# Patient Record
Sex: MALE | Race: WHITE | NOT HISPANIC OR LATINO | Employment: OTHER | URBAN - METROPOLITAN AREA
[De-identification: names, ages, dates, MRNs, and addresses within clinical notes are randomized per-mention and may not be internally consistent; named-entity substitution may affect disease eponyms.]

---

## 2017-07-11 ENCOUNTER — OFFICE VISIT (OUTPATIENT)
Dept: NEUROLOGY | Facility: CLINIC | Age: 37
End: 2017-07-11
Payer: MEDICARE

## 2017-07-11 VITALS
DIASTOLIC BLOOD PRESSURE: 68 MMHG | BODY MASS INDEX: 29.27 KG/M2 | HEART RATE: 84 BPM | WEIGHT: 228 LBS | OXYGEN SATURATION: 96 % | SYSTOLIC BLOOD PRESSURE: 105 MMHG

## 2017-07-11 DIAGNOSIS — G40.909 SEIZURE DISORDER (H): Primary | ICD-10-CM

## 2017-07-11 LAB
ALBUMIN SERPL-MCNC: 3.8 G/DL (ref 3.4–5)
ALP SERPL-CCNC: 38 U/L (ref 40–150)
ALT SERPL W P-5'-P-CCNC: 17 U/L (ref 0–70)
ANION GAP SERPL CALCULATED.3IONS-SCNC: 5 MMOL/L (ref 3–14)
AST SERPL W P-5'-P-CCNC: 11 U/L (ref 0–45)
BASOPHILS # BLD AUTO: 0 10E9/L (ref 0–0.2)
BASOPHILS NFR BLD AUTO: 0.2 %
BILIRUB SERPL-MCNC: 0.3 MG/DL (ref 0.2–1.3)
BUN SERPL-MCNC: 15 MG/DL (ref 7–30)
CALCIUM SERPL-MCNC: 9.5 MG/DL (ref 8.5–10.1)
CHLORIDE SERPL-SCNC: 107 MMOL/L (ref 94–109)
CO2 SERPL-SCNC: 26 MMOL/L (ref 20–32)
CREAT SERPL-MCNC: 0.9 MG/DL (ref 0.66–1.25)
DIFFERENTIAL METHOD BLD: ABNORMAL
EOSINOPHIL # BLD AUTO: 0.1 10E9/L (ref 0–0.7)
EOSINOPHIL NFR BLD AUTO: 2 %
ERYTHROCYTE [DISTWIDTH] IN BLOOD BY AUTOMATED COUNT: 12.7 % (ref 10–15)
GFR SERPL CREATININE-BSD FRML MDRD: ABNORMAL ML/MIN/1.7M2
GLUCOSE SERPL-MCNC: 90 MG/DL (ref 70–99)
HCT VFR BLD AUTO: 38.3 % (ref 40–53)
HGB BLD-MCNC: 13 G/DL (ref 13.3–17.7)
LYMPHOCYTES # BLD AUTO: 1.7 10E9/L (ref 0.8–5.3)
LYMPHOCYTES NFR BLD AUTO: 35.2 %
MCH RBC QN AUTO: 32.4 PG (ref 26.5–33)
MCHC RBC AUTO-ENTMCNC: 33.9 G/DL (ref 31.5–36.5)
MCV RBC AUTO: 96 FL (ref 78–100)
MONOCYTES # BLD AUTO: 0.7 10E9/L (ref 0–1.3)
MONOCYTES NFR BLD AUTO: 13.9 %
NEUTROPHILS # BLD AUTO: 2.4 10E9/L (ref 1.6–8.3)
NEUTROPHILS NFR BLD AUTO: 48.7 %
PLATELET # BLD AUTO: 149 10E9/L (ref 150–450)
POTASSIUM SERPL-SCNC: 3.9 MMOL/L (ref 3.4–5.3)
PROT SERPL-MCNC: 7.5 G/DL (ref 6.8–8.8)
RBC # BLD AUTO: 4.01 10E12/L (ref 4.4–5.9)
SODIUM SERPL-SCNC: 138 MMOL/L (ref 133–144)
VALPROATE SERPL-MCNC: 117 MG/L (ref 50–100)
WBC # BLD AUTO: 4.9 10E9/L (ref 4–11)

## 2017-07-11 PROCEDURE — 80053 COMPREHEN METABOLIC PANEL: CPT | Performed by: PSYCHIATRY & NEUROLOGY

## 2017-07-11 PROCEDURE — 99000 SPECIMEN HANDLING OFFICE-LAB: CPT | Performed by: PSYCHIATRY & NEUROLOGY

## 2017-07-11 PROCEDURE — 80201 ASSAY OF TOPIRAMATE: CPT | Mod: 90 | Performed by: PSYCHIATRY & NEUROLOGY

## 2017-07-11 PROCEDURE — 36415 COLL VENOUS BLD VENIPUNCTURE: CPT | Performed by: PSYCHIATRY & NEUROLOGY

## 2017-07-11 PROCEDURE — 99213 OFFICE O/P EST LOW 20 MIN: CPT | Performed by: PSYCHIATRY & NEUROLOGY

## 2017-07-11 PROCEDURE — 80164 ASSAY DIPROPYLACETIC ACD TOT: CPT | Performed by: PSYCHIATRY & NEUROLOGY

## 2017-07-11 PROCEDURE — 85025 COMPLETE CBC W/AUTO DIFF WBC: CPT | Performed by: PSYCHIATRY & NEUROLOGY

## 2017-07-11 NOTE — PROGRESS NOTES
This patient is seen in follow-up with the chronic encephalopathy and seizure disorder. I last saw him a year ago. He is here today with his  Noamy . By Naomy's report the patient has had no seizures in the last year. His medicines include topiramate 150 mg twice a day and Depakote 500 mg extended release twice a day. He also takes sertraline 150 mg a day, lithium 150 mg in the morning, and risperidone. He has moved to a new group home in [place].    On exam his blood pressures 105/68. The patient is cheerful and interactive to a limited degree. He seems mostly engaged in self stimulatory behavior. There has been no new lab work performed since last year as far as is known.    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 I am going to recheck labs including topiramate and valproate drug levels, CBC, and comprehensive metabolic panel. Follow-up will be in one year. I will communicate the results to his group home when available.      7/14 Addendum: reviewed labs with Naomy at group home.  Topiramate level 6, VPA level 117, Hgb slightly low at 13, and it has been low like that in the past.  plt count okay.  CMP normal.  No change in meds. F/u in one year.

## 2017-07-11 NOTE — NURSING NOTE
"Duane D Backes's goals for this visit include:   Chief Complaint   Patient presents with     RECHECK       He requests these members of his care team be copied on today's visit information: yes     PCP: Rajat Parkinson    Referring Provider:  No referring provider defined for this encounter.    Chief Complaint   Patient presents with     RECHECK       Initial /68  Pulse 84  Wt 103.4 kg (228 lb)  SpO2 96%  BMI 29.27 kg/m2 Estimated body mass index is 29.27 kg/(m^2) as calculated from the following:    Height as of 8/12/16: 1.88 m (6' 2\").    Weight as of this encounter: 103.4 kg (228 lb).  Medication Reconciliation: complete    Do you need any medication refills at today's visit?       "

## 2017-07-11 NOTE — MR AVS SNAPSHOT
After Visit Summary   7/11/2017    Duane D Backes    MRN: [id number]           Patient Information     Date Of Birth          1980        Visit Information        Provider Department      7/11/2017 3:00 PM Davidson Villar MD [facility]        Today's Diagnoses     Seizure disorder (H)    -  1       Follow-ups after your visit        Follow-up notes from your care team     Return in about 1 year (around 7/11/2018).      Your next 10 appointments already scheduled     Jul 10, 2018  3:30 PM CDT   Return Visit with Davidson Villar MD   [facility] ([facility])    [address] [place] [address] [phone]              Who to contact     If you have questions or need follow up information about today's clinic visit or your schedule please contact [facility] directly at [phone].  Normal or non-critical lab and imaging results will be communicated to you by MyChart, letter or phone within 4 business days after the clinic has received the results. If you do not hear from us within 7 days, please contact the clinic through [email or web address] Ã¨â€¹Â±Ã¨â€¦Â¾Ã§Â½â€˜hart or phone. If you have a critical or abnormal lab result, we will notify you by phone as soon as possible.  Submit refill requests through [employer] or call your pharmacy and they will forward the refill request to us. Please allow 3 business days for your refill to be completed.          Additional Information About Your Visit        MyChart Information     [employer] is an electronic gateway that provides easy, online access to your medical records. With [employer], you can request a clinic appointment, read your test results, renew a prescription or communicate with your care team.     To sign up for [employer] visit the website at [email or web address]   You will be asked to enter the access code listed below, as well as some personal information. Please follow the directions  to create your username and password.     Your access code is: [email or web address]  Expires: 10/9/2017  3:30 PM     Your access code will  in 90 days. If you need help or a new code, please contact your [facility] or call [phone] for assistance.        Care EveryWhere ID     This is your Care EveryWhere ID. This could be used by other organizations to access your [facility] medical records  [id number]        Your Vitals Were     Pulse Pulse Oximetry BMI (Body Mass Index)             84 96% 29.27 kg/m2          Blood Pressure from Last 3 Encounters:   17 105/68   16 117/72   16 114/71    Weight from Last 3 Encounters:   17 103.4 kg (228 lb)   16 102.1 kg (225 lb)   16 98.4 kg (217 lb)              We Performed the Following     CBC with platelets differential     Comprehensive metabolic panel     Topiramate Level     Valproic acid        Primary Care Provider Office Phone # Fax #    Rajat Parkinson [phone]       [facility] [address]  [facility] [address]        Equal Access to Services     MIKEY FROST : Hadii aad ku hadasho Soomaali, waaxda luqadaha, qaybta kaalmada adeegyada, waxay juan davidin hayfilomenan adán vicente. So [facility] [phone].    ATENCIÓN: Si habla español, tiene a mcnally disposición servicios gratuitos de asistencia lingüística. Llame al [phone].    We comply with applicable federal civil rights laws and [place] laws. We do not discriminate on the basis of race, color, national origin, age, disability sex, sexual orientation or gender identity.            Thank you!     Thank you for choosing [facility]  for your care. Our goal is always to provide you with excellent care. Hearing back from our patients is one way we can continue to improve our services. Please take a few minutes to complete the written survey that you may receive in the mail after your visit with us.  Thank you!             Your Updated Medication List - Protect others around you: Learn how to safely use, store and throw away your medicines at www.disposemymeds.org.          This list is accurate as of: 7/11/17  3:30 PM.  Always use your most recent med list.                   Brand Name Dispense Instructions for use Diagnosis    Briefs Large Misc     90    use prn during night for incontinence    Moderate intellectual disabilities, Autistic disorder, current or active state       diazepam 2 MG tablet    VALIUM    10 tablet    Take 1 tablet (2 mg) by mouth once as needed (60 minutes prior to medical or dental appointment)    Active autistic disorder, Moderate mental retardation, Obsessive-compulsive disorder       divalproex 500 MG 24 hr tablet    DEPAKOTE ER     Take 3 tablets by mouth 2 times daily.        docusate sodium 100 MG capsule    COLACE     Take 1 capsule by mouth 2 times daily.        EUCERIN Lotn      Externally apply topically daily        fish oil-omega-3 fatty acids 1000 MG capsule     60    1 PO BID    Autistic disorder, current or active state       lactulose 10 GM/15ML solution    CHRONULAC     Take 30 mLs by mouth daily. & additional 30mL 2 times per day as needed        levothyroxine 175 MCG tablet    SYNTHROID/LEVOTHROID    30 tablet    Take 1 tablet (175 mcg) by mouth daily for thyroid.    Hypothyroidism       lithium 150 MG capsule      150 mg 1 cap a.m., ., 3 caps bedtime        * MILK OF MAGNESIA PO      Take  by mouth. 30 cc every PM.        * MILK OF MAGNESIA 400 MG/5ML suspension   Generic drug:  magnesium hydroxide           polyethylene glycol powder    MIRALAX    510 g    Take 17 g by mouth daily.    Constipation       RISPERDAL 2 MG tablet   Generic drug:  risperiDONE      Take 2 mg by mouth 1 tab every morning & 1 tab every bedtime        sertraline 100 MG tablet    ZOLOFT     Take 1.5 tablets by mouth every morning.        * topiramate 100 MG tablet    TOPAMAX     Take 1 tablet  by mouth 2 times daily.        * topiramate 50 MG tablet    TOPAMAX    60 tablet    Take 1 tablet (50 mg) by mouth 2 times daily    Generalized convulsive epilepsy (H)       Vitamin D (Cholecalciferol) 1000 UNITS Caps     90 capsule    Take 3,000 Units by mouth daily    Vitamin D deficiency       * Notice:  This list has 4 medication(s) that are the same as other medications prescribed for you. Read the directions carefully, and ask your doctor or other care provider to review them with you.

## 2017-07-11 NOTE — LETTER
7/11/2017      RE: Duane D Backes  [address]  [facility] [address]       This patient is seen in follow-up with the chronic encephalopathy and seizure disorder. I last saw him a year ago. He is here today with his  Naomy . By Naomy's report the patient has had no seizures in the last year. His medicines include topiramate 150 mg twice a day and Depakote 500 mg extended release twice a day. He also takes sertraline 150 mg a day, lithium 150 mg in the morning, and risperidone. He has moved to a new group home in [place].    On exam his blood pressures 105/68. The patient is cheerful and interactive to a limited degree. He seems mostly engaged in self stimulatory behavior. There has been no new lab work performed since last year as far as is known.    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 I am going to recheck labs including topiramate and valproate drug levels, CBC, and comprehensive metabolic panel. Follow-up will be in one year. I will communicate the results to his group home when available.      7/14 Addendum: reviewed labs with Naomy at group home.  Topiramate level 6, VPA level 117, Hgb slightly low at 13, and it has been low like that in the past.  plt count okay.  CMP normal.  No change in meds. F/u in one year.    Davidson Villar MD

## 2017-07-13 LAB — TOPIRAMATE SERPL-MCNC: 6 UG/ML

## 2017-08-03 ENCOUNTER — MEDICAL CORRESPONDENCE (OUTPATIENT)
Dept: HEALTH INFORMATION MANAGEMENT | Facility: CLINIC | Age: 37
End: 2017-08-03

## 2017-08-15 ENCOUNTER — OFFICE VISIT (OUTPATIENT)
Dept: FAMILY MEDICINE | Facility: CLINIC | Age: 37
End: 2017-08-15
Payer: MEDICARE

## 2017-08-15 VITALS
DIASTOLIC BLOOD PRESSURE: 73 MMHG | HEART RATE: 75 BPM | HEIGHT: 74 IN | SYSTOLIC BLOOD PRESSURE: 120 MMHG | TEMPERATURE: 97.5 F | WEIGHT: 225.6 LBS | OXYGEN SATURATION: 98 % | BODY MASS INDEX: 28.95 KG/M2

## 2017-08-15 DIAGNOSIS — E03.4 HYPOTHYROIDISM DUE TO ACQUIRED ATROPHY OF THYROID: ICD-10-CM

## 2017-08-15 DIAGNOSIS — E66.3 OVERWEIGHT (BMI 25.0-29.9): ICD-10-CM

## 2017-08-15 DIAGNOSIS — Z00.00 ENCOUNTER FOR ROUTINE ADULT HEALTH EXAMINATION WITHOUT ABNORMAL FINDINGS: Primary | ICD-10-CM

## 2017-08-15 DIAGNOSIS — Z13.1 SCREENING FOR DIABETES MELLITUS: ICD-10-CM

## 2017-08-15 DIAGNOSIS — Z86.39 PERSONAL HISTORY OF OTHER ENDOCRINE, NUTRITIONAL AND METABOLIC DISEASE: ICD-10-CM

## 2017-08-15 DIAGNOSIS — Z13.6 CARDIOVASCULAR SCREENING; LDL GOAL LESS THAN 160: ICD-10-CM

## 2017-08-15 LAB
CHOLEST SERPL-MCNC: 152 MG/DL
GLUCOSE SERPL-MCNC: 91 MG/DL (ref 70–99)
HBA1C MFR BLD: 4.9 % (ref 4.3–6)
HDLC SERPL-MCNC: 60 MG/DL
LDLC SERPL CALC-MCNC: 73 MG/DL
NONHDLC SERPL-MCNC: 92 MG/DL
T4 FREE SERPL-MCNC: 0.85 NG/DL (ref 0.76–1.46)
TRIGL SERPL-MCNC: 94 MG/DL
TSH SERPL DL<=0.005 MIU/L-ACNC: 4.38 MU/L (ref 0.4–4)

## 2017-08-15 PROCEDURE — 80061 LIPID PANEL: CPT | Performed by: FAMILY MEDICINE

## 2017-08-15 PROCEDURE — 82947 ASSAY GLUCOSE BLOOD QUANT: CPT | Performed by: FAMILY MEDICINE

## 2017-08-15 PROCEDURE — 83036 HEMOGLOBIN GLYCOSYLATED A1C: CPT | Performed by: FAMILY MEDICINE

## 2017-08-15 PROCEDURE — 36415 COLL VENOUS BLD VENIPUNCTURE: CPT | Performed by: FAMILY MEDICINE

## 2017-08-15 PROCEDURE — 99395 PREV VISIT EST AGE 18-39: CPT | Performed by: FAMILY MEDICINE

## 2017-08-15 PROCEDURE — 84443 ASSAY THYROID STIM HORMONE: CPT | Performed by: FAMILY MEDICINE

## 2017-08-15 PROCEDURE — 84439 ASSAY OF FREE THYROXINE: CPT | Performed by: FAMILY MEDICINE

## 2017-08-15 RX ORDER — AMMONIUM LACTATE 12 G/100G
LOTION TOPICAL DAILY
COMMUNITY

## 2017-08-15 NOTE — LETTER
[address] [address]  Phone: [phone]       August 18, 2017      Duane D Backes  [address] [place] [id number]            Dear Duane D Backes    Your follow-up TSH is abnormal suggesting you are currently undertreated.  I suggest we change your levothyroxine dose to 200 micrograms/day and recheck your TSH in 2-3 months.   All of the rest of your test results were within the expected range for you.      Enclosed is a copy of the results.  It was a pleasure to see you at your last appointment.    If you have any questions or concerns, please call myself or my nurse at [phone].      Sincerely,      Rajat Parkinson MD/TANJA Lopez MA      Results for orders placed or performed in visit on 08/15/17   TSH with free T4 reflex   Result Value Ref Range    TSH 4.38 (H) 0.40 - 4.00 mU/L   Lipid panel reflex to direct LDL   Result Value Ref Range    Cholesterol 152 <200 mg/dL    Triglycerides 94 <150 mg/dL    HDL Cholesterol 60 >39 mg/dL    LDL Cholesterol Calculated 73 <100 mg/dL    Non HDL Cholesterol 92 <130 mg/dL   Glucose   Result Value Ref Range    Glucose 91 70 - 99 mg/dL   Hemoglobin A1c   Result Value Ref Range    Hemoglobin A1C 4.9 4.3 - 6.0 %   T4 free   Result Value Ref Range    T4 Free 0.85 0.76 - 1.46 ng/dL

## 2017-08-15 NOTE — MR AVS SNAPSHOT
After Visit Summary   8/15/2017    Duane D Backes    MRN: [id number]           Patient Information     Date Of Birth          1980        Visit Information        Provider Department      8/15/2017 3:40 PM Rajat Parkinson MD [facility]        Today's Diagnoses     Encounter for routine adult health examination without abnormal findings    -  1    Hypothyroidism due to acquired atrophy of thyroid        Overweight (BMI 25.0-29.9)        CARDIOVASCULAR SCREENING; LDL GOAL LESS THAN 160        Screening for diabetes mellitus        Personal history of other endocrine, nutritional and metabolic disease           Care Instructions      Preventive Health Recommendations  Male Ages 26 - 39    Yearly exam:             See your health care provider every year in order to  o   Review health changes.   o   Discuss preventive care.    o   Review your medicines if your doctor has prescribed any.    You should be tested each year for STDs (sexually transmitted diseases), if you re at risk.     After age 35, talk to your provider about cholesterol testing. If you are at risk for heart disease, have your cholesterol tested at least every 5 years.     If you are at risk for diabetes, you should have a diabetes test (fasting glucose).  Shots: Get a flu shot each year. Get a tetanus shot every 10 years.     Nutrition:    Eat at least 5 servings of fruits and vegetables daily.     Eat whole-grain bread, whole-wheat pasta and brown rice instead of white grains and rice.     Talk to your provider about Calcium and Vitamin D.     Lifestyle    Exercise for at least 150 minutes a week (30 minutes a day, 5 days a week). This will help you control your weight and prevent disease.     Limit alcohol to one drink per day.     No smoking.     Wear sunscreen to prevent skin cancer.     See your dentist every six months for an exam and cleaning.             Follow-ups after your visit        Follow-up notes  "from your care team     Return in about 1 year (around 8/15/2018) for full physical.      Your next 10 appointments already scheduled     Jul 10, 2018  3:30 PM CDT   Return Visit with Davidson Villar MD   [facility] ([facility]    [address] [place] [address] [phone]              Who to contact     If you have questions or need follow up information about today's clinic visit or your schedule please contact [facility] directly at [phone].  Normal or non-critical lab and imaging results will be communicated to you by [employer], letter or phone within 4 business days after the clinic has received the results. If you do not hear from us within 7 days, please contact the clinic through [employer] or phone. If you have a critical or abnormal lab result, we will notify you by phone as soon as possible.  Submit refill requests through [employer] or call your pharmacy and they will forward the refill request to us. Please allow 3 business days for your refill to be completed.          Additional Information About Your Visit        MyChart Information     [employer] lets you send messages to your doctor, view your test results, renew your prescriptions, schedule appointments and more. To sign up, go to [email or web address] . Click on \"Log in\" on the left side of the screen, which will take you to the Welcome page. Then click on \"Sign up Now\" on the right side of the page.     You will be asked to enter the access code listed below, as well as some personal information. Please follow the directions to create your username and password.     Your access code is: [email or web address]  Expires: 10/9/2017  3:30 PM     Your access code will  in 90 days. If you need help or a new code, please call your [facility] or [phone].        Care EveryWhere ID     This is your Care EveryWhere ID. This could be used by other organizations to access your " "[facility] medical records  [id number]        Your Vitals Were     Pulse Temperature Height Pulse Oximetry BMI (Body Mass Index)       75 97.5  F (36.4  C) (Oral) 6' 2.02\" (1.88 m) 98% 28.95 kg/m2        Blood Pressure from Last 3 Encounters:   08/15/17 120/73   07/11/17 105/68   11/08/16 117/72    Weight from Last 3 Encounters:   08/15/17 225 lb 9.6 oz (102.3 kg)   07/11/17 228 lb (103.4 kg)   11/08/16 225 lb (102.1 kg)              We Performed the Following     Glucose     Hemoglobin A1c     Lipid panel reflex to direct LDL     T4 free     TSH with free T4 reflex        Primary Care Provider Office Phone # Fax #    Rajat Parkinson [phone] [address] [place]  [facility] [address]        Equal Access to Services     [facility] Center[facility] : Hadii aad ku hadasho Soomaali, waaxda luqadaha, qaybta kaalmada adeegyada, waxay idiin hayfilomenan adán correa . So [facility] [phone].    ATENCIÓN: Si habla español, tiene a mcnally disposición servicios gratuitos de asistencia lingüística. Camila al [phone].    We comply with applicable federal civil rights laws and [place] laws. We do not discriminate on the basis of race, color, national origin, age, disability sex, sexual orientation or gender identity.            Thank you!     Thank you for choosing [facility]  for your care. Our goal is always to provide you with excellent care. Hearing back from our patients is one way we can continue to improve our services. Please take a few minutes to complete the written survey that you may receive in the mail after your visit with us. Thank you!             Your Updated Medication List - Protect others around you: Learn how to safely use, store and throw away your medicines at www.disposemymeds.org.          This list is accurate as of: 8/15/17 11:59 PM.  Always use your most recent med list.                   Brand Name Dispense Instructions for use Diagnosis    ammonium lactate 12 % " cream    AMLACTIN     Apply topically daily as needed for dry skin        Briefs Large Misc     90    use prn during night for incontinence    Moderate intellectual disabilities, Autistic disorder, current or active state       diazepam 2 MG tablet    VALIUM    10 tablet    Take 1 tablet (2 mg) by mouth once as needed (60 minutes prior to medical or dental appointment)    Active autistic disorder, Moderate mental retardation, Obsessive-compulsive disorder       divalproex 500 MG 24 hr tablet    DEPAKOTE ER     Take 3 tablets by mouth 2 times daily.        docusate sodium 100 MG capsule    COLACE     Take 1 capsule by mouth 2 times daily.        EUCERIN Lotn      Externally apply topically daily        fish oil-omega-3 fatty acids 1000 MG capsule     60    1 PO BID    Autistic disorder, current or active state       lactulose 10 GM/15ML solution    CHRONULAC     Take 30 mLs by mouth daily. & additional 30mL 2 times per day as needed        levothyroxine 175 MCG tablet    SYNTHROID/LEVOTHROID    30 tablet    Take 1 tablet (175 mcg) by mouth daily for thyroid.    Hypothyroidism       lithium 150 MG capsule      150 mg 1 cap a.m., ., 3 caps bedtime        * MILK OF MAGNESIA PO      Take  by mouth. 30 cc every PM.        * MILK OF MAGNESIA 400 MG/5ML suspension   Generic drug:  magnesium hydroxide           polyethylene glycol powder    MIRALAX    510 g    Take 17 g by mouth daily.    Constipation       RISPERDAL 2 MG tablet   Generic drug:  risperiDONE      Take 2 mg by mouth 1 tab every morning & 1 tab every bedtime        sertraline 100 MG tablet    ZOLOFT     Take 1.5 tablets by mouth every morning.        * topiramate 100 MG tablet    TOPAMAX     Take 1 tablet by mouth 2 times daily.        * topiramate 50 MG tablet    TOPAMAX    60 tablet    Take 1 tablet (50 mg) by mouth 2 times daily    Generalized convulsive epilepsy (H)       Vitamin D (Cholecalciferol) 1000 UNITS Caps     90 capsule    Take 3,000 Units by  mouth daily    Vitamin D deficiency       * Notice:  This list has 4 medication(s) that are the same as other medications prescribed for you. Read the directions carefully, and ask your doctor or other care provider to review them with you.

## 2017-08-15 NOTE — NURSING NOTE
"Chief Complaint   Patient presents with     Physical       Initial /73 (BP Location: Left arm, Patient Position: Chair, Cuff Size: Adult Regular)  Pulse 75  Temp 97.5  F (36.4  C) (Oral)  Ht 6' 2.02\" (1.88 m)  Wt 225 lb 9.6 oz (102.3 kg)  SpO2 98%  BMI 28.95 kg/m2 Estimated body mass index is 28.95 kg/(m^2) as calculated from the following:    Height as of this encounter: 6' 2.02\" (1.88 m).    Weight as of this encounter: 225 lb 9.6 oz (102.3 kg).  Medication Reconciliation: complete   Saige Fang CMA      "

## 2017-08-15 NOTE — PROGRESS NOTES
SUBJECTIVE:   CC: Duane D Backes is an 37 year old male who presents for preventative health visit.   He is accompanied by his group home attendant.     Healthy Habits:    Do you get at least three servings of calcium containing foods daily (dairy, green leafy vegetables, etc.)? yes    Amount of exercise or daily activities, outside of work: 1-2 day(s) per week    Problems taking medications regularly No    Medication side effects: No    Have you had an eye exam in the past two years? yes    Do you see a dentist twice per year? yes  Do you have sleep apnea, excessive snoring or daytime drowsiness?yes- possibly      Today's PHQ-2 Score:   PHQ-2 ( 1999 Pf[employer]) 8/6/2015 7/29/2014   Q1: Little interest or pleasure in doing things 0 0   Q2: Feeling down, depressed or hopeless 0 0   PHQ-2 Score 0 0     Unable to answer PHQ2  Abuse: Current or Past(Physical, Sexual or Emotional)- No  Do you feel safe in your environment - Yes    Social History   Substance Use Topics     Smoking status: Never Smoker     Smokeless tobacco: Never Used     Alcohol use No     Pt does not drink alcoholic drinks.    Pt has had incontinence problem for at least 6 months    ROS:  C: NEGATIVE for fever, chills, change in weight  I: NEGATIVE for worrisome rashes, moles or lesions  E: NEGATIVE for vision changes or irritation  ENT: NEGATIVE for ear, mouth and throat problems  R: NEGATIVE for significant cough or SOB  CV: NEGATIVE for chest pain, palpitations or peripheral edema  GI: NEGATIVE for nausea, abdominal pain, heartburn, or change in bowel habits   male: negative for dysuria, hematuria, decreased urinary stream, erectile dysfunction, urethral discharge  M: NEGATIVE for significant arthralgias or myalgia  N: NEGATIVE for weakness, dizziness or paresthesias  P: NEGATIVE for changes in mood or affect    Any history above obtained by the Medical Assistant was reviewed by Dr. Rajat Parkinson MD, and edited when necessary.    This document  "serves as a record of the services and decisions personally performed and made by Dr. Parkinson. It was created on his behalf by Amber Santana, a trained medical scribe. The creation of this document is based the provider's statements to the medical scribe.  Amber Santana August 15, 2017 4:25 PM      OBJECTIVE:   /73 (BP Location: Left arm, Patient Position: Chair, Cuff Size: Adult Regular)  Pulse 75  Temp 97.5  F (36.4  C) (Oral)  Ht 1.88 m (6' 2.02\")  Wt 102.3 kg (225 lb 9.6 oz)  SpO2 98%  BMI 28.95 kg/m2     EXAM:  GENERAL: healthy, alert and no distress  EYES: Eyes grossly normal to inspection, PERRL and conjunctivae and sclerae normal  HENT: ear canals and TM's normal, nose and mouth without ulcers or lesions  NECK: no adenopathy, no asymmetry, masses, or scars and thyroid normal to palpation  RESP: lungs clear to auscultation, no crackles or wheezes, no areas of dullness, no tachypnea   CV: regular rate and rhythm, normal S1 S2, no S3 or S4, no murmur, click or rub, no peripheral edema and peripheral pulses strong  ABDOMEN: soft, nontender, no hepatosplenomegaly, no masses and bowel sounds normal   (male): normal male genitalia without lesions or urethral discharge, no hernia  MS: no gross musculoskeletal defects noted, no edema  SKIN: no suspicious lesions or rashes  NEURO: Normal strength and tone, mentation intact and speech normal, DTRs symmetrical, cranial nerves 2-12 intact   PSYCH: autistic, occasional verbalization but no conversation, very cooperative with the exam, affect normal/bright     ASSESSMENT/PLAN:   (Z00.00) Encounter for routine adult health examination without abnormal findings  (primary encounter diagnosis)  Comment: Negative screening exam; up-to-date on preventive services.   Plan: Lipid panel reflex to direct LDL, Glucose,         Hemoglobin A1c        Follow up in 1 year.    (E03.4) Hypothyroidism due to acquired atrophy of thyroid  Comment: Last TSH was elevated but " "the free T4 was normal.  Plan: TSH with free T4 reflex        Adjust dose as needed.    (E66.3) Overweight (BMI 25.0-29.9)  Comment: weight increase over the last 3 years apparently due to ill-advised excess calories provided by family members during visits  Plan: Lipid panel reflex to direct LDL, Glucose,         Hemoglobin A1c        I told his attendant to review diet recommendations that were prescribed in 2014 and re-institute them. They can contact me for formal orders/signature if needed.    (Z13.6) CARDIOVASCULAR SCREENING; LDL GOAL LESS THAN 160  Comment: Non-fasting (last ate 3:00pm). Historically at goal.  Plan: Lipid panel reflex to direct LDL        Monitor periodically.    (Z13.1) Screening for diabetes mellitus  Comment:   Plan: Glucose, Hemoglobin A1c            COUNSELING:  Reviewed preventive health counseling, as reflected in patient instructions  Special attention given to:        Regular exercise       Healthy diet/nutrition       reports that he has never smoked. He has never used smokeless tobacco.    Estimated body mass index is 28.95 kg/(m^2) as calculated from the following:    Height as of this encounter: 1.88 m (6' 2.02\").    Weight as of this encounter: 102.3 kg (225 lb 9.6 oz).   Weight management plan: resume calorie restriction as described above    Counseling Resources:  ATP IV Guidelines  Pooled Cohorts Equation Calculator  FRAX Risk Assessment  ICSI Preventive Guidelines  Dietary Guidelines for Americans, 2010  USDA's MyPlate  ASA Prophylaxis  Lung CA Screening    The information in this document, created by the medical scribe for me, accurately reflects the services I personally performed and the decisions made by me. I have reviewed and approved this document for accuracy prior to leaving the patient care area. August 15, 2017 4:25 PM      Rajat Parkinson MD  "

## 2017-08-18 ENCOUNTER — TELEPHONE (OUTPATIENT)
Dept: FAMILY MEDICINE | Facility: CLINIC | Age: 37
End: 2017-08-18

## 2017-08-18 RX ORDER — LEVOTHYROXINE SODIUM 200 UG/1
200 TABLET ORAL DAILY
Qty: 30 TABLET | Refills: 11 | Status: SHIPPED | OUTPATIENT
Start: 2017-08-18 | End: 2019-08-28

## 2017-08-18 NOTE — TELEPHONE ENCOUNTER
Returned call from [facility] Spoke to Maurilio, advised of below results. Voiced understanding and requests copy of Dr. Parkinson' letter, labs and RX for synthroid.  TC- can you please fax to fax # [facility] [phone]  Ruth Marrero RN. '

## 2017-08-18 NOTE — TELEPHONE ENCOUNTER
Notes Recorded by Rajat Parkinson MD on 8/18/2017 at 7:23 AM  Mr. Green,    Your follow-up TSH is abnormal suggesting you are currently undertreated.  I suggest we change your levothyroxine dose to 200 micrograms/day and recheck your TSH in 2-3 months.  All of the rest of your test results were within the expected range for you.    Please contact the clinic if you have additional questions.  Thank you.    Sincerely,      Rajat Parkinson MD    This writer attempted to contact Duane on 08/18/17      Reason for call relay results and left message to return call with group home staff.      When patient calls back, please contact clinic RN team. If no one available, document that pt called and route to care team. routine priority.        Shelby Bowman RN

## 2017-08-18 NOTE — TELEPHONE ENCOUNTER
Printed letter with lab results and faxed to Maurilio at [facility], with a note that Levothyroxine was e-scribed to [facility],  [phone], right fax confirmed at 10:15 am today.  Linda Bhatt MA/  [facility]

## 2017-10-09 DIAGNOSIS — F84.0 ACTIVE AUTISTIC DISORDER: ICD-10-CM

## 2017-10-09 DIAGNOSIS — F71 MODERATE MENTAL RETARDATION: ICD-10-CM

## 2017-10-09 NOTE — TELEPHONE ENCOUNTER
diazepam (VALIUM) 2 MG tablet      Last Written Prescription Date:  08/11/16  Last Fill Quantity: 10,   # refills: 0  Last [place] [facility] [facility] or [facility] prescribing provider: 08/15/17  Future Office visit:       Routing refill request to provider for review/approval because:  Drug not on the [facility] or [facility] refill protocol or controlled substance      Chely Paul  [facility]

## 2017-10-10 RX ORDER — DIAZEPAM 2 MG
2 TABLET ORAL
Qty: 10 TABLET | Refills: 0 | Status: SHIPPED | OUTPATIENT
Start: 2017-10-10 | End: 2018-10-18

## 2017-10-10 NOTE — TELEPHONE ENCOUNTER
Faxed RX October 10, 2017 to fax number [phone]    Right Fax confirmed at 1:04 PM    LENA Stone MA

## 2017-11-07 ENCOUNTER — TELEPHONE (OUTPATIENT)
Dept: FAMILY MEDICINE | Facility: CLINIC | Age: 37
End: 2017-11-07

## 2017-11-07 NOTE — TELEPHONE ENCOUNTER
Reason for call:  Patient reporting a symptom    Symptom or request: Dry lips, face is turning red, not sure if spreading. Sometimes he will drink from his hand and rub it on his face. Not sure if possible it is spreading, not sure if it is dry skin or a rash and if patient needs to be seen?    Duration (how long have symptoms been present): first notices the evening of 10/5/2017    Have you been treated for this before? No    Additional comments:     Phone Number patient can be reached at:  Other phone number:    [facility] [phone]         Best Time:  any    Can we leave a detailed message on this number:  YES    Call taken on 11/7/2017 at 4:02 PM by Taya López

## 2017-11-08 NOTE — TELEPHONE ENCOUNTER
Maurilio was told to call if any changes  Patient rash has improved with lubriderm and vaseline    Patient will be keeping his appointment with you tomorrow

## 2017-11-08 NOTE — TELEPHONE ENCOUNTER
[place] and Maurilio states that the two days the patient is having red dry patches on face, it is spreading. Notes change in lithium dose. It has been decreased. Reviewed medication in [facility]. Medication does have dermatological adverse reactions. Advised for the patient to be seen. Scheduled for Thursday as Maurilio notes that is the soonest they are able to get the patient in.    Chanelle López RN, [facility] Triage

## 2017-11-09 ENCOUNTER — OFFICE VISIT (OUTPATIENT)
Dept: FAMILY MEDICINE | Facility: CLINIC | Age: 37
End: 2017-11-09
Payer: MEDICARE

## 2017-11-09 VITALS
SYSTOLIC BLOOD PRESSURE: 132 MMHG | DIASTOLIC BLOOD PRESSURE: 76 MMHG | OXYGEN SATURATION: 99 % | BODY MASS INDEX: 28.59 KG/M2 | WEIGHT: 222.8 LBS | HEIGHT: 74 IN | HEART RATE: 75 BPM

## 2017-11-09 DIAGNOSIS — L71.0 PERIORAL DERMATITIS: Primary | ICD-10-CM

## 2017-11-09 PROCEDURE — 99213 OFFICE O/P EST LOW 20 MIN: CPT | Performed by: FAMILY MEDICINE

## 2017-11-09 RX ORDER — TRIAMCINOLONE ACETONIDE 1 MG/G
OINTMENT TOPICAL
Qty: 30 G | Refills: 0 | Status: SHIPPED | OUTPATIENT
Start: 2017-11-09 | End: 2017-11-23

## 2017-11-09 RX ORDER — LANOLIN ALCOHOL/MO/W.PET/CERES
CREAM (GRAM) TOPICAL
Qty: 480 ML | Refills: 0 | Status: SHIPPED | OUTPATIENT
Start: 2017-11-09 | End: 2018-05-17

## 2017-11-09 NOTE — PROGRESS NOTES
"  SUBJECTIVE:   Duane D Backes is a 37 year old male who presents to clinic today for the following health issues:  He is accompanied by his group home attendant.     Concern - Derm  Onset: 11/03/17    Description: redness on the face    Intensity: moderate    Progression of Symptoms:  improving    Accompanying Signs & Symptoms:  Redness on the face    Previous history of similar problem:   none    Precipitating factors:   Worsened by: none    Alleviating factors:  Improved by: Lotion  (Lubriderm)    Therapies Tried and outcome: none    His group home attendant notes that the patient's parents informed the attendant that he has a habit of splashing water on his face and drinking from the faucet (which has recently increased due to his increased dose of lithium).     Past medical, family, and social histories, medications, and allergies are reviewed and updated in Epic.     ROS:  C: NEGATIVE for fever, chills, change in weight  E/M: NEGATIVE for ear, mouth and throat problems  R: NEGATIVE for significant cough or SOB  CV: NEGATIVE for chest pain, palpitations or peripheral edema  ROS otherwise negative    This document serves as a record of the services and decisions personally performed and made by Dr. Parkinson. It was created on his behalf by Amber Santana, a trained medical scribe. The creation of this document is based the provider's statements to the medical scribe.  Amber Santana November 9, 2017 4:09 PM     OBJECTIVE:                                                    /76 (BP Location: Left arm, Patient Position: Chair, Cuff Size: Adult Regular)  Pulse 75  Ht 1.88 m (6' 2.02\")  Wt 101.1 kg (222 lb 12.8 oz)  SpO2 99%  BMI 28.59 kg/m2   Body mass index is 28.59 kg/(m^2).     GENERAL: healthy, alert and no distress  EYES: Eyes grossly normal to inspection, PERRL, EOMI, sclerae white and conjunctivae normal  MS: no gross musculoskeletal defects noted, no edema  SKIN: Generalized erythema in the " greater perioral region including all of the chin and up past the nasolabial folds. There's a small patch on the bridge of the nose as well. Minimal if any scale present.  NEURO: Normal strength and tone, sensory exam grossly normal, and cranial nerves 2-12 intact  PSYCH: autistic, occasional verbalization but no conversation, affect normal/bright    Diagnostic Test Results:  none      ASSESSMENT/PLAN:                                                      (L71.0) Perioral dermatitis  (primary encounter diagnosis)  Comment: Secondary to water and air exposure (secondary to polydipsia from his lithium).  Plan: triamcinolone (KENALOG) 0.1 % ointment,         Emollient (EUCERIN) LOTN        I don't want a topical steroid used on this area for more than 2 weeks, so I have given specific instructions regarding that. Apply moisturizing cream/lotion (such as Eucerin) 4 times per day. Minimize water exposure to the face. When showering, use as cool of water as tolerated (tepid).     The information in this document, created by the medical scribe for me, accurately reflects the services I personally performed and the decisions made by me. I have reviewed and approved this document for accuracy prior to leaving the patient care area. November 9, 2017 4:09 PM   Rajat Parkinson MD

## 2017-11-09 NOTE — NURSING NOTE
"Chief Complaint   Patient presents with     Derm Problem     Pt's care giver stated that the redness on the face started on 11/03/17 evening. That the nurse stated that it might the Lithium medication.        Initial /76 (BP Location: Left arm, Patient Position: Chair, Cuff Size: Adult Regular)  Pulse 75  Ht 6' 2.02\" (1.88 m)  Wt 222 lb 12.8 oz (101.1 kg)  SpO2 99%  BMI 28.59 kg/m2 Estimated body mass index is 28.59 kg/(m^2) as calculated from the following:    Height as of this encounter: 6' 2.02\" (1.88 m).    Weight as of this encounter: 222 lb 12.8 oz (101.1 kg).  Medication Reconciliation: complete   Pablo Nieto MA        "

## 2018-01-08 ENCOUNTER — TELEPHONE (OUTPATIENT)
Dept: FAMILY MEDICINE | Facility: CLINIC | Age: 38
End: 2018-01-08

## 2018-01-08 NOTE — TELEPHONE ENCOUNTER
Reason for call:  Patient reporting a symptom    Symptom or request: Diarrhea     Duration (how long have symptoms been present): 4 instances today, other group home person was seen yesterday with intestional flu     Have you been treated for this before? No    Additional comments: [facility] wants to know should we need to bring him in please call us back.    Phone Number [facility] Staff can be reached at: [phone]    Best Time:  and    Can we leave a detailed message on this number:  YES    Call taken on 1/8/2018 at 9:42 AM by Anahi Smith

## 2018-01-08 NOTE — TELEPHONE ENCOUNTER
Duane D Backes is a 37 year old male who calls with diarrhea.    NURSING ASSESSMENT:  Description:  Diarrhea 4 times since 2 am  Onset/duration:  2:00 this morning  Precip. factors:  Another resident had a GI bug and patient had a lot more food than he typically does as he had visitors on Sunday.  Associated symptoms:  Denies fever, abdominal pain, trauma,weakness, lethargy, loss of appetite, trouble sleeping, new medications, vomiting, signs of dehydration, rapid or labored breathing, dizziness  Improves/worsens symptoms:  Patient is on BRAT diet now due to diarrhea-just started this morning at breakfast so unable to assess if this has helped at this time.  Pain scale (0-10)   0/10  LMP/preg/breast feeding:  N/A  Last exam/Treatment:  11/9/2017  Allergies:   Allergies   Allergen Reactions     Penicillin [Penicillins] Other (See Comments)     Prozac [Fluoxetine Hcl]      Reglan [Metoclopramide Hcl]      Ritalin [Methylphenidate Derivatives] Other (See Comments)     Ritalin     Trazodone        MEDICATIONS:   Taking medication(s) as prescribed? Yes  Taking over the counter medication(s?) No  Any medication side effects? No significant side effects    Any barriers to taking medication(s) as prescribed?  No  Medication(s) improving/managing symptoms?  N/A  Medication reconciliation completed: No      NURSING PLAN: Nursing advice to patient continue homecare measures and monitor.    RECOMMENDED DISPOSITION:  Home care advice - Continue BRAT diet, continue to monitor vital signs. Increase fluids. Progress to soft foods as tolerated. Be seem immediately if severe abdominal pain occurs, dizziness or weakness occurs, signs of dehydration occur or is diarrhea has blood in it. Be seen if diarrhea persists for more than 2 days with home care measures.  Will comply with recommendation: Yes  If further questions/concerns or if symptoms do not improve, worsen or new symptoms develop, call your PCP or [facility] as  soon as possible.      Guideline used:  Telephone Triage Protocols for Nurses, Fifth Edition, Daisha Barillas  Diarrhea, Adult    rOion Downs RN, BSN

## 2018-01-11 ENCOUNTER — MEDICAL CORRESPONDENCE (OUTPATIENT)
Dept: HEALTH INFORMATION MANAGEMENT | Facility: CLINIC | Age: 38
End: 2018-01-11

## 2018-02-05 ENCOUNTER — OFFICE VISIT (OUTPATIENT)
Dept: FAMILY MEDICINE | Facility: CLINIC | Age: 38
End: 2018-02-05
Payer: MEDICARE

## 2018-02-05 VITALS
HEART RATE: 90 BPM | OXYGEN SATURATION: 98 % | SYSTOLIC BLOOD PRESSURE: 114 MMHG | DIASTOLIC BLOOD PRESSURE: 73 MMHG | TEMPERATURE: 97.3 F

## 2018-02-05 DIAGNOSIS — L72.3 INFLAMED SEBACEOUS CYST: Primary | ICD-10-CM

## 2018-02-05 DIAGNOSIS — L02.02 FURUNCLE OF CHEEK: ICD-10-CM

## 2018-02-05 PROCEDURE — 99213 OFFICE O/P EST LOW 20 MIN: CPT | Performed by: FAMILY MEDICINE

## 2018-02-05 RX ORDER — SULFAMETHOXAZOLE/TRIMETHOPRIM 800-160 MG
1 TABLET ORAL 2 TIMES DAILY
Qty: 20 TABLET | Refills: 0 | Status: SHIPPED | OUTPATIENT
Start: 2018-02-05 | End: 2018-02-15

## 2018-02-05 ASSESSMENT — PAIN SCALES - GENERAL: PAINLEVEL: NO PAIN (0)

## 2018-02-05 NOTE — PROGRESS NOTES
SUBJECTIVE:   Duane D Backes is a 37 year old male who presents to clinic today for the following health issues:  He is accompanied by his group home attendant.       Concern - Lump/Bump     When did it start?: about 2 weeks    Any strain/injury, other illness, or event to trigger this problem?   YES- it was a pimple    Previous history of similar problem:   no      Location:           Left cheek    Describe it:   Bump, red a little pain   Patient itched/scratched at this area occasionally, per group home attendant    Severity: moderate      Progression of symptoms from onset until now:  same      Accompanying Signs & Symptoms:  Bump, red & a little pain    What have you noticed that tends to make this worse?     None      What have you noticed that tends to make this better?     None      What have you done (this time) to try to treat this problem yourself?     None      Did it help?     no           Past medical, family, and social histories, medications, and allergies are reviewed and updated in Epic.     ROS:  C: NEGATIVE for fever, chills, change in weight  E/M: NEGATIVE for ear, mouth and throat problems  R: NEGATIVE for significant cough or SOB  CV: NEGATIVE for chest pain, palpitations or peripheral edema  ROS otherwise negative    This document serves as a record of the services and decisions personally performed and made by Dr. Parkinson. It was created on his behalf by Amber Snatana, a trained medical scribe. The creation of this document is based the provider's statements to the medical scribe.  Amber Santana February 5, 2018 4:57 PM     OBJECTIVE:                                                    /73 (BP Location: Left arm, Patient Position: Chair, Cuff Size: Adult Large)  Pulse 90  Temp 97.3  F (36.3  C) (Oral)  SpO2 98%   There is no height or weight on file to calculate BMI.     GENERAL: healthy, alert and no distress  EYES: Eyes grossly normal to inspection, PERRL, EOMI, sclerae  white and conjunctivae normal  MS: no gross musculoskeletal defects noted, no edema  SKIN: Left cheek: Some nodularity to the skin and evidence of chronic inflammation and there's a central pore suggesting possible development of a sebaceous cyst. The area of erythema is about 14 x 9 mm which is also the area of induration (suggesting possible development of a dermatofibroma).  NEURO: Normal strength and tone, sensory exam grossly normal, and cranial nerves 2-12 intact  PSYCH: autistic, occasional verbalization but no conversation, affect normal/bright     Diagnostic Test Results:  none      ASSESSMENT/PLAN:                                                      (L72.3) Inflamed sebaceous cyst  (primary encounter diagnosis)  (L02.02) Furuncle of cheek  Comment: Possible dermatofibroma.   Plan: sulfamethoxazole-trimethoprim (BACTRIM         DS/SEPTRA DS) 800-160 MG per tablet        Return in about 10 days (around 2/15/2018) for recheck if symptoms of inflammation fail to resolve by then. His attendant is advised that he is likely to have thickened and discolored skin in that location for some time (since he likely has a sebaceous cyst and/or a dermatofibroma there).       The information in this document, created by the medical scribe for me, accurately reflects the services I personally performed and the decisions made by me. I have reviewed and approved this document for accuracy prior to leaving the patient care area. February 5, 2018 4:57 PM   Rajat Parkinson MD

## 2018-02-05 NOTE — PATIENT INSTRUCTIONS
At [facility], we strive to deliver an exceptional experience to you, every time we see you.  If you receive a survey in the mail, please send us back your thoughts. We really do value your feedback.    Based on your medical history, these are the current health maintenance/preventive care services that you are due for (some may have been done at this visit.)  Health Maintenance Due   Topic Date Due     MEDICARE ANNUAL WELLNESS VISIT  07/22/2000     EYE EXAM Q1 YEAR  08/13/2016     INFLUENZA VACCINE (SYSTEM ASSIGNED)  09/01/2017         Suggested websites for health information:  Www.[employer] : Up to date and easily searchable information on multiple topics.  Www.medlineplus.gov : medication info, interactive tutorials, watch real surgeries online  Www.familydoctor.org : good info from the Academy of Family Physicians  Www.cdc.gov : public health info, travel advisories, epidemics (H1N1)  Www.aap.org : children's health info, normal development, vaccinations  Www.health.[facility].[place].us : [place] dept of health, public health issues in [place], N1N1    Your care team:                            Family Medicine Internal Medicine   MD Scooter Gao MD Shantel Branch-Fleming, MD Katya Georgiev PA-C Megan Hill, APRN STEVEN Delgado MD Pediatrics   Jarrell Tinajero PABENOIT Joseph, STEVEN Barrientos APRN CNP   MD Estelle Marvin MD Deborah Mielke, MD Kim Thein, APRN [facility]      Clinic hours: Monday - Thursday 7 am-7 pm; Fridays 7 am-5 pm.   Urgent care: Monday - Friday 11 am-9 pm; Saturday and Sunday 9 am-5 pm.  Pharmacy : Monday -Thursday 8 am-8 pm; Friday 8 am-6 pm; Saturday and Sunday 9 am-5 pm.     Clinic: [phone]   Pharmacy: [phone]

## 2018-02-05 NOTE — MR AVS SNAPSHOT
After Visit Summary   2/5/2018    Duane D Backes    MRN: [id number]           Patient Information     Date Of Birth          1980        Visit Information        Provider Department      2/5/2018 4:40 PM Rajat Parkinson MD [facility]        Today's Diagnoses     Inflamed sebaceous cyst    -  1    Furuncle of cheek          Care Instructions    At [facility], we strive to deliver an exceptional experience to you, every time we see you.  If you receive a survey in the mail, please send us back your thoughts. We really do value your feedback.    Based on your medical history, these are the current health maintenance/preventive care services that you are due for (some may have been done at this visit.)  Health Maintenance Due   Topic Date Due     MEDICARE ANNUAL WELLNESS VISIT  07/22/2000     EYE EXAM Q1 YEAR  08/13/2016     INFLUENZA VACCINE (SYSTEM ASSIGNED)  09/01/2017         Suggested websites for health information:  Www.mymxlog : Up to date and easily searchable information on multiple topics.  Www.medlineplus.gov : medication info, interactive tutorials, watch real surgeries online  Www.familydoctor.org : good info from the Academy of Family Physicians  Www.cdc.gov : public health info, travel advisories, epidemics (H1N1)  Www.aap.org : children's health info, normal development, vaccinations  Www.health.state.[place].us : [place] dept of health, public health issues in [place], N1N1    Your care team:                            Family Medicine Internal Medicine   MD Scooter Gao MD Shantel Branch-Fleming, MD Katya Georgiev PA-C Megan Hill, APRN CNP Nam Ho, MD Pediatrics   RAMA Oliveira, STEVEN Barrientos APRN MD Estelle Goodman MD Deborah Mielke, MD Kim Thein, APRN [facility]      Clinic hours: Monday - Thursday 7 am-7 pm; Fridays 7 am-5 pm.   Urgent care: Monday - Friday 11 am-9 pm;  "Saturday and Sunday 9 am-5 pm.  Pharmacy : Monday -Thursday 8 am-8 pm; Friday 8 am-6 pm; Saturday and Sunday 9 am-5 pm.     Clinic: [phone]   Pharmacy: [phone]              Follow-ups after your visit        Follow-up notes from your care team     Return in about 10 days (around 2/15/2018) for recheck if symptoms fail to resolve by then.      Your next 10 appointments already scheduled     Jul 10, 2018  3:30 PM CDT   [facility] Davidson [facility] MD   [facility]    [address] [address] [place] [address] [phone]              Who to contact     If you have questions or need follow up information about today's clinic visit or your schedule please contact [facility] directly at [phone].  Normal or non-critical lab and imaging results will be communicated to you by MyChart, letter or phone within 4 business days after the clinic has received the results. If you do not hear from us within 7 days, please contact the clinic through [employer] or phone. If you have a critical or abnormal lab result, we will notify you by phone as soon as possible.  Submit refill requests through [employer] or call your pharmacy and they will forward the refill request to us. Please allow 3 business days for your refill to be completed.          Additional Information About Your Visit        [employer] Information     [employer]okia lets you send messages to your doctor, view your test results, renew your prescriptions, schedule appointments and more. To sign up, go to [email or web address] . Click on \"Log in\" on the left side of the screen, which will take you to the Welcome page. Then click on \"Sign up Now\" on the right side of the page.     You will be asked to enter the access code listed below, as well as some personal information. Please follow the directions to create your username and password.     Your access code is: " [email or web address]  Expires: 2018  4:16 PM     Your access code will  in 90 days. If you need help or a new code, please call your [place] clinic or [phone].        Care EveryWhere ID     This is your Care EveryWhere ID. This could be used by other organizations to access your [facility] medical records  [id number]        Your Vitals Were     Pulse Temperature Pulse Oximetry             90 97.3  F (36.3  C) (Oral) 98%          Blood Pressure from Last 3 Encounters:   18 114/73   17 132/76   08/15/17 120/73    Weight from Last 3 Encounters:   17 222 lb 12.8 oz (101.1 kg)   08/15/17 225 lb 9.6 oz (102.3 kg)   17 228 lb (103.4 kg)              Today, you had the following     No orders found for display         Today's Medication Changes          These changes are accurate as of 18  5:03 PM.  If you have any questions, ask your nurse or doctor.               Start taking these medicines.        Dose/Directions    sulfamethoxazole-trimethoprim 800-160 MG per tablet   Commonly known as:  BACTRIM DS/SEPTRA DS   Used for:  Inflamed sebaceous cyst, Furuncle of cheek   Started by:  Rajat Parkinson MD        Dose:  1 tablet   Take 1 tablet by mouth 2 times daily for 10 days for skin infection.   Quantity:  20 tablet   Refills:  0            Where to get your medicines      These medications were sent to [facility]. - [place], [place] - [address]  [address] [place] [address] [address] [address]     Phone:  [phone]     sulfamethoxazole-trimethoprim 800-160 MG per tablet                Primary Care Provider Office Phone # Fax #    Rajat Parkinson [phone] [address]  [place] [facility] [address]        Equal Access to Services     [facility] : Migdalia brisenoo Sovasu, waaxda luqadaha, qaybta kaalmada adeegyalance, concepcion correa . [facility] [phone].    ATENCIÓN: Si elodia díaz, jeanette a mcnally disposición  servicios gratuitos de asistencia lingüística. Camila kim [phone].    We comply with applicable federal civil rights laws and [place] laws. We do not discriminate on the basis of race, color, national origin, age, disability, sex, sexual orientation, or gender identity.            Thank you!     Thank you for choosing [facility]  for your care. Our goal is always to provide you with excellent care. Hearing back from our patients is one way we can continue to improve our services. Please take a few minutes to complete the written survey that you may receive in the mail after your visit with us. Thank you!             Your Updated Medication List - Protect others around you: Learn how to safely use, store and throw away your medicines at www.disposemymeds.org.          This list is accurate as of 2/5/18  5:03 PM.  Always use your most recent med list.                   Brand Name Dispense Instructions for use Diagnosis    ammonium lactate 12 % cream    AMLACTIN     Apply topically daily as needed for dry skin        Briefs Large Misc     90    use prn during night for incontinence    Moderate intellectual disabilities, Autistic disorder, current or active state       diazepam 2 MG tablet    VALIUM    10 tablet    Take 1 tablet (2 mg) by mouth once as needed (60 minutes prior to medical or dental appointment)    Active autistic disorder, Moderate mental retardation       divalproex sodium extended-release 500 MG 24 hr tablet    DEPAKOTE ER     Take 3 tablets by mouth 2 times daily.        docusate sodium 100 MG capsule    COLACE     Take 1 capsule by mouth 2 times daily.        EUCERIN Lotn     480 mL    Apply Eucerin cream to facial rash 4 times per day until cleared.    Perioral dermatitis       fish oil-omega-3 fatty acids 1000 MG capsule     60    1 PO BID    Autistic disorder, current or active state       lactulose 10 GM/15ML solution    CHRONULAC     Take 30 mLs by mouth daily. &  additional 30mL 2 times per day as needed        levothyroxine 200 MCG tablet    SYNTHROID/LEVOTHROID    30 tablet    Take 1 tablet (200 mcg) by mouth daily for thyroid.    Hypothyroidism due to acquired atrophy of thyroid       lithium 150 MG capsule      150 mg 1 cap a.m., ., 3 caps bedtime        * MILK OF MAGNESIA PO      Take  by mouth. 30 cc every PM.        * MILK OF MAGNESIA 400 MG/5ML suspension   Generic drug:  magnesium hydroxide           polyethylene glycol powder    MIRALAX    510 g    Take 17 g by mouth daily.    Constipation       RISPERDAL 2 MG tablet   Generic drug:  risperiDONE      Take 2 mg by mouth 1 tab every morning & 1 tab every bedtime        sertraline 100 MG tablet    ZOLOFT     Take 1.5 tablets by mouth every morning.        sulfamethoxazole-trimethoprim 800-160 MG per tablet    BACTRIM DS/SEPTRA DS    20 tablet    Take 1 tablet by mouth 2 times daily for 10 days for skin infection.    Inflamed sebaceous cyst, Furuncle of cheek       * topiramate 100 MG tablet    TOPAMAX     Take 1 tablet by mouth 2 times daily.        * topiramate 50 MG tablet    TOPAMAX    60 tablet    Take 1 tablet (50 mg) by mouth 2 times daily    Generalized convulsive epilepsy (H)       Vitamin D (Cholecalciferol) 1000 UNITS Caps     90 capsule    Take 3,000 Units by mouth daily    Vitamin D deficiency       * Notice:  This list has 4 medication(s) that are the same as other medications prescribed for you. Read the directions carefully, and ask your doctor or other care provider to review them with you.

## 2018-04-12 ENCOUNTER — TELEPHONE (OUTPATIENT)
Dept: FAMILY MEDICINE | Facility: CLINIC | Age: 38
End: 2018-04-12

## 2018-04-12 NOTE — TELEPHONE ENCOUNTER
Maurilio patient patient's group home contacted back and provided medication list prescribed by Dr Parkinson. Maurilio has other medications on patient's list but told him most likely prescribed by another provider such as specialist. Offered and scheduled an appointment due to pharmacy requesting refills.    John Almendarez CMA

## 2018-04-12 NOTE — TELEPHONE ENCOUNTER
Reason for Call:  Other call back    Detailed comments: Maurilio from [facility] needs to confirm which medication Dr. Parkinson prescribed for the patient. Maurilio is currently making a medication profile for the patient and need to put prescriber with medication. Feel free to fax to [phone]    Phone Number Maurilio can be reached at: [phone]    Best Time: anytime     Can we leave a detailed message on this number? YES    Call taken on 4/12/2018 at 10:18 AM by Marcy Basilio

## 2018-05-17 ENCOUNTER — OFFICE VISIT (OUTPATIENT)
Dept: FAMILY MEDICINE | Facility: CLINIC | Age: 38
End: 2018-05-17
Payer: MEDICARE

## 2018-05-17 VITALS
OXYGEN SATURATION: 95 % | BODY MASS INDEX: 28.11 KG/M2 | TEMPERATURE: 97.3 F | HEIGHT: 74 IN | DIASTOLIC BLOOD PRESSURE: 84 MMHG | HEART RATE: 74 BPM | WEIGHT: 219 LBS | SYSTOLIC BLOOD PRESSURE: 129 MMHG

## 2018-05-17 DIAGNOSIS — F84.0 ACTIVE AUTISTIC DISORDER: ICD-10-CM

## 2018-05-17 DIAGNOSIS — L84 CORN OR CALLUS: ICD-10-CM

## 2018-05-17 DIAGNOSIS — E03.4 HYPOTHYROIDISM DUE TO ACQUIRED ATROPHY OF THYROID: Primary | ICD-10-CM

## 2018-05-17 DIAGNOSIS — Z79.899 DRUG THERAPY: Primary | ICD-10-CM

## 2018-05-17 DIAGNOSIS — F71 MODERATE MENTAL RETARDATION: ICD-10-CM

## 2018-05-17 LAB
ANION GAP SERPL CALCULATED.3IONS-SCNC: 4 MMOL/L (ref 3–14)
BUN SERPL-MCNC: 9 MG/DL (ref 7–30)
CHLORIDE SERPL-SCNC: 105 MMOL/L (ref 94–109)
CHOLEST SERPL-MCNC: 174 MG/DL
CO2 SERPL-SCNC: 29 MMOL/L (ref 20–32)
CREAT SERPL-MCNC: 0.83 MG/DL (ref 0.66–1.25)
GFR SERPL CREATININE-BSD FRML MDRD: >90 ML/MIN/1.7M2
GLUCOSE SERPL-MCNC: 90 MG/DL (ref 70–99)
HDLC SERPL-MCNC: 58 MG/DL
LDLC SERPL CALC-MCNC: 101 MG/DL
LITHIUM SERPL-SCNC: 0.43 MMOL/L (ref 0.6–1.2)
NONHDLC SERPL-MCNC: 116 MG/DL
POTASSIUM SERPL-SCNC: 4.3 MMOL/L (ref 3.4–5.3)
SODIUM SERPL-SCNC: 138 MMOL/L (ref 133–144)
TRIGL SERPL-MCNC: 77 MG/DL
TSH SERPL DL<=0.005 MIU/L-ACNC: 1.38 MU/L (ref 0.4–4)

## 2018-05-17 PROCEDURE — 82565 ASSAY OF CREATININE: CPT

## 2018-05-17 PROCEDURE — 84443 ASSAY THYROID STIM HORMONE: CPT

## 2018-05-17 PROCEDURE — 80178 ASSAY OF LITHIUM: CPT

## 2018-05-17 PROCEDURE — 84520 ASSAY OF UREA NITROGEN: CPT

## 2018-05-17 PROCEDURE — 80061 LIPID PANEL: CPT

## 2018-05-17 PROCEDURE — 99214 OFFICE O/P EST MOD 30 MIN: CPT | Performed by: FAMILY MEDICINE

## 2018-05-17 PROCEDURE — 36415 COLL VENOUS BLD VENIPUNCTURE: CPT

## 2018-05-17 PROCEDURE — 82947 ASSAY GLUCOSE BLOOD QUANT: CPT

## 2018-05-17 PROCEDURE — 80051 ELECTROLYTE PANEL: CPT

## 2018-05-17 RX ORDER — CHOLECALCIFEROL (VITAMIN D3) 25 MCG
3000 TABLET ORAL DAILY
COMMUNITY
Start: 2017-07-11 | End: 2022-03-15

## 2018-05-17 ASSESSMENT — PAIN SCALES - GENERAL: PAINLEVEL: NO PAIN (0)

## 2018-05-17 NOTE — PROGRESS NOTES
"  SUBJECTIVE:   Duane D Backes is a 37 year old male who presents to clinic today for the following health issues:  He is accompanied by his group home attendant.     Medication Followup of ALL     Taking Medication as prescribed: yes    Side Effects:  None    Medication Helping Symptoms:  yes     Patient's group home attendant believes that the home's medication list is different than the clinic's medication list and he wants to make sure everything matches. Most things are the same, but some doses are different (lithium, for example).     Past medical, family, and social histories, medications, and allergies are reviewed and updated in [facility].     ROS:  CONSTITUTIONAL: NEGATIVE for fever, chills, change in weight  ENT/MOUTH: NEGATIVE for ear, mouth and throat problems  RESP: NEGATIVE for significant cough or SOB  CV: NEGATIVE for chest pain, palpitations or peripheral edema  ROS otherwise negative    This document serves as a record of the services and decisions personally performed and made by Dr. Parkinson. It was created on his behalf by Amber Santana, a trained medical scribe. The creation of this document is based the provider's statements to the medical scribe.  Amber Santana May 17, 2018 9:51 AM     OBJECTIVE:                                                    /84 (BP Location: Left arm, Patient Position: Chair, Cuff Size: Adult Large)  Pulse 74  Temp 97.3  F (36.3  C) (Oral)  Ht 1.88 m (6' 2.02\")  Wt 99.3 kg (219 lb)  SpO2 95%  BMI 28.11 kg/m2   Body mass index is 28.11 kg/(m^2).     GENERAL: healthy, alert and no distress  EYES: Eyes grossly normal to inspection, PERRL, EOMI, sclerae white and conjunctivae normal  MS: no gross musculoskeletal defects noted, no edema  SKIN: no suspicious lesions or rashes  NEURO: Normal strength and tone, sensory exam grossly normal, and cranial nerves 2-12 intact  PSYCH: autistic, occasional verbalization but no conversation, affect normal/bright  "     Diagnostic Test Results:  none      ASSESSMENT/PLAN:                                                      (E03.4) Hypothyroidism due to acquired atrophy of thyroid  (primary encounter diagnosis)  Comment: First recheck since increasing levothyroxine to 200 mcg daily.  Plan: Adjust levothyroxine as needed. Follow up in 6 months at [facility].    (F71) Moderate mental retardation  (F84.0) Active autistic disorder  Comment: Rubber gloves  Plan: order for DME (vinyl or nitrile exam gloves)            (L84) Corn or callus  Comment: His thickened callused feet do not appear to be causing much of a problem now.   Plan: The group home can change to using the ammonium lactate cream on a PRN basis, as we have on our medication list.    The information in this document, created by the medical scribe for me, accurately reflects the services I personally performed and the decisions made by me. I have reviewed and approved this document for accuracy prior to leaving the patient care area. May 17, 2018 9:51 AM     Rajat Parkinson MD

## 2018-05-17 NOTE — MR AVS SNAPSHOT
After Visit Summary   5/17/2018    Duane D Backes    MRN: [id number]           Patient Information     Date Of Birth          1980        Visit Information        Provider Department      5/17/2018 9:40 AM Rajat Parkinson MD [facility]        Today's Diagnoses     Hypothyroidism due to acquired atrophy of thyroid    -  1    Moderate mental retardation        Active autistic disorder          Care Instructions    At [facility], we strive to deliver an exceptional experience to you, every time we see you.  If you receive a survey in the mail, please send us back your thoughts. We really do value your feedback.    Based on your medical history, these are the current health maintenance/preventive care services that you are due for (some may have been done at this visit.)  Health Maintenance Due   Topic Date Due     MEDICARE ANNUAL WELLNESS VISIT  07/22/2000     HIV SCREEN (SYSTEM ASSIGNED)  07/22/2000     EYE EXAM Q1 YEAR  08/13/2016       Suggested websites for health information:  Www.[employer] : Up to date and easily searchable information on multiple topics.  Www.medlineplus.gov : medication info, interactive tutorials, watch real surgeries online  Www.familydoctor.org : good info from the Academy of Family Physicians  Www.cdc.gov : public health info, travel advisories, epidemics (H1N1)  Www.aap.org : children's health info, normal development, vaccinations  Www.health.state.[place].us : [place] dept of health, public health issues in [place], N1N1    Your care team:                            Family Medicine Internal Medicine   MD Scooter Gao MD Shantel Branch-Fleming, MD Katya Georgiev PA-C Megan Hill, APRN STEVEN Delgado MD Pediatrics   RAMA Oliveira, MD Shirin Dowell APRN CNP   MD Estelle Marvin MD Deborah Mielke, MD Kim Thein, APRN [facility]      Clinic hours: Monday - Thursday 7  am-7 pm; Fridays 7 am-5 pm.   Urgent care: Monday - Friday 11 am-9 pm; Saturday and Sunday 9 am-5 pm.  Pharmacy : Monday -Thursday 8 am-8 pm; Friday 8 am-6 pm; Saturday and Sunday 9 am-5 pm.     Clinic: [phone]   Pharmacy: [phone]              Follow-ups after your visit        Follow-up notes from your care team     Return in about 6 months (around 11/17/2018) for full physical, lab tests.      Your next 10 appointments already scheduled     Jul 10, 2018  3:30 PM CDT   Return Visit with Davidson Villar MD   [facility] ([facility])    [address] [place] [address] [phone]              Future tests that were ordered for you today     Open Standing Orders        Priority Remaining Interval Expires Ordered    Electrolyte panel (Na, K, Cl, CO2, Anion gap) Routine 1/2 EVERY 6 MONTHS 5/17/2019 5/17/2018    Urea nitrogen Routine 1/2 EVERY 6 MONTHS 5/17/2019 5/17/2018    Creatinine Routine 1/2 EVERY 6 MONTHS 5/17/2019 5/17/2018    Lithium level Routine 3/4  5/17/2019 5/17/2018            Who to contact     If you have questions or need follow up information about today's clinic visit or your schedule please contact [facility] directly at [phone].  Normal or non-critical lab and imaging results will be communicated to you by [employer], letter or phone within 4 business days after the clinic has received the results. If you do not hear from us within 7 days, please contact the clinic through [employer] or phone. If you have a critical or abnormal lab result, we will notify you by phone as soon as possible.  Submit refill requests through [employer] or call your pharmacy and they will forward the refill request to us. Please allow 3 business days for your refill to be completed.          Additional Information About Your Visit        [employer] Information     [employer] lets you send messages to your doctor, view your test results, renew  "your prescriptions, schedule appointments and more. To sign up, go to [email or web address] . Click on \"Log in\" on the left side of the screen, which will take you to the Welcome page. Then click on \"Sign up Now\" on the right side of the page.     You will be asked to enter the access code listed below, as well as some personal information. Please follow the directions to create your username and password.     Your access code is: [email or web address]  Expires: 8/15/2018 10:12 AM     Your access code will  in 90 days. If you need help or a new code, please call your [facility] clinic or [phone].        Care EveryWhere ID     This is your Care EveryWhere ID. This could be used by other organizations to access your [facility] medical records  [id number]        Your Vitals Were     Pulse Temperature Height Pulse Oximetry BMI (Body Mass Index)       74 97.3  F (36.3  C) (Oral) 6' 2.02\" (1.88 m) 95% 28.11 kg/m2        Blood Pressure from Last 3 Encounters:   18 129/84   18 114/73   17 132/76    Weight from Last 3 Encounters:   18 219 lb (99.3 kg)   17 222 lb 12.8 oz (101.1 kg)   08/15/17 225 lb 9.6 oz (102.3 kg)              Today, you had the following     No orders found for display         Today's Medication Changes          These changes are accurate as of 18 10:12 AM.  If you have any questions, ask your nurse or doctor.               Start taking these medicines.        Dose/Directions    order for DME   Used for:  Moderate mental retardation, Active autistic disorder   Started by:  Rajat Parkinson MD        Equipment being ordered: disposable briefs   Quantity:  100 each   Refills:  prn         These medicines have changed or have updated prescriptions.        Dose/Directions    fish oil-omega-3 fatty acids 1000 MG capsule   This may have changed:  See the new instructions.   Used for:  Autistic disorder, current or active state        1 PO BID   Quantity:  60   Refills:  " 11            Where to get your medicines      Some of these will need a paper prescription and others can be bought over the counter.  Ask your nurse if you have questions.     Bring a paper prescription for each of these medications     order for DME                Primary Care Provider Office Phone # Fax #    Rajat Parkinson [phone]       [address]  [facility] [address]        Equal Access to Services     TIFFANIE FROST : Hadii aad ku hadasho Soomaali, waaxda luqadaha, qaybta kaalmada adeegyada, waxay idiin hayaan adeeg kharash la'aan ah. So [facility] [phone].    ATENCIÓN: Si habrodrigo díaz, tiene a mcnally disposición servicios gratuitos de asistencia lingüística. Llame al [phone].    We comply with applicable federal civil rights laws and [place] laws. We do not discriminate on the basis of race, color, national origin, age, disability, sex, sexual orientation, or gender identity.            Thank you!     Thank you for choosing [facility]  for your care. Our goal is always to provide you with excellent care. Hearing back from our patients is one way we can continue to improve our services. Please take a few minutes to complete the written survey that you may receive in the mail after your visit with us. Thank you!             Your Updated Medication List - Protect others around you: Learn how to safely use, store and throw away your medicines at www.disposemymeds.org.          This list is accurate as of 5/17/18 10:12 AM.  Always use your most recent med list.                   Brand Name Dispense Instructions for use Diagnosis    ammonium lactate 12 % cream    AMLACTIN     Apply topically daily as needed for dry skin        cholecalciferol 1000 UNIT tablet    vitamin D3     3,000 Units daily        diazepam 2 MG tablet    VALIUM    10 tablet    Take 1 tablet (2 mg) by mouth once as needed (60 minutes prior to medical or dental appointment)    Active autistic  disorder, Moderate mental retardation       divalproex sodium extended-release 500 MG 24 hr tablet    DEPAKOTE ER     Take 3 tablets by mouth 2 times daily.        docusate sodium 100 MG capsule    COLACE     Take 1 capsule by mouth 2 times daily.        fish oil-omega-3 fatty acids 1000 MG capsule     60    1 PO BID    Autistic disorder, current or active state       lactulose 10 GM/15ML solution    CHRONULAC     Take 30 mLs by mouth every evening & additional 30mL 2 times per day as needed        levothyroxine 200 MCG tablet    SYNTHROID/LEVOTHROID    30 tablet    Take 1 tablet (200 mcg) by mouth daily for thyroid.    Hypothyroidism due to acquired atrophy of thyroid       lithium 150 MG capsule      450 mg At Bedtime        MILK OF MAGNESIA 400 MG/5ML suspension   Generic drug:  magnesium hydroxide      30 mLs every evening        order for DME     100 each    Equipment being ordered: disposable briefs    Moderate mental retardation, Active autistic disorder       polyethylene glycol powder    MIRALAX    510 g    Take 17 g by mouth daily.    Constipation       RISPERDAL 2 MG tablet   Generic drug:  risperiDONE      Take 2 mg by mouth 1 tab every morning & 1 tab every bedtime        sertraline 100 MG tablet    ZOLOFT     Take 1.5 tablets by mouth every morning.        * topiramate 100 MG tablet    TOPAMAX     Take 100 mg by mouth 2 times daily (150mg BID total)        * topiramate 50 MG tablet    TOPAMAX    60 tablet    Take 1 tablet (50 mg) by mouth 2 times daily    Generalized convulsive epilepsy (H)       * Notice:  This list has 2 medication(s) that are the same as other medications prescribed for you. Read the directions carefully, and ask your doctor or other care provider to review them with you.

## 2018-05-17 NOTE — PATIENT INSTRUCTIONS
At [facility], we strive to deliver an exceptional experience to you, every time we see you.  If you receive a survey in the mail, please send us back your thoughts. We really do value your feedback.    Based on your medical history, these are the current health maintenance/preventive care services that you are due for (some may have been done at this visit.)  Health Maintenance Due   Topic Date Due     MEDICARE ANNUAL WELLNESS VISIT  07/22/2000     HIV SCREEN (SYSTEM ASSIGNED)  07/22/2000     EYE EXAM Q1 YEAR  08/13/2016       Suggested websites for health information:  Www.Changelight.Sellvana : Up to date and easily searchable information on multiple topics.  Www.medlineplus.gov : medication info, interactive tutorials, watch real surgeries online  Www.familydoctor.org : good info from the Academy of Family Physicians  Www.cdc.gov : public health info, travel advisories, epidemics (H1N1)  Www.aap.org : children's health info, normal development, vaccinations  Www.health.[facility].[place].us : [place] dept of health, public health issues in [place], N1N1    Your care team:                            Family Medicine Internal Medicine   MD Scooter Gao MD Shantel Branch-Fleming, MD Katya Georgiev PA-C Megan Hill, APRN STEVEN Delgado MD Pediatrics   Jarrell Tinajero PABENOIT Joseph, MD Shirin Dowell APRN CNP   MD Estelle Marvin MD Deborah Mielke, MD Kim Thein, APRN [facility]      Clinic hours: Monday - Thursday 7 am-7 pm; Fridays 7 am-5 pm.   Urgent care: Monday - Friday 11 am-9 pm; Saturday and Sunday 9 am-5 pm.  Pharmacy : Monday -Thursday 8 am-8 pm; Friday 8 am-6 pm; Saturday and Sunday 9 am-5 pm.     Clinic: [phone]   Pharmacy: [phone]

## 2018-07-10 ENCOUNTER — OFFICE VISIT (OUTPATIENT)
Dept: NEUROLOGY | Facility: CLINIC | Age: 38
End: 2018-07-10
Payer: MEDICARE

## 2018-07-10 VITALS
OXYGEN SATURATION: 95 % | DIASTOLIC BLOOD PRESSURE: 71 MMHG | BODY MASS INDEX: 28.9 KG/M2 | SYSTOLIC BLOOD PRESSURE: 116 MMHG | WEIGHT: 225.2 LBS | HEART RATE: 83 BPM

## 2018-07-10 DIAGNOSIS — G40.909 SEIZURE DISORDER (H): Primary | ICD-10-CM

## 2018-07-10 DIAGNOSIS — E55.9 VITAMIN D DEFICIENCY: ICD-10-CM

## 2018-07-10 LAB
ALBUMIN SERPL-MCNC: 3.6 G/DL (ref 3.4–5)
ALP SERPL-CCNC: 40 U/L (ref 40–150)
ALT SERPL W P-5'-P-CCNC: 13 U/L (ref 0–70)
ANION GAP SERPL CALCULATED.3IONS-SCNC: 7 MMOL/L (ref 3–14)
AST SERPL W P-5'-P-CCNC: 13 U/L (ref 0–45)
BASOPHILS # BLD AUTO: 0 10E9/L (ref 0–0.2)
BASOPHILS NFR BLD AUTO: 0.2 %
BILIRUB SERPL-MCNC: 0.2 MG/DL (ref 0.2–1.3)
BUN SERPL-MCNC: 14 MG/DL (ref 7–30)
CALCIUM SERPL-MCNC: 9.1 MG/DL (ref 8.5–10.1)
CHLORIDE SERPL-SCNC: 109 MMOL/L (ref 94–109)
CO2 SERPL-SCNC: 26 MMOL/L (ref 20–32)
CREAT SERPL-MCNC: 0.78 MG/DL (ref 0.66–1.25)
DIFFERENTIAL METHOD BLD: ABNORMAL
EOSINOPHIL # BLD AUTO: 0.1 10E9/L (ref 0–0.7)
EOSINOPHIL NFR BLD AUTO: 2.3 %
ERYTHROCYTE [DISTWIDTH] IN BLOOD BY AUTOMATED COUNT: 12.8 % (ref 10–15)
GFR SERPL CREATININE-BSD FRML MDRD: >90 ML/MIN/1.7M2
GLUCOSE SERPL-MCNC: 85 MG/DL (ref 70–99)
HCT VFR BLD AUTO: 39.5 % (ref 40–53)
HGB BLD-MCNC: 12.9 G/DL (ref 13.3–17.7)
IMM GRANULOCYTES # BLD: 0 10E9/L (ref 0–0.4)
IMM GRANULOCYTES NFR BLD: 0.2 %
LYMPHOCYTES # BLD AUTO: 1.7 10E9/L (ref 0.8–5.3)
LYMPHOCYTES NFR BLD AUTO: 35.8 %
MCH RBC QN AUTO: 31.9 PG (ref 26.5–33)
MCHC RBC AUTO-ENTMCNC: 32.7 G/DL (ref 31.5–36.5)
MCV RBC AUTO: 98 FL (ref 78–100)
MONOCYTES # BLD AUTO: 0.6 10E9/L (ref 0–1.3)
MONOCYTES NFR BLD AUTO: 12.7 %
NEUTROPHILS # BLD AUTO: 2.3 10E9/L (ref 1.6–8.3)
NEUTROPHILS NFR BLD AUTO: 48.8 %
PLATELET # BLD AUTO: 174 10E9/L (ref 150–450)
POTASSIUM SERPL-SCNC: 4.4 MMOL/L (ref 3.4–5.3)
PROT SERPL-MCNC: 7 G/DL (ref 6.8–8.8)
RBC # BLD AUTO: 4.05 10E12/L (ref 4.4–5.9)
SODIUM SERPL-SCNC: 142 MMOL/L (ref 133–144)
WBC # BLD AUTO: 4.8 10E9/L (ref 4–11)

## 2018-07-10 PROCEDURE — 82306 VITAMIN D 25 HYDROXY: CPT | Performed by: PSYCHIATRY & NEUROLOGY

## 2018-07-10 PROCEDURE — 80053 COMPREHEN METABOLIC PANEL: CPT | Performed by: PSYCHIATRY & NEUROLOGY

## 2018-07-10 PROCEDURE — 99214 OFFICE O/P EST MOD 30 MIN: CPT | Performed by: PSYCHIATRY & NEUROLOGY

## 2018-07-10 PROCEDURE — 99000 SPECIMEN HANDLING OFFICE-LAB: CPT | Performed by: PSYCHIATRY & NEUROLOGY

## 2018-07-10 PROCEDURE — 36415 COLL VENOUS BLD VENIPUNCTURE: CPT | Performed by: PSYCHIATRY & NEUROLOGY

## 2018-07-10 PROCEDURE — 80201 ASSAY OF TOPIRAMATE: CPT | Mod: 90 | Performed by: PSYCHIATRY & NEUROLOGY

## 2018-07-10 PROCEDURE — 85025 COMPLETE CBC W/AUTO DIFF WBC: CPT | Performed by: PSYCHIATRY & NEUROLOGY

## 2018-07-10 PROCEDURE — 80164 ASSAY DIPROPYLACETIC ACD TOT: CPT | Performed by: PSYCHIATRY & NEUROLOGY

## 2018-07-10 ASSESSMENT — PAIN SCALES - GENERAL: PAINLEVEL: NO PAIN (0)

## 2018-07-10 NOTE — PROGRESS NOTES
Visit Date:   07/10/2018      HISTORY OF PRESENT ILLNESS:  This patient is seen in followup with a seizure disorder and chronic encephalopathy.  I last saw the patient a year ago.  He is here today with his , Raul, from the [facility].  There has been no major change in his neurologic status.  The patient has not had any seizures as far as is known.  The last seizure occurred in the first part of .        He is on topiramate 150 mg twice a day and Depakote 1500 mg twice a day.      NEUROLOGIC EXAMINATION:   On exam, the patient is somewhat cooperative.  He does occasionally shout out.  He has some self-stimulatory behavior.  His blood pressure is 116/71.  He kept getting up and going to the sink for a glass of water.      ASSESSMENT:   1.  Seizure disorder.   2.  Chronic encephalopathy with mental retardation and autism.   3.  History of epidermoid tumor.      DISCUSSION:  The patient is seen with the above problem list.  At this point, I am going to recheck labs including a topiramate level, Depakote level, CBC, metabolic panel, and vitamin D level.  His vitamin D level has been low in the past.  He is on D replacement now.  I will communicate the results of his lab work to his group home when available.  Followup will be in 1 year.         EMMY JARAMILLO MD             D: 07/10/2018   T: 07/10/2018   MT: ESTELLA      Name:     BACKES, DUANE   MRN:      [id number]        Account:      [id number]   :      1980           Visit Date:   07/10/2018      Document: [id number]         addendum: reviewed labs with Raul at [facility].  CBC, CMP, Vit D all normal.  Topiramate level 6, .  No change in meds.

## 2018-07-10 NOTE — LETTER
7/10/2018         RE: Duane D Backes  [address]  [facility] [address]        Dear Colleague,    Thank you for referring your patient, Duane D Backes, to the [facility]. Please see a copy of my visit note below.    Visit Date:   07/10/2018      HISTORY OF PRESENT ILLNESS:  This patient is seen in followup with a seizure disorder and chronic encephalopathy.  I last saw the patient a year ago.  He is here today with his , Raul, from the [facility].  There has been no major change in his neurologic status.  The patient has not had any seizures as far as is known.  The last seizure occurred in the first part of .        He is on topiramate 150 mg twice a day and Depakote 1500 mg twice a day.      NEUROLOGIC EXAMINATION:   On exam, the patient is somewhat cooperative.  He does occasionally shout out.  He has some self-stimulatory behavior.  His blood pressure is 116/71.  He kept getting up and going to the sink for a glass of water.      ASSESSMENT:   1.  Seizure disorder.   2.  Chronic encephalopathy with mental retardation and autism.   3.  History of epidermoid tumor.      DISCUSSION:  The patient is seen with the above problem list.  At this point, I am going to recheck labs including a topiramate level, Depakote level, CBC, metabolic panel, and vitamin D level.  His vitamin D level has been low in the past.  He is on D replacement now.  I will communicate the results of his lab work to his group home when available.  Followup will be in 1 year.         EMMY VILLAR MD             D: 07/10/2018   T: 07/10/2018   MT: NTS      Name:     BACKES, DUANE   MRN:      [id number]        Account:      [id number]   :      1980           Visit Date:   07/10/2018      Document: [id number]       Again, thank you for allowing me to participate in the care of your patient.        Sincerely,        Emmy Villar MD

## 2018-07-10 NOTE — NURSING NOTE
Duane D Backes's goals for this visit include: return  He requests these members of his care team be copied on today's visit information:     PCP: Rajat Parkinson    Referring Provider:  No referring provider defined for this encounter.    /71  Pulse 83  Wt 102.2 kg (225 lb 3.2 oz)  SpO2 95%  BMI 28.9 kg/m2    Do you need any medication refills at today's visit? n

## 2018-07-10 NOTE — MR AVS SNAPSHOT
After Visit Summary   7/10/2018    Duane D Backes    MRN: [id number]           Patient Information     Date Of Birth          1980        Visit Information        Provider Department      7/10/2018 3:30 PM Davidson Villar MD [facility]        Today's Diagnoses     Seizure disorder (H)    -  1    Vitamin D deficiency            Follow-ups after your visit        Follow-up notes from your care team     Return in about 1 year (around 7/10/2019).      Who to contact     If you have questions or need follow up information about today's clinic visit or your schedule please contact [facility] directly at [phone].  Normal or non-critical lab and imaging results will be communicated to you by MyChart, letter or phone within 4 business days after the clinic has received the results. If you do not hear from us within 7 days, please contact the clinic through MyChart or phone. If you have a critical or abnormal lab result, we will notify you by phone as soon as possible.  Submit refill requests through [employer] or call your pharmacy and they will forward the refill request to us. Please allow 3 business days for your refill to be completed.          Additional Information About Your Visit        MyChart Information     [employer] is an electronic gateway that provides easy, online access to your medical records. With [employer], you can request a clinic appointment, read your test results, renew a prescription or communicate with your care team.     To sign up for Hitsbookt visit the website at [email or web address]   You will be asked to enter the access code listed below, as well as some personal information. Please follow the directions to create your username and password.     Your access code is: [email or web address]  Expires: 8/15/2018 10:12 AM     Your access code will  in 90 days. If you need help or a new code, please contact your [facility] or call [phone] for assistance.        Care EveryWhere ID     This is your Care EveryWhere ID. This could be used by other organizations to access your [place] medical records  [id number]        Your Vitals Were     Pulse Pulse Oximetry BMI (Body Mass Index)             83 95% 28.9 kg/m2          Blood Pressure from Last 3 Encounters:   07/10/18 116/71   05/17/18 129/84   02/05/18 114/73    Weight from Last 3 Encounters:   07/10/18 102.2 kg (225 lb 3.2 oz)   05/17/18 99.3 kg (219 lb)   11/09/17 101.1 kg (222 lb 12.8 oz)              We Performed the Following     CBC with platelets and differential     Comprehensive metabolic panel     Topiramate Level     Valproic Acid level     Vitamin D Deficiency          Today's Medication Changes          These changes are accurate as of 7/10/18  3:49 PM.  If you have any questions, ask your nurse or doctor.               These medicines have changed or have updated prescriptions.        Dose/Directions    fish oil-omega-3 fatty acids 1000 MG capsule   This may have changed:  See the new instructions.   Used for:  Autistic disorder, current or active state        1 PO BID   Quantity:  60   Refills:  11                Primary Care Provider Office Phone # Fax #    Rajat Parkinson [phone]       [address]  [facility] [address]        Equal Access to Services     MIKEY FROST : Hadii roel ku hadasho Soomaali, waaxda luqadaha, qaybta kaalmada adefrancisco, concepcion vicente. So [facility] [phone].    ATENCIÓN: Si habla español, tiene a mcnally disposición servicios gratuitos de asistencia lingüística. Camila al [phone].    We comply with applicable federal civil rights laws and [place] laws. We do not discriminate on the basis of race, color, national origin, age, disability, sex, sexual orientation, or gender identity.            Thank you!     Thank you for choosing [facility]  for your  care. Our goal is always to provide you with excellent care. Hearing back from our patients is one way we can continue to improve our services. Please take a few minutes to complete the written survey that you may receive in the mail after your visit with us. Thank you!             Your Updated Medication List - Protect others around you: Learn how to safely use, store and throw away your medicines at www.disposemymeds.org.          This list is accurate as of 7/10/18  3:49 PM.  Always use your most recent med list.                   Brand Name Dispense Instructions for use Diagnosis    ammonium lactate 12 % cream    AMLACTIN     Apply topically daily as needed for dry skin        cholecalciferol 1000 UNIT tablet    vitamin D3     3,000 Units daily        diazepam 2 MG tablet    VALIUM    10 tablet    Take 1 tablet (2 mg) by mouth once as needed (60 minutes prior to medical or dental appointment)    Active autistic disorder, Moderate mental retardation       divalproex sodium extended-release 500 MG 24 hr tablet    DEPAKOTE ER     Take 3 tablets by mouth 2 times daily.        docusate sodium 100 MG capsule    COLACE     Take 1 capsule by mouth 2 times daily.        fish oil-omega-3 fatty acids 1000 MG capsule     60    1 PO BID    Autistic disorder, current or active state       lactulose 10 GM/15ML solution    CHRONULAC     Take 30 mLs by mouth every evening & additional 30mL 2 times per day as needed        levothyroxine 200 MCG tablet    SYNTHROID/LEVOTHROID    30 tablet    Take 1 tablet (200 mcg) by mouth daily for thyroid.    Hypothyroidism due to acquired atrophy of thyroid       lithium 150 MG capsule      450 mg At Bedtime        MILK OF MAGNESIA 400 MG/5ML suspension   Generic drug:  magnesium hydroxide      30 mLs every evening        polyethylene glycol powder    MIRALAX    510 g    Take 17 g by mouth daily.    Constipation       RISPERDAL 2 MG tablet   Generic drug:  risperiDONE      Take 2 mg by mouth 1  tab every morning & 1 tab every bedtime        sertraline 100 MG tablet    ZOLOFT     Take 1.5 tablets by mouth every morning.        * topiramate 100 MG tablet    TOPAMAX     Take 100 mg by mouth 2 times daily (150mg BID total)        * topiramate 50 MG tablet    TOPAMAX    60 tablet    Take 1 tablet (50 mg) by mouth 2 times daily    Generalized convulsive epilepsy (H)       * Notice:  This list has 2 medication(s) that are the same as other medications prescribed for you. Read the directions carefully, and ask your doctor or other care provider to review them with you.

## 2018-07-11 LAB
DEPRECATED CALCIDIOL+CALCIFEROL SERPL-MC: 39 UG/L (ref 20–75)
VALPROATE SERPL-MCNC: 129 MG/L (ref 50–100)

## 2018-07-12 LAB — TOPIRAMATE SERPL-MCNC: 6.1 UG/ML (ref 5–20)

## 2018-07-27 ENCOUNTER — TRANSFERRED RECORDS (OUTPATIENT)
Dept: HEALTH INFORMATION MANAGEMENT | Facility: CLINIC | Age: 38
End: 2018-07-27

## 2018-07-27 ENCOUNTER — OFFICE VISIT (OUTPATIENT)
Dept: URGENT CARE | Facility: URGENT CARE | Age: 38
End: 2018-07-27
Payer: MEDICARE

## 2018-07-27 VITALS
DIASTOLIC BLOOD PRESSURE: 62 MMHG | TEMPERATURE: 98 F | WEIGHT: 222 LBS | RESPIRATION RATE: 16 BRPM | OXYGEN SATURATION: 96 % | BODY MASS INDEX: 28.49 KG/M2 | HEART RATE: 76 BPM | SYSTOLIC BLOOD PRESSURE: 98 MMHG

## 2018-07-27 DIAGNOSIS — L73.9 HAIR FOLLICLE INFECTION: ICD-10-CM

## 2018-07-27 DIAGNOSIS — T14.8XXA BRUISE: Primary | ICD-10-CM

## 2018-07-27 PROCEDURE — 99213 OFFICE O/P EST LOW 20 MIN: CPT | Performed by: FAMILY MEDICINE

## 2018-07-27 NOTE — MR AVS SNAPSHOT
After Visit Summary   7/27/2018    Duane D Backes    MRN: [id number]           Patient Information     Date Of Birth          1980        Visit Information        Provider Department      7/27/2018 9:30 AM Mayuri Starkey MD [facility]        Today's Diagnoses     Bruise    -  1    Hair follicle infection           Follow-ups after your visit        Your next 10 appointments already scheduled     Aug 13, 2018  9:00 AM CDT   [facility]    [address] [facility] [address]   [phone]           Please do not eat 10-12 hours before your appointment if you are coming in fasting for labs on lipids, cholesterol, or glucose (sugar). This does not apply to pregnant women. Water, hot tea and black coffee (with nothing added) are okay. Do not drink other fluids, diet soda or chew gum.            Aug 16, 2018  4:20 PM CDT   PHYSICAL with Rajat Parkinson MD   [facility]    [address] [facility] [facility] [address]   [phone]              Who to contact     If you have questions or need follow up information about today's clinic visit or your schedule please contact [facility] directly at [phone].  Normal or non-critical lab and imaging results will be communicated to you by MyChart, letter or phone within 4 business days after the clinic has received the results. If you do not hear from us within 7 days, please contact the clinic through MyChart or phone. If you have a critical or abnormal lab result, we will notify you by phone as soon as possible.  Submit refill requests through [employer] or call your pharmacy and they will forward the refill request to us. Please allow 3 business days for your refill to be completed.          Additional Information About Your Visit        MyChart  "Information     [employer] lets you send messages to your doctor, view your test results, renew your prescriptions, schedule appointments and more. To sign up, go to [email or web address] . Click on \"Log in\" on the left side of the screen, which will take you to the Welcome page. Then click on \"Sign up Now\" on the right side of the page.     You will be asked to enter the access code listed below, as well as some personal information. Please follow the directions to create your username and password.     Your access code is: [email or web address]  Expires: 8/15/2018 10:12 AM     Your access code will  in 90 days. If you need help or a new code, please call your [place] clinic or [phone].        Care EveryWhere ID     This is your Care EveryWhere ID. This could be used by other organizations to access your [place] medical records  [id number]        Your Vitals Were     Pulse Temperature Respirations Pulse Oximetry BMI (Body Mass Index)       76 98  F (36.7  C) (Oral) 16 96% 28.49 kg/m2        Blood Pressure from Last 3 Encounters:   18 98/62   07/10/18 116/71   18 129/84    Weight from Last 3 Encounters:   18 222 lb (100.7 kg)   07/10/18 225 lb 3.2 oz (102.2 kg)   18 219 lb (99.3 kg)              Today, you had the following     No orders found for display         Today's Medication Changes          These changes are accurate as of 18 11:25 AM.  If you have any questions, ask your nurse or doctor.               These medicines have changed or have updated prescriptions.        Dose/Directions    fish oil-omega-3 fatty acids 1000 MG capsule   This may have changed:  See the new instructions.   Used for:  Autistic disorder, current or active state        1 PO BID   Quantity:  60   Refills:  11                Primary Care Provider Office Phone # Fax #    Rajat Parkinson [phone]       [address]  [facility] [address]        Equal Access to Services     " MIKEY [facility] Hospital[facility] : Hadii aad ku horacio Miller, waaxda luqadaha, qaybta kaalmada cassi, concepcion sahra haybetty tateuydinisha correa . So [facility] [phone].    ATENCIÓN: Si habla español, tiene a mcnally disposición servicios gratuitos de asistencia lingüística. Llame al [phone].    We comply with applicable federal civil rights laws and [place] laws. We do not discriminate on the basis of race, color, national origin, age, disability, sex, sexual orientation, or gender identity.            Thank you!     Thank you for choosing [facility]  for your care. Our goal is always to provide you with excellent care. Hearing back from our patients is one way we can continue to improve our services. Please take a few minutes to complete the written survey that you may receive in the mail after your visit with us. Thank you!             Your Updated Medication List - Protect others around you: Learn how to safely use, store and throw away your medicines at www.disposemymeds.org.          This list is accurate as of 7/27/18 11:25 AM.  Always use your most recent med list.                   Brand Name Dispense Instructions for use Diagnosis    ammonium lactate 12 % cream    AMLACTIN     Apply topically daily as needed for dry skin        cholecalciferol 1000 UNIT tablet    vitamin D3     3,000 Units daily        diazepam 2 MG tablet    VALIUM    10 tablet    Take 1 tablet (2 mg) by mouth once as needed (60 minutes prior to medical or dental appointment)    Active autistic disorder, Moderate mental retardation       divalproex sodium extended-release 500 MG 24 hr tablet    DEPAKOTE ER     Take 3 tablets by mouth 2 times daily.        docusate sodium 100 MG capsule    COLACE     Take 1 capsule by mouth 2 times daily.        fish oil-omega-3 fatty acids 1000 MG capsule     60    1 PO BID    Autistic disorder, current or active state       lactulose 10 GM/15ML solution    CHRONULAC     Take 30 mLs by  mouth every evening & additional 30mL 2 times per day as needed        levothyroxine 200 MCG tablet    SYNTHROID/LEVOTHROID    30 tablet    Take 1 tablet (200 mcg) by mouth daily for thyroid.    Hypothyroidism due to acquired atrophy of thyroid       lithium 150 MG capsule      450 mg At Bedtime        MILK OF MAGNESIA 400 MG/5ML suspension   Generic drug:  magnesium hydroxide      30 mLs every evening        polyethylene glycol powder    MIRALAX    510 g    Take 17 g by mouth daily.    Constipation       RISPERDAL 2 MG tablet   Generic drug:  risperiDONE      Take 2 mg by mouth 1 tab every morning & 1 tab every bedtime        sertraline 100 MG tablet    ZOLOFT     Take 1.5 tablets by mouth every morning.        * topiramate 100 MG tablet    TOPAMAX     Take 100 mg by mouth 2 times daily (150mg BID total)        * topiramate 50 MG tablet    TOPAMAX    60 tablet    Take 1 tablet (50 mg) by mouth 2 times daily    Generalized convulsive epilepsy (H)       * Notice:  This list has 2 medication(s) that are the same as other medications prescribed for you. Read the directions carefully, and ask your doctor or other care provider to review them with you.

## 2018-08-01 DIAGNOSIS — Z79.899 DRUG THERAPY: ICD-10-CM

## 2018-08-01 PROCEDURE — 80178 ASSAY OF LITHIUM: CPT

## 2018-08-01 PROCEDURE — 36415 COLL VENOUS BLD VENIPUNCTURE: CPT

## 2018-08-02 LAB — LITHIUM SERPL-SCNC: 0.28 MMOL/L (ref 0.6–1.2)

## 2018-08-03 ENCOUNTER — DOCUMENTATION ONLY (OUTPATIENT)
Dept: LAB | Facility: CLINIC | Age: 38
End: 2018-08-03

## 2018-08-03 DIAGNOSIS — E03.4 HYPOTHYROIDISM DUE TO ACQUIRED ATROPHY OF THYROID: Primary | ICD-10-CM

## 2018-08-03 DIAGNOSIS — Z11.4 SCREENING FOR HUMAN IMMUNODEFICIENCY VIRUS: ICD-10-CM

## 2018-08-03 NOTE — PROGRESS NOTES
Patient has a lab appointment on 08/13/2018. Per the lab schedule scrubbing protocol they are not due for anything. Please review chart and send orders or let patient know appointment is not needed.    Thank you,  Katey Ibarra

## 2018-08-09 DIAGNOSIS — Z11.4 SCREENING FOR HUMAN IMMUNODEFICIENCY VIRUS: ICD-10-CM

## 2018-08-09 DIAGNOSIS — E03.4 HYPOTHYROIDISM DUE TO ACQUIRED ATROPHY OF THYROID: ICD-10-CM

## 2018-08-09 LAB
HIV 1+2 AB+HIV1 P24 AG SERPL QL IA: NONREACTIVE
TSH SERPL DL<=0.005 MIU/L-ACNC: 0.83 MU/L (ref 0.4–4)

## 2018-08-09 PROCEDURE — 84443 ASSAY THYROID STIM HORMONE: CPT | Performed by: FAMILY MEDICINE

## 2018-08-09 PROCEDURE — 87389 HIV-1 AG W/HIV-1&-2 AB AG IA: CPT | Performed by: FAMILY MEDICINE

## 2018-08-09 PROCEDURE — 36415 COLL VENOUS BLD VENIPUNCTURE: CPT | Performed by: FAMILY MEDICINE

## 2018-08-09 NOTE — LETTER
August 10, 2018      Duanenancy Green  [address]  [facility] [address]        Mr. Green,     All of your labs were normal.     Please contact the clinic if you have additional questions.  Thank you.     Sincerely,       Rajat Parkinson MD     Resulted Orders   TSH with free T4 reflex   Result Value Ref Range    TSH 0.83 0.40 - 4.00 mU/L   HIV Antigen Antibody Combo   Result Value Ref Range    HIV Antigen Antibody Combo Nonreactive NR^Nonreactive          Comment:      HIV-1 p24 Ag & HIV-1/HIV-2 Ab Not Detected

## 2018-08-16 ENCOUNTER — OFFICE VISIT (OUTPATIENT)
Dept: FAMILY MEDICINE | Facility: CLINIC | Age: 38
End: 2018-08-16
Payer: MEDICARE

## 2018-08-16 VITALS
SYSTOLIC BLOOD PRESSURE: 110 MMHG | TEMPERATURE: 97.5 F | OXYGEN SATURATION: 98 % | DIASTOLIC BLOOD PRESSURE: 67 MMHG | HEIGHT: 74 IN | WEIGHT: 224 LBS | BODY MASS INDEX: 28.75 KG/M2 | HEART RATE: 84 BPM

## 2018-08-16 DIAGNOSIS — E03.4 HYPOTHYROIDISM DUE TO ACQUIRED ATROPHY OF THYROID: ICD-10-CM

## 2018-08-16 DIAGNOSIS — Z00.00 ENCOUNTER FOR ROUTINE ADULT HEALTH EXAMINATION WITHOUT ABNORMAL FINDINGS: Primary | ICD-10-CM

## 2018-08-16 PROCEDURE — 99395 PREV VISIT EST AGE 18-39: CPT | Performed by: FAMILY MEDICINE

## 2018-08-16 ASSESSMENT — PAIN SCALES - GENERAL: PAINLEVEL: NO PAIN (0)

## 2018-08-16 NOTE — PATIENT INSTRUCTIONS
At [facility], we strive to deliver an exceptional experience to you, every time we see you.  If you receive a survey in the mail, please send us back your thoughts. We really do value your feedback.    Based on your medical history, these are the current health maintenance/preventive care services that you are due for (some may have been done at this visit.)  Health Maintenance Due   Topic Date Due     PHQ-2 Q1 YR  07/22/1994     MEDICARE ANNUAL WELLNESS VISIT  07/22/2000     EYE EXAM Q1 YEAR  08/13/2016       Suggested websites for health information:  Www.[facility].Agendia : Up to date and easily searchable information on multiple topics.  Www.SDI.gov : medication info, interactive tutorials, watch real surgeries online  Www.familydoctor.org : good info from the Academy of Family Physicians  Www.cdc.gov : public health info, travel advisories, epidemics (H1N1)  Www.aap.org : children's health info, normal development, vaccinations  Www.health.[facility].[place].us : [place] dept of health, public health issues in [place], N1N1    Your care team:                            Family Medicine Internal Medicine   MD Scooter Gao MD Shantel Branch-Fleming, MD Katya Georgiev PA-C Megan Hill, APRN CNP    Lee Delgado MD Pediatrics   Jarrell Tinajero, PABENOIT Joseph, MD Shirin Dowell APRN CNP   MD Estelle Marvin MD Deborah Mielke, MD Kim Thein, APRN [facility]      Clinic hours: Monday - Thursday 7 am-7 pm; Fridays 7 am-5 pm.   Urgent care: Monday - Friday 11 am-9 pm; Saturday and Sunday 9 am-5 pm.  Pharmacy : Monday -Thursday 8 am-8 pm; Friday 8 am-6 pm; Saturday and Sunday 9 am-5 pm.     Clinic: [phone]   Pharmacy: [phone]        Preventive Health Recommendations  Male Ages 26 - 39    Yearly exam:             See your health care provider every year in order to  o   Review health changes.   o   Discuss preventive care.    o   Review your medicines  if your doctor has prescribed any.    You should be tested each year for STDs (sexually transmitted diseases), if you re at risk.     After age 35, talk to your provider about cholesterol testing. If you are at risk for heart disease, have your cholesterol tested at least every 5 years.     If you are at risk for diabetes, you should have a diabetes test (fasting glucose).  Shots: Get a flu shot each year. Get a tetanus shot every 10 years.     Nutrition:    Eat at least 5 servings of fruits and vegetables daily.     Eat whole-grain bread, whole-wheat pasta and brown rice instead of white grains and rice.     Get adequate Calcium and Vitamin D.     Lifestyle    Exercise for at least 150 minutes a week (30 minutes a day, 5 days a week). This will help you control your weight and prevent disease.     Limit alcohol to one drink per day.     No smoking.     Wear sunscreen to prevent skin cancer.     See your dentist every six months for an exam and cleaning.

## 2018-08-16 NOTE — PROGRESS NOTES
SUBJECTIVE:   CC: Duane D Backes is an 38 year old male who presents for preventative health visit.   He is accompanied by his group home attendant.     Healthy Habits:    Do you get at least three servings of calcium containing foods daily (dairy, green leafy vegetables, etc.)? yes    Amount of exercise or daily activities, outside of work: none    Problems taking medications regularly: no    Medication side effects: no    Have you had an eye exam in the past two years? yes    Do you see a dentist twice per year? yes    Do you have sleep apnea, excessive snoring or daytime drowsiness? no    Today's PHQ-2 Score:   PHQ-2 ( 1999 Pf[employer]) 8/6/2015 7/29/2014   Q1: Little interest or pleasure in doing things 0 0   Q2: Feeling down, depressed or hopeless 0 0   PHQ-2 Score 0 0       Abuse: Current or Past (Physical, Sexual or Emotional) - NO  Do you feel safe in your environment - YES    Social History   Substance Use Topics     Smoking status: Never Smoker     Smokeless tobacco: Never Used     Alcohol use No      If you drink alcohol do you typically have >3 drinks per day or >7 drinks per week? No                      Last PSA: No results found for: PSA    Reviewed orders with patient. Reviewed health maintenance and updated orders accordingly - Yes    ROS:  CONSTITUTIONAL: NEGATIVE for fever, chills, change in weight  INTEGUMENTARY/SKIN: NEGATIVE for worrisome rashes, moles or lesions  EYES: NEGATIVE for vision changes or irritation  ENT: NEGATIVE for ear, mouth and throat problems  RESP: NEGATIVE for significant cough or SOB  CV: NEGATIVE for chest pain, palpitations or peripheral edema  GI: NEGATIVE for nausea, abdominal pain, heartburn, or change in bowel habits   male: negative for dysuria, hematuria, decreased urinary stream, erectile dysfunction, urethral discharge  MUSCULOSKELETAL: NEGATIVE for significant arthralgias or myalgia  NEURO: NEGATIVE for weakness, dizziness or paresthesias  PSYCHIATRIC: NEGATIVE  "for changes in mood or affect    This document serves as a record of the services and decisions personally performed and made by Dr. Parkinson. It was created on his behalf by Jennifer Hardin, a trained medical scribe. The creation of this document is based the provider's statements to the medical scribe.  Jennifer Hardin August 16, 2018 4:39 PM     OBJECTIVE:   /67 (BP Location: Right arm, Patient Position: Chair, Cuff Size: Adult Large)  Pulse 84  Temp 97.5  F (36.4  C) (Oral)  Ht 6' 2.02\" (1.88 m)  Wt 224 lb (101.6 kg)  SpO2 98%  BMI 28.75 kg/m2     EXAM:  GENERAL: healthy, alert and no distress  EYES: Eyes grossly normal to inspection, PERRL and conjunctivae and sclerae normal  HENT: ear canals and TM's normal, nose and mouth without ulcers or lesions  NECK: no adenopathy, no asymmetry, masses, or scars and thyroid normal to palpation  RESP: lungs clear to auscultation - no rales, rhonchi or wheezes  CV: regular rate and rhythm, normal S1 S2, no S3 or S4, no murmur, click or rub, no peripheral edema and peripheral pulses strong  ABDOMEN: soft, nontender, no hepatosplenomegaly, no masses and bowel sounds normal   (male): normal male genitalia without lesions or urethral discharge, no hernia  MS: no gross musculoskeletal defects noted, no edema  SKIN: no suspicious lesions or rashes  NEURO: Normal strength and tone, sensory exam grossly normal, mentation intact, cranial nerves 2-12 intact and DTR's normal and symmetric   PSYCH: autistic, occasional verbalization but no conversation, very cooperative with the exam, affect normal/bright     Diagnostic Test Results:  Results for orders placed or performed in visit on 08/09/18   TSH with free T4 reflex   Result Value Ref Range    TSH 0.83 0.40 - 4.00 mU/L   HIV Antigen Antibody Combo   Result Value Ref Range    HIV Antigen Antibody Combo Nonreactive NR^Nonreactive          Ref. Range 7/10/2018 15:54   Sodium Latest Ref Range: 133 - 144 mmol/L 142   Potassium " "Latest Ref Range: 3.4 - 5.3 mmol/L 4.4   Chloride Latest Ref Range: 94 - 109 mmol/L 109   Carbon Dioxide Latest Ref Range: 20 - 32 mmol/L 26   Urea Nitrogen Latest Ref Range: 7 - 30 mg/dL 14   Creatinine Latest Ref Range: 0.66 - 1.25 mg/dL 0.78   GFR Estimate Latest Ref Range: >60 mL/min/1.7m2 >90   GFR Estimate If Black Latest Ref Range: >60 mL/min/1.7m2 >90   Calcium Latest Ref Range: 8.5 - 10.1 mg/dL 9.1   Anion Gap Latest Ref Range: 3 - 14 mmol/L 7   Albumin Latest Ref Range: 3.4 - 5.0 g/dL 3.6   Protein Total Latest Ref Range: 6.8 - 8.8 g/dL 7.0   Bilirubin Total Latest Ref Range: 0.2 - 1.3 mg/dL 0.2   Alkaline Phosphatase Latest Ref Range: 40 - 150 U/L 40   ALT Latest Ref Range: 0 - 70 U/L 13   AST Latest Ref Range: 0 - 45 U/L 13   Vitamin D Deficiency screening Latest Ref Range: 20 - 75 ug/L 39       ASSESSMENT/PLAN:   (Z00.00) Encounter for routine adult health examination without abnormal findings  (primary encounter diagnosis)  Comment: Negative screening exam; up-to-date on preventive services.   Plan: Return in about 1 year (around 8/16/2019) for full physical.     (E03.4) Hypothyroidism due to acquired atrophy of thyroid  Comment: historically euthyroid   Plan: TSH with free T4 reflex        Recheck TSH in 6 months and adjust levothyroxine dose as needed.    COUNSELING:  Reviewed preventive health counseling, as reflected in patient instructions    BP Readings from Last 1 Encounters:   08/16/18 110/67     Estimated body mass index is 28.75 kg/(m^2) as calculated from the following:    Height as of this encounter: 6' 2.02\" (1.88 m).    Weight as of this encounter: 224 lb (101.6 kg).  Weight management plan: see AVS for exercise recommendations. BMI chart provided.     reports that he has never smoked. He has never used smokeless tobacco.      Counseling Resources:  ATP IV Guidelines  Pooled Cohorts Equation Calculator  FRAX Risk Assessment  ICSI Preventive Guidelines  Dietary Guidelines for Americans, " 2010  USDA's MyPlate  ASA Prophylaxis  Lung CA Screening    The information in this document, created by the medical scribe for me, accurately reflects the services I personally performed and the decisions made by me. I have reviewed and approved this document for accuracy prior to leaving the patient care area. August 16, 2018 4:39 PM      Rajat Parkinson MD  [facility]

## 2018-08-16 NOTE — MR AVS SNAPSHOT
After Visit Summary   8/16/2018    Duane D Backes    MRN: [id number]           Patient Information     Date Of Birth          1980        Visit Information        Provider Department      8/16/2018 4:20 PM Rajat Parkinson MD [facility]        Today's Diagnoses     Encounter for routine adult health examination without abnormal findings    -  1    Hypothyroidism due to acquired atrophy of thyroid          Care Instructions    At [facility], we strive to deliver an exceptional experience to you, every time we see you.  If you receive a survey in the mail, please send us back your thoughts. We really do value your feedback.    Based on your medical history, these are the current health maintenance/preventive care services that you are due for (some may have been done at this visit.)  Health Maintenance Due   Topic Date Due     PHQ-2 Q1 YR  07/22/1994     MEDICARE ANNUAL WELLNESS VISIT  07/22/2000     EYE EXAM Q1 YEAR  08/13/2016       Suggested websites for health information:  Www.[employer] : Up to date and easily searchable information on multiple topics.  Www.medlineplus.gov : medication info, interactive tutorials, watch real surgeries online  Www.familydoctor.org : good info from the Academy of Family Physicians  Www.cdc.gov : public health info, travel advisories, epidemics (H1N1)  Www.aap.org : children's health info, normal development, vaccinations  Www.health.state.[place].us : [place] dept of health, public health issues in [place], N1N1    Your care team:                            Family Medicine Internal Medicine   MD Scooter Gao MD Shantel Branch-Fleming, MD Katya Georgiev PA-C Megan Hill, APRN STEVEN Delgado MD Pediatrics   RAMA Oliveira, MD Shirin Dowell APRN CNP   MD Estelle Marvin MD Deborah Mielke, MD Kim Thein, APRN CNP      Clinic hours: Monday - Thursday 7  am-7 pm; Fridays 7 am-5 pm.   Urgent care: Monday - Friday 11 am-9 pm; Saturday and Sunday 9 am-5 pm.  Pharmacy : Monday -Thursday 8 am-8 pm; Friday 8 am-6 pm; Saturday and Sunday 9 am-5 pm.     Clinic: [phone]   Pharmacy: [phone]        Preventive Health Recommendations  Male Ages 26 - 39    Yearly exam:             See your health care provider every year in order to  o   Review health changes.   o   Discuss preventive care.    o   Review your medicines if your doctor has prescribed any.    You should be tested each year for STDs (sexually transmitted diseases), if you re at risk.     After age 35, talk to your provider about cholesterol testing. If you are at risk for heart disease, have your cholesterol tested at least every 5 years.     If you are at risk for diabetes, you should have a diabetes test (fasting glucose).  Shots: Get a flu shot each year. Get a tetanus shot every 10 years.     Nutrition:    Eat at least 5 servings of fruits and vegetables daily.     Eat whole-grain bread, whole-wheat pasta and brown rice instead of white grains and rice.     Get adequate Calcium and Vitamin D.     Lifestyle    Exercise for at least 150 minutes a week (30 minutes a day, 5 days a week). This will help you control your weight and prevent disease.     Limit alcohol to one drink per day.     No smoking.     Wear sunscreen to prevent skin cancer.     See your dentist every six months for an exam and cleaning.             Follow-ups after your visit        Follow-up notes from your care team     Return in about 1 year (around 8/16/2019) for full physical.      Future tests that were ordered for you today     Open Future Orders        Priority Expected Expires Ordered    TSH with free T4 reflex Routine 2/6/2019 8/16/2019 8/16/2018            Who to contact     If you have questions or need follow up information about today's clinic visit or your schedule please contact [facility] directly  "at [phone].  Normal or non-critical lab and imaging results will be communicated to you by MyChart, letter or phone within 4 business days after the clinic has received the results. If you do not hear from us within 7 days, please contact the clinic through MyChart or phone. If you have a critical or abnormal lab result, we will notify you by phone as soon as possible.  Submit refill requests through [employer] or call your pharmacy and they will forward the refill request to us. Please allow 3 business days for your refill to be completed.          Additional Information About Your Visit        Care EveryWhere ID     This is your Care EveryWhere ID. This could be used by other organizations to access your [facility] medical records  [id number]        Your Vitals Were     Pulse Temperature Height Pulse Oximetry BMI (Body Mass Index)       84 97.5  F (36.4  C) (Oral) 1.88 m (6' 2.02\") 98% 28.75 kg/m2        Blood Pressure from Last 3 Encounters:   08/16/18 110/67   07/27/18 98/62   07/10/18 116/71    Weight from Last 3 Encounters:   08/16/18 101.6 kg (224 lb)   07/27/18 100.7 kg (222 lb)   07/10/18 102.2 kg (225 lb 3.2 oz)                 Today's Medication Changes          These changes are accurate as of 8/16/18  5:03 PM.  If you have any questions, ask your nurse or doctor.               These medicines have changed or have updated prescriptions.        Dose/Directions    fish oil-omega-3 fatty acids 1000 MG capsule   This may have changed:  See the new instructions.   Used for:  Autistic disorder, current or active state        1 PO BID   Quantity:  60   Refills:  11                Primary Care Provider Office Phone # Fax #    Rajat Parkinson [phone]       [address]  [place] [facility] [address]        Equal Access to Services     MIKEY FROST AH: Migdalia brisenoo Soomaali, waaxda luqadaha, qaybta kaalmada adeegyada, waxay sahra ivcente. So wa " [phone].    ATENCIÓN: Si elodia díaz, tiene a mcnally disposición servicios gratuitos de asistencia lingüística. Camila kim [phone].    We comply with applicable federal civil rights laws and [place] laws. We do not discriminate on the basis of race, color, national origin, age, disability, sex, sexual orientation, or gender identity.            Thank you!     Thank you for choosing [facility]  for your care. Our goal is always to provide you with excellent care. Hearing back from our patients is one way we can continue to improve our services. Please take a few minutes to complete the written survey that you may receive in the mail after your visit with us. Thank you!             Your Updated Medication List - Protect others around you: Learn how to safely use, store and throw away your medicines at www.disposemymeds.org.          This list is accurate as of 8/16/18  5:03 PM.  Always use your most recent med list.                   Brand Name Dispense Instructions for use Diagnosis    ammonium lactate 12 % cream    AMLACTIN     Apply topically daily as needed for dry skin        cholecalciferol 1000 UNIT tablet    vitamin D3     3,000 Units daily        diazepam 2 MG tablet    VALIUM    10 tablet    Take 1 tablet (2 mg) by mouth once as needed (60 minutes prior to medical or dental appointment)    Active autistic disorder, Moderate mental retardation       divalproex sodium extended-release 500 MG 24 hr tablet    DEPAKOTE ER     Take 3 tablets by mouth 2 times daily.        docusate sodium 100 MG capsule    COLACE     Take 1 capsule by mouth 2 times daily.        fish oil-omega-3 fatty acids 1000 MG capsule     60    1 PO BID    Autistic disorder, current or active state       lactulose 10 GM/15ML solution    CHRONULAC     Take 30 mLs by mouth every evening & additional 30mL 2 times per day as needed        levothyroxine 200 MCG tablet    SYNTHROID/LEVOTHROID    30 tablet    Take 1 tablet  (200 mcg) by mouth daily for thyroid.    Hypothyroidism due to acquired atrophy of thyroid       lithium 150 MG capsule      450 mg At Bedtime        MILK OF MAGNESIA 400 MG/5ML suspension   Generic drug:  magnesium hydroxide      30 mLs every evening        polyethylene glycol powder    MIRALAX    510 g    Take 17 g by mouth daily.    Constipation       RISPERDAL 2 MG tablet   Generic drug:  risperiDONE      Take 2 mg by mouth 1 tab every morning & 1 tab every bedtime        sertraline 100 MG tablet    ZOLOFT     Take 1.5 tablets by mouth every morning.        * topiramate 100 MG tablet    TOPAMAX     Take 100 mg by mouth 2 times daily (150mg BID total)        * topiramate 50 MG tablet    TOPAMAX    60 tablet    Take 1 tablet (50 mg) by mouth 2 times daily    Generalized convulsive epilepsy (H)       * Notice:  This list has 2 medication(s) that are the same as other medications prescribed for you. Read the directions carefully, and ask your doctor or other care provider to review them with you.

## 2018-09-27 ENCOUNTER — TELEPHONE (OUTPATIENT)
Dept: FAMILY MEDICINE | Facility: CLINIC | Age: 38
End: 2018-09-27

## 2018-09-27 NOTE — TELEPHONE ENCOUNTER
Spoke with Raul, group home staff, regarding concerns with patient's swallowing. This has been an ongoing issue. Patient has a habit of drinking liquids quickly and having some coughing due to it. They have been working with patient on trying to slow down and take his time during meal times.     No concerns for patient aspirating a large amount of liquid or solid. No difficulty breathing, shortness of breath, airway issues, chest pain, abdominal pain, confusion, or altered mental status.     Writer advised to have patient seen by PCP for evaluation of issue before a referral can be placed, Raul is agreeable. Patient is scheduled to see Dr. Parkinson tomorrow PM for OV. Advised Raul to call clinic back before then if any changes or concerns regarding patient, he states understanding.     Sia Oswald RN

## 2018-09-27 NOTE — TELEPHONE ENCOUNTER
Reason for Call:  Other     Detailed comments: patient has been coughing after drinking liquids and asking if patient can have a barium swallow test done?    Phone Number Patient can be reached at: Other phone number:    Raul ([facility]) [phone]         Best Time: any    Can we leave a detailed message on this number? YES    Call taken on 9/27/2018 at 2:19 PM by Taya López

## 2018-09-28 ENCOUNTER — OFFICE VISIT (OUTPATIENT)
Dept: FAMILY MEDICINE | Facility: CLINIC | Age: 38
End: 2018-09-28
Payer: MEDICARE

## 2018-09-28 VITALS
BODY MASS INDEX: 29.43 KG/M2 | DIASTOLIC BLOOD PRESSURE: 78 MMHG | TEMPERATURE: 97.5 F | HEIGHT: 74 IN | OXYGEN SATURATION: 97 % | SYSTOLIC BLOOD PRESSURE: 125 MMHG | HEART RATE: 84 BPM | RESPIRATION RATE: 18 BRPM | WEIGHT: 229.3 LBS

## 2018-09-28 DIAGNOSIS — Z23 NEED FOR PROPHYLACTIC VACCINATION AND INOCULATION AGAINST INFLUENZA: ICD-10-CM

## 2018-09-28 DIAGNOSIS — R13.13 PHARYNGEAL DYSPHAGIA: Primary | ICD-10-CM

## 2018-09-28 DIAGNOSIS — R13.10 DIFFICULTY SWALLOWING LIQUIDS: ICD-10-CM

## 2018-09-28 PROCEDURE — 90686 IIV4 VACC NO PRSV 0.5 ML IM: CPT | Performed by: FAMILY MEDICINE

## 2018-09-28 PROCEDURE — G0008 ADMIN INFLUENZA VIRUS VAC: HCPCS | Performed by: FAMILY MEDICINE

## 2018-09-28 PROCEDURE — 99213 OFFICE O/P EST LOW 20 MIN: CPT | Mod: 25 | Performed by: FAMILY MEDICINE

## 2018-09-28 ASSESSMENT — PAIN SCALES - GENERAL: PAINLEVEL: NO PAIN (0)

## 2018-09-28 NOTE — PROGRESS NOTES
"  SUBJECTIVE:   Duane D Backes is a 38 year old male who presents to clinic today for the following health issues:  He is accompanied by his group home attendant.    This has been an ongoing issue. Patient has a habit of drinking liquids quickly and having some coughing due to it. They have been working with patient on trying to slow down and take his time during meal times.       Past medical, family, and social histories, medications, and allergies are reviewed and updated in [facility].     ROS:  CONSTITUTIONAL: NEGATIVE for fever, chills, change in weight  ENT/MOUTH: NEGATIVE for ear, mouth and throat problems  RESP: NEGATIVE for significant cough or SOB  CV: NEGATIVE for chest pain, palpitations or peripheral edema  ROS otherwise negative    This document serves as a record of the services and decisions personally performed and made by Dr. Parkinson. It was created on his behalf by Jennifer Hardin, a trained medical scribe. The creation of this document is based the provider's statements to the medical scribe.  Jennifer Hardin September 28, 2018 4:41 PM     OBJECTIVE:                                                    /78 (BP Location: Right arm, Patient Position: Sitting, Cuff Size: Adult Large)  Pulse 84  Temp 97.5  F (36.4  C) (Oral)  Resp 18  Ht 1.88 m (6' 2.02\")  Wt 104 kg (229 lb 4.8 oz)  SpO2 97%  BMI 29.43 kg/m2   Body mass index is 29.43 kg/(m^2).     GENERAL: healthy, alert and no distress  EYES: Eyes grossly normal to inspection, PERRL, EOMI, sclerae white and conjunctivae normal  MS: no gross musculoskeletal defects noted, no edema  SKIN: no suspicious lesions or rashes  NEURO: Normal strength and tone, sensory exam grossly normal, mentation intact, oriented times 3 and cranial nerves 2-12 intact  PSYCH: mentation appears normal, affect normal/bright     Diagnostic Test Results:  none      ASSESSMENT/PLAN:                                                      (R13.13) Pharyngeal dysphagia  (primary " encounter diagnosis)  (R13.10) Difficulty swallowing liquids  Comment: coughing with drinking liquids but otherwise does not appear to have an eating problem, and he is definitely getting enough calories  Plan: SPEECH THERAPY REFERRAL, SPEECH THERAPY         REFERRAL, XR Video Swallow w Esophagram        I have ordered a clinical swallow study, and ST can do a video swallow study if necessary    (Z23) Need for prophylactic vaccination and inoculation against influenza  Comment: Influenza vaccine offered and accepted by patient's group home attendant. He has received it before without problems.   Plan: HC FLU VAC PRESRV FREE QUAD SPLIT VIR 3+YRS IM              The information in this document, created by the medical scribe for me, accurately reflects the services I personally performed and the decisions made by me. I have reviewed and approved this document for accuracy prior to leaving the patient care area. September 28, 2018 4:41 PM   Rajat Parkinson MD                             Injectable Influenza Immunization Documentation    1.  Is the person to be vaccinated sick today?   No    2. Does the person to be vaccinated have an allergy to a component   of the vaccine?   No  Egg Allergy Algorithm Link    3. Has the person to be vaccinated ever had a serious reaction   to influenza vaccine in the past?   No    4. Has the person to be vaccinated ever had Guillain-Barré syndrome?   No    Form completed by Patient staff

## 2018-09-28 NOTE — MR AVS SNAPSHOT
"              After Visit Summary   9/28/2018    Duane D Backes    MRN: [id number]           Patient Information     Date Of Birth          1980        Visit Information        Provider Department      9/28/2018 4:00 PM Rajat Parkinson MD [facility]        Today's Diagnoses     Pharyngeal dysphagia    -  1    Difficulty swallowing liquids        Need for prophylactic vaccination and inoculation against influenza           Follow-ups after your visit        Additional Services     SPEECH THERAPY REFERRAL       *This therapy referral will be filtered to a centralized scheduling office at [facility] and the patient will receive a call to schedule an appointment at a [place] location most convenient for them. *     [facility] provides Speech Therapy evaluation and treatment and many specialty services across the [place] system.  If requesting a specialty program, please choose from the list below.  If you have not heard from the scheduling office within 2 business days, please call [phone] for all locations, with the exception of [place], please call [phone].       Treatment: Evaluation & Treatment  Speech Treatment Diagnosis: Dysphagia  Special Instructions: also do video swallow study if indicated based on clinical swallow study  Special Programs: Clinical Swallow Study    Please be aware that coverage of these services is subject to the terms and limitations of your health insurance plan.  Call member services at your health plan with any benefit or coverage questions.      **Note to Provider:  If you are referring outside of [place] for the therapy appointment, please list the name of the location in the \"special instructions\" above, print the referral and give to the patient to schedule the appointment.            SPEECH THERAPY REFERRAL       *This therapy referral will be filtered to a centralized scheduling office at [place] [facility] and the patient will receive a call to schedule an appointment at a [place] location most convenient for them. *     [facility] provides Speech Therapy evaluation and treatment and many specialty services across the [place] system.  If requesting a specialty program, please choose from the list below.  If you have not heard from the scheduling office within 2 business days, please call [phone] for all locations, with the exception of [place], please call [phone].       Treatment: Evaluation & Treatment  Speech Treatment Diagnosis: Dysphagia  Special Instructions: clinical swallow study  Special Programs: also do Video Swallow Study if indicated based on clinical swallow study    Please be aware that coverage of these services is subject to the terms and limitations of your health insurance plan.  Call member services at your health plan with any benefit or coverage questions.      **Note to Provider:  If you are referring outside of [place] for the therapy appointment, please list the name of the location in the \"special instructions\" above, print the referral and give to the patient to schedule the appointment.                  Future tests that were ordered for you today     Open Future Orders        Priority Expected Expires Ordered    SPEECH THERAPY REFERRAL Routine  9/28/2019 9/28/2018    XR Video Swallow w Esophagram Routine 9/28/2018 9/28/2019 9/28/2018    SPEECH THERAPY REFERRAL Routine  9/28/2019 9/28/2018            Who to contact     If you have questions or need follow up information about today's clinic visit or your schedule please contact [facility] JOSUE ROYAL directly at [phone].  Normal or non-critical lab and imaging results will be communicated to you by MyChart, letter or phone within 4 business days after the clinic has received the results. If you do not hear from us within 7 days, please contact the clinic through [employer] " "or phone. If you have a critical or abnormal lab result, we will notify you by phone as soon as possible.  Submit refill requests through [employer] or call your pharmacy and they will forward the refill request to us. Please allow 3 business days for your refill to be completed.          Additional Information About Your Visit        Care EveryWhere ID     This is your Care EveryWhere ID. This could be used by other organizations to access your [place] medical records  [id number]        Your Vitals Were     Pulse Temperature Respirations Height Pulse Oximetry BMI (Body Mass Index)    84 97.5  F (36.4  C) (Oral) 18 6' 2.02\" (1.88 m) 97% 29.43 kg/m2       Blood Pressure from Last 3 Encounters:   09/28/18 125/78   08/16/18 110/67   07/27/18 98/62    Weight from Last 3 Encounters:   09/28/18 229 lb 4.8 oz (104 kg)   08/16/18 224 lb (101.6 kg)   07/27/18 222 lb (100.7 kg)              We Performed the Following     HC FLU VAC PRESRV FREE QUAD SPLIT VIR 3+YRS IM          Today's Medication Changes          These changes are accurate as of 9/28/18  4:49 PM.  If you have any questions, ask your nurse or doctor.               These medicines have changed or have updated prescriptions.        Dose/Directions    fish oil-omega-3 fatty acids 1000 MG capsule   This may have changed:  See the new instructions.   Used for:  Autistic disorder, current or active state        1 PO BID   Quantity:  60   Refills:  11                Primary Care Provider Office Phone # Fax #    Rajat Parkinson [phone]       [address]  [facility] [address]        Equal Access to Services     [facility] : Hadii roel Miller, waaxda bereket, qaybta troyalconcepcion jaimes. So [facility] [phone].    ATENCIÓN: Si habla español, tiene a mcnally disposición servicios gratuitos de asistencia lingüística. Llame al [phone].    We comply with applicable federal civil rights laws " and [place] laws. We do not discriminate on the basis of race, color, national origin, age, disability, sex, sexual orientation, or gender identity.            Thank you!     Thank you for choosing [facility]  for your care. Our goal is always to provide you with excellent care. Hearing back from our patients is one way we can continue to improve our services. Please take a few minutes to complete the written survey that you may receive in the mail after your visit with us. Thank you!             Your Updated Medication List - Protect others around you: Learn how to safely use, store and throw away your medicines at www.disposemymeds.org.          This list is accurate as of 9/28/18  4:49 PM.  Always use your most recent med list.                   Brand Name Dispense Instructions for use Diagnosis    ammonium lactate 12 % cream    AMLACTIN     Apply topically daily as needed for dry skin        cholecalciferol 1000 UNIT tablet    vitamin D3     3,000 Units daily        diazepam 2 MG tablet    VALIUM    10 tablet    Take 1 tablet (2 mg) by mouth once as needed (60 minutes prior to medical or dental appointment)    Active autistic disorder, Moderate mental retardation       divalproex sodium extended-release 500 MG 24 hr tablet    DEPAKOTE ER     Take 3 tablets by mouth 2 times daily.        docusate sodium 100 MG capsule    COLACE     Take 1 capsule by mouth 2 times daily.        fish oil-omega-3 fatty acids 1000 MG capsule     60    1 PO BID    Autistic disorder, current or active state       lactulose 10 GM/15ML solution    CHRONULAC     Take 30 mLs by mouth every evening & additional 30mL 2 times per day as needed        levothyroxine 200 MCG tablet    SYNTHROID/LEVOTHROID    30 tablet    Take 1 tablet (200 mcg) by mouth daily for thyroid.    Hypothyroidism due to acquired atrophy of thyroid       lithium 150 MG capsule      450 mg At Bedtime        MILK OF MAGNESIA 400 MG/5ML suspension    Generic drug:  magnesium hydroxide      30 mLs every evening        polyethylene glycol powder    MIRALAX    510 g    Take 17 g by mouth daily.    Constipation       RISPERDAL 2 MG tablet   Generic drug:  risperiDONE      Take 2 mg by mouth 1 tab every morning & 1 tab every bedtime        sertraline 100 MG tablet    ZOLOFT     Take 1.5 tablets by mouth every morning.        * topiramate 100 MG tablet    TOPAMAX     Take 100 mg by mouth 2 times daily (150mg BID total)        * topiramate 50 MG tablet    TOPAMAX    60 tablet    Take 1 tablet (50 mg) by mouth 2 times daily    Generalized convulsive epilepsy (H)       * Notice:  This list has 2 medication(s) that are the same as other medications prescribed for you. Read the directions carefully, and ask your doctor or other care provider to review them with you.

## 2018-10-16 ENCOUNTER — TELEPHONE (OUTPATIENT)
Dept: FAMILY MEDICINE | Facility: CLINIC | Age: 38
End: 2018-10-16

## 2018-10-16 ENCOUNTER — HOSPITAL ENCOUNTER (OUTPATIENT)
Dept: SPEECH THERAPY | Facility: CLINIC | Age: 38
Setting detail: THERAPIES SERIES
End: 2018-10-16
Attending: FAMILY MEDICINE
Payer: MEDICARE

## 2018-10-16 DIAGNOSIS — R13.10 DIFFICULTY SWALLOWING LIQUIDS: ICD-10-CM

## 2018-10-16 DIAGNOSIS — R13.13 PHARYNGEAL DYSPHAGIA: ICD-10-CM

## 2018-10-16 PROCEDURE — G8998 SWALLOW D/C STATUS: HCPCS | Mod: GN,CI | Performed by: SPEECH-LANGUAGE PATHOLOGIST

## 2018-10-16 PROCEDURE — 40000211 ZZHC STATISTIC SLP  DEPARTMENT VISIT: Performed by: SPEECH-LANGUAGE PATHOLOGIST

## 2018-10-16 PROCEDURE — G8996 SWALLOW CURRENT STATUS: HCPCS | Mod: GN,CI | Performed by: SPEECH-LANGUAGE PATHOLOGIST

## 2018-10-16 PROCEDURE — G8997 SWALLOW GOAL STATUS: HCPCS | Mod: GN,CI | Performed by: SPEECH-LANGUAGE PATHOLOGIST

## 2018-10-16 PROCEDURE — 92610 EVALUATE SWALLOWING FUNCTION: CPT | Mod: GN | Performed by: SPEECH-LANGUAGE PATHOLOGIST

## 2018-10-16 NOTE — TELEPHONE ENCOUNTER
Reason for Call:  Other Order    Detailed comments: Pt has a PRN for Diazepam and need a discontinue order for Pt no longer uses and would like the order to be faxed to fax # [phone]    Phone Number Patient can be reached at: Other phone number:  [phone]    Best Time: anytime    Can we leave a detailed message on this number? YES    Call taken on 10/16/2018 at 10:03 AM by Jose Ca

## 2018-10-16 NOTE — TELEPHONE ENCOUNTER
Living facility where patient resides is asking for discontinuation orders to remove Diazepam 2 mg tabs from current medication list. They report patient does not use this and would like discontinued. Need written physician order to do this. Please fax written order to [phone], emelia Carter at [facility].    Routing to provider to review and address.    Angelina Ac RN  [facility] Triage

## 2018-10-17 ENCOUNTER — DOCUMENTATION ONLY (OUTPATIENT)
Dept: OTHER | Facility: CLINIC | Age: 38
End: 2018-10-17

## 2018-10-17 NOTE — PROGRESS NOTES
[facility] OP SPEECH LANGUAGE PATHOLOGY CLINICAL SWALLOW EVALUATION   10/16/18 1600       Present No   General Information   Type Of Visit Initial   Start Of Care Date 10/16/18   Referring Physician Dr. CESAR Parkinson MD    Orders Evaluate And Treat   Medical Diagnosis Dysphagia    Onset Of Illness/injury Or Date Of Surgery 09/28/18  (Date of MD orders. Caregiver reports ~9 yrs of fast drinking)   Precautions/limitations No Known Precautions/limitations   Hearing WFL   Pertinent History of Current Problem/OT: Additional Occupational Profile Info Pt is a 38 y.o. male with a medical diagnosis of seizure disorder and chronic encephalopathy.  He lives in a group home. Pt presents today with his caregiver, Demetrius. Pt was referred for an OP SLP clinical swallow and video swallow evaluation of necessary d/t concerns regarding coughing and 1 severe episode of choking when drinking thin liquids. Demetrius reports he has been with Duane for the past 9 years, he has consistently been a very fast drinker, draining the full cup in quick successive gulps. Demetrius and other caregivers do provided verbal cues to reduce rate, though this is limited in its success.    Respiratory Status Room air   Prior Level Of Function Swallowing   Prior Level Of Function Comment Ongoing reports of coughing following quick successive swallows of all liquids for ~9 years.    Patient Role/employment History Employed   Living Environment Group Home   Patient/family Goals Recommendations for safe swallow, and adaptive drinking devices to reduce rate of intake.    Pain Assessment   Pain Reported No   Fall Risk Screen   Fall screen completed by SLP   Have you fallen 2 or more times in the past year? No   Have you fallen and had an injury in the past year? No   Is patient a fall risk? No   Clinical Swallow Evaluation   Oral Musculature unable to assess due to poor participation/comprehension   Dentition present and adequate   Mucosal Quality good   Oral  Labial Strength and Mobility WFL   Lingual Strength and Mobility WFL   Laryngeal Function Cough  (Strong following swallow)   Oral Musculature Comments Pt limited in his ability to follow directions and imitate SLP models. Based on limited OM examination, Pt does demonstrate intact oral musculature and anatomy as needed for swallow function.    Additional Documentation Yes   Additional evaluation(s) completed today Yes   Rationale for completing additional evaluation Based on Pt/caregiver report of symptoms, MD orders, and SLP clinical judgment.    Clinical Swallow Eval: Thin Liquid Texture Trial   Mode of Presentation, Thin Liquids cup;straw;self-fed  (Open cup, adaptive Bionix SafeStraw. )   Volume of Liquid or Food Presented 1 cup (successive), 6.2 ML-straw   Oral Phase of Swallow WFL   Pharyngeal Phase of Swallow intact;coughing/choking;impaired  (1 big cough following successive swallows from cup. )   Diagnostic Statement SLP: When given open cup Pt gulps full glass until empty and the produces big cough. Pt introduced to Bionix SafeFlow (blue) straw, Pt able to still suck up water even with the stopper d/t excellent buccal strength. Pt uncomprehending of swallow release straw concept to allow the weight to sink before gathering another sip-however, no overt s/sx of aspiration or penetration demonstrated using adaptive straw.    Swallow Compensations   Swallow Compensations (Adaptive drinking device to reduce rate/limit intake. )   Results (Overt s/sx of aspiration w/ open up. )   Educational Assessment   Barriers to Learning Cognitive   Preferred Learning Style Listening;Demonstration;Pictures/video   General Therapy Interventions   Intervention Comments No therapy recommended-SLP provided education and recommendations for adapive drinking materials.    Swallow Eval: Clinical Impressions   Skilled Criteria for Therapy Intervention No problems identified which require skilled intervention   Functional  Assessment Scale (FAS) 6   Dysphagia Outcome Severity Scale (JACQUELINE) Level 6 - JACQUELINE   Diet texture recommendations Regular diet;Thin liquids  (Recommend use of adaptive device for drinking. )   Recommended Feeding/Eating Techniques (Adaptive drinking device. )   Anticipated Discharge Disposition home w/ assist   Risks and Benefits of Treatment have been explained. Yes   Patient, family and/or staff in agreement with Plan of Care Yes   Clinical Impression Comments Pt demonstrated 1 instance of overt s/sx of aspiration-strong cough following successive swallows from open cup. Diet textures not tried during this evaluation, as no concerns were reported. Pt demonstrates impulsivity and excessive rate of intake with liquids, Pt is not receptive to verbal cues for reduced rate. When introduced to adaptive straw, Pt demonstrated limited cognitive capacity for sip and release to allow the weight to return to the bottom for reloading of liquid before taking another sip-this resulted in slow leakage around straw weight, with still limited oral intake. SLP presumes that with practice and cueing from caregivers Pt would be able to use straw appropriately. Unfortunately, a slow flow adaptive cup was not available for this evaluation, however, SLP provided verbal education to caregiver, and printed hand out on ordering forms for both the slow flow cup (recommended) and the SafeStraw. Based on the results of this assessment Pt is at risk for aspiration/penetration d/t rapid rate of liquid oral intake, SLP recommends Pt and caregivers obtain 1 of the 2 recommended adaptive drinking device to reduce rate and limit intake to single sips. Pt's caregiver, Demetrius received printed information, and verbalized understanding of all recommendations as well as education regarding overt s/sx and risks of aspiration.    Total Session Time   Total Session Time 25 minutes    Total Evaluation Time 25 minutes    Therapy Certification   Certification  date from 10/16/18   Certification date to 01/13/19   Medical Diagnosis Dysphagia, Seizure Disorder.    SLP Medicare Only G-code   G-code Swallowing   Swallowing   Swallowing:  Current Status , Goal , Discharge -Xsrl Only-Modifier the same for all G-codes CI: 1-19% impairment   Swallowing: Current  & Discharge Modifier Rationale-Eval Only Based on Pt performance during PO trials and SLP clinical judgment.      Thank you for referring Duane Backes to outpatient therapy at [facility] in [place].  Please call Nain Alcocer MA, SLP-CCC at [phone] or email [email or web address] with any questions or concerns.      Nani Alcocer M.A., SLP-CCC  Speech-Language Pathologist

## 2018-10-18 NOTE — TELEPHONE ENCOUNTER
Signed printed medication is faxed as requested below.  Andrea Alfonso,  [facility]

## 2018-11-20 ENCOUNTER — OFFICE VISIT (OUTPATIENT)
Dept: FAMILY MEDICINE | Facility: CLINIC | Age: 38
End: 2018-11-20
Payer: MEDICARE

## 2018-11-20 VITALS
OXYGEN SATURATION: 96 % | TEMPERATURE: 97.6 F | HEIGHT: 74 IN | WEIGHT: 225 LBS | HEART RATE: 78 BPM | BODY MASS INDEX: 28.88 KG/M2 | SYSTOLIC BLOOD PRESSURE: 123 MMHG | DIASTOLIC BLOOD PRESSURE: 72 MMHG

## 2018-11-20 DIAGNOSIS — E03.4 HYPOTHYROIDISM DUE TO ACQUIRED ATROPHY OF THYROID: ICD-10-CM

## 2018-11-20 DIAGNOSIS — Z01.818 PREOP GENERAL PHYSICAL EXAM: Primary | ICD-10-CM

## 2018-11-20 DIAGNOSIS — G40.309 GENERALIZED TONIC CLONIC EPILEPSY (H): ICD-10-CM

## 2018-11-20 DIAGNOSIS — F84.0 ACTIVE AUTISTIC DISORDER: ICD-10-CM

## 2018-11-20 DIAGNOSIS — H26.9 CATARACT OF LEFT EYE, UNSPECIFIED CATARACT TYPE: ICD-10-CM

## 2018-11-20 DIAGNOSIS — F71 MODERATE MENTAL RETARDATION: ICD-10-CM

## 2018-11-20 PROCEDURE — 99214 OFFICE O/P EST MOD 30 MIN: CPT | Performed by: FAMILY MEDICINE

## 2018-11-20 ASSESSMENT — PAIN SCALES - GENERAL: PAINLEVEL: NO PAIN (0)

## 2018-11-20 NOTE — PROGRESS NOTES
[address] [address]  [phone]  Dept: [phone]    PRE-OP EVALUATION:  Today's date: 2018    Duane D Backes (: 1980) presents for pre-operative evaluation assessment as requested by Dr. Abarca.  He requires evaluation and anesthesia risk assessment prior to undergoing surgery/procedure for treatment of left eye cataract .  He is accompanied by his group home attendant.     Proposed Surgery/ Procedure: left eye cataract removal and IOLI, right eye exam under anesthesia   Date of Surgery/ Procedure: 18  Time of Surgery/ Procedure: unsure  Hospital/Surgical Facility: [facility]  Fax number for surgical facility: [phone]  Primary Physician: Rajat Parkinson  Type of Anesthesia Anticipated: to be determined    Patient has a Health Care Directive or Living Will:  NO    1. NO - Do you have a history of heart attack, stroke, stent, bypass or surgery on an artery in the head, neck, heart or legs?  2. NO - Do you ever have any pain or discomfort in your chest?  3. NO - Do you have a history of  Heart Failure?  4. NO - Are you troubled by shortness of breath when: walking on the level, up a slight hill or at night?  5. YES - Do you currently have a cold, bronchitis or other respiratory infection?  6. NO - Do you have a cough, shortness of breath or wheezing?  7. NO - Do you sometimes get pains in the calves of your legs when you walk?  8. NO - Do you or anyone in your family have previous history of blood clots?  9. NO - Do you or does anyone in your family have a serious bleeding problem such as prolonged bleeding following surgeries or cuts?  10. NO - Have you ever had problems with anemia or been told to take iron pills?  11. NO - Have you had any abnormal blood loss such as black, tarry or bloody stools, or abnormal vaginal bleeding?  12. NO - Have you ever had a blood transfusion?  13. NO - Have you or any of  your relatives ever had problems with anesthesia?  14. NO - Do you have sleep apnea, excessive snoring or daytime drowsiness?  15. NO - Do you have any prosthetic heart valves?  16. NO - Do you have prosthetic joints?  17. NO - Is there any chance that you may be pregnant?      HPI:     HPI related to upcoming procedure: This 38 year old male complains of cataract that is apparently visually significant.       See problem list for active medical problems.  Problems all longstanding and stable, except as noted/documented.  See ROS for pertinent symptoms related to these conditions.                                                                                                                                                          .    MEDICAL HISTORY:     Patient Active Problem List    Diagnosis Date Noted     Overweight (BMI 25.0-29.9) 08/02/2013     Priority: Medium     Benign neoplasm of brain (H) 04/26/2013     Priority: Medium     Epidermoid brain tumor, Dr. Nilo Banuelos MD (neuro)       Hypothyroidism due to acquired atrophy of thyroid 03/31/2011     Priority: Medium     CARDIOVASCULAR SCREENING; LDL GOAL LESS THAN 160 10/31/2010     Priority: Medium     Generalized tonic clonic epilepsy (H) 08/27/2009     Priority: Medium     Dry skin 08/27/2009     Priority: Medium     Hearing loss 08/30/2005     Priority: Medium     Problem list name updated by automated process. Provider to review       Moderate mental retardation 04/13/2005     Priority: Medium     Active autistic disorder 04/13/2005     Priority: Medium     Problem list name updated by automated process. Provider to review       Obsessive-compulsive disorder 04/13/2005     Priority: Medium     Problem list name updated by automated process. Provider to review       Chronic constipation 04/13/2005     Priority: Medium      Past Medical History:   Diagnosis Date     Autistic disorder, current or active state      Dry skin      Moderate intellectual  disabilities      Obsessive-compulsive disorders      Overweight (BMI 25.0-29.9) 8/2/2013     Seizure disorder (H)     Generalized tonic clonic     Unspecified constipation      Past Surgical History:   Procedure Laterality Date     NO HISTORY OF SURGERY      Has had anesthesia with dental work.     Current Outpatient Prescriptions   Medication Sig Dispense Refill     ammonium lactate (AMLACTIN) 12 % cream Apply topically daily as needed for dry skin       divalproex (DEPAKOTE) 500 MG 24 hr tablet Take 3 tablets by mouth 2 times daily.       docusate sodium (COLACE) 100 MG capsule Take 1 capsule by mouth 2 times daily.       FISH OIL 1000 MG OR CAPS 1 PO BID (Patient taking differently: 1 capsule 2 times per day) 60 11     lactulose (CHRONULAC) 10 GM/15ML solution Take 30 mLs by mouth every evening & additional 30mL 2 times per day as needed       levothyroxine (SYNTHROID/LEVOTHROID) 200 MCG tablet Take 1 tablet (200 mcg) by mouth daily for thyroid. 30 tablet 11     lithium 150 MG capsule 450 mg At Bedtime        MILK OF MAGNESIA 400 MG/5ML suspension 30 mLs every evening        polyethylene glycol (MIRALAX) powder Take 17 g by mouth daily. 510 g 0     risperiDONE (RISPERDAL) 2 MG tablet Take 2 mg by mouth 1 tab every morning & 1 tab every bedtime       sertraline (ZOLOFT) 100 MG tablet Take 1.5 tablets by mouth every morning.       topiramate (TOPAMAX) 100 MG tablet Take 100 mg by mouth 2 times daily (150mg BID total)       topiramate (TOPAMAX) 50 MG tablet Take 1 tablet (50 mg) by mouth 2 times daily 60 tablet 6     VITAMIN D3 1000 units tablet 3,000 Units daily       OTC products: None, except as noted above    Allergies   Allergen Reactions     Penicillin [Penicillins] Other (See Comments)     Prozac [Fluoxetine Hcl]      Reglan [Metoclopramide Hcl]      Ritalin [Methylphenidate Derivatives] Other (See Comments)     Ritalin     Trazodone       Latex Allergy: NO    Social History   Substance Use Topics      "Smoking status: Never Smoker     Smokeless tobacco: Never Used     Alcohol use No     History   Drug Use No       REVIEW OF SYSTEMS:   Constitutional, neuro, ENT, endocrine, pulmonary, cardiac, gastrointestinal, genitourinary, musculoskeletal, integument and psychiatric systems are negative, except as otherwise noted.    This document serves as a record of the services and decisions personally performed and made by Dr. Parkinson. It was created on his behalf by Jennifer Hardin, a trained medical scribe. The creation of this document is based the provider's statements to the medical scribe.  Jennifer Hardin November 20, 2018 4:18 PM   EXAM:   /72 (BP Location: Left arm, Patient Position: Chair, Cuff Size: Adult Large)  Pulse 78  Temp 97.6  F (36.4  C) (Oral)  Ht 6' 2.02\" (1.88 m)  Wt 225 lb (102.1 kg)  SpO2 96%  BMI 28.88 kg/m2    GENERAL APPEARANCE: healthy, alert and no distress     EYES: EOMI,  PERRL, cataract left eye noted     HENT: ear canals and TM's normal and nose and mouth without ulcers or lesions     NECK: no adenopathy, no asymmetry, masses, or scars and thyroid normal to palpation     RESP: lungs clear to auscultation - no rales, rhonchi or wheezes     CV: regular rates and rhythm, normal S1 S2, no S3 or S4 and no murmur, click or rub     ABDOMEN:  soft, nontender, no HSM or masses and bowel sounds normal     MS: extremities normal- no gross deformities noted, no evidence of inflammation in joints, FROM in all extremities.     SKIN: mild seborrheic dermatitis and patches on the face     NEURO: Normal strength and tone, sensory exam grossly normal, mentation intact, speech normal, cranial nerves 2-12 intact and DTRs symmetrical      PSYCH: autistic, occasional verbalization but no conversation, very cooperative with the exam, affect normal/bright      LYMPHATICS: No cervical adenopathy    DIAGNOSTICS:   No labs or EKG required for low risk surgery (cataract, skin procedure, breast biopsy, " etc)    Recent Labs   Lab Test  07/10/18   1554  05/17/18   0936  08/15/17   1658  07/11/17   1533   HGB  12.9*   --    --   13.0*   PLT  174   --    --   149*   NA  142  138   --   138   POTASSIUM  4.4  4.3   --   3.9   CR  0.78  0.83   --   0.90   A1C   --    --   4.9   --    Results for BACKES, DUANE D (MRN [id number]) as of 11/20/2018 16:29   Ref. Range 8/9/2018 07:58   TSH Latest Ref Range: 0.40 - 4.00 mU/L 0.83     IMPRESSION:   Reason for surgery/procedure: cataract, autism requiring eye exam under anesthesia   Diagnosis/reason for consult: Pre-op evaluation of fitness for surgery & proposed anesthesia     The proposed surgical procedure is considered LOW risk.    REVISED CARDIAC RISK INDEX  The patient has the following serious cardiovascular risks for perioperative complications such as (MI, PE, VFib and 3  AV Block):  No serious cardiac risks  INTERPRETATION: 0 risks: Class I (very low risk - 0.4% complication rate)    The patient has the following additional risks for perioperative complications:  The diagnoses listed below:      ICD-10-CM    1. Preop general physical exam Z01.818    2. Cataract of left eye, unspecified cataract type H26.9    3. Active autistic disorder F84.0    4. Moderate mental retardation F71    5. Generalized tonic clonic epilepsy (H) G40.309    6. Hypothyroidism due to acquired atrophy of thyroid E03.4      RECOMMENDATIONS:     APPROVAL GIVEN to proceed with proposed procedure, without further diagnostic evaluation     The information in this document, created by the medical scribe for me, accurately reflects the services I personally performed and the decisions made by me. I have reviewed and approved this document for accuracy prior to leaving the patient care area. November 20, 2018 4:18 PM    Signed Electronically by: Rajat Parkinson MD    Copy of this evaluation report is provided to requesting physician.    [facility] Preop Guidelines    Revised Cardiac Risk Index

## 2018-11-20 NOTE — MR AVS SNAPSHOT
After Visit Summary   11/20/2018    Duane D Backes    MRN: [id number]           Patient Information     Date Of Birth          1980        Visit Information        Provider Department      11/20/2018 4:00 PM Rajat Parkinson MD [facility]        Today's Diagnoses     Preop general physical exam    -  1    Cataract of left eye, unspecified cataract type        Active autistic disorder        Moderate mental retardation        Generalized tonic clonic epilepsy (H)        Hypothyroidism due to acquired atrophy of thyroid          Care Instructions    At [facility], we strive to deliver an exceptional experience to you, every time we see you.  If you receive a survey in the mail, please send us back your thoughts. We really do value your feedback.    Your care team:                            Family Medicine Internal Medicine   MD Scooter Gao MD Shantel Branch-Fleming, MD Katya Georgiev PA-C Megan Hill, APRN [facility]    Lee Delgado MD Pediatrics   Jarrell Tinajero, PAEstrellaC  Rylie Joseph, CNP MD Shirin Fraser APRN CNP   MD Estelle Marvin MD Deborah Mielke, MD Kim Thein, APRN CNP      Clinic hours: Monday - Thursday 7 am-7 pm; Fridays 7 am-5 pm.   Urgent care: Monday - Friday 11 am-9 pm; Saturday and Sunday 9 am-5 pm.  Pharmacy : Monday -Thursday 8 am-8 pm; Friday 8 am-6 pm; Saturday and Sunday 9 am-5 pm.     Clinic: [phone]   Pharmacy: [phone]          Before Your Surgery      Call your surgeon if there is any change in your health. This includes signs of a cold or flu (such as a sore throat, runny nose, cough, rash or fever).    Do not smoke, drink alcohol or take over the counter medicine (unless your surgeon or primary care doctor tells you to) for the 24 hours before and after surgery.    If you take prescribed drugs: Follow your doctor s orders about which medicines to take and which to stop  "until after surgery.    Eating and drinking prior to surgery: follow the instructions from your surgeon    Take a shower or bath the night before surgery. Use the soap your surgeon gave you to gently clean your skin. If you do not have soap from your surgeon, use your regular soap. Do not shave or scrub the surgery site.  Wear clean pajamas and have clean sheets on your bed.           Follow-ups after your visit        Follow-up notes from your care team     Return in about 9 months (around 2019) for full physical.      Who to contact     If you have questions or need follow up information about today's clinic visit or your schedule please contact [facility] directly at [phone].  Normal or non-critical lab and imaging results will be communicated to you by [employer], letter or phone within 4 business days after the clinic has received the results. If you do not hear from us within 7 days, please contact the clinic through [employer] or phone. If you have a critical or abnormal lab result, we will notify you by phone as soon as possible.  Submit refill requests through Create or call your pharmacy and they will forward the refill request to us. Please allow 3 business days for your refill to be completed.          Additional Information About Your Visit        Create Information     Create lets you send messages to your doctor, view your test results, renew your prescriptions, schedule appointments and more. To sign up, go to [email or web address] . Click on \"Log in\" on the left side of the screen, which will take you to the Welcome page. Then click on \"Sign up Now\" on the right side of the page.     You will be asked to enter the access code listed below, as well as some personal information. Please follow the directions to create your username and password.     Your access code is: [email or web address]  Expires: 2019  4:30 PM     Your access code will  in 90 days. If you need help " "or a new code, please call your [facility] or [phone].        Care EveryWhere ID     This is your Care EveryWhere ID. This could be used by other organizations to access your [facility] medical records  [id number]        Your Vitals Were     Pulse Temperature Height Pulse Oximetry BMI (Body Mass Index)       78 97.6  F (36.4  C) (Oral) 1.88 m (6' 2.02\") 96% 28.88 kg/m2        Blood Pressure from Last 3 Encounters:   11/20/18 123/72   09/28/18 125/78   08/16/18 110/67    Weight from Last 3 Encounters:   11/20/18 102.1 kg (225 lb)   09/28/18 104 kg (229 lb 4.8 oz)   08/16/18 101.6 kg (224 lb)              Today, you had the following     No orders found for display         Today's Medication Changes          These changes are accurate as of 11/20/18  4:30 PM.  If you have any questions, ask your nurse or doctor.               These medicines have changed or have updated prescriptions.        Dose/Directions    fish oil-omega-3 fatty acids 1000 MG capsule   This may have changed:  See the new instructions.   Used for:  Autistic disorder, current or active state        1 PO BID   Quantity:  60   Refills:  11                Primary Care Provider Office Phone # Fax #    Rajat Parkinson [phone]       [address] [place]  [facility] [address]        Equal Access to Services     [facility] : Hadii roel ospina hadasho Sodarnellali, waaxda luqadaha, qaybta kaalmada adeabbiyada, concepcion correa . So [facility] [phone].    ATENCIÓN: Si habla español, tiene a mcnally disposición servicios gratuitos de asistencia lingüística. Camila al [phone].    We comply with applicable federal civil rights laws and [place] laws. We do not discriminate on the basis of race, color, national origin, age, disability, sex, sexual orientation, or gender identity.            Thank you!     Thank you for choosing [facility]  for your care. Our goal is always to provide you with " excellent care. Hearing back from our patients is one way we can continue to improve our services. Please take a few minutes to complete the written survey that you may receive in the mail after your visit with us. Thank you!             Your Updated Medication List - Protect others around you: Learn how to safely use, store and throw away your medicines at www.disposemymeds.org.          This list is accurate as of 11/20/18  4:30 PM.  Always use your most recent med list.                   Brand Name Dispense Instructions for use Diagnosis    ammonium lactate 12 % cream    AMLACTIN     Apply topically daily as needed for dry skin        cholecalciferol 1000 UNIT tablet    vitamin D3     3,000 Units daily        divalproex sodium extended-release 500 MG 24 hr tablet    DEPAKOTE ER     Take 3 tablets by mouth 2 times daily.        docusate sodium 100 MG capsule    COLACE     Take 1 capsule by mouth 2 times daily.        fish oil-omega-3 fatty acids 1000 MG capsule     60    1 PO BID    Autistic disorder, current or active state       lactulose 10 GM/15ML solution    CHRONULAC     Take 30 mLs by mouth every evening & additional 30mL 2 times per day as needed        levothyroxine 200 MCG tablet    SYNTHROID/LEVOTHROID    30 tablet    Take 1 tablet (200 mcg) by mouth daily for thyroid.    Hypothyroidism due to acquired atrophy of thyroid       lithium 150 MG capsule      450 mg At Bedtime        MILK OF MAGNESIA 400 MG/5ML suspension   Generic drug:  magnesium hydroxide      30 mLs every evening        polyethylene glycol powder    MIRALAX    510 g    Take 17 g by mouth daily.    Constipation       RISPERDAL 2 MG tablet   Generic drug:  risperiDONE      Take 2 mg by mouth 1 tab every morning & 1 tab every bedtime        sertraline 100 MG tablet    ZOLOFT     Take 1.5 tablets by mouth every morning.        * topiramate 100 MG tablet    TOPAMAX     Take 100 mg by mouth 2 times daily (150mg BID total)        * topiramate  50 MG tablet    TOPAMAX    60 tablet    Take 1 tablet (50 mg) by mouth 2 times daily    Generalized convulsive epilepsy (H)       * Notice:  This list has 2 medication(s) that are the same as other medications prescribed for you. Read the directions carefully, and ask your doctor or other care provider to review them with you.

## 2018-11-20 NOTE — PATIENT INSTRUCTIONS
At [facility], we strive to deliver an exceptional experience to you, every time we see you.  If you receive a survey in the mail, please send us back your thoughts. We really do value your feedback.    Your care team:                            Family Medicine Internal Medicine   MD Scooter Gao MD Shantel Branch-Fleming, MD Katya Georgiev PA-C Megan Hill, APRN CNP    Lee Delgado, MD Pediatrics   Jarrell Tinajero, RAMA Joseph, MD Shirin Dowell APRN CNP   MD Estelle Marvin MD Deborah Mielke, MD Alejandra Stephens, APRN [facility]      Clinic hours: Monday - Thursday 7 am-7 pm; Fridays 7 am-5 pm.   Urgent care: Monday - Friday 11 am-9 pm; Saturday and Sunday 9 am-5 pm.  Pharmacy : Monday -Thursday 8 am-8 pm; Friday 8 am-6 pm; Saturday and Sunday 9 am-5 pm.     Clinic: [phone]   Pharmacy: [phone]          Before Your Surgery      Call your surgeon if there is any change in your health. This includes signs of a cold or flu (such as a sore throat, runny nose, cough, rash or fever).    Do not smoke, drink alcohol or take over the counter medicine (unless your surgeon or primary care doctor tells you to) for the 24 hours before and after surgery.    If you take prescribed drugs: Follow your doctor s orders about which medicines to take and which to stop until after surgery.    Eating and drinking prior to surgery: follow the instructions from your surgeon    Take a shower or bath the night before surgery. Use the soap your surgeon gave you to gently clean your skin. If you do not have soap from your surgeon, use your regular soap. Do not shave or scrub the surgery site.  Wear clean pajamas and have clean sheets on your bed.

## 2018-11-26 ENCOUNTER — TELEPHONE (OUTPATIENT)
Dept: FAMILY MEDICINE | Facility: CLINIC | Age: 38
End: 2018-11-26

## 2018-11-26 NOTE — TELEPHONE ENCOUNTER
Reason for Call:  Other prescription    Detailed comments: Pt's Group Home calling to inform Dr. Parkinson that Pt's Psychiatrist  recently discontinued Pt's lithium carbonate for Pt is no longer using medication.    Phone Number [facility] [employer] can be reached at: Other phone number:  [phone]    Best Time: anytime    Can we leave a detailed message on this number? YES    Call taken on 11/26/2018 at 9:49 AM by Jose Ca

## 2018-12-12 DIAGNOSIS — G40.309 GENERALIZED CONVULSIVE EPILEPSY (H): ICD-10-CM

## 2018-12-12 NOTE — TELEPHONE ENCOUNTER
"Requested Prescriptions   Pending Prescriptions Disp Refills     topiramate (TOPAMAX) 100 MG tablet [Pharmacy Med Name: TOPIRAMATE 100MG TABS]  10          Last Written Prescription Date:  n/a  Last Fill Quantity: 0,  # refills: 0   Last Office Visit with [facility], [facility] or [facility] prescribing provider:  11/20/18   Future Office Visit:      Sig: TAKE 1 TABLET BY MOUTH TWICE DAILY (ALONG WITH 50MG TABLET FOR TOTAL = 150MG TWICE DAILY)    Anti-Seizure Meds Protocol  Failed - 12/12/2018  9:16 AM       Failed - Review Authorizing provider's last note.     Refer to last progress notes: confirm request is for original authorizing provider (cannot be through other providers).         Passed - Recent (12 mo) or future (30 days) visit within the authorizing provider's specialty    Patient had office visit in the last 12 months or has a visit in the next 30 days with authorizing provider or within the authorizing provider's specialty.  See \"Patient Info\" tab in inbasket, or \"Choose Columns\" in Meds & Orders section of the refill encounter.             Passed - Normal CBC on file in past 26 months    Recent Labs   Lab Test 07/10/18  1554   WBC 4.8   RBC 4.05*   HGB 12.9*   HCT 39.5*                   Passed - Normal ALT or AST on file in past 26 months    Recent Labs   Lab Test 07/10/18  1554   ALT 13     Recent Labs   Lab Test 07/10/18  1554   AST 13            Passed - Normal platelet count on file in past 26 months    Recent Labs   Lab Test 07/10/18  1554                  topiramate (TOPAMAX) 50 MG tablet [Pharmacy Med Name: TOPIRAMATE TAB 50MG]  10            Last Written Prescription Date:  5/10/16  Last Fill Quantity: 60,  # refills: 6   Last Office Visit with [facility], [facility] or [facility] prescribing provider:  11/20/18   Future Office Visit:      Sig: TAKE 1 TABLET BY MOUTH TWICE DAILY.    Anti-Seizure Meds Protocol  Failed - 12/12/2018  9:16 AM       Failed - Review Authorizing provider's last note.     Refer to last " "progress notes: confirm request is for original authorizing provider (cannot be through other providers).         Passed - Recent (12 mo) or future (30 days) visit within the authorizing provider's specialty    Patient had office visit in the last 12 months or has a visit in the next 30 days with authorizing provider or within the authorizing provider's specialty.  See \"Patient Info\" tab in inbasket, or \"Choose Columns\" in Meds & Orders section of the refill encounter.             Passed - Normal CBC on file in past 26 months    Recent Labs   Lab Test 07/10/18  1554   WBC 4.8   RBC 4.05*   HGB 12.9*   HCT 39.5*                   Passed - Normal ALT or AST on file in past 26 months    Recent Labs   Lab Test 07/10/18  1554   ALT 13     Recent Labs   Lab Test 07/10/18  1554   AST 13            Passed - Normal platelet count on file in past 26 months    Recent Labs   Lab Test 07/10/18  1554                        Marcin Faarax  [facility]  "

## 2018-12-14 NOTE — TELEPHONE ENCOUNTER
Routing refill request to provider for review/approval because:  A break in medication - last filled in 2016    Sia Oswald RN

## 2018-12-17 RX ORDER — TOPIRAMATE 100 MG/1
TABLET, FILM COATED ORAL
Refills: 10 | OUTPATIENT
Start: 2018-12-17

## 2018-12-17 RX ORDER — TOPIRAMATE 50 MG/1
TABLET, FILM COATED ORAL
Refills: 10 | OUTPATIENT
Start: 2018-12-17

## 2018-12-17 NOTE — TELEPHONE ENCOUNTER
Called group home and told family doctor wants med from psych-group home to call pharmacy and tell them psychiatrist provider name and have med refill request resent    Routing to Rn to cancel pended medication and close encounter  John Almendarez CMA

## 2018-12-24 DIAGNOSIS — G40.309 GENERALIZED CONVULSIVE EPILEPSY (H): ICD-10-CM

## 2018-12-25 NOTE — TELEPHONE ENCOUNTER
"Requested Prescriptions   Pending Prescriptions Disp Refills     topiramate (TOPAMAX) 100 MG tablet [Pharmacy Med Name: TOPIRAMATE 100MG TABS]  10     Sig: TAKE 1 TABLET BY MOUTH TWICE DAILY (ALONG WITH 50MG TABLET FOR TOTAL = 150MG TWICE DAILY)    Anti-Seizure Meds Protocol  Failed - 12/24/2018  1:40 PM       Failed - Review Authorizing provider's last note.     Refer to last progress notes: confirm request is for original authorizing provider (cannot be through other providers).         Passed - Recent (12 mo) or future (30 days) visit within the authorizing provider's specialty    Patient had office visit in the last 12 months or has a visit in the next 30 days with authorizing provider or within the authorizing provider's specialty.  See \"Patient Info\" tab in inbasket, or \"Choose Columns\" in Meds & Orders section of the refill encounter.             Passed - Normal CBC on file in past 26 months    Recent Labs   Lab Test 07/10/18  1554   WBC 4.8   RBC 4.05*   HGB 12.9*   HCT 39.5*                   Passed - Normal ALT or AST on file in past 26 months    Recent Labs   Lab Test 07/10/18  1554   ALT 13     Recent Labs   Lab Test 07/10/18  1554   AST 13            Passed - Normal platelet count on file in past 26 months    Recent Labs   Lab Test 07/10/18  1554                  topiramate (TOPAMAX) 50 MG tablet [Pharmacy Med Name: TOPIRAMATE TAB 50MG]  10     Sig: TAKE 1 TABLET BY MOUTH TWICE DAILY.    Anti-Seizure Meds Protocol  Failed - 12/24/2018  1:40 PM       Failed - Review Authorizing provider's last note.     Refer to last progress notes: confirm request is for original authorizing provider (cannot be through other providers).         Passed - Recent (12 mo) or future (30 days) visit within the authorizing provider's specialty    Patient had office visit in the last 12 months or has a visit in the next 30 days with authorizing provider or within the authorizing provider's specialty.  See \"Patient " "Info\" tab in inbasket, or \"Choose Columns\" in Meds & Orders section of the refill encounter.    Last Written Prescription Date:  5/10/16  Last Fill Quantity: 60,  # refills: 6   Last office visit: 11/20/2018 with prescribing provider:     Future Office Visit:                 Passed - Normal CBC on file in past 26 months    Recent Labs   Lab Test 07/10/18  1554   WBC 4.8   RBC 4.05*   HGB 12.9*   HCT 39.5*                   Passed - Normal ALT or AST on file in past 26 months    Recent Labs   Lab Test 07/10/18  1554   ALT 13     Recent Labs   Lab Test 07/10/18  1554   AST 13            Passed - Normal platelet count on file in past 26 months    Recent Labs   Lab Test 07/10/18  1554                    "

## 2018-12-27 RX ORDER — TOPIRAMATE 50 MG/1
TABLET, FILM COATED ORAL
Qty: 62 TABLET | Refills: 10 | Status: SHIPPED | OUTPATIENT
Start: 2018-12-27 | End: 2019-07-10

## 2018-12-27 RX ORDER — TOPIRAMATE 100 MG/1
TABLET, FILM COATED ORAL
Qty: 62 TABLET | Refills: 10 | Status: SHIPPED | OUTPATIENT
Start: 2018-12-27 | End: 2019-07-10

## 2019-01-22 ENCOUNTER — TELEPHONE (OUTPATIENT)
Dept: FAMILY MEDICINE | Facility: CLINIC | Age: 39
End: 2019-01-22

## 2019-01-22 DIAGNOSIS — F42.9 OBSESSIVE-COMPULSIVE DISORDER, UNSPECIFIED TYPE: ICD-10-CM

## 2019-01-22 DIAGNOSIS — F71 MODERATE MENTAL RETARDATION: ICD-10-CM

## 2019-01-22 DIAGNOSIS — F84.0 ACTIVE AUTISTIC DISORDER: Primary | ICD-10-CM

## 2019-01-23 NOTE — TELEPHONE ENCOUNTER
Received Referral request via fax for - OT/ SENSORY EVALUATION       Fax from : Fernando Estrada Designated Coordinator with [facility] [place] -    Provider they see: Dr. Parkinson   Phone number they can be reached at: Fernando can be reached at [phone]    Please fax [place]/ SENSORY EVALU[place]  [place] [facility] [phone] and a copy  to Fernando Estrada [phone]      REFERRAL REQUEST IN TEAM COMFORT BOX     Raimudno Croft, Patient Rep  [facility]

## 2019-01-24 NOTE — TELEPHONE ENCOUNTER
Referral and demographic face sheet is faxed to both gabby and Fernando.  Andrea Alfonso,  For [facility]

## 2019-02-22 ENCOUNTER — APPOINTMENT (OUTPATIENT)
Dept: CT IMAGING | Facility: CLINIC | Age: 39
End: 2019-02-22
Attending: EMERGENCY MEDICINE
Payer: MEDICARE

## 2019-02-22 ENCOUNTER — OFFICE VISIT (OUTPATIENT)
Dept: FAMILY MEDICINE | Facility: CLINIC | Age: 39
End: 2019-02-22
Payer: MEDICARE

## 2019-02-22 ENCOUNTER — HOSPITAL ENCOUNTER (EMERGENCY)
Facility: CLINIC | Age: 39
Discharge: HOME | End: 2019-02-22
Attending: EMERGENCY MEDICINE | Admitting: EMERGENCY MEDICINE
Payer: MEDICARE

## 2019-02-22 VITALS
DIASTOLIC BLOOD PRESSURE: 76 MMHG | OXYGEN SATURATION: 94 % | HEART RATE: 101 BPM | HEIGHT: 74 IN | TEMPERATURE: 97.9 F | SYSTOLIC BLOOD PRESSURE: 123 MMHG | WEIGHT: 215 LBS | BODY MASS INDEX: 27.59 KG/M2

## 2019-02-22 VITALS
BODY MASS INDEX: 30.06 KG/M2 | SYSTOLIC BLOOD PRESSURE: 128 MMHG | DIASTOLIC BLOOD PRESSURE: 83 MMHG | WEIGHT: 210 LBS | OXYGEN SATURATION: 98 % | TEMPERATURE: 97.3 F | RESPIRATION RATE: 16 BRPM | HEIGHT: 70 IN

## 2019-02-22 DIAGNOSIS — R09.81 NASAL CONGESTION: ICD-10-CM

## 2019-02-22 DIAGNOSIS — G47.10 HYPERSOMNOLENT: Primary | ICD-10-CM

## 2019-02-22 DIAGNOSIS — R41.82 ALTERED MENTAL STATUS, UNSPECIFIED ALTERED MENTAL STATUS TYPE: ICD-10-CM

## 2019-02-22 LAB
ALBUMIN SERPL-MCNC: 3.2 G/DL (ref 3.4–5)
ALBUMIN UR-MCNC: 10 MG/DL
ALP SERPL-CCNC: 43 U/L (ref 40–150)
ALT SERPL W P-5'-P-CCNC: 11 U/L (ref 0–70)
AMMONIA PLAS-SCNC: 30 UMOL/L (ref 10–50)
ANION GAP SERPL CALCULATED.3IONS-SCNC: 5 MMOL/L (ref 3–14)
APPEARANCE UR: CLEAR
AST SERPL W P-5'-P-CCNC: 9 U/L (ref 0–45)
BASOPHILS # BLD AUTO: 0 10E9/L (ref 0–0.2)
BASOPHILS NFR BLD AUTO: 0 %
BILIRUB SERPL-MCNC: 0.2 MG/DL (ref 0.2–1.3)
BILIRUB UR QL STRIP: NEGATIVE
BUN SERPL-MCNC: 14 MG/DL (ref 7–30)
CALCIUM SERPL-MCNC: 10.1 MG/DL (ref 8.5–10.1)
CHLORIDE SERPL-SCNC: 108 MMOL/L (ref 94–109)
CO2 SERPL-SCNC: 29 MMOL/L (ref 20–32)
COLOR UR AUTO: YELLOW
CREAT SERPL-MCNC: 0.84 MG/DL (ref 0.66–1.25)
DIFFERENTIAL METHOD BLD: ABNORMAL
DOHLE BOD BLD QL SMEAR: PRESENT
EOSINOPHIL # BLD AUTO: 0 10E9/L (ref 0–0.7)
EOSINOPHIL NFR BLD AUTO: 0 %
ERYTHROCYTE [DISTWIDTH] IN BLOOD BY AUTOMATED COUNT: 13.7 % (ref 10–15)
GFR SERPL CREATININE-BSD FRML MDRD: >90 ML/MIN/{1.73_M2}
GLUCOSE SERPL-MCNC: 99 MG/DL (ref 70–99)
GLUCOSE UR STRIP-MCNC: 70 MG/DL
HCT VFR BLD AUTO: 40 % (ref 40–53)
HETEROPH AB SER QL: NEGATIVE
HGB BLD-MCNC: 13.7 G/DL (ref 13.3–17.7)
HGB UR QL STRIP: ABNORMAL
KETONES UR STRIP-MCNC: NEGATIVE MG/DL
LEUKOCYTE ESTERASE UR QL STRIP: NEGATIVE
LITHIUM SERPL-SCNC: 0.21 MMOL/L (ref 0.6–1.2)
LYMPHOCYTES # BLD AUTO: 1.1 10E9/L (ref 0.8–5.3)
LYMPHOCYTES NFR BLD AUTO: 16 %
MCH RBC QN AUTO: 32.1 PG (ref 26.5–33)
MCHC RBC AUTO-ENTMCNC: 34.3 G/DL (ref 31.5–36.5)
MCV RBC AUTO: 94 FL (ref 78–100)
MONOCYTES # BLD AUTO: 1.1 10E9/L (ref 0–1.3)
MONOCYTES NFR BLD AUTO: 16 %
MUCOUS THREADS #/AREA URNS LPF: PRESENT /LPF
NEUTROPHILS # BLD AUTO: 4.5 10E9/L (ref 1.6–8.3)
NEUTROPHILS NFR BLD AUTO: 68 %
NITRATE UR QL: NEGATIVE
OVALOCYTES BLD QL SMEAR: ABNORMAL
PH UR STRIP: 6 PH (ref 5–7)
PLATELET # BLD AUTO: 110 10E9/L (ref 150–450)
PLATELET # BLD EST: ABNORMAL 10*3/UL
POTASSIUM SERPL-SCNC: 4 MMOL/L (ref 3.4–5.3)
PROT SERPL-MCNC: 7.8 G/DL (ref 6.8–8.8)
RBC # BLD AUTO: 4.27 10E12/L (ref 4.4–5.9)
RBC #/AREA URNS AUTO: 3 /HPF (ref 0–2)
SMUDGE CELLS BLD QL SMEAR: PRESENT
SODIUM SERPL-SCNC: 142 MMOL/L (ref 133–144)
SOURCE: ABNORMAL
SP GR UR STRIP: 1.02 (ref 1–1.03)
TOXIC GRANULES BLD QL SMEAR: PRESENT
UROBILINOGEN UR STRIP-MCNC: 2 MG/DL (ref 0–2)
WBC # BLD AUTO: 6.6 10E9/L (ref 4–11)
WBC #/AREA URNS AUTO: 1 /HPF (ref 0–5)

## 2019-02-22 PROCEDURE — 85025 COMPLETE CBC W/AUTO DIFF WBC: CPT | Performed by: EMERGENCY MEDICINE

## 2019-02-22 PROCEDURE — 70450 CT HEAD/BRAIN W/O DYE: CPT

## 2019-02-22 PROCEDURE — 99213 OFFICE O/P EST LOW 20 MIN: CPT | Performed by: NURSE PRACTITIONER

## 2019-02-22 PROCEDURE — 99284 EMERGENCY DEPT VISIT MOD MDM: CPT | Mod: 25

## 2019-02-22 PROCEDURE — 81001 URINALYSIS AUTO W/SCOPE: CPT | Performed by: EMERGENCY MEDICINE

## 2019-02-22 PROCEDURE — 82140 ASSAY OF AMMONIA: CPT | Performed by: EMERGENCY MEDICINE

## 2019-02-22 PROCEDURE — 80178 ASSAY OF LITHIUM: CPT | Performed by: EMERGENCY MEDICINE

## 2019-02-22 PROCEDURE — 86308 HETEROPHILE ANTIBODY SCREEN: CPT | Performed by: EMERGENCY MEDICINE

## 2019-02-22 PROCEDURE — 80053 COMPREHEN METABOLIC PANEL: CPT | Performed by: EMERGENCY MEDICINE

## 2019-02-22 RX ORDER — PSEUDOEPHEDRINE HCL 30 MG
30 TABLET ORAL
Qty: 15 TABLET | Refills: 1 | Status: SHIPPED | OUTPATIENT
Start: 2019-02-22 | End: 2019-03-04

## 2019-02-22 ASSESSMENT — MIFFLIN-ST. JEOR
SCORE: 1888.8
SCORE: 1975.3

## 2019-02-22 ASSESSMENT — ENCOUNTER SYMPTOMS
FATIGUE: 1
FEVER: 0
COUGH: 0
DIARRHEA: 0

## 2019-02-22 NOTE — PROGRESS NOTES
HPI      SUBJECTIVE:   Duane D Backes is a 38 year old male who presents to clinic today for the following health issues:      RESPIRATORY SYMPTOMS      Duration: 4 days    Description  nasal congestion, cough and fatigue/malaise    Severity: severe    Accompanying signs and symptoms: None    History (predisposing factors):  none    Precipitating or alleviating factors: None    Therapies tried and outcome:  none      Seen with caregiver   4 days of sleeping frequently,sinus and chest congestion, no fever. Infrequent cough. Appetite normal, no nausea, vomiting, diarrhea. No changes in medications. Normally active and communicating, abnormal to be sleeping at this time. Denies pain, behavior and interactions are normal. Goes to [employer] program for work and spent the whole day sleeping.     Past Medical History:   Diagnosis Date     Autistic disorder, current or active state      Dry skin      Moderate intellectual disabilities      Obsessive-compulsive disorders      Overweight (BMI 25.0-29.9) 8/2/2013     Seizure disorder (H)     Generalized tonic clonic     Unspecified constipation      Family History   Problem Relation Age of Onset     Unknown/Adopted No family hx of      Past Surgical History:   Procedure Laterality Date     NO HISTORY OF SURGERY      Has had anesthesia with dental work.     Social History     Tobacco Use     Smoking status: Never Smoker     Smokeless tobacco: Never Used   Substance Use Topics     Alcohol use: No     Current Outpatient Medications   Medication Sig Dispense Refill     ammonium lactate (AMLACTIN) 12 % cream Apply topically daily as needed for dry skin       divalproex (DEPAKOTE) 500 MG 24 hr tablet Take 3 tablets by mouth 2 times daily.       docusate sodium (COLACE) 100 MG capsule Take 1 capsule by mouth 2 times daily.       FISH OIL 1000 MG OR CAPS 1 PO BID (Patient taking differently: 1 capsule 2 times per day) 60 11     lactulose (CHRONULAC) 10 GM/15ML solution Take 30 mLs by mouth  "every evening & additional 30mL 2 times per day as needed       levothyroxine (SYNTHROID/LEVOTHROID) 200 MCG tablet Take 1 tablet (200 mcg) by mouth daily for thyroid. 30 tablet 11     MILK OF MAGNESIA 400 MG/5ML suspension 30 mLs every evening        polyethylene glycol (MIRALAX) powder Take 17 g by mouth daily. 510 g 0     risperiDONE (RISPERDAL) 2 MG tablet Take 2 mg by mouth 1 tab every morning & 1 tab every bedtime       sertraline (ZOLOFT) 100 MG tablet Take 1.5 tablets by mouth every morning.       topiramate (TOPAMAX) 100 MG tablet TAKE 1 TABLET BY MOUTH TWICE DAILY (ALONG WITH 50MG TABLET FOR TOTAL = 150MG TWICE DAILY) 62 tablet 10     topiramate (TOPAMAX) 50 MG tablet TAKE 1 TABLET BY MOUTH TWICE DAILY. 62 tablet 10     VITAMIN D3 1000 units tablet 3,000 Units daily       Allergies   Allergen Reactions     Penicillin [Penicillins] Other (See Comments)     Prozac [Fluoxetine Hcl]      Reglan [Metoclopramide Hcl]      Ritalin [Methylphenidate Derivatives] Other (See Comments)     Ritalin     Trazodone        Reviewed and updated as needed this visit by clinical staff and provider     ROS  Detailed as above     /76 (BP Location: Right arm, Patient Position: Sitting, Cuff Size: Adult Large)   Pulse 101   Temp 97.9  F (36.6  C) (Oral)   Ht 1.88 m (6' 2.02\")   Wt 97.5 kg (215 lb)   SpO2 94%   BMI 27.59 kg/m     Physical Exam   Constitutional: He appears well-developed and well-nourished.   Very difficult to arouse. Requires forceful shaking.   HENT:   Head: Normocephalic.   Right Ear: Tympanic membrane and ear canal normal.   Left Ear: Tympanic membrane and ear canal normal.   Nose: Rhinorrhea (purulent) present.   Mouth/Throat: No oropharyngeal exudate.   Eyes: Right eye exhibits discharge. Left eye exhibits discharge.   Cardiovascular: Regular rhythm.   Pulmonary/Chest: Effort normal and breath sounds normal.   Neurological:   Somnolent. Difficult to arouse   Skin: Skin is warm and dry. "   Psychiatric:   sleeping         Assessment and Plan:       ICD-10-CM    1. Hypersomnolent G47.10      Patient hypersomnolent during exam and difficult to arouse. Concern for possible medication toxicity versus infection. Spoke with caregiver about concern for progression of symptoms over past four days and would like patient to be evaluated in ER.       Peri Bernstein, APRN, CNP  [facility]

## 2019-02-22 NOTE — ED PROVIDER NOTES
History     Chief Complaint:  Altered mental status    History limited due to altered mental status. History provided by group home staff.    HPI Duane D Backes is a 38 year old male with a history of autistic disorder, mild MR, seizure, and obsessive compulsive disorder who presents to the emergency department today for evaluation of altered mental status. Group home staff states within this past week, the patient has been sleeping more. He endorses that the patient will go to work and then come home and sleep the rest of the day. The patient is usually an active person with staff when he gets home from work. Additionally, group home staff states that he has increased congestion and is noticeable while the patient is sleeping. One week ago the patient did have diarrhea, but has resolved. The patient typically drinks a lot of water throughout the day and has urinary frequency, however, that is not something new in the past week. Group home staff denies cough, diarrhea, fever, or history of asthma or urinary tract infections.    Allergies:  Penicillin   Prozac  Reglan  Ritalin  Trazodone     Medications:    Amlactin  Depakote  Colace   Levothyroxine   Risperdal  Zoloft  Topamax  Lactulose      Past Medical History:    Autistic disorder  Moderate intellectual disabilities  Obsessive compulsive disorders  Seizure disorder  Benign neoplasm of brain    Past Surgical History:    Surgical history reviewed. No pertinent surgical history.    Family History:    Family history reviewed. No pertinent family history.    Social History:  The patient was accompanied to the ED by group home staff.  Smoking Status: Never Smoker  Smokeless Tobacco: Never Used  Alcohol Use: Negative  Drug Use: Negative  Marital Status:  Single      Review of Systems   Constitutional: Positive for fatigue. Negative for fever.   HENT: Positive for congestion.    Respiratory: Negative for cough.    Gastrointestinal: Negative for diarrhea.   All other  "systems reviewed and are negative.    Physical Exam     Patient Vitals for the past 24 hrs:   BP Temp Heart Rate Resp SpO2 Height Weight   02/22/19 1559 128/83 97.3  F (36.3  C) 97 16 98 % 1.778 m (5' 10\") 95.3 kg (210 lb)       Physical Exam  General/Appearance: appears stated age, well-groomed, sleeping and appears sleepy when awoken but awoken easily, ambulates without difficulty  Eyes: EOMI, no scleral injection, no icterus  ENT: MMM  Neck: supple, nl ROM, no stiffness  Cardiovascular: RRR, nl S1S2, no m/r/g, 2+ pulses in all 4 extremities, cap refill <2sec  Respiratory: CTAB, good air movement throughout, no wheezes/rhonchi/rales, no increased WOB, no retractions  GI: abd soft, non-distended, nttp,  no HSM, no rebound, no guarding, nl BS  MSK: MITTAL, good tone, no bony abnormality  Skin: warm and well-perfused, no rash, no edema, no ecchymosis, nl turgor  Neuro: sleeping but easily awoken  Psych: deferred  Heme: no petechia, no purpura, no active bleeding    Emergency Department Course     Imaging:  Radiology findings were communicated with the patient who voiced understanding of the findings.    Head CT w/o contrast  1. No acute abnormality.  2. 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  Reading per radiology    Laboratory:  Laboratory findings were communicated with the patient and group home staff who voiced understanding of the findings.    Lithium level: 0.21(L)  Ammonia: 30  CBC: WBC 6.6, HGB 13.7, (L)  CMP: albumin 3.2(L) o/w WNL (Creatinine 0.84)  Mononucleosis: negative  UA with microscopic: glucose 70(A), blood trace(A), protein albumin 10(A), RBC 3(A), mucous present(A), o/w WNL    Emergency Department Course:    1557 Nursing notes and vitals reviewed.    1603 I performed an exam of the patient as documented " above.     1615 The patient provided a urine sample here in the emergency department. This was sent for laboratory testing, findings above.    1623 IV was inserted and blood was drawn for laboratory testing, results above.    1709 The patient was sent for a head CT while in the emergency department, results above.     1805 Patient rechecked and updated.     1805 I personally reviewed the lab and image results with the patient and group home staff and answered all related questions prior to discharge.    Impression & Plan      Medical Decision Making:  Duane D Backes is a 38 year old male with history of autism and mild MR, lives in a group home, who presents with increasing sleepiness over the past week.  Of note he has been able to go to work every day however come home and sleeps.  He is also noted to have nasal congestion during this time with group home staff thinks it is keeping him up at night.  He has not had any other focal complaints such as headache, cough, visible shortness of breath, diarrhea, vomiting.  There is been no noted falls.  Here workup is been relatively benign.  Head CT shows a chronic abnormality without change.  I doubt ACS, PE, other cardiopulmonary abnormality.  There is nothing to suggest infection such as nuchal rigidity, fever, elevated white count.  Although he is sleepy here he is very easily  awoken and is ambulated well.  I do not think LP is indicated.  I even went as far as a do a mono test as at some is not a torus the will cause sedation.  At this point in time I think it is possible that he is not sleeping well at night due to the nasal congestion, leading to excessive daytime sleepiness.  Especially as he still been able to go to work this week and function well I have low concern that were missing something acute and life-threatening.  We will try a nasal decongestant at night to see if this helps him sleep, thus leading to less than to sedation during the  day.    Diagnosis:    ICD-10-CM    1. Altered mental status, unspecified altered mental status type R41.82    2. Nasal congestion R09.81      Disposition:   The patient is discharged to home.    Discharge Medications:        START taking      Dose / Directions   pseudoePHEDrine 30 MG tablet  Commonly known as:  SUDAFED      Dose:  30 mg  Take 1 tablet (30 mg) by mouth nightly as needed for congestion  Quantity:  15 tablet  Refills:  1           Where to get your medicines      Some of these will need a paper prescription and others can be bought over the counter. Ask your nurse if you have questions.    Bring a paper prescription for each of these medications    pseudoePHEDrine 30 MG tablet         Scribe Disclosure:  I, Elicia Reeder, am serving as a scribe at 4:13 PM on 2/22/2019 to document services personally performed by Lety Bartlett MD based on my observations and the provider's statements to me.     EMERGENCY DEPARTMENT       Lety Bartlett MD  02/22/19 4330

## 2019-02-22 NOTE — ED AVS SNAPSHOT
Emergency Department  [address] [address]  Phone:  [phone]  Fax:  [phone]                                    Duane D Backes   MRN: [id number]    Department:   Emergency Department   Date of Visit:  2/22/2019           After Visit Summary Signature Page    I have received my discharge instructions, and my questions have been answered. I have discussed any challenges I see with this plan with the nurse or doctor.    ..........................................................................................................................................  Patient/Patient Representative Signature      ..........................................................................................................................................  Patient Representative Print Name and Relationship to Patient    ..................................................               ................................................  Date                                   Time    ..........................................................................................................................................  Reviewed by Signature/Title    ...................................................              ..............................................  Date                                               Time          22EPIC Rev 08/18

## 2019-03-19 ENCOUNTER — OFFICE VISIT (OUTPATIENT)
Dept: FAMILY MEDICINE | Facility: CLINIC | Age: 39
End: 2019-03-19
Payer: MEDICARE

## 2019-03-19 VITALS
DIASTOLIC BLOOD PRESSURE: 69 MMHG | OXYGEN SATURATION: 96 % | SYSTOLIC BLOOD PRESSURE: 103 MMHG | BODY MASS INDEX: 30.78 KG/M2 | HEART RATE: 88 BPM | WEIGHT: 215 LBS | TEMPERATURE: 98.2 F | HEIGHT: 70 IN

## 2019-03-19 DIAGNOSIS — K02.9 DENTAL CARIES: ICD-10-CM

## 2019-03-19 DIAGNOSIS — G40.309 GENERALIZED TONIC CLONIC EPILEPSY (H): ICD-10-CM

## 2019-03-19 DIAGNOSIS — F71 MODERATE MENTAL RETARDATION: ICD-10-CM

## 2019-03-19 DIAGNOSIS — Z01.818 PREOP GENERAL PHYSICAL EXAM: Primary | ICD-10-CM

## 2019-03-19 DIAGNOSIS — F84.0 ACTIVE AUTISTIC DISORDER: ICD-10-CM

## 2019-03-19 DIAGNOSIS — K05.6 PERIODONTAL DISEASE: ICD-10-CM

## 2019-03-19 DIAGNOSIS — E03.4 HYPOTHYROIDISM DUE TO ACQUIRED ATROPHY OF THYROID: ICD-10-CM

## 2019-03-19 LAB — TSH SERPL DL<=0.005 MIU/L-ACNC: 0.78 MU/L (ref 0.4–4)

## 2019-03-19 PROCEDURE — 36415 COLL VENOUS BLD VENIPUNCTURE: CPT | Performed by: FAMILY MEDICINE

## 2019-03-19 PROCEDURE — 84443 ASSAY THYROID STIM HORMONE: CPT | Performed by: FAMILY MEDICINE

## 2019-03-19 PROCEDURE — 99214 OFFICE O/P EST MOD 30 MIN: CPT | Performed by: FAMILY MEDICINE

## 2019-03-19 ASSESSMENT — MIFFLIN-ST. JEOR: SCORE: 1911.48

## 2019-03-19 ASSESSMENT — PAIN SCALES - GENERAL: PAINLEVEL: NO PAIN (0)

## 2019-03-19 NOTE — PATIENT INSTRUCTIONS
Before Your Surgery      Call your surgeon if there is any change in your health. This includes signs of a cold or flu (such as a sore throat, runny nose, cough, rash or fever).    Do not smoke, drink alcohol or take over the counter medicine (unless your surgeon or primary care doctor tells you to) for the 24 hours before and after surgery.    If you take prescribed drugs: Follow your doctor s orders about which medicines to take and which to stop until after surgery.    Eating and drinking prior to surgery: follow the instructions from your surgeon    Take a shower or bath the night before surgery. Use the soap your surgeon gave you to gently clean your skin. If you do not have soap from your surgeon, use your regular soap. Do not shave or scrub the surgery site.  Wear clean pajamas and have clean sheets on your bed.         ================================================================================  Normal Values   Blood pressure  <140/90 for most adults    <130/80 for some chronic diseases (ask your care team about yours)    BMI (body mass index)  18.5-25 kg/m2 (based on height and weight)     Thank you for visiting [facility]    Normal or non-critical lab and imaging results will be communicated to you by MyChart, letter or phone within 7 days.  If you do not hear from us within 10 days, please call the clinic. If you have a critical or abnormal lab result, we will notify you by phone as soon as possible.     If you have any questions regarding your visit please contact:     Team Comfort:   Clinic Hours Telephone Number   Dr. Rajat Hayden 7am-5pm  Monday - Friday [phone]  Isma Ryan RN  Ruth RN   Pharmacy 8:00am-8pm Monday-Friday    9am-5pm Saturday-Sunday [phone]   Urgent Care 11am-9pm Monday-Friday        9am-5pm Saturday-Sunday [phone]     After hours, weekend or if you need to make an appointment with your  primary provider please call [phone].   After Hours nurse advise: call [facility]: [phone]    Medication Refills:  Call your pharmacy and they will forward the refill to us. Please allow 3 business days for your refills to be completed.

## 2019-03-19 NOTE — LETTER
March 20, 2019        Duanenancy Green  [address] [place] [place] [address]        Mr. Green,    Your TSH indicates that your thyroid function is currently in balance.  No additional testing is necessary your next general physical unless you develop symptoms of over or underactive thyroid.    Please contact the clinic if you have additional questions.  Thank you.    Sincerely,      Raajt Parkinson MD/debby      Resulted Orders   TSH with free T4 reflex   Result Value Ref Range    TSH 0.78 0.40 - 4.00 mU/L

## 2019-03-19 NOTE — PROGRESS NOTES
[address] [address]  [phone]  Dept: [phone]    PRE-OP EVALUATION:  Today's date: 3/19/2019    Duane D Backes (: 1980) presents for pre-operative evaluation assessment as requested by Dr. Major. He is accompanied by his group home attendant.  He requires evaluation and anesthesia risk assessment prior to undergoing surgery/procedure for treatment of dental condition and/or for dental exam and cleaning.    Proposed Surgery/ Procedure: TBD  Date of Surgery/ Procedure: 19  Time of Surgery/ Procedure: 8:30am  Hospital/Surgical Facility: [facility]  Fax number for surgical facility: [phone]  Primary Physician: Rajat Parkinson MD  Type of Anesthesia Anticipated: to be determined    Patient has a Health Care Directive or Living Will:  NO    1. NO - Do you have a history of heart attack, stroke, stent, bypass or surgery on an artery in the head, neck, heart or legs?  2. NO - Do you ever have any pain or discomfort in your chest?  3. NO - Do you have a history of  Heart Failure?  4. NO - Are you troubled by shortness of breath when: walking on the level, up a slight hill or at night?  5. NO - Do you currently have a cold, bronchitis or other respiratory infection?  6. NO - Do you have a cough, shortness of breath or wheezing?  7. NO - Do you sometimes get pains in the calves of your legs when you walk?  8. NO - Do you or anyone in your family have previous history of blood clots?  9. NO - Do you or does anyone in your family have a serious bleeding problem such as prolonged bleeding following surgeries or cuts?  10. NO - Have you ever had problems with anemia or been told to take iron pills?  11. NO - Have you had any abnormal blood loss such as black, tarry or bloody stools, or abnormal vaginal bleeding?  12. NO - Have you ever had a blood transfusion?  13. NO - Have you or any of your relatives ever had problems  with anesthesia?  14. NO - Do you have sleep apnea, excessive snoring or daytime drowsiness?  15. NO - Do you have any prosthetic heart valves?  16. NO - Do you have prosthetic joints?  17. NO - Is there any chance that you may be pregnant?      HPI:     HPI related to upcoming procedure: This 38 year old male with mental retardation & autism complains of periodontal disease and dental caries requiring treatment under anesthesia.       See problem list for active medical problems.  Problems all longstanding and stable, except as noted/documented.  See ROS for pertinent symptoms related to these conditions.                                                                                                                                                          .    MEDICAL HISTORY:     Patient Active Problem List    Diagnosis Date Noted     Obesity, Class I, BMI 30-34.9 08/02/2013     Priority: Medium     Benign neoplasm of brain (H) 04/26/2013     Priority: Medium     Epidermoid brain tumor, DrUlises Banuelos MD (neuro)       Hypothyroidism due to acquired atrophy of thyroid 03/31/2011     Priority: Medium     CARDIOVASCULAR SCREENING; LDL GOAL LESS THAN 160 10/31/2010     Priority: Medium     Generalized tonic clonic epilepsy (H) 08/27/2009     Priority: Medium     Dry skin 08/27/2009     Priority: Medium     Hearing loss 08/30/2005     Priority: Medium     Problem list name updated by automated process. Provider to review       Moderate mental retardation 04/13/2005     Priority: Medium     Active autistic disorder 04/13/2005     Priority: Medium     Problem list name updated by automated process. Provider to review       Obsessive-compulsive disorder 04/13/2005     Priority: Medium     Problem list name updated by automated process. Provider to review       Chronic constipation 04/13/2005     Priority: Medium      Past Medical History:   Diagnosis Date     Autistic disorder, current or active state      Dry  skin      Moderate intellectual disabilities      Obsessive-compulsive disorders      Overweight (BMI 25.0-29.9) 8/2/2013     Seizure disorder (H)     Generalized tonic clonic     Unspecified constipation      Past Surgical History:   Procedure Laterality Date     CATARACT IOL, RT/LT Left 11/17/2018     Current Outpatient Medications   Medication Sig Dispense Refill     ammonium lactate (AMLACTIN) 12 % cream Apply topically daily as needed for dry skin       divalproex (DEPAKOTE) 500 MG 24 hr tablet Take 3 tablets by mouth 2 times daily.       docusate sodium (COLACE) 100 MG capsule Take 1 capsule by mouth 2 times daily.       FISH OIL 1000 MG OR CAPS 1 PO BID (Patient taking differently: 1 capsule 2 times per day) 60 11     lactulose (CHRONULAC) 10 GM/15ML solution Take 30 mLs by mouth every evening & additional 30mL 2 times per day as needed       levothyroxine (SYNTHROID/LEVOTHROID) 200 MCG tablet Take 1 tablet (200 mcg) by mouth daily for thyroid. 30 tablet 11     MILK OF MAGNESIA 400 MG/5ML suspension 30 mLs every evening        polyethylene glycol (MIRALAX) powder Take 17 g by mouth daily. 510 g 0     risperiDONE (RISPERDAL) 2 MG tablet Take 2 mg by mouth 1 tab every morning & 1 tab every bedtime       sertraline (ZOLOFT) 100 MG tablet Take 1.5 tablets by mouth every morning.       topiramate (TOPAMAX) 100 MG tablet TAKE 1 TABLET BY MOUTH TWICE DAILY (ALONG WITH 50MG TABLET FOR TOTAL = 150MG TWICE DAILY) 62 tablet 10     topiramate (TOPAMAX) 50 MG tablet TAKE 1 TABLET BY MOUTH TWICE DAILY. 62 tablet 10     VITAMIN D3 1000 units tablet 3,000 Units daily       OTC products: None, except as noted above    Allergies   Allergen Reactions     Penicillin [Penicillins] Other (See Comments)     Prozac [Fluoxetine Hcl]      Reglan [Metoclopramide Hcl]      Ritalin [Methylphenidate Derivatives] Other (See Comments)     Ritalin     Trazodone       Latex Allergy: NO    Social History     Tobacco Use     Smoking status:  "Never Smoker     Smokeless tobacco: Never Used   Substance Use Topics     Alcohol use: No     History   Drug Use No       REVIEW OF SYSTEMS:   Constitutional, neuro, ENT, endocrine, pulmonary, cardiac, gastrointestinal, genitourinary, musculoskeletal, integument and psychiatric systems are presumed negative, except as otherwise noted (pt unable to verbalize to do a ROS thoroughly)    EXAM:   /69 (BP Location: Left arm, Patient Position: Chair, Cuff Size: Adult Large)   Pulse 88   Temp 98.2  F (36.8  C) (Oral)   Ht 1.778 m (5' 10\")   Wt 97.5 kg (215 lb)   SpO2 96%   BMI 30.85 kg/m    GENERAL APPEARANCE: healthy, alert and no distress     EYES: EOMI,  PERRL, sclera white, no conjunctivitis     HENT: ear canals and TM's normal and nose and mouth without ulcers or lesions     NECK: no adenopathy, no asymmetry, masses, or scars and thyroid normal to palpation     RESP: lungs clear to auscultation - no rales, rhonchi or wheezes     CV: regular rates and rhythm, normal S1 S2, no S3 or S4 and no murmur, click or rub     ABDOMEN:  soft, nontender, no HSM or masses and bowel sounds normal     MS: extremities normal- no gross deformities noted, no evidence of inflammation in joints, FROM in all extremities.     SKIN: mild seborrheic dermatitis and patches on the face     NEURO: Normal strength and tone, sensory exam grossly normal, mentation intact, speech normal, cranial nerves 2-12 intact and DTRs symmetrical      PSYCH: autistic, occasional verbalization but no conversation, very cooperative with the exam, affect normal/bright      LYMPHATICS: No cervical adenopathy    DIAGNOSTICS:   No labs or EKG required for low risk surgery (cataract, skin procedure, breast biopsy, etc)    Recent Labs   Lab Test 02/22/19  1623 07/10/18  1554  08/15/17  1658   HGB 13.7 12.9*  --   --    * 174  --   --     142   < >  --    POTASSIUM 4.0 4.4   < >  --    CR 0.84 0.78   < >  --    A1C  --   --   --  4.9    < > = " values in this interval not displayed.     IMPRESSION:   Reason for surgery/procedure: periodontal disease  Diagnosis/reason for consult: Pre-procedure evaluation of fitness for procedure & proposed anesthesia     The proposed surgical procedure is considered LOW risk.    REVISED CARDIAC RISK INDEX  The patient has the following serious cardiovascular risks for perioperative complications such as (MI, PE, VFib and 3  AV Block):  No serious cardiac risks  INTERPRETATION: 0 risks: Class I (very low risk - 0.4% complication rate)    The patient has the following additional risks for perioperative complications:  The diagnoses listed below:       ICD-10-CM    1. Preop general physical exam Z01.818    2. Periodontal disease K05.6    3. History of dental caries K02.9    4. Active autistic disorder F84.0    5. Moderate mental retardation F71    6. Generalized tonic clonic epilepsy (H) G40.309    7. Hypothyroidism due to acquired atrophy of thyroid E03.4 TSH with free T4 reflex       RECOMMENDATIONS:     --Patient is to take all scheduled medications on the day of surgery EXCEPT for modifications listed below.  Fish oil capsules to be stopped starting 2 weeks before surgery    APPROVAL GIVEN to proceed with proposed procedure, without further diagnostic evaluation       Signed Electronically by: Rajat Parkinson MD    Copy of this evaluation report is provided to requesting physician.    [facility] Preop Guidelines    Revised Cardiac Risk Index

## 2019-05-02 ENCOUNTER — TRANSFERRED RECORDS (OUTPATIENT)
Dept: HEALTH INFORMATION MANAGEMENT | Facility: CLINIC | Age: 39
End: 2019-05-02

## 2019-07-10 ENCOUNTER — OFFICE VISIT (OUTPATIENT)
Dept: NEUROLOGY | Facility: CLINIC | Age: 39
End: 2019-07-10
Payer: MEDICARE

## 2019-07-10 VITALS
BODY MASS INDEX: 29.84 KG/M2 | OXYGEN SATURATION: 98 % | DIASTOLIC BLOOD PRESSURE: 77 MMHG | HEART RATE: 82 BPM | SYSTOLIC BLOOD PRESSURE: 113 MMHG | WEIGHT: 208 LBS

## 2019-07-10 DIAGNOSIS — E55.9 VITAMIN D DEFICIENCY: ICD-10-CM

## 2019-07-10 DIAGNOSIS — G40.909 SEIZURE DISORDER (H): Primary | ICD-10-CM

## 2019-07-10 DIAGNOSIS — G40.309 GENERALIZED CONVULSIVE EPILEPSY (H): ICD-10-CM

## 2019-07-10 LAB
ALBUMIN SERPL-MCNC: 3.8 G/DL (ref 3.4–5)
ALP SERPL-CCNC: 35 U/L (ref 40–150)
ALT SERPL W P-5'-P-CCNC: 14 U/L (ref 0–70)
ANION GAP SERPL CALCULATED.3IONS-SCNC: 3 MMOL/L (ref 3–14)
AST SERPL W P-5'-P-CCNC: 11 U/L (ref 0–45)
BASOPHILS # BLD AUTO: 0 10E9/L (ref 0–0.2)
BASOPHILS NFR BLD AUTO: 0.3 %
BILIRUB SERPL-MCNC: 0.3 MG/DL (ref 0.2–1.3)
BUN SERPL-MCNC: 16 MG/DL (ref 7–30)
CALCIUM SERPL-MCNC: 9.5 MG/DL (ref 8.5–10.1)
CHLORIDE SERPL-SCNC: 106 MMOL/L (ref 94–109)
CO2 SERPL-SCNC: 30 MMOL/L (ref 20–32)
CREAT SERPL-MCNC: 0.86 MG/DL (ref 0.66–1.25)
DIFFERENTIAL METHOD BLD: ABNORMAL
EOSINOPHIL # BLD AUTO: 0.1 10E9/L (ref 0–0.7)
EOSINOPHIL NFR BLD AUTO: 1.7 %
ERYTHROCYTE [DISTWIDTH] IN BLOOD BY AUTOMATED COUNT: 12.7 % (ref 10–15)
GFR SERPL CREATININE-BSD FRML MDRD: >90 ML/MIN/{1.73_M2}
GLUCOSE SERPL-MCNC: 78 MG/DL (ref 70–99)
HCT VFR BLD AUTO: 43.5 % (ref 40–53)
HGB BLD-MCNC: 14.3 G/DL (ref 13.3–17.7)
IMM GRANULOCYTES # BLD: 0 10E9/L (ref 0–0.4)
IMM GRANULOCYTES NFR BLD: 0.3 %
LYMPHOCYTES # BLD AUTO: 1.5 10E9/L (ref 0.8–5.3)
LYMPHOCYTES NFR BLD AUTO: 43.1 %
MCH RBC QN AUTO: 32.1 PG (ref 26.5–33)
MCHC RBC AUTO-ENTMCNC: 32.9 G/DL (ref 31.5–36.5)
MCV RBC AUTO: 98 FL (ref 78–100)
MONOCYTES # BLD AUTO: 0.4 10E9/L (ref 0–1.3)
MONOCYTES NFR BLD AUTO: 10.6 %
NEUTROPHILS # BLD AUTO: 1.5 10E9/L (ref 1.6–8.3)
NEUTROPHILS NFR BLD AUTO: 44 %
PLATELET # BLD AUTO: 132 10E9/L (ref 150–450)
POTASSIUM SERPL-SCNC: 4 MMOL/L (ref 3.4–5.3)
PROT SERPL-MCNC: 7.5 G/DL (ref 6.8–8.8)
RBC # BLD AUTO: 4.45 10E12/L (ref 4.4–5.9)
SODIUM SERPL-SCNC: 139 MMOL/L (ref 133–144)
VALPROATE SERPL-MCNC: 136 MG/L (ref 50–100)
WBC # BLD AUTO: 3.5 10E9/L (ref 4–11)

## 2019-07-10 PROCEDURE — 80053 COMPREHEN METABOLIC PANEL: CPT | Performed by: PSYCHIATRY & NEUROLOGY

## 2019-07-10 PROCEDURE — 80201 ASSAY OF TOPIRAMATE: CPT | Mod: 90 | Performed by: PSYCHIATRY & NEUROLOGY

## 2019-07-10 PROCEDURE — 85025 COMPLETE CBC W/AUTO DIFF WBC: CPT | Performed by: PSYCHIATRY & NEUROLOGY

## 2019-07-10 PROCEDURE — 99213 OFFICE O/P EST LOW 20 MIN: CPT | Performed by: PSYCHIATRY & NEUROLOGY

## 2019-07-10 PROCEDURE — 82306 VITAMIN D 25 HYDROXY: CPT | Performed by: PSYCHIATRY & NEUROLOGY

## 2019-07-10 PROCEDURE — 99000 SPECIMEN HANDLING OFFICE-LAB: CPT | Performed by: PSYCHIATRY & NEUROLOGY

## 2019-07-10 PROCEDURE — 80164 ASSAY DIPROPYLACETIC ACD TOT: CPT | Performed by: PSYCHIATRY & NEUROLOGY

## 2019-07-10 PROCEDURE — 36415 COLL VENOUS BLD VENIPUNCTURE: CPT | Performed by: PSYCHIATRY & NEUROLOGY

## 2019-07-10 RX ORDER — LITHIUM CARBONATE 300 MG/1
600 TABLET, FILM COATED, EXTENDED RELEASE ORAL AT BEDTIME
Status: ON HOLD | COMMUNITY
End: 2022-08-29

## 2019-07-10 RX ORDER — TOPIRAMATE 100 MG/1
TABLET, FILM COATED ORAL
Qty: 62 TABLET | Refills: 11 | Status: SHIPPED | OUTPATIENT
Start: 2019-07-10 | End: 2020-07-09

## 2019-07-10 RX ORDER — TOPIRAMATE 50 MG/1
50 TABLET, FILM COATED ORAL 2 TIMES DAILY
Qty: 62 TABLET | Refills: 11 | Status: SHIPPED | OUTPATIENT
Start: 2019-07-10 | End: 2020-07-09

## 2019-07-10 RX ORDER — DIVALPROEX SODIUM 500 MG/1
1500 TABLET, EXTENDED RELEASE ORAL 2 TIMES DAILY
Qty: 186 TABLET | Refills: 11 | Status: SHIPPED | OUTPATIENT
Start: 2019-07-10 | End: 2020-07-09

## 2019-07-10 ASSESSMENT — PAIN SCALES - GENERAL: PAINLEVEL: NO PAIN (0)

## 2019-07-10 NOTE — LETTER
7/10/2019         RE: Duane D Backes  [address]  [facility] [address]        Dear Colleague,    Thank you for referring your patient, Duane D Backes, to the [facility]. Please see a copy of my visit note below.    Visit Date:   07/10/2019      HISTORY OF PRESENT ILLNESS:  This patient is seen in followup with a seizure disorder and chronic encephalopathy.  I last saw the patient a year ago.  He is here with his , Demetrius, from the [facility].  Demetrius has known the patient for many years.  He does not think the patient has had a seizure, probably in 4 years or so.  The patient's diagnoses include chronic encephalopathy with mental retardation and autism, as well as history of epidermoid tumor and seizure disorder.  His medicines include topiramate 150 mg twice a day.  He does drink adequate amounts of water.  He is on Depakote 1500 mg twice a day.  He also takes vitamin D, sertraline, risperidone, and lithium.      On exam today, the patient is cooperative and in no distress.  His blood pressure is 113/77.  The patient is alert.  He makes occasional comments.  He is quite focused on going to [employer] for a  Diet Coke after this appointment.      ASSESSMENT:   1.  Seizure disorder.   2.  Chronic encephalopathy with mental retardation and autism.   3.  History of epidermoid tumor.      DISCUSSION:  The patient is seen with the above problem list.  Of note, he did have labs performed in February, including a platelet count of 110,000.  A comprehensive metabolic panel, at that time, was normal.  He had drug levels performed a year ago, and topiramate and Depakote levels were both therapeutic.  He had an ammonia level checked in February and that was normal, as well as a normal TSH performed in March.  I am going to recheck labs, including a CBC, metabolic panel and drug levels and a vitamin D level.  I will communicate those results to his group home.  I will see him in followup in 1  year.         EMMY VILLAR MD             D: 07/10/2019   T: 07/10/2019   MT: DARSHANA      Name:     BACKES, DUANE   MRN:      [id number]        Account:      [id number]   :      1980           Visit Date:   07/10/2019      Document: [id number]       Again, thank you for allowing me to participate in the care of your patient.        Sincerely,        Emmy Villar MD

## 2019-07-10 NOTE — PROGRESS NOTES
Visit Date:   07/10/2019      HISTORY OF PRESENT ILLNESS:  This patient is seen in followup with a seizure disorder and chronic encephalopathy.  I last saw the patient a year ago.  He is here with his , Demetrius, from the [facility].  Demetrius has known the patient for many years.  He does not think the patient has had a seizure, probably in 4 years or so.  The patient's diagnoses include chronic encephalopathy with mental retardation and autism, as well as history of epidermoid tumor and seizure disorder.  His medicines include topiramate 150 mg twice a day.  He does drink adequate amounts of water.  He is on Depakote 1500 mg twice a day.  He also takes vitamin D, sertraline, risperidone, and lithium.      On exam today, the patient is cooperative and in no distress.  His blood pressure is 113/77.  The patient is alert.  He makes occasional comments.  He is quite focused on going to [employer] for a  Diet Coke after this appointment.      ASSESSMENT:   1.  Seizure disorder.   2.  Chronic encephalopathy with mental retardation and autism.   3.  History of epidermoid tumor.      DISCUSSION:  The patient is seen with the above problem list.  Of note, he did have labs performed in February, including a platelet count of 110,000.  A comprehensive metabolic panel, at that time, was normal.  He had drug levels performed a year ago, and topiramate and Depakote levels were both therapeutic.  He had an ammonia level checked in February and that was normal, as well as a normal TSH performed in March.  I am going to recheck labs, including a CBC, metabolic panel and drug levels and a vitamin D level.  I will communicate those results to his group home.  I will see him in followup in 1 year.         EMMY JARAMILLO MD             D: 07/10/2019   T: 07/10/2019   MT: DARSHANA      Name:     BACKES, DUANE   MRN:      [id number]        Account:      [id number]   :      1980           Visit Date:   07/10/2019       Document: [id number]          7/15 addendum; reviewed lab work with group home.  VPA and topiramate levels therapeutic, CBC shows improved plt count.  CMP and Vit D normal. No change in Rx.

## 2019-07-11 LAB
DEPRECATED CALCIDIOL+CALCIFEROL SERPL-MC: 48 UG/L (ref 20–75)
TOPIRAMATE SERPL-MCNC: 7 UG/ML (ref 5–20)

## 2019-07-17 ENCOUNTER — TELEPHONE (OUTPATIENT)
Dept: NEUROLOGY | Facility: CLINIC | Age: 39
End: 2019-07-17

## 2019-07-17 NOTE — TELEPHONE ENCOUNTER
Lab results from Dr. Villar visit 7/10/19 faxed to Dr. Avalos at [facility] [phone].    Shannan Gordon LPN

## 2019-08-27 ENCOUNTER — OFFICE VISIT (OUTPATIENT)
Dept: FAMILY MEDICINE | Facility: CLINIC | Age: 39
End: 2019-08-27
Payer: MEDICARE

## 2019-08-27 VITALS
OXYGEN SATURATION: 95 % | TEMPERATURE: 97.5 F | HEART RATE: 77 BPM | WEIGHT: 202.4 LBS | SYSTOLIC BLOOD PRESSURE: 109 MMHG | BODY MASS INDEX: 28.98 KG/M2 | DIASTOLIC BLOOD PRESSURE: 75 MMHG | HEIGHT: 70 IN

## 2019-08-27 DIAGNOSIS — Z00.00 ROUTINE GENERAL MEDICAL EXAMINATION AT A HEALTH CARE FACILITY: Primary | ICD-10-CM

## 2019-08-27 DIAGNOSIS — E03.4 HYPOTHYROIDISM DUE TO ACQUIRED ATROPHY OF THYROID: ICD-10-CM

## 2019-08-27 DIAGNOSIS — E66.811 OBESITY, CLASS I, BMI 30-34.9: ICD-10-CM

## 2019-08-27 LAB
T4 FREE SERPL-MCNC: 1.19 NG/DL (ref 0.76–1.46)
TSH SERPL DL<=0.005 MIU/L-ACNC: 0.34 MU/L (ref 0.4–4)

## 2019-08-27 PROCEDURE — 99395 PREV VISIT EST AGE 18-39: CPT | Performed by: FAMILY MEDICINE

## 2019-08-27 PROCEDURE — 84443 ASSAY THYROID STIM HORMONE: CPT | Performed by: FAMILY MEDICINE

## 2019-08-27 PROCEDURE — 36415 COLL VENOUS BLD VENIPUNCTURE: CPT | Performed by: FAMILY MEDICINE

## 2019-08-27 PROCEDURE — 84439 ASSAY OF FREE THYROXINE: CPT | Performed by: FAMILY MEDICINE

## 2019-08-27 ASSESSMENT — PAIN SCALES - GENERAL: PAINLEVEL: NO PAIN (0)

## 2019-08-27 ASSESSMENT — MIFFLIN-ST. JEOR: SCORE: 1849.33

## 2019-08-27 NOTE — PROGRESS NOTES
SUBJECTIVE:   CC: Duane D Backes is an 39 year old male who presents for preventive health visit.   He is accompanied by his group home attendant.      Healthy Habits:    Do you get at least three servings of calcium containing foods daily (dairy, green leafy vegetables, etc.)? yes    Amount of exercise or daily activities, outside of work: 15 mins of physical activities    Problems taking medications regularly No    Medication side effects: No    Have you had an eye exam in the past two years? yes    Do you see a dentist twice per year? yes    Do you have sleep apnea, excessive snoring or daytime drowsiness?no          Today's PHQ-2 Score:   PHQ-2 ( 1999 [employer]) 2/22/2019 8/16/2018   Q1: Little interest or pleasure in doing things 2 0   Q2: Feeling down, depressed or hopeless 0 0   PHQ-2 Score 2 0       Abuse: Current or Past(Physical, Sexual or Emotional)- No  Do you feel safe in your environment? Yes    Social History     Tobacco Use     Smoking status: Never Smoker     Smokeless tobacco: Never Used   Substance Use Topics     Alcohol use: No     If you drink alcohol do you typically have >3 drinks per day or >7 drinks per week? No                            Past medical, family, and social histories, medications, and allergies are reviewed and updated in [facility].          ROS:  CONSTITUTIONAL: NEGATIVE for fever, chills, change in weight  INTEGUMENTARY/SKIN: NEGATIVE for worrisome rashes, moles or lesions  EYES: NEGATIVE for vision changes or irritation  ENT: NEGATIVE for ear, mouth and throat problems  RESP: NEGATIVE for significant cough or SOB  CV: NEGATIVE for chest pain, palpitations or peripheral edema  GI: NEGATIVE for nausea, abdominal pain, heartburn, or change in bowel habits   male: negative for dysuria, hematuria, decreased urinary stream, erectile dysfunction, urethral discharge  MUSCULOSKELETAL: NEGATIVE for significant arthralgias or myalgia  NEURO: NEGATIVE for weakness, dizziness or  "paresthesias  PSYCHIATRIC: NEGATIVE for changes in mood or affect  This document serves as a record of the services and decisions personally performed and made by DO KAPIL Ramos. It was created on his behalf by Marti Fuentes, a trained medical scribe. The creation of this document is based the provider's statements to the medical scribe.    Vanessa Fuentes 9:19 AM 8/27/2019      OBJECTIVE:   /75 (BP Location: Right arm, Patient Position: Chair, Cuff Size: Adult Regular)   Pulse 77   Temp 97.5  F (36.4  C) (Oral)   Ht 1.778 m (5' 10\")   Wt 91.8 kg (202 lb 6.4 oz)   SpO2 95%   BMI 29.04 kg/m    EXAM:  GENERAL: healthy, alert and no distress  EYES: Eyes grossly normal to inspection, PERRL and conjunctivae and sclerae normal  HENT: ear canals and TM's normal, nose and mouth without ulcers or lesions  NECK: no adenopathy, no asymmetry, masses, or scars and thyroid normal to palpation  RESP: lungs clear to auscultation - no rales, rhonchi or wheezes  CV: regular rate and rhythm, normal S1 S2, no S3 or S4, no murmur, click or rub, no peripheral edema and peripheral pulses strong  ABDOMEN: soft, nontender, no hepatosplenomegaly, no masses and bowel sounds normal   (male): normal male genitalia without lesions or urethral discharge, no hernia  MS: no gross musculoskeletal defects noted, no edema  SKIN: no suspicious lesions or rashes  NEURO: Normal strength and tone, mentation intact and speech normal  PSYCH: Autistic, well behaved. Appears at his baseline        ASSESSMENT/PLAN:   (Z00.00) Routine general medical examination at a health care facility  (primary encounter diagnosis)  Comment: Negative screening exam; up-to-date on preventive services.  Plan:        Follow up in 1 year    (E03.4) Hypothyroidism due to acquired atrophy of thyroid  Comment: Historically euthyroid  Plan: TSH with free T4 reflex        Return in about 6 months (around 2/27/2020) for recheck medications " "(thyroid).    (E66.9) Obesity, Class I, BMI 30-34.9  Comment: His weight has gradually decreased over past 1+ years  Plan: See below    COUNSELING:  Reviewed preventive health counseling, as reflected in patient instructions    Estimated body mass index is 29.04 kg/m  as calculated from the following:    Height as of this encounter: 1.778 m (5' 10\").    Weight as of this encounter: 91.8 kg (202 lb 6.4 oz).    Weight management plan: Discussed healthy diet and exercise guidelines , weight graph reviewed with his attendant      reports that he has never smoked. He has never used smokeless tobacco.      Counseling Resources:  ATP IV Guidelines  Pooled Cohorts Equation Calculator  FRAX Risk Assessment  ICSI Preventive Guidelines  Dietary Guidelines for Americans, 2010  USDA's MyPlate  ASA Prophylaxis  Lung CA Screening    Rajat Parkinson MD  [facility]    The information in this document, created by a scribe for me, accurately reflects the services I personally performed and the decisions made by me. I have reviewed and approved this document for accuracy.     "

## 2019-08-27 NOTE — LETTER
August 28, 2019      Duane D Backes  [address] [place] [address]        Dear Duane D Backes    Your follow-up TSH is abnormal suggesting you are currently over-treated.  I suggest we change your levothyroxine dose to 175 micrograms/day and recheck your TSH in 2-3 months.     Please contact the clinic if you have additional questions.  Thank you.      Enclosed is a copy of the results.  It was a pleasure to see you at your last appointment.      Sincerely,      Rajat Parkinson MD/EAN Thorpe MA      Results for orders placed or performed in visit on 08/27/19   TSH with free T4 reflex   Result Value Ref Range    TSH 0.34 (L) 0.40 - 4.00 mU/L   T4 free   Result Value Ref Range    T4 Free 1.19 0.76 - 1.46 ng/dL

## 2019-08-28 DIAGNOSIS — E03.4 HYPOTHYROIDISM DUE TO ACQUIRED ATROPHY OF THYROID: ICD-10-CM

## 2019-08-28 RX ORDER — LEVOTHYROXINE SODIUM 175 UG/1
175 TABLET ORAL DAILY
Qty: 30 TABLET | Refills: 11 | Status: SHIPPED | OUTPATIENT
Start: 2019-08-28 | End: 2020-03-05 | Stop reason: DRUGHIGH

## 2019-10-30 ENCOUNTER — TELEPHONE (OUTPATIENT)
Dept: FAMILY MEDICINE | Facility: CLINIC | Age: 39
End: 2019-10-30

## 2019-10-30 DIAGNOSIS — E03.4 HYPOTHYROIDISM DUE TO ACQUIRED ATROPHY OF THYROID: Primary | ICD-10-CM

## 2019-10-30 NOTE — TELEPHONE ENCOUNTER
Reason for Call: Request for an order or referral:    Order or referral being requested: Thryoid Labs    Date needed: as soon as possible    Has the patient been seen by the PCP for this problem? YES    Additional comments: Pt's group home rep called stating that they were told there would be a lab put in for a thyroid check at the pt's physical and they would like to go ahead a schedule it. He did request a phone call back for when the order was put in. Thank you.    Phone number Patient can be reached at:  Other phone number:  [phone]    Best Time:  Any    Can we leave a detailed message on this number?  YES    Call taken on 10/30/2019 at 4:16 PM by Yenny Pichardo

## 2019-11-07 DIAGNOSIS — F84.0 ACTIVE AUTISTIC DISORDER: Primary | ICD-10-CM

## 2019-11-07 DIAGNOSIS — F42.9 OBSESSIVE-COMPULSIVE DISORDER, UNSPECIFIED TYPE: ICD-10-CM

## 2019-11-07 DIAGNOSIS — F71 MODERATE INTELLECTUAL DISABILITY: ICD-10-CM

## 2019-11-08 NOTE — TELEPHONE ENCOUNTER
Called and spoke with Raul, he is notified lab orders are in.  Andrea Alfonso,  For [facility]st [facility] ([facility])

## 2019-11-29 ENCOUNTER — HOSPITAL ENCOUNTER (OUTPATIENT)
Dept: LAB | Facility: CLINIC | Age: 39
Discharge: HOME | End: 2019-11-29
Admitting: FAMILY MEDICINE
Payer: MEDICARE

## 2019-11-29 DIAGNOSIS — E03.4 HYPOTHYROIDISM DUE TO ACQUIRED ATROPHY OF THYROID: ICD-10-CM

## 2019-11-29 LAB — TSH SERPL DL<=0.005 MIU/L-ACNC: 0.85 MU/L (ref 0.4–4)

## 2019-11-29 PROCEDURE — 84443 ASSAY THYROID STIM HORMONE: CPT | Performed by: FAMILY MEDICINE

## 2019-11-29 PROCEDURE — 36415 COLL VENOUS BLD VENIPUNCTURE: CPT | Performed by: FAMILY MEDICINE

## 2019-11-29 NOTE — LETTER
November 29, 2019      Duane D Backes  [address] [place] [id number]          Dear Duane,     Your thyroid test was within normal range. Please continue with your current thyroid medication and dose. This should be checked yearly. Enclosed are the results.  Results for orders placed or performed during the hospital encounter of 11/29/19   TSH with free T4 reflex     Status: None   Result Value Ref Range    TSH 0.85 0.40 - 4.00 mU/L       Sincerely,   Lee Delgado MD

## 2019-12-16 ENCOUNTER — OFFICE VISIT (OUTPATIENT)
Dept: URGENT CARE | Facility: URGENT CARE | Age: 39
End: 2019-12-16
Payer: MEDICARE

## 2019-12-16 ENCOUNTER — ANCILLARY PROCEDURE (OUTPATIENT)
Dept: GENERAL RADIOLOGY | Facility: CLINIC | Age: 39
End: 2019-12-16
Attending: FAMILY MEDICINE
Payer: MEDICARE

## 2019-12-16 VITALS
SYSTOLIC BLOOD PRESSURE: 122 MMHG | HEART RATE: 87 BPM | DIASTOLIC BLOOD PRESSURE: 72 MMHG | TEMPERATURE: 98.8 F | BODY MASS INDEX: 28.47 KG/M2 | OXYGEN SATURATION: 99 % | WEIGHT: 198.4 LBS

## 2019-12-16 DIAGNOSIS — M79.671 RIGHT FOOT PAIN: Primary | ICD-10-CM

## 2019-12-16 PROCEDURE — 73630 X-RAY EXAM OF FOOT: CPT | Mod: RT

## 2019-12-16 PROCEDURE — 99214 OFFICE O/P EST MOD 30 MIN: CPT | Performed by: FAMILY MEDICINE

## 2019-12-16 NOTE — PROGRESS NOTES
SUBJECTIVE:  Chief Complaint   Patient presents with     Musculoskeletal Problem     right foot   .ryan who presents with a chief complaint of  right foot pain and swelling.  Symptoms began this am  ago , are moderate andstill present.  Context:Injury: possible   Pain exacerbated by weight-bearing   Relieved bynothing He treated it initially with no therapy.   This is the first time this type of injury has occurred to this patient.     Past Medical History:   Diagnosis Date     Autistic disorder, current or active state      Dry skin      Moderate intellectual disabilities      Obsessive-compulsive disorders      Overweight (BMI 25.0-29.9) 8/2/2013     Seizure disorder (H)     Generalized tonic clonic     Unspecified constipation        Past Surgical History:   Procedure Laterality Date     CATARACT IOL, RT/LT Left 11/17/2018       Family History   Problem Relation Age of Onset     Unknown/Adopted No family hx of        Social History     Tobacco Use     Smoking status: Never Smoker     Smokeless tobacco: Never Used   Substance Use Topics     Alcohol use: No        Allergies   Allergen Reactions     Penicillin [Penicillins] Other (See Comments)     Prozac [Fluoxetine Hcl]      Reglan [Metoclopramide Hcl]      Ritalin [Methylphenidate Derivatives] Other (See Comments)     Ritalin     Trazodone          ROS:Review of systems negative except as stated below    EXAM: /72   Pulse 87   Temp 98.8  F (37.1  C) (Oral)   Wt 90 kg (198 lb 6.4 oz)   SpO2 99%   BMI 28.47 kg/m     Exam:foot  tenderness to palpation and pain with rom  GENERAL APPEARANCE: healthy, alert and no distress  EXTREMITIES: peripheral pulses normal  SKIN: no suspicious lesions or rashes  NEURO: Normal strength and tone, sensory exam grossly normal, mentation intact and speech normal    Xray without acute findings, no fx read by Fernando Gonzales D.O.    ASSESSMENT:     ICD-10-CM    1. Right foot pain M79.671 XR Foot Right G/E 3 Views     order for  DME     PODIATRY/FOOT & ANKLE SURGERY REFERRAL     Ice  OTC tylenol  rest

## 2019-12-23 ENCOUNTER — OFFICE VISIT (OUTPATIENT)
Dept: PODIATRY | Facility: CLINIC | Age: 39
End: 2019-12-23
Payer: MEDICARE

## 2019-12-23 VITALS
BODY MASS INDEX: 28.35 KG/M2 | HEIGHT: 70 IN | WEIGHT: 198 LBS | SYSTOLIC BLOOD PRESSURE: 106 MMHG | DIASTOLIC BLOOD PRESSURE: 70 MMHG

## 2019-12-23 DIAGNOSIS — S99.921A INJURY OF RIGHT FOOT, INITIAL ENCOUNTER: Primary | ICD-10-CM

## 2019-12-23 DIAGNOSIS — S90.31XA CONTUSION OF RIGHT FOOT, INITIAL ENCOUNTER: ICD-10-CM

## 2019-12-23 PROCEDURE — 99203 OFFICE O/P NEW LOW 30 MIN: CPT | Performed by: PODIATRIST

## 2019-12-23 ASSESSMENT — MIFFLIN-ST. JEOR: SCORE: 1829.37

## 2019-12-23 NOTE — PATIENT INSTRUCTIONS
Thank you for choosing [facility] / [facility]le Surgery!    DR. HERZOG'S CLINIC LOCATIONS     MONDAY - [place] WEDNESDAY (AM ONLY) - [place]   [address] [place] [id number] [address] [address]   [phone] / [phone] / [phone]       THURSDAY - [place] SCHEDULE SURGERY: [phone]   [address] APPOINTMENTS: [phone]   [place] [address] BILLING QUESTIONS: [phone] / [phone]               FYI: BODY WEIGHT AND YOUR FEET  The following information is included in the after visit summary for all patients. Body weight can be a sensitive issue to discuss in clinic, but we think the following information is very important. Although we focus on the feet and ankles, we do support the overall health of our patients.     Many things can cause foot and ankle problems. Foot structure, activity level, foot mechanics and injuries are common causes of pain. One very important issue that often goes unmentioned, is body weight. Extra weight can cause increased stress on muscles, ligaments, bones and tendons. Sometimes just a few extra pounds is all it takes to put one over her/his threshold. Without reducing that stress, it can be difficult to alleviate pain. As Foot & Ankle specialists, our job is addressing the lower extremity problem and possible causes. Regarding extra body weight, we encourage patients to discuss diet and weight management plans with their primary care doctors. It is this team approach that gives you the best opportunity for pain relief and getting you back on your feet.      [place] has a Comprehensive Weight Management Program. This program includes counseling, education, non-surgical and surgical approaches to weight loss. If you are interested in learning more either talk to you primary care provider or call [phone].

## 2019-12-23 NOTE — LETTER
12/23/2019         RE: Duane D Backes  [address]  [facility] [address]        Dear Colleague,    Thank you for referring your patient, Duane D Backes, to the [facility]. Please see a copy of my visit note below.      ASSESSMENT/PLAN:    Encounter Diagnoses   Name Primary?     Injury of right foot, initial encounter Yes     Contusion of right foot, initial encounter      I personally reviewed the XR images.   No fractures or dislocations seen.     Given that he won't wear the CAM walker and has walked and run without difficulty, I am not concerned.     Activity as tolerated    Follow up in 1 month, if any noticed changes in gait, complaints or pain or signs of pain.    Body mass index is 28.41 kg/m .    Weight management plan: Patient was referred to their PCP to discuss a diet and exercise plan.      Nishant Mcdaniels DPM, FACFAS, MS    [facility] Department of Podiatry/Foot & Ankle Surgery      ____________________________________________________________________    HPI:         Chief Complaint: follow up from urgent care  He is accompanied by his care giver.  Onset of problem: 2 days  Duane was complaining of right foot pain and limping. Bruising was noted on his right foot.  Exact mechanism of injury unknown  12/16 urgent care. XR. CAM walker. He wore it for 1 day and then would throw it, rather than puti it on. He has walked and run without problems more recently   *  Patient Active Problem List   Diagnosis     Moderate mental retardation     Active autistic disorder     Obsessive-compulsive disorder     Chronic constipation     Hearing loss     Generalized tonic clonic epilepsy (H)     Dry skin     CARDIOVASCULAR SCREENING; LDL GOAL LESS THAN 160     Hypothyroidism due to acquired atrophy of thyroid     Benign neoplasm of brain (H)     Obesity, Class I, BMI 30-34.9   *  *  Past Surgical History:   Procedure Laterality Date     CATARACT IOL, RT/LT Left 11/17/2018   *  *  Current  Outpatient Medications   Medication Sig Dispense Refill     ammonium lactate (AMLACTIN) 12 % cream Apply topically daily as needed for dry skin       divalproex sodium extended-release (DEPAKOTE ER) 500 MG 24 hr tablet Take 3 tablets (1,500 mg) by mouth 2 times daily 186 tablet 11     docusate sodium (COLACE) 100 MG capsule Take 1 capsule by mouth 2 times daily.       FISH OIL 1000 MG OR CAPS 1 PO BID (Patient taking differently: 1 capsule 2 times per day) 60 11     lactulose (CHRONULAC) 10 GM/15ML solution Take 30 mLs by mouth every evening & additional 30mL 2 times per day as needed       levothyroxine (SYNTHROID/LEVOTHROID) 175 MCG tablet Take 1 tablet (175 mcg) by mouth daily for thyroid. 30 tablet 11     lithium (ESKALITH) 450 MG CR tablet Take 450 mg by mouth       MILK OF MAGNESIA 400 MG/5ML suspension 30 mLs every evening        polyethylene glycol (MIRALAX) powder Take 17 g by mouth daily. 510 g 0     risperiDONE (RISPERDAL) 2 MG tablet Take 2 mg by mouth 1 tab every morning & 1 tab every bedtime       sertraline (ZOLOFT) 100 MG tablet Take 1.5 tablets by mouth every morning.       topiramate (TOPAMAX) 100 MG tablet TAKE 1 TABLET BY MOUTH TWICE DAILY (ALONG WITH 50MG TABLET FOR TOTAL = 150MG TWICE DAILY) 62 tablet 11     topiramate (TOPAMAX) 50 MG tablet Take 1 tablet (50 mg) by mouth 2 times daily 62 tablet 11     VITAMIN D3 1000 units tablet 3,000 Units daily       order for DME Rt short cam walker (Patient not taking: Reported on 12/23/2019) 1 Device 0       Social History: Employment:  [employer];  Exercise/Physical activity:  daily;  Tobacco use:  no  Social History     Socioeconomic History     Marital status: Single     Spouse name: Not on file     Number of children: 0     Years of education: Not on file     Highest education level: Not on file   Occupational History     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      Employer: [employer]   Social Needs     Financial resource strain: Not on file  "    Food insecurity:     Worry: Not on file     Inability: Not on file     Transportation needs:     Medical: Not on file     Non-medical: Not on file   Tobacco Use     Smoking status: Never Smoker     Smokeless tobacco: Never Used   Substance and Sexual Activity     Alcohol use: No     Drug use: No     Sexual activity: Never   Lifestyle     Physical activity:     Days per week: Not on file     Minutes per session: Not on file     Stress: Not on file   Relationships     Social connections:     Talks on phone: Not on file     Gets together: Not on file     Attends Christian service: Not on file     Active member of club or organization: Not on file     Attends meetings of clubs or organizations: Not on file     Relationship status: Not on file     Intimate partner violence:     Fear of current or ex partner: Not on file     Emotionally abused: Not on file     Physically abused: Not on file     Forced sexual activity: Not on file   Other Topics Concern     Parent/sibling w/ CABG, MI or angioplasty before 65F 55M? Not Asked   Social History Narrative    Lives in group home.  Parents are legal guardians.       Family history:  Family History   Problem Relation Age of Onset     Unknown/Adopted No family hx of          EXAM:    Vitals: /70   Ht 1.778 m (5' 10\")   Wt 89.8 kg (198 lb)   BMI 28.41 kg/m     BMI: Body mass index is 28.41 kg/m .  Height: 5' 10\"    Constitutional/ general:  Pt is in no apparent distress, appears well-nourished.  Cooperative with history and physical exam.     Vascular:  Pedal pulses are palpable bilaterally for both the DP and PT arteries.  CFT < 3 sec.  No edema.  Pedal hair growth noted.     Neuro:  Alert and oriented x 3. Coordinated gait.  Light touch sensation is intact to the L4, L5, S1 distributions. No obvious deficits.  No evidence of neurological-based weakness, spasticity, or contracture in the lower extremities.     Derm: No open lesions. There is light ecchymosis over the " dorsal central metararsal heads.     Musculoskeletal:    Lower extremity muscle strength is normal.  Patient is ambulatory without an assistive device or brace .  No gross deformities.  Every where I palpated, he said it hurt. However, no problems standing or walking.      RIGHT FOOT THREE VIEWS  12/16/2019 10:35 AM     HISTORY:  Right foot pain.     COMPARISON:  None.     FINDINGS:  No fracture or osseous lesion is seen. The joint spaces are  well preserved. No soft tissue pathology is seen.                                                                        IMPRESSION:  Unremarkable examination.       CLARENCE IRVIN MD          Again, thank you for allowing me to participate in the care of your patient.        Sincerely,        Nishant Mcdaniels DPM

## 2019-12-27 ENCOUNTER — TRANSFERRED RECORDS (OUTPATIENT)
Dept: HEALTH INFORMATION MANAGEMENT | Facility: CLINIC | Age: 39
End: 2019-12-27

## 2019-12-27 ENCOUNTER — MEDICAL CORRESPONDENCE (OUTPATIENT)
Dept: HEALTH INFORMATION MANAGEMENT | Facility: CLINIC | Age: 39
End: 2019-12-27

## 2020-02-06 ENCOUNTER — TELEPHONE (OUTPATIENT)
Dept: FAMILY MEDICINE | Facility: CLINIC | Age: 40
End: 2020-02-06

## 2020-02-06 DIAGNOSIS — E03.4 HYPOTHYROIDISM DUE TO ACQUIRED ATROPHY OF THYROID: Primary | ICD-10-CM

## 2020-02-06 NOTE — TELEPHONE ENCOUNTER
.Reason for call:  Order   Order or referral being requested: Thyroid  Reason for request: gets them checked every 6 months  Date needed: as soon as possible  Has the patient been seen by the PCP for this problem? YES    Additional comments: Fax orders to: [phone]    Phone number to reach patient:  Home number on file [phone] (home)    Best Time:  Any    Can we leave a detailed message on this number?  YES

## 2020-02-14 NOTE — TELEPHONE ENCOUNTER
Lab order printed and faxed.  Andrea Alfonso,  For [facility]st [facility] ([facility])

## 2020-03-03 ENCOUNTER — TELEPHONE (OUTPATIENT)
Dept: FAMILY MEDICINE | Facility: CLINIC | Age: 40
End: 2020-03-03

## 2020-03-03 NOTE — TELEPHONE ENCOUNTER
Reason for Call:  Other prescription    Detailed comments: Patient has not been getting the right dose of lithium (ESKALITH) 450 MG CR tablet for over a period of time. Patient had been given 300 mg and was supposed to be taking 600 mg at night. She is assuring that patient is fine.    Phone Number Patient can be reached at: Other phone number:  [phone]    Best Time: any    Can we leave a detailed message on this number? YES    Call taken on 3/3/2020 at 11:32 AM by Taya López

## 2020-03-03 NOTE — TELEPHONE ENCOUNTER
Called for clarification.     01/16/2020 lithium dose was upped to 600mg. Dr. Grier is the prescribing provider.    Mary states that the patient was getting the wrong or improper dose of medication and it has now been corrected.     Routing to provider as an FYI    Kira Prakash RN  [facility]

## 2020-03-04 DIAGNOSIS — E03.4 HYPOTHYROIDISM DUE TO ACQUIRED ATROPHY OF THYROID: ICD-10-CM

## 2020-03-04 PROCEDURE — 36415 COLL VENOUS BLD VENIPUNCTURE: CPT | Performed by: FAMILY MEDICINE

## 2020-03-04 PROCEDURE — 84443 ASSAY THYROID STIM HORMONE: CPT | Performed by: FAMILY MEDICINE

## 2020-03-04 PROCEDURE — 84439 ASSAY OF FREE THYROXINE: CPT | Performed by: FAMILY MEDICINE

## 2020-03-05 ENCOUNTER — TELEPHONE (OUTPATIENT)
Dept: FAMILY MEDICINE | Facility: CLINIC | Age: 40
End: 2020-03-05

## 2020-03-05 DIAGNOSIS — E03.4 HYPOTHYROIDISM DUE TO ACQUIRED ATROPHY OF THYROID: Primary | ICD-10-CM

## 2020-03-05 LAB
T4 FREE SERPL-MCNC: 1 NG/DL (ref 0.76–1.46)
TSH SERPL DL<=0.005 MIU/L-ACNC: 0.03 MU/L (ref 0.4–4)

## 2020-03-05 RX ORDER — LEVOTHYROXINE SODIUM 150 UG/1
150 TABLET ORAL DAILY
Qty: 90 TABLET | Refills: 1 | Status: SHIPPED | OUTPATIENT
Start: 2020-03-05 | End: 2020-08-12

## 2020-03-05 NOTE — TELEPHONE ENCOUNTER
Please contact patient's caregiver- from recent labs looks like we should drop his thyroid meds back down to 150 mcg. I have sent this to [facility].  Jarrell Tinajero PA-C

## 2020-03-10 DIAGNOSIS — Z79.890 NEED FOR PROPHYLACTIC HORMONE REPLACEMENT THERAPY (POSTMENOPAUSAL): Primary | ICD-10-CM

## 2020-03-10 PROCEDURE — 36415 COLL VENOUS BLD VENIPUNCTURE: CPT

## 2020-03-10 PROCEDURE — 80178 ASSAY OF LITHIUM: CPT

## 2020-03-11 LAB — LITHIUM SERPL-SCNC: 0.62 MMOL/L (ref 0.6–1.2)

## 2020-06-02 ENCOUNTER — TRANSFERRED RECORDS (OUTPATIENT)
Dept: HEALTH INFORMATION MANAGEMENT | Facility: CLINIC | Age: 40
End: 2020-06-02

## 2020-06-15 ENCOUNTER — TELEPHONE (OUTPATIENT)
Dept: NEUROLOGY | Facility: CLINIC | Age: 40
End: 2020-06-15

## 2020-06-15 DIAGNOSIS — G40.909 SEIZURE DISORDER (H): Primary | ICD-10-CM

## 2020-06-15 NOTE — TELEPHONE ENCOUNTER
[facility] Call Center    Phone Message    May a detailed message be left on voicemail: yes     Reason for Call: Other: pt had 2 minor seisures recently, for the first time in 5 years, should pt be seen sooner than july 8th? or have any labs done, please call dino at pts group home at [phone] thank you     Action Taken: Message routed to:  [facility] neuro    Travel Screening: Not Applicable

## 2020-06-15 NOTE — TELEPHONE ENCOUNTER
Was able to speak with the house lead at the group home who reports Duane having 2 tonic clonic seizures yesterday which is the first time in 5 years. They were both fairly short (first shorter than the second one) with a post-ictal period of about a half hour in which he returned to baseline.   He has not missed any medication dosages. He has not been ill, had any infections or been under an increased amount of stress.   The pts virtual appt was moved up to 6/24 at 1PM. Will send message to the covering provider to see if he would like labs drawn in the meantime. He is currently taking Depakote and Topamax. Dosage and frequency verified.       Chanelle Ferguson, RNCC  Neurology

## 2020-06-15 NOTE — TELEPHONE ENCOUNTER
Tried reaching Raul at the group home but there was no answer so a message was left on his VM.  Chanelle Ferguson, RNCC  Neurology

## 2020-06-15 NOTE — TELEPHONE ENCOUNTER
Raul was notified of the lab orders and they will bring the pt to the  [facility] and complete the virtual visit with Dr Villar next week.  Chanelle Ferguson, RNCC  Neurology

## 2020-06-16 ENCOUNTER — NURSE TRIAGE (OUTPATIENT)
Dept: NURSING | Facility: CLINIC | Age: 40
End: 2020-06-16

## 2020-06-16 DIAGNOSIS — G40.909 SEIZURE DISORDER (H): ICD-10-CM

## 2020-06-16 LAB
ERYTHROCYTE [DISTWIDTH] IN BLOOD BY AUTOMATED COUNT: 14.4 % (ref 10–15)
HCT VFR BLD AUTO: 38.1 % (ref 40–53)
HGB BLD-MCNC: 12.7 G/DL (ref 13.3–17.7)
MCH RBC QN AUTO: 32.6 PG (ref 26.5–33)
MCHC RBC AUTO-ENTMCNC: 33.3 G/DL (ref 31.5–36.5)
MCV RBC AUTO: 98 FL (ref 78–100)
PLATELET # BLD AUTO: 125 10E9/L (ref 150–450)
RBC # BLD AUTO: 3.89 10E12/L (ref 4.4–5.9)
VALPROATE SERPL-MCNC: 172 MG/L (ref 50–100)
WBC # BLD AUTO: 4.9 10E9/L (ref 4–11)

## 2020-06-16 PROCEDURE — 80053 COMPREHEN METABOLIC PANEL: CPT | Performed by: PSYCHIATRY & NEUROLOGY

## 2020-06-16 PROCEDURE — 36415 COLL VENOUS BLD VENIPUNCTURE: CPT | Performed by: PSYCHIATRY & NEUROLOGY

## 2020-06-16 PROCEDURE — 99000 SPECIMEN HANDLING OFFICE-LAB: CPT | Performed by: PSYCHIATRY & NEUROLOGY

## 2020-06-16 PROCEDURE — 80164 ASSAY DIPROPYLACETIC ACD TOT: CPT | Performed by: PSYCHIATRY & NEUROLOGY

## 2020-06-16 PROCEDURE — 85027 COMPLETE CBC AUTOMATED: CPT | Performed by: PSYCHIATRY & NEUROLOGY

## 2020-06-16 PROCEDURE — 80201 ASSAY OF TOPIRAMATE: CPT | Mod: 90 | Performed by: PSYCHIATRY & NEUROLOGY

## 2020-06-17 ENCOUNTER — TELEPHONE (OUTPATIENT)
Dept: NEUROLOGY | Facility: CLINIC | Age: 40
End: 2020-06-17

## 2020-06-17 ENCOUNTER — TELEPHONE (OUTPATIENT)
Dept: FAMILY MEDICINE | Facility: CLINIC | Age: 40
End: 2020-06-17

## 2020-06-17 LAB
ALBUMIN SERPL-MCNC: 3.3 G/DL (ref 3.4–5)
ALP SERPL-CCNC: 53 U/L (ref 40–150)
ALT SERPL W P-5'-P-CCNC: 17 U/L (ref 0–70)
ANION GAP SERPL CALCULATED.3IONS-SCNC: 4 MMOL/L (ref 3–14)
AST SERPL W P-5'-P-CCNC: 34 U/L (ref 0–45)
BILIRUB SERPL-MCNC: 0.2 MG/DL (ref 0.2–1.3)
BUN SERPL-MCNC: 14 MG/DL (ref 7–30)
CALCIUM SERPL-MCNC: 8.9 MG/DL (ref 8.5–10.1)
CHLORIDE SERPL-SCNC: 102 MMOL/L (ref 94–109)
CO2 SERPL-SCNC: 28 MMOL/L (ref 20–32)
CREAT SERPL-MCNC: 0.79 MG/DL (ref 0.66–1.25)
GFR SERPL CREATININE-BSD FRML MDRD: >90 ML/MIN/{1.73_M2}
GLUCOSE SERPL-MCNC: 92 MG/DL (ref 70–99)
POTASSIUM SERPL-SCNC: 4.7 MMOL/L (ref 3.4–5.3)
PROT SERPL-MCNC: 7.4 G/DL (ref 6.8–8.8)
SODIUM SERPL-SCNC: 134 MMOL/L (ref 133–144)

## 2020-06-17 NOTE — TELEPHONE ENCOUNTER
Spoke to Samson at Group Home. Note valproic acid level 172 but no signs of toxicity such as imbalance, lethargy (he is hyper) N,V. No seizures since the 2 on 6/14. Not changing the Depakote dose at this time in view of 2 recent seizures. Topiramate level pending. He has follow up with Dr Villar next week.

## 2020-06-17 NOTE — TELEPHONE ENCOUNTER
I spoke to Nori just now about the critical VPA level of 172, obtained at 3.28 pm. Patient takes ER Depakote 1500 mg bid. Review of previous levels for Duane indicates that they have been often running in the 130s without him having any problems. He did have a cluster of seizures on 6/14, therefore it would be inappropriate to stop VPA now. He reportedly is doing well (no lethargy, ataxia, vomiting, etc). He has already taken the evening dose of VPA.  I recommended continuing the valproic acid ER dose without changes unless they observe one of the above clinical signs. I will notify Dr Villar in am in case he wants a different approach. GM

## 2020-06-17 NOTE — TELEPHONE ENCOUNTER
Rec'd call from lab w/ critical result: 6/16/20 Valproic Acid level 172.  Last level 7/10/19 was 136. EHR shows pt lives in group home. Group home yesterday reported pt had 2 seizures on 6/14 after 5 years seizure free. Taking Depakote ER (500mg) 3 tabs twice a day. Group home staff reports pt well today w/ no add'l seizures or other issues. Virtual Visit scheduled 6/24 w/ Dr Villar at [facility]. Paged on-call  Neuro resident @8:04 pm to call [facility]. 2nd page sent @8:28 pm. Spoke w/Dr Bolden @8:38pm: he advised if pt doing well w/ no add'l seizures, no vomiting, alert, acting like usual then continue current dose of Depakote and call primary neurologist tomorrow AM. Group home informed (Omar) of above and to call if any change in pt condition.

## 2020-06-17 NOTE — TELEPHONE ENCOUNTER
Reason for call:  Patient reporting a symptom    Symptom or request: Two seizures on 06/15/2020.    Have you been treated for this before? Yes    Additional comments: Spoke with neurologist and was told to follow up with primary about lab result to figure out a plan of action.    Phone Number patient can be reached at:  Other phone number:  [phone]    Best Time:  Anytime.    Can we leave a detailed message on this number:  YES    Call taken on 6/17/2020 at 9:07 AM by Ginger Miller

## 2020-06-17 NOTE — TELEPHONE ENCOUNTER
This writer attempted to contact Duane on 06/17/20      Reason for call triage and left message.      If patient calls back:   Registered Nurse called. Follow Triage Call workflow        Alina Ross RN     Left VM that if patient is symptomatic and/or has concerns please call back to ask to speak to triage nurse otherwise if just needing apt schedule apt with call center.

## 2020-06-17 NOTE — TELEPHONE ENCOUNTER
Raul from the patients group home is requesting to have lab orders placed for the client and he would also like a call back regarding prevoious lab results, please [phone] - ok to leave a message

## 2020-06-18 ENCOUNTER — VIRTUAL VISIT (OUTPATIENT)
Dept: FAMILY MEDICINE | Facility: CLINIC | Age: 40
End: 2020-06-18
Payer: MEDICARE

## 2020-06-18 DIAGNOSIS — G40.909 SEIZURE DISORDER (H): Primary | ICD-10-CM

## 2020-06-18 DIAGNOSIS — D69.59 THROMBOCYTOPENIA DUE TO DRUGS: ICD-10-CM

## 2020-06-18 DIAGNOSIS — T50.905A THROMBOCYTOPENIA DUE TO DRUGS: ICD-10-CM

## 2020-06-18 DIAGNOSIS — D63.8 ANEMIA IN OTHER CHRONIC DISEASES CLASSIFIED ELSEWHERE: ICD-10-CM

## 2020-06-18 DIAGNOSIS — E03.4 HYPOTHYROIDISM DUE TO ACQUIRED ATROPHY OF THYROID: ICD-10-CM

## 2020-06-18 LAB — TOPIRAMATE SERPL-MCNC: 5.3 UG/ML (ref 5–20)

## 2020-06-18 PROCEDURE — 99214 OFFICE O/P EST MOD 30 MIN: CPT | Mod: 95 | Performed by: FAMILY MEDICINE

## 2020-06-18 NOTE — PATIENT INSTRUCTIONS
At [facility], we strive to deliver an exceptional experience to you, every time we see you.  If you receive a survey in the mail, please send us back your thoughts. We really do value your feedback.    Based on your medical history, these are the current health maintenance/preventive care services that you are due for (some may have been done at this visit.)  Health Maintenance Due   Topic Date Due     EYE EXAM  08/13/2016     PHQ-2  01/01/2020         Suggested websites for health information:  Www.SelStor.org : Up to date and easily searchable information on multiple topics.  Www.[employer].gov : medication info, interactive tutorials, watch real surgeries online  Www.familydoctor.org : good info from the Academy of Family Physicians  Www.cdc.gov : public health info, travel advisories, epidemics (H1N1)  Www.aap.org : children's health info, normal development, vaccinations  Www.health.[facility].[place].us : [place] dept of health, public health issues in [place], N1N1    Your care team:                            Family Medicine Internal Medicine   MD Scooter Gao MD Shantel Branch-Fleming, MD Katya Georgiev PA-C Nam Ho, MD Pediatrics   Jarrell Tinajero, PABENOIT Joseph, CNP MD Estelle Nicole CNP, MD Deborah Mielke, MD Kim Thein, APRN CNP      Clinic hours: Monday - Thursday 7 am-7 pm; Fridays 7 am-5 pm.   Urgent care: Monday - Friday 11 am-9 pm; Saturday and Sunday 9 am-5 pm.  Pharmacy : Monday -Thursday 8 am-8 pm; Friday 8 am-6 pm; Saturday and Sunday 9 am-5 pm.     Clinic: [phone]   Pharmacy: [phone]

## 2020-06-18 NOTE — PROGRESS NOTES
"Duane D Backes is a 39 year old male who is being evaluated via a billable telephone visit.      The patient has been notified of following:     \"This telephone visit will be conducted via a call between you and your physician/provider. We have found that certain health care needs can be provided without the need for a physical exam.  This service lets us provide the care you need with a short phone conversation.  If a prescription is necessary we can send it directly to your pharmacy.  If lab work is needed we can place an order for that and you can then stop by our lab to have the test done at a later time.    Telephone visits are billed at different rates depending on your insurance coverage. During this emergency period, for some insurers they may be billed the same as an in-person visit.  Please reach out to your insurance provider with any questions.    If during the course of the call the physician/provider feels a telephone visit is not appropriate, you will not be charged for this service.\"    Patient has given verbal consent for Telephone visit?  Yes    What phone number would you like to be contacted at? [phone]    How would you like to obtain your AVS? Mail a copy    Subjective     Duane D Backes is a 39 year old male who presents via phone visit today for the following health issues. He is accompanied by his group home attendant (Raul) who provides the history for the patient. :    HPI  Concern - Seizures  Onset: 6/14/2020    Description:   Experienced 2 seizures on Sunday 6/14/2020    Intensity: mild    Progression of Symptoms:  none    Accompanying Signs & Symptoms:  none    Previous history of similar problem:   yes    Precipitating factors:   Worsened by: none    Alleviating factors:  Improved by: none    Therapies Tried and outcome:  Divalproex and topiramate       Past medical, family, and social histories, medications, and allergies are reviewed and updated in Epic.  Allergies: Penicillin " [penicillins]; Prozac [fluoxetine hcl]; Reglan [metoclopramide hcl]; Ritalin [methylphenidate derivatives]; and Trazodone    Review Of Systems:   Constitutional, HEENT, cardiovascular, pulmonary, gi and gu systems are negative, except as otherwise noted.    Objective:     Reported vitals:There were no vitals taken for this visit.   RESP: No cough, no audible wheezing, able to talk in full sentences.  PSYCH: The remainder of the exam cannot be completed due to this being a telephone visit.  The patient himself is nonverbal, and all history and discussion is done through his attendant.      Diagnostic Test Results:     Ref. Range 6/16/2020 15:27 6/16/2020 15:28   Sodium Latest Ref Range: 133 - 144 mmol/L 134    Potassium Latest Ref Range: 3.4 - 5.3 mmol/L 4.7    Chloride Latest Ref Range: 94 - 109 mmol/L 102    Carbon Dioxide Latest Ref Range: 20 - 32 mmol/L 28    Urea Nitrogen Latest Ref Range: 7 - 30 mg/dL 14    Creatinine Latest Ref Range: 0.66 - 1.25 mg/dL 0.79    GFR Estimate Latest Ref Range: >60 mL/min/1.73_m2 >90    Calcium Latest Ref Range: 8.5 - 10.1 mg/dL 8.9    Anion Gap Latest Ref Range: 3 - 14 mmol/L 4    Albumin Latest Ref Range: 3.4 - 5.0 g/dL 3.3 (L)    Protein Total Latest Ref Range: 6.8 - 8.8 g/dL 7.4    Bilirubin Total Latest Ref Range: 0.2 - 1.3 mg/dL 0.2    Alkaline Phosphatase Latest Ref Range: 40 - 150 U/L 53    ALT Latest Ref Range: 0 - 70 U/L 17    AST Latest Ref Range: 0 - 45 U/L 34    Glucose Latest Ref Range: 70 - 99 mg/dL 92    WBC Latest Ref Range: 4.0 - 11.0 10e9/L  4.9   Hemoglobin Latest Ref Range: 13.3 - 17.7 g/dL  12.7 (L)   Hematocrit Latest Ref Range: 40.0 - 53.0 %  38.1 (L)   Platelet Count Latest Ref Range: 150 - 450 10e9/L  125 (L)   RBC Count Latest Ref Range: 4.4 - 5.9 10e12/L  3.89 (L)   MCV Latest Ref Range: 78 - 100 fl  98   MCH Latest Ref Range: 26.5 - 33.0 pg  32.6   MCHC Latest Ref Range: 31.5 - 36.5 g/dL  33.3   RDW Latest Ref Range: 10.0 - 15.0 %  14.4   Valproic Acid  Level Latest Ref Range: 50 - 100 mg/L  172 (HH)       =====    ASSESSMENT/PLAN:  (G40.909) Seizure disorder (H)  (primary encounter diagnosis)  Comment: No more seizures since the original to earlier this week  Plan: I reviewed the lab results available at this time and Dr. Reagan's note with Raul.  The topiramate level is still not done yet.  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  Plan: Raul is made aware.  He can ask the other prescribing physicians to comment on them.      Phone call duration:  15 minutes    Rajat Parkinson MD

## 2020-06-18 NOTE — TELEPHONE ENCOUNTER
S-(situation): spoke with Raul at group home.     B-(background): patient had 2 minor seizures this week.     A-(assessment): the seizures were minor. One of them was one min long  and the other one was  two min long. Up until now his seizures have been  managed well. He had labs drawn with neurology on  Tuesday. They said  Depakote level  was a little high and he should get in touch with his PCP to see if medication should be adjusted.       R-(recommendations): phone apt today. Raul was agreeable. Writer assisted him in scheduling.     Alina Ross RN

## 2020-06-22 NOTE — TELEPHONE ENCOUNTER
I talked with Minerva at group home.  Pt had two seizures last week on the same day.  Back to baseline after 45 minutes.  Had eye-blinkin, incontinence with second event.  Slumped over.  Letharhic.  Last seizure 6 years ago.  Will discuss at f/u appt Wednesday.

## 2020-06-24 ENCOUNTER — VIRTUAL VISIT (OUTPATIENT)
Dept: NEUROLOGY | Facility: CLINIC | Age: 40
End: 2020-06-24
Payer: MEDICARE

## 2020-06-24 ENCOUNTER — TRANSFERRED RECORDS (OUTPATIENT)
Dept: HEALTH INFORMATION MANAGEMENT | Facility: CLINIC | Age: 40
End: 2020-06-24

## 2020-06-24 DIAGNOSIS — G40.309 GENERALIZED TONIC CLONIC EPILEPSY (H): Primary | ICD-10-CM

## 2020-06-24 PROCEDURE — 99213 OFFICE O/P EST LOW 20 MIN: CPT | Mod: 95 | Performed by: PSYCHIATRY & NEUROLOGY

## 2020-06-24 NOTE — LETTER
"    6/24/2020         RE: Duane D Backes  [address] [facility] [address]        Dear Colleague,    Thank you for referring your patient, Duane D Backes, to the [facility]. Please see a copy of my visit note below.    Duane D Backes is a 39 year old male who is being evaluated via a billable video visit.      The patient has been notified of following:     \"This video visit will be conducted via a call between you and your physician/provider. We have found that certain health care needs can be provided without the need for an in-person physical exam.  This service lets us provide the care you need with a video conversation.  If a prescription is necessary we can send it directly to your pharmacy.  If lab work is needed we can place an order for that and you can then stop by our lab to have the test done at a later time.    Video visits are billed at different rates depending on your insurance coverage.  Please reach out to your insurance provider with any questions.    If during the course of the call the physician/provider feels a video visit is not appropriate, you will not be charged for this service.\"    Patient has given verbal consent for Video visit? Yes      Will anyone else be joining your video visit? No        Video-Visit Details    Type of service:  Video Visit    Video Start Time:   Video End Time:     Originating Location (pt. Location):     Distant Location (provider location):  [facility]     Platform used for Video Visit:     Davidson Villar MD        Visit Date:   06/24/2020      NEUROLOGY CONSULTATION       This was a telephone followup visit on the patient.  His , Raul, handled the call.  The patient himself was not involved because of his chronic encephalopathy.        INTERVAL HISTORY:  The patient had 2 seizures last week.  These are the first seizures he has had probably in 4 years.  He has been in his current residence 3 years and has " never had a seizure there.  He had 2 seizures that occurred close to each other.  He had some jerking and convulsions for a minute or more.  He went over on his side.  He did not injure himself.  He was dazed and confused for up to 25 minutes afterwards and then he slept for a period of time.  Within 35 minutes or so he was resuming his normal activities and was back to himself.  Some labs were checked.  A Depakote level was found to be elevated at 172.  In the past, he has had Depakote levels ranging from 117 up to 136.  He also had a topiramate level performed and it was 5.3, which is in the range of his previous topiramate levels.  His medicines are topiramate 150 mg twice a day and Depakote 1500 mg twice a day.  Other labs included a CBC.  His platelet count was 125.  Looking at platelet counts over the last 4 years, they have ranged from 136 up to 174.  His platelet count last year was 132.  Blood count was otherwise significant for a hemoglobin of 12.7.  His white count was normal.  His T4 was normal and his TSH was slightly low.      There is no exam as this is a telephone followup visit.      ASSESSMENT:   1.  Recent breakthrough seizures.   2.  Longstanding history of epilepsy and chronic encephalopathy.   3.  History of epidermoid tumor.      DISCUSSION:  The patient is seen in followup with the above problem list.  He did have 2 breakthrough seizures that sounded like they were complex partial with a motor component.  They resolved within a minute or 2 and within 30 minutes, he was back to his baseline.  The episodes occurred on the same day.  This is quite unusual.  However, now he is back to baseline and has been since shortly after the events.  Options would be to make no change in the medicine, increase the topiramate, or refer to Epilepsy for another opinion.  After going through the pros and cons with Raul, the decision was made to recheck his Depakote level and CBC in a month.  If he has another  seizure, then referral will be put in for epilepsy consultation.  Otherwise, no change will be made in his medicines at this time.      This was a telephone visit.  It was initiated at time 1:05 and terminated at time 1:25.  The call was conducted with the patient's , Raul, at the patient's residence.  I did fill out a new seizure protocol and also ordered sheet for labs in 1 month.  The patient will follow-up in a year.         EMMY VILLAR MD             D: 2020   T: 2020   MT:       Name:     BACKES, DUANE   MRN:      [id number]        Account:      [id number]   :      1980           Visit Date:   2020      Document: [id number]       cc: Copy for Provider        [facility]         Again, thank you for allowing me to participate in the care of your patient.        Sincerely,        Emmy Villar MD

## 2020-06-24 NOTE — PROGRESS NOTES
"Duane D Backes is a 39 year old male who is being evaluated via a billable video visit.      The patient has been notified of following:     \"This video visit will be conducted via a call between you and your physician/provider. We have found that certain health care needs can be provided without the need for an in-person physical exam.  This service lets us provide the care you need with a video conversation.  If a prescription is necessary we can send it directly to your pharmacy.  If lab work is needed we can place an order for that and you can then stop by our lab to have the test done at a later time.    Video visits are billed at different rates depending on your insurance coverage.  Please reach out to your insurance provider with any questions.    If during the course of the call the physician/provider feels a video visit is not appropriate, you will not be charged for this service.\"    Patient has given verbal consent for Video visit? Yes      Will anyone else be joining your video visit? No        Video-Visit Details    Type of service:  Video Visit    Video Start Time:   Video End Time:     Originating Location (pt. Location):     Distant Location (provider location):  [facility]     Platform used for Video Visit:     Davidson Villar MD      "

## 2020-06-25 NOTE — PROGRESS NOTES
Visit Date:   06/24/2020      NEUROLOGY CONSULTATION       This was a telephone followup visit on the patient.  His , Raul, handled the call.  The patient himself was not involved because of his chronic encephalopathy.        INTERVAL HISTORY:  The patient had 2 seizures last week.  These are the first seizures he has had probably in 4 years.  He has been in his current residence 3 years and has never had a seizure there.  He had 2 seizures that occurred close to each other.  He had some jerking and convulsions for a minute or more.  He went over on his side.  He did not injure himself.  He was dazed and confused for up to 25 minutes afterwards and then he slept for a period of time.  Within 35 minutes or so he was resuming his normal activities and was back to himself.  Some labs were checked.  A Depakote level was found to be elevated at 172.  In the past, he has had Depakote levels ranging from 117 up to 136.  He also had a topiramate level performed and it was 5.3, which is in the range of his previous topiramate levels.  His medicines are topiramate 150 mg twice a day and Depakote 1500 mg twice a day.  Other labs included a CBC.  His platelet count was 125.  Looking at platelet counts over the last 4 years, they have ranged from 136 up to 174.  His platelet count last year was 132.  Blood count was otherwise significant for a hemoglobin of 12.7.  His white count was normal.  His T4 was normal and his TSH was slightly low.      There is no exam as this is a telephone followup visit.      ASSESSMENT:   1.  Recent breakthrough seizures.   2.  Longstanding history of epilepsy and chronic encephalopathy.   3.  History of epidermoid tumor.      DISCUSSION:  The patient is seen in followup with the above problem list.  He did have 2 breakthrough seizures that sounded like they were complex partial with a motor component.  They resolved within a minute or 2 and within 30 minutes, he was back to his baseline.   The episodes occurred on the same day.  This is quite unusual.  However, now he is back to baseline and has been since shortly after the events.  Options would be to make no change in the medicine, increase the topiramate, or refer to Epilepsy for another opinion.  After going through the pros and cons with Raul, the decision was made to recheck his Depakote level and CBC in a month.  If he has another seizure, then referral will be put in for epilepsy consultation.  Otherwise, no change will be made in his medicines at this time.      This was a telephone visit.  It was initiated at time 1:05 and terminated at time 1:25.  The call was conducted with the patient's , Raul, at the patient's residence.  I did fill out a new seizure protocol and also ordered sheet for labs in 1 month.  The patient will follow-up in a year.         EMMY JARAMILLO MD             D: 2020   T: 2020   MT:       Name:     BACKES, DUANE   MRN:      [id number]        Account:      [id number]   :      1980           Visit Date:   2020      Document: [id number]       cc: Copy for Provider        [facility]

## 2020-07-09 DIAGNOSIS — G40.909 SEIZURE DISORDER (H): ICD-10-CM

## 2020-07-09 DIAGNOSIS — G40.309 GENERALIZED CONVULSIVE EPILEPSY (H): ICD-10-CM

## 2020-07-09 RX ORDER — TOPIRAMATE 100 MG/1
TABLET, FILM COATED ORAL
Qty: 60 TABLET | Refills: 11 | Status: SHIPPED | OUTPATIENT
Start: 2020-07-09 | End: 2020-10-30

## 2020-07-09 RX ORDER — DIVALPROEX SODIUM 500 MG/1
TABLET, EXTENDED RELEASE ORAL
Qty: 180 TABLET | Refills: 11 | Status: SHIPPED | OUTPATIENT
Start: 2020-07-09 | End: 2020-10-30

## 2020-07-09 RX ORDER — TOPIRAMATE 50 MG/1
TABLET, FILM COATED ORAL
Qty: 60 TABLET | Refills: 11 | Status: ON HOLD | OUTPATIENT
Start: 2020-07-09 | End: 2021-02-25

## 2020-07-09 NOTE — TELEPHONE ENCOUNTER
Rx Authorization:    Requested Medication/ Dose: Topirmate 100MG Tabs    Date last refill ordered: 7/10/19    Quantity ordered: 62 tabs    # refills: 10    Date of last clinic visit with ordering provider: 6/24/20    Date of next clinic visit with ordering provider: f/u 1 year    All pertinent protocol data (lab date/result):     Include pertinent information from patients message:       Rx Authorization:    Requested Medication/ Dose: Dicalproex Sodium ER 500MG Tabs    Date last refill ordered: 7/10/19    Quantity ordered: 186 tabs    # refills: 11    Date of last clinic visit with ordering provider: 6/24/20    Date of next clinic visit with ordering provider: f/u 1 year    All pertinent protocol data (lab date/result):     Include pertinent information from patients message:

## 2020-07-09 NOTE — TELEPHONE ENCOUNTER
Rx Authorization:    Requested Medication/ Dose: Topiramate 50MG Tabs    Date last refill ordered: 7/10/19    Quantity ordered: 62 tabs    # refills:     Date of last clinic visit with ordering provider: 6/24/20    Date of next clinic visit with ordering provider: f/u 1 year    All pertinent protocol data (lab date/result):     Include pertinent information from patients message:

## 2020-08-10 DIAGNOSIS — E03.4 HYPOTHYROIDISM DUE TO ACQUIRED ATROPHY OF THYROID: ICD-10-CM

## 2020-08-12 NOTE — TELEPHONE ENCOUNTER
Routing refill request to provider for review/approval because:  Labs not current:  TSH    Chanelle López RN, [facility] Triage

## 2020-08-14 RX ORDER — LEVOTHYROXINE SODIUM 150 UG/1
150 TABLET ORAL DAILY
Qty: 31 TABLET | Refills: 0 | Status: SHIPPED | OUTPATIENT
Start: 2020-08-14 | End: 2020-09-03

## 2020-08-15 ENCOUNTER — ANCILLARY PROCEDURE (OUTPATIENT)
Dept: GENERAL RADIOLOGY | Facility: CLINIC | Age: 40
End: 2020-08-15
Attending: PHYSICIAN ASSISTANT
Payer: MEDICARE

## 2020-08-15 ENCOUNTER — OFFICE VISIT (OUTPATIENT)
Dept: URGENT CARE | Facility: URGENT CARE | Age: 40
End: 2020-08-15
Payer: MEDICARE

## 2020-08-15 VITALS
SYSTOLIC BLOOD PRESSURE: 118 MMHG | RESPIRATION RATE: 18 BRPM | OXYGEN SATURATION: 98 % | DIASTOLIC BLOOD PRESSURE: 77 MMHG | HEART RATE: 79 BPM

## 2020-08-15 DIAGNOSIS — M79.674 PAIN OF TOE OF RIGHT FOOT: ICD-10-CM

## 2020-08-15 DIAGNOSIS — M79.674 PAIN OF TOE OF RIGHT FOOT: Primary | ICD-10-CM

## 2020-08-15 DIAGNOSIS — S92.919A CLOSED NONDISPLACED FRACTURE OF PHALANX OF TOE, UNSPECIFIED LATERALITY, UNSPECIFIED TOE, INITIAL ENCOUNTER: ICD-10-CM

## 2020-08-15 PROCEDURE — 99214 OFFICE O/P EST MOD 30 MIN: CPT | Performed by: PHYSICIAN ASSISTANT

## 2020-08-15 PROCEDURE — 73660 X-RAY EXAM OF TOE(S): CPT | Mod: RT

## 2020-08-15 NOTE — PROGRESS NOTES
SUBJECTIVE:  Chief Complaint   Patient presents with     Toe Injury     Pt stubbed R 4th digit toe 2 days ago and now it is swollen and bruised     Duane D Backes is a 40 year old male presents with a chief complaint of right toe(s) great and fourth swelling and bruising.  The injury occurred 2 days   ago.   The injury happened while at home. How: stubbed.  The patient complained of mild pain  and has not had decreased ROM.      Past Medical History:   Diagnosis Date     Autistic disorder, current or active state      Dry skin      Moderate intellectual disabilities      Obsessive-compulsive disorders      Overweight (BMI 25.0-29.9) 8/2/2013     Seizure disorder (H)     Generalized tonic clonic     Unspecified constipation      Allergies   Allergen Reactions     Penicillin [Penicillins] Other (See Comments)     Prozac [Fluoxetine Hcl]      Reglan [Metoclopramide Hcl]      Ritalin [Methylphenidate Derivatives] Other (See Comments)     Ritalin     Trazodone      Social History     Tobacco Use     Smoking status: Never Smoker     Smokeless tobacco: Never Used   Substance Use Topics     Alcohol use: No     Family History   Problem Relation Age of Onset     Unknown/Adopted No family hx of        ROS:  CONSTITUTIONAL:NEGATIVE for fever, chills, change in weight  INTEGUMENTARY/SKIN: POSITIVE for right toe bruising  RESP:NEGATIVE for significant cough or SOB  CV: NEGATIVE for chest pain, palpitations or peripheral edema  GI: NEGATIVE for nausea, abdominal pain, heartburn, or change in bowel habits  MUSCULOSKELETAL: POSITIVE for right toe injury  NEURO: NEGATIVE for weakness, dizziness or paresthesias    EXAM:   /77   Pulse 79   Resp 18   SpO2 98%   Gen: healthy,alert,no distress  Extremity: toe(s) great and fourth has swelling and point tenderness .   There is not compromise to the distal circulation.  Pulses are +2 and CRT is brisk  GENERAL APPEARANCE: healthy, alert and no distress  EXTREMITIES: peripheral pulses  normal  MS:  tender to palpation   SKIN: positive for mild bruising  NEURO: Normal strength and tone, sensory exam grossly normal, mentation intact and speech normal    X-RAY Positive for toe injury with fracture on xray Xray read by Real Pak PA-C at time of visit    ASSESSMENT/PLAN    ICD-10-CM    1. Pain of toe of right foot  M79.674 XR Toe Right G/E 2 Views     order for DME   2. Closed nondisplaced fracture of phalanx of toe, unspecified laterality, unspecified toe, initial encounter  S92.919A order for DME       Flat sole shoe  Ice  Tylenol  Activities as tolerated

## 2020-08-19 ENCOUNTER — TELEPHONE (OUTPATIENT)
Dept: FAMILY MEDICINE | Facility: CLINIC | Age: 40
End: 2020-08-19

## 2020-08-19 DIAGNOSIS — F84.0 ACTIVE AUTISTIC DISORDER: ICD-10-CM

## 2020-08-19 DIAGNOSIS — F71 MODERATE INTELLECTUAL DISABILITY: ICD-10-CM

## 2020-08-19 DIAGNOSIS — R13.13 PHARYNGEAL DYSPHAGIA: Primary | ICD-10-CM

## 2020-08-19 DIAGNOSIS — R13.10 DIFFICULTY SWALLOWING LIQUIDS: ICD-10-CM

## 2020-08-19 NOTE — TELEPHONE ENCOUNTER
Response to fax (which will arrive shortly):    1. Swallow eval ordered.    2. I see he has Miralax, lactulose, and milk of magnesia on his med list. I would neeed to know how much of these he is taking per week before I recommend any other med changes.

## 2020-08-20 ENCOUNTER — TELEPHONE (OUTPATIENT)
Dept: NEUROLOGY | Facility: CLINIC | Age: 40
End: 2020-08-20

## 2020-08-20 ENCOUNTER — TELEPHONE (OUTPATIENT)
Dept: NEUROLOGY | Facility: CLINIC | Age: 40
End: 2020-08-20
Payer: MEDICARE

## 2020-08-20 DIAGNOSIS — G40.909 SEIZURE DISORDER (H): Primary | ICD-10-CM

## 2020-08-20 NOTE — TELEPHONE ENCOUNTER
Health Call Center    Phone Message    May a detailed message be left on voicemail: yes     Reason for Call: Other: Raul calling to request orders for labs for Duane. Please call him back at your earliest convenience.   He is requesting this call back today.    Action Taken: Message routed to:  [facility] ([facility]):  NEUROLOGY    Travel Screening: Not Applicable

## 2020-08-21 DIAGNOSIS — E03.4 HYPOTHYROIDISM DUE TO ACQUIRED ATROPHY OF THYROID: ICD-10-CM

## 2020-08-21 PROCEDURE — 84443 ASSAY THYROID STIM HORMONE: CPT | Performed by: FAMILY MEDICINE

## 2020-08-21 PROCEDURE — 36415 COLL VENOUS BLD VENIPUNCTURE: CPT | Performed by: FAMILY MEDICINE

## 2020-08-22 LAB — TSH SERPL DL<=0.005 MIU/L-ACNC: 0.42 MU/L (ref 0.4–4)

## 2020-08-26 ENCOUNTER — TELEPHONE (OUTPATIENT)
Dept: NEUROLOGY | Facility: CLINIC | Age: 40
End: 2020-08-26

## 2020-08-26 NOTE — TELEPHONE ENCOUNTER
This writer attempted to contact Duane on 08/26/20      Reason for call medication questions and left message.      If patient calls back:   1st floor [facility] Care Team (MA/TC) called. Inform patient that someone from the team will contact them, document that pt called and route to care team.     -patient has speech therapy scheduled for 08/27/2020    Eunice Thacker MA

## 2020-08-26 NOTE — TELEPHONE ENCOUNTER
I called patient home number and  Left a message to call back and reschedule hie appt with Dr. Carter 9/3/2020 to either a video appt on 9/3 at the previous scheduled time or they can reschedule to a Tuesday or Wednesday in clinic. Dr. Carter does not do telephone NP appts.    Shannan Gordon LPN

## 2020-08-27 ENCOUNTER — HOSPITAL ENCOUNTER (OUTPATIENT)
Dept: SPEECH THERAPY | Facility: CLINIC | Age: 40
Setting detail: THERAPIES SERIES
End: 2020-08-27
Attending: FAMILY MEDICINE
Payer: MEDICARE

## 2020-08-27 DIAGNOSIS — R13.10 DIFFICULTY SWALLOWING LIQUIDS: ICD-10-CM

## 2020-08-27 DIAGNOSIS — F71 MODERATE INTELLECTUAL DISABILITY: ICD-10-CM

## 2020-08-27 DIAGNOSIS — R13.13 PHARYNGEAL DYSPHAGIA: ICD-10-CM

## 2020-08-27 DIAGNOSIS — F84.0 ACTIVE AUTISTIC DISORDER: ICD-10-CM

## 2020-08-27 PROCEDURE — 92610 EVALUATE SWALLOWING FUNCTION: CPT | Mod: GN | Performed by: SPEECH-LANGUAGE PATHOLOGIST

## 2020-08-27 NOTE — PROGRESS NOTES
Speech-Language Pathology Department   [facility]  [facility] Swallow Evaluation   08/27/20 1100   General Information   Type Of Visit Initial   Start Of Care Date 08/27/20   Referring Physician Dr. CESAR Parkinson MD    Orders Evaluate And Treat   Medical Diagnosis Dysphagia    Onset Of Illness/injury Or Date Of Surgery 08/19/20  (Date of MD orders. )   Precautions/limitations No Known Precautions/limitations   Hearing WFL   Pertinent History of Current Problem/OT: Additional Occupational Profile Info Pt is a 40 y.o. male known to this evaluating SLP from a previous clinical swallow evaluation completed 10/2018. Pt with a PMH significant for seizure dx and ASD. Pt was referred for a clinical swallow evaluation d/t concerns with coughing on liquids, reported to occur several times per week. Pt present today with his caregiver, Raul, who reports using a cup and straw since last evaluation and provision of verbal cues for Pt to reduce rate of intake. Pt will grab open faced cups and quickly drink all contents if left unattended in the group home environment with profuse coughing following. No reported concerns with eating and textures or consistencies.    Respiratory Status Room air   Prior Level Of Function Swallowing   Prior Level Of Function Comment Ongoing dysphagia, previous clinical swallow evaluation completed.    Patient Role/employment History Disabled   Living Environment Group Home   General Observations Pt alert and engage, some verbal abilites.    Patient/family Goals Assess swallow function and safety.    Clinical Swallow Evaluation   Oral Musculature unable to assess due to poor participation/comprehension   Dentition present and adequate   Laryngeal Function Cough   Oral Musculature Comments Pt limited in his ability to follow directions and imitate SLP models. Based on limited OM examination, Pt does demonstrate intact oral  musculature and anatomy as needed for swallow function.    Additional Documentation Yes   Additional evaluation(s) completed today Yes   Rationale for completing additional evaluation Based on Pt/caregiver report of symptoms, MD orders, and SLP clinical judgment.    Clinical Swallow Eval: Thin Liquid Texture Trial   Mode of Presentation, Thin Liquids straw;self-fed   Volume of Liquid or Food Presented 2-3 tbs   (Pt taking successive sips. )   Oral Phase of Swallow WFL   Pharyngeal Phase of Swallow intact   Diagnostic Statement No overt s/sx of aspiration nor penetration observed.    Clinical Swallow Eval: Nectar Thick Liquid Texture Trial   Mode of Presentation, Nectar straw;self-fed   Volume of Nectar Presented 1-3 tbs  (Successive )   Oral Phase, Nectar WFL   Pharyngeal Phase, Nectar impaired;coughing/choking   Diagnostic Statement Delayed cough, possibly d/t successive rapid swallows.    Clinical Swallow Eval: Honey Thick Liquid Texture Trial   Mode of Presentation, Honey straw;self-fed   Volume of Honey Presented 1-3 tbs   Oral Phase, Honey WFL   Pharyngeal Phase, Honey intact   Diagnostic Statement No overt s/sx of aspiration nor penetration.    Swallow Compensations   Swallow Compensations   (Bionic straw-adaptive device. Limited success. )   Results Suspect aspiration   Educational Assessment   Barriers to Learning Cognitive   Preferred Learning Style Listening;Demonstration;Pictures/video   General Therapy Interventions   Intervention Comments No therapy recommended at this time. Will request MD orders for OP VFSS to rule in/out aspiration. Trial of Bionic adaptive straw for reduced amt of intake unsuccessful even with cueing for Pt to release straw with each sip to allow block weight to return to the bottom of the straw for re-fill.    Swallow Eval: Clinical Impressions   Skilled Criteria for Therapy Intervention Current level of function same as previous level of function   Functional Assessment Scale  (FAS) 5   Dysphagia Outcome Severity Scale (JACQUELINE) Level 5 - JACQUELINE   Treatment Diagnosis Mild pharyngal dysphagia    Diet texture recommendations Regular diet;Thin liquids  (Using straw and closed cup for reduced rate. )   Recommended Feeding/Eating Techniques alternate between small bites and sips of food/liquid;small sips/bites   Rehab Potential fair, will monitor progress closely   Demonstrates Need for Referral to Another Service   (OP VFSS)   Anticipated Discharge Disposition home w/ assist   Risks and Benefits of Treatment have been explained. Yes   Patient, family and/or staff in agreement with Plan of Care Yes   Clinical Impression Comments Pt demonstrated overt s/sx of aspiration or penetration on 1 trial during this evaluation. SLP suspects this is d/t rapid rate of intake. At this time it is recommended Pt complete and OP VFSS to establish a baseline for his current swallow function and rule in/out aspiration or penetration. Recommend Pt continue to use closed cup with straw at home to reduce rate of intake for safer swallow function. Pt is at increased risk for aspiration/penetration d/t his rapid rate of intake even with the straw to reduce amts. Education provided this session to caregiver regarding overt s/sx of aspiration/penetration and associated risks as well as recommended strategies: straw, cueing for reduced rate, smaller amts in the cup at a time, and chin tuck. Caregiver verbalized understanding of all results and recommendations.    Total Session Time   Total Evaluation Time 20     Thank you for referring [facility]  Please call Nani Alcocer MA, SLP-CCC at [phone] or email [email or web address] with any questions or concerns.      Nani Alcocer M.A., SLP-CCC  Speech-Language Pathologist

## 2020-08-28 NOTE — TELEPHONE ENCOUNTER
I was able to contact [facility] at [phone] and reschedule to video appt.  They are able to have video appts using laptop.  They will call back with email to send link to    Shannan Gordon LPN

## 2020-09-01 NOTE — TELEPHONE ENCOUNTER
Rual called again to see if they can get lab orders to be completed before 9/16/2020 appt with Dr. Carter.  Dr. Ingram had mentioned at pt last appt 6/24/2020 that pt was to get Depakote ane CBC drawn in 1 month (aprox 7/24/2020) but this was not completed.    I advise Raul that I will check with Dr. Carter and get back to him.    Pt uses [facility] trina Gordon LPN

## 2020-09-01 NOTE — TELEPHONE ENCOUNTER
Dr. Carter placed orders for CBC and Depakote level. Brain informed and will pt in to have these done before appt.    Shannan Gordon LPN

## 2020-09-02 DIAGNOSIS — E03.4 HYPOTHYROIDISM DUE TO ACQUIRED ATROPHY OF THYROID: ICD-10-CM

## 2020-09-03 RX ORDER — LEVOTHYROXINE SODIUM 150 UG/1
TABLET ORAL
Qty: 31 TABLET | Refills: 9 | Status: SHIPPED | OUTPATIENT
Start: 2020-09-03 | End: 2022-01-28

## 2020-09-03 NOTE — TELEPHONE ENCOUNTER
Prescription approved per [facility] Refill Protocol.    Kimmie Harmon RN

## 2020-09-09 NOTE — TELEPHONE ENCOUNTER
Spoke with group home and pt is taking:    Miralax 17 grams daily in am  Milk of mag 30 ml daily at 5 pm  lactulose 30 ml daily at 5 pm       Fax number# [phone]    Coty Giraldo MA

## 2020-09-14 DIAGNOSIS — G40.909 SEIZURE DISORDER (H): ICD-10-CM

## 2020-09-14 LAB
ERYTHROCYTE [DISTWIDTH] IN BLOOD BY AUTOMATED COUNT: 13.9 % (ref 10–15)
HCT VFR BLD AUTO: 37.9 % (ref 40–53)
HGB BLD-MCNC: 12.6 G/DL (ref 13.3–17.7)
MCH RBC QN AUTO: 33.3 PG (ref 26.5–33)
MCHC RBC AUTO-ENTMCNC: 33.2 G/DL (ref 31.5–36.5)
MCV RBC AUTO: 100 FL (ref 78–100)
PLATELET # BLD AUTO: 148 10E9/L (ref 150–450)
RBC # BLD AUTO: 3.78 10E12/L (ref 4.4–5.9)
VALPROATE SERPL-MCNC: 144 MG/L (ref 50–100)
WBC # BLD AUTO: 4.7 10E9/L (ref 4–11)

## 2020-09-14 PROCEDURE — 80164 ASSAY DIPROPYLACETIC ACD TOT: CPT | Performed by: INTERNAL MEDICINE

## 2020-09-14 PROCEDURE — 36415 COLL VENOUS BLD VENIPUNCTURE: CPT | Performed by: INTERNAL MEDICINE

## 2020-09-14 PROCEDURE — 85027 COMPLETE CBC AUTOMATED: CPT | Performed by: INTERNAL MEDICINE

## 2020-09-15 NOTE — PROGRESS NOTES
[facility] Neurology Consultation    Duane D Backes MRN# [id number]   Age: 40 year old YOB: 1980     Requesting physician: No ref. provider found  Rajat Parkinson     Reason for Consultation: seizures      History of Presenting Symptoms:   Duane D Backes is a 40 year old male who presents today for evaluation of seizures. Patient previously followed with Dr Villar and was last seen 6/2020.     Prior to visit in June patient had 2 seizures on one day. These were the first seizures in approximately 4 years. They were described as some jerking and convulsions for about a minute or so. He was dazed and confused for about about 25 minutes afterwards. About 10 minutes later he was resuming his normal activities. Depakote level was checked and elevated to 174. Depakote level kept at same dosing given seizures and lack of symptoms that could be attributed to toxicity. CBC showed low but stable platelets and hemoglobin.    Patient returns for follow-up today. He has had no seizures since last visit in June. His behavior has become more aggressive over the last few months. Labs were checked prior to visit. Depakote level was now 144. Patient has always been high energy level and this has not changes. There are no changes in balance and no tremors seen.     Patient has followed with Dr Villar since 2013. He has history of mental retardation and chronic encephalopathy. When he first saw Dr Villar in 2013 he was on Topamax 100 mg BID and Depakote 1500 mg BID. Caretaker who is with patient today is not aware of patient's seizure history. Patient lives in a group home. 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 He has seen neurosurgery in the past. Past EEGs have showed left temporal sharp waves.     No other active health concerns from caretaker today.        Past Medical History:     Patient Active Problem List   Diagnosis     Moderate mental retardation     Active autistic  disorder     Obsessive-compulsive disorder     Chronic constipation     Hearing loss     Generalized tonic clonic epilepsy (H)     Dry skin     CARDIOVASCULAR SCREENING; LDL GOAL LESS THAN 160     Hypothyroidism due to acquired atrophy of thyroid     Benign neoplasm of brain (H)     Obesity, Class I, BMI 30-34.9     Seizure disorder (H)     Past Medical History:   Diagnosis Date     Autistic disorder, current or active state      Dry skin      Moderate intellectual disabilities      Obsessive-compulsive disorders      Overweight (BMI 25.0-29.9) 8/2/2013     Seizure disorder (H)     Generalized tonic clonic     Unspecified constipation         Past Surgical History:     Past Surgical History:   Procedure Laterality Date     CATARACT IOL, RT/LT Left 11/17/2018        Social History:     Social History     Tobacco Use     Smoking status: Never Smoker     Smokeless tobacco: Never Used   Substance Use Topics     Alcohol use: No     Drug use: No        Family History:     Family History   Problem Relation Age of Onset     Unknown/Adopted No family hx of         Medications:     Current Outpatient Medications   Medication Sig     ammonium lactate (AMLACTIN) 12 % cream Apply topically daily as needed for dry skin     divalproex sodium extended-release (DEPAKOTE ER) 500 MG 24 hr tablet TAKE 3 TABLETS (1500MG) BY MOUTH TWICE DAILY.     docusate sodium (COLACE) 100 MG capsule Take 1 capsule by mouth 2 times daily.     FISH OIL 1000 MG OR CAPS 1 PO BID (Patient taking differently: 1 capsule 2 times per day)     lactulose (CHRONULAC) 10 GM/15ML solution Take 30 mLs by mouth every evening & additional 30mL 2 times per day as needed     levothyroxine (SYNTHROID/LEVOTHROID) 150 MCG tablet TAKE 1 TABLET BY MOUTH ONCE DAILY  FOR THYROID   *1 TOTAL FILL*     LITHIUM CARBONATE ER PO Take 600 mg by mouth At Bedtime      MILK OF MAGNESIA 400 MG/5ML suspension 30 mLs every evening      polyethylene glycol (MIRALAX) powder Take 17 g by  "mouth daily.     risperiDONE (RISPERDAL) 2 MG tablet Take 2 mg by mouth 1 tab every morning & 1 tab every bedtime     sertraline (ZOLOFT) 100 MG tablet Take 1.5 tablets by mouth every morning.     topiramate (TOPAMAX) 100 MG tablet TAKE 1 TABLET BY MOUTH TWICE DAILY ALONG WITH 50MG TABLET TO = 150MG TOTAL.     topiramate (TOPAMAX) 50 MG tablet TAKE 1 TABLET BY MOUTH TWICE DAILY.     VITAMIN D3 1000 units tablet 3,000 Units daily     order for [facility] [place] sole shoe     No current facility-administered medications for this visit.         Allergies:     Allergies   Allergen Reactions     Penicillin [Penicillins] Other (See Comments)     Prozac [Fluoxetine Hcl]      Reglan [Metoclopramide Hcl]      Ritalin [Methylphenidate Derivatives] Other (See Comments)     Ritalin     Trazodone         Review of Systems:   As documented above     Physical Exam:   Vitals: BP 97/59   Pulse 67   SpO2 98%    Exam limited due to mental status.  General: Mildly agitated  HEENT: Neck supple with normal range of motion. No paracervical muscle tenderness or tightness.  Patient not cooperative for fundoscopic exam  Heart: Regular rate  Lungs: breathing comfortably  Extremities: no edema  Skin: No rashes  Neurologic:     Mental Status: Fully alert. Language is limited. Does not answer provider's questions. Follows instructions from caretaker to spell name. Frequently says \"big cup\". Very inattentive. Unable to assess orientation, memory, fund of knowledge.      Cranial Nerves: Visual fields intact to confrontation. EOMI grossly intact. Facial movements symmetric. Hearing not formally tested but intact to conversation. No dysarthria.      Motor: Normal tone, no tremors. Unable to formally test strength, but at antigravity with resistance in all extremities.     Deep Tendon Reflexes: 2+/symmetric throughout upper and lower extremities.      Sensory: Responds to light touch stimulation in all extremities.     Coordination: Does not follow " directions for testing     Gait: Normal, steady casual gait. Walks briefly on toes and heels as instructed.          Data: Pertinent prior to visit   Imaging:  CT brain 2/22/2019    IMPRESSION:  1. No acute abnormality.  2. 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  Personally reviewed and agree with above impression    Procedures:  Routine EEG 12/6/2011  CONCLUSION:  Abnormal electroencephalogram due to isolated spike  and slow wave seen once over a T3 electrode.  Previous EEG also reportedly showed left temporal spikes    Laboratory:  , Hgb 12.6 (stable),  (stable), WBC 4.7 (9/4/2020)  Topirimate 5.3 (6/16/2020)         Assessment and Plan:   Assessment:  Duane D Backes is a 40 year old male who presents today for evaluation of seizures. Patient previously followed with Dr Villar and was last seen 6/2020. Patient has had no seizures since last visit with Dr Villar. Examination showing chronic cognitive deficits as documented above. Depakote level again elevated, but no evidence of toxicity such as ataxia, tremors, depressed mental status.  Seizures appear to be well controlled. Since 2013 Depakote levels have ranged from 117 to 172 and one reading of 338. Patient does not appear to be bothered symptomatically by high levels. There is mild but stable decrease in hemoglobin and platelets. I am going to refer to epilepsy clinic with question of whether it is appropriate to continue Depakote level at current dosing given high levels. I am concerned that decreasing dose may exacerbate seizures or result in worsening of behavioral issues. Patient is on risperidone as well which is prescribed by primary care doctor.      Plan:  - Continue current anti-seizure drugs for now; Depakote 1500 mg BID and Topamax 150 mg  BID  - Refer to epilepsy clinic for question of Depakote dosing with chronically high levels  - Patient can return to general neurology clinic after referral or continue to follow in epilepsy clinic as desired    Follow up in Neurology clinic as needed should new concerns arise.    Wilner Carter MD   of Neurology  [facility]

## 2020-09-16 ENCOUNTER — TELEPHONE (OUTPATIENT)
Dept: FAMILY MEDICINE | Facility: CLINIC | Age: 40
End: 2020-09-16

## 2020-09-16 ENCOUNTER — MEDICAL CORRESPONDENCE (OUTPATIENT)
Dept: HEALTH INFORMATION MANAGEMENT | Facility: CLINIC | Age: 40
End: 2020-09-16

## 2020-09-16 ENCOUNTER — OFFICE VISIT (OUTPATIENT)
Dept: NEUROLOGY | Facility: CLINIC | Age: 40
End: 2020-09-16
Payer: MEDICARE

## 2020-09-16 VITALS — OXYGEN SATURATION: 98 % | HEART RATE: 67 BPM | DIASTOLIC BLOOD PRESSURE: 59 MMHG | SYSTOLIC BLOOD PRESSURE: 97 MMHG

## 2020-09-16 DIAGNOSIS — N39.44 NOCTURNAL ENURESIS: Primary | ICD-10-CM

## 2020-09-16 DIAGNOSIS — Z13.1 SCREENING FOR DIABETES MELLITUS: ICD-10-CM

## 2020-09-16 DIAGNOSIS — Z86.39 PERSONAL HISTORY OF OTHER ENDOCRINE, NUTRITIONAL AND METABOLIC DISEASE: ICD-10-CM

## 2020-09-16 DIAGNOSIS — G40.909 SEIZURE DISORDER (H): Primary | ICD-10-CM

## 2020-09-16 PROCEDURE — 99213 OFFICE O/P EST LOW 20 MIN: CPT | Performed by: INTERNAL MEDICINE

## 2020-09-16 ASSESSMENT — PAIN SCALES - GENERAL: PAINLEVEL: NO PAIN (0)

## 2020-09-16 NOTE — LETTER
9/16/2020         RE: Duane D Backes  [address] [facility] [address]        Dear Colleague,    Thank you for referring your patient, Duane D Backes, to the [facility]. Please see a copy of my visit note below.    [facility] Neurology Consultation    Duane D Backes MRN# [id number]   Age: 40 year old YOB: 1980     Requesting physician: No ref. provider found  Rajat Parkinson     Reason for Consultation: seizures      History of Presenting Symptoms:   Duane D Backes is a 40 year old male who presents today for evaluation of seizures. Patient previously followed with Dr Villar and was last seen 6/2020.     Prior to visit in June patient had 2 seizures on one day. These were the first seizures in approximately 4 years. They were described as some jerking and convulsions for about a minute or so. He was dazed and confused for about about 25 minutes afterwards. About 10 minutes later he was resuming his normal activities. Depakote level was checked and elevated to 174. Depakote level kept at same dosing given seizures and lack of symptoms that could be attributed to toxicity. CBC showed low but stable platelets and hemoglobin.    Patient returns for follow-up today. He has had no seizures since last visit in June. His behavior has become more aggressive over the last few months. Labs were checked prior to visit. Depakote level was now 144. Patient has always been high energy level and this has not changes. There are no changes in balance and no tremors seen.     Patient has followed with Dr Villar since 2013. He has history of mental retardation and chronic encephalopathy. When he first saw Dr Villar in 2013 he was on Topamax 100 mg BID and Depakote 1500 mg BID. Caretaker who is with patient today is not aware of patient's seizure history. Patient lives in a group home. 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 He has seen neurosurgery in  the past. Past EEGs have showed left temporal sharp waves.     No other active health concerns from caretaker today.        Past Medical History:     Patient Active Problem List   Diagnosis     Moderate mental retardation     Active autistic disorder     Obsessive-compulsive disorder     Chronic constipation     Hearing loss     Generalized tonic clonic epilepsy (H)     Dry skin     CARDIOVASCULAR SCREENING; LDL GOAL LESS THAN 160     Hypothyroidism due to acquired atrophy of thyroid     Benign neoplasm of brain (H)     Obesity, Class I, BMI 30-34.9     Seizure disorder (H)     Past Medical History:   Diagnosis Date     Autistic disorder, current or active state      Dry skin      Moderate intellectual disabilities      Obsessive-compulsive disorders      Overweight (BMI 25.0-29.9) 8/2/2013     Seizure disorder (H)     Generalized tonic clonic     Unspecified constipation         Past Surgical History:     Past Surgical History:   Procedure Laterality Date     CATARACT IOL, RT/LT Left 11/17/2018        Social History:     Social History     Tobacco Use     Smoking status: Never Smoker     Smokeless tobacco: Never Used   Substance Use Topics     Alcohol use: No     Drug use: No        Family History:     Family History   Problem Relation Age of Onset     Unknown/Adopted No family hx of         Medications:     Current Outpatient Medications   Medication Sig     ammonium lactate (AMLACTIN) 12 % cream Apply topically daily as needed for dry skin     divalproex sodium extended-release (DEPAKOTE ER) 500 MG 24 hr tablet TAKE 3 TABLETS (1500MG) BY MOUTH TWICE DAILY.     docusate sodium (COLACE) 100 MG capsule Take 1 capsule by mouth 2 times daily.     FISH OIL 1000 MG OR CAPS 1 PO BID (Patient taking differently: 1 capsule 2 times per day)     lactulose (CHRONULAC) 10 GM/15ML solution Take 30 mLs by mouth every evening & additional 30mL 2 times per day as needed     levothyroxine (SYNTHROID/LEVOTHROID) 150 MCG tablet TAKE  "1 TABLET BY MOUTH ONCE DAILY  FOR THYROID   *1 TOTAL FILL*     LITHIUM CARBONATE ER PO Take 600 mg by mouth At Bedtime      MILK OF MAGNESIA 400 MG/5ML suspension 30 mLs every evening      polyethylene glycol (MIRALAX) powder Take 17 g by mouth daily.     risperiDONE (RISPERDAL) 2 MG tablet Take 2 mg by mouth 1 tab every morning & 1 tab every bedtime     sertraline (ZOLOFT) 100 MG tablet Take 1.5 tablets by mouth every morning.     topiramate (TOPAMAX) 100 MG tablet TAKE 1 TABLET BY MOUTH TWICE DAILY ALONG WITH 50MG TABLET TO = 150MG TOTAL.     topiramate (TOPAMAX) 50 MG tablet TAKE 1 TABLET BY MOUTH TWICE DAILY.     VITAMIN D3 1000 units tablet 3,000 Units daily     order for [facility] [place] sole shoe     No current facility-administered medications for this visit.         Allergies:     Allergies   Allergen Reactions     Penicillin [Penicillins] Other (See Comments)     Prozac [Fluoxetine Hcl]      Reglan [Metoclopramide Hcl]      Ritalin [Methylphenidate Derivatives] Other (See Comments)     Ritalin     Trazodone         Review of Systems:   As documented above     Physical Exam:   Vitals: BP 97/59   Pulse 67   SpO2 98%    Exam limited due to mental status.  General: Mildly agitated  HEENT: Neck supple with normal range of motion. No paracervical muscle tenderness or tightness.  Patient not cooperative for fundoscopic exam  Heart: Regular rate  Lungs: breathing comfortably  Extremities: no edema  Skin: No rashes  Neurologic:     Mental Status: Fully alert. Language is limited. Does not answer provider's questions. Follows instructions from caretaker to spell name. Frequently says \"big cup\". Very inattentive. Unable to assess orientation, memory, fund of knowledge.      Cranial Nerves: Visual fields intact to confrontation. EOMI grossly intact. Facial movements symmetric. Hearing not formally tested but intact to conversation. No dysarthria.      Motor: Normal tone, no tremors. Unable to formally test strength, but at " antigravity with resistance in all extremities.     Deep Tendon Reflexes: 2+/symmetric throughout upper and lower extremities.      Sensory: Responds to light touch stimulation in all extremities.     Coordination: Does not follow directions for testing     Gait: Normal, steady casual gait. Walks briefly on toes and heels as instructed.          Data: Pertinent prior to visit   Imaging:  CT brain 2/22/2019    IMPRESSION:  1. No acute abnormality.  2. 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  Personally reviewed and agree with above impression    Procedures:  Routine EEG 12/6/2011  CONCLUSION:  Abnormal electroencephalogram due to isolated spike  and slow wave seen once over a T3 electrode.  Previous EEG also reportedly showed left temporal spikes    Laboratory:  , Hgb 12.6 (stable),  (stable), WBC 4.7 (9/4/2020)  Topirimate 5.3 (6/16/2020)         Assessment and Plan:   Assessment:  Duane D Backes is a 40 year old male who presents today for evaluation of seizures. Patient previously followed with Dr Villar and was last seen 6/2020. Patient has had no seizures since last visit with Dr Villar. Examination showing chronic cognitive deficits as documented above. Depakote level again elevated, but no evidence of toxicity such as ataxia, tremors, depressed mental status.  Seizures appear to be well controlled. Since 2013 Depakote levels have ranged from 117 to 172 and one reading of 338. Patient does not appear to be bothered symptomatically by high levels. There is mild but stable decrease in hemoglobin and platelets. I am going to refer to epilepsy clinic with question of whether it is appropriate to continue Depakote level at current dosing given high levels. I am concerned that decreasing dose may exacerbate  seizures or result in worsening of behavioral issues. Patient is on risperidone as well which is prescribed by primary care doctor.      Plan:  - Continue current anti-seizure drugs for now; Depakote 1500 mg BID and Topamax 150 mg BID  - Refer to epilepsy clinic for question of Depakote dosing with chronically high levels  - Patient can return to general neurology clinic after referral or continue to follow in epilepsy clinic as desired    Follow up in Neurology clinic as needed should new concerns arise.    Wilner Carter MD   of Neurology  [facility]        Again, thank you for allowing me to participate in the care of your patient.        Sincerely,        Wilner Carter MD

## 2020-09-16 NOTE — TELEPHONE ENCOUNTER
.Reason for Call: Request for an order or referral:    Order or referral being requested: Lab orders / appointment request  received via fax     Date needed: as soon as possible    Has the patient been seen by the PCP for this problem? Not Applicable    Additional comments: Incoming Fax request was  placed on  [facility]     Phone number  can be reached at:  Fernando Ashton Designated Coordinator - Can be reached at [phone]    Best Time:      Can we leave a detailed message on this number?  YES    Fax received  on 9/16/2020 at 2:20 PM by Raimundo Croft

## 2020-09-16 NOTE — TELEPHONE ENCOUNTER
Regarding the patient's increase in nocturnal enuresis: I have placed orders for blood and urine tests to screen for diabetes.  They should keep in mind I think this is a low yield test, because he had a normal glucose 3 months ago.  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  They should bring this up to his psychiatrist.      In general, since he is under age 40, has had normal exams in the past, I recommend against him coming in for a screening exam if he is not mask compliant.  I will review this request for a variance in our mask policy with my supervisors.

## 2020-09-16 NOTE — TELEPHONE ENCOUNTER
This is a Letter to Dr Parkinson from Fernando Estrada. It was placed in Dr Parkinson' bin for review.      Aidee RODRIGUEZ)(DEVONTE)

## 2020-09-16 NOTE — NURSING NOTE
Duane D Backes's goals for this visit include: consult  He requests these members of his care team be copied on today's visit information:     PCP: Rajat Parkinson    Referring Provider:  No referring provider defined for this encounter.    BP 97/59   Pulse 67   SpO2 98%     Do you need any medication refills at today's visit? n

## 2020-09-18 NOTE — TELEPHONE ENCOUNTER
This writer attempted to contact Fernando on 09/18/20      Reason for call provider message and left message.      If patient calls back:   Registered Nurse called. Follow Triage Call workflow        Alina Ross RN

## 2020-09-21 NOTE — TELEPHONE ENCOUNTER
System direction is that we do not refuse care to patients who cannot mask but rather immediately room them and conduct the visit with full PPE; please help pt schedule on ancillary schedule with clear expectations that he will be put in a room as soon as he arrives and EKG will need to be done in full PPE. He will need to exit the building immediately after EKG is done. Will update team if further directions are received between now and when pt is scheduled.     Evie Ortiz, Patient Care Supervisor

## 2020-09-21 NOTE — TELEPHONE ENCOUNTER
[facility] staff  scheduled EKG on 10/29/20. Writer put in notes regarding immediate rooming and full PPE usage.  Updated staff with information as well since pt not mask compliant.      JATINDER Mcknight.

## 2020-09-21 NOTE — TELEPHONE ENCOUNTER
Shelby Bowman, RN  [facility] 14 minutes ago (11:32 AM)       I have asked Evie to document in chart if ok for patient to have ancillary visit or not if patient is not going to be wearing a mask. Once we have direction.      Waiting to hear if pt can be put on ancillary schedule for EKG.

## 2020-09-21 NOTE — TELEPHONE ENCOUNTER
"I believe he can schedule EKG on ancillary schedule if they are willing to accept him without a mask.   (I am surprised this is called \"annual\" EKG, as he never had an EKG here before)    I still recommend against the physical.  "

## 2020-09-21 NOTE — TELEPHONE ENCOUNTER
Raul is requesting options for the patient to have his exam as well as an annual EKG per psychiatrist  Request.     Patient is having an increase in urination , agitation and aggression. Getting up and throwing objects wanting to get up and go on van rides, not waiting for staff, he rushes out the door.     Patient is home more, not going to work and is around people in the home more than normal so is believed to be part of the heightened behavior.     Brain is aware that lab orders have been placed and will call to schedule this apt.     Routing to provider to please advise on physical and EKG.     Kira Prakash RN  [facility]

## 2020-09-21 NOTE — TELEPHONE ENCOUNTER
Message sent to ancillary staff supervisor. Awaiting for direction regarding: ancillary visit without mask. Staff will need to be updated with Dr. Parkinson' message below and assist with scheduling per supervisor response.    Shelby Bowman RN  [facility]

## 2020-09-23 DIAGNOSIS — Z13.1 SCREENING FOR DIABETES MELLITUS: ICD-10-CM

## 2020-09-23 DIAGNOSIS — Z86.39 PERSONAL HISTORY OF OTHER ENDOCRINE, NUTRITIONAL AND METABOLIC DISEASE: ICD-10-CM

## 2020-09-23 DIAGNOSIS — N39.44 NOCTURNAL ENURESIS: ICD-10-CM

## 2020-09-23 LAB
ALBUMIN UR-MCNC: NEGATIVE MG/DL
APPEARANCE UR: CLEAR
BILIRUB UR QL STRIP: NEGATIVE
COLOR UR AUTO: YELLOW
GLUCOSE SERPL-MCNC: 81 MG/DL (ref 70–99)
GLUCOSE UR STRIP-MCNC: NEGATIVE MG/DL
HBA1C MFR BLD: 4.7 % (ref 0–5.6)
HGB UR QL STRIP: NEGATIVE
KETONES UR STRIP-MCNC: ABNORMAL MG/DL
LEUKOCYTE ESTERASE UR QL STRIP: NEGATIVE
NITRATE UR QL: NEGATIVE
PH UR STRIP: 7.5 PH (ref 5–7)
SOURCE: ABNORMAL
SP GR UR STRIP: 1.02 (ref 1–1.03)
UROBILINOGEN UR STRIP-ACNC: 0.2 EU/DL (ref 0.2–1)

## 2020-09-23 PROCEDURE — 36415 COLL VENOUS BLD VENIPUNCTURE: CPT | Performed by: INTERNAL MEDICINE

## 2020-09-23 PROCEDURE — 81003 URINALYSIS AUTO W/O SCOPE: CPT | Performed by: INTERNAL MEDICINE

## 2020-09-23 PROCEDURE — 82947 ASSAY GLUCOSE BLOOD QUANT: CPT | Performed by: INTERNAL MEDICINE

## 2020-09-23 PROCEDURE — 83036 HEMOGLOBIN GLYCOSYLATED A1C: CPT | Performed by: INTERNAL MEDICINE

## 2020-09-28 ENCOUNTER — HOSPITAL ENCOUNTER (OUTPATIENT)
Dept: GENERAL RADIOLOGY | Facility: CLINIC | Age: 40
Discharge: HOME | End: 2020-09-28
Attending: FAMILY MEDICINE | Admitting: FAMILY MEDICINE
Payer: MEDICARE

## 2020-09-28 DIAGNOSIS — R13.13 DYSPHAGIA, PHARYNGEAL: ICD-10-CM

## 2020-09-28 DIAGNOSIS — R13.10 DIFFICULTY IN SWALLOWING: ICD-10-CM

## 2020-09-28 PROCEDURE — 74220 X-RAY XM ESOPHAGUS 1CNTRST: CPT

## 2020-09-28 PROCEDURE — 25500045 ZZH RX 255: Performed by: FAMILY MEDICINE

## 2020-09-28 RX ADMIN — ANTACID/ANTIFLATULENT 4 G: 380; 550; 10; 10 GRANULE, EFFERVESCENT ORAL at 09:04

## 2020-10-27 ENCOUNTER — TELEPHONE (OUTPATIENT)
Dept: FAMILY MEDICINE | Facility: CLINIC | Age: 40
End: 2020-10-27

## 2020-10-27 NOTE — TELEPHONE ENCOUNTER
Reason for Call:  Request for results:    Name of test or procedure: UA reflex to Microscopic, Glucose, Hemoglobin A1c    Date of test of procedure: 09/23/20    Location of the test or procedure: [facility]    OK to leave the result message on voice mail or with a family member? YES    Phone number Group Home  can be reached at:  Other phone number:  [phone]    Additional comments: Pt's Group Home calling for Pt had a lab apt on 09/23/20 and would like a call back to discuss results.    Call taken on 10/27/2020 at 1:09 PM by Jose Ca

## 2020-10-27 NOTE — TELEPHONE ENCOUNTER
Routing to provider to advise; it appears that patient's labs need to be reviewed.     Orion Downs RN, BSN, PHN

## 2020-10-29 ENCOUNTER — ALLIED HEALTH/NURSE VISIT (OUTPATIENT)
Dept: NURSING | Facility: CLINIC | Age: 40
End: 2020-10-29
Payer: MEDICARE

## 2020-10-29 DIAGNOSIS — Z23 NEED FOR INFLUENZA VACCINATION: ICD-10-CM

## 2020-10-29 DIAGNOSIS — Z79.899 MEDICATION MANAGEMENT: Primary | ICD-10-CM

## 2020-10-29 PROCEDURE — G0008 ADMIN INFLUENZA VIRUS VAC: HCPCS

## 2020-10-29 PROCEDURE — 99207 PR NO CHARGE NURSE ONLY: CPT

## 2020-10-29 PROCEDURE — 93000 ELECTROCARDIOGRAM COMPLETE: CPT

## 2020-10-29 PROCEDURE — 90686 IIV4 VACC NO PRSV 0.5 ML IM: CPT

## 2020-10-29 NOTE — TELEPHONE ENCOUNTER
Patient was here today for EKG and flu shot, and asked about results. Informed Staff with patient that provider has not had chance to review message and result yet. Asking for print out of labs. Lab result printed and gave to staff with patient.     Sheila Jurado MA

## 2020-10-30 ENCOUNTER — VIRTUAL VISIT (OUTPATIENT)
Dept: NEUROLOGY | Facility: CLINIC | Age: 40
End: 2020-10-30
Payer: MEDICARE

## 2020-10-30 ENCOUNTER — TELEPHONE (OUTPATIENT)
Dept: NEUROLOGY | Facility: CLINIC | Age: 40
End: 2020-10-30

## 2020-10-30 DIAGNOSIS — G40.909 SEIZURE DISORDER (H): ICD-10-CM

## 2020-10-30 DIAGNOSIS — G40.309 GENERALIZED CONVULSIVE EPILEPSY (H): ICD-10-CM

## 2020-10-30 RX ORDER — DIVALPROEX SODIUM 500 MG/1
TABLET, EXTENDED RELEASE ORAL
Qty: 150 TABLET | Refills: 11 | Status: SHIPPED | OUTPATIENT
Start: 2020-10-30 | End: 2021-03-09

## 2020-10-30 RX ORDER — TOPIRAMATE 100 MG/1
TABLET, FILM COATED ORAL
Qty: 120 TABLET | Refills: 11 | Status: ON HOLD | OUTPATIENT
Start: 2020-10-30 | End: 2021-02-25

## 2020-10-30 NOTE — TELEPHONE ENCOUNTER
Increasing dose instructions are in the patient instruction section of the visit from this morning.    Will route via Right Fax to the pharmacy.    [facility] [place], [place] - [address]  Phone:  [phone]   Fax:  [phone]

## 2020-10-30 NOTE — TELEPHONE ENCOUNTER
What is the concern that needs to be addressed by a nurse? Pharmacy [facility] called. They received the RX's of  Depakote and Topamax but had not received the med schedule for them. They request those be faxed out    May a detailed message be left on voicemail    Date of last office visit:     Message routed to: Hyun yu

## 2020-10-30 NOTE — LETTER
"10/30/2020       RE: Duane D Backes  : 1980   MRN: [id number]      Dear Colleague,    Thank you for referring your patient, Duane D Backes, to the [facility] at [facility]. Please see a copy of my visit note below.    Duane D Backes is a 40 year old male who is being evaluated via a billable video visit.      The patient has been notified of following:     \"This video visit will be conducted via a call between you and your physician/provider. We have found that certain health care needs can be provided without the need for an in-person physical exam.  This service lets us provide the care you need with a video conversation.  If a prescription is necessary we can send it directly to your pharmacy.  If lab work is needed we can place an order for that and you can then stop by our lab to have the test done at a later time.    Video visits are billed at different rates depending on your insurance coverage.  Please reach out to your insurance provider with any questions.    If during the course of the call the physician/provider feels a video visit is not appropriate, you will not be charged for this service.\"    Patient has given verbal consent for Video visit? Yes  How would you like to obtain your AVS? Mail a copy  If you are dropped from the video visit, the video invite should be resent to: Send to e-mail at: [email or web address].. [phone]    Will anyone else be joining your video visit? Yes: Raul. How would they like to receive their invitation? Send to e-mail at:email listed above            Video-Visit Details    Type of service:  Video Visit    Video Start Time: 9:04 AM  Video End Time: 10:08 AM    Originating Location (pt. Location): Home    Distant Location (provider location):  [facility]     Platform used for Video Visit: Lakisha Washington MD    [facility] History and Physical       Patient:  Duane D " "Peter  :  1980   Age:  40 year old   Today's Office Visit:  10/30/2020    Referring Provider:    Wilner Carter MD  [address] [place] [id number]    History of Present Illness:  Duane D Backes is 40 year old right handed with history of cognitive disability and epilepsy referred to us by Dr. Carter for depakote recommendation. Visit is completed by Brain (lead staff person).Past seizure history if not well known to Brain. Per medical records from  he had a generalized tonic-clonic convulsion at [place], EEG showed rare left temporal epileptiform discharges. Per medical records \"While on 5D at [facility], [place], he did have 2 seizures.  One on the evening of 2006, 1 in the morning of 2006.  Each of them were described as lasting about 2 minutes with a decrease in oxygen saturations, elevated blood pressure, elevated heart rate.  Described that his head deviates to the left, his eyes roll back, and all 4 extremities shake in a rhythmic motion.  He tends to be postictal and lethargic for a few minutes afterwards.  He is also noted to grind his teeth and become diaphoretic during this time.  Due to these 2 seizures, it was decided that we would orally load him in divided doses with Dilantin.  He had previously been on Depakote for a mood stabilizer, however, he was on a dose that could be used for seizure control.  It was thought that Dilantin was a better drug for focal seizure control.  He was then started on a maintenance dose of 300 mg by mouth in the morning.  The loading dose was 300 mg by mouth every 4 hours x4 doses.  First maintenance dose given 2006 in the morning.  His goal Dilantin level is at the low end of the therapeutic range, so if the total therapeutic range is 10-20, we would like him closer to 10 if that is adequately controlling his seizures without causing significant side effects.  His last seizures was the morning of " "09/05/2006, anticipated discharge is 09/07/2006.\"    He came to current group home in 2017.  He is has been treated for epilepsy with topiramate (low levels) and depakote with high concentration for many years. His most recent depakote 9/14/2020 at 144 and  6/16/2020 at 172.  1st depakote level in 5/19/2015 was 136. He has always had persistently high depakote. In  June 2020 he had two seizure in one day. Per Brain he was sitting on couch, whole body was rigidity with eye lid twitching for 1 minute. There was no obvious shaking.  At home caregiver have not seen staring spells, no convulsions at night, no stiffening spells. He has autism, he perseverates on food, he has increased behavioral outburst with COVID restrictions. He started lithium this year 1/2020 and it has been helpful.He has lost weight in the last few years and has restricted diet. In the 3 years he has lost 40 pounds.    Seizure type 1: Generalized tonic-clonic convulsion. Last one 6/2020. Prior to this 2006.   Epilepsy Risk Factors:   Prior history of encephalitis, meningitis, traumatic brain injury, febrile seizures in childhood and birth and delivery are unknown to Raul.  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       Current Outpatient Medications   Medication Sig Dispense Refill     ammonium lactate (AMLACTIN) 12 % cream Apply topically daily as needed for dry skin       divalproex sodium extended-release (DEPAKOTE ER) 500 MG 24 hr tablet TAKE 3 TABLETS (1500MG) BY MOUTH TWICE DAILY. 180 tablet 11     docusate sodium (COLACE) 100 MG capsule Take 1 capsule by mouth 2 times daily.       FISH OIL 1000 MG OR CAPS 1 PO BID (Patient taking differently: 1 capsule 2 times per day) 60 11     lactulose (CHRONULAC) 10 GM/15ML solution Take 30 mLs by mouth every evening & additional 30mL 2 times per day as needed       levothyroxine (SYNTHROID/LEVOTHROID) 150 MCG tablet TAKE 1 TABLET BY MOUTH ONCE DAILY  FOR THYROID   *1 TOTAL FILL* " 31 tablet 9     LITHIUM CARBONATE ER PO Take 600 mg by mouth At Bedtime        MILK OF MAGNESIA 400 MG/5ML suspension 30 mLs every evening        polyethylene glycol (MIRALAX) powder Take 17 g by mouth daily. 510 g 0     risperiDONE (RISPERDAL) 2 MG tablet Take 2 mg by mouth 1 tab every morning & 1 tab every bedtime       sertraline (ZOLOFT) 100 MG tablet Take 1.5 tablets by mouth every morning.       topiramate (TOPAMAX) 100 MG tablet TAKE 1 TABLET BY MOUTH TWICE DAILY ALONG WITH 50MG TABLET TO = 150MG TOTAL. 60 tablet 11     topiramate (TOPAMAX) 50 MG tablet TAKE 1 TABLET BY MOUTH TWICE DAILY. 60 tablet 11     VITAMIN D3 1000 units tablet 3,000 Units daily       order for DME Flat sole shoe (Patient not taking: Reported on 10/30/2020) 1 Device 0     Perceived AED Side Effects: No  Medication Notes:   AED Medication Compliance:  compliant most of the time  Using a pill box:  Yes  Past AEDs:   Topiramate 150 mg bid   Depakote 1500 mg twice a day   Past medical history:   Autism, cognitive delay (he is verbal, but, limited), no other past medical history noted by Brain    Past Medical History:   Diagnosis Date     Autistic disorder, current or active state      Dry skin      Moderate intellectual disabilities      Obsessive-compulsive disorders      Overweight (BMI 25.0-29.9) 2013     Seizure disorder (H)     Generalized tonic clonic     Unspecified constipation       Past Surgical History:   Procedure Laterality Date     CATARACT IOL, RT/LT Left 2018     Family History   Problem Relation Age of Onset     Unknown/Adopted No family hx of       Psycho-Social History: he moved to current group home . He grew up with parents, after high school he was institutionalized (may be due to behavioral) and then transferred to group home . No drinking, no drugs, and no smoking.     Previous Evaluations for Epilepsy:   EE: Left temporal epileptiform discharges   MRI brain 2009: Large multilobulated mass  lesion centered in the right   cerebellopontine angle cistern with associated mass-effect, most   compatible with epidermoid tumor, not significantly changed in size   from the previous study.     Review of Systems:  Lethargy / Tiredness:  No  Nausea / Vomiting:  No  Double Vision:  No  Sleepiness:  No  Depression:  No  Slowed Cognitive Function:  yes  Memory Problems:  No  Poor Balance:  No  Dizziness:  No  Appetite Changes:  Increased   Blurred Vision:  No  Sleep Changes:  No  Behavioral Changes:  No  Skin: negative  Respiratory: No shortness of breath, dyspnea on exertion, cough, or hemoptysis  Cardiovascular: negative  Have you experienced a traumatic fall related to your events: No  Are these falls related to your seizures: No      Exam:    There were no vitals taken for this visit.     Wt Readings from Last 5 Encounters:   12/23/19 198 lb (89.8 kg)   12/16/19 198 lb 6.4 oz (90 kg)   08/27/19 202 lb 6.4 oz (91.8 kg)   07/10/19 208 lb (94.3 kg)   03/19/19 215 lb (97.5 kg)       Limited exam completed over video. Alert, able to follow simple commands, speaks in one-word sentences, extraocular movements are intact, face appears symmetric, he is able to lift upper extremities overhead.  He is able to walk around.      Impression:    Epilepsy (possible focal epilepsy)  History of Autism   History of right temporal epidermoid tumor     Discussion:  40-year-old male with a history of autism, epilepsy, and right temporal lobe epilepsy epidermoid tumor.  Prior EEGs have shown rare left temporal sharp waves (opposite side of tumor).  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  Patient has been managed on Depakote and topiramate for several years with no seizures.  In June 2020 he had 2 possible generalized tonic-clonic seizures in the setting of supratherapeutic divalproex levels.  His most recent divalproex level in September  "2020 was 144 and prior to that 172.  In the last 3 years he has lost nearly 40 pounds and there has been a gradual increase in his divalproex level.  At this time I recommend we increase topiramate as his last level was only 5.3 (6/16/2020) and then decrease divalproex as noted below.  Certainly if he has seizure deterioration we may introduce a sodium channel blocker, in 2006 Dilantin was very efficacious for him.  If a sodium channel blockers considered I recommend we use oxcarbazepine or Vimpat.  Phenytoin will have interactions with divalproex.  Certainly when we make medication changes he is at risk of breakthrough seizures, behavioral changes, and appetite changes.  Additionally side effects of kidney stones, appetite changes, and mood changes with increasing Topamax have been reviewed.  I reviewed this with Raul and he is agreeable to making these changes.  We will follow up with them in 4 weeks.    Plan :   We will increased topiramate and reduce depakote.   Week 1: topiramate 150 mg am and 200 mg pm and continue depakote 1500 mg twice a day   Week 2: topiramate 200 mg am and 200 mg pm and continue depakote 1500 mg twice a day   Week 3: topiramate 200 mg am and 200 mg pm and decrease depakote 1000 mg am and 1500 mg Pm   Week 4-onward:  topiramate 200 mg am and 200 mg pm and depakote 1000 mg twice a day   Week 5: check seizure medications labs   Week 8: follow up  With Felix     If he has breakthrough seizure we can consider a Na+ blocking agent (phenytoin  Worked for him in the past). We may consider oxcarbazepine or vimpat.      During this time medical history data collection, counseling, and coordination of care exceeded 50% of the visit time. I addressed all questions the patient/caregiver raised in regards to the patient's medical care. This note was created with voice recognition software. Inadvertent grammatical errors, typographical errors, and \"sound a like\" substitutions may occur due to " limitations of the software.  Read the note carefully and apply context when erroneous substitutions have occurred. Thank you.     Lauren Washington MD   Epilepsy Staff

## 2020-10-30 NOTE — PATIENT INSTRUCTIONS
We will increased topiramate and reduce depakote.   Week 1: topiramate 150 mg am and 200 mg pm and continue depakote 1500 mg twice a day   Week 2: topiramate 200 mg am and 200 mg pm and continue depakote 1500 mg twice a day   Week 3: topiramate 200 mg am and 200 mg pm and decrease depakote 1000 mg am and 1500 mg Pm   Week 4-onward:  topiramate 200 mg am and 200 mg pm and depakote 1000 mg twice a day   Week 5: check seizure medications labs   Week 8: follow up  With Felix TURCIOS if he has a seizure at [phone]    Lauren Washington MD

## 2020-10-30 NOTE — PROGRESS NOTES
"Duane D Backes is a 40 year old male who is being evaluated via a billable video visit.      The patient has been notified of following:     \"This video visit will be conducted via a call between you and your physician/provider. We have found that certain health care needs can be provided without the need for an in-person physical exam.  This service lets us provide the care you need with a video conversation.  If a prescription is necessary we can send it directly to your pharmacy.  If lab work is needed we can place an order for that and you can then stop by our lab to have the test done at a later time.    Video visits are billed at different rates depending on your insurance coverage.  Please reach out to your insurance provider with any questions.    If during the course of the call the physician/provider feels a video visit is not appropriate, you will not be charged for this service.\"    Patient has given verbal consent for Video visit? Yes  How would you like to obtain your AVS? Mail a copy  If you are dropped from the video visit, the video invite should be resent to: Send to e-mail at: [email or web address]. [phone]    Will anyone else be joining your video visit? Yes: Raul. How would they like to receive their invitation? Send to e-mail at:email listed above            Video-Visit Details    Type of service:  Video Visit    Video Start Time: 9:04 AM  Video End Time: 10:08 AM    Originating Location (pt. Location): Home    Distant Location (provider location):  [facility] EPILEPSY CARE     Platform used for Video Visit: Lakisha Washington MD    [facility] Epilepsy [facility] History and Physical       Patient:  Duane D Backes  :  1980   Age:  40 year old   Today's Office Visit:  10/30/2020    Referring Provider:    Wilner Carter MD  [address] [place] [id number]    History of Present Illness:  Duane D Backes is 40 year old right handed with history of cognitive disability and " "epilepsy referred to us by Dr. Carter for depakote recommendation. Visit is completed by Brain (lead staff person).Past seizure history if not well known to Brain. Per medical records from 2006 he had a generalized tonic-clonic convulsion at [place], EEG showed rare left temporal epileptiform discharges. Per medical records \"While on 5D at [facility], [place], he did have 2 seizures.  One on the evening of 09/04/2006, 1 in the morning of 09/05/2006.  Each of them were described as lasting about 2 minutes with a decrease in oxygen saturations, elevated blood pressure, elevated heart rate.  Described that his head deviates to the left, his eyes roll back, and all 4 extremities shake in a rhythmic motion.  He tends to be postictal and lethargic for a few minutes afterwards.  He is also noted to grind his teeth and become diaphoretic during this time.  Due to these 2 seizures, it was decided that we would orally load him in divided doses with Dilantin.  He had previously been on Depakote for a mood stabilizer, however, he was on a dose that could be used for seizure control.  It was thought that Dilantin was a better drug for focal seizure control.  He was then started on a maintenance dose of 300 mg by mouth in the morning.  The loading dose was 300 mg by mouth every 4 hours x4 doses.  First maintenance dose given 09/06/2006 in the morning.  His goal Dilantin level is at the low end of the therapeutic range, so if the total therapeutic range is 10-20, we would like him closer to 10 if that is adequately controlling his seizures without causing significant side effects.  His last seizures was the morning of 09/05/2006, anticipated discharge is 09/07/2006.\"    He came to current group home in 2017.  He is has been treated for epilepsy with topiramate (low levels) and depakote with high concentration for many years. His most recent depakote 9/14/2020 at 144 and  6/16/2020 at 172.  1st " depakote level in 5/19/2015 was 136. He has always had persistently high depakote. In  June 2020 he had two seizure in one day. Per Brain he was sitting on couch, whole body was rigidity with eye lid twitching for 1 minute. There was no obvious shaking.  At home caregiver have not seen staring spells, no convulsions at night, no stiffening spells. He has autism, he perseverates on food, he has increased behavioral outburst with COVID restrictions. He started lithium this year 1/2020 and it has been helpful.He has lost weight in the last few years and has restricted diet. In the 3 years he has lost 40 pounds.    Seizure type 1: Generalized tonic-clonic convulsion. Last one 6/2020. Prior to this 2006.   Epilepsy Risk Factors:   Prior history of encephalitis, meningitis, traumatic brain injury, febrile seizures in childhood and birth and delivery are unknown to Raul.  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       Current Outpatient Medications   Medication Sig Dispense Refill     ammonium lactate (AMLACTIN) 12 % cream Apply topically daily as needed for dry skin       divalproex sodium extended-release (DEPAKOTE ER) 500 MG 24 hr tablet TAKE 3 TABLETS (1500MG) BY MOUTH TWICE DAILY. 180 tablet 11     docusate sodium (COLACE) 100 MG capsule Take 1 capsule by mouth 2 times daily.       FISH OIL 1000 MG OR CAPS 1 PO BID (Patient taking differently: 1 capsule 2 times per day) 60 11     lactulose (CHRONULAC) 10 GM/15ML solution Take 30 mLs by mouth every evening & additional 30mL 2 times per day as needed       levothyroxine (SYNTHROID/LEVOTHROID) 150 MCG tablet TAKE 1 TABLET BY MOUTH ONCE DAILY  FOR THYROID   *1 TOTAL FILL* 31 tablet 9     LITHIUM CARBONATE ER PO Take 600 mg by mouth At Bedtime        MILK OF MAGNESIA 400 MG/5ML suspension 30 mLs every evening        polyethylene glycol (MIRALAX) powder Take 17 g by mouth daily. 510 g 0     risperiDONE (RISPERDAL) 2 MG tablet Take 2 mg by mouth 1  tab every morning & 1 tab every bedtime       sertraline (ZOLOFT) 100 MG tablet Take 1.5 tablets by mouth every morning.       topiramate (TOPAMAX) 100 MG tablet TAKE 1 TABLET BY MOUTH TWICE DAILY ALONG WITH 50MG TABLET TO = 150MG TOTAL. 60 tablet 11     topiramate (TOPAMAX) 50 MG tablet TAKE 1 TABLET BY MOUTH TWICE DAILY. 60 tablet 11     VITAMIN D3 1000 units tablet 3,000 Units daily       order for DME Flat sole shoe (Patient not taking: Reported on 10/30/2020) 1 Device 0     Perceived AED Side Effects: No  Medication Notes:   AED Medication Compliance:  compliant most of the time  Using a pill box:  Yes  Past AEDs:   Topiramate 150 mg bid   Depakote 1500 mg twice a day   Past medical history:   Autism, cognitive delay (he is verbal, but, limited), no other past medical history noted by Brain    Past Medical History:   Diagnosis Date     Autistic disorder, current or active state      Dry skin      Moderate intellectual disabilities      Obsessive-compulsive disorders      Overweight (BMI 25.0-29.9) 2013     Seizure disorder (H)     Generalized tonic clonic     Unspecified constipation       Past Surgical History:   Procedure Laterality Date     CATARACT IOL, RT/LT Left 2018     Family History   Problem Relation Age of Onset     Unknown/Adopted No family hx of       Psycho-Social History: he moved to current group home . He grew up with parents, after high school he was institutionalized (may be due to behavioral) and then transferred to group home . No drinking, no drugs, and no smoking.     Previous Evaluations for Epilepsy:   EE: Left temporal epileptiform discharges   MRI brain 2009: Large multilobulated mass lesion centered in the right   cerebellopontine angle cistern with associated mass-effect, most   compatible with epidermoid tumor, not significantly changed in size   from the previous study.     Review of Systems:  Lethargy / Tiredness:  No  Nausea / Vomiting:  No  Double  Vision:  No  Sleepiness:  No  Depression:  No  Slowed Cognitive Function:  yes  Memory Problems:  No  Poor Balance:  No  Dizziness:  No  Appetite Changes:  Increased   Blurred Vision:  No  Sleep Changes:  No  Behavioral Changes:  No  Skin: negative  Respiratory: No shortness of breath, dyspnea on exertion, cough, or hemoptysis  Cardiovascular: negative  Have you experienced a traumatic fall related to your events: No  Are these falls related to your seizures: No      Exam:    There were no vitals taken for this visit.     Wt Readings from Last 5 Encounters:   12/23/19 198 lb (89.8 kg)   12/16/19 198 lb 6.4 oz (90 kg)   08/27/19 202 lb 6.4 oz (91.8 kg)   07/10/19 208 lb (94.3 kg)   03/19/19 215 lb (97.5 kg)       Limited exam completed over video. Alert, able to follow simple commands, speaks in one-word sentences, extraocular movements are intact, face appears symmetric, he is able to lift upper extremities overhead.  He is able to walk around.      Impression:    Epilepsy (possible focal epilepsy)  History of Autism   History of right temporal epidermoid tumor     Discussion:  40-year-old male with a history of autism, epilepsy, and right temporal lobe epilepsy epidermoid tumor.  Prior EEGs have shown rare left temporal sharp waves (opposite side of tumor).  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  Patient has been managed on Depakote and topiramate for several years with no seizures.  In June 2020 he had 2 possible generalized tonic-clonic seizures in the setting of supratherapeutic divalproex levels.  His most recent divalproex level in September 2020 was 144 and prior to that 172.  In the last 3 years he has lost nearly 40 pounds and there has been a gradual increase in his divalproex level.  At this time I recommend we increase topiramate as his last level was only 5.3 (6/16/2020) and then decrease divalproex as  "noted below.  Certainly if he has seizure deterioration we may introduce a sodium channel blocker, in 2006 Dilantin was very efficacious for him.  If a sodium channel blockers considered I recommend we use oxcarbazepine or Vimpat.  Phenytoin will have interactions with divalproex.  Certainly when we make medication changes he is at risk of breakthrough seizures, behavioral changes, and appetite changes.  Additionally side effects of kidney stones, appetite changes, and mood changes with increasing Topamax have been reviewed.  I reviewed this with Raul and he is agreeable to making these changes.  We will follow up with them in 4 weeks.    Plan :   We will increased topiramate and reduce depakote.   Week 1: topiramate 150 mg am and 200 mg pm and continue depakote 1500 mg twice a day   Week 2: topiramate 200 mg am and 200 mg pm and continue depakote 1500 mg twice a day   Week 3: topiramate 200 mg am and 200 mg pm and decrease depakote 1000 mg am and 1500 mg Pm   Week 4-onward:  topiramate 200 mg am and 200 mg pm and depakote 1000 mg twice a day   Week 5: check seizure medications labs   Week 8: follow up  With Felix     If he has breakthrough seizure we can consider a Na+ blocking agent (phenytoin  Worked for him in the past). We may consider oxcarbazepine or vimpat.      During this time medical history data collection, counseling, and coordination of care exceeded 50% of the visit time. I addressed all questions the patient/caregiver raised in regards to the patient's medical care. This note was created with voice recognition software. Inadvertent grammatical errors, typographical errors, and \"sound a like\" substitutions may occur due to limitations of the software.  Read the note carefully and apply context when erroneous substitutions have occurred. Thank you.     Lauren Washington MD   Epilepsy Staff           "

## 2020-11-02 NOTE — TELEPHONE ENCOUNTER
Neurology visit referral form prepared in alignment with the note by Dr. Washington on 10/30/2020.

## 2020-11-03 ENCOUNTER — TRANSFERRED RECORDS (OUTPATIENT)
Dept: HEALTH INFORMATION MANAGEMENT | Facility: CLINIC | Age: 40
End: 2020-11-03

## 2020-11-11 ENCOUNTER — HOSPITAL ENCOUNTER (OUTPATIENT)
Dept: SPEECH THERAPY | Facility: CLINIC | Age: 40
Setting detail: THERAPIES SERIES
End: 2020-11-11
Attending: FAMILY MEDICINE
Payer: MEDICARE

## 2020-11-11 ENCOUNTER — HOSPITAL ENCOUNTER (OUTPATIENT)
Dept: GENERAL RADIOLOGY | Facility: CLINIC | Age: 40
Discharge: HOME | End: 2020-11-11
Attending: FAMILY MEDICINE | Admitting: FAMILY MEDICINE
Payer: MEDICARE

## 2020-11-11 DIAGNOSIS — R13.10 DIFFICULTY IN SWALLOWING: ICD-10-CM

## 2020-11-11 DIAGNOSIS — R13.13 DYSPHAGIA, PHARYNGEAL: ICD-10-CM

## 2020-11-11 PROCEDURE — 92526 ORAL FUNCTION THERAPY: CPT | Mod: GN | Performed by: SPEECH-LANGUAGE PATHOLOGIST

## 2020-11-11 PROCEDURE — 74230 X-RAY XM SWLNG FUNCJ C+: CPT

## 2020-11-11 PROCEDURE — 92611 MOTION FLUOROSCOPY/SWALLOW: CPT | Mod: GN | Performed by: SPEECH-LANGUAGE PATHOLOGIST

## 2020-11-11 RX ORDER — BARIUM SULFATE 400 MG/ML
5 SUSPENSION ORAL ONCE
Status: COMPLETED | OUTPATIENT
Start: 2020-11-11 | End: 2020-11-11

## 2020-11-11 RX ADMIN — BARIUM SULFATE 30 ML: 400 SUSPENSION ORAL at 11:45

## 2020-11-11 RX ADMIN — BARIUM SULFATE 20 ML: 400 SUSPENSION ORAL at 11:34

## 2020-11-11 NOTE — PROGRESS NOTES
[facility]          OUTPATIENT SWALLOW  EVALUATION  PLAN OF TREATMENT FOR OUTPATIENT REHABILITATION  (COMPLETE FOR INITIAL CLAIMS ONLY)  Patient's Last Name, First Name, M.I.  YOB: 1980  Backes,Duane D     Provider's Name   [facility]   Medical Record No.  [id number]     Start of Care Date:  11/11/20   Onset Date:  08/19/20   Type:     ___PT   ____OT  ___X_SLP Medical Diagnosis:  Dysphagia     Treatment Diagnosis:  Moderately-severe oropharyngeal dysphagia, cervical esophageal stasis Visits from SOC:  1     _________________________________________________________________________________  Plan of Treatment/Functional Goals:  Planned Therapy Interventions: Dysphagia Treatment                        Goals   1. Goal Identifier: Strategies       Goal Description: 1. Patient will improve safety of oral intake by having caregivers verbalize comprehension of small sips,  thickening liquids at home/diet modifications, and maintaining upright positioning 1 hour after intake.        Target Date: 02/09/21           2. Goal Identifier: Exercises       Goal Description: 2. Patient will improve timing and strength of swallow with trial of exercise program with effortful swallow exercise training to patient and caregiver 10 reps, 3x/daily.        Target Date: 02/09/21           3.                             4.                             5.                            6.                              7.                              8.                                 Predicted Duration of Therapy Intervention (days/wks): 2x/mon x 3 mos    WILMER Meade       I CERTIFY THE NEED FOR THESE SERVICES FURNISHED UNDER        THIS PLAN OF TREATMENT AND WHILE UNDER MY CARE     (Physician co-signature of this document indicates review and certification of the therapy plan).                  Certification date  from: 11/11/20 Certification date to: 02/09/21          Referring Physician: Dr. Rajat Parkinson    Initial Assessment        See Epic Evaluation Start Of Care Date: 11/11/20

## 2020-11-11 NOTE — PROGRESS NOTES
VIDEO SWALLOW STUDY       11/11/20 1200   General Information   Type Of Visit Initial   Start Of Care Date 11/11/20   Referring Physician Dr. Rajat Parkinson   Orders Evaluate And Treat   Medical Diagnosis Dysphagia   Onset Of Illness/injury Or Date Of Surgery 08/19/20   Precautions/limitations No Known Precautions/limitations   Hearing WFL   Pertinent History of Current Problem/OT: Additional Occupational Profile Info Pt is a 40 y.o. with PMH significant for seizure dx and ASD. Pt was referred for a clinical swallow evaluation d/t concerns with coughing on liquids, reported to occur several times per week. Pt present today with his caregiver, Raul, who reports using a cup and straw since last evaluation and provision of verbal cues for Pt to reduce rate of intake. Pt will grab open faced cups and quickly drink all contents if left unattended in the group home environment with profuse coughing following. No reported concerns with eating and textures or consistencies.  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    Respiratory Status Room air   Prior Level Of Function Swallowing   Prior Level Of Function Comment Eating a soft solid diet with thin liquids.    Patient Role/employment History Disabled   Living Environment Group Home   General Observations Patient pleasant, cooperative with verbal redirection.    Patient/family Goals Patient is interested in eating.    Fall Risk Screen   Have you fallen 2 or more times in the past year? No   Have you fallen and had an injury in the past year? No   Is patient a fall risk? No   Clinical Swallow Evaluation   Oral Musculature unable to assess due to poor participation/comprehension   Additional evaluation(s) completed today Yes   Rationale for completing additional evaluation Aspiration visualized during esophagram, overt Sx of aspiration noted during clinical swallow evaluation and need for further assessment.    VFSS  Evaluation   VFSS Additional Documentation Yes   VFSS Eval: Thin Liquid Texture Trial   Mode of Presentation, Thin Liquid cup;self-fed   Preparatory Phase Poor bolus control   Oral Phase, Thin Liquid Premature pharyngeal entry   Pharyngeal Phase, Thin Liquid Delayed swallow reflex;Residue in valleculae   Rosenbek's Penetration Aspiration Scale: Thin Liquid Trial Results 8 - contrast passes glottis, visible subglottic residue remains, absent patient response (aspiration)   Response to Aspiration unproductive reflexive involuntary cough/throat clear   Diagnostic Statement Delayed unproductive response to aspiration   VFSS Eval: Nectar Thick Liquid Texture Trial   Mode of Presentation, Nectar cup;straw;self-fed   Preparatory Phase WFL   Oral Phase, Nectar Premature pharyngeal entry   Pharyngeal Phase, Nectar Delayed swallow reflex;Residue in valleculae   Rosenbek's Penetration Aspiration Scale: Nectar-Thick Liquid Trial Results 7 - contrast passes glottis, visible subglottic residue remains despite patient's response (aspiration)   Response to Aspiration, Nectar absent response, silent aspiration   Diagnostic Statement Residue remaining in trachea without cough response   VFSS Eval: Honey Thick Liquid Texture Trial   Mode of Presentation, Honey self-fed;cup   Oral Phase, Honey Poor AP movement   Pharyngeal Phase, Honey Delayed swallow reflex;Residue in valleculae   Rosenbek's Penetration Aspiration Scale: Honey Trial Results 6 - contrast passes glottis, no subglottic residue remains (aspiration)   Response to Aspiration unproductive reflexive involuntary cough/throat clear   Diagnostic Statement Trace aspiration   VFSS Eval: Puree Solid Texture Trial   Mode of Presentation, Puree spoon;fed by clinician   Oral Phase, Puree Premature pharyngeal entry   Pharyngeal Phase, Puree Residue in valleculae;Delayed swallow reflex   Rosenbek's Penetration Aspiration Scale: Puree Food Trial Results 1 - no aspiration, contrast does  not enter airway   Diagnostic Statement Significant cervical esophageal stasis   VFSS Eval: Semisolid Texture Trial   Mode of Presentation, Semisolid self-fed   Preparatory Phase WFL   Oral Phase, Semisolid Premature pharyngeal entry   Pharyngeal Phase, Semisolid Residue in valleculae;UES dysfunction   Diagnostic Statement Significant cervical esophageal stasis   Esophageal Phase of Swallow   Patient reports or presents with symptoms of esophageal dysphagia Yes   Esophageal comments Esophagram was limited to liquid swallow on 9/28/20 due to johnnie aspiration during study   General Therapy Interventions   Planned Therapy Interventions Dysphagia Treatment   Swallow Eval: Clinical Impressions   Skilled Criteria for Therapy Intervention Skilled criteria met.  Treatment indicated.   Functional Assessment Scale (FAS) 2   Dysphagia Outcome Severity Scale (JACQUELINE) Level 2 - JACQUELINE   Treatment Diagnosis Moderately-severe oropharyngeal dysphagia, cervical esophageal stasis   Diet texture recommendations Dysphagia diet level 2;Honey thick liquids   Recommended Feeding/Eating Techniques maintain upright posture during/after eating for 30 mins;small sips/bites  (Single sips in cup vs. controlled amount via straw)   Rehab Potential fair, will monitor progress closely   Demonstrates Need for Referral to Another Service other (see comments)  (Neurologist)   Predicted Duration of Therapy Intervention (days/wks) 2x/mon x 3 mos   Anticipated Discharge Disposition home w/ outpatient services   Risks and Benefits of Treatment have been explained. Yes   Patient, family and/or staff in agreement with Plan of Care Yes   Clinical Impression Comments Patient presents with moderately-severe oropharyngeal dysphagia with aspiration observed across thin, nectar, and honey thick liquid consistencies.  His swallow response is delayed with premature pharyngeal entry of liquids.  There was a delayed cough response following some aspiration events, but  patient unable to complete a volitional cough or throat clear to expel aspirated material.  He cleared puree and semi-solid consistencies through the pharynx, but with moderately-severe esophageal stasis noted throughout study, suggestive of esophageal dysmotility.  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  Recommend consideration of alternative means of nutrition and hydration to replace oral intake of liquids, as patient is at very high aspiration pneumonia risk.     Swallow Goals   SLP Swallow Goals 1;2   Swallow Goal 1   Goal Identifier Strategies   Goal Description 1. Patient will improve safety of oral intake by having caregivers verbalize comprehension of small sips,  thickening liquids at home/diet modifications, and maintaining upright positioning 1 hour after intake.    Target Date 02/09/21   Swallow Goal 2   Goal Identifier Exercises   Goal Description 2. Patient will improve timing and strength of swallow with trial of exercise program with effortful swallow exercise training to patient and caregiver 10 reps, 3x/daily.    Target Date 02/09/21   Total Session Time   OT Video/Fluoro Swallow Eval Minutes (52946) 45   Total Evaluation Time 45   Therapy Certification   Certification date from 11/11/20   Certification date to 02/09/21   Medical Diagnosis Dysphagia

## 2020-11-25 ENCOUNTER — VIRTUAL VISIT (OUTPATIENT)
Dept: FAMILY MEDICINE | Facility: CLINIC | Age: 40
End: 2020-11-25
Payer: MEDICARE

## 2020-11-25 DIAGNOSIS — R53.83 FATIGUE, UNSPECIFIED TYPE: Primary | ICD-10-CM

## 2020-11-25 PROCEDURE — 99441 PR PHYSICIAN TELEPHONE EVALUATION 5-10 MIN: CPT | Mod: 95 | Performed by: FAMILY MEDICINE

## 2020-11-25 NOTE — PATIENT INSTRUCTIONS
At [facility], we strive to deliver an exceptional experience to you, every time we see you. If you receive a survey, please complete it as we do value your feedback.  If you have MyChart, you can expect to receive results automatically within 24 hours of their completion.  Your provider will send a note interpreting your results as well.   If you do not have MyChart, you should receive your results in about a week by mail.    Your care team:                            Family Medicine Internal Medicine   MD Scooter Gao MD Shantel Branch-Fleming, MD Srinivasa Vaka, MD Katya Georgiev PA-C Megan Hill, APRN CNP    Lee Delgado, MD Pediatrics   Jarrell Tinajero, PAEstrellaC  Rylie Joseph, CNP MD Shirin Fraser APRN CNP   MD Estelle Marvin MD Deborah Mielke, MD Alejandra Stephens, APRN CNP  Berna Summers, PAEstrellaC  Nori Greer, CNP  MD Liane Kim MD Angela Wermerskirchen, MD      Clinic hours: Monday - Thursday 7 am-7 pm; Fridays 7 am-5 pm.   Urgent care: Monday - Friday 11 am-9 pm; Saturday and Sunday 9 am-5 pm.    Clinic: [phone]       [facility]: Monday - Thursday 8 am - 7 pm; Friday 8 am - 6 pm  [facility]: [phone]     Use [email or web address] for 24/7 diagnosis and treatment of dozens of conditions.

## 2020-11-25 NOTE — PROGRESS NOTES
"Duane D Backes is a 40 year old male who is being evaluated via a billable telephone visit.      The patient has been notified of following:     \"This telephone visit will be conducted via a call between you and your physician/provider. We have found that certain health care needs can be provided without the need for a physical exam.  This service lets us provide the care you need with a short phone conversation.  If a prescription is necessary we can send it directly to your pharmacy.  If lab work is needed we can place an order for that and you can then stop by our lab to have the test done at a later time.    Telephone visits are billed at different rates depending on your insurance coverage. During this emergency period, for some insurers they may be billed the same as an in-person visit.  Please reach out to your insurance provider with any questions.    If during the course of the call the physician/provider feels a telephone visit is not appropriate, you will not be charged for this service.\"    Patient has given verbal consent for Telephone visit?  Yes    What phone number would you like to be contacted at? See appt notes    How would you like to obtain your AVS? Mail a copy    Subjective     Duane D Backes is a 40 year old male who presents via phone visit today for the following health issues:    HPI     History per Maria G the nurse at the group home.     Fatigue over the last day or so.  Lives in a group home and no specific exposure but concern and would like to be tested. Guardian agrees. Denies fever, cough or other symptoms but patient is nonverbal. Patient some times acts more fatigued when ill.        Review of Systems   Constitutional, HEENT, cardiovascular, pulmonary, gi and gu systems are negative, except as otherwise noted.       Objective          Vitals:  No vitals were obtained today due to virtual visit.    Exam no available for this nonverbal patient.        " "    Assessment/Plan:    Assessment & Plan     Fatigue, unspecified type  COVID testing since in group home and having ill symptoms.  - Symptomatic COVID-19 Virus (Coronavirus) by PCR; Future     BMI:   Estimated body mass index is 28.41 kg/m  as calculated from the following:    Height as of 12/23/19: 1.778 m (5' 10\").    Weight as of 12/23/19: 89.8 kg (198 lb).            Return in about 3 days (around 11/28/2020), or if symptoms worsen or fail to improve.    Denise Matias MD  [facility]    Phone call duration:  6 minutes                "

## 2020-12-11 ENCOUNTER — TELEPHONE (OUTPATIENT)
Dept: NEUROLOGY | Facility: CLINIC | Age: 40
End: 2020-12-11

## 2020-12-11 ENCOUNTER — VIRTUAL VISIT (OUTPATIENT)
Dept: NEUROLOGY | Facility: CLINIC | Age: 40
End: 2020-12-11
Payer: MEDICARE

## 2020-12-11 DIAGNOSIS — G40.909 SEIZURE DISORDER (H): Primary | ICD-10-CM

## 2020-12-11 NOTE — PATIENT INSTRUCTIONS
Continue  topiramate 200 mg am and 200 mg pm and depakote 1000 mg twice a day   Check seizure medications labs when able (he is COVID positive 11/27/2020)  Follow up  With Felix 2 months     Lauren Washington MD

## 2020-12-11 NOTE — LETTER
"2020       RE: Duane D Backes  : 1980   MRN: [id number]      Dear Colleague,    Thank you for referring your patient, Duane D Backes, to the [facility] EPILEPSY [facility] at [facility]. Please see a copy of my visit note below.    Duane D Backes is a 40 year old male who is being evaluated via a billable video visit.      The patient has been notified of following:     \"This video visit will be conducted via a call between you and your physician/provider. We have found that certain health care needs can be provided without the need for an in-person physical exam.  This service lets us provide the care you need with a video conversation.  If a prescription is necessary we can send it directly to your pharmacy.  If lab work is needed we can place an order for that and you can then stop by our lab to have the test done at a later time.    Video visits are billed at different rates depending on your insurance coverage.  Please reach out to your insurance provider with any questions.    If during the course of the call the physician/provider feels a video visit is not appropriate, you will not be charged for this service.\"        Patient has given verbal consent for Video visit? Yes  How would you like to obtain your AVS? Mail a copy  If you are dropped from the video visit, the video invite should be resent to: Send to e-mail at: [email or web address] [phone]    Will anyone else be joining your video visit? Yes: Brain. How would they like to receive their invitation? Text to cell phone: cell        Video-Visit Details    Type of service:  Video Visit    Video Start Time: 2:20 PM  Video End Time: 2:32 PM    Originating Location (pt. Location): Home    Distant Location (provider location):  [facility] EPILEPSY CARE     Platform used for Video Visit: Lexus Washington MD      [facility] Epilepsy Care History and Physical       Patient:  Duane D Backes  :  " 1980   Age:  40 year old   Today's Office Visit:  12/11/2020    Referring Provider:    Wilner Carter MD  [address] [place] [address]    History of Present Illness:  Duane D Backes is 40 year old right handed with history of cognitive disability and epilepsy referred to us by Dr. Carter for high depakote levels. Per medical records from 2006 he had a generalized tonic-clonic convulsion at [place], EEG showed rare left temporal epileptiform discharges. Per medical records he moved to current [facility] in 2017.  He is has been treated for epilepsy with topiramate (low levels) and depakote with high concentration for many years. His most recent depakote 9/14/2020 at 144 and  6/16/2020 at 172.  1st depakote level in 5/19/2015 was 136. He has always had persistently high depakote. In  June 2020 he had two seizure in one day. Per Brain he was sitting on couch, whole body was rigidity with eye lid twitching for 1 minute. There was no obvious shaking.  In the last 3 years he has lost 40 pounds.    Seizure type 1: Generalized tonic-clonic convulsion. Last one 6/2020. Prior to this 2006.     Interval History: Last seizure 6/2020.  On her last visit we reduced Depakote and increase topiramate.  Unfortunately he had Covid in the end of November 2020 and was not able to complete lab testing.  He has not had any breakthrough seizures.  There have been no significant side effects to these medication changes including rash, mood changes, behavioral outburst.  I encouraged Raul to obtain his labs when it is safe  Current antiepileptic drug:   Depakote 1000 mg twice a day  Topiramate  200 mg twice a day  Medication Notes:   AED Medication Compliance:  compliant most of the time  Using a pill box:  Yes  Past AEDs notes:   Topiramate: We increase topiramate to 200 mg twice a day in winter 2020.    Depakote was decreased from 1500 mg twice a day to 1000 mg twice a day and 2020 due to blood concentrations near  170.  Psycho-Social History: he moved to current group home . He grew up with parents, after high school he was institutionalized (may be due to behavioral) and then transferred to group home . No drinking, no drugs, and no smoking.     Previous Evaluations for Epilepsy:   EE: Left temporal epileptiform discharges   MRI brain 2009: Large multilobulated mass lesion centered in the right   cerebellopontine angle cistern with associated mass-effect, most   compatible with epidermoid tumor, not significantly changed in size   from the previous study.     Review of Systems:  Lethargy / Tiredness:  No  Nausea / Vomiting:  No  Double Vision:  No  Sleepiness:  No  Depression:  No  Slowed Cognitive Function:  yes  Memory Problems:  No  Poor Balance:  No  Dizziness:  No  Appetite Changes:  Increased   Blurred Vision:  No  Sleep Changes:  No  Behavioral Changes:  No  Skin: negative  Respiratory: No shortness of breath, dyspnea on exertion, cough, or hemoptysis  Cardiovascular: negative  Have you experienced a traumatic fall related to your events: No  Are these falls related to your seizures: No      Exam:    There were no vitals taken for this visit.     Wt Readings from Last 5 Encounters:   19 198 lb (89.8 kg)   19 198 lb 6.4 oz (90 kg)   19 202 lb 6.4 oz (91.8 kg)   07/10/19 208 lb (94.3 kg)   19 215 lb (97.5 kg)     Examination was not completed     Impression:    Epilepsy (possible focal epilepsy)  History of Autism   History of right temporal epidermoid tumor     Discussion:  40-year-old male with a history of autism, epilepsy, and right temporal lobe epilepsy epidermoid tumor.  Prior EEGs have shown rare left temporal sharp waves (opposite side of tumor).  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  Patient has been managed on Depakote and topiramate for several years with no seizures.  In  June 2020 he had 2 possible generalized tonic-clonic seizures in the setting of supratherapeutic divalproex levels.  His most recent divalproex level in September 2020 was 144 and prior to that 172.  On her last visit we lowered divalproex from 3000 mg/day to 2000 mg/day.  Increase topiramate from 300 mg/day to 400 mg/day.  He tolerated this change with no significant seizure deterioration or side effects.  He currently has Covid and was not able to complete laboratory testing.  I encouraged Raul to obtain labs when it is safe to do so.  I will follow-up with him in 2 months.    If he has seizure deterioration we may introduce a sodium channel blocker, in 2006 Dilantin was very efficacious for him.  If a sodium channel blockers considered I recommend we use oxcarbazepine or Vimpat.  Phenytoin will have interactions with divalproex.  Certainly when we make medication changes he is at risk of breakthrough seizures, behavioral changes, and appetite changes.  Additionally side effects of kidney stones, appetite changes, and mood changes with increasing Topamax have been reviewed.     Plan :   Continue  topiramate 200 mg am and 200 mg pm and depakote 1000 mg twice a day   Check seizure medications labs when able (he is COVID positive 11/27/2020)  Follow up  With Felix 2 months     If he has breakthrough seizure we can consider a Na+ blocking agent (phenytoin  Worked for him in the past). We may consider oxcarbazepine or vimpat.      Lauren Washington MD   Epilepsy Staff

## 2020-12-11 NOTE — PROGRESS NOTES
"Duane D Backes is a 40 year old male who is being evaluated via a billable video visit.      The patient has been notified of following:     \"This video visit will be conducted via a call between you and your physician/provider. We have found that certain health care needs can be provided without the need for an in-person physical exam.  This service lets us provide the care you need with a video conversation.  If a prescription is necessary we can send it directly to your pharmacy.  If lab work is needed we can place an order for that and you can then stop by our lab to have the test done at a later time.    Video visits are billed at different rates depending on your insurance coverage.  Please reach out to your insurance provider with any questions.    If during the course of the call the physician/provider feels a video visit is not appropriate, you will not be charged for this service.\"        Patient has given verbal consent for Video visit? Yes  How would you like to obtain your AVS? Mail a copy  If you are dropped from the video visit, the video invite should be resent to: Send to e-mail at: [email or web address]. [phone]    Will anyone else be joining your video visit? Yes: Brain. How would they like to receive their invitation? Text to cell phone: cell        Video-Visit Details    Type of service:  Video Visit    Video Start Time: 2:20 PM  Video End Time: 2:32 PM    Originating Location (pt. Location): Home    Distant Location (provider location):  [facility] EPILEPSY CARE     Platform used for Video Visit: Lexus Washington MD      [facility] Epilepsy Care History and Physical       Patient:  Duane D Backes  :  1980   Age:  40 year old   Today's Office Visit:  2020    Referring Provider:    Wilner Carter MD  [address] [place] [id number]    History of Present Illness:  Duane D Backes is 40 year old right handed with history of cognitive disability and epilepsy " referred to us by Dr. Carter for high depakote levels. Per medical records from 2006 he had a generalized tonic-clonic convulsion at [place], EEG showed rare left temporal epileptiform discharges. Per medical records he moved to current shelter in 2017.  He is has been treated for epilepsy with topiramate (low levels) and depakote with high concentration for many years. His most recent depakote 9/14/2020 at 144 and  6/16/2020 at 172.  1st depakote level in 5/19/2015 was 136. He has always had persistently high depakote. In  June 2020 he had two seizure in one day. Per Brain he was sitting on couch, whole body was rigidity with eye lid twitching for 1 minute. There was no obvious shaking.  In the last 3 years he has lost 40 pounds.    Seizure type 1: Generalized tonic-clonic convulsion. Last one 6/2020. Prior to this 2006.     Interval History: Last seizure 6/2020.  On her last visit we reduced Depakote and increase topiramate.  Unfortunately he had Covid in the end of November 2020 and was not able to complete lab testing.  He has not had any breakthrough seizures.  There have been no significant side effects to these medication changes including rash, mood changes, behavioral outburst.  I encouraged Raul to obtain his labs when it is safe  Current antiepileptic drug:   Depakote 1000 mg twice a day  Topiramate  200 mg twice a day  Medication Notes:   AED Medication Compliance:  compliant most of the time  Using a pill box:  Yes  Past AEDs notes:   Topiramate: We increase topiramate to 200 mg twice a day in winter 2020.    Depakote was decreased from 1500 mg twice a day to 1000 mg twice a day and 2020 due to blood concentrations near 170.  Psycho-Social History: he moved to current group home 2017. He grew up with parents, after high school he was institutionalized (may be due to behavioral) and then transferred to group home 2013. No drinking, no drugs, and no smoking.     Previous Evaluations for Epilepsy:    EE: Left temporal epileptiform discharges   MRI brain 2009: Large multilobulated mass lesion centered in the right   cerebellopontine angle cistern with associated mass-effect, most   compatible with epidermoid tumor, not significantly changed in size   from the previous study.     Review of Systems:  Lethargy / Tiredness:  No  Nausea / Vomiting:  No  Double Vision:  No  Sleepiness:  No  Depression:  No  Slowed Cognitive Function:  yes  Memory Problems:  No  Poor Balance:  No  Dizziness:  No  Appetite Changes:  Increased   Blurred Vision:  No  Sleep Changes:  No  Behavioral Changes:  No  Skin: negative  Respiratory: No shortness of breath, dyspnea on exertion, cough, or hemoptysis  Cardiovascular: negative  Have you experienced a traumatic fall related to your events: No  Are these falls related to your seizures: No      Exam:    There were no vitals taken for this visit.     Wt Readings from Last 5 Encounters:   19 198 lb (89.8 kg)   19 198 lb 6.4 oz (90 kg)   19 202 lb 6.4 oz (91.8 kg)   07/10/19 208 lb (94.3 kg)   19 215 lb (97.5 kg)     Examination was not completed     Impression:    Epilepsy (possible focal epilepsy)  History of Autism   History of right temporal epidermoid tumor     Discussion:  40-year-old male with a history of autism, epilepsy, and right temporal lobe epilepsy epidermoid tumor.  Prior EEGs have shown rare left temporal sharp waves (opposite side of tumor).  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  Patient has been managed on Depakote and topiramate for several years with no seizures.  In 2020 he had 2 possible generalized tonic-clonic seizures in the setting of supratherapeutic divalproex levels.  His most recent divalproex level in 2020 was 144 and prior to that 172.  On her last visit we lowered divalproex from 3000 mg/day to 2000 mg/day.   Increase topiramate from 300 mg/day to 400 mg/day.  He tolerated this change with no significant seizure deterioration or side effects.  He currently has Covid and was not able to complete laboratory testing.  I encouraged Raul to obtain labs when it is safe to do so.  I will follow-up with him in 2 months.    If he has seizure deterioration we may introduce a sodium channel blocker, in 2006 Dilantin was very efficacious for him.  If a sodium channel blockers considered I recommend we use oxcarbazepine or Vimpat.  Phenytoin will have interactions with divalproex.  Certainly when we make medication changes he is at risk of breakthrough seizures, behavioral changes, and appetite changes.  Additionally side effects of kidney stones, appetite changes, and mood changes with increasing Topamax have been reviewed.     Plan :   Continue  topiramate 200 mg am and 200 mg pm and depakote 1000 mg twice a day   Check seizure medications labs when able (he is COVID positive 11/27/2020)  Follow up  With Felix 2 months     If he has breakthrough seizure we can consider a Na+ blocking agent (phenytoin  Worked for him in the past). We may consider oxcarbazepine or vimpat.      Lauren Washington MD   Epilepsy Staff

## 2020-12-17 ENCOUNTER — MEDICAL CORRESPONDENCE (OUTPATIENT)
Dept: HEALTH INFORMATION MANAGEMENT | Facility: CLINIC | Age: 40
End: 2020-12-17

## 2020-12-17 NOTE — TELEPHONE ENCOUNTER
Healthcare visit referral form prepared in alignment with office note from 12/11/2020. Ready for MD review and signature.

## 2020-12-21 NOTE — TELEPHONE ENCOUNTER
Healthcare visit form completed and fax back to [phone], fax to [employer] and placed in folder.

## 2020-12-22 ENCOUNTER — MEDICAL CORRESPONDENCE (OUTPATIENT)
Dept: HEALTH INFORMATION MANAGEMENT | Facility: CLINIC | Age: 40
End: 2020-12-22

## 2020-12-22 ENCOUNTER — HOSPITAL ENCOUNTER (OUTPATIENT)
Dept: SPEECH THERAPY | Facility: CLINIC | Age: 40
Setting detail: THERAPIES SERIES
End: 2020-12-22
Attending: FAMILY MEDICINE
Payer: MEDICARE

## 2020-12-22 ENCOUNTER — TELEPHONE (OUTPATIENT)
Dept: FAMILY MEDICINE | Facility: CLINIC | Age: 40
End: 2020-12-22

## 2020-12-22 DIAGNOSIS — G40.309 GENERALIZED CONVULSIVE EPILEPSY (H): ICD-10-CM

## 2020-12-22 LAB — VALPROATE SERPL-MCNC: 130 MG/L (ref 50–100)

## 2020-12-22 PROCEDURE — 92526 ORAL FUNCTION THERAPY: CPT | Mod: GN | Performed by: SPEECH-LANGUAGE PATHOLOGIST

## 2020-12-22 PROCEDURE — 80201 ASSAY OF TOPIRAMATE: CPT | Mod: 90 | Performed by: PSYCHIATRY & NEUROLOGY

## 2020-12-22 PROCEDURE — 80164 ASSAY DIPROPYLACETIC ACD TOT: CPT | Performed by: PSYCHIATRY & NEUROLOGY

## 2020-12-22 PROCEDURE — 36415 COLL VENOUS BLD VENIPUNCTURE: CPT | Performed by: PSYCHIATRY & NEUROLOGY

## 2020-12-22 PROCEDURE — 99000 SPECIMEN HANDLING OFFICE-LAB: CPT | Performed by: PSYCHIATRY & NEUROLOGY

## 2020-12-22 NOTE — TELEPHONE ENCOUNTER
.Reason for Call:  Form, our goal is to have forms completed with 72 hours, however, some forms may require a visit or additional information.    Type of letter, form or note:  Adult Standing Medication and Program Orders Form    Who is the form from?: [facility]     Where did the form come from: form was faxed in    What clinic location was the form placed at?: [place]    [facility]    What number is listed as a contact on the form?: [phone]       Additional comments: Please fax completed form to [phone]    Form received via Fax  on 12/22/2020 at 1:27 PM by Raimundo Croft

## 2020-12-22 NOTE — TELEPHONE ENCOUNTER
Form was not on [facility] nor in  bin. So writer saw last person to access chart was Lina Gilbert.  I huddled with DAVE Gilbert and she stated she had put the form in Dr. Parkinson red folder.  It is not there.  So please advise if provider has form for completion.      JATINDER Mcknight.

## 2020-12-23 LAB — TOPIRAMATE SERPL-MCNC: 8.1 UG/ML (ref 5–20)

## 2020-12-24 NOTE — TELEPHONE ENCOUNTER
Form faxed and put in abstraction basket to be scanned in.  Andrea Alfonso,  For 1st Floor Primary Care

## 2021-01-05 ENCOUNTER — TELEPHONE (OUTPATIENT)
Dept: FAMILY MEDICINE | Facility: CLINIC | Age: 41
End: 2021-01-05

## 2021-01-05 ENCOUNTER — HOSPITAL ENCOUNTER (OUTPATIENT)
Dept: SPEECH THERAPY | Facility: CLINIC | Age: 41
Setting detail: THERAPIES SERIES
End: 2021-01-05
Attending: FAMILY MEDICINE
Payer: MEDICARE

## 2021-01-05 ENCOUNTER — TELEPHONE (OUTPATIENT)
Dept: NEUROLOGY | Facility: CLINIC | Age: 41
End: 2021-01-05

## 2021-01-05 PROCEDURE — 92526 ORAL FUNCTION THERAPY: CPT | Mod: GN | Performed by: SPEECH-LANGUAGE PATHOLOGIST

## 2021-01-05 NOTE — TELEPHONE ENCOUNTER
Routing refill request to provider for review/approval because:  Drug not on the [facility] refill protocol   Doreen Miranda RN

## 2021-01-05 NOTE — TELEPHONE ENCOUNTER
Reason for call: Raul from Patient's group home is requesting a Rx for a Thickening Agent to be sent to [facility]    Can we leave a detailed message: yes     Raul  can be reached at: [phone]    Call taken at 12:23 pm on 1/5/2021    Linda Bhatt MA  [facility]  2nd Floor  Primary Care

## 2021-01-05 NOTE — TELEPHONE ENCOUNTER
Dr. Washington provided to this nurse a fax from the patient's Designated Coordinator. MD would like AED levels drawn and a follow up visit in 2-4 weeks. Advises for patient to seek advice from PCP about COVID vaccine.     Labs have been drawn. Appointment was scheduled for 1/29 to follow up.

## 2021-01-14 ENCOUNTER — TELEPHONE (OUTPATIENT)
Dept: FAMILY MEDICINE | Facility: CLINIC | Age: 41
End: 2021-01-14

## 2021-01-14 DIAGNOSIS — R13.13 PHARYNGEAL DYSPHAGIA: Primary | ICD-10-CM

## 2021-01-14 RX ORDER — NIACINAMIDE, ADENOSINE 1; .02 G/50ML; G/50ML
CREAM TOPICAL
Status: CANCELLED | OUTPATIENT
Start: 2021-01-14

## 2021-01-14 NOTE — TELEPHONE ENCOUNTER
Reason for Call: Request for an order or referral:    Order or referral being requested: for liquid thickening per his swallow study    Date needed: as soon as possible    Has the patient been seen by the PCP for this problem? YES    Additional comments: n/a     Phone number Patient can be reached at:  Other phone number:  [phone]    Best Time:  anytime    Can we leave a detailed message on this number?  YES    Call taken on 1/14/2021 at 10:55 AM by Char Roth

## 2021-01-14 NOTE — TELEPHONE ENCOUNTER
Raul contacted and informed Rx sent to [facility]. He would also like Rx copy faxed to him for file [phone]. Copy faxed.    John Almendarez CMA

## 2021-01-14 NOTE — TELEPHONE ENCOUNTER
Sarah Funes speech pathologist ([phone]). She is calling to get an Rx for thicket sent over to the pharmacy as the patient is at high risk for aspiration.    Chanelle López RN, [facility] Triage

## 2021-01-19 ENCOUNTER — HOSPITAL ENCOUNTER (OUTPATIENT)
Dept: SPEECH THERAPY | Facility: CLINIC | Age: 41
Setting detail: THERAPIES SERIES
End: 2021-01-19
Attending: FAMILY MEDICINE
Payer: MEDICARE

## 2021-01-19 ENCOUNTER — VIRTUAL VISIT (OUTPATIENT)
Dept: NEUROLOGY | Facility: CLINIC | Age: 41
End: 2021-01-19
Payer: MEDICARE

## 2021-01-19 DIAGNOSIS — G40.909 SEIZURE DISORDER (H): Primary | ICD-10-CM

## 2021-01-19 PROCEDURE — 92526 ORAL FUNCTION THERAPY: CPT | Mod: GN | Performed by: SPEECH-LANGUAGE PATHOLOGIST

## 2021-01-19 NOTE — PROGRESS NOTES
Duane is a 40 year old who is being evaluated via a billable video visit.      How would you like to obtain your AVS? Fax [phone]  If the video visit is dropped, the invitation should be resent by: Text to cell phone: [phone] (same)  Will anyone else be joining your video visit? Yes: Raul. How would they like to receive their invitation? Other e-mail: no      Video Start Time: 11:04 AM  Video-Visit Details    Type of service:  Video Visit    Video End Time:11:22 AM    Originating Location (pt. Location): Home    Distant Location (provider location):  [facility]     Platform used for Video Visit: Appia         [facility] and Physical       Patient:  Duane D Backes  :  1980   Age:  40 year old   Today's Office Visit:  2021    Referring Provider:    Wilner Carter MD  [address] [place] [id number]    Epilepsy history copied forward:  Duane D Backes is 40 year old right handed with history of cognitive disability and epilepsy referred to us by Dr. Carter for high depakote levels. Per medical records from  he had a generalized tonic-clonic convulsion at [facility], EEG showed rare left temporal epileptiform discharges. Per medical records he moved to current [facility] in 2017.  He is has been treated for epilepsy with topiramate (low levels) and depakote with high concentration for many years. His most recent depakote 2020 at 144 and  2020 at 172.  1st depakote level in 2015 was 136. He has always had persistently high depakote. In  2020 he had two seizure in one day. Per Brain he was sitting on couch, whole body was rigidity with eye lid twitching for 1 minute. There was no obvious shaking.  In the last 3 years he has lost 40 pounds.    Seizure type 1: Generalized tonic-clonic convulsion. Last one 2020. Prior to this .     Interval History: spoke to brain on phone. Duane's last seizure 2020.  On her last visit we  reduced Depakote and increase topiramate.  Unfortunately he had Covid in the end of 2020 and was not able to complete lab testing.  He has not had any breakthrough seizures.  There have been no significant side effects to these medication changes including rash, mood changes, behavioral outburst (hits, throws, and may get mad). He gets 14 (8 oz) cups per day.   Current antiepileptic drug:   Depakote 1000 mg twice a day  Topiramate  200 mg twice a day  Medication Notes:   AED Medication Compliance:  compliant most of the time  Using a pill box:  Yes  Past AEDs notes:   Topiramate: We increase topiramate to 200 mg twice a day in winter 2020.    Depakote was decreased from 1500 mg twice a day to 1000 mg twice a day and  due to blood concentrations near 170.  Psycho-Social History: he moved to current group home . He grew up with parents, after high school he was institutionalized (may be due to behavioral) and then transferred to group home . No drinking, no drugs, and no smoking.     Previous Evaluations for Epilepsy:   EE: Left temporal epileptiform discharges   MRI brain 2009: Large multilobulated mass lesion centered in the right   cerebellopontine angle cistern with associated mass-effect, most   compatible with epidermoid tumor, not significantly changed in size   from the previous study.     Review of Systems:  Lethargy / Tiredness:  No  Nausea / Vomiting:  No  Double Vision:  No  Sleepiness:  No  Depression:  No  Slowed Cognitive Function:  yes  Memory Problems:  No  Poor Balance:  No  Dizziness:  No  Appetite Changes:  Increased   Blurred Vision:  No  Sleep Changes:  No  Behavioral Changes:  No  Skin: negative  Respiratory: No shortness of breath, dyspnea on exertion, cough, or hemoptysis  Cardiovascular: negative  Have you experienced a traumatic fall related to your events: No  Are these falls related to your seizures: No      Exam:    There were no vitals taken for this visit.      Wt Readings from Last 5 Encounters:   12/23/19 198 lb (89.8 kg)   12/16/19 198 lb 6.4 oz (90 kg)   08/27/19 202 lb 6.4 oz (91.8 kg)   07/10/19 208 lb (94.3 kg)   03/19/19 215 lb (97.5 kg)     Cognitive delay, 1-2 words, face symmetric, extra ocular movements in tact, hard to have him follow task and assess upper extremities strength.      Impression:    Epilepsy (possible focal epilepsy)  History of Autism   History of right temporal epidermoid tumor   Behavioral outburst       Discussion:  40-year-old male with a history of autism, epilepsy, and right temporal lobe epilepsy epidermoid tumor.  Prior EEGs have shown rare left temporal sharp waves (opposite side of tumor).  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  Patient has been managed on Depakote and topiramate for several years with no seizures.  In June 2020 he had 2 possible generalized tonic-clonic seizures in the setting of supratherapeutic divalproex levels.  His most recent divalproex level in September 2020 was 130, prior to that 144 and 172.  He tolerated this change with no significant seizure deterioration or side effects.  I encouraged Raul to obtain labs when it is safe to do so.  I will follow-up with him in 2 months.    If he has seizure deterioration we may introduce a sodium channel blocker, in 2006 Dilantin was very efficacious for him.  If a sodium channel blockers considered I recommend we use oxcarbazepine or Vimpat.  Phenytoin will have interactions with divalproex.  Certainly when we make medication changes he is at risk of breakthrough seizures, behavioral changes, and appetite changes.  Additionally side effects of kidney stones, appetite changes, and mood changes with increasing Topamax have been reviewed.     Plan :   Continue topiramate 200 mg am and 200 mg pm and depakote 1000 mg twice a day   We may consider lowering depakote 500 mg  am and 1000 mg pm since his levels are still high at 130, certainly he can have more mood issues and seizures.   Follow up  With Felix 4-6 months   Encouraged hydration     If he has breakthrough seizure we can consider a Na+ blocking agent (phenytoin  Worked for him in the past). We may consider oxcarbazepine or vimpat.      Lauren Washington MD   Epilepsy Staff

## 2021-01-19 NOTE — PATIENT INSTRUCTIONS
Continue topiramate 200 mg am and 200 mg pm and depakote 1000 mg twice a day   We may consider lowering depakote 500 mg am and 1000 mg pm since his levels are still high at 130, certainly he can have more mood issues and seizures.   Follow up  With Felix 4-6 months   Encouraged hydration       Lauren Washington MD

## 2021-01-26 ENCOUNTER — RECORDS - HEALTHEAST (OUTPATIENT)
Dept: SCHEDULING | Facility: CLINIC | Age: 41
End: 2021-01-26

## 2021-01-26 ENCOUNTER — NURSE TRIAGE (OUTPATIENT)
Dept: NURSING | Facility: CLINIC | Age: 41
End: 2021-01-26

## 2021-01-26 NOTE — TELEPHONE ENCOUNTER
Please call Raul (Lead Care Provider) at .    Raul is calling requesting to know if patient should adjust any daily regimen for constipation?     Patient is taking Colace, MOM, Miralax, lactulose.  Topomax was increased to 200 mg BID.  Depakote 500mg BID.      Reporting under standing orders patient starts prune juice and day Ducolox on day 3 if no results.   Reporting patient has received a suppository x 3 in past month on 14th, 18th, 25th. Denies current symptoms.    Lina Guzman RN  [facility]      Additional Information    Negative: Drug overdose and triager unable to answer question    Negative: Caller requesting information unrelated to medicine    Negative: Caller requesting a prescription for Strep throat and has a positive culture result    Negative: Rash while taking a medication or within 3 days of stopping it    Negative: Immunization reaction suspected    Negative: Asthma and having symptoms of asthma (cough, wheezing, etc.)    Negative: Breastfeeding questions about mother's medicines and diet    Negative: MORE THAN A DOUBLE DOSE of a prescription or over-the-counter (OTC) drug    Negative: DOUBLE DOSE (an extra dose or lesser amount) of over-the-counter (OTC) drug and any symptoms (e.g., dizziness, nausea, pain, sleepiness)    Negative: DOUBLE DOSE (an extra dose or lesser amount) of prescription drug and any symptoms (e.g., dizziness, nausea, pain, sleepiness)    Negative: Took another person's prescription drug    Negative: DOUBLE DOSE (an extra dose or lesser amount) of prescription drug and NO symptoms (Exception: a double dose of antibiotics)    Negative: Diabetes drug error or overdose (e.g., took wrong type of insulin or took extra dose)    Negative: Caller has medication question about med not prescribed by PCP and triager unable to answer question (e.g., compatibility with other med, storage)    Negative: Request for URGENT new prescription or refill of 'essential'  medication (i.e., likelihood of harm to patient if not taken) and triager unable to fill per department policy    Negative: Prescription not at pharmacy and was prescribed today by PCP    Negative: Pharmacy calling with prescription questions and triager unable to answer question    Negative: Caller has urgent medication question about med that PCP prescribed and triager unable to answer question    Caller has NON-URGENT medication question about med that PCP prescribed and triager unable to answer question    Protocols used: MEDICATION QUESTION CALL-A-[place]

## 2021-02-02 ENCOUNTER — HOSPITAL ENCOUNTER (OUTPATIENT)
Dept: SPEECH THERAPY | Facility: CLINIC | Age: 41
Setting detail: THERAPIES SERIES
End: 2021-02-02
Attending: FAMILY MEDICINE
Payer: MEDICARE

## 2021-02-02 PROCEDURE — 92526 ORAL FUNCTION THERAPY: CPT | Mod: GN | Performed by: SPEECH-LANGUAGE PATHOLOGIST

## 2021-02-02 NOTE — PROGRESS NOTES
Outpatient Speech Language Pathology Discharge Note     Patient: Duane D Backes  : 1980    Beginning/End Dates of Reporting Period:  20 to 2021    Referring Provider: Dr. BAYLEE Parkinson MD     Therapy Diagnosis: Moderately-severe oropharyngeal dysphagia, cervical esophageal stasis    Client Self Report: Pt is a 40 y.o. male who completed an OP Clinical Swallow Evaluation in 2020, and an OP VFSS on 20. Pt recommended NDD2 with honey thick liquids as well as training of swallow strategies and exercises. Pt has been limited in compliance with exercises even given support and daily models/cues from group home staff. Staff reports 1:1 supervision for meals, they have not yet obtained thickened liquids d/t insurance difficulties-SLP has contacted MD and prescription has been provided. Pt noted to continually be severely impulsive during all PO intake and benefits from offering of limited amounts (2 plate method) for all intake.     Objective Measurements: See below:    Data from last tx. Session completed 2021:   HEP-Caregiver reports limited Pt particpation with recommended exercises at home but daily attempts continue to be made and cues provided. Concern Pt continues to be highly impulsive during all PO intake drinking and eating/over stuffing his mouth. Pt noted to be getting adequate nutrition and hydration per caregiver reports. Pt tolerating thickened liquids and 2 plate method for meals with direct cues for reduced impulsivity and slow rate of intake. Concern that when Pt is offered smaller amts of liquid in his cup he is tilting his head back far in order to get the liquid-recommend cueing for imrpoved head position rather than increasing amt of liquid avaliable to Pt or offering straws. Caregiver verbalized understanding. SLP-Fctnxzspso-qrivpk of exercises verbally, caregiver notes Pt working with staff at home with limited accuracy. BRX-Iukkaybmfk-opgszvrd reviewed and provided written  education to staff on recommendations for 1:1 supervision, NDD2 with honey thick for all PO, direct cueing for single sips/single bites to reduce aspiration risk. SLP provided education on overt s/sx of aspiration/penetration as well as associated risks-caregiver verbalized understanding with 100%. Theraputic PO trials-single sips-limited amt in cup provided of honey thick liquid=3/3 w/o overt s/sx of aspiration/penetration-Pt demonstrated 1 instance of reaching/grabbing cup, SLP and staff cued Pt to stop and wait. Pt still has not gotten thickened liquid for at home d/t insurance difficulties even with MD perscription-staff continuing to work on addressing this.        Goals:  Goal Identifier Strategies   Goal Description 1. Patient will improve safety of oral intake by having caregivers verbalize comprehension of small sips,  thickening liquids at home/diet modifications, and maintaining upright positioning 1 hour after intake.    Target Date 02/09/21   Date Met   2/2/2021   Progress:Group home staff/caregivers verbalized understanding of all recommendations and will continue to cue Pt for compliance.      Goal Identifier Exercises   Goal Description 2. Patient will improve timing and strength of swallow with trial of exercise program with effortful swallow exercise training to patient and caregiver 10 reps, 3x/daily.    Target Date 02/09/21   Date Met      Progress:Limited, as Pt mostly non-compliant d/t limited cognitive abilities.      Progress Toward Goals:   Progress limited due to Pt impaired cognition and limited participation even given significant support from group home staff/caregivers.        Plan:  Discharge from therapy.    Discharge: Yes     Reason for Discharge: No further expectation of progress.    Discharge Plan: Patient to continue home program. RECOMMEND: Pt TO COMPLETE REPEAT OP VFSS IF REGRESSION OCCURS FOR IN 6-12 MONTHS TO MONITOR SWALLOW FUNCTION.     Thank you for referring Duane Backes  to outpatient [facility].  Please call Nani Alcocer MA, SLP-CCC at [phone] or email [email or web address] with any questions or concerns.      Nani Alcocer M.A., SLP-CCC  Speech-Language Pathologist

## 2021-02-05 NOTE — TELEPHONE ENCOUNTER
Raul returned call. Informed of the provider documentation below. Raul will pass along provider's response to the LPN on site. No further action needed at this time, per Raul.     Shelby Bowman RN  [facility]

## 2021-02-05 NOTE — TELEPHONE ENCOUNTER
This writer attempted to contact Raul from  on 02/05/21      Reason for call provider message and left message.      If patient calls back:   Relay message from provider below      Sarah Walker RN, BSN, CMSRN  [facility]

## 2021-02-05 NOTE — TELEPHONE ENCOUNTER
He has chronic constipation, and he is basically given every medication and treatment we have available.  He will have any additional recommendations than what is listed below.

## 2021-02-24 ENCOUNTER — HOSPITAL ENCOUNTER (INPATIENT)
Facility: CLINIC | Age: 41
LOS: 12 days | Discharge: NURSING, REHAB OR LONG-TERM CARE | DRG: 100 | End: 2021-03-09
Attending: EMERGENCY MEDICINE | Admitting: INTERNAL MEDICINE
Payer: MEDICARE

## 2021-02-24 ENCOUNTER — APPOINTMENT (OUTPATIENT)
Dept: CT IMAGING | Facility: CLINIC | Age: 41
DRG: 100 | End: 2021-02-24
Attending: EMERGENCY MEDICINE
Payer: MEDICARE

## 2021-02-24 DIAGNOSIS — G40.909 SEIZURE DISORDER (H): ICD-10-CM

## 2021-02-24 DIAGNOSIS — R41.82 ALTERED MENTAL STATUS, UNSPECIFIED ALTERED MENTAL STATUS TYPE: ICD-10-CM

## 2021-02-24 LAB
ANION GAP SERPL CALCULATED.3IONS-SCNC: <1 MMOL/L (ref 3–14)
BASOPHILS # BLD AUTO: 0 10E9/L (ref 0–0.2)
BASOPHILS NFR BLD AUTO: 0 %
BUN SERPL-MCNC: 13 MG/DL (ref 7–30)
CALCIUM SERPL-MCNC: 9.7 MG/DL (ref 8.5–10.1)
CHLORIDE SERPL-SCNC: 105 MMOL/L (ref 94–109)
CO2 SERPL-SCNC: 35 MMOL/L (ref 20–32)
CREAT SERPL-MCNC: 0.76 MG/DL (ref 0.66–1.25)
DIFFERENTIAL METHOD BLD: ABNORMAL
EOSINOPHIL # BLD AUTO: 0.1 10E9/L (ref 0–0.7)
EOSINOPHIL NFR BLD AUTO: 3.4 %
ERYTHROCYTE [DISTWIDTH] IN BLOOD BY AUTOMATED COUNT: 14.7 % (ref 10–15)
GFR SERPL CREATININE-BSD FRML MDRD: >90 ML/MIN/{1.73_M2}
GLUCOSE SERPL-MCNC: 87 MG/DL (ref 70–99)
HCT VFR BLD AUTO: 41.9 % (ref 40–53)
HGB BLD-MCNC: 13.1 G/DL (ref 13.3–17.7)
IMM GRANULOCYTES # BLD: 0 10E9/L (ref 0–0.4)
IMM GRANULOCYTES NFR BLD: 0.5 %
LACTATE BLD-SCNC: 0.7 MMOL/L (ref 0.7–2)
LITHIUM SERPL-SCNC: 1.26 MMOL/L (ref 0.6–1.2)
LYMPHOCYTES # BLD AUTO: 1 10E9/L (ref 0.8–5.3)
LYMPHOCYTES NFR BLD AUTO: 25.6 %
MCH RBC QN AUTO: 30.3 PG (ref 26.5–33)
MCHC RBC AUTO-ENTMCNC: 31.3 G/DL (ref 31.5–36.5)
MCV RBC AUTO: 97 FL (ref 78–100)
MONOCYTES # BLD AUTO: 0.2 10E9/L (ref 0–1.3)
MONOCYTES NFR BLD AUTO: 5.7 %
NEUTROPHILS # BLD AUTO: 2.5 10E9/L (ref 1.6–8.3)
NEUTROPHILS NFR BLD AUTO: 64.8 %
NRBC # BLD AUTO: 0 10*3/UL
NRBC BLD AUTO-RTO: 0 /100
PLATELET # BLD AUTO: 155 10E9/L (ref 150–450)
POTASSIUM SERPL-SCNC: 5.2 MMOL/L (ref 3.4–5.3)
RBC # BLD AUTO: 4.32 10E12/L (ref 4.4–5.9)
SODIUM SERPL-SCNC: 138 MMOL/L (ref 133–144)
VALPROATE SERPL-MCNC: 102 MG/L (ref 50–100)
WBC # BLD AUTO: 3.9 10E9/L (ref 4–11)

## 2021-02-24 PROCEDURE — 99291 CRITICAL CARE FIRST HOUR: CPT | Mod: 25

## 2021-02-24 PROCEDURE — 96374 THER/PROPH/DIAG INJ IV PUSH: CPT

## 2021-02-24 PROCEDURE — 96375 TX/PRO/DX INJ NEW DRUG ADDON: CPT

## 2021-02-24 PROCEDURE — 83605 ASSAY OF LACTIC ACID: CPT | Performed by: EMERGENCY MEDICINE

## 2021-02-24 PROCEDURE — 250N000011 HC RX IP 250 OP 636: Performed by: EMERGENCY MEDICINE

## 2021-02-24 PROCEDURE — 31500 INSERT EMERGENCY AIRWAY: CPT

## 2021-02-24 PROCEDURE — 70450 CT HEAD/BRAIN W/O DYE: CPT | Mod: ME

## 2021-02-24 PROCEDURE — 99292 CRITICAL CARE ADDL 30 MIN: CPT

## 2021-02-24 PROCEDURE — 80201 ASSAY OF TOPIRAMATE: CPT | Performed by: EMERGENCY MEDICINE

## 2021-02-24 PROCEDURE — C9803 HOPD COVID-19 SPEC COLLECT: HCPCS

## 2021-02-24 PROCEDURE — 80048 BASIC METABOLIC PNL TOTAL CA: CPT | Performed by: EMERGENCY MEDICINE

## 2021-02-24 PROCEDURE — 96361 HYDRATE IV INFUSION ADD-ON: CPT

## 2021-02-24 PROCEDURE — 85025 COMPLETE CBC W/AUTO DIFF WBC: CPT | Performed by: EMERGENCY MEDICINE

## 2021-02-24 PROCEDURE — 80178 ASSAY OF LITHIUM: CPT | Performed by: EMERGENCY MEDICINE

## 2021-02-24 PROCEDURE — 36415 COLL VENOUS BLD VENIPUNCTURE: CPT

## 2021-02-24 PROCEDURE — 96376 TX/PRO/DX INJ SAME DRUG ADON: CPT

## 2021-02-24 PROCEDURE — 80164 ASSAY DIPROPYLACETIC ACD TOT: CPT | Performed by: EMERGENCY MEDICINE

## 2021-02-24 RX ORDER — LORAZEPAM 2 MG/ML
INJECTION INTRAMUSCULAR
Status: DISCONTINUED
Start: 2021-02-24 | End: 2021-02-25 | Stop reason: HOSPADM

## 2021-02-24 RX ADMIN — LORAZEPAM 2 MG: 2 INJECTION INTRAMUSCULAR; INTRAVENOUS at 23:53

## 2021-02-24 RX ADMIN — LORAZEPAM 2 MG: 2 INJECTION INTRAMUSCULAR; INTRAVENOUS at 23:56

## 2021-02-25 ENCOUNTER — APPOINTMENT (OUTPATIENT)
Dept: MRI IMAGING | Facility: CLINIC | Age: 41
DRG: 100 | End: 2021-02-25
Attending: EMERGENCY MEDICINE
Payer: MEDICARE

## 2021-02-25 ENCOUNTER — TELEPHONE (OUTPATIENT)
Dept: NEUROLOGY | Facility: CLINIC | Age: 41
End: 2021-02-25

## 2021-02-25 ENCOUNTER — APPOINTMENT (OUTPATIENT)
Dept: GENERAL RADIOLOGY | Facility: CLINIC | Age: 41
DRG: 100 | End: 2021-02-25
Attending: EMERGENCY MEDICINE
Payer: MEDICARE

## 2021-02-25 ENCOUNTER — HOSPITAL ENCOUNTER (OUTPATIENT)
Dept: NEUROLOGY | Facility: CLINIC | Age: 41
DRG: 100 | End: 2021-02-25
Attending: PSYCHIATRY & NEUROLOGY
Payer: MEDICARE

## 2021-02-25 PROBLEM — R41.82 ALTERED MENTAL STATUS, UNSPECIFIED ALTERED MENTAL STATUS TYPE: Status: ACTIVE | Noted: 2021-02-25

## 2021-02-25 LAB
ALBUMIN SERPL-MCNC: 3 G/DL (ref 3.4–5)
ALBUMIN UR-MCNC: NEGATIVE MG/DL
APPEARANCE UR: CLEAR
BASE EXCESS BLDA CALC-SCNC: 4.4 MMOL/L
BASE EXCESS BLDV CALC-SCNC: 3 MMOL/L
BILIRUB UR QL STRIP: NEGATIVE
COLOR UR AUTO: NORMAL
CREAT SERPL-MCNC: 0.7 MG/DL (ref 0.66–1.25)
ERYTHROCYTE [DISTWIDTH] IN BLOOD BY AUTOMATED COUNT: 14.7 % (ref 10–15)
FLUAV+FLUBV AG SPEC QL: NEGATIVE
FLUAV+FLUBV AG SPEC QL: NEGATIVE
GASTROCULT GAST QL: POSITIVE
GFR SERPL CREATININE-BSD FRML MDRD: >90 ML/MIN/{1.73_M2}
GLUCOSE BLDC GLUCOMTR-MCNC: 72 MG/DL (ref 70–99)
GLUCOSE BLDC GLUCOMTR-MCNC: 73 MG/DL (ref 70–99)
GLUCOSE BLDC GLUCOMTR-MCNC: 78 MG/DL (ref 70–99)
GLUCOSE BLDC GLUCOMTR-MCNC: 86 MG/DL (ref 70–99)
GLUCOSE BLDC GLUCOMTR-MCNC: 87 MG/DL (ref 70–99)
GLUCOSE BLDC GLUCOMTR-MCNC: 91 MG/DL (ref 70–99)
GLUCOSE UR STRIP-MCNC: NEGATIVE MG/DL
GRAM STN SPEC: NORMAL
GRAM STN SPEC: NORMAL
HCO3 BLD-SCNC: 27 MMOL/L (ref 21–28)
HCO3 BLDV-SCNC: 27 MMOL/L (ref 21–28)
HCT VFR BLD AUTO: 35.7 % (ref 40–53)
HGB BLD-MCNC: 11.7 G/DL (ref 13.3–17.7)
HGB UR QL STRIP: NEGATIVE
KETONES UR STRIP-MCNC: NEGATIVE MG/DL
LABORATORY COMMENT REPORT: NORMAL
LEUKOCYTE ESTERASE UR QL STRIP: NEGATIVE
Lab: NORMAL
MCH RBC QN AUTO: 30.8 PG (ref 26.5–33)
MCHC RBC AUTO-ENTMCNC: 32.8 G/DL (ref 31.5–36.5)
MCV RBC AUTO: 94 FL (ref 78–100)
NITRATE UR QL: NEGATIVE
O2/TOTAL GAS SETTING VFR VENT: 40 %
OXYHGB MFR BLD: 99 % (ref 92–100)
OXYHGB MFR BLDV: 97 %
PCO2 BLD: 33 MM HG (ref 35–45)
PCO2 BLDV: 40 MM HG (ref 40–50)
PH BLD: 7.52 PH (ref 7.35–7.45)
PH BLDV: 7.44 PH (ref 7.32–7.43)
PH UR STRIP: 7 PH (ref 5–7)
PLATELET # BLD AUTO: 150 10E9/L (ref 150–450)
PO2 BLD: 174 MM HG (ref 80–105)
PO2 BLDV: 100 MM HG (ref 25–47)
RBC # BLD AUTO: 3.8 10E12/L (ref 4.4–5.9)
SARS-COV-2 RNA RESP QL NAA+PROBE: NEGATIVE
SOURCE: NORMAL
SP GR UR STRIP: 1 (ref 1–1.03)
SPECIMEN SOURCE: NORMAL
T4 FREE SERPL-MCNC: 1.05 NG/DL (ref 0.76–1.46)
TSH SERPL DL<=0.005 MIU/L-ACNC: 0.27 MU/L (ref 0.4–4)
UROBILINOGEN UR STRIP-MCNC: 0 MG/DL (ref 0–2)
WBC # BLD AUTO: 4.6 10E9/L (ref 4–11)

## 2021-02-25 PROCEDURE — C9113 INJ PANTOPRAZOLE SODIUM, VIA: HCPCS | Performed by: ANESTHESIOLOGY

## 2021-02-25 PROCEDURE — 258N000003 HC RX IP 258 OP 636: Performed by: STUDENT IN AN ORGANIZED HEALTH CARE EDUCATION/TRAINING PROGRAM

## 2021-02-25 PROCEDURE — 85027 COMPLETE CBC AUTOMATED: CPT | Performed by: INTERNAL MEDICINE

## 2021-02-25 PROCEDURE — 87205 SMEAR GRAM STAIN: CPT | Performed by: INTERNAL MEDICINE

## 2021-02-25 PROCEDURE — 87070 CULTURE OTHR SPECIMN AEROBIC: CPT | Performed by: INTERNAL MEDICINE

## 2021-02-25 PROCEDURE — 36600 WITHDRAWAL OF ARTERIAL BLOOD: CPT

## 2021-02-25 PROCEDURE — 82271 OCCULT BLOOD OTHER SOURCES: CPT | Performed by: INTERNAL MEDICINE

## 2021-02-25 PROCEDURE — 250N000013 HC RX MED GY IP 250 OP 250 PS 637: Performed by: PSYCHIATRY & NEUROLOGY

## 2021-02-25 PROCEDURE — 87077 CULTURE AEROBIC IDENTIFY: CPT | Performed by: INTERNAL MEDICINE

## 2021-02-25 PROCEDURE — 87186 SC STD MICRODIL/AGAR DIL: CPT | Performed by: INTERNAL MEDICINE

## 2021-02-25 PROCEDURE — 87040 BLOOD CULTURE FOR BACTERIA: CPT | Performed by: EMERGENCY MEDICINE

## 2021-02-25 PROCEDURE — 250N000009 HC RX 250: Performed by: INTERNAL MEDICINE

## 2021-02-25 PROCEDURE — 36415 COLL VENOUS BLD VENIPUNCTURE: CPT | Performed by: ANESTHESIOLOGY

## 2021-02-25 PROCEDURE — 250N000013 HC RX MED GY IP 250 OP 250 PS 637: Performed by: INTERNAL MEDICINE

## 2021-02-25 PROCEDURE — 82805 BLOOD GASES W/O2 SATURATION: CPT | Performed by: ANESTHESIOLOGY

## 2021-02-25 PROCEDURE — 70553 MRI BRAIN STEM W/O & W/DYE: CPT | Mod: ME

## 2021-02-25 PROCEDURE — C9254 INJECTION, LACOSAMIDE: HCPCS | Performed by: STUDENT IN AN ORGANIZED HEALTH CARE EDUCATION/TRAINING PROGRAM

## 2021-02-25 PROCEDURE — 200N000001 HC R&B ICU

## 2021-02-25 PROCEDURE — A9585 GADOBUTROL INJECTION: HCPCS | Performed by: EMERGENCY MEDICINE

## 2021-02-25 PROCEDURE — 999N000052 EEG VIDEO 12-26 HR UNMONITORED

## 2021-02-25 PROCEDURE — 999N000065 XR CHEST PORT 1 VW

## 2021-02-25 PROCEDURE — 87804 INFLUENZA ASSAY W/OPTIC: CPT | Performed by: INTERNAL MEDICINE

## 2021-02-25 PROCEDURE — 87635 SARS-COV-2 COVID-19 AMP PRB: CPT | Performed by: EMERGENCY MEDICINE

## 2021-02-25 PROCEDURE — 258N000003 HC RX IP 258 OP 636: Performed by: EMERGENCY MEDICINE

## 2021-02-25 PROCEDURE — 999N001017 HC STATISTIC GLUCOSE BY METER IP

## 2021-02-25 PROCEDURE — 999N000157 HC STATISTIC RCP TIME EA 10 MIN

## 2021-02-25 PROCEDURE — 250N000011 HC RX IP 250 OP 636: Performed by: STUDENT IN AN ORGANIZED HEALTH CARE EDUCATION/TRAINING PROGRAM

## 2021-02-25 PROCEDURE — 258N000003 HC RX IP 258 OP 636: Performed by: PSYCHIATRY & NEUROLOGY

## 2021-02-25 PROCEDURE — 250N000011 HC RX IP 250 OP 636: Performed by: EMERGENCY MEDICINE

## 2021-02-25 PROCEDURE — 84439 ASSAY OF FREE THYROXINE: CPT | Performed by: INTERNAL MEDICINE

## 2021-02-25 PROCEDURE — 255N000002 HC RX 255 OP 636: Performed by: EMERGENCY MEDICINE

## 2021-02-25 PROCEDURE — 94003 VENT MGMT INPAT SUBQ DAY: CPT

## 2021-02-25 PROCEDURE — 250N000011 HC RX IP 250 OP 636: Performed by: PSYCHIATRY & NEUROLOGY

## 2021-02-25 PROCEDURE — 82565 ASSAY OF CREATININE: CPT | Performed by: INTERNAL MEDICINE

## 2021-02-25 PROCEDURE — 82040 ASSAY OF SERUM ALBUMIN: CPT | Performed by: INTERNAL MEDICINE

## 2021-02-25 PROCEDURE — 250N000011 HC RX IP 250 OP 636

## 2021-02-25 PROCEDURE — 5A1955Z RESPIRATORY VENTILATION, GREATER THAN 96 CONSECUTIVE HOURS: ICD-10-PCS | Performed by: EMERGENCY MEDICINE

## 2021-02-25 PROCEDURE — 95720 EEG PHY/QHP EA INCR W/VEEG: CPT | Performed by: PSYCHIATRY & NEUROLOGY

## 2021-02-25 PROCEDURE — 250N000011 HC RX IP 250 OP 636: Performed by: ANESTHESIOLOGY

## 2021-02-25 PROCEDURE — 99291 CRITICAL CARE FIRST HOUR: CPT | Mod: GC | Performed by: PSYCHIATRY & NEUROLOGY

## 2021-02-25 PROCEDURE — 36415 COLL VENOUS BLD VENIPUNCTURE: CPT | Performed by: INTERNAL MEDICINE

## 2021-02-25 PROCEDURE — 82805 BLOOD GASES W/O2 SATURATION: CPT | Performed by: INTERNAL MEDICINE

## 2021-02-25 PROCEDURE — 999N000009 HC STATISTIC AIRWAY CARE

## 2021-02-25 PROCEDURE — 250N000009 HC RX 250: Performed by: PSYCHIATRY & NEUROLOGY

## 2021-02-25 PROCEDURE — 99291 CRITICAL CARE FIRST HOUR: CPT | Performed by: INTERNAL MEDICINE

## 2021-02-25 PROCEDURE — 84443 ASSAY THYROID STIM HORMONE: CPT | Performed by: INTERNAL MEDICINE

## 2021-02-25 PROCEDURE — 81003 URINALYSIS AUTO W/O SCOPE: CPT | Performed by: EMERGENCY MEDICINE

## 2021-02-25 PROCEDURE — 250N000011 HC RX IP 250 OP 636: Performed by: INTERNAL MEDICINE

## 2021-02-25 PROCEDURE — 94002 VENT MGMT INPAT INIT DAY: CPT

## 2021-02-25 PROCEDURE — 250N000009 HC RX 250: Performed by: EMERGENCY MEDICINE

## 2021-02-25 RX ORDER — NICOTINE POLACRILEX 4 MG
15-30 LOZENGE BUCCAL
Status: DISCONTINUED | OUTPATIENT
Start: 2021-02-25 | End: 2021-03-09 | Stop reason: HOSPADM

## 2021-02-25 RX ORDER — LORAZEPAM 2 MG/ML
2 INJECTION INTRAMUSCULAR ONCE
Status: DISCONTINUED | OUTPATIENT
Start: 2021-02-25 | End: 2021-02-28 | Stop reason: CLARIF

## 2021-02-25 RX ORDER — DEXTROSE MONOHYDRATE 25 G/50ML
25-50 INJECTION, SOLUTION INTRAVENOUS
Status: DISCONTINUED | OUTPATIENT
Start: 2021-02-25 | End: 2021-03-09 | Stop reason: HOSPADM

## 2021-02-25 RX ORDER — LORAZEPAM 2 MG/ML
INJECTION INTRAMUSCULAR
Status: COMPLETED
Start: 2021-02-25 | End: 2021-02-25

## 2021-02-25 RX ORDER — MIDAZOLAM (PF) 1 MG/ML IN 0.9 % SODIUM CHLORIDE INTRAVENOUS SOLUTION
1-8 CONTINUOUS
Status: DISCONTINUED | OUTPATIENT
Start: 2021-02-25 | End: 2021-02-25

## 2021-02-25 RX ORDER — PROPOFOL 10 MG/ML
INJECTION, EMULSION INTRAVENOUS
Status: DISCONTINUED
Start: 2021-02-25 | End: 2021-02-25 | Stop reason: WASHOUT

## 2021-02-25 RX ORDER — TOPIRAMATE 200 MG/1
200 TABLET, FILM COATED ORAL 2 TIMES DAILY
Status: DISCONTINUED | OUTPATIENT
Start: 2021-02-25 | End: 2021-03-09 | Stop reason: HOSPADM

## 2021-02-25 RX ORDER — LORAZEPAM 2 MG/ML
2 INJECTION INTRAMUSCULAR ONCE
Status: COMPLETED | OUTPATIENT
Start: 2021-02-25 | End: 2021-02-25

## 2021-02-25 RX ORDER — PROPOFOL 10 MG/ML
5-75 INJECTION, EMULSION INTRAVENOUS CONTINUOUS
Status: DISCONTINUED | OUTPATIENT
Start: 2021-02-25 | End: 2021-02-26

## 2021-02-25 RX ORDER — GADOBUTROL 604.72 MG/ML
9 INJECTION INTRAVENOUS ONCE
Status: COMPLETED | OUTPATIENT
Start: 2021-02-25 | End: 2021-02-25

## 2021-02-25 RX ORDER — PSEUDOEPHEDRINE HCL 30 MG
30 TABLET ORAL
COMMUNITY
End: 2021-03-23

## 2021-02-25 RX ORDER — ETOMIDATE 2 MG/ML
20 INJECTION INTRAVENOUS ONCE
Status: COMPLETED | OUTPATIENT
Start: 2021-02-25 | End: 2021-02-25

## 2021-02-25 RX ORDER — TOPIRAMATE 200 MG/1
200 TABLET, FILM COATED ORAL 2 TIMES DAILY
COMMUNITY
End: 2021-03-30

## 2021-02-25 RX ORDER — LORAZEPAM 2 MG/ML
INJECTION INTRAMUSCULAR
Status: DISCONTINUED
Start: 2021-02-25 | End: 2021-02-25 | Stop reason: HOSPADM

## 2021-02-25 RX ORDER — LORAZEPAM 2 MG/ML
2 INJECTION INTRAMUSCULAR ONCE
Status: COMPLETED | OUTPATIENT
Start: 2021-02-25 | End: 2021-02-24

## 2021-02-25 RX ORDER — LACTULOSE 10 G/15ML
30 SOLUTION ORAL 2 TIMES DAILY PRN
COMMUNITY
End: 2021-03-23

## 2021-02-25 RX ORDER — LEVETIRACETAM 10 MG/ML
1000 INJECTION INTRAVASCULAR ONCE
Status: COMPLETED | OUTPATIENT
Start: 2021-02-25 | End: 2021-02-25

## 2021-02-25 RX ORDER — DEXTROSE, SODIUM CHLORIDE, SODIUM LACTATE, POTASSIUM CHLORIDE, AND CALCIUM CHLORIDE 5; .6; .31; .03; .02 G/100ML; G/100ML; G/100ML; G/100ML; G/100ML
INJECTION, SOLUTION INTRAVENOUS CONTINUOUS
Status: DISCONTINUED | OUTPATIENT
Start: 2021-02-25 | End: 2021-03-02

## 2021-02-25 RX ORDER — CHLORHEXIDINE GLUCONATE ORAL RINSE 1.2 MG/ML
15 SOLUTION DENTAL EVERY 12 HOURS
Status: DISCONTINUED | OUTPATIENT
Start: 2021-02-25 | End: 2021-03-02

## 2021-02-25 RX ADMIN — VALPROATE SODIUM 750 MG: 100 INJECTION, SOLUTION INTRAVENOUS at 18:40

## 2021-02-25 RX ADMIN — SODIUM CHLORIDE 100 MG: 9 INJECTION, SOLUTION INTRAVENOUS at 20:47

## 2021-02-25 RX ADMIN — GADOBUTROL 9 ML: 604.72 INJECTION INTRAVENOUS at 05:25

## 2021-02-25 RX ADMIN — ETOMIDATE INJECTION 20 MG: 2 SOLUTION INTRAVENOUS at 00:10

## 2021-02-25 RX ADMIN — PANTOPRAZOLE SODIUM 40 MG: 40 INJECTION, POWDER, FOR SOLUTION INTRAVENOUS at 18:27

## 2021-02-25 RX ADMIN — TOPIRAMATE 200 MG: 200 TABLET ORAL at 12:53

## 2021-02-25 RX ADMIN — CHLORHEXIDINE GLUCONATE 0.12% ORAL RINSE 15 ML: 1.2 LIQUID ORAL at 20:09

## 2021-02-25 RX ADMIN — TOPIRAMATE 200 MG: 200 TABLET ORAL at 20:09

## 2021-02-25 RX ADMIN — LEVETIRACETAM 1000 MG: 10 INJECTION INTRAVENOUS at 00:00

## 2021-02-25 RX ADMIN — LORAZEPAM 2 MG: 2 INJECTION INTRAMUSCULAR; INTRAVENOUS at 03:44

## 2021-02-25 RX ADMIN — LEVETIRACETAM 750 MG: 100 INJECTION, SOLUTION INTRAVENOUS at 12:35

## 2021-02-25 RX ADMIN — LORAZEPAM 2 MG: 2 INJECTION INTRAMUSCULAR; INTRAVENOUS at 04:38

## 2021-02-25 RX ADMIN — CHLORHEXIDINE GLUCONATE 0.12% ORAL RINSE 15 ML: 1.2 LIQUID ORAL at 09:29

## 2021-02-25 RX ADMIN — ENOXAPARIN SODIUM 40 MG: 40 INJECTION SUBCUTANEOUS at 18:27

## 2021-02-25 RX ADMIN — VALPROATE SODIUM 750 MG: 100 INJECTION, SOLUTION INTRAVENOUS at 12:37

## 2021-02-25 RX ADMIN — FAMOTIDINE 20 MG: 10 INJECTION, SOLUTION INTRAVENOUS at 09:29

## 2021-02-25 RX ADMIN — SODIUM CHLORIDE, SODIUM LACTATE, POTASSIUM CHLORIDE, CALCIUM CHLORIDE AND DEXTROSE MONOHYDRATE: 5; 600; 310; 30; 20 INJECTION, SOLUTION INTRAVENOUS at 06:56

## 2021-02-25 RX ADMIN — MIDAZOLAM (PF) 1 MG/ML IN 0.9 % SODIUM CHLORIDE INTRAVENOUS SOLUTION 4 MG/HR: at 04:05

## 2021-02-25 RX ADMIN — SODIUM CHLORIDE 200 MG: 9 INJECTION, SOLUTION INTRAVENOUS at 09:47

## 2021-02-25 RX ADMIN — ROCURONIUM BROMIDE 100 MG: 10 INJECTION, SOLUTION INTRAVENOUS at 00:10

## 2021-02-25 RX ADMIN — SODIUM CHLORIDE 1000 ML: 9 INJECTION, SOLUTION INTRAVENOUS at 00:20

## 2021-02-25 RX ADMIN — PROPOFOL 10 MCG/KG/MIN: 10 INJECTION, EMULSION INTRAVENOUS at 00:15

## 2021-02-25 RX ADMIN — SODIUM CHLORIDE 100 MG: 9 INJECTION, SOLUTION INTRAVENOUS at 13:18

## 2021-02-25 RX ADMIN — LEVETIRACETAM 1000 MG: 10 INJECTION INTRAVENOUS at 00:15

## 2021-02-25 ASSESSMENT — ACTIVITIES OF DAILY LIVING (ADL)
ADLS_ACUITY_SCORE: 16
ADLS_ACUITY_SCORE: 16
ADLS_ACUITY_SCORE: 14
ADLS_ACUITY_SCORE: 16

## 2021-02-25 NOTE — H&P
[facility] Intensive Care Unit  History and Physical  February 25, 2021  Duane D Backes MRN# [id number]   Age: 40 year old YOB: 1980     Date of Admission: 2/24/2021    Primary care provider: Rajat Parkinson          Assessment and plan :   Duane D Backes is a 40 year old male with history of autism, tonic-clonic seizures, benign epidermoid tumor, and hypothyroidism who presented to the ED 2/24/2021 for a 3 minute witnessed seizure. He had another seizure when he arrived in the ED and was given a total of 4 mg ativan. He became somnolent and had increased secretions so he was intubated in the ED and transferred to the ICU. My assessment and plan for this patient is as follows:    Neurology:   Seizures  Patient has seizure disorder that has reportedly been controlled with depakote 1000 mg BID and topiramate 200 mg BID. He had one witnessed 3-4 minute seizure at his group home on 2/24. The seizure broke by the time he arrived to the ED. CT 2/24 showing no acute changes. He had another 3-4 minute seizure in the ED and was given a total of 4 mg of ativan. He was intubated for airway protection given somnolence and increased secretions. Neuro crit was consulted and MRI and EEG was ordered. He was also given a keppra load. Patient had another seizure when he was in MRI 2/25. Ativan given and versed drip was started. Patient has remained hemodynamically stable.  -Neuro consulted - appreciate recs  -Keppra load  -EEG in morning  -Versed gtt    Cardiovascular:   Patient has had softer pressures on propofol. Will stop propofol drip as patient is on versed. Will continue to monitor.  -Discontinue propofol    Pulmonary/Ventilator Management:   Mechanical ventilation  He was intubated in the ED for airway protection. On VC/AC with RR 16, Vte 550, PEEP 5, FiO2 40. Has not had a blood gas since being intubated.  -VBG     GI and Nutrition : Acute abdominal issue present  No. Active GI bleeding present: No.  Liver function abnormality present No. Significant constipation issue: No. Significant diarrhea No.  Other GI problem: No.   Reasons to postpone or  Contraindication to enteral nutrition No   -Will keep NPO for now given he may be able to be extubated within 24 hours    Renal/Fluids/Electrolytes: .Assessment: JESSICA  present (rapid recent change in kidney function: rise in serum creatinine r?0.3 mg/dl or ? 50% or urine output <0.5 ml/kg/hr for more than 6 hours)?: NO. Is most recent renal function and/or urine output trend improving: not changed. Rare Major electrolytes or A/B abnormalities present needing immediate intervention No: Is U/A abnormal: No. Plan: monitor renal function, I/O, electrolytes, A/B status and treat/replace  electrolytes as needed.   -D5 LR at 50 ml/hr for lower gluocse    Infectious Disease:   No concerns at this time. Patient had COVID-19 in November and is now negative.     Endocrine:   Hypothyroidism  Patient is on levothyroxine 150 mcg PTA. Can hold this for now.    Hematology/Oncology:   No concerns at this time.    IV/Access:   2 Peripherals    Prophylaxis:   Famotidine  DVT: enoxaparin    Billing: total time spend providing critical care was 30 min         Chief Complaint/ Reason for ICU Admission and HPI     Admitted for : Seizure   History obtained from ED signout  History of Present Illness:   Duane Backes is a 40 year old M with history of epidermoid tumor and previously well controlled seizure disorder on depakote and topiramate who had a 3-4 minute witnessed seizure at his group home. He was brought to the ED by EMS and by the time he arrived, his seizure broke. He had another seizure in the ED and was given 2 of ativan and then 2 more. He was likely post-ictal and overly sedated and started to become somnolent and had frothy secretions. He was intubated in the ED for airway protection and transferred to the ICU.    Patient is currently sedated and intubated.         Past Medical  History:   I have reviewed this patient's medical history.         Past Surgical History:   I have reviewed this patient's surgical history.         Social History:   I have reviewed this patient's social history.  Smoking: No.   History   Smoking Status     Never Smoker   Smokeless Tobacco     Never Used     Alcohol: No  Social History    Substance and Sexual Activity      Alcohol use: No           Family History:     Family History   Problem Relation Age of Onset     Unknown/Adopted No family hx of             Allergies:   Please see allergy list which was reviewed this admission.  All allergies reviewed and addressed         Medications:     Medications Prior to Admission   Medication Sig Dispense Refill Last Dose     ammonium lactate (AMLACTIN) 12 % cream Apply topically daily as needed for dry skin        divalproex sodium extended-release (DEPAKOTE ER) 500 MG 24 hr tablet Decreased depakote as advised (will fax med schedule to [facility]) to 1000 mg twice a day 150 tablet 11      docusate sodium (COLACE) 100 MG capsule Take 1 capsule by mouth 2 times daily.        FISH OIL 1000 MG OR CAPS 1 PO BID (Patient taking differently: 1 capsule 2 times per day) 60 11      lactulose (CHRONULAC) 10 GM/15ML solution Take 30 mLs by mouth every evening & additional 30mL 2 times per day as needed        levothyroxine (SYNTHROID/LEVOTHROID) 150 MCG tablet TAKE 1 TABLET BY MOUTH ONCE DAILY  FOR THYROID   *1 TOTAL FILL* 31 tablet 9      LITHIUM CARBONATE ER PO Take 600 mg by mouth At Bedtime         MILK OF MAGNESIA 400 MG/5ML suspension 30 mLs every evening         order for DME Flat sole shoe (Patient not taking: Reported on 10/30/2020) 1 Device 0      polyethylene glycol (MIRALAX) powder Take 17 g by mouth daily. 510 g 0      risperiDONE (RISPERDAL) 2 MG tablet Take 2 mg by mouth 1 tab every morning & 1 tab every bedtime        sertraline (ZOLOFT) 100 MG tablet Take 1.5 tablets by mouth every morning.        Starch, Thickening,  POWD Combined with beverages, per instructions from Speech Therapy. 850 g 99      topiramate (TOPAMAX) 100 MG tablet Increase as advised to 200 mg twice a day over 2 weeks 120 tablet 11      topiramate (TOPAMAX) 50 MG tablet TAKE 1 TABLET BY MOUTH TWICE DAILY. (Patient not taking: Reported on 1/19/2021) 60 tablet 11      VITAMIN D3 1000 units tablet 3,000 Units daily               Review of Systems:   Unable to complete ROS because patient is sedated and intubated.       Physical Exam:   Vitals were reviewed  Temp:  [97.8  F (36.6  C)] 97.8  F (36.6  C)  Pulse:  [] 64  Resp:  [0-185] 9  BP: ()/(49-87) 113/83  SpO2:  [97 %-100 %] 99 %    Intake/Output Summary (Last 24 hours) at 2/25/2021 0427  Last data filed at 2/25/2021 0115  Gross per 24 hour   Intake 1200 ml   Output --   Net 1200 ml       General:   Comfortable, no pain or respiratory distress   Neurologic:   Sedation: heavily sedated and unawakable   HEENT:   Head is atraumatic  Trachea is midline  Endotrachael tube is present      Lungs:   Symmetrical chest shape and movements with each tidal breath  Symmetrical and normal breath sounds, no wheeze, ronchi, crackles, rub or bronchial breath sounds   Cardiovascular:   Normal and symmetrical radial, femoral and carotid pulses  Regular rate and rhythm  Normal S1,S2, and no gallop, rub or murmur   Abdomen:   Distended:  No. Bowel sound present and normal: Yes . Soft: Yes . Tender: No. Rebound:tendeness or guarding present No  Hernia present: No   Extremities:   Clubbing present: No, Edema present: No, Acrocyanosis present: No, Mottling present: No, Normal capillary refill: Yes    Skin:   Warm, dry.  No rash on limited exam.    Lines/Drain:    Central line present: No  Arterial line present: No  External ventricular Drain present: No  Chest tube present: No  LATHA present: No  PEG present: No           Ancillary Data:   All laboratory and imaging data reviewed.     ABG/vent data:   Ventilation Mode: CMV/AC   (Continuous Mandatory Ventilation/ Assist Control)  Rate Set (breaths/minute): 16 breaths/min  Tidal Volume Set (mL): 550 mL  PEEP (cm H2O): 5 cmH2O  Oxygen Concentration (%): 60 %  Peak Inspiratory Pressure (cm H2O) (Drager Mackenzie): 22  Resp: 9    No lab results found in last 7 days.     ROUTINE IP LABS (Last four results)  BMP  Recent Labs   Lab 02/24/21  2322      POTASSIUM 5.2   CHLORIDE 105   JESSICA 9.7   CO2 35*   BUN 13   CR 0.76   GLC 87     CBC  Recent Labs   Lab 02/24/21  2322   WBC 3.9*   RBC 4.32*   HGB 13.1*   HCT 41.9   MCV 97   MCH 30.3   MCHC 31.3*   RDW 14.7        INRNo lab results found in last 7 days.          All imaging studies reviewed by me.    Kristin Huddleston, MS4 February 25, 2021 served as a scribe for this note for Dr. Prasanna Shine MD    CC time:  30 min  Prasanna Shine MD  [facility] Intensivist Service

## 2021-02-25 NOTE — CONSULTS
"  [facility]    Neuro Critical Care Consult Note    Reason for Consult:  Seizure     Chief Complaint: No chief complaint on file.       HPI obtain from chart review as patient is intubated and sedated   Duane D Backes is a 40 year old male \"...........with history of autism, tonic-clonic seizures, benign epidermoid tumor, and hypothyroidism who presented to the ED 2/24/2021 for a 3 minute witnessed seizure. He had another seizure when he arrived in the ED and was given a total of 4 mg ativan. He became somnolent and had increased secretions so he was intubated in the ED and transferred to the ICU......\"    Patient takes VPA 1000 mg BID and Topamax 200 mg BID and follows at the  with Dr. Washington as his primary epileptologist. Per his last note on 1/19/2021    \"........ Prior EEGs have shown rare left temporal sharp waves (opposite side of tumor).  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  Patient has been managed on Depakote and topiramate for several years with no seizures.  In June 2020 he had 2 possible generalized tonic-clonic seizures in the setting of supratherapeutic divalproex levels.  His most recent divalproex level in September 2020 was 130, prior to that 144 and 172.  He tolerated this change with no significant seizure deterioration or side effects.  I encouraged Raul to obtain labs when it is safe to do so.  I will follow-up with him in 2 months.     If he has seizure deterioration we may introduce a sodium channel blocker, in 2006 Dilantin was very efficacious for him.  If a sodium channel blockers considered I recommend we use oxcarbazepine or Vimpat.  Phenytoin will have interactions with divalproex.  Certainly when we make medication changes he is at risk of breakthrough seizures, behavioral changes, and appetite changes.  Additionally side effects of kidney stones, appetite " "changes, and mood changes with increasing Topamax have been reviewed.      Plan :   Continue topiramate 200 mg am and 200 mg pm and depakote 1000 mg twice a day   We may consider lowering depakote 500 mg am and 1000 mg pm since his levels are still high at 130, certainly he can have more mood issues and seizures.\"    Impression  Epilepsy  Concern for status   VPA level 102    Recommendations  Will hook up to EEG  Will stop Keppra as it may exacerbate mood problems   Will load with Vimpat 200 now and start 100 mg BID   Will start  mg every 8 hours   Will resume home  mg BID   Limited sedating medication as able, preferably use precedex for sedation for accurate neuro examination  Hold all lithium and other antipsychotic medication as it may lower seizure threshold    Patient Follow-up    - final recommendation pending work-up    Thank you for this consult. We will continue to follow.     The NeuroCritical and Stroke Staff is Kathy Cardona MD.    Joshua Merchant MD  Vascular Neurology Fellow  To page me or covering stroke neurology team member, click here: [facility]   Choose \"On Call\" tab at top, then search dropdown box for \"Neurology Adult\", select location, press Enter, then look for stroke/neuro ICU/telestroke.    _____________________________________________________    Past Medical History   Past Medical History:   Diagnosis Date     Autistic disorder, current or active state      Dry skin      Moderate intellectual disabilities      Obsessive-compulsive disorders      Overweight (BMI 25.0-29.9) 8/2/2013     Seizure disorder (H)     Generalized tonic clonic     Unspecified constipation      Past Surgical History   Past Surgical History:   Procedure Laterality Date     CATARACT IOL, RT/LT Left 11/17/2018     Medications   Home Meds  Prior to Admission medications    Medication Sig Start Date End Date Taking? Authorizing Provider   ammonium lactate (LAC-HYDRIN) 12 % external lotion Apply topically " daily To feet and heels   Yes Reported, Patient   divalproex sodium extended-release (DEPAKOTE ER) 500 MG 24 hr tablet Decreased depakote as advised (will fax med schedule to [facility]) to 1000 mg twice a day 10/30/20  Yes Lauren Washington MD   docusate sodium (COLACE) 100 MG capsule Take 1 capsule by mouth 2 times daily.   Yes Reported, Patient   FISH OIL 1000 MG OR CAPS 1 PO BID  Patient taking differently: 1 capsule 2 times per day 8/27/09  Yes Pato Morales MD   lactulose (CHRONULAC) 10 GM/15ML solution Take 30 mLs by mouth 2 times daily as needed   Yes Unknown, Entered By History   lactulose (CHRONULAC) 10 GM/15ML solution Take 30 mLs by mouth every evening & additional 30mL 2 times per day as needed   Yes Reported, Patient   levothyroxine (SYNTHROID/LEVOTHROID) 150 MCG tablet TAKE 1 TABLET BY MOUTH ONCE DAILY  FOR THYROID   *1 TOTAL FILL* 9/3/20  Yes Rajat Parkinson MD   lithium ER (LITHOBID) 300 MG CR tablet Take 600 mg by mouth At Bedtime    Yes Reported, Patient   MILK OF MAGNESIA 400 MG/5ML suspension 30 mLs every evening  3/29/16  Yes Reported, Patient   polyethylene glycol (MIRALAX) powder Take 17 g by mouth daily. 4/27/12  Yes Pato Morales MD   pseudoePHEDrine (SUDAFED) 30 MG tablet Take 30 mg by mouth every evening as needed for congestion   Yes Unknown, Entered By History   risperiDONE (RISPERDAL) 2 MG tablet Take 2 mg by mouth 2 times daily 1 tab every morning & 1 tab every bedtime   Yes Reported, Patient   sertraline (ZOLOFT) 100 MG tablet Take 1.5 tablets by mouth every morning.   Yes Ronal Tavera MD   topiramate (TOPAMAX) 200 MG tablet Take 200 mg by mouth 2 times daily   Yes Unknown, Entered By History   VITAMIN D3 1000 units tablet 3,000 Units daily 7/11/17  Yes Reported, Patient   order for DME Flat sole shoe  Patient not taking: Reported on 10/30/2020 8/15/20   Real Pak, PAEstrellaC   Starch, Thickening, POWD Combined with beverages, per instructions from Speech Therapy.  1/14/21   Rajat Parkinson MD   topiramate (TOPAMAX) 50 MG tablet TAKE 1 TABLET BY MOUTH TWICE DAILY.  Patient not taking: Reported on 1/19/2021 7/9/20   Davidson Villar MD       Scheduled Meds    chlorhexidine  15 mL Mouth/Throat Q12H     enoxaparin ANTICOAGULANT  40 mg Subcutaneous Q24H     famotidine  20 mg Intravenous Q12H     LORazepam  2 mg Intravenous Once       Infusion Meds    dextrose 5% lactated ringers 50 mL/hr at 02/25/21 0733     midazolam 4 mg/hr (02/25/21 0732)     midazolam 4 mg/hr (02/25/21 0656)     propofol (DIPRIVAN) infusion Stopped (02/25/21 0637)       PRN Meds  glucose **OR** dextrose **OR** glucagon, midazolam    Allergies   Allergies   Allergen Reactions     Penicillin [Penicillins] Other (See Comments)     Prozac [Fluoxetine Hcl]      Reglan [Metoclopramide Hcl]      Ritalin [Methylphenidate Derivatives] Other (See Comments)     Ritalin     Trazodone      Family History   Family History   Problem Relation Age of Onset     Unknown/Adopted No family hx of      Social History   Social History     Tobacco Use     Smoking status: Never Smoker     Smokeless tobacco: Never Used   Substance Use Topics     Alcohol use: No     Drug use: No       Review of Systems   Review of systems not obtained due to patient factors - intubation and sedation       PHYSICAL EXAMINATION   Temp:  [97.8  F (36.6  C)] 97.8  F (36.6  C)  Pulse:  [] 55  Resp:  [0-185] 16  BP: ()/(48-87) 88/59  FiO2 (%):  [40 %] 40 %  SpO2:  [97 %-100 %] 100 %    Neurologic  Mental Status:  intubated and sedated, does no follow  Cranial Nerves:  Pupils 3-4 mm and reactive, face appears symmetric, cough and gag intact. Dolls eyes present   Motor:  Normal tone, withdraws on noxious  Reflexes:  clonus in RLE  Sensory:  intact to noxious  Coordination:  no resting tremor  Station/Gait:  deferred    Dysphagia Screen  Per Nursing      Imaging  I personally reviewed all imaging; relevant findings per HPI.    Labs Data    CBC  Recent Labs   Lab 02/24/21  2322   WBC 3.9*   RBC 4.32*   HGB 13.1*   HCT 41.9        Basic Metabolic Panel   Recent Labs   Lab 02/25/21  0749 02/24/21  2322   NA  --  138   POTASSIUM  --  5.2   CHLORIDE  --  105   CO2  --  35*   BUN  --  13   CR 0.70 0.76   GLC  --  87   JESSICA  --  9.7     Liver Panel  No results for input(s): PROTTOTAL, ALBUMIN, BILITOTAL, ALKPHOS, AST, ALT, BILIDIRECT in the last 168 hours.  INR  No lab results found.   Lipid Profile    Recent Labs   Lab Test 05/17/18  0936 08/15/17  1658 08/06/15  0946 07/31/14 08/02/13  1532   CHOL 174 152 138 137 138   HDL 58 60 59 47 53   * 73 65 75 70   TRIG 77 94 70 76 80   CHOLHDLRATIO  --   --  2.3 3 2.6     A1C    Recent Labs   Lab Test 09/23/20  0858 08/15/17  1658   A1C 4.7 4.9     Troponin I  No results for input(s): TROPI in the last 168 hours.

## 2021-02-25 NOTE — TELEPHONE ENCOUNTER
Per chart review, patient was intubated for respiratory compromise secondary to treatment for repetitive GTC. Forwarding to provider as notification.  ----------ADDENDUM 3/1/2021-------------  [facility] was notified by telephone to call our office once Duane is discharged to schedule a follow up appointment with Dr. Washington.

## 2021-02-25 NOTE — PROGRESS NOTES
Care Coordination:    Writer was notified that patient might have a guardian. Patient called family at [phone]. Awaiting call back. Writer also received multiple calls from [facility], they would like an update after rounds.Will verify ok to talk to GH from parents/guardian.    Bette Giraldo RN  BSN, Care Coordinator    [employer]/ Care Transitions          [email or web address]                Phone [phone]

## 2021-02-25 NOTE — ED NOTES
Approx 3.5 minute seizure in room 24. RNs at bedside; Ativan given, MD called to room. Patient moved to Stab 3. Charge RN aware.

## 2021-02-25 NOTE — PROGRESS NOTES
Critical Care Progress Note      02/25/2021    Name: Duane D Backes MRN#: [id number]   Age: 40 year old YOB: 1980     Hsptl Day# 1  ICU DAY # 1    MV DAY #             Problem List:   Active Problems:    Seizure disorder (H)    Altered mental status, unspecified altered mental status type           Summary/Hospital Course:   Duane D Backes is a 40 year old male with PMH of autism, epilepsy, tonic-clonic seizures, autism, and an epidermoid tumor who presented on 2/24 to the ED for a 3 minute witnessed seizure. He had another seizure in the ED and was given a total of 4 mg of ativan. He became somnolent and had increased secretions prompting intubation and mechanical ventilation and admission to the ICU.         Assessment and plan :     Duane D Backes is a 40 year old male with PMH of autism, epilepsy, tonic-clonic seizures, and an epidermoid tumor admitted on 2/24/2021 for treatment of seizure that led to intubation and mechanical ventilation. Patient has been transitioned off of sedation as of this morning and is intubated and mechanically ventilated.    I have personally reviewed the daily labs, imaging studies, cultures and discussed the case with referring physician and consulting physicians.     My assessment and plan by system for this patient is as follows:    Neurology/Psychiatry:   1. Seizure disorder with new seizure prompting admission. Patient has history of seizures, and specifically epilepsy, tonic-clonic seizures, and an enlarging epidermoid cyst. Differential for seizure leading to this admission includes increasing size of epidermoid cyst, medications lowering the seizure threshold (lithium, Risperdal, Zoloft, and Sudafed), and less likely infection and sepsis which would lower the seizure threshold. Patient reportedly taking 1000 mg BID Depakote and 200 mg BID topiramate PTA. Lithium level 1.26 and valproic acid level 102 on admission.   - Transitioned off of sedation this  AM    Plan  - Neurology consulted and following, appreciate recs:   - Discontinue the midazolam, start lacosamide  - EEG this AM  - Repeat CBC assessing for infection as possible cause of seizure    Cardiovascular:   1. Stable hemodynamics. Was on propofol this AM for sedation, has been stopped due to hypotension.   2. Sinus Rhythm on monitor with no signs of ischemia   Plan   - Monitor hemodynamic status.     Pulmonary/Ventilator Management:   1. Currently intubated and mechanically ventilated. Vent settings: RR 12, Vte 550, PEEP 5, FiO2 30.     Plan  - Recheck VBG to assess acid-base status and respiratory rate  - Trial SBT today, if patient awake and alert enough.     GI and Nutrition :   1. Brown-red sediment/liquid output through OG tube. Less likely blood in the tube at this point related to a GI bleed because patient is hemodynamically stable.   2. NPO while intubated  3. Recently started on DD2 diet with honey thickened liquids per group home  Plan  - Follow Hgb closely  - Monitor output of OG tube closely for clotting    Renal/Fluids/Electrolytes:   1. Respiratory alkalosis. Patient blowing off too much CO2 while on vent. Vent settings changed to decrease respiratory rate from 16 to 12 bpm.     Plan  - Repeat VBG to assess acid-base status  - monitor function and electrolytes as needed with replacement per ICU protocols.  - generally avoid nephrotoxic agents such as NSAID, IV contrast unless specifically required  - adjust medications as needed for renal clearance  - follow I/O's as appropriate.    Infectious Disease:   1.  WBC 3.9 on admission->4.6 on recheck 2/25 AM. UA on admission negative. Blood cultures pending, NGT.       Plan  - Repeat CBC, follow-up on blood culture   - Sputum culture  - Flu swab  - Check in with group home to see if flu has been at group home  - WBCs normalizing, and temperature coming up. Patient does not have clear source of infection. Additionally there is no organ dysfunction  present. Therefore, do not need to start antibiotics at this point     Endocrine:   1. Hypothermia. Patient initially had temp of 91F and has slowly warmed up to normal temp while on a Eric hugger. SIRS or Sepsis could lead to hypothermia.  2. History of hypothyroidism: We will check TSH and reflex T4    Plan  - ICU insulin protocol, goal sugar <180  - Continue Eric hugger for external warming of patient  - Continue to monitor temperature, investigate for source of infection  - PTA levothyroxine 150 mcg, hold for now    Hematology/Oncology:   1. Anemia, Hg 13.1->11.7  - Monitor Hg levels on recheck      MSKL/Rheum:  1. No current concerns    IV/Access:   1. Venous access - PIV right upper forearm, PIV left forearm,   2. Colin catheter    ICU Prophylaxis:   1. DVT: Hep Subq  2. VAP: HOB 30 degrees, chlorhexidine rinse  3. Stress Ulcer: PPI  4. Restraints: Nonviolent soft two point restraints required and necessary for patient safety and continued cares and good effect as patient continues to pull at necessary lines, tubes despite education and distraction. Will readdress daily.   5. Wound care - per unit routine   6. Feeding - NPO while intubated and mechanically sedated  7. Family Update: TBD  8. Disposition - ICU, TBD        Key goals for next 24 hours:   1. Monitor patient's neurologic status to assess whether he is alert enough to extubate  2. Prevent further seizures  3. Check thyroid function           Interim History:   Patient off of sedation, intubated and mechanically ventilated.            Key Medications:       chlorhexidine  15 mL Mouth/Throat Q12H     enoxaparin ANTICOAGULANT  40 mg Subcutaneous Q24H     famotidine  20 mg Intravenous Q12H     lacosamide (VIMPAT) intermittent infusion  100 mg Intravenous BID     levETIRAcetam  750 mg Intravenous Q12H     LORazepam  2 mg Intravenous Once     topiramate  200 mg Oral BID     valproate (DEPACON) intermittent infusion  750 mg Intravenous Q8H       dextrose 5%  lactated ringers 50 mL/hr at 02/25/21 0733     midazolam Stopped (02/25/21 0910)     midazolam Stopped (02/25/21 0927)     propofol (DIPRIVAN) infusion Stopped (02/25/21 0637)               Physical Examination:   Temp:  [92.5  F (33.6  C)-97.8  F (36.6  C)] 95.2  F (35.1  C)  Pulse:  [] 84  Resp:  [0-185] 16  BP: ()/(48-87) 107/73  FiO2 (%):  [30 %-40 %] 30 %  SpO2:  [97 %-100 %] 98 %    Intake/Output Summary (Last 24 hours) at 2/25/2021 0943  Last data filed at 2/25/2021 0637  Gross per 24 hour   Intake 1250.15 ml   Output 170 ml   Net 1080.15 ml     Wt Readings from Last 4 Encounters:   02/25/21 85.7 kg (188 lb 15 oz)   12/23/19 89.8 kg (198 lb)   12/16/19 90 kg (198 lb 6.4 oz)   08/27/19 91.8 kg (202 lb 6.4 oz)     BP - Mean:  [] 82  Ventilation Mode: CMV/AC  (Continuous Mandatory Ventilation/ Assist Control)  FiO2 (%): 30 %  Rate Set (breaths/minute): 12 breaths/min  Tidal Volume Set (mL): 550 mL  PEEP (cm H2O): 5 cmH2O  Oxygen Concentration (%): 30 %  Peak Inspiratory Pressure (cm H2O) (Drager Mackenzie): 22  Resp: 16    Recent Labs   Lab 02/25/21  0755   PH 7.52*   PCO2 33*   PO2 174*   HCO3 27   O2PER 40       GEN: no acute distress   HEENT: head ncat, sclera anicteric, OP patent, trachea midline   PULM: unlabored synchronous with vent, clear anteriorly    CV/COR: RRR S1S2 no gallop,  No rub, no murmur, 2+ peripheral pulses  ABD: soft nontender, hypoactive bowel sounds, no mass  EXT: No edema,   warm  NEURO: pupils constrict to light, patient does not react to pinches to extremities  SKIN: no obvious rash  LINES: clean, dry intact         Data:   All data and imaging reviewed     ROUTINE ICU LABS (Last four results)  CMP  Recent Labs   Lab 02/25/21  0749 02/24/21  2322   NA  --  138   POTASSIUM  --  5.2   CHLORIDE  --  105   CO2  --  35*   ANIONGAP  --  <1*   GLC  --  87   BUN  --  13   CR 0.70 0.76   GFRESTIMATED >90 >90   GFRESTBLACK >90 >90   JESSICA  --  9.7   ALBUMIN 3.0*  --      CBC  Recent  Labs   Lab 02/25/21  0749 02/24/21  2322   WBC 4.6 3.9*   RBC 3.80* 4.32*   HGB 11.7* 13.1*   HCT 35.7* 41.9   MCV 94 97   MCH 30.8 30.3   MCHC 32.8 31.3*   RDW 14.7 14.7    155     INRNo lab results found in last 7 days.  Arterial Blood Gas  Recent Labs   Lab 02/25/21  0755   PH 7.52*   PCO2 33*   PO2 174*   HCO3 27   O2PER 40       All cultures:  Recent Labs   Lab 02/25/21  0028 02/25/21  0023   CULT No growth after 4 hours No growth after 4 hours     Recent Results (from the past 24 hour(s))   CT Head w/o Contrast    Narrative    EXAM: CT HEAD W/O CONTRAST  LOCATION: [facility]  DATE/TIME: 2/24/2021 11:42 PM    INDICATION: Seizure, nontraumatic (Age 18-40y)  COMPARISON: 02/22/2019  TECHNIQUE: Routine CT Head without IV contrast. Multiplanar reformats. Dose reduction techniques were used.    FINDINGS:  INTRACRANIAL CONTENTS: No intracranial hemorrhage. Again noted is a large low-attenuation lesion involving the right middle cranial fossa, right ambient cistern, sella and suprasellar region and bilateral CP angles extending along the right aspect of the   cervical medullary junction. This compresses the right cerebral peduncle, right lilli and medulla and the craniocervical junction is deviated to the left. No CT evidence of acute infarct. Normal parenchymal attenuation. Normal ventricles and sulci.     VISUALIZED ORBITS/SINUSES/MASTOIDS: No intraorbital abnormality. No paranasal sinus mucosal disease. No middle ear or mastoid effusion.    BONES/SOFT TISSUES: No acute abnormality.      Impression    IMPRESSION:  1.  No acute intracranial process.  2.  No change in the large epidermoid tumor involving the right middle cranial fossa, right ambient cistern, suprasellar region, with extension along the right aspect of the midbrain and lilli and into the cervical medullary junction region with   associated mass effect as described.   XR Chest Port 1 View    Narrative    EXAM: XR CHEST PORT 1  VW  LOCATION: [facility]  DATE/TIME: 2/25/2021 12:14 AM    INDICATION: Intubated.  COMPARISON: None.      Impression    IMPRESSION: Endotracheal tube has been placed which terminates about 4 cm above the ruma. An enteric tube terminates over the left upper quadrant. Heart size is normal. Streaky bibasilar atelectasis or infiltrate. The upper lung zones are clear. No   visible pneumothorax or pleural effusion on this supine image.   MR Brain w/o & w Contrast    Narrative    EXAM: MR BRAIN W/O and W CONTRAST  LOCATION: [facility]  DATE/TIME: 2/25/2021 4:36 AM    INDICATION: Seizure, nontraumatic (Age 18-40y).  COMPARISON: 03/27/2014, 02/24/2021.  CONTRAST: 9 mL Gadavist.  TECHNIQUE: 1.5 multiplanar multisequence head MRI without and with intravenous contrast according to the seizure protocol.    FINDINGS:  INTRACRANIAL CONTENTS: The large epidermoid cyst has increased in size from 2014, with a larger component along the right lateral aspect of the medulla (series 8 image 6). There is increased mass effect on the brainstem with leftward displacement. This   area previously measured 2.2 x 2.2 cm, but now measures 3.6 x 2.9 cm in oblique AP x oblique transverse dimension. In addition, extension along the left medulla has increased from 2014 (series 5.2 image 17), measuring 2.5 x 1.8 cm, previously 1.4 x 1.4   cm. The remainder of the epidermoid cyst is grossly stable in size and extent, extending from the foramen magnum superiorly to encase the medulla and basilar artery, extending superiorly along the ventral lilli and midbrain into the suprasellar cistern   and along the undersurface of the right temporal lobe.     There is nodular enhancement and septation along the ventral and right lateral margin of the epidermoid cyst adjacent to the medulla (series 16 images 6 through 9). While some of the nodular enhancement could reflect choroid plexus traversing through the   epidermoid cyst, the  thicker septations along the ventral medulla are somewhat suspicious for a solid component, measuring approximately 0.7 x 1.4 x 0.8 cm (series 16 and series 8 image 6 and series 18 image 12). In addition, there is associated   susceptibility with the areas of nodular enhancement that appear calcified on the comparison head CT.    No acute or subacute infarct. No acute intracranial hemorrhage. There is no midline shift or uncal herniation. The cerebellar tonsils are superiorly displaced without herniation. The brain parenchyma is grossly normal in signal. The ventricles are stable   in configuration and nondilated. Normal position of the cerebellar tonsils.    SELLA: The pituitary gland is grossly unremarkable. The epidermoid cyst extends superiorly to encase the pituitary infundibulum and superiorly displaces the optic chiasm.    OSSEOUS STRUCTURES/SOFT TISSUES: Normal marrow signal. The major intracranial vascular flow voids are maintained.     ORBITS: No abnormality accounting for technique.     SINUSES/MASTOIDS: Moderate mucosal thickening scattered about the paranasal sinuses. Scattered fluid/membrane thickening in the mastoid air cells bilaterally.       Impression    IMPRESSION:  1.  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  2.  In addition, there is a new enhancing, partially calcified nodular component along the anterior and right lateral margins of the medulla. Given that epidermoid cysts can have malignant degeneration, short interval contrast-enhanced MRI follow-up is   recommended in 4-6 weeks.  3.  No acute hemorrhage or infarct.    Findings were discussed with Dr. Xavier at 6:41 AM on 02/25/2021.             Samson Romero on 2/25/2021 at 1:05 PM    Billing: This patient is critically ill: Yes. Total critical care time today 35 min.    Physician Attestation   I, Wale Woodward, was present with  the medical/REBEKA student who participated in the service and in the documentation of the note.  I have verified the history and personally performed the physical exam and medical decision making.  I agree with the assessment and plan of care as documented in the note.      I personally reviewed vital signs, medications, labs and imaging.  40 year old male with epidermoid tumor with PMH of autism, epilepsy, tonic-clonic seizures, presented with seizures that required intubation for hypoxemic respiratory failure and lack of airway protection. He also is on thyroxine and was found to be hypothermic. We will assess his thyroid function as to the cause of his hypothermia. It is unlikely septic or infection related.  He remains intubated    Wale Woodward MD  Date of Service (when I saw the patient): 02/25/21    Critical Care Time excluding procedures: 37 Mins

## 2021-02-25 NOTE — ED TRIAGE NOTES
2320 staff reported seizure type activity at 2320, pt has Hx of seizure 1 year ago but has hx of brain tumor. En-route ems reports  Pt intermittently opens eyes and looks around. Slight tremor noted upon arrival. Ems reports pt incontinent

## 2021-02-25 NOTE — PROGRESS NOTES
ABG ordered by MD. Joesph's test performed on right radial artery, showing collateral circulation. ABG performed successfully. RT will continue to follow.

## 2021-02-25 NOTE — PHARMACY-ADMISSION MEDICATION HISTORY
Pharmacy Medication History  Admission medication history interview status for the 2/24/2021  admission is complete. See EPIC admission navigator for prior to admission medications     Location of Interview: NA  Medication history sources: MAR (DION Ontiveros)    In the past week, patient estimated taking medication this percent of the time: greater than 90%    Medication reconciliation completed by provider prior to medication history? No    Time spent in this activity: 20 minutes    Prior to Admission medications    Medication Sig Last Dose Taking? Auth Provider   ammonium lactate (LAC-HYDRIN) 12 % external lotion Apply topically daily To feet and heels 2/24/2021 at Unknown time Yes Reported, Patient   divalproex sodium extended-release (DEPAKOTE ER) 500 MG 24 hr tablet Decreased depakote as advised (will fax med schedule to [facility]) to 1000 mg twice a day 2/24/2021 at Unknown time Yes Lauren Washington MD   docusate sodium (COLACE) 100 MG capsule Take 1 capsule by mouth 2 times daily. 2/24/2021 at Unknown time Yes Reported, Patient   FISH OIL 1000 MG OR CAPS 1 PO BID  Patient taking differently: 1 capsule 2 times per day 2/24/2021 at Unknown time Yes Pato Morales MD   lactulose (CHRONULAC) 10 GM/15ML solution Take 30 mLs by mouth 2 times daily as needed prn Yes Unknown, Entered By History   lactulose (CHRONULAC) 10 GM/15ML solution Take 30 mLs by mouth every evening & additional 30mL 2 times per day as needed 2/24/2021 at Unknown time Yes Reported, Patient   levothyroxine (SYNTHROID/LEVOTHROID) 150 MCG tablet TAKE 1 TABLET BY MOUTH ONCE DAILY  FOR THYROID   *1 TOTAL FILL* 2/24/2021 at Unknown time Yes Rajat Parkinson MD   lithium ER (LITHOBID) 300 MG CR tablet Take 600 mg by mouth At Bedtime  2/24/2021 at Unknown time Yes Reported, Patient   MILK OF MAGNESIA 400 MG/5ML suspension 30 mLs every evening  2/24/2021 at Unknown time Yes Reported, Patient   polyethylene glycol (MIRALAX) powder Take 17 g by mouth  daily. 2/24/2021 at Unknown time Yes Pato Morales MD   pseudoePHEDrine (SUDAFED) 30 MG tablet Take 30 mg by mouth every evening as needed for congestion prn Yes Unknown, Entered By History   risperiDONE (RISPERDAL) 2 MG tablet Take 2 mg by mouth 2 times daily 1 tab every morning & 1 tab every bedtime 2/24/2021 at Unknown time Yes Reported, Patient   sertraline (ZOLOFT) 100 MG tablet Take 1.5 tablets by mouth every morning. 2/24/2021 at Unknown time Yes Ronal Tavera MD   topiramate (TOPAMAX) 200 MG tablet Take 200 mg by mouth 2 times daily 2/24/2021 at Unknown time Yes Unknown, Entered By History   VITAMIN D3 1000 units tablet 3,000 Units daily 2/24/2021 at Unknown time Yes Reported, Patient   order for DME Flat sole shoe  Patient not taking: Reported on 10/30/2020   Real Pak, PA-C   Starch, Thickening, POWD Combined with beverages, per instructions from Speech Therapy.   Rajat Parkinson MD       The information provided in this note is only as accurate as the sources available at the time of update(s)

## 2021-02-25 NOTE — TELEPHONE ENCOUNTER
Patient's Group Home called to inform Dr. Washington that patient has been admitted to [facility] after multiple seizures yesterday.

## 2021-02-25 NOTE — ED NOTES
Currently in MRI. Patient had another seizure, Ativan given and MD notified. Per MD, Versed drip started. VS stable at this time.

## 2021-02-25 NOTE — ED NOTES
Successful intubation at this time. Size 8 ET tube secured at 27 cm at the teeth. See intubation record.

## 2021-02-25 NOTE — ED NOTES
"Spoke to Quentin at [facility] who stated that parents are guardians and \"they are elderly so they may not know what you're talking about\". Parents can be reached at [phone]. Quentin would like to be updated when pt is moved to intensive care - can be reached at [phone]  "

## 2021-02-25 NOTE — PROGRESS NOTES
[facility] ICU RESPIRATORY NOTE        Date of Admission: 2/24/2021    Date of Intubation (most recent): 2/25/21    Reason for Mechanical Ventilation: Airway Protection    Number of Days on Mechanical Ventilation: 1    Met Criteria for Spontaneous Breathing Trial: No    Reason for No Spontaneous Breathing Trial: AMS    Significant Events Today: None    ABG Results:   Recent Labs   Lab 02/25/21  0755   PH 7.52*   PCO2 33*   PO2 174*   HCO3 27   O2PER 40       Current Vent Settings: Ventilation Mode: CMV/AC  (Continuous Mandatory Ventilation/ Assist Control)  FiO2 (%): 30 %  Rate Set (breaths/minute): 12 breaths/min  Tidal Volume Set (mL): 550 mL  PEEP (cm H2O): 5 cmH2O  Oxygen Concentration (%): 30 %  Peak Inspiratory Pressure (cm H2O) (Drager Mackenzie): 22  Resp: 16      Skin Assessment: Intact    Plan: RT will continue to follow, and wean as able.    Queenie Lea, RT

## 2021-02-25 NOTE — PROGRESS NOTES
Pt intubated in the ED with 8.0 ETT secured 27 at the teeth confirmed with ETCOS, BBS and CXR. Pt placed on transport vent with settings Rt 16, Vt 550, PEEP of 5 and 60% FIO2. RT will continue to follow.

## 2021-02-25 NOTE — CONSULTS
Care Management Initial Consult    General Information  Assessment completed with: Care Team MemberMaria G  Type of CM/SW Visit: Initial Assessment    Primary Care Provider verified and updated as needed: Yes   Readmission within the last 30 days: no previous admission in last 30 days         Advance Care Planning: Advance Care Planning Reviewed: other (comment)(guardianship paper work pending)          Communication Assessment  Patient's communication style: spoken language (English or Bilingual)    Hearing Difficulty or Deaf: no   Wear Glasses or Blind: no    Cognitive  Cognitive/Neuro/Behavioral: .WDL except  Level of Consciousness: sedated  Arousal Level: unresponsive  Orientation: other (see comments)(ramón)        Speech: RAMÓN (unable to assess)    Living Environment:   People in home: other (see comments)     Current living Arrangements: group home(Estates)      Able to return to prior arrangements: yes       Family/Social Support:  Care provided by: other (see comments)  Provides care for: no one, unable/limited ability to care for self  Marital Status: Single  Parent(s), Guardian, Facility resident(s)/Staff          Description of Support System: Supportive         Current Resources:   Patient receiving home care services: No     Community Resources: County Worker, Group Home  Equipment currently used at home: none  Supplies currently used at home: Gloves, Wipes, Other(special cups for apiration risk)    Employment/Financial:  Employment Status: disabled        Financial Concerns: No concerns identified      Finance Comments: ( waiting on funding for thickend liquids.)    Lifestyle & Psychosocial Needs:        Socioeconomic History     Marital status: Single     Spouse name: Not on file     Number of children: 0     Years of education: Not on file     Highest education level: Not on file   Occupational History     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      Employer: [employer]     Tobacco Use      Smoking status: Never Smoker     Smokeless tobacco: Never Used   Substance and Sexual Activity     Alcohol use: No     Drug use: No     Sexual activity: Never       Functional Status:  Prior to admission patient needed assistance:   Dependent ADLs:: Grooming(needed help wiping self after a BM)          Mental Health Status:  Mental Health Status: No Current Concerns       Chemical Dependency Status:                Values/Beliefs:  Spiritual, Cultural Beliefs, Mormon Practices, Values that affect care: no               Additional Information:  Awaiting guardianship paperwork and then will send to [facility] choices.    Bette Giraldo RN

## 2021-02-25 NOTE — ED PROVIDER NOTES
History     Chief Complaint:  Seizures        The history is provided by the EMS personnel and a caregiver. History limited by: Altered Mental Status.     Duane D Backes is a 40 year old male with a history of autism, tonic clonic seizures, hypothyroidism, benign neoplasm of brain, and intellectual disability who presents for evaluation after a seizure. EMS reports that the patient was laying on the couch at his group home this evening when he began to have a seizure with shaking movements. Staff quickly noticed this and reported that this seizure with tremulous movements that lasted for approximately 3-4 minutes. EMS noted him to be incontinent and the patient did not have any trauma or injuries from the seizure. En route, his blood pressure was 120/85 and his blood glucose was 96. He lifted his head up, licked his lips,and occasionally opened his eyes for EMS though was generally unresponsive.      Of note, staff at his group home noted him to seem more off-balance today than usual. He took all of his normal medications today.     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     Review of Systems   Unable to perform ROS: Mental status change   All other systems reviewed and are negative.    Allergies:  Penicillin  Fluoxetine  Reglan  Ritalin  trazodone     Medications:   Depakote  Colace  Lactulose  Levothyroxine   Risperidone  Miralax  Lithium carbonate  Sertraline  Milk of magnesia  Topiramate     Medical History:   Autistic disorder  Intellectual disabilities  OCD  Seizure disorder - generalized tonic clonic   Constipation    Benign neoplasm of brain  Hearing loss  Hypothyroidism     Surgical History:  Cataract IOL - left    Social History:  The patient was accompanied to  the ED by EMS.  The patient currently lives in a group home.   PCP: Rajat Parkinson      Physical Exam     Patient Vitals for the past 24 hrs:   BP Pulse Resp SpO2 Weight   02/25/21 0050 101/70 72 16 99 % --   02/25/21 0040 92/58 75 (!) 0 99 % --   02/25/21 0030 (!) 89/53 84 16 99 % --   02/25/21 0020 (!) 74/49 93 15 100 % --   02/24/21 2350 -- 85 13 100 % 90.7 kg (200 lb)   02/24/21 2320 109/73 120 (!) 6 99 % --   02/24/21 2317 -- 129 (!) 185 97 % --      Physical Exam  General: somnolent, will not wake to voice but will occasionally open eyes and look around the room with noxious stimuli   Head: The scalp, face, and head appear normal. No signs of trauma   Eyes: The pupils are equal, round, and reactive to light. Conjunctivae and sclerae are normal. No fixed or deviated gaze   Neck: Normal range of motion. No anterior cervical lymphadenopathy noted  CV: tachycardiac but regular   Resp: Lungs are clear without wheezes or rales. No respiratory distress.   GI: Abdomen is soft, no rigidity, guarding, or rebound. No distension. No tenderness to palpation in any quadrant.     MS:Joints grossly normal without effusions. No asymmetric leg swelling, calf or thigh tenderness.    Skin: No rash or lesions noted. Normal capillary refill noted  Neuro: somnolent, likely post ictal, moving all extremities. Occasionally grabbing at IV. Will open eyes and look around the room. Will not answer questions or follow commands. Clonus present bilaterally     Emergency Department Course     Imaging:  CT Head w/o Contrast:  IMPRESSION:   1.  No acute intracranial process.   2.  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  Reading per radiology.     XR Chest Port 1 View:  IMPRESSION: Endotracheal tube has  been placed which terminates about 4 cm above the ruma. An enteric tube terminates over the left upper quadrant. Heart size is normal. Streaky bibasilar atelectasis or infiltrate. The upper lung zones are clear. No visible pneumothorax or pleural effusion on this supine image.  Reading per radiology.     MR Brain w/o & w contrast:  Pending     Laboratory:    CBC: WBC 3.9 (L), HGB 13.1 (L),   BMP: Carbon Dioxide 35 (H), Anion Gap <1 (L), (Creatinine 0.76) o/w WNL   Lactic Acid (Resulted 2336): 0.7  Lithium Level: 1.26 (H)   Valproic Acid Level: 102 (H)   Topiramate Level: Pending   Blood Culture x2: Pending     UA with Microscopic: Negative     [facility]    -Intubation    Date/Time: 2/25/2021 12:17 AM  Performed by: Samson Navarrete MD  Authorized by: Samson Navarrete MD     ED EVALUATION:      Assessment Time: 2/24/2021 11:59 PM      I have performed an Emergency Department Evaluation including taking a history and physical examination, this evaluation will be documented in the electronic medical record for this ED encounter.      ASA Class: Class 2- mild systemic disease, no acute problems, no functional limitations    Mallampati: Grade 1- soft palate, uvula, tonsillar pillars, and posterior pharyngeal wall visible    NPO Status: not NPO, emergent situation  UNIVERSAL PROTOCOL   Site Marked: Yes  Prior Images Obtained and Reviewed:  Yes  Required items: Required blood products, implants, devices and special equipment available    Patient identity confirmed:  Hospital-assigned identification number and arm band  Patient was reevaluated immediately before administering moderate or deep sedation or anesthesia  Confirmation Checklist:  Patient's identity using two indicators  Universal Protocol: the Joint Commission Universal Protocol was followed          PRE-PROCEDURE DETAILS     Patient status:  Altered mental status    Paralytics:   Rocuronium        SEDATION    Patient Sedated: Yes    Sedation:  Etomidate  Vital signs: Vital signs monitored during sedation    PROCEDURE DETAILS     Preoxygenation:  Nasal cannula    CPR in progress: no      Intubation method:  Oral    Oral intubation technique:  Video-assisted    Laryngoscope blade:  Mac 4    Tube size (mm):  8.0    Tube type:  Cuffed    Number of attempts:  1    Tube visualized through cords: yes      PLACEMENT ASSESSMENT     ETT to teeth:  27    Tube secured with:  Adhesive tape and ETT parks    Breath sounds:  Equal    Placement verification: chest rise, condensation, CXR verification, direct visualization, equal breath sounds and ETCO2 detector      CXR findings:  ETT in proper place  PROCEDURE   Length of time physician/provider present for 1:1 monitoring during sedation: 20       Emergency Department Course:    Reviewed:  I reviewed the patient's nursing notes, vitals, past medical records, Care Everywhere.     Assessments:  2314 EMS arrival. History obtained from EMS.  2316 I performed an exam of the patient, as documented above.   2359 I walked by the room and the patient was seizing. He was moved to the stabilization room and intubated per above procedure note.   0021 I looked at the patient's chest xray after it was taken to see ETT placement.   0045 Patient rechecked. He is doing well.   0341 Patient is seizing again. I rechecked the patient. Given Ativan and will start on Versed drip     Consults:   0029 I spoke with Dr. Puga of the Neurology service regarding patient's presentation, findings, and plan of care.   0114 I spoke with Dr. Xavier of the intensivist service regarding patient's presentation, findings, and plan of care. They are in agreement to accept the patient for admission to a bed for further monitoring, care, and treatment.      Interventions:  2353 Ativan 2 mg IV  2356 Ativan 2 mg IV  0000 Keppra 1000 mg IV  0010 Etomidate 20 mg IV  0010 Rocuronium 100 mg  IV  0015 Propofol infusion 10 mcg/kg/min IV  0015 Keppra 1000 mg IV  0020 Normal Saline 1000 mL IV   0344 Ativan 2 mg IV  Versed Drip    Disposition:  The patient was admitted to the ICU under the care of Dr. Xavier.     Impression & Plan     Medical Decision Making:  Duane D Backes is a 40 year old male with a history of autism, tonic clonic seizures, hypothyroidism, epidermoid tumor, and intellectual disability who presents for evaluation after a seizure.  Patient comes in from group home after witnessed seizure activity.  Patient has known history of epilepsy and is normally well controlled on topiramate and Depakote.  He recently had his Depakote decreased from 1500 mg a day to 1000 mg twice a day in January.  Upon initial evaluation in the emergency department he appears to be postictal and quite somnolent.  He will react to noxious stimuli but will not follow commands or give any pertinent history.  In reviewing his notes this does not appear to be consistent with his baseline.  Shortly after initially evaluating the patient and returning from CT, patient was witnessed by nursing staff to have tonic-clonic movements.  I was brought to the room and witnessed him having a tonic-clonic seizure.  2 mg of Ativan were administered however after 3 minutes he continued to have seizure-like activity therefore 2 more milligrams of Ativan were administered with ceasing of seizure activity.  Review of the CT of his head does not show any significant changes in his epidermoid tumor.  There is no evidence of intracranial hemorrhage or new mass.  Patient was loaded with 2 g of Keppra.  Patient was observed closely for period of time, but did not recover to his mental baseline.  He was still quite somnolent and had gurgling respirations.  I was concerned about his ability to maintain his airway and protect his airway in case of an episode of emesis.  Therefore I proceeded with intubation.  Details can be viewed as above.   Patient was started on propofol for sedation and further treatment of seizure-like activity.  I discussed the case with neuro critical care who recommended obtaining an MRI and admitting the patient to the neuro ICU for further evaluation.  Shortly before going to MRI patient was witnessed having further seizure-like activity.  This was abated once again with 2 mg of Ativan.  We increased his propofol drip and started an additional Versed drip.  I again reviewed the patient's blood work and history.  He does not have any significant electrolyte abnormalities that would contribute to his seizing.  He does have a mildly elevated lithium level compared to his baseline which may be contributing to lowering his seizure threshold.  However I do not believe that he requires dialysis at this time as I do not believe that this is overt lithium toxicity.  There is no clear infectious source on my work-up here today.  Given that he has not had any fevers or history of headaches, I will hold off on performing LP especially in light of a known brain mass.  At this point I do not have a clear cause for the patient's increased seizure activity that was previously well controlled.  Will be admitted to our neuro ICU for further evaluation and treatment.    Critical Care Time for this patient is 45 minutes, exclusive of procedures.    Covid-19  Duane D Backes was evaluated during a global COVID-19 pandemic, which necessitated consideration that the patient might be at risk for infection with the SARS-CoV-2 virus that causes COVID-19.   Applicable protocols for evaluation were followed during the patient's care.   COVID-19 was considered as part of the patient's evaluation. The plan for testing is:  a test was obtained during this visit.     Diagnosis:     ICD-10-CM    1. Seizure disorder (H)  G40.909 Blood culture     Blood culture     Asymptomatic SARS-CoV-2 COVID-19 Virus (Coronavirus) by PCR   2. Altered mental status, unspecified  altered mental status type  R41.82        Scribe Disclosure:  I, Rylie Jean, am serving as a scribe at 11:14 PM on 2/24/2021 to document services personally performed by Samson Navarrete MD based on my observations and the provider's statements to me.      Samson Navarrete MD  02/25/21 0451

## 2021-02-26 ENCOUNTER — HOSPITAL ENCOUNTER (OUTPATIENT)
Dept: NEUROLOGY | Facility: CLINIC | Age: 41
DRG: 100 | End: 2021-02-26
Attending: PSYCHIATRY & NEUROLOGY
Payer: MEDICARE

## 2021-02-26 LAB
ALBUMIN SERPL-MCNC: 2.9 G/DL (ref 3.4–5)
ALP SERPL-CCNC: 56 U/L (ref 40–150)
ALT SERPL W P-5'-P-CCNC: 19 U/L (ref 0–70)
AMMONIA PLAS-SCNC: 29 UMOL/L (ref 10–50)
ANION GAP SERPL CALCULATED.3IONS-SCNC: 4 MMOL/L (ref 3–14)
AST SERPL W P-5'-P-CCNC: 18 U/L (ref 0–45)
BASE EXCESS BLDV CALC-SCNC: 1.5 MMOL/L
BASOPHILS # BLD AUTO: 0 10E9/L (ref 0–0.2)
BASOPHILS NFR BLD AUTO: 0.1 %
BILIRUB SERPL-MCNC: 0.3 MG/DL (ref 0.2–1.3)
BUN SERPL-MCNC: 16 MG/DL (ref 7–30)
CALCIUM SERPL-MCNC: 9.5 MG/DL (ref 8.5–10.1)
CHLORIDE SERPL-SCNC: 110 MMOL/L (ref 94–109)
CO2 SERPL-SCNC: 27 MMOL/L (ref 20–32)
CREAT SERPL-MCNC: 0.87 MG/DL (ref 0.66–1.25)
CRP SERPL-MCNC: 127 MG/L (ref 0–8)
DIFFERENTIAL METHOD BLD: ABNORMAL
EOSINOPHIL # BLD AUTO: 0 10E9/L (ref 0–0.7)
EOSINOPHIL NFR BLD AUTO: 0.3 %
ERYTHROCYTE [DISTWIDTH] IN BLOOD BY AUTOMATED COUNT: 15.4 % (ref 10–15)
GFR SERPL CREATININE-BSD FRML MDRD: >90 ML/MIN/{1.73_M2}
GLUCOSE BLDC GLUCOMTR-MCNC: 82 MG/DL (ref 70–99)
GLUCOSE BLDC GLUCOMTR-MCNC: 98 MG/DL (ref 70–99)
GLUCOSE SERPL-MCNC: 104 MG/DL (ref 70–99)
HCO3 BLDV-SCNC: 26 MMOL/L (ref 21–28)
HCT VFR BLD AUTO: 39.1 % (ref 40–53)
HGB BLD-MCNC: 12.6 G/DL (ref 13.3–17.7)
IMM GRANULOCYTES # BLD: 0.1 10E9/L (ref 0–0.4)
IMM GRANULOCYTES NFR BLD: 0.5 %
LYMPHOCYTES # BLD AUTO: 0.7 10E9/L (ref 0.8–5.3)
LYMPHOCYTES NFR BLD AUTO: 6.4 %
MCH RBC QN AUTO: 30.6 PG (ref 26.5–33)
MCHC RBC AUTO-ENTMCNC: 32.2 G/DL (ref 31.5–36.5)
MCV RBC AUTO: 95 FL (ref 78–100)
MONOCYTES # BLD AUTO: 1.4 10E9/L (ref 0–1.3)
MONOCYTES NFR BLD AUTO: 13 %
NEUTROPHILS # BLD AUTO: 8.6 10E9/L (ref 1.6–8.3)
NEUTROPHILS NFR BLD AUTO: 79.7 %
NRBC # BLD AUTO: 0 10*3/UL
NRBC BLD AUTO-RTO: 0 /100
O2/TOTAL GAS SETTING VFR VENT: 30 %
OXYHGB MFR BLDV: 92 %
PCO2 BLDV: 40 MM HG (ref 40–50)
PH BLDV: 7.43 PH (ref 7.32–7.43)
PLATELET # BLD AUTO: 148 10E9/L (ref 150–450)
PO2 BLDV: 63 MM HG (ref 25–47)
POTASSIUM SERPL-SCNC: 4.3 MMOL/L (ref 3.4–5.3)
PROCALCITONIN SERPL-MCNC: 0.09 NG/ML
PROT SERPL-MCNC: 6.5 G/DL (ref 6.8–8.8)
RBC # BLD AUTO: 4.12 10E12/L (ref 4.4–5.9)
SODIUM SERPL-SCNC: 141 MMOL/L (ref 133–144)
TOPIRAMATE SERPL-MCNC: 8.8 UG/ML (ref 5–20)
WBC # BLD AUTO: 10.8 10E9/L (ref 4–11)

## 2021-02-26 PROCEDURE — 999N000052 EEG VIDEO 12-26 HR UNMONITORED

## 2021-02-26 PROCEDURE — 200N000001 HC R&B ICU

## 2021-02-26 PROCEDURE — 94003 VENT MGMT INPAT SUBQ DAY: CPT

## 2021-02-26 PROCEDURE — 258N000003 HC RX IP 258 OP 636: Performed by: PSYCHIATRY & NEUROLOGY

## 2021-02-26 PROCEDURE — 999N000157 HC STATISTIC RCP TIME EA 10 MIN

## 2021-02-26 PROCEDURE — 250N000013 HC RX MED GY IP 250 OP 250 PS 637: Performed by: PSYCHIATRY & NEUROLOGY

## 2021-02-26 PROCEDURE — 250N000011 HC RX IP 250 OP 636: Performed by: STUDENT IN AN ORGANIZED HEALTH CARE EDUCATION/TRAINING PROGRAM

## 2021-02-26 PROCEDURE — 999N001017 HC STATISTIC GLUCOSE BY METER IP

## 2021-02-26 PROCEDURE — 250N000011 HC RX IP 250 OP 636: Performed by: INTERNAL MEDICINE

## 2021-02-26 PROCEDURE — 258N000003 HC RX IP 258 OP 636: Performed by: STUDENT IN AN ORGANIZED HEALTH CARE EDUCATION/TRAINING PROGRAM

## 2021-02-26 PROCEDURE — 250N000009 HC RX 250: Performed by: PSYCHIATRY & NEUROLOGY

## 2021-02-26 PROCEDURE — C9254 INJECTION, LACOSAMIDE: HCPCS | Performed by: STUDENT IN AN ORGANIZED HEALTH CARE EDUCATION/TRAINING PROGRAM

## 2021-02-26 PROCEDURE — 80053 COMPREHEN METABOLIC PANEL: CPT | Performed by: STUDENT IN AN ORGANIZED HEALTH CARE EDUCATION/TRAINING PROGRAM

## 2021-02-26 PROCEDURE — 82805 BLOOD GASES W/O2 SATURATION: CPT | Performed by: INTERNAL MEDICINE

## 2021-02-26 PROCEDURE — 272N000078 HC NUTRITION PRODUCT INTERMEDIATE LITER

## 2021-02-26 PROCEDURE — 84145 PROCALCITONIN (PCT): CPT | Performed by: STUDENT IN AN ORGANIZED HEALTH CARE EDUCATION/TRAINING PROGRAM

## 2021-02-26 PROCEDURE — 250N000009 HC RX 250: Performed by: ANESTHESIOLOGY

## 2021-02-26 PROCEDURE — 250N000013 HC RX MED GY IP 250 OP 250 PS 637: Performed by: INTERNAL MEDICINE

## 2021-02-26 PROCEDURE — C9113 INJ PANTOPRAZOLE SODIUM, VIA: HCPCS | Performed by: ANESTHESIOLOGY

## 2021-02-26 PROCEDURE — 94002 VENT MGMT INPAT INIT DAY: CPT

## 2021-02-26 PROCEDURE — 82140 ASSAY OF AMMONIA: CPT | Performed by: STUDENT IN AN ORGANIZED HEALTH CARE EDUCATION/TRAINING PROGRAM

## 2021-02-26 PROCEDURE — 999N000009 HC STATISTIC AIRWAY CARE

## 2021-02-26 PROCEDURE — 95720 EEG PHY/QHP EA INCR W/VEEG: CPT | Performed by: PSYCHIATRY & NEUROLOGY

## 2021-02-26 PROCEDURE — 85025 COMPLETE CBC W/AUTO DIFF WBC: CPT | Performed by: STUDENT IN AN ORGANIZED HEALTH CARE EDUCATION/TRAINING PROGRAM

## 2021-02-26 PROCEDURE — 86140 C-REACTIVE PROTEIN: CPT | Performed by: STUDENT IN AN ORGANIZED HEALTH CARE EDUCATION/TRAINING PROGRAM

## 2021-02-26 PROCEDURE — 36415 COLL VENOUS BLD VENIPUNCTURE: CPT | Performed by: STUDENT IN AN ORGANIZED HEALTH CARE EDUCATION/TRAINING PROGRAM

## 2021-02-26 PROCEDURE — 250N000011 HC RX IP 250 OP 636: Performed by: ANESTHESIOLOGY

## 2021-02-26 PROCEDURE — 99291 CRITICAL CARE FIRST HOUR: CPT | Performed by: ANESTHESIOLOGY

## 2021-02-26 PROCEDURE — 99291 CRITICAL CARE FIRST HOUR: CPT | Mod: 25 | Performed by: PSYCHIATRY & NEUROLOGY

## 2021-02-26 RX ORDER — DEXMEDETOMIDINE HYDROCHLORIDE 4 UG/ML
0.2-0.7 INJECTION, SOLUTION INTRAVENOUS CONTINUOUS
Status: DISCONTINUED | OUTPATIENT
Start: 2021-02-26 | End: 2021-03-01

## 2021-02-26 RX ADMIN — TOPIRAMATE 200 MG: 200 TABLET ORAL at 20:21

## 2021-02-26 RX ADMIN — VALPROATE SODIUM 750 MG: 100 INJECTION, SOLUTION INTRAVENOUS at 18:20

## 2021-02-26 RX ADMIN — CHLORHEXIDINE GLUCONATE 0.12% ORAL RINSE 15 ML: 1.2 LIQUID ORAL at 08:11

## 2021-02-26 RX ADMIN — VALPROATE SODIUM 750 MG: 100 INJECTION, SOLUTION INTRAVENOUS at 02:09

## 2021-02-26 RX ADMIN — PANTOPRAZOLE SODIUM 40 MG: 40 INJECTION, POWDER, FOR SOLUTION INTRAVENOUS at 02:09

## 2021-02-26 RX ADMIN — FAMOTIDINE 20 MG: 10 INJECTION, SOLUTION INTRAVENOUS at 11:39

## 2021-02-26 RX ADMIN — SODIUM CHLORIDE, SODIUM LACTATE, POTASSIUM CHLORIDE, CALCIUM CHLORIDE AND DEXTROSE MONOHYDRATE: 5; 600; 310; 30; 20 INJECTION, SOLUTION INTRAVENOUS at 05:11

## 2021-02-26 RX ADMIN — ENOXAPARIN SODIUM 40 MG: 40 INJECTION SUBCUTANEOUS at 18:20

## 2021-02-26 RX ADMIN — TOPIRAMATE 200 MG: 200 TABLET ORAL at 08:22

## 2021-02-26 RX ADMIN — CHLORHEXIDINE GLUCONATE 0.12% ORAL RINSE 15 ML: 1.2 LIQUID ORAL at 20:24

## 2021-02-26 RX ADMIN — SODIUM CHLORIDE, SODIUM LACTATE, POTASSIUM CHLORIDE, CALCIUM CHLORIDE AND DEXTROSE MONOHYDRATE: 5; 600; 310; 30; 20 INJECTION, SOLUTION INTRAVENOUS at 23:04

## 2021-02-26 RX ADMIN — SODIUM CHLORIDE 100 MG: 9 INJECTION, SOLUTION INTRAVENOUS at 21:28

## 2021-02-26 RX ADMIN — VALPROATE SODIUM 750 MG: 100 INJECTION, SOLUTION INTRAVENOUS at 09:37

## 2021-02-26 RX ADMIN — SODIUM CHLORIDE 100 MG: 9 INJECTION, SOLUTION INTRAVENOUS at 08:29

## 2021-02-26 RX ADMIN — FAMOTIDINE 20 MG: 10 INJECTION, SOLUTION INTRAVENOUS at 23:07

## 2021-02-26 ASSESSMENT — ACTIVITIES OF DAILY LIVING (ADL)
ADLS_ACUITY_SCORE: 16
ADLS_ACUITY_SCORE: 16
ADLS_ACUITY_SCORE: 22
TOILETING_ISSUES: NO
DOING_ERRANDS_INDEPENDENTLY_DIFFICULTY: YES
ADLS_ACUITY_SCORE: 24
ADLS_ACUITY_SCORE: 22
WEAR_GLASSES_OR_BLIND: NO
WALKING_OR_CLIMBING_STAIRS_DIFFICULTY: NO
ADLS_ACUITY_SCORE: 20
DIFFICULTY_COMMUNICATING: YES
DRESSING/BATHING_DIFFICULTY: NO
COMMUNICATION: OTHER (SEE COMMENTS)
CONCENTRATING,_REMEMBERING_OR_MAKING_DECISIONS_DIFFICULTY: YES
DIFFICULTY_EATING/SWALLOWING: NO
FALL_HISTORY_WITHIN_LAST_SIX_MONTHS: NO

## 2021-02-26 ASSESSMENT — MIFFLIN-ST. JEOR: SCORE: 1866.75

## 2021-02-26 NOTE — PROVIDER NOTIFICATION
Dr. Woodward notified of positive occult blood in sample send from gastric tube. No change in plan of care, ok to give lovenox.

## 2021-02-26 NOTE — PROGRESS NOTES
End of Shift Nursing Summary  02/25/21 0700 to 1930   Neuro:  PERRL. Withdraws bilateral upper extremities, nothing from lowers. Spontaneous movement of right arm x1.   Pain: CPOT 0  CV:  SR. MAP > 65.  Pulm:  Lungs clear. Copious clear oral secretions. Small volume white secretions through ET suction. + cough.  GI/:  Urine output adequate. Colin removed-condom catheter applied. No BM. NPO  Endocrine: BG WDL.  Skin: Abrasion on right ankle and bruised right pinkie toe.  Fever: Hypothermic this a.m. Normalized now.   Lines/drips:  2 PIV.   Additional:  Spoke with group home staff. Patient likes [employer], [employer], fries, soda and ice cream. Called patient Dad but no answer and no callback.      *See EPIC flowsheet for full nursing assessment findings    Olya Patino RN

## 2021-02-26 NOTE — PROGRESS NOTES
Care Management Follow Up    Length of Stay (days): 1    Expected Discharge Date:  TBD     Concerns to be Addressed:       Patient plan of care discussed at interdisciplinary rounds: will be discussing at rounds.    Referrals Placed by CM/SW:    Private pay costs discussed: Not applicable    Additional Information:  Writer has received multiple calls from patient's mother as she is unable to get through to ICU. Writer gave update going by notes but Mother and Guardian Seema would like an update soon.      Bette Giraldo RN

## 2021-02-26 NOTE — PROGRESS NOTES
Critical Care Progress Note      02/26/2021    Name: Duane D Backes MRN#: [id number]   Age: 40 year old YOB: 1980     Hsptl Day# 2  ICU DAY # 2    MV DAY #             Problem List:   Active Problems:    Seizure disorder (H)    Altered mental status, unspecified altered mental status type           Summary/Hospital Course:   Duane D Backes is a 40 year old male with PMH of autism, epilepsy, tonic-clonic seizures, autism, and an epidermoid tumor who presented on 2/24 to the ED for a 3 minute witnessed seizure. He had another seizure in the ED, became somnolent,  and was given a total of 4 mg of ativan. He became somnolent and had increased secretions prompting intubation and mechanical ventilation and admission to the ICU. He has been off of sedation for 24 hours and has been slow to wake up since then. EEG has not shown any epileptic or seizure activity so far.         Assessment and plan :     Duane D Backes is a 40 year old male with PMH of autism, epilepsy, tonic-clonic seizures, and an epidermoid tumor admitted on 2/24/2021 for treatment of seizure that led to intubation and mechanical ventilation. Patient has been off of sedation as of yesterday morning and is intubated and mechanically ventilated.  I have personally reviewed the daily labs, imaging studies, cultures and discussed the case with referring physician and consulting physicians.     My assessment and plan by system for this patient is as follows:    Neurology/Psychiatry:   1. Seizure disorder with new seizure prompting admission. Patient has history of seizures, and specifically epilepsy, tonic-clonic seizures, and an enlarging epidermoid cyst. Differential for seizure leading to this admission includes increasing size of epidermoid cyst, medications lowering the seizure threshold (lithium, Risperdal, Zoloft, and Sudafed), and less likely infection and sepsis which would lower the seizure threshold. Less likely infection or sepsis, as  WBC has normalized, influenza A and B are negative, there is no growth to date on the sputum or blood cultures. Less likely related to medications because according to the group home, patient's medications have not been changed recently.   - Transitioned off of sedation as of yesterday (2/25) AM, patient has been slow to arouse from sedation since then.   2. History of autism--Baseline mental status could be clouding the patient's neurological status and the process of transitioning off of sedation.   3. History of epilepsy  4. History of tonic-clonic seizures  5. History of enlarging epidermoid cyst       Plan  - Neurology consulted and following, appreciate recs:              - Continue valproate 750 mg Q8H    - Continue PTA topiramate 200 mg BID   - Hold lithium and other antipsychotic meds as they may lower the seizure threshold   - Limit sedating medication as able, preferable use precedex for sedation for accurate neuro  examination--holding off on precedex for now because patient has been slow to wake up from sedation.   - Continue EEG, await final results. Preliminary EEG shows continuous polymorphic moderate amplitude delta. Study consistent with moderate to severe diffuse encephalopathy. No indication of seizures or seizure tendency.    - Repeat CBC, assessing for infection as possible cause of seizure  - Neurosurgery consult based on neurology input     Cardiovascular:   1. Stable hemodynamics   2. Sinus Rhythm on monitor with no signs of ischemia   Plan   - Monitor hemodynamic status closely      Pulmonary/Ventilator Management:   1. Currently intubated and mechanically ventilated. Vent settings: RR 12, Vte 550, PEEP 5, FiO2 30.      Plan  - Recheck ABG to assess acid-base status and respiratory rate  - Continue to monitor neurologic status to assess readiness for weaning ventilation per protocol    GI and Nutrition :   1. Occult blood from OG tube found yesterday, today no more blood or clotting in OG  tube.  Discontinued protonix  2. Recently started on DD2 diet with honey thickened liquids per group home    Plan  - Nutrition consult today   - Start TF today   - Change pantoprazole 40 mg Q12H to famotidine 20 mg Q12H, as there is no more sign of bleeding from the stomach or in the OG tube.     Renal/Fluids/Electrolytes:   1. Previous respiratory alkalosis, resolved.      Plan  - Repeat VBG to assess acid-base status  - monitor function and electrolytes as needed with replacement per ICU protocols.  - generally avoid nephrotoxic agents such as NSAID, IV contrast unless specifically required  - adjust medications as needed for renal clearance  - follow I/O's as appropriate.    Infectious Disease:   1. - WBCs normalizing, and temperature now in normal range. Patient does not have clear source of infection. Additionally there is no organ dysfunction present. Therefore, do not need to start antibiotics at this point.  WBC 3.9 on admission->4.6 on recheck 2/25 AM. UA on admission negative. Blood cultures pending, NGT. Sputum cultures with >25 PMNs/low power field. Influenza A and B negative. Per patient's mother, patient received flu shot a few weeks ago. He did not have any adverse reactions at that time.        Plan  - Repeat CBC, follow-up on blood and sputum cultures       Endocrine:   1. Hypothermia, resolved. Patient initially had temp of 91F and has slowly warmed up to normal temp while on a Eric hugger. SIRS or Sepsis could lead to hypothermia, but less likely at this point because WBC are normalizing .  2. History of hypothyroidism: TSH low at 0.27 and free T4 normal at 1.05. Patient's most recent TSH levels were 0.42 on 8/21/20 and 0.03 on 3/4/20. Hypothyroidism is less likely to be cause of hypothermia since free T4 was in the normal range and TSH is low. Patient is currently euthermic     Plan  - ICU insulin protocol, goal sugar <180  - Continue Eric hugger for external warming of patient  - Continue to  monitor temperature, investigate for source of infection  - PTA levothyroxine 150 mcg, holding for now     Hematology/Oncology:   1. Anemia, Hg 13.1->11.7 yesterday (2/25)    Plan  - Monitor Hg levels on recheck      MSKL/Rheum:  1. No current concerns      IV/Access:   1. Venous access - PIV right upper forearm, PIV left forearm   2. Colin catheter      ICU Prophylaxis:   1. DVT: Hep Subq/ LMWH/mechanical  2. VAP: HOB 30 degrees, chlorhexidine rinse  3. Stress Ulcer: PPI ppx  4. Restraints: Nonviolent soft two point restraints required and necessary for patient safety and continued cares and good effect as patient continues to pull at necessary lines, tubes despite education and distraction. Will readdress daily.   5. Wound care - per unit routine   6. Feeding - NPO while intubated and mechanically ventilated  7. Family Update: Family updated yesterday and per group home, they keep family updated  8. Disposition - ICU        Key goals for next 24 hours:   1. Monitor patient's neurologic status to assess whether he is alert enough to extubate  2. Prevent further seizures  3. Wean ventilator as tolerated               Interim History:   Patient has been off of sedation for 24 hours at this point. Patient has been slow to arouse. Of note, contacted patient's group home and mother yesterday to obtain a baseline functioning status. He is verbal at baseline and responds when asked questions, though does not hold conversations. Ambulates independently at baseline. His mother reports that he may not understand how to respond when asked to open his eyes or move his arms.            Key Medications:       chlorhexidine  15 mL Mouth/Throat Q12H     enoxaparin ANTICOAGULANT  40 mg Subcutaneous Q24H     lacosamide (VIMPAT) intermittent infusion  100 mg Intravenous BID     LORazepam  2 mg Intravenous Once     pantoprazole  40 mg Intravenous Q12H     topiramate  200 mg Oral BID     valproate (DEPACON) intermittent infusion  750  mg Intravenous Q8H       dexmedetomidine       dextrose 5% lactated ringers 50 mL/hr at 02/26/21 0828               Physical Examination:   Temp:  [94.1  F (34.5  C)-98.8  F (37.1  C)] 97.8  F (36.6  C)  Pulse:  [72-93] 74  Resp:  [11-12] 12  BP: ()/(47-94) 118/70  FiO2 (%):  [30 %] 30 %  SpO2:  [97 %-99 %] 99 %    Intake/Output Summary (Last 24 hours) at 2/26/2021 0909  Last data filed at 2/26/2021 0600  Gross per 24 hour   Intake 1756.33 ml   Output 1500 ml   Net 256.33 ml     Wt Readings from Last 4 Encounters:   02/26/21 87.7 kg (193 lb 5.5 oz)   12/23/19 89.8 kg (198 lb)   12/16/19 90 kg (198 lb 6.4 oz)   08/27/19 91.8 kg (202 lb 6.4 oz)     BP - Mean:  [] 81  Ventilation Mode: CMV/AC  (Continuous Mandatory Ventilation/ Assist Control)  FiO2 (%): 30 %  Rate Set (breaths/minute): 12 breaths/min  Tidal Volume Set (mL): 550 mL  PEEP (cm H2O): 5 cmH2O  Oxygen Concentration (%): 30 %  Peak Inspiratory Pressure (cm H2O) (Drager Mackenzie): 22  Resp: 12    Recent Labs   Lab 02/25/21  0755   PH 7.52*   PCO2 33*   PO2 174*   HCO3 27   O2PER 40       GEN: no acute distress   HEENT: head ncat, sclera anicteric, OP patent, trachea midline   PULM: unlabored synchronous with vent, clear anteriorly    CV/COR: RRR S1S2 no gallop,  No rub, no murmur, pulses 2+ BUE and BLE  ABD: soft nontender, hypoactive bowel sounds, no mass  EXT:  1+ BLE edema, extremities warm and well perfused  NEURO: pupils constrict to light, patient moving some and withdrawing during oral cares, does not withdraw to painful stimulus on BUE or BLE, does not respond to verbal commands  SKIN: no obvious rash  LINES: clean, dry intact         Data:   All data and imaging reviewed     ROUTINE ICU LABS (Last four results)  CMP  Recent Labs   Lab 02/25/21  0749 02/24/21  2161   NA  --  138   POTASSIUM  --  5.2   CHLORIDE  --  105   CO2  --  35*   ANIONGAP  --  <1*   GLC  --  87   BUN  --  13   CR 0.70 0.76   GFRESTIMATED >90 >90   GFRESTBLACK >90 >90    JESSICA  --  9.7   ALBUMIN 3.0*  --      CBC  Recent Labs   Lab 02/25/21  0749 02/24/21  2322   WBC 4.6 3.9*   RBC 3.80* 4.32*   HGB 11.7* 13.1*   HCT 35.7* 41.9   MCV 94 97   MCH 30.8 30.3   MCHC 32.8 31.3*   RDW 14.7 14.7    155     INRNo lab results found in last 7 days.  Arterial Blood Gas  Recent Labs   Lab 02/25/21  0755   PH 7.52*   PCO2 33*   PO2 174*   HCO3 27   O2PER 40       All cultures:  Recent Labs   Lab 02/25/21  1435 02/25/21  0028 02/25/21  0023   CULT PENDING No growth after 1 day No growth after 1 day     No results found for this or any previous visit (from the past 24 hour(s)).    Samson Romero on 2/26/2021 at 11:45 AM    Physician Attestation   I, Wale Woodward, was present with the medical/REBEKA student who participated in the service and in the documentation of the note.  I have verified the history and personally performed the physical exam and medical decision making.  I agree with the assessment and plan of care as documented in the note.      I personally reviewed vital signs, medications, labs and imaging.    40 year old developmentally autistic presented with seizures that required intubation and control of seizures with anti epileptic medications including valproate and topiramate.   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

## 2021-02-26 NOTE — PROGRESS NOTES
[facility] ICU RESPIRATORY NOTE           Date of Admission: 2/24/2021     Date of Intubation (most recent): 2/25/21     Reason for Mechanical Ventilation: Airway Protection     Number of Days on Mechanical Ventilation: 2     Met Criteria for Spontaneous Breathing Trial: No     Reason for No Spontaneous Breathing Trial: AMS     Significant Events Today: None     ABG Results:   Recent Labs   Lab 02/25/21  0755   PH 7.52*   PCO2 33*   PO2 174*   HCO3 27   O2PER 40     Ventilation Mode: CMV/AC  (Continuous Mandatory Ventilation/ Assist Control)  FiO2 (%): 30 %  Rate Set (breaths/minute): 12 breaths/min  Tidal Volume Set (mL): 550 mL  PEEP (cm H2O): 5 cmH2O  Oxygen Concentration (%): 30 %  Peak Inspiratory Pressure (cm H2O) (Drager Mackenzie): 22  Resp: 11    TAVO Pierre, RRT

## 2021-02-26 NOTE — PROGRESS NOTES
EEG CLINICAL NEUROPHYSIOLOGY PRELIMINARY REPORT    First 2 hours of video EEG reviewed.  As best as can be determined from electronic medical record, low-dose of sedative drips had been discontinued 1 to 2 hours prior to the study.  Moderate amplitude delta and a diffuse theta is noted.  There are brief periods of attenuation lasting 2 to 3 seconds.  These are not particularly persistent.  EEG does show some reactivity.  With spontaneous arousal, higher amplitude and somewhat more rhythmic delta is seen.  Epileptiform discharges or seizures not seen.    Abnormal.  Background activity consistent with moderate to severe diffuse encephalopathy.  Recent discontinuation of low-dose sedative drips would not be expected to result in these findings.  Thus far, no indication of seizures or seizure tendency.Will continue video-EEG monitoring. Full report to follow.    Ricardo Quiroga MD  Contact information for physicians covering Epilepsy and EEG can be found on [facility] under Neurology Adult/[facility]/Staff Epilepsy and EEG.

## 2021-02-26 NOTE — PROGRESS NOTES
"  [facility]    Stroke Progress Note    Interval Events  No acute neurological event overnight     Impression  Encephalopathy  Hx of Seizure   ?Prolong post ictus state vs ?CNS infection, however thought to be likely given objective finding for infection.   TPM and VPA level both WNL   VEEG has been showing moderate to severe non specific encephalopathy.     Plan  Will continue vEEG due to poor exam   Hold all sedative to allow it to clear system  If patient does not continue to slowly improve, will need LP   Continue AED at current doses   Will order ammonia, ESR, CRP for additional toxic metabolic infectious work up.     Sputum growing Klebsiella PNA, concern for ?aspiration pna, treatment per MICU. Low threshold to treat.       The Stroke Staff is STEVENSON Marie.    Joshua Merchant MD  Vascular Neurology Fellow  To page me or covering stroke neurology team member, click here: [facility]COM   Choose \"On Call\" tab at top, then search dropdown box for \"Neurology Adult\", select location, press Enter, then look for stroke/neuro ICU/telestroke.    ______________________________________________________    Medications   Home Meds  Prior to Admission medications    Medication Sig Start Date End Date Taking? Authorizing Provider   ammonium lactate (LAC-HYDRIN) 12 % external lotion Apply topically daily To feet and heels   Yes Reported, Patient   divalproex sodium extended-release (DEPAKOTE ER) 500 MG 24 hr tablet Decreased depakote as advised (will fax med schedule to [facility]) to 1000 mg twice a day 10/30/20  Yes Lauren Washington MD   docusate sodium (COLACE) 100 MG capsule Take 1 capsule by mouth 2 times daily.   Yes Reported, Patient   FISH OIL 1000 MG OR CAPS 1 PO BID  Patient taking differently: 1 capsule 2 times per day 8/27/09  Yes Pato Morales MD   lactulose (CHRONULAC) 10 GM/15ML solution Take 30 mLs by mouth 2 times daily as needed   Yes Unknown, Entered By History   lactulose (CHRONULAC) 10 " GM/15ML solution Take 30 mLs by mouth every evening & additional 30mL 2 times per day as needed   Yes Reported, Patient   levothyroxine (SYNTHROID/LEVOTHROID) 150 MCG tablet TAKE 1 TABLET BY MOUTH ONCE DAILY  FOR THYROID   *1 TOTAL FILL* 9/3/20  Yes Rajat Parkinson MD   lithium ER (LITHOBID) 300 MG CR tablet Take 600 mg by mouth At Bedtime    Yes Reported, Patient   MILK OF MAGNESIA 400 MG/5ML suspension 30 mLs every evening  3/29/16  Yes Reported, Patient   polyethylene glycol (MIRALAX) powder Take 17 g by mouth daily. 4/27/12  Yes Pato Morales MD   pseudoePHEDrine (SUDAFED) 30 MG tablet Take 30 mg by mouth every evening as needed for congestion   Yes Unknown, Entered By History   risperiDONE (RISPERDAL) 2 MG tablet Take 2 mg by mouth 2 times daily 1 tab every morning & 1 tab every bedtime   Yes Reported, Patient   sertraline (ZOLOFT) 100 MG tablet Take 1.5 tablets by mouth every morning.   Yes Ronal Tavera MD   topiramate (TOPAMAX) 200 MG tablet Take 200 mg by mouth 2 times daily   Yes Unknown, Entered By History   VITAMIN D3 1000 units tablet 3,000 Units daily 7/11/17  Yes Reported, Patient   order for DME Flat sole shoe  Patient not taking: Reported on 10/30/2020 8/15/20   Real Pak, PAEstrellaC   Starch, Thickening, POWD Combined with beverages, per instructions from Speech Therapy. 1/14/21   Rajat Parkinson MD       Scheduled Meds    chlorhexidine  15 mL Mouth/Throat Q12H     enoxaparin ANTICOAGULANT  40 mg Subcutaneous Q24H     famotidine  20 mg Intravenous Q12H     lacosamide (VIMPAT) intermittent infusion  100 mg Intravenous BID     LORazepam  2 mg Intravenous Once     topiramate  200 mg Oral BID     valproate (DEPACON) intermittent infusion  750 mg Intravenous Q8H       Infusion Meds    dexmedetomidine       dextrose 5% lactated ringers 50 mL/hr at 02/26/21 0828       PRN Meds  glucose **OR** dextrose **OR** glucagon, midazolam       PHYSICAL EXAMINATION  Temp:  [97.5  F (36.4   C)-98.7  F (37.1  C)] 98.7  F (37.1  C)  Pulse:  [72-93] 79  Resp:  [11-16] 13  BP: ()/(58-94) 114/69  FiO2 (%):  [30 %] 30 %  SpO2:  [96 %-99 %] 96 %     Neurologic  Mental Status:  Intubated and examined off propofol x 3 hours, does not follow.  Cranial Nerves:  Pupil 5 mm BL and reactive, facial sensation intact to noxious, face appear symmetric, corneal intact, cough gag intact  Motor:  mild withdraw to noxious, shuffles bilateral lower extremity with noxious.   Reflexes:  toes down-going  Sensory:  intact to noxious  Coordination:  intermittent shivering of body *these are not seizure*  Station/Gait:  deferred         Imaging  I personally reviewed all imaging; relevant findings per HPI.     Lab Results Data   CBC  Recent Labs   Lab 02/25/21  0749 02/24/21  2322   WBC 4.6 3.9*   RBC 3.80* 4.32*   HGB 11.7* 13.1*   HCT 35.7* 41.9    155     Basic Metabolic Panel    Recent Labs   Lab 02/25/21  0749 02/24/21  2322   NA  --  138   POTASSIUM  --  5.2   CHLORIDE  --  105   CO2  --  35*   BUN  --  13   CR 0.70 0.76   GLC  --  87   JESSICA  --  9.7     Liver Panel  Recent Labs   Lab 02/25/21  0749   ALBUMIN 3.0*     INR  No lab results found.   Lipid Profile    Recent Labs   Lab Test 05/17/18  0936 08/15/17  1658 08/06/15  0946 07/31/14 08/02/13  1532   CHOL 174 152 138 137 138   HDL 58 60 59 47 53   * 73 65 75 70   TRIG 77 94 70 76 80   CHOLHDLRATIO  --   --  2.3 3 2.6     A1C    Recent Labs   Lab Test 09/23/20  0858 08/15/17  1658   A1C 4.7 4.9     Troponin I  No results for input(s): TROPI in the last 168 hours.

## 2021-02-26 NOTE — PROGRESS NOTES
EEG CLINICAL NEUROPHYSIOLOGY PRELIMINARY REPORT    Video-EEG through 4 AM today reviewed. As best as can be determined from electronic medical record, patient not currently being treated with sedative drips. Reasonably continuous polymorphic moderate amplitude delta. No normal rhythms. No clear reactivity. No epileptiform discharges or seizures.    Study continues consistent with moderate to severe diffuse encephalopathy. No indication of seizures or seizure tendency. Full report to follow.    Ricardo Quiroga MD  Contact information for physicians covering Epilepsy and EEG can be found on [facility] under Neurology Adult/[facility]/Staff Epilepsy and EEG.

## 2021-02-26 NOTE — CONSULTS
"CLINICAL NUTRITION SERVICES  -  ASSESSMENT NOTE      Recommendations Ordered by Registered Dietitian (RD):   - Change to non-dextrose IVF with TF start  - Begin TF Isosource 1.5 via OG at 10 mL/hr. After 12 hrs, increase to 25 mL/hr and continue to advance by 20 mL every 8 hrs to goal rate of 65 mL/hr  - Goal: Isosource 1.5 at 65 mL/hr = 2340 kcal (27 kcal/kg), 106 gm protein (1.2 mg/kg), 23 gm fiber, 275 gm CHO, 1185 mL free water  - Free water flushes: 60 mL q 4 hrs for patency     - Electrolyte replacements with TF advancements as needed   - Consider bowel regimen with TF start     Malnutrition:  % Weight Loss:  Weight loss does not meet criteria for malnutrition   % Intake:  Decreased intake does not meet criteria for malnutrition (at least day 3 NPO)  Subcutaneous Fat Loss:  None observed  Muscle Loss:  None observed  Fluid Retention:  None noted    Malnutrition Diagnosis: Patient does not meet two of the above criteria necessary for diagnosing malnutrition at this time but is at risk pending further decline         REASON FOR ASSESSMENT  Duane D Backes is a 40 year old male seen by Registered Dietitian for Provider Order - Registered Dietitian to Assess and Order TF per Medical Nutrition Therapy Protocol      NUTRITION HISTORY  - Information obtained from chart review  - PMH includes: autism, tonic-clonic seizures, benign epidermoid tumor, and hypothyroidism  - Presented to the ED for a 3 minute witnessed seizure   - Resides in a group home and apparently recently started on a DD2 diet and HTL  - Per RN note on admission: \"Spoke with group home staff. Patient likes [employer], [employer], fries, soda and ice cream.\"    - Unable to obtain diet hx from patient d/t current condition     CURRENT NUTRITION ORDERS  Diet Order:     NPO, vented     Current Intake/Tolerance:  Pt is NPO day 4  OGT present, plan for TF 2/26 during rounds       NUTRITION FOCUSED PHYSICAL ASSESSMENT FOR DIAGNOSING MALNUTRITION)  Yes - upper " "body only             Observed:    No nutrition-related physical findings observed    Obtained from Chart/Interdisciplinary Team:  No BM recorded  No edema recorded     ANTHROPOMETRICS  Height: 6' 2\"  Weight:  85.7 kg (188 lb 15 oz) -- 2/25   Body mass index is 24.24 kg/m .  Weight Status:  Normal BMI  IBW: 86.3 kg   % IBW: 99%  Weight History: Lack of recent weights available to evaluate. He appears down ~8 pounds in the past 14 months (4% body wt), and down ~39 pounds over the past 29 months (17% body wt). This is not significant enough to meet criteria for malnutrition, and without diet history we unable to rule out if this was intentional wt loss  Wt Readings from Last 10 Encounters:   02/27/21 86.3 kg (190 lb 4.1 oz)   12/23/19 89.8 kg (198 lb)   12/16/19 90 kg (198 lb 6.4 oz)   08/27/19 91.8 kg (202 lb 6.4 oz)   07/10/19 94.3 kg (208 lb)   03/19/19 97.5 kg (215 lb)   02/22/19 95.3 kg (210 lb)   02/22/19 97.5 kg (215 lb)   11/20/18 102.1 kg (225 lb)   09/28/18 104 kg (229 lb 4.8 oz)     Vitals:    02/24/21 2350 02/25/21 0600 02/26/21 0400 02/27/21 0000   Weight: 90.7 kg (200 lb) 85.7 kg (188 lb 15 oz) 87.7 kg (193 lb 5.5 oz) 86.3 kg (190 lb 4.1 oz)       LABS  Labs reviewed  K 3.9  No Mg/Phos   Recent Labs   Lab 02/27/21  0515 02/27/21  0356 02/26/21  2055 02/26/21  1511 02/26/21  0410 02/25/21  2346 02/25/21 2029 02/25/21  1654 02/24/21 2322 02/24/21 2322   *  --   --  104*  --   --   --   --   --  87   BGM  --  102* 98  --  82 87 91 86   < >  --     < > = values in this interval not displayed.     Lab Results   Component Value Date    URINEKETONE Negative 02/25/2021       MEDICATIONS  Medications reviewed  D5 IVF in LR  At 50 mL/hr = 204 kcal, 60 gm CHO  Propofol stopped 2/25    ASSESSED NUTRITION NEEDS PER APPROVED PRACTICE GUIDELINES:    Dosing Weight 85.7 kg   Estimated Energy Needs: 2753-6991 kcals (25-30 Kcal/Kg)  Justification: maintenance and vented  Estimated Protein Needs: 102-128 grams " protein (1.2-1.5 g pro/Kg)  Justification: preservation of lean body mass  Estimated Fluid Needs: 1 mL/kcal  Justification: maintenance or per provider pending fluid status    MALNUTRITION:  % Weight Loss:  Weight loss does not meet criteria for malnutrition   % Intake:  Decreased intake does not meet criteria for malnutrition (at least day 4 NPO)  Subcutaneous Fat Loss:  None observed  Muscle Loss:  None observed  Fluid Retention:  None noted    Malnutrition Diagnosis: Patient does not meet two of the above criteria necessary for diagnosing malnutrition at this time but is at risk pending further decline     NUTRITION DIAGNOSIS:  Inadequate protein-energy intake related to NPO and vented as evidenced by receiving 0% estimated needs       NUTRITION INTERVENTIONS  Recommendations / Nutrition Prescription  - Change to non-dextrose IVF with TF start  - Begin TF Isosource 1.5 via OG at 10 mL/hr. After 12 hrs, increase to 25 mL/hr and continue to advance by 20 mL every 8 hrs to goal rate of 65 mL/hr  - Goal: Isosource 1.5 at 65 mL/hr = 2340 kcal (27 kcal/kg), 106 gm protein (1.2 mg/kg), 23 gm fiber, 275 gm CHO, 1185 mL free water  - Free water flushes: 60 mL q 4 hrs for patency     - Electrolyte replacements with TF advancements as needed   - Consider bowel regimen with TF start      Implementation  Nutrition education: Not appropriate at this time due to patient condition  EN Composition, EN Schedule, Feeding Tube Flush: as above   Collaboration and Referral of Nutrition care: patient was discussed during ICU rounds 2/26      Nutrition Goals  TF Isosource 1.5 at 65 mL/hr will meet % estimated needs within 48 hrs       MONITORING AND EVALUATION:  Progress towards goals will be monitored and evaluated per protocol and Practice Guidelines      Lexi Mason RD, LD  Clinical Dietitian

## 2021-02-26 NOTE — PROGRESS NOTES
[facility] ICU RESPIRATORY NOTE        Date of Admission: 2/24/2021    Date of Intubation (most recent):  2/25/21    Reason for Mechanical Ventilation:  AW protection    Number of Days on Mechanical Ventilation:  2    Met Criteria for Spontaneous Breathing Trial: No    Reason for No Spontaneous Breathing Trial:  Per MD    Significant Events Today: None  ABG Results:   Recent Labs   Lab 02/26/21  1505 02/25/21  0755   PH  --  7.52*   PCO2  --  33*   PO2  --  174*   HCO3  --  27   O2PER 30 40       Current Vent Settings: Ventilation Mode: CMV/AC  (Continuous Mandatory Ventilation/ Assist Control)  FiO2 (%): 30 %  Rate Set (breaths/minute): 12 breaths/min  Tidal Volume Set (mL): 550 mL  PEEP (cm H2O): 5 cmH2O  Oxygen Concentration (%): 30 %  Peak Inspiratory Pressure (cm H2O) (Drager Mackenzie): 22  Resp: 14      Pan:  Pt to remain on full vent support overnight    Mark Anthony Elaine, RT

## 2021-02-26 NOTE — PLAN OF CARE
Neuro:  PERRL,  Waxing neuro status.  Intermittently withdraws to pain in bilateral upper extremities.  Some spontaneous head movements noted.  Cough intact.   Heart rate increases with stimulation.    Does not follow commands.  Continuous EEG.  See MRI report regarding increased size of epidermoid tumor.       Pulm:  LS CTA, scant white/clear secretions.  Copious oral secretions noted.      Cardiac: Normal Sinus Rhythm, BP WDL     GI:  NG to Low Intermittent suction, aden colored output noted.     :  Colin removed by previous RN.   External catheter in place, patient due to void. Bladder palpated,  non-distended.       Skin:  Coccyx intact with sacral mepilex dressing in place.   R 5th toe bruised/scabbed toenail.  R ankle approximately 2cm x 2cm abrasion/reddened  area noted.          For full assessment see Epic flow sheets.

## 2021-02-27 ENCOUNTER — HOSPITAL ENCOUNTER (OUTPATIENT)
Dept: NEUROLOGY | Facility: CLINIC | Age: 41
DRG: 100 | End: 2021-02-27
Attending: PSYCHIATRY & NEUROLOGY
Payer: MEDICARE

## 2021-02-27 LAB
ANION GAP SERPL CALCULATED.3IONS-SCNC: 3 MMOL/L (ref 3–14)
BACTERIA SPEC CULT: ABNORMAL
BUN SERPL-MCNC: 15 MG/DL (ref 7–30)
CALCIUM SERPL-MCNC: 9.8 MG/DL (ref 8.5–10.1)
CHLORIDE SERPL-SCNC: 109 MMOL/L (ref 94–109)
CO2 SERPL-SCNC: 26 MMOL/L (ref 20–32)
CREAT SERPL-MCNC: 0.87 MG/DL (ref 0.66–1.25)
CRP SERPL-MCNC: 148 MG/L (ref 0–8)
ERYTHROCYTE [DISTWIDTH] IN BLOOD BY AUTOMATED COUNT: 15.1 % (ref 10–15)
ERYTHROCYTE [SEDIMENTATION RATE] IN BLOOD BY WESTERGREN METHOD: 29 MM/H (ref 0–15)
GFR SERPL CREATININE-BSD FRML MDRD: >90 ML/MIN/{1.73_M2}
GLUCOSE BLDC GLUCOMTR-MCNC: 102 MG/DL (ref 70–99)
GLUCOSE SERPL-MCNC: 102 MG/DL (ref 70–99)
HCT VFR BLD AUTO: 36.9 % (ref 40–53)
HGB BLD-MCNC: 12 G/DL (ref 13.3–17.7)
Lab: ABNORMAL
MAGNESIUM SERPL-MCNC: 2 MG/DL (ref 1.6–2.3)
MCH RBC QN AUTO: 30.7 PG (ref 26.5–33)
MCHC RBC AUTO-ENTMCNC: 32.5 G/DL (ref 31.5–36.5)
MCV RBC AUTO: 94 FL (ref 78–100)
PHOSPHATE SERPL-MCNC: 3.7 MG/DL (ref 2.5–4.5)
PLATELET # BLD AUTO: 114 10E9/L (ref 150–450)
POTASSIUM SERPL-SCNC: 3.9 MMOL/L (ref 3.4–5.3)
PROCALCITONIN SERPL-MCNC: 0.12 NG/ML
RBC # BLD AUTO: 3.91 10E12/L (ref 4.4–5.9)
SODIUM SERPL-SCNC: 138 MMOL/L (ref 133–144)
SPECIMEN SOURCE: ABNORMAL
WBC # BLD AUTO: 10.2 10E9/L (ref 4–11)

## 2021-02-27 PROCEDURE — C9254 INJECTION, LACOSAMIDE: HCPCS | Performed by: STUDENT IN AN ORGANIZED HEALTH CARE EDUCATION/TRAINING PROGRAM

## 2021-02-27 PROCEDURE — 36415 COLL VENOUS BLD VENIPUNCTURE: CPT | Performed by: ANESTHESIOLOGY

## 2021-02-27 PROCEDURE — 999N001017 HC STATISTIC GLUCOSE BY METER IP

## 2021-02-27 PROCEDURE — 84100 ASSAY OF PHOSPHORUS: CPT | Performed by: ANESTHESIOLOGY

## 2021-02-27 PROCEDURE — 258N000003 HC RX IP 258 OP 636: Performed by: STUDENT IN AN ORGANIZED HEALTH CARE EDUCATION/TRAINING PROGRAM

## 2021-02-27 PROCEDURE — 258N000003 HC RX IP 258 OP 636: Performed by: PSYCHIATRY & NEUROLOGY

## 2021-02-27 PROCEDURE — 250N000009 HC RX 250: Performed by: PSYCHIATRY & NEUROLOGY

## 2021-02-27 PROCEDURE — 85652 RBC SED RATE AUTOMATED: CPT | Performed by: ANESTHESIOLOGY

## 2021-02-27 PROCEDURE — 200N000001 HC R&B ICU

## 2021-02-27 PROCEDURE — 99291 CRITICAL CARE FIRST HOUR: CPT | Performed by: ANESTHESIOLOGY

## 2021-02-27 PROCEDURE — 85027 COMPLETE CBC AUTOMATED: CPT | Performed by: ANESTHESIOLOGY

## 2021-02-27 PROCEDURE — 250N000013 HC RX MED GY IP 250 OP 250 PS 637: Performed by: PSYCHIATRY & NEUROLOGY

## 2021-02-27 PROCEDURE — 250N000011 HC RX IP 250 OP 636: Performed by: INTERNAL MEDICINE

## 2021-02-27 PROCEDURE — 86140 C-REACTIVE PROTEIN: CPT | Performed by: ANESTHESIOLOGY

## 2021-02-27 PROCEDURE — 999N000052 EEG VIDEO 12-26 HR UNMONITORED

## 2021-02-27 PROCEDURE — 99291 CRITICAL CARE FIRST HOUR: CPT | Mod: 25 | Performed by: PSYCHIATRY & NEUROLOGY

## 2021-02-27 PROCEDURE — 272N000078 HC NUTRITION PRODUCT INTERMEDIATE LITER

## 2021-02-27 PROCEDURE — 250N000009 HC RX 250: Performed by: ANESTHESIOLOGY

## 2021-02-27 PROCEDURE — 999N000157 HC STATISTIC RCP TIME EA 10 MIN

## 2021-02-27 PROCEDURE — 250N000011 HC RX IP 250 OP 636: Performed by: STUDENT IN AN ORGANIZED HEALTH CARE EDUCATION/TRAINING PROGRAM

## 2021-02-27 PROCEDURE — 83735 ASSAY OF MAGNESIUM: CPT | Performed by: ANESTHESIOLOGY

## 2021-02-27 PROCEDURE — 94003 VENT MGMT INPAT SUBQ DAY: CPT

## 2021-02-27 PROCEDURE — 84145 PROCALCITONIN (PCT): CPT | Performed by: ANESTHESIOLOGY

## 2021-02-27 PROCEDURE — 95720 EEG PHY/QHP EA INCR W/VEEG: CPT | Performed by: PSYCHIATRY & NEUROLOGY

## 2021-02-27 PROCEDURE — 80048 BASIC METABOLIC PNL TOTAL CA: CPT | Performed by: ANESTHESIOLOGY

## 2021-02-27 PROCEDURE — 250N000013 HC RX MED GY IP 250 OP 250 PS 637: Performed by: INTERNAL MEDICINE

## 2021-02-27 RX ORDER — DEXTROSE MONOHYDRATE 100 MG/ML
INJECTION, SOLUTION INTRAVENOUS CONTINUOUS PRN
Status: DISCONTINUED | OUTPATIENT
Start: 2021-02-27 | End: 2021-03-03

## 2021-02-27 RX ORDER — LEVOFLOXACIN 5 MG/ML
750 INJECTION, SOLUTION INTRAVENOUS EVERY 24 HOURS
Status: DISCONTINUED | OUTPATIENT
Start: 2021-02-27 | End: 2021-03-04

## 2021-02-27 RX ADMIN — SODIUM CHLORIDE 100 MG: 9 INJECTION, SOLUTION INTRAVENOUS at 21:00

## 2021-02-27 RX ADMIN — SODIUM CHLORIDE, SODIUM LACTATE, POTASSIUM CHLORIDE, CALCIUM CHLORIDE AND DEXTROSE MONOHYDRATE: 5; 600; 310; 30; 20 INJECTION, SOLUTION INTRAVENOUS at 20:11

## 2021-02-27 RX ADMIN — CHLORHEXIDINE GLUCONATE 0.12% ORAL RINSE 15 ML: 1.2 LIQUID ORAL at 07:40

## 2021-02-27 RX ADMIN — TOPIRAMATE 200 MG: 200 TABLET ORAL at 20:07

## 2021-02-27 RX ADMIN — LEVOFLOXACIN 750 MG: 5 INJECTION, SOLUTION INTRAVENOUS at 09:31

## 2021-02-27 RX ADMIN — VALPROATE SODIUM 750 MG: 100 INJECTION, SOLUTION INTRAVENOUS at 20:12

## 2021-02-27 RX ADMIN — CHLORHEXIDINE GLUCONATE 0.12% ORAL RINSE 15 ML: 1.2 LIQUID ORAL at 20:08

## 2021-02-27 RX ADMIN — VALPROATE SODIUM 750 MG: 100 INJECTION, SOLUTION INTRAVENOUS at 01:50

## 2021-02-27 RX ADMIN — VALPROATE SODIUM 750 MG: 100 INJECTION, SOLUTION INTRAVENOUS at 11:49

## 2021-02-27 RX ADMIN — FAMOTIDINE 20 MG: 10 INJECTION, SOLUTION INTRAVENOUS at 11:52

## 2021-02-27 RX ADMIN — TOPIRAMATE 200 MG: 200 TABLET ORAL at 09:20

## 2021-02-27 RX ADMIN — ENOXAPARIN SODIUM 40 MG: 40 INJECTION SUBCUTANEOUS at 20:07

## 2021-02-27 RX ADMIN — SODIUM CHLORIDE 100 MG: 9 INJECTION, SOLUTION INTRAVENOUS at 09:59

## 2021-02-27 ASSESSMENT — ACTIVITIES OF DAILY LIVING (ADL)
ADLS_ACUITY_SCORE: 24

## 2021-02-27 ASSESSMENT — MIFFLIN-ST. JEOR: SCORE: 1852.75

## 2021-02-27 NOTE — PLAN OF CARE
Neuro: this AM pt moves x 3 extremities spontaneously but does not withdraw LUE. Does not open eyes, pupils now equal and brisk. Spontaneous coughing on vent. Brief period of upper body tremors (approx 10 seconds) x 1. Remains on EEG and off sedation  CV: SR without ectopy, BP stable, mild BLE edema  Pulm: At times breathing over vent. Lungs clear. Small to moderate secretions.  GI/: good UOP through condom cath. + flatus. OG to LIS  Skin: repo side to side, blanchable redness to gluteus  Lines: D5LR at 50 mL/hr  Other: Patient's father updated last night over phone regarding patient's condition.

## 2021-02-27 NOTE — PLAN OF CARE
"Pt remains vented, without sedation for more than 36 hours.  Neuro exam performed every two hours, note slight increase in awareness when performing mouth care, turning head to side to avoid suctioning. Spontaneous cough with turns.  Occasionally, pt withdraws bilat upper extremities to pain response, left lower extremity withdrew x1.  Restraints removed as pt not reaching for ET tube.  Pressure support done with neuro at bedside, pt did not elicit his own breath and trial stopped immediately.   Updated mother/guardian via phone.  Discussed pt's functional status in group home, learned that he may have difficulty following simple commands like, \"squeeze my hand, show a thumb\".  May need creative ways to assess alertness.  Mother reports that pt is visually astute, following directions when shown, but not necessarily cued with hearing.  EEG remains on, note increased activity with cares/turns and then return to slowed waveform.    "

## 2021-02-27 NOTE — PROGRESS NOTES
ICU Update    Crp and procal slightly up D/w NCC - will start abx, levaquin - bugs S to both per antibiogram.   NCC want avoid meropenem and cefepime as potential provoke worsening seizures    Tony Briggs MD

## 2021-02-27 NOTE — PROGRESS NOTES
[facility] ICU RESPIRATORY NOTE        Date of Admission: 2/24/2021    Date of Intubation (most recent): 2/25/2021    Reason for Mechanical Ventilation: AW protection    Number of Days on Mechanical Ventilation: 3    Met Criteria for Spontaneous Breathing Trial:No    Reason for No Spontaneous Breathing Trial: Per MD    Significant Events Today: None    ABG Results:   Recent Labs   Lab 02/26/21  1505 02/25/21  0755   PH  --  7.52*   PCO2  --  33*   PO2  --  174*   HCO3  --  27   O2PER 30 40       Current Vent Settings: Ventilation Mode: CMV/AC  (Continuous Mandatory Ventilation/ Assist Control)  FiO2 (%): 30 %  Rate Set (breaths/minute): 14 breaths/min  Tidal Volume Set (mL): 550 mL  PEEP (cm H2O): 5 cmH2O  Oxygen Concentration (%): 30 %  Resp: 15      Plan: Continue on full vent support.      Hakeem Lopez, RT

## 2021-02-27 NOTE — PROGRESS NOTES
EEG CLINICAL NEUROPHYSIOLOGY PRELIMINARY REPORT    EEG through 9 AM today reviewed. As best as can be determined from electronic medical record, patient not currently being treated with sedative drips. Moderate irregular diffuse delta persists. No normal posterior dominant rhythm. No seizures.    Continues with evidence of moderate to severe diffuse encephalopathy. No indications of seizures or seizure tendency. Full report to follow.    Ricardo Quiroga MD  Contact information for physicians covering Epilepsy and EEG can be found on [facility] under Neurology Adult/MC/Staff Epilepsy and EEG.

## 2021-02-27 NOTE — PROGRESS NOTES
Critical Care Progress Note      02/27/2021    Name: Duane D Backes MRN#: [id number]   Age: 40 year old YOB: 1980     Hsptl Day# 2  ICU DAY # 2    MV DAY #             Problem List:   Active Problems:    Seizure disorder (H)    Altered mental status, unspecified altered mental status type           Summary/Hospital Course:   Duane D Backes is a 40 year old male with PMH of autism, epilepsy, tonic-clonic seizures, & an epidermoid tumor who presented on 2/24 to the ED for a 3 minute witnessed seizure. He had another seizure in the ED, became somnolent,  and was given a total of 4 mg of ativan. He had increased secretions prompting intubation and mechanical ventilation and admission to the ICU. He has been off of sedation for 48h and has been slow to wake up since then. EEG has not shown any epileptic or seizure activity.    Sputum growing Klebsiella PNA, concern for pneumonia started on levaquin.      Assessment and plan :     Duane D Backes IS a 40 year old male w/ PMH of autism, epilepsy, tonic-clonic seizures, and an epidermoid tumor, admitted on 2/24/2021 for treatment of seizure that led to intubation and mechanical ventilation. Patient has been off of sedation as of 2/25 AM & remains intubated and mechanically ventilated.  I have personally reviewed the daily labs, imaging studies, cultures and discussed the case with referring physician and consulting physicians.     My assessment and plan by system for this patient is as follows:    Neurology/Psychiatry:   1. Seizure disorder with new seizure  PMH of seizures, and specifically epilepsy, tonic-clonic seizures, and an enlarging epidermoid cyst. Differential for seizure leading to this admission includes increasing size of epidermoid cyst, medications lowering the seizure threshold (Lithium, Risperdal, Zoloft, and Sudafed), and infection and sepsis. Infection is possible as elevated ESR/CRP & sputum Cx returned positive for Enterobacter cloacae &  Klebsiella pneumonia, the patient was started on levaquin per antibiogram.  - Less likely related to medications as these have not been changed recently. TPM & VPA level WNL.  - Less likely to be related to epidermoid cyst as this is likely slow growing & nothing on MRI to suggest edema &/or compression per neurology  - Preliminary EEG shows continuous polymorphic moderate amplitude delta. Study consistent with moderate to severe diffuse encephalopathy. No indication of seizures or seizure tendency.  2. Transitioned off of sedation as of two days ago (2/25) AM, patient has been slow to arouse from sedation since then.   3. History of autism--Baseline mental status could be clouding the patient's neurological status and the process of transitioning off of sedation. Moving all extremities but does not follow commands     Plan  - Neurology consulted and following, appreciate recs:              - Continue valproate 750 mg Q8H               - Continue PTA topiramate 200 mg BID              - Hold lithium and other antipsychotic meds as they may lower the seizure threshold              - Limit sedating medication as able, off sedation for 48 hours  - Continue EEG, await final results.   -  Repeat CBC, assessing for infection as possible cause of seizure  - Neurosurgery consult based on neurology input    Cardiovascular:   1. Stable hemodynamics   2. Sinus Rhythm on monitor with no signs of ischemia   Plan   - Monitor hemodynamic status closely    Pulmonary/Ventilator Management:   1. Currently intubated and mechanically ventilated. CMV RR14, Vte 550, PEEP 5, FiO2 30.   2. Sputum Cx returned positive for Enterobacter cloacae & Klebsiella pneumonia, concern for aspiration pneumonia     Plan  - Start Levaquin given sensitivities per antibiogram, avoiding meropenem & cefepime as potential to provoke seizures  - CXR  Recheck ABG to assess acid-base status and respiratory rate  - Continue to monitor neurologic status to assess  readiness for weaning ventilation per protocol  -    GI and Nutrition :   1. Occult blood from OG tube found 2/25, today no more blood or clotting in OG tube.   2. Nutrition consulted, plan for TF Isosource 1.5 at 65 mL/hr      Plan  - Start TF today   - Continue famotidine 20 mg Q12H, as there is no more sign of bleeding from the stomach or in the OG tube.     Renal/Fluids/Electrolytes:   1. Previous respiratory alkalosis, resolved.   2. Normal kidney function w/ Cr 0.87, BUN 15     Plan  - Repeat VBG to assess acid-base status  - monitor function and electrolytes as needed with replacement per ICU protocols.  - generally avoid nephrotoxic agents such as NSAID, IV contrast unless specifically required  - adjust medications as needed for renal clearance  - follow I/O's as appropriate.    Infectious Disease:   1. Sputum Cx growth of Enterobacter cloacae & Klebsiella pneumonia  2. CRP & ESR elevated   3. Influenza A/B negative  Plan  - Start Levaquin given sensitivities per antibiogram, avoiding meropenem & cefepime as potential to provoke seizures    Endocrine:   1. Hypothermia, resolved. Patient initially had temp of 91F and has slowly warmed up to normal temp while on a Eric hugger. SIRS or Sepsis could lead to hypothermia.   2. History of hypothyroidism: TSH low at 0.27 and free T4 normal at 1.05. Patient's most recent TSH levels were 0.42 on 8/21/20 and 0.03 on 3/4/20. Hypothyroidism is less likely to be cause of hypothermia since free T4 was in the normal range and TSH is low. Patient is currently euthermic.     Plan  - ICU insulin protocol, goal sugar <180   - Continue to monitor temperature, investigate for source of infection  - PTA levothyroxine 150 mcg, holding for now     Hematology/Oncology:   1. Anemia. Hb now 12, down from 13.1 on 2/24  Plan  - Continue to monitor    MSKL/Rheum:  1. No active issues    IV/Access:   1. Venous access - PIVx2  Plan  - central access required and necessary      ICU  Prophylaxis:   1. DVT: Hep Subq/ LMWH/mechanical  2. VAP: HOB 30 degrees, chlorhexidine rinse  3. Stress Ulcer: PPI/H2 blocker  4. Restraints: Nonviolent soft two point restraints required and necessary for patient safety and continued cares and good effect as patient continues to pull at necessary lines, tubes despite education and distraction. Will readdress daily.   5. Wound care - per unit routine   6. Feeding - TF Isosource 1.5 at 65 mL/hr   7. Family Update:   8. Disposition - ICU    PLAN:  1. Started levoquin  2 ABG and chest Xray  3. Neurology following          Interim History:     No acute overnight events.          Key Medications:       chlorhexidine  15 mL Mouth/Throat Q12H     enoxaparin ANTICOAGULANT  40 mg Subcutaneous Q24H     famotidine  20 mg Intravenous Q12H     lacosamide (VIMPAT) intermittent infusion  100 mg Intravenous BID     levofloxacin  750 mg Intravenous Q24H     LORazepam  2 mg Intravenous Once     topiramate  200 mg Oral BID     valproate (DEPACON) intermittent infusion  750 mg Intravenous Q8H       dexmedetomidine       dextrose       dextrose 5% lactated ringers 50 mL/hr at 02/27/21 0726               Physical Examination:   Temp:  [97.5  F (36.4  C)-99.6  F (37.6  C)] 99.6  F (37.6  C)  Pulse:  [] 102  Resp:  [9-24] 24  BP: (103-155)/(64-91) 155/90  FiO2 (%):  [30 %] 30 %  SpO2:  [90 %-98 %] 94 %    Intake/Output Summary (Last 24 hours) at 2/27/2021 1308  Last data filed at 2/27/2021 1300  Gross per 24 hour   Intake 1570 ml   Output 1920 ml   Net -350 ml     Wt Readings from Last 4 Encounters:   02/27/21 86.3 kg (190 lb 4.1 oz)   12/23/19 89.8 kg (198 lb)   12/16/19 90 kg (198 lb 6.4 oz)   08/27/19 91.8 kg (202 lb 6.4 oz)     BP - Mean:  [] 113  Ventilation Mode: CMV/AC  (Continuous Mandatory Ventilation/ Assist Control)  FiO2 (%): 30 %  Rate Set (breaths/minute): 14 breaths/min  Tidal Volume Set (mL): 550 mL  PEEP (cm H2O): 5 cmH2O  Oxygen Concentration (%): 30 %  Resp:  24    Recent Labs   Lab 02/26/21  1505 02/25/21  0755   PH  --  7.52*   PCO2  --  33*   PO2  --  174*   HCO3  --  27   O2PER 30 40       GEN: no acute distress   HEENT: head ncat, sclera anicteric, OP patent, trachea midline. NG in place  PULM: unlabored synchronous with vent, clear anteriorly    CV/COR: RRR S1S2 no gallop,  No rub, no murmur  ABD: soft nontender, hypoactive bowel sounds, no mass. OG in place  EXT:  Edema   warm  NEURO: moves all extremities spontaneously. Intact to noxious. Does not follow commands.  : roman draining clear urine  SKIN: no obvious rash  LINES: clean, dry intact         Data:   All data and imaging reviewed     ROUTINE ICU LABS (Last four results)  CMP  Recent Labs   Lab 02/27/21 0515 02/26/21  1511 02/25/21  0749 02/24/21  2322    141  --  138   POTASSIUM 3.9 4.3  --  5.2   CHLORIDE 109 110*  --  105   CO2 26 27  --  35*   ANIONGAP 3 4  --  <1*   * 104*  --  87   BUN 15 16  --  13   CR 0.87 0.87 0.70 0.76   GFRESTIMATED >90 >90 >90 >90   GFRESTBLACK >90 >90 >90 >90   JESSICA 9.8 9.5  --  9.7   MAG 2.0  --   --   --    PHOS 3.7  --   --   --    PROTTOTAL  --  6.5*  --   --    ALBUMIN  --  2.9* 3.0*  --    BILITOTAL  --  0.3  --   --    ALKPHOS  --  56  --   --    AST  --  18  --   --    ALT  --  19  --   --      CBC  Recent Labs   Lab 02/27/21  0515 02/26/21  1511 02/25/21  0749 02/24/21  2322   WBC 10.2 10.8 4.6 3.9*   RBC 3.91* 4.12* 3.80* 4.32*   HGB 12.0* 12.6* 11.7* 13.1*   HCT 36.9* 39.1* 35.7* 41.9   MCV 94 95 94 97   MCH 30.7 30.6 30.8 30.3   MCHC 32.5 32.2 32.8 31.3*   RDW 15.1* 15.4* 14.7 14.7   * 148* 150 155     INRNo lab results found in last 7 days.  Arterial Blood Gas  Recent Labs   Lab 02/26/21  1505 02/25/21  0755   PH  --  7.52*   PCO2  --  33*   PO2  --  174*   HCO3  --  27   O2PER 30 40       All cultures:  Recent Labs   Lab 02/25/21  1435 02/25/21  0028 02/25/21  0023   CULT Heavy growth  Normal stacey    Light growth  Enterobacter cloacae  complex  *  Light growth  Klebsiella pneumoniae  *  Susceptibility testing in progress No growth after 1 day No growth after 1 day     No results found for this or any previous visit (from the past 24 hour(s)).      Loretta Gonzalez, MS4    Physician Attestation   I, Wale Woodward, was present with the medical/REBEKA student who participated in the service and in the documentation of the note.  I have verified the history and personally performed the physical exam and medical decision making.  I agree with the assessment and plan of care as documented in the note.      I personally reviewed vital signs, medications, labs and imaging.    40 year old male with a past medical history significant for autism, epilepsy, tonic-clonic seizures, and an epidermoid tumor, admitted on 2/24/2021 for treatment of seizure that led to intubation and mechanical ventilation.   He has acute respiratory failure secondary to aspiration pneumonia and is currently on antibiotics  Patient still not following commands or at baseline but moves all 4 extremities. EEG monitoring continues and encephalopathy is being investigated    Wale Woodward MD  Date of Service (when I saw the patient): 02/27/21    Critical care time excluding procedures 35 Mins

## 2021-02-27 NOTE — PROGRESS NOTES
Care Management Follow Up    Length of Stay (days): 2    Expected Discharge Date: TBD     Concerns to be Addressed:       Patient plan of care discussed at interdisciplinary rounds: Yes    Anticipated Discharge Disposition:  Back to group home?     Additional Information:  Writer received call from Maria G WILDER and RN on duty. Writer gave update and asked again for guardianship paperwork. Writer did received guardianship paperwork and forwarded it to [employer] to be verified.      Bette Giraldo RN

## 2021-02-27 NOTE — PROGRESS NOTES
"  [facility]    Stroke Progress Note    Interval Events  No acute neurological event overnight    CRP rising   EEG negative for seizure     Impression  Encephalopathy  Hx of Seizure  Klebsiella PNA   Prolong post ictus state plus superimpose infection from PNA      Plan  Will continue vEEG due to poor exam   Continue to hold all sedative to allow it to clear system  Defer LP for now given slow improvement and source found for infection.  Continue AED at current doses   Antibiotic per MICU     Wean from vent as tolerated.    The Stroke Staff is STEVENSON Marie.    Joshua Merchant MD  Vascular Neurology Fellow  To page me or covering stroke neurology team member, click here: AMCOM   Choose \"On Call\" tab at top, then search dropdown box for \"Neurology Adult\", select location, press Enter, then look for stroke/neuro ICU/telestroke.    ______________________________________________________    Medications   Home Meds  Prior to Admission medications    Medication Sig Start Date End Date Taking? Authorizing Provider   ammonium lactate (LAC-HYDRIN) 12 % external lotion Apply topically daily To feet and heels   Yes Reported, Patient   divalproex sodium extended-release (DEPAKOTE ER) 500 MG 24 hr tablet Decreased depakote as advised (will fax med schedule to [facility]) to 1000 mg twice a day 10/30/20  Yes Lauren Washington MD   docusate sodium (COLACE) 100 MG capsule Take 1 capsule by mouth 2 times daily.   Yes Reported, Patient   FISH OIL 1000 MG OR CAPS 1 PO BID  Patient taking differently: 1 capsule 2 times per day 8/27/09  Yes Pato Morales MD   lactulose (CHRONULAC) 10 GM/15ML solution Take 30 mLs by mouth 2 times daily as needed   Yes Unknown, Entered By History   lactulose (CHRONULAC) 10 GM/15ML solution Take 30 mLs by mouth every evening & additional 30mL 2 times per day as needed   Yes Reported, Patient   levothyroxine (SYNTHROID/LEVOTHROID) 150 MCG tablet TAKE 1 TABLET BY MOUTH ONCE DAILY  " FOR THYROID   *1 TOTAL FILL* 9/3/20  Yes Rajat Parkinson MD   lithium ER (LITHOBID) 300 MG CR tablet Take 600 mg by mouth At Bedtime    Yes Reported, Patient   MILK OF MAGNESIA 400 MG/5ML suspension 30 mLs every evening  3/29/16  Yes Reported, Patient   polyethylene glycol (MIRALAX) powder Take 17 g by mouth daily. 4/27/12  Yes Pato Morales MD   pseudoePHEDrine (SUDAFED) 30 MG tablet Take 30 mg by mouth every evening as needed for congestion   Yes Unknown, Entered By History   risperiDONE (RISPERDAL) 2 MG tablet Take 2 mg by mouth 2 times daily 1 tab every morning & 1 tab every bedtime   Yes Reported, Patient   sertraline (ZOLOFT) 100 MG tablet Take 1.5 tablets by mouth every morning.   Yes Ronal Tavera MD   topiramate (TOPAMAX) 200 MG tablet Take 200 mg by mouth 2 times daily   Yes Unknown, Entered By History   VITAMIN D3 1000 units tablet 3,000 Units daily 7/11/17  Yes Reported, Patient   order for DME Flat sole shoe  Patient not taking: Reported on 10/30/2020 8/15/20   Real Pak, RAMA   Starch, Thickening, POWD Combined with beverages, per instructions from Speech Therapy. 1/14/21   Rajat Parkinson MD       Scheduled Meds    chlorhexidine  15 mL Mouth/Throat Q12H     enoxaparin ANTICOAGULANT  40 mg Subcutaneous Q24H     famotidine  20 mg Intravenous Q12H     lacosamide (VIMPAT) intermittent infusion  100 mg Intravenous BID     levofloxacin  750 mg Intravenous Q24H     LORazepam  2 mg Intravenous Once     topiramate  200 mg Oral BID     valproate (DEPACON) intermittent infusion  750 mg Intravenous Q8H       Infusion Meds    dexmedetomidine       dextrose       dextrose 5% lactated ringers 50 mL/hr at 02/27/21 0726       PRN Meds  dextrose, glucose **OR** dextrose **OR** glucagon, midazolam       PHYSICAL EXAMINATION  Temp:  [97.5  F (36.4  C)-99.6  F (37.6  C)] 99.6  F (37.6  C)  Pulse:  [] 102  Resp:  [9-24] 24  BP: (103-155)/(64-91) 155/90  FiO2 (%):  [30 %] 30 %  SpO2:  [90  %-98 %] 94 %     Neurologic  Mental Status:  Intubated and examined off sedation, does not follow   Cranial Nerves:  Pupil 5 mm BL and reactive, facial sensation intact to noxious, face appear symmetric, corneal intact, cough gag intact, open eyes and grimaces face to noxious   Motor:  Moves all extremity to noxious stimulation, LUE extends with noxious.   Reflexes:  toes down-going  Sensory:  intact to noxious  Coordination:  No resting tremor   Station/Gait:  deferred         Imaging  I personally reviewed all imaging; relevant findings per HPI.     Lab Results Data   CBC  Recent Labs   Lab 02/27/21  0515 02/26/21  1511 02/25/21  0749   WBC 10.2 10.8 4.6   RBC 3.91* 4.12* 3.80*   HGB 12.0* 12.6* 11.7*   HCT 36.9* 39.1* 35.7*   * 148* 150     Basic Metabolic Panel    Recent Labs   Lab 02/27/21  0515 02/26/21  1511 02/25/21  0749 02/24/21  2322    141  --  138   POTASSIUM 3.9 4.3  --  5.2   CHLORIDE 109 110*  --  105   CO2 26 27  --  35*   BUN 15 16  --  13   CR 0.87 0.87 0.70 0.76   * 104*  --  87   JESSICA 9.8 9.5  --  9.7     Liver Panel  Recent Labs   Lab 02/26/21  1511 02/25/21  0749   PROTTOTAL 6.5*  --    ALBUMIN 2.9* 3.0*   BILITOTAL 0.3  --    ALKPHOS 56  --    AST 18  --    ALT 19  --      INR  No lab results found.   Lipid Profile    Recent Labs   Lab Test 05/17/18  0936 08/15/17  1658 08/06/15  0946 07/31/14 08/02/13  1532   CHOL 174 152 138 137 138   HDL 58 60 59 47 53   * 73 65 75 70   TRIG 77 94 70 76 80   CHOLHDLRATIO  --   --  2.3 3 2.6     A1C    Recent Labs   Lab Test 09/23/20  0858 08/15/17  1658   A1C 4.7 4.9     Troponin I  No results for input(s): TROPI in the last 168 hours.

## 2021-02-28 ENCOUNTER — APPOINTMENT (OUTPATIENT)
Dept: GENERAL RADIOLOGY | Facility: CLINIC | Age: 41
DRG: 100 | End: 2021-02-28
Attending: ANESTHESIOLOGY
Payer: MEDICARE

## 2021-02-28 ENCOUNTER — HOSPITAL ENCOUNTER (OUTPATIENT)
Dept: NEUROLOGY | Facility: CLINIC | Age: 41
DRG: 100 | End: 2021-02-28
Attending: PSYCHIATRY & NEUROLOGY
Payer: MEDICARE

## 2021-02-28 LAB
ANION GAP SERPL CALCULATED.3IONS-SCNC: 7 MMOL/L (ref 3–14)
BASE EXCESS BLDA CALC-SCNC: 3.2 MMOL/L
BUN SERPL-MCNC: 22 MG/DL (ref 7–30)
CALCIUM SERPL-MCNC: 9.9 MG/DL (ref 8.5–10.1)
CHLORIDE SERPL-SCNC: 110 MMOL/L (ref 94–109)
CO2 SERPL-SCNC: 27 MMOL/L (ref 20–32)
CREAT SERPL-MCNC: 0.91 MG/DL (ref 0.66–1.25)
CREAT SERPL-MCNC: 0.94 MG/DL (ref 0.66–1.25)
CRP SERPL-MCNC: 126 MG/L (ref 0–8)
ERYTHROCYTE [DISTWIDTH] IN BLOOD BY AUTOMATED COUNT: 14.6 % (ref 10–15)
GFR SERPL CREATININE-BSD FRML MDRD: >90 ML/MIN/{1.73_M2}
GFR SERPL CREATININE-BSD FRML MDRD: >90 ML/MIN/{1.73_M2}
GLUCOSE SERPL-MCNC: 117 MG/DL (ref 70–99)
HCO3 BLD-SCNC: 25 MMOL/L (ref 21–28)
HCT VFR BLD AUTO: 36.9 % (ref 40–53)
HGB BLD-MCNC: 11.9 G/DL (ref 13.3–17.7)
MCH RBC QN AUTO: 30.6 PG (ref 26.5–33)
MCHC RBC AUTO-ENTMCNC: 32.2 G/DL (ref 31.5–36.5)
MCV RBC AUTO: 95 FL (ref 78–100)
O2/TOTAL GAS SETTING VFR VENT: ABNORMAL %
OXYHGB MFR BLD: 99 % (ref 92–100)
PCO2 BLD: 28 MM HG (ref 35–45)
PH BLD: 7.56 PH (ref 7.35–7.45)
PLATELET # BLD AUTO: 104 10E9/L (ref 150–450)
PO2 BLD: 166 MM HG (ref 80–105)
POTASSIUM SERPL-SCNC: 3.5 MMOL/L (ref 3.4–5.3)
PROCALCITONIN SERPL-MCNC: 0.12 NG/ML
RBC # BLD AUTO: 3.89 10E12/L (ref 4.4–5.9)
SODIUM SERPL-SCNC: 144 MMOL/L (ref 133–144)
WBC # BLD AUTO: 9.8 10E9/L (ref 4–11)

## 2021-02-28 PROCEDURE — 258N000003 HC RX IP 258 OP 636: Performed by: STUDENT IN AN ORGANIZED HEALTH CARE EDUCATION/TRAINING PROGRAM

## 2021-02-28 PROCEDURE — 250N000013 HC RX MED GY IP 250 OP 250 PS 637: Performed by: ANESTHESIOLOGY

## 2021-02-28 PROCEDURE — 36415 COLL VENOUS BLD VENIPUNCTURE: CPT | Performed by: INTERNAL MEDICINE

## 2021-02-28 PROCEDURE — 999N000157 HC STATISTIC RCP TIME EA 10 MIN

## 2021-02-28 PROCEDURE — 250N000011 HC RX IP 250 OP 636: Performed by: INTERNAL MEDICINE

## 2021-02-28 PROCEDURE — 999N000052 EEG 61-119 MINUTES

## 2021-02-28 PROCEDURE — 82805 BLOOD GASES W/O2 SATURATION: CPT | Performed by: ANESTHESIOLOGY

## 2021-02-28 PROCEDURE — 200N000001 HC R&B ICU

## 2021-02-28 PROCEDURE — 250N000009 HC RX 250: Performed by: PSYCHIATRY & NEUROLOGY

## 2021-02-28 PROCEDURE — 99291 CRITICAL CARE FIRST HOUR: CPT | Performed by: ANESTHESIOLOGY

## 2021-02-28 PROCEDURE — 84145 PROCALCITONIN (PCT): CPT | Performed by: ANESTHESIOLOGY

## 2021-02-28 PROCEDURE — 36600 WITHDRAWAL OF ARTERIAL BLOOD: CPT

## 2021-02-28 PROCEDURE — 250N000013 HC RX MED GY IP 250 OP 250 PS 637: Performed by: PSYCHIATRY & NEUROLOGY

## 2021-02-28 PROCEDURE — 36415 COLL VENOUS BLD VENIPUNCTURE: CPT | Performed by: ANESTHESIOLOGY

## 2021-02-28 PROCEDURE — 258N000003 HC RX IP 258 OP 636: Performed by: PSYCHIATRY & NEUROLOGY

## 2021-02-28 PROCEDURE — 99291 CRITICAL CARE FIRST HOUR: CPT | Mod: 25 | Performed by: PSYCHIATRY & NEUROLOGY

## 2021-02-28 PROCEDURE — 80048 BASIC METABOLIC PNL TOTAL CA: CPT | Performed by: ANESTHESIOLOGY

## 2021-02-28 PROCEDURE — 250N000009 HC RX 250: Performed by: STUDENT IN AN ORGANIZED HEALTH CARE EDUCATION/TRAINING PROGRAM

## 2021-02-28 PROCEDURE — 82565 ASSAY OF CREATININE: CPT | Performed by: INTERNAL MEDICINE

## 2021-02-28 PROCEDURE — 71045 X-RAY EXAM CHEST 1 VIEW: CPT

## 2021-02-28 PROCEDURE — 250N000013 HC RX MED GY IP 250 OP 250 PS 637: Performed by: INTERNAL MEDICINE

## 2021-02-28 PROCEDURE — 95813 EEG EXTND MNTR 61-119 MIN: CPT | Mod: 26 | Performed by: PSYCHIATRY & NEUROLOGY

## 2021-02-28 PROCEDURE — 94003 VENT MGMT INPAT SUBQ DAY: CPT

## 2021-02-28 PROCEDURE — 250N000011 HC RX IP 250 OP 636: Performed by: STUDENT IN AN ORGANIZED HEALTH CARE EDUCATION/TRAINING PROGRAM

## 2021-02-28 PROCEDURE — 250N000009 HC RX 250: Performed by: ANESTHESIOLOGY

## 2021-02-28 PROCEDURE — 86140 C-REACTIVE PROTEIN: CPT | Performed by: ANESTHESIOLOGY

## 2021-02-28 PROCEDURE — C9254 INJECTION, LACOSAMIDE: HCPCS | Performed by: STUDENT IN AN ORGANIZED HEALTH CARE EDUCATION/TRAINING PROGRAM

## 2021-02-28 PROCEDURE — 85027 COMPLETE CBC AUTOMATED: CPT | Performed by: ANESTHESIOLOGY

## 2021-02-28 PROCEDURE — 272N000078 HC NUTRITION PRODUCT INTERMEDIATE LITER

## 2021-02-28 RX ORDER — PROPOFOL 10 MG/ML
2.5-75 INJECTION, EMULSION INTRAVENOUS CONTINUOUS
Status: DISCONTINUED | OUTPATIENT
Start: 2021-02-28 | End: 2021-03-02

## 2021-02-28 RX ORDER — ACETAZOLAMIDE 250 MG/1
250 TABLET ORAL
Status: DISCONTINUED | OUTPATIENT
Start: 2021-02-28 | End: 2021-02-28

## 2021-02-28 RX ORDER — ACETAZOLAMIDE 250 MG/1
250 TABLET ORAL ONCE
Status: COMPLETED | OUTPATIENT
Start: 2021-02-28 | End: 2021-02-28

## 2021-02-28 RX ADMIN — SODIUM CHLORIDE 100 MG: 9 INJECTION, SOLUTION INTRAVENOUS at 21:02

## 2021-02-28 RX ADMIN — ACETAZOLAMIDE 250 MG: 250 TABLET ORAL at 14:14

## 2021-02-28 RX ADMIN — FAMOTIDINE 20 MG: 10 INJECTION, SOLUTION INTRAVENOUS at 23:53

## 2021-02-28 RX ADMIN — FAMOTIDINE 20 MG: 10 INJECTION, SOLUTION INTRAVENOUS at 00:01

## 2021-02-28 RX ADMIN — VALPROATE SODIUM 750 MG: 100 INJECTION, SOLUTION INTRAVENOUS at 01:57

## 2021-02-28 RX ADMIN — ENOXAPARIN SODIUM 40 MG: 40 INJECTION SUBCUTANEOUS at 17:19

## 2021-02-28 RX ADMIN — TOPIRAMATE 200 MG: 200 TABLET ORAL at 08:20

## 2021-02-28 RX ADMIN — CHLORHEXIDINE GLUCONATE 0.12% ORAL RINSE 15 ML: 1.2 LIQUID ORAL at 08:20

## 2021-02-28 RX ADMIN — DEXMEDETOMIDINE HYDROCHLORIDE 0.2 MCG/KG/HR: 4 INJECTION, SOLUTION INTRAVENOUS at 15:32

## 2021-02-28 RX ADMIN — TOPIRAMATE 200 MG: 200 TABLET ORAL at 20:31

## 2021-02-28 RX ADMIN — CHLORHEXIDINE GLUCONATE 0.12% ORAL RINSE 15 ML: 1.2 LIQUID ORAL at 19:57

## 2021-02-28 RX ADMIN — SODIUM CHLORIDE, SODIUM LACTATE, POTASSIUM CHLORIDE, CALCIUM CHLORIDE AND DEXTROSE MONOHYDRATE: 5; 600; 310; 30; 20 INJECTION, SOLUTION INTRAVENOUS at 18:11

## 2021-02-28 RX ADMIN — VALPROATE SODIUM 750 MG: 100 INJECTION, SOLUTION INTRAVENOUS at 17:19

## 2021-02-28 RX ADMIN — VALPROATE SODIUM 750 MG: 100 INJECTION, SOLUTION INTRAVENOUS at 11:38

## 2021-02-28 RX ADMIN — LEVOFLOXACIN 750 MG: 5 INJECTION, SOLUTION INTRAVENOUS at 07:58

## 2021-02-28 RX ADMIN — MICONAZOLE NITRATE: 20 POWDER TOPICAL at 17:23

## 2021-02-28 RX ADMIN — SODIUM CHLORIDE 100 MG: 9 INJECTION, SOLUTION INTRAVENOUS at 09:17

## 2021-02-28 RX ADMIN — FAMOTIDINE 20 MG: 10 INJECTION, SOLUTION INTRAVENOUS at 14:14

## 2021-02-28 ASSESSMENT — ACTIVITIES OF DAILY LIVING (ADL)
ADLS_ACUITY_SCORE: 24

## 2021-02-28 ASSESSMENT — MIFFLIN-ST. JEOR: SCORE: 1855.75

## 2021-02-28 NOTE — PROGRESS NOTES
ICU Update    Pt not tolerating precedex for sedation 2/2 bradycardia (0.2 HR 45) and agitation on vent.  Will order propofol if unable to titrate precedex    Tony Briggs MD

## 2021-02-28 NOTE — PROGRESS NOTES
[facility] ICU RESPIRATORY NOTE        Date of Admission: 2/24/2021    Date of Intubation (most recent): 2/25/21    Reason for Mechanical Ventilation: Airway Protection    Number of Days on Mechanical Ventilation: 4    Met Criteria for Spontaneous Breathing Trial: Yes - 5/5 30%      Significant Events Today: Patient tolerated CPAP trial today for 5 hours. Decision not to extubate per MD for secretion management.     ABG Results:   Recent Labs   Lab 02/28/21  0635 02/26/21  1505 02/25/21  0755   PH 7.56*  --  7.52*   PCO2 28*  --  33*   PO2 166*  --  174*   HCO3 25  --  27   O2PER 30% 30 40       Current Vent Settings: Ventilation Mode: CMV/AC  (Continuous Mandatory Ventilation/ Assist Control)  FiO2 (%): 30 %  Rate Set (breaths/minute): 14 breaths/min  Tidal Volume Set (mL): 550 mL  PEEP (cm H2O): 5 cmH2O  Pressure Support (cm H2O): 5 cmH2O  Oxygen Concentration (%): 30 %  Resp: 10      Skin Assessment: No skin issues    Plan: Continue vent support and daily SBT     Amber Davis, RT

## 2021-02-28 NOTE — PROGRESS NOTES
Critical Care Progress Note      02/28/2021    Name: Duane D Backes MRN#: [id number]   Age: 40 year old YOB: 1980     Hsptl Day# 3  ICU DAY # 3    MV DAY #             Problem List:   Active Problems:    Seizure disorder (H)    Altered mental status, unspecified altered mental status type           Summary/Hospital Course:   Duane D Backes is a 40 year old male with PMH of autism, epilepsy, tonic-clonic seizures, & an epidermoid tumor who presented on 2/24 to the ED for a 3 minute witnessed seizure. He had another seizure in the ED, became somnolent and had increased secretions, prompting intubation and mechanical ventilation and admission to the ICU. He has been off of sedation for 72h and has been slow to wake up since then. EEG has not shown any epileptic or seizure activity. Sputum growing Klebsiella PNA and Enterobacter cloacae, now on day 2 of levaquin for pneumonia.         Assessment and plan :   Duane D Backes is a 40 year old male w/ PMH of autism, epilepsy, tonic-clonic seizures, and an epidermoid tumor, admitted on 2/24/2021 for treatment of seizure that led to intubation and mechanical ventilation. Patient has been off of sedation as of 2/25 AM & remains intubated and mechanically ventilated. Patient has been slow to wake up, but as of today 2/28, is demonstrating more neurologic activity.   I have personally reviewed the daily labs, imaging studies, cultures and discussed the case with referring physician and consulting physicians.     My assessment and plan by system for this patient is as follows:    Neurology/Psychiatry:   1. Seizure disorder with new seizure  PMH of seizures, and specifically epilepsy, tonic-clonic seizures, and an enlarging epidermoid cyst. Differential for seizure leading to this admission includes increasing size of epidermoid cyst, medications lowering the seizure threshold (Lithium, Risperdal, Zoloft, and Sudafed), and infection and sepsis. Infection is possible  cause of seizure, as elevated ESR/CRP & sputum culture returned positive for Enterobacter cloacae & Klebsiella pneumonia, the patient was started on levaquin per antibiogram.  - Less likely related to medications as these have not been changed recently. TPM & VPA level WNL.  - Less likely to be related to epidermoid cyst as this is likely slow growing & nothing on MRI to suggest edema &/or compression per neurology  - Preliminary EEG shows continuous polymorphic moderate amplitude delta. Study consistent with moderate to severe diffuse encephalopathy. EEG reactivity seen at one point. No indication of seizures or seizure tendency.  2. Transitioned off of sedation as of three days ago (2/25) AM, patient has been slow to arouse from sedation since then.   3. History of autism--Baseline mental status could be clouding the patient's neurological status and the process of transitioning off of sedation. Moving all extremities, follows commands to open eyes, but does not follow commands regarding extremities.      Plan  - Neurology consulted and following, appreciate recs:              - Continue valproate 750 mg Q8H               - Continue PTA topiramate 200 mg BID              - Hold lithium and other antipsychotic meds as they may lower the seizure threshold              - Limit sedating medication as able, off sedation for 72 hours   - Defer LP for now given slow improvement and source found for infection precipitating seizures  - Continue EEG, await final results.   -  Repeat CBC monitor WBC count      Cardiovascular:   1. Stable hemodynamics   2. Sinus Rhythm on monitor with no signs of ischemia   Plan   - Monitor hemodynamic status closely    Pulmonary/Ventilator Management:   1. Currently intubated and mechanically ventilated. O2 sats have been 96-98% with vent settings: CMV RR14, Vte 550, PEEP 5, FiO2 30%  2. Sputum Cx returned positive for Enterobacter cloacae & Klebsiella pneumonia, concern for aspiration  pneumonia. Large amount of secretions suctioned from mouth per nursing. CXR today 2/28 shows new airspace opacity in mid left lung with increasing opacity in retrocardiac region as well, possibly reflecting lower lobe pneumonia.      Plan  - Continue Levaquin given sensitivities per antibiogram, avoiding meropenem & cefepime as potential to provoke seizures  - Repeat CXR tomorrow to evaluate progression of pneumonia   -  Continue to monitor neurologic status and assess readiness for weaning ventilation per protocol  -SBTrial for potential extubation    GI and Nutrition :   1. Occult blood from OG tube found 2/25, no more blood or clotting in OG tube.   2.  TF Isosource 1.5 with goal at 65 mL/hr      Plan  - Continue TF   - Continue famotidine 20 mg Q12H, as there is no more sign of bleeding from the stomach or in the OG tube.     Renal/Fluids/Electrolytes:   1. Respiratory alkalosis on current vent settings. Decrease RR from 14 to 12 increase CO2 towards normal.   2. Normal kidney function w/ Cr 0.87->0.94, BUN 15     Plan  - Repeat ABG to assess acid-base status  - monitor function and electrolytes as needed with replacement per ICU protocols.  - generally avoid nephrotoxic agents such as NSAID, IV contrast unless specifically required  - adjust medications as needed for renal clearance  - follow I/O's as appropriate.    Infectious Disease:   1. Sputum Cx growth of Enterobacter cloacae & Klebsiella pneumonia  2. CRP elevated (127->148)  3. Influenza A/B negative  4. CXR today 2/28 compared with CXR 2/25 shows new airspace opacity in mid left lung with increasing opacity in retrocardiac region, possibly reflecting lower lobe pneumonia.   Plan  - Continue Levaquin given sensitivities per antibiogram, avoiding meropenem & cefepime as potential to provoke seizures       Endocrine:   1. Hypothermia, resolved. Patient initially had temp of 91F and has slowly warmed up to normal temp while on a Eric hugger. SIRS or Sepsis  could lead to hypothermia.   2. History of hypothyroidism: TSH low at 0.27 and free T4 normal at 1.05. Patient's most recent TSH levels were 0.42 on 8/21/20 and 0.03 on 3/4/20. Hypothyroidism is less likely to be cause of hypothermia since free T4 was in the normal range and TSH is low. Patient is currently euthermic.     Plan  - ICU insulin protocol, goal sugar <180   - Continue to monitor temperature as we treat infection  - PTA levothyroxine 150 mcg, holding for now     Hematology/Oncology:   1. Anemia. Hb 12 on 2/27, down from 13.1 on 2/24  Plan  - Continue to monitor Hb      MSKL/Rheum:  1. No active issues      IV/Access:   1. Venous access - PIVx2      ICU Prophylaxis:   1. DVT: Hep Subq/ LMWH/mechanical  2. VAP: HOB 30 degrees, chlorhexidine rinse  3. Stress Ulcer: PPI/H2 blocker  4. Restraints: Nonviolent soft two point restraints required and necessary for patient safety and continued cares and good effect as patient continues to pull at necessary lines, tubes despite education and distraction. Will readdress daily.   5. Wound care - per unit routine   6. Feeding - TF Isosource at 65 mL/hr  7. Family Update:TBD  8. Disposition - ICU        Key goals for next 24 hours:   1. Continue to monitor neurologic status to assess whether ready to wean ventilation  2. Continue to treat pneumonia with Levaquin for 7 days and re-assess status of pneumonia  3. Assess for potential extubation           Interim History:   Pt remains off sedation, still mechanically ventilated. Moves right arm spontaneously as well as BLE. Tube feeds started, tolerating well. Condom cath in place with good urine output.            Key Medications:       chlorhexidine  15 mL Mouth/Throat Q12H     enoxaparin ANTICOAGULANT  40 mg Subcutaneous Q24H     famotidine  20 mg Intravenous Q12H     lacosamide (VIMPAT) intermittent infusion  100 mg Intravenous BID     levofloxacin  750 mg Intravenous Q24H     LORazepam  2 mg Intravenous Once      topiramate  200 mg Oral BID     valproate (DEPACON) intermittent infusion  750 mg Intravenous Q8H       dexmedetomidine       dextrose       dextrose 5% lactated ringers 50 mL/hr at 02/27/21 2011               Physical Examination:   Temp:  [98.1  F (36.7  C)-99.1  F (37.3  C)] 98.1  F (36.7  C)  Pulse:  [] 66  Resp:  [13-24] 14  BP: (101-160)/(59-98) 121/81  FiO2 (%):  [30 %] 30 %  SpO2:  [92 %-100 %] 96 %    Intake/Output Summary (Last 24 hours) at 2/28/2021 0847  Last data filed at 2/28/2021 0800  Gross per 24 hour   Intake 4100 ml   Output 1430 ml   Net 2670 ml     Wt Readings from Last 4 Encounters:   02/28/21 86.6 kg (190 lb 14.7 oz)   12/23/19 89.8 kg (198 lb)   12/16/19 90 kg (198 lb 6.4 oz)   08/27/19 91.8 kg (202 lb 6.4 oz)     BP - Mean:  [] 90  Ventilation Mode: CMV/AC  (Continuous Mandatory Ventilation/ Assist Control)  FiO2 (%): 30 %  Rate Set (breaths/minute): 14 breaths/min  Tidal Volume Set (mL): 550 mL  PEEP (cm H2O): 5 cmH2O  Oxygen Concentration (%): 30 %  Resp: 14    Recent Labs   Lab 02/28/21  0635 02/26/21  1505 02/25/21  0755   PH 7.56*  --  7.52*   PCO2 28*  --  33*   PO2 166*  --  174*   HCO3 25  --  27   O2PER 30% 30 40       GEN: no acute distress   HEENT: head ncat, sclera anicteric, OP patent, trachea midline   PULM: unlabored synchronous with vent, course lung sounds over all lung sounds anteriorly bilaterally   CV/COR: RRR S1S2 no gallop,  No rub, no murmur  ABD: soft nontender, hypoactive bowel sounds, no mass  EXT:  No edema, warm  NEURO: opens eyes to commands. Does not follow commands with extremities. Moves all extremities spontaneously, intact to painful stimuli, withdraws to oral cares.   SKIN: no obvious rash  LINES: clean, dry intact         Data:   All data and imaging reviewed     ROUTINE ICU LABS (Last four results)  CMP  Recent Labs   Lab 02/28/21  0611 02/27/21  0515 02/26/21  1511 02/25/21  0749 02/24/21  2322   NA  --  138 141  --  138   POTASSIUM  --  3.9  4.3  --  5.2   CHLORIDE  --  109 110*  --  105   CO2  --  26 27  --  35*   ANIONGAP  --  3 4  --  <1*   GLC  --  102* 104*  --  87   BUN  --  15 16  --  13   CR 0.94 0.87 0.87 0.70 0.76   GFRESTIMATED >90 >90 >90 >90 >90   GFRESTBLACK >90 >90 >90 >90 >90   JESSICA  --  9.8 9.5  --  9.7   MAG  --  2.0  --   --   --    PHOS  --  3.7  --   --   --    PROTTOTAL  --   --  6.5*  --   --    ALBUMIN  --   --  2.9* 3.0*  --    BILITOTAL  --   --  0.3  --   --    ALKPHOS  --   --  56  --   --    AST  --   --  18  --   --    ALT  --   --  19  --   --      CBC  Recent Labs   Lab 02/27/21  0515 02/26/21  1511 02/25/21  0749 02/24/21  2322   WBC 10.2 10.8 4.6 3.9*   RBC 3.91* 4.12* 3.80* 4.32*   HGB 12.0* 12.6* 11.7* 13.1*   HCT 36.9* 39.1* 35.7* 41.9   MCV 94 95 94 97   MCH 30.7 30.6 30.8 30.3   MCHC 32.5 32.2 32.8 31.3*   RDW 15.1* 15.4* 14.7 14.7   * 148* 150 155     INRNo lab results found in last 7 days.  Arterial Blood Gas  Recent Labs   Lab 02/28/21  0635 02/26/21  1505 02/25/21  0755   PH 7.56*  --  7.52*   PCO2 28*  --  33*   PO2 166*  --  174*   HCO3 25  --  27   O2PER 30% 30 40       All cultures:  Recent Labs   Lab 02/25/21  1435 02/25/21  0028 02/25/21  0023   CULT Heavy growth  Normal stacey    Light growth  Enterobacter cloacae complex  *  Light growth  Klebsiella pneumoniae  * No growth after 3 days No growth after 3 days     No results found for this or any previous visit (from the past 24 hour(s)).    Physician Attestation   I, Wale Woodward, was present with the medical/REBEKA student who participated in the service and in the documentation of the note.  I have verified the history and personally performed the physical exam and medical decision making.  I agree with the assessment and plan of care as documented in the note.      I personally reviewed vital signs, medications, labs and imaging.    40 year old male with PMH of autism, epilepsy, tonic-clonic seizures, & an epidermoid tumor who presented on  2/24 to the ED for a 3 minute witnessed seizure. He had another seizure in the ED,prompting intubation and mechanical ventilation and admission to the ICU. He has been off of sedation for 72h and has been slow to wake up since then. EEG has not shown any epileptic or seizure activity. Sputum growing Klebsiella PNA and Enterobacter cloacae, now on day 2 of levaquin for pneumonia. He has acute hypoxic respiratory failure and is weaning slowly. Secretions may preclude extubation today.         Wale Woodward MD  Date of Service (when I saw the patient): 02/28/21  Billing: This patient is critically ill: Yes. Total critical care time today 38 min.

## 2021-02-28 NOTE — PROGRESS NOTES
"  [facility]    Stroke Progress Note    Interval Events  Patient more reactive today and moving more. Opening eyes spontaneously   VEEG negative for seizure     Impression  Encephalopathy  Hx of Seizure  Klebsiella PNA      Plan  Will discontinue vEEG   Continue to hold all sedative to allow it to clear system.  Continue AED at current doses   Antibiotic per MICU     Wean from vent as tolerated.    The Stroke Staff is STEVENSON Marie.    Joshua Merchant MD  Vascular Neurology Fellow  To page me or covering stroke neurology team member, click here: AMCOM   Choose \"On Call\" tab at top, then search dropdown box for \"Neurology Adult\", select location, press Enter, then look for stroke/neuro ICU/telestroke.    ______________________________________________________    Medications   Home Meds  Prior to Admission medications    Medication Sig Start Date End Date Taking? Authorizing Provider   ammonium lactate (LAC-HYDRIN) 12 % external lotion Apply topically daily To feet and heels   Yes Reported, Patient   divalproex sodium extended-release (DEPAKOTE ER) 500 MG 24 hr tablet Decreased depakote as advised (will fax med schedule to [facility]) to 1000 mg twice a day 10/30/20  Yes Lauren Washington MD   docusate sodium (COLACE) 100 MG capsule Take 1 capsule by mouth 2 times daily.   Yes Reported, Patient   FISH OIL 1000 MG OR CAPS 1 PO BID  Patient taking differently: 1 capsule 2 times per day 8/27/09  Yes Pato Morales MD   lactulose (CHRONULAC) 10 GM/15ML solution Take 30 mLs by mouth 2 times daily as needed   Yes Unknown, Entered By History   lactulose (CHRONULAC) 10 GM/15ML solution Take 30 mLs by mouth every evening & additional 30mL 2 times per day as needed   Yes Reported, Patient   levothyroxine (SYNTHROID/LEVOTHROID) 150 MCG tablet TAKE 1 TABLET BY MOUTH ONCE DAILY  FOR THYROID   *1 TOTAL FILL* 9/3/20  Yes Rajat Parkinson MD   lithium ER (LITHOBID) 300 MG CR tablet Take 600 mg by mouth At " Bedtime    Yes Reported, Patient   MILK OF MAGNESIA 400 MG/5ML suspension 30 mLs every evening  3/29/16  Yes Reported, Patient   polyethylene glycol (MIRALAX) powder Take 17 g by mouth daily. 4/27/12  Yes Pato Morales MD   pseudoePHEDrine (SUDAFED) 30 MG tablet Take 30 mg by mouth every evening as needed for congestion   Yes Unknown, Entered By History   risperiDONE (RISPERDAL) 2 MG tablet Take 2 mg by mouth 2 times daily 1 tab every morning & 1 tab every bedtime   Yes Reported, Patient   sertraline (ZOLOFT) 100 MG tablet Take 1.5 tablets by mouth every morning.   Yes Ronal Tavera MD   topiramate (TOPAMAX) 200 MG tablet Take 200 mg by mouth 2 times daily   Yes Unknown, Entered By History   VITAMIN D3 1000 units tablet 3,000 Units daily 7/11/17  Yes Reported, Patient   order for DME Flat sole shoe  Patient not taking: Reported on 10/30/2020 8/15/20   Real Pak, PAEstrellaC   Starch, Thickening, POWD Combined with beverages, per instructions from Speech Therapy. 1/14/21   Rajat Parkinson MD       Scheduled Meds    chlorhexidine  15 mL Mouth/Throat Q12H     enoxaparin ANTICOAGULANT  40 mg Subcutaneous Q24H     famotidine  20 mg Intravenous Q12H     lacosamide (VIMPAT) intermittent infusion  100 mg Intravenous BID     levofloxacin  750 mg Intravenous Q24H     topiramate  200 mg Oral BID     valproate (DEPACON) intermittent infusion  750 mg Intravenous Q8H       Infusion Meds    dexmedetomidine 0.2 mcg/kg/hr (02/28/21 1532)     dextrose       dextrose 5% lactated ringers 50 mL/hr at 02/27/21 2011       PRN Meds  dextrose, glucose **OR** dextrose **OR** glucagon, miconazole, midazolam       PHYSICAL EXAMINATION  Temp:  [98.1  F (36.7  C)-99.1  F (37.3  C)] 98.1  F (36.7  C)  Pulse:  [] 56  Resp:  [10-18] 14  BP: (101-160)/(59-98) 113/69  FiO2 (%):  [30 %] 30 %  SpO2:  [95 %-100 %] 96 %     Neurologic  Mental Status:  Intubated and examined off sedation, does not follow, open eyes spontaneously    Cranial Nerves:  Pupil 5 mm BL and reactive, facial sensation intact to noxious, face appear symmetric, corneal intact, cough gag intact, open eyes and grimaces face to noxious   Motor:  Moves all extremity to noxious stimulation and spontanously, LUE extends with noxious.   Reflexes:  toes down-going  Sensory:  intact to noxious  Coordination:  No resting tremor   Station/Gait:  deferred         Imaging  I personally reviewed all imaging; relevant findings per HPI.     Lab Results Data   CBC  Recent Labs   Lab 02/28/21  1419 02/27/21  0515 02/26/21  1511   WBC 9.8 10.2 10.8   RBC 3.89* 3.91* 4.12*   HGB 11.9* 12.0* 12.6*   HCT 36.9* 36.9* 39.1*   * 114* 148*     Basic Metabolic Panel    Recent Labs   Lab 02/28/21  1419 02/28/21  0611 02/27/21  0515 02/26/21  1511     --  138 141   POTASSIUM 3.5  --  3.9 4.3   CHLORIDE 110*  --  109 110*   CO2 27  --  26 27   BUN 22  --  15 16   CR 0.91 0.94 0.87 0.87   *  --  102* 104*   JESSICA 9.9  --  9.8 9.5     Liver Panel  Recent Labs   Lab 02/26/21  1511 02/25/21  0749   PROTTOTAL 6.5*  --    ALBUMIN 2.9* 3.0*   BILITOTAL 0.3  --    ALKPHOS 56  --    AST 18  --    ALT 19  --      INR  No lab results found.   Lipid Profile    Recent Labs   Lab Test 05/17/18  0936 08/15/17  1658 08/06/15  0946 07/31/14 08/02/13  1532   CHOL 174 152 138 137 138   HDL 58 60 59 47 53   * 73 65 75 70   TRIG 77 94 70 76 80   CHOLHDLRATIO  --   --  2.3 3 2.6     A1C    Recent Labs   Lab Test 09/23/20  0858 08/15/17  1658   A1C 4.7 4.9     Troponin I  No results for input(s): TROPI in the last 168 hours.

## 2021-02-28 NOTE — PROGRESS NOTES
EEG CLINICAL NEUROPHYSIOLOGY PRELIMINARY REPORT    EEG through 9 AM February 25 reviewed.  Irregular diffuse delta predominates.  No normal rhythms.  There is indication of reactivity at 1 point with a brief run of irregular alpha with spontaneous arousal.  At times the delta becomes somewhat periodic.  Infrequent brief runs of a poorly formed generalized periodic discharges at 1 Hz or less.  These appear to be part of patient's generalized encephalopathy.  No electrographic seizures.    Study continues consistent with moderate to severe diffuse encephalopathy.  EEG reactivity is seen at one point.  No epileptiform activity or electrographic seizures.    Full note to follow.    Ricardo Quiroga MD  Contact information for physicians covering Epilepsy and EEG is available on [facility] under Neurology Adult/MC/Staff Epilepsy and EEG

## 2021-02-28 NOTE — PLAN OF CARE
Pt remains off all of sedation medications, neuro exam with slow improvement noted.  With profound painful stimuli applied by MD, noted eye flutter to open, withdraw to pain.  No following of commands.  Independent movement of right upper extremity observed several times throughout shift, bilat lower extremity movement noted at times.  Left upper extremity appears stiff, flexes to deep pain.EEG continues, note increased activity after turns/neuro exam.    Tube feeding started, will increase to goal by end of next day, appears to be tolerating.  Applied new external catheter for urine collection, urine output adequate, incontinent x 1 with failed condom cath for large amount of urine.    Note blanchable redness bilat buttocks and coccyx.  Turned pt q2 hours, using tap system and pillows, applied new foam dressing to coccyx.    Updated both parents via phone x 2 this shift.

## 2021-02-28 NOTE — PLAN OF CARE
Pt remains off sedation and on vent support. Pt does not follow commands. He does move right arm spontaneously as well as both lower extremities. Left arm noted to move spontaneously x1. Tube feeds advanced, tolerated well. No BM. Condom cath in place for good output. Labs pending.

## 2021-03-01 LAB
ANION GAP SERPL CALCULATED.3IONS-SCNC: 5 MMOL/L (ref 3–14)
BASE EXCESS BLDV CALC-SCNC: 2.5 MMOL/L
BUN SERPL-MCNC: 25 MG/DL (ref 7–30)
CALCIUM SERPL-MCNC: 9.8 MG/DL (ref 8.5–10.1)
CHLORIDE SERPL-SCNC: 114 MMOL/L (ref 94–109)
CO2 SERPL-SCNC: 27 MMOL/L (ref 20–32)
CREAT SERPL-MCNC: 0.82 MG/DL (ref 0.66–1.25)
ERYTHROCYTE [DISTWIDTH] IN BLOOD BY AUTOMATED COUNT: 14.7 % (ref 10–15)
GFR SERPL CREATININE-BSD FRML MDRD: >90 ML/MIN/{1.73_M2}
GLUCOSE BLDC GLUCOMTR-MCNC: 82 MG/DL (ref 70–99)
GLUCOSE SERPL-MCNC: 80 MG/DL (ref 70–99)
HCO3 BLDV-SCNC: 27 MMOL/L (ref 21–28)
HCT VFR BLD AUTO: 35.9 % (ref 40–53)
HGB BLD-MCNC: 11.3 G/DL (ref 13.3–17.7)
MAGNESIUM SERPL-MCNC: 2.3 MG/DL (ref 1.6–2.3)
MCH RBC QN AUTO: 30.2 PG (ref 26.5–33)
MCHC RBC AUTO-ENTMCNC: 31.5 G/DL (ref 31.5–36.5)
MCV RBC AUTO: 96 FL (ref 78–100)
O2/TOTAL GAS SETTING VFR VENT: ABNORMAL %
OXYHGB MFR BLDV: 97 %
PCO2 BLDV: 40 MM HG (ref 40–50)
PH BLDV: 7.43 PH (ref 7.32–7.43)
PHOSPHATE SERPL-MCNC: 4 MG/DL (ref 2.5–4.5)
PLATELET # BLD AUTO: 95 10E9/L (ref 150–450)
PO2 BLDV: 93 MM HG (ref 25–47)
POTASSIUM SERPL-SCNC: 3.6 MMOL/L (ref 3.4–5.3)
RBC # BLD AUTO: 3.74 10E12/L (ref 4.4–5.9)
SODIUM SERPL-SCNC: 146 MMOL/L (ref 133–144)
WBC # BLD AUTO: 7.3 10E9/L (ref 4–11)

## 2021-03-01 PROCEDURE — 83735 ASSAY OF MAGNESIUM: CPT | Performed by: ANESTHESIOLOGY

## 2021-03-01 PROCEDURE — 36415 COLL VENOUS BLD VENIPUNCTURE: CPT | Performed by: ANESTHESIOLOGY

## 2021-03-01 PROCEDURE — 250N000009 HC RX 250: Performed by: PSYCHIATRY & NEUROLOGY

## 2021-03-01 PROCEDURE — 250N000011 HC RX IP 250 OP 636: Performed by: STUDENT IN AN ORGANIZED HEALTH CARE EDUCATION/TRAINING PROGRAM

## 2021-03-01 PROCEDURE — 258N000003 HC RX IP 258 OP 636: Performed by: STUDENT IN AN ORGANIZED HEALTH CARE EDUCATION/TRAINING PROGRAM

## 2021-03-01 PROCEDURE — 200N000001 HC R&B ICU

## 2021-03-01 PROCEDURE — 250N000011 HC RX IP 250 OP 636: Performed by: INTERNAL MEDICINE

## 2021-03-01 PROCEDURE — 250N000009 HC RX 250: Performed by: ANESTHESIOLOGY

## 2021-03-01 PROCEDURE — 94660 CPAP INITIATION&MGMT: CPT

## 2021-03-01 PROCEDURE — 999N000157 HC STATISTIC RCP TIME EA 10 MIN

## 2021-03-01 PROCEDURE — 36415 COLL VENOUS BLD VENIPUNCTURE: CPT | Performed by: INTERNAL MEDICINE

## 2021-03-01 PROCEDURE — 84100 ASSAY OF PHOSPHORUS: CPT | Performed by: ANESTHESIOLOGY

## 2021-03-01 PROCEDURE — 80048 BASIC METABOLIC PNL TOTAL CA: CPT | Performed by: ANESTHESIOLOGY

## 2021-03-01 PROCEDURE — 999N001017 HC STATISTIC GLUCOSE BY METER IP

## 2021-03-01 PROCEDURE — 250N000013 HC RX MED GY IP 250 OP 250 PS 637: Performed by: INTERNAL MEDICINE

## 2021-03-01 PROCEDURE — 94003 VENT MGMT INPAT SUBQ DAY: CPT

## 2021-03-01 PROCEDURE — 82805 BLOOD GASES W/O2 SATURATION: CPT | Performed by: INTERNAL MEDICINE

## 2021-03-01 PROCEDURE — 99291 CRITICAL CARE FIRST HOUR: CPT | Performed by: INTERNAL MEDICINE

## 2021-03-01 PROCEDURE — 99232 SBSQ HOSP IP/OBS MODERATE 35: CPT | Performed by: PSYCHIATRY & NEUROLOGY

## 2021-03-01 PROCEDURE — C9254 INJECTION, LACOSAMIDE: HCPCS | Performed by: STUDENT IN AN ORGANIZED HEALTH CARE EDUCATION/TRAINING PROGRAM

## 2021-03-01 PROCEDURE — 258N000003 HC RX IP 258 OP 636: Performed by: PSYCHIATRY & NEUROLOGY

## 2021-03-01 PROCEDURE — 85027 COMPLETE CBC AUTOMATED: CPT | Performed by: ANESTHESIOLOGY

## 2021-03-01 PROCEDURE — 250N000013 HC RX MED GY IP 250 OP 250 PS 637: Performed by: PSYCHIATRY & NEUROLOGY

## 2021-03-01 RX ORDER — ALBUTEROL SULFATE 0.83 MG/ML
2.5 SOLUTION RESPIRATORY (INHALATION) EVERY 6 HOURS PRN
Status: DISCONTINUED | OUTPATIENT
Start: 2021-03-01 | End: 2021-03-09 | Stop reason: HOSPADM

## 2021-03-01 RX ORDER — DEXAMETHASONE SODIUM PHOSPHATE 4 MG/ML
4 INJECTION, SOLUTION INTRA-ARTICULAR; INTRALESIONAL; INTRAMUSCULAR; INTRAVENOUS; SOFT TISSUE EVERY 12 HOURS
Status: COMPLETED | OUTPATIENT
Start: 2021-03-01 | End: 2021-03-02

## 2021-03-01 RX ORDER — SODIUM CHLORIDE FOR INHALATION 3 %
3 VIAL, NEBULIZER (ML) INHALATION
Status: DISCONTINUED | OUTPATIENT
Start: 2021-03-01 | End: 2021-03-02

## 2021-03-01 RX ADMIN — VALPROATE SODIUM 750 MG: 100 INJECTION, SOLUTION INTRAVENOUS at 18:06

## 2021-03-01 RX ADMIN — PROPOFOL 2.5 MCG/KG/MIN: 10 INJECTION, EMULSION INTRAVENOUS at 00:06

## 2021-03-01 RX ADMIN — DEXAMETHASONE SODIUM PHOSPHATE 4 MG: 4 INJECTION, SOLUTION INTRAMUSCULAR; INTRAVENOUS at 19:56

## 2021-03-01 RX ADMIN — SODIUM CHLORIDE 100 MG: 9 INJECTION, SOLUTION INTRAVENOUS at 21:16

## 2021-03-01 RX ADMIN — SODIUM CHLORIDE, SODIUM LACTATE, POTASSIUM CHLORIDE, CALCIUM CHLORIDE AND DEXTROSE MONOHYDRATE: 5; 600; 310; 30; 20 INJECTION, SOLUTION INTRAVENOUS at 15:14

## 2021-03-01 RX ADMIN — CHLORHEXIDINE GLUCONATE 0.12% ORAL RINSE 15 ML: 1.2 LIQUID ORAL at 08:04

## 2021-03-01 RX ADMIN — TOPIRAMATE 200 MG: 200 TABLET ORAL at 08:04

## 2021-03-01 RX ADMIN — PROPOFOL 15 MCG/KG/MIN: 10 INJECTION, EMULSION INTRAVENOUS at 11:16

## 2021-03-01 RX ADMIN — LEVOFLOXACIN 750 MG: 5 INJECTION, SOLUTION INTRAVENOUS at 08:04

## 2021-03-01 RX ADMIN — VALPROATE SODIUM 750 MG: 100 INJECTION, SOLUTION INTRAVENOUS at 01:54

## 2021-03-01 RX ADMIN — SODIUM CHLORIDE 100 MG: 9 INJECTION, SOLUTION INTRAVENOUS at 08:35

## 2021-03-01 RX ADMIN — VALPROATE SODIUM 750 MG: 100 INJECTION, SOLUTION INTRAVENOUS at 09:30

## 2021-03-01 RX ADMIN — FAMOTIDINE 20 MG: 10 INJECTION, SOLUTION INTRAVENOUS at 11:04

## 2021-03-01 RX ADMIN — ENOXAPARIN SODIUM 40 MG: 40 INJECTION SUBCUTANEOUS at 18:06

## 2021-03-01 ASSESSMENT — ACTIVITIES OF DAILY LIVING (ADL)
ADLS_ACUITY_SCORE: 24

## 2021-03-01 NOTE — PROGRESS NOTES
Patient remains vented, on 5/5 vent wean, tolerating well. Propofol weaned to off. Vital signs stable, patient does not follow commands. Patient parents called with update.

## 2021-03-01 NOTE — PROGRESS NOTES
"  [facility]    Stroke Progress Note    Interval Events  No acute neurological event overnight, patient moving more today while off sedation    Impression  Encephalopathy  Hx of Seizure  Klebsiella PNA      Plan  Expect patient's encephalopathy to continue to improve once he PNA is being treated   His EEG was negative for seizure over 3 days of recordning  Thank you for the consult   We will sign off at this time   Please page with any additional question.         The Stroke Staff is BRAYAN Griffiths MD  Vascular Neurology Fellow  To page me or covering stroke neurology team member, click here: AMCOM   Choose \"On Call\" tab at top, then search dropdown box for \"Neurology Adult\", select location, press Enter, then look for stroke/neuro ICU/telestroke.    ______________________________________________________    Medications   Home Meds  Prior to Admission medications    Medication Sig Start Date End Date Taking? Authorizing Provider   ammonium lactate (LAC-HYDRIN) 12 % external lotion Apply topically daily To feet and heels   Yes Reported, Patient   divalproex sodium extended-release (DEPAKOTE ER) 500 MG 24 hr tablet Decreased depakote as advised (will fax med schedule to [facility]) to 1000 mg twice a day 10/30/20  Yes Lauren Washington MD   docusate sodium (COLACE) 100 MG capsule Take 1 capsule by mouth 2 times daily.   Yes Reported, Patient   FISH OIL 1000 MG OR CAPS 1 PO BID  Patient taking differently: 1 capsule 2 times per day 8/27/09  Yes Pato Morales MD   lactulose (CHRONULAC) 10 GM/15ML solution Take 30 mLs by mouth 2 times daily as needed   Yes Unknown, Entered By History   lactulose (CHRONULAC) 10 GM/15ML solution Take 30 mLs by mouth every evening & additional 30mL 2 times per day as needed   Yes Reported, Patient   levothyroxine (SYNTHROID/LEVOTHROID) 150 MCG tablet TAKE 1 TABLET BY MOUTH ONCE DAILY  FOR THYROID   *1 TOTAL FILL* 9/3/20  Yes Rajat Parkinson, " MD   lithium ER (LITHOBID) 300 MG CR tablet Take 600 mg by mouth At Bedtime    Yes Reported, Patient   MILK OF MAGNESIA 400 MG/5ML suspension 30 mLs every evening  3/29/16  Yes Reported, Patient   polyethylene glycol (MIRALAX) powder Take 17 g by mouth daily. 4/27/12  Yes Pato Morales MD   pseudoePHEDrine (SUDAFED) 30 MG tablet Take 30 mg by mouth every evening as needed for congestion   Yes Unknown, Entered By History   risperiDONE (RISPERDAL) 2 MG tablet Take 2 mg by mouth 2 times daily 1 tab every morning & 1 tab every bedtime   Yes Reported, Patient   sertraline (ZOLOFT) 100 MG tablet Take 1.5 tablets by mouth every morning.   Yes Ronal Tavera MD   topiramate (TOPAMAX) 200 MG tablet Take 200 mg by mouth 2 times daily   Yes Unknown, Entered By History   VITAMIN D3 1000 units tablet 3,000 Units daily 7/11/17  Yes Reported, Patient   order for DME Flat sole shoe  Patient not taking: Reported on 10/30/2020 8/15/20   Real Pak, PA-C   Starch, Thickening, POWD Combined with beverages, per instructions from Speech Therapy. 1/14/21   Rajat Parkinson MD       Scheduled Meds    chlorhexidine  15 mL Mouth/Throat Q12H     enoxaparin ANTICOAGULANT  40 mg Subcutaneous Q24H     famotidine  20 mg Intravenous Q12H     lacosamide (VIMPAT) intermittent infusion  100 mg Intravenous BID     levofloxacin  750 mg Intravenous Q24H     topiramate  200 mg Oral BID     valproate (DEPACON) intermittent infusion  750 mg Intravenous Q8H       Infusion Meds    dexmedetomidine Stopped (03/01/21 0000)     dextrose       dextrose 5% lactated ringers 50 mL/hr at 03/01/21 0742     propofol (DIPRIVAN) infusion 10 mcg/kg/min (03/01/21 1120)       PRN Meds  dextrose, glucose **OR** dextrose **OR** glucagon, miconazole, midazolam       PHYSICAL EXAMINATION  Temp:  [97.9  F (36.6  C)-98.8  F (37.1  C)] 97.9  F (36.6  C)  Pulse:  [47-94] 81  Resp:  [10-17] 13  BP: (104-149)/(56-96) 109/61  FiO2 (%):  [30 %] 30 %  SpO2:  [96  %-99 %] 98 %     Neurologic  Mental Status:  Intubated and examined off sedation, does not follow, open eyes spontaneously   Cranial Nerves:  Pupil 5 mm BL and reactive, facial sensation intact to noxious, face appear symmetric, corneal intact, cough gag intact, open eyes and grimaces face to noxious   Motor:  Moves all extremity to noxious stimulation and spontanously, LUE extends with noxious.   Reflexes:  toes down-going  Sensory:  intact to noxious  Coordination:  No resting tremor   Station/Gait:  deferred         Imaging  I personally reviewed all imaging; relevant findings per HPI.     Lab Results Data   CBC  Recent Labs   Lab 03/01/21  0534 02/28/21  1419 02/27/21  0515   WBC 7.3 9.8 10.2   RBC 3.74* 3.89* 3.91*   HGB 11.3* 11.9* 12.0*   HCT 35.9* 36.9* 36.9*   PLT 95* 104* 114*     Basic Metabolic Panel    Recent Labs   Lab 03/01/21  0534 02/28/21  1419 02/28/21  0611 02/27/21  0515   * 144  --  138   POTASSIUM 3.6 3.5  --  3.9   CHLORIDE 114* 110*  --  109   CO2 27 27  --  26   BUN 25 22  --  15   CR 0.82 0.91 0.94 0.87   GLC 80 117*  --  102*   JESSICA 9.8 9.9  --  9.8     Liver Panel  Recent Labs   Lab 02/26/21  1511 02/25/21  0749   PROTTOTAL 6.5*  --    ALBUMIN 2.9* 3.0*   BILITOTAL 0.3  --    ALKPHOS 56  --    AST 18  --    ALT 19  --      INR  No lab results found.   Lipid Profile    Recent Labs   Lab Test 05/17/18  0936 08/15/17  1658 08/06/15  0946 07/31/14 08/02/13  1532   CHOL 174 152 138 137 138   HDL 58 60 59 47 53   * 73 65 75 70   TRIG 77 94 70 76 80   CHOLHDLRATIO  --   --  2.3 3 2.6     A1C    Recent Labs   Lab Test 09/23/20  0858 08/15/17  1658   A1C 4.7 4.9     Troponin I  No results for input(s): TROPI in the last 168 hours.

## 2021-03-01 NOTE — PROGRESS NOTES
Patient on oxymask, then to nasal canula 5 LPM. No signs of shortness of breath or labored breathing. Vital signs stable. Patient restless and anxious at times. Sitter at bedside. Worsening agitation, upper extremity soft restraints applied per Dr Shine order. Patient much calmer after started. Patient father at bedside for visit and update.

## 2021-03-01 NOTE — PROGRESS NOTES
Medical Student Critical Care Progress Note      03/01/2021    Name: Duane D Backes MRN#: [id number]   Age: 40 year old YOB: 1980     [facility] Day# 4  ICU DAY # 4    MV DAY #             Problem List:   Active Problems:    Seizure disorder (H)    Altered mental status, unspecified altered mental status type           Summary/Hospital Course:   Duane D Backes is a 40 year old male with PMH of autism, epilepsy, tonic-clonic seizures, & an epidermoid tumor who presented on 2/24 to the ED for a 3 minute witnessed seizure. He had another seizure in the ED, became somnolent and had increased secretions, prompting intubation and mechanical ventilation and admission to the ICU. Patient was off of sedation from 2/25 to 2/28, and he was slow to wake during that period, likely due to his baseline mental status. EEG did not show any epileptic or seizure activity, EEG has been discontinued. Sputum growing Klebsiella PNA and Enterobacter cloacae, now on day 3 of levaquin for pneumonia.         Assessment and plan :     Duane D Backes is a 40 year old male w/ PMH of autism, epilepsy, tonic-clonic seizures, and an epidermoid tumor, admitted on 2/24/2021 for treatment of seizure that led to intubation and mechanical ventilation. Patient is sedated, intubated, and mechanically ventilated. Patient's sputum grew Klebsiella pneumonia and Enterobacter cloacae, and today is on day 3 of 7 of levaquin for pneumonia treatement  I have personally reviewed the daily labs, imaging studies, cultures and discussed the case with referring physician and consulting physicians.     My assessment and plan by system for this patient is as follows:    Neurology/Psychiatry:   1. Seizure disorder with new seizure  PMH of seizures, and specifically epilepsy, tonic-clonic seizures, and an enlarging epidermoid cyst. Differential for seizure leading to this admission includes increasing size of epidermoid cyst, medications lowering the seizure  threshold (Lithium, Risperdal, Zoloft, and Sudafed), and infection and sepsis. Infection is possible cause of seizure, as elevated ESR/CRP & sputum culture returned positive for Enterobacter cloacae & Klebsiella pneumonia, the patient was started on levaquin per antibiogram. Patient currently on sedation with propofol.   - Less likely related to medications as these have not been changed recently. TPM & VPA level WNL.  - Less likely to be related to epidermoid cyst as this is likely slow growing & nothing on MRI to suggest edema &/or compression per neurology  - 3 day Video EEG showed irregular diffuse delta of low to moderate amplitude. Rarely there were some signs of reactivity (irregular alpha). No epileptiform discharges or seizures. Overall study consistent with severe diffuse encephalopathy. Seizures not seen at any point.   2. History of autism--Baseline mental status could be clouding the patient's neurological status and the process of transitioning off of sedation from 2/25 to 2/28, and might make transitioning off sedation on 3/1 slow. Moving right upper extremity and bilateral lower extremities.      Plan  - Neurology consulted and signing off today, appreciate recs:              - Expect patient's encephalopathy to continue to improve with treatment of PNA   - Continue valproate 750 mg Q8H               - Continue PTA topiramate 200 mg BID   - Lacosamine 100 mg BID              - Hold lithium and other antipsychotic meds as they may lower the seizure threshold              - Defer LP for now given slow improvement and source found for infection precipitating  seizures  - EEG discontinuted  - Repeat CBC, monitor WBC count  - Wean down propofol sedation as able for extubation    Cardiovascular:   1. Stable hemodynamics   2. Sinus Rhythm on monitor with no signs of ischemia   Plan   - Monitor hemodynamic status closely       Pulmonary/Ventilator Management:   1. Currently intubated and mechanically  ventilated. O2 sats have been 96-98% with vent settings: CMV RR14, Vte 550, PEEP 5, FiO2 30%. SBTrial done yesterday 2/28, and per RT note, pt tolerated CPAP trial for 5 hours, but because of large amount of secretions, was not extubated at that time.   2. Sputum Cx returned positive for Enterobacter cloacae & Klebsiella pneumonia, concern for aspiration pneumonia. Large amount of secretions suctioned from mouth per nursing. CXR on 2/28 shows new airspace opacity in mid left lung with increasing opacity in retrocardiac region as well, possibly reflecting lower lobe pneumonia.   Plan  - Continue levaquin for pneumonia  - Continue to monitor neurologic status and assess readiness for weaning ventilation per protocol  - SBTrial again today   - Continue to suction secretions, more manageable today    GI and Nutrition :   1. Occult blood from OG tube found 2/25, no more blood or clotting in OG tube.   2. TF Isosource 1.5 with goal at 65 mL/hr      Plan  - Continue TF   - Continue famotidine 20 mg Q12H, as there is no more sign of bleeding from the stomach or in the OG tube.   - assess swallow after extubation for ability to take pills     Renal/Fluids/Electrolytes:   1. Respiratory alkalosis on current vent settings as of ABG yesterday (2/28).   2. Mild hypernatremia, 146 today 3/1, up from 144 yesterday 2/28   3. Mild hyperchloremia, 114 today 3/1, up from 110 yesterday 2/28   4. Normal kidney function w/ Cr 0.87->0.94, BUN 15     Plan  - Continue to monitor sodium  - D5W drip at 50 mL/hr  - monitor function and electrolytes as needed with replacement per ICU protocols.  - generally avoid nephrotoxic agents such as NSAID, IV contrast unless specifically required  - adjust medications as needed for renal clearance  - follow I/O's as appropriate.    Infectious Disease:   1. Sputum Cx growth of Enterobacter cloacae & Klebsiella pneumonia  2. Influenza A/B negative  3. CXR yesterday 2/28 compared with CXR 2/25 shows new  airspace opacity in mid left lung with increasing opacity in retrocardiac region, possibly reflecting lower lobe pneumonia.   Plan  - Continue Levaquin (on day 3 of 7) given sensitivities per antibiogram, avoiding meropenem & cefepime as potential to provoke seizures    Endocrine:   1. Hypothermia, resolved. Patient initially had temp of 91F and has slowly warmed up to normal temp while on a Eric hugger. SIRS or Sepsis could have led to hypothermia.   2. History of hypothyroidism: TSH low at 0.27 and free T4 normal at 1.05. Patient's most recent TSH levels were 0.42 on 8/21/20 and 0.03 on 3/4/20. Hypothyroidism is less likely to be cause of hypothermia since free T4 was in the normal range and TSH is low. Patient is currently euthermic.     Plan  - ICU insulin protocol, goal sugar <180   - Continue to monitor temperature as we treat infection  - PTA levothyroxine 150 mcg, holding for now--resume if patient able to take PO    Hematology/Oncology:   1. Anemia, normocytic. Current Hb 11.3 today 3/1. (13.1->12->11.3). No evidence of acute blood loss.   2. Thrombocytopenia, chronic, stable  Plan  - Continue to monitor Hb and plt      MSKL/Rheum:  1. No issues  Plan  - Possibly PT/OT      IV/Access:   1. Venous access - PIV x 2      ICU Prophylaxis:   1. DVT: Hep Subq/ LMWH/mechanical  2. VAP: HOB 30 degrees, chlorhexidine rinse  3. Stress Ulcer: PPI/H2 blocker  4. Restraints: Nonviolent soft two point restraints required and necessary for patient safety and continued cares and good effect as patient continues to pull at necessary lines, tubes despite education and distraction. Will readdress daily.   5. Wound care - per unit routine   6. Feeding - Continue TF Isosource at 65 mL/hr  7. Family Update: TBD  8. Disposition - ICU        Key goals for next 24 hours:   1. Continue to monitor neurologic status and secretions to assess whether ready to wean ventilation  2. Assess for potential extubation   3. Continue to treat  pneumonia with Levaquin for 7 days (currently day 3/7) and re-assess status of pneumonia               Interim History:   Pt placed on sedation, initially with Precedex, switched to propofol due to HR in low 40's. Remains ventilated; patient over breaths vent.            Key Medications:       chlorhexidine  15 mL Mouth/Throat Q12H     enoxaparin ANTICOAGULANT  40 mg Subcutaneous Q24H     famotidine  20 mg Intravenous Q12H     lacosamide (VIMPAT) intermittent infusion  100 mg Intravenous BID     levofloxacin  750 mg Intravenous Q24H     topiramate  200 mg Oral BID     valproate (DEPACON) intermittent infusion  750 mg Intravenous Q8H       dexmedetomidine Stopped (03/01/21 0000)     dextrose       dextrose 5% lactated ringers 50 mL/hr at 03/01/21 0742     propofol (DIPRIVAN) infusion 10 mcg/kg/min (03/01/21 1120)               Physical Examination:   Temp:  [97.9  F (36.6  C)-98.8  F (37.1  C)] 97.9  F (36.6  C)  Pulse:  [47-87] 81  Resp:  [10-17] 13  BP: (104-149)/(56-96) 109/61  FiO2 (%):  [30 %] 30 %  SpO2:  [96 %-99 %] 98 %    Intake/Output Summary (Last 24 hours) at 3/1/2021 0908  Last data filed at 3/1/2021 0900  Gross per 24 hour   Intake 3555.86 ml   Output 1100 ml   Net 2455.86 ml     Wt Readings from Last 4 Encounters:   02/28/21 86.6 kg (190 lb 14.7 oz)   12/23/19 89.8 kg (198 lb)   12/16/19 90 kg (198 lb 6.4 oz)   08/27/19 91.8 kg (202 lb 6.4 oz)     BP - Mean:  [] 78  Ventilation Mode: CPAP/PS  (Continuous positive airway pressure with Pressure Support)  FiO2 (%): 30 %  Rate Set (breaths/minute): 14 breaths/min  Tidal Volume Set (mL): 550 mL  PEEP (cm H2O): 5 cmH2O  Pressure Support (cm H2O): 5 cmH2O  Oxygen Concentration (%): 30 %  Resp: 13    Recent Labs   Lab 02/28/21  0635 02/26/21  1505 02/25/21  0755   PH 7.56*  --  7.52*   PCO2 28*  --  33*   PO2 166*  --  174*   HCO3 25  --  27   O2PER 30% 30 40       GEN: no acute distress   HEENT: head ncat, sclera anicteric, OP patent, trachea midline    PULM: unlabored synchronous with vent, mild rhonchi bilaterally anteriorly, though decreased from exam yesterday   CV/COR: RRR S1S2 no gallop,  No rub, no murmur  ABD: soft nontender, hypoactive bowel sounds, no mass  EXT:  Edema   warm  NEURO: sedated with propofol, moves RUE and BLE spontaneously, withdraws to pain on RUE and BLE   SKIN: no obvious rash  LINES: clean, dry intact         Data:   All data and imaging reviewed     ROUTINE ICU LABS (Last four results)  CMP  Recent Labs   Lab 03/01/21  0534 02/28/21  1419 02/28/21  0611 02/27/21  0515 02/26/21  1511 02/25/21  0749   * 144  --  138 141  --    POTASSIUM 3.6 3.5  --  3.9 4.3  --    CHLORIDE 114* 110*  --  109 110*  --    CO2 27 27  --  26 27  --    ANIONGAP 5 7  --  3 4  --    GLC 80 117*  --  102* 104*  --    BUN 25 22  --  15 16  --    CR 0.82 0.91 0.94 0.87 0.87 0.70   GFRESTIMATED >90 >90 >90 >90 >90 >90   GFRESTBLACK >90 >90 >90 >90 >90 >90   JESSICA 9.8 9.9  --  9.8 9.5  --    MAG 2.3  --   --  2.0  --   --    PHOS 4.0  --   --  3.7  --   --    PROTTOTAL  --   --   --   --  6.5*  --    ALBUMIN  --   --   --   --  2.9* 3.0*   BILITOTAL  --   --   --   --  0.3  --    ALKPHOS  --   --   --   --  56  --    AST  --   --   --   --  18  --    ALT  --   --   --   --  19  --      CBC  Recent Labs   Lab 03/01/21  0534 02/28/21  1419 02/27/21  0515 02/26/21  1511   WBC 7.3 9.8 10.2 10.8   RBC 3.74* 3.89* 3.91* 4.12*   HGB 11.3* 11.9* 12.0* 12.6*   HCT 35.9* 36.9* 36.9* 39.1*   MCV 96 95 94 95   MCH 30.2 30.6 30.7 30.6   MCHC 31.5 32.2 32.5 32.2   RDW 14.7 14.6 15.1* 15.4*   PLT 95* 104* 114* 148*     INRNo lab results found in last 7 days.  Arterial Blood Gas  Recent Labs   Lab 02/28/21  0635 02/26/21  1505 02/25/21  0755   PH 7.56*  --  7.52*   PCO2 28*  --  33*   PO2 166*  --  174*   HCO3 25  --  27   O2PER 30% 30 40       All cultures:  Recent Labs   Lab 02/25/21  1435 02/25/21  0028 02/25/21  0023   CULT Heavy growth  Normal stacey    Light  growth  Enterobacter cloacae complex  *  Light growth  Klebsiella pneumoniae  * No growth after 4 days No growth after 4 days     No results found for this or any previous visit (from the past 24 hour(s)).  Samson Romero, MS4 on 3/1/2021 at 2:44 PM    Physician Attestation   I, Prasanna Rodriguez, was present with the medical/REBEKA student who participated in the service and in the documentation of the note.  I have verified the history and personally performed the physical exam and medical decision making.  I agree with the assessment and plan of care as documented in the note.      I personally reviewed vital signs, medications, labs and imaging.    Please see my separate note for plan of care.     Prasanna Rodriguez MD  Date of Service (when I saw the patient): 03/01/21

## 2021-03-01 NOTE — PROGRESS NOTES
Critical Care Progress Note  03/01/2021    Name: Duane D Backes MRN#: [id number]   Age: 40 year old YOB: 1980     [facility] Day# 4           Problem List:   Active Problems:    Seizure disorder (H)    Altered mental status, unspecified altered mental status type         Summary/Hospital Course:   Mr. Green is a 40 year old man with a history of autism, epilepsy, tonic-clonic seizures, and an epidermoid tumor who initially presented on 2/24 with a 3 minute seizure. At presentation in ED, had a recurrent seizure, and required intubation for airway protection and admission to ICU. He has been slow to wake from sedation. His course has been complicated by sputum growing Klebsiella and enterobacter, now on levaquin.     Assessment and plan :     Duane D Backes is a 40 year old male admitted on 2/24/2021 for seizures, with need for intubation for airway protection, in the setting of a history of autism, epilepsy, tonic-clonic seizures, and an epidermoid tumor, with course complicated by probable aspiration pneumonia.   I have personally reviewed the daily labs, imaging studies, cultures and discussed the case with referring physician and consulting physicians.   My assessment and plan by system for this patient is as follows:    Neurology/Psychiatry:   1. ICU sedation  2. Seizure disorder  3. Autism   Plan  -neurology was consulted, feel he is close to baseline at this point  -continue valproate 750 gm q8h  -lacosamine 100 mg BID  -topiramate 200 mg BID  -holding lithium and other antipsychotic meds that could lower seizure threshold  -propofol for sedation, wean to off as able (did not tolerate precedex due to bradycardia)    Cardiovascular:   1. No acute issues  Plan  - monitor hemodynamics     Pulmonary/Ventilator Management:   1. Intubation for airway protection   2. Probable aspiration pneumonia  3. Respiratory alkalosis, resolved  Plan  - improving oxygenation, tolerating PST well today  - check VBG, if  stable, will trial extubation today, despite not following commands (as his mental status is likely close to baseline- he is opening eyes to voice).   - secretions more manageable today   - treatment of pneumonia as below     GI/Nutrition :   1. Enteral feedings  Plan  -Continue tube feeds  -famotidine 20 mg q12h for GI ppx  -will need to assess swallow after extubation for ability to take pills, otherwise will need NG placement.     Renal/Fluids/Electrolytes:   1. Respiratory alkalosis, resolved   2. Hypernatremia: mild   3. Hyperchloremia   Plan  - D5W drip at 50 mL/hr  - follow sodium   - monitor function and electrolytes as needed with replacement per ICU protocols.  - generally avoid nephrotoxic agents such as NSAID, IV contrast unless specifically required  - adjust medications as needed for renal clearance  - follow I/O's as appropriate.    Infectious Disease:   1. Aspiration pneumonia, with Enterobacter and Klebsiella on culture  Plan  - Continue Levoquin day 3 of 7    Endocrine:   1. Hypothyroid  Plan  - levothyroxine 150 mcg daily, resume if able to take PO.     Hematology/Oncology:   1. Normocytic anemia, no signs, symptoms of active blood loss  2. Thrombocytopenia, chronic, relatively stable.   Plan  - monitor CBC    MSK/Rheum:  1. No acute issues. Baseline is able to use all extremities.   Plan  - monitor, may need PT/OT.     IV/Access:   1. Venous access - PIV  Plan  - continue current access     ICU Prophylaxis:   1. DVT: LMWH  2. VAP: HOB 30 degrees, chlorhexidine rinse  3. Stress Ulcer: H2 blocker (stop when extubated)   4. Restraints: Nonviolent soft two point restraints required and necessary for patient safety and continued cares and good effect as patient continues to pull at necessary lines, tubes despite education and distraction. Will readdress daily.   5. Wound care - per unit routine   6. Feeding - tube feeds  7. Family Update: Will be updated by team.  8. Disposition - ICU    Key goals for  next 24 hours:   1. PST, possible extubation   2. Wean sedation to off, if able  3. Monitor secretions  4. Continue antibiotics.       Interim History/Overnight Events:   Improving secretions this AM. Off propofol, and starting to open eyes to voice, not following commands.        Key Medications:       chlorhexidine  15 mL Mouth/Throat Q12H     enoxaparin ANTICOAGULANT  40 mg Subcutaneous Q24H     famotidine  20 mg Intravenous Q12H     lacosamide (VIMPAT) intermittent infusion  100 mg Intravenous BID     levofloxacin  750 mg Intravenous Q24H     topiramate  200 mg Oral BID     valproate (DEPACON) intermittent infusion  750 mg Intravenous Q8H       dexmedetomidine Stopped (03/01/21 0000)     dextrose       dextrose 5% lactated ringers 50 mL/hr at 03/01/21 0742     propofol (DIPRIVAN) infusion 15 mcg/kg/min (03/01/21 0815)               Physical Examination:   Temp:  [97.9  F (36.6  C)-98.8  F (37.1  C)] 97.9  F (36.6  C)  Pulse:  [47-94] 87  Resp:  [10-17] 14  BP: (112-149)/(63-96) 140/85  FiO2 (%):  [30 %] 30 %  SpO2:  [96 %-99 %] 98 %    Intake/Output Summary (Last 24 hours) at 3/1/2021 1016  Last data filed at 3/1/2021 1000  Gross per 24 hour   Intake 3675.86 ml   Output 1100 ml   Net 2575.86 ml     Wt Readings from Last 4 Encounters:   02/28/21 86.6 kg (190 lb 14.7 oz)   12/23/19 89.8 kg (198 lb)   12/16/19 90 kg (198 lb 6.4 oz)   08/27/19 91.8 kg (202 lb 6.4 oz)     BP - Mean:  [] 106  Ventilation Mode: CMV/AC  (Continuous Mandatory Ventilation/ Assist Control)  FiO2 (%): 30 %  Rate Set (breaths/minute): 14 breaths/min  Tidal Volume Set (mL): 550 mL  PEEP (cm H2O): 5 cmH2O  Pressure Support (cm H2O): 5 cmH2O  Oxygen Concentration (%): 30 %  Resp: 14    Recent Labs   Lab 02/28/21  0635 02/26/21  1505 02/25/21  0755   PH 7.56*  --  7.52*   PCO2 28*  --  33*   PO2 166*  --  174*   HCO3 25  --  27   O2PER 30% 30 40         GEN: no acute distress, on vent.   HEENT: head ncat, sclera anicteric, trachea midline    Lymph: no cervical or supraclavicular LAD  PULM: unlabored synchronous with vent, clear anteriorly. Breathing over vent.   CV/COR: RRR, normal S1 and S2. No gallop, rub, nor murmur  ABD: soft, non-tender, non-distended. Hypoactive bowel sounds, no mass  MSK/EXT:  No LE edema. WWP.  NEURO: opening eyes to voice, otherwise not following commands. Moves RUE and BLE spontaneously.   SKIN: no obvious rash  LINES: clean, dry intact         Data:   All data and imaging reviewed.     ROUTINE ICU LABS (Last four results)  CMP  Recent Labs   Lab 03/01/21  0534 02/28/21  1419 02/28/21  0611 02/27/21  0515 02/26/21  1511 02/25/21  0749   * 144  --  138 141  --    POTASSIUM 3.6 3.5  --  3.9 4.3  --    CHLORIDE 114* 110*  --  109 110*  --    CO2 27 27  --  26 27  --    ANIONGAP 5 7  --  3 4  --    GLC 80 117*  --  102* 104*  --    BUN 25 22  --  15 16  --    CR 0.82 0.91 0.94 0.87 0.87 0.70   GFRESTIMATED >90 >90 >90 >90 >90 >90   GFRESTBLACK >90 >90 >90 >90 >90 >90   JESSICA 9.8 9.9  --  9.8 9.5  --    MAG 2.3  --   --  2.0  --   --    PHOS 4.0  --   --  3.7  --   --    PROTTOTAL  --   --   --   --  6.5*  --    ALBUMIN  --   --   --   --  2.9* 3.0*   BILITOTAL  --   --   --   --  0.3  --    ALKPHOS  --   --   --   --  56  --    AST  --   --   --   --  18  --    ALT  --   --   --   --  19  --      CBC  Recent Labs   Lab 03/01/21  0534 02/28/21  1419 02/27/21  0515 02/26/21  1511   WBC 7.3 9.8 10.2 10.8   RBC 3.74* 3.89* 3.91* 4.12*   HGB 11.3* 11.9* 12.0* 12.6*   HCT 35.9* 36.9* 36.9* 39.1*   MCV 96 95 94 95   MCH 30.2 30.6 30.7 30.6   MCHC 31.5 32.2 32.5 32.2   RDW 14.7 14.6 15.1* 15.4*   PLT 95* 104* 114* 148*     INRNo lab results found in last 7 days.  Arterial Blood Gas  Recent Labs   Lab 02/28/21  0635 02/26/21  1505 02/25/21  0755   PH 7.56*  --  7.52*   PCO2 28*  --  33*   PO2 166*  --  174*   HCO3 25  --  27   O2PER 30% 30 40       All cultures:  Recent Labs   Lab 02/25/21  1435 02/25/21  0028 02/25/21  0023   CULT  Heavy growth  Normal stacey    Light growth  Enterobacter cloacae complex  *  Light growth  Klebsiella pneumoniae  * No growth after 4 days No growth after 4 days     Recent Results (from the past 24 hour(s))   XR Chest Port 1 View    Narrative    CHEST ONE VIEW PORTABLE   2/28/2021 10:38 AM     HISTORY: Sputum sample shows Klebsiella. Evaluate for pneumonia.    COMPARISON: Chest x-ray 2/25/2021.      Impression    IMPRESSION: Portable chest. There is a new airspace opacity in the mid  left lung with increasing opacity in the retrocardiac region as well,  possibly reflecting left lower lobe pneumonia. Right lung is clear. No  pneumothorax or pleural fluid. Heart is normal in size. Nasogastric  tube and endotracheal tube remain in position.    DOUGLAS GONZALEZ MD                 Billing: This patient is critically ill: Yes. Total critical care time today 40 min, excluding procedures.     Prasanna Rodriguez MD    Department of Medicine, Division of Pulmonary, Allergy, Critical Care, and Sleep Medicine

## 2021-03-01 NOTE — PLAN OF CARE
Pt with improved neuro exam, opens eyes to rigorous stimulation, moves all extremities strongly, reaching for ET tube, requiring restrains of bilateral upper extremities.  Pressure supported x 4 hours 5/5, returned to cmv due to oral secretions and likely inability to manage them if extubated.  Will reassess in AM.  Tube feeds to goal, no residuals noted, + flatus, no BM. Frequent turns, position changes (to chair) completed, note blanchable buttocks/coccyx, covered with foam dressing.  Pt using legs to kick off condom cath x 2, applied brief for incontinence as both male external catheters appear to fail.  Next shift made aware of concerns.

## 2021-03-01 NOTE — PROGRESS NOTES
[facility] ICU RESPIRATORY NOTE        Date of Admission: 2/24/2021    Date of Intubation (most recent): 2/25/21    Reason for Mechanical Ventilation: airway protection    Number of Days on Mechanical Ventilation: 5    Met Criteria for Spontaneous Breathing Trial: No    Reason for No Spontaneous Breathing Trial: Per MD    Significant Events Today: None    ABG Results:   Recent Labs   Lab 02/28/21  0635 02/26/21  1505 02/25/21  0755   PH 7.56*  --  7.52*   PCO2 28*  --  33*   PO2 166*  --  174*   HCO3 25  --  27   O2PER 30% 30 40       Current Vent Settings: Ventilation Mode: CMV/AC  (Continuous Mandatory Ventilation/ Assist Control)  FiO2 (%): 30 %  Rate Set (breaths/minute): 14 breaths/min  Tidal Volume Set (mL): 550 mL  PEEP (cm H2O): 5 cmH2O  Pressure Support (cm H2O): 5 cmH2O  Oxygen Concentration (%): 30 %  Resp: 14          Plan: will keep the patient on full ventilatory support  Ans wean as tolerated    Lydia Crawford, RT

## 2021-03-01 NOTE — PROGRESS NOTES
Extubation Note    Successful completion of SBT (Yes or No): Yes  Extubation time: 1545    Patient assessment:  Lung sounds: Slightly Coarse  Stridor Present (Yes or No):  No  Patient tolerance:  Well    Oxygen device: Nasal Cannula  Liter flow: 4lpm  SpO2: 97%    Plan: Continue to monitor    Amber Davis, RT

## 2021-03-01 NOTE — PLAN OF CARE
Pt has been restless at times. Precedex switched to propofol due to HR in the low 40's. Will wean as able. Moves all 4 extremities. Does not follow commands. Remains on vent. Pt over breaths vent rate. Tube feeds at goal, tolerating well. Pt bladder scanned after 7 hours without voiding. Straight cathed for 550ml urine. Blanchable redness noted to coccyx and sacrum. Barrier applied. Pt turned side to side q2. Will continue to monitor closely.

## 2021-03-02 ENCOUNTER — APPOINTMENT (OUTPATIENT)
Dept: SPEECH THERAPY | Facility: CLINIC | Age: 41
DRG: 100 | End: 2021-03-02
Attending: INTERNAL MEDICINE
Payer: MEDICARE

## 2021-03-02 LAB
ANION GAP SERPL CALCULATED.3IONS-SCNC: 6 MMOL/L (ref 3–14)
BUN SERPL-MCNC: 28 MG/DL (ref 7–30)
CALCIUM SERPL-MCNC: 9.6 MG/DL (ref 8.5–10.1)
CHLORIDE SERPL-SCNC: 115 MMOL/L (ref 94–109)
CO2 SERPL-SCNC: 28 MMOL/L (ref 20–32)
CREAT SERPL-MCNC: 0.93 MG/DL (ref 0.66–1.25)
ERYTHROCYTE [DISTWIDTH] IN BLOOD BY AUTOMATED COUNT: 14.6 % (ref 10–15)
GFR SERPL CREATININE-BSD FRML MDRD: >90 ML/MIN/{1.73_M2}
GLUCOSE BLDC GLUCOMTR-MCNC: 68 MG/DL (ref 70–99)
GLUCOSE BLDC GLUCOMTR-MCNC: 72 MG/DL (ref 70–99)
GLUCOSE BLDC GLUCOMTR-MCNC: 78 MG/DL (ref 70–99)
GLUCOSE BLDC GLUCOMTR-MCNC: 94 MG/DL (ref 70–99)
GLUCOSE BLDC GLUCOMTR-MCNC: 98 MG/DL (ref 70–99)
GLUCOSE SERPL-MCNC: 80 MG/DL (ref 70–99)
HCT VFR BLD AUTO: 34 % (ref 40–53)
HGB BLD-MCNC: 10.8 G/DL (ref 13.3–17.7)
MCH RBC QN AUTO: 30.3 PG (ref 26.5–33)
MCHC RBC AUTO-ENTMCNC: 31.8 G/DL (ref 31.5–36.5)
MCV RBC AUTO: 96 FL (ref 78–100)
PLATELET # BLD AUTO: 104 10E9/L (ref 150–450)
POTASSIUM SERPL-SCNC: 3.2 MMOL/L (ref 3.4–5.3)
POTASSIUM SERPL-SCNC: 3.3 MMOL/L (ref 3.4–5.3)
POTASSIUM SERPL-SCNC: 3.8 MMOL/L (ref 3.4–5.3)
RBC # BLD AUTO: 3.56 10E12/L (ref 4.4–5.9)
SODIUM SERPL-SCNC: 145 MMOL/L (ref 133–144)
SODIUM SERPL-SCNC: 149 MMOL/L (ref 133–144)
WBC # BLD AUTO: 7.1 10E9/L (ref 4–11)

## 2021-03-02 PROCEDURE — 200N000001 HC R&B ICU

## 2021-03-02 PROCEDURE — 84295 ASSAY OF SERUM SODIUM: CPT | Performed by: INTERNAL MEDICINE

## 2021-03-02 PROCEDURE — 258N000003 HC RX IP 258 OP 636: Performed by: INTERNAL MEDICINE

## 2021-03-02 PROCEDURE — 36415 COLL VENOUS BLD VENIPUNCTURE: CPT | Performed by: ANESTHESIOLOGY

## 2021-03-02 PROCEDURE — 250N000011 HC RX IP 250 OP 636: Performed by: STUDENT IN AN ORGANIZED HEALTH CARE EDUCATION/TRAINING PROGRAM

## 2021-03-02 PROCEDURE — 250N000011 HC RX IP 250 OP 636: Performed by: INTERNAL MEDICINE

## 2021-03-02 PROCEDURE — 258N000003 HC RX IP 258 OP 636: Performed by: PSYCHIATRY & NEUROLOGY

## 2021-03-02 PROCEDURE — C9254 INJECTION, LACOSAMIDE: HCPCS | Performed by: STUDENT IN AN ORGANIZED HEALTH CARE EDUCATION/TRAINING PROGRAM

## 2021-03-02 PROCEDURE — 250N000009 HC RX 250: Performed by: ANESTHESIOLOGY

## 2021-03-02 PROCEDURE — 85027 COMPLETE CBC AUTOMATED: CPT | Performed by: ANESTHESIOLOGY

## 2021-03-02 PROCEDURE — 258N000003 HC RX IP 258 OP 636: Performed by: STUDENT IN AN ORGANIZED HEALTH CARE EDUCATION/TRAINING PROGRAM

## 2021-03-02 PROCEDURE — 80048 BASIC METABOLIC PNL TOTAL CA: CPT | Performed by: ANESTHESIOLOGY

## 2021-03-02 PROCEDURE — 999N001017 HC STATISTIC GLUCOSE BY METER IP

## 2021-03-02 PROCEDURE — 99233 SBSQ HOSP IP/OBS HIGH 50: CPT | Performed by: INTERNAL MEDICINE

## 2021-03-02 PROCEDURE — 250N000009 HC RX 250: Performed by: PSYCHIATRY & NEUROLOGY

## 2021-03-02 PROCEDURE — 36415 COLL VENOUS BLD VENIPUNCTURE: CPT | Performed by: INTERNAL MEDICINE

## 2021-03-02 PROCEDURE — 92526 ORAL FUNCTION THERAPY: CPT | Mod: GN | Performed by: SPEECH-LANGUAGE PATHOLOGIST

## 2021-03-02 PROCEDURE — 250N000013 HC RX MED GY IP 250 OP 250 PS 637: Performed by: PSYCHIATRY & NEUROLOGY

## 2021-03-02 PROCEDURE — 92610 EVALUATE SWALLOWING FUNCTION: CPT | Mod: GN | Performed by: SPEECH-LANGUAGE PATHOLOGIST

## 2021-03-02 PROCEDURE — 258N000001 HC RX 258: Performed by: INTERNAL MEDICINE

## 2021-03-02 PROCEDURE — 84132 ASSAY OF SERUM POTASSIUM: CPT | Performed by: INTERNAL MEDICINE

## 2021-03-02 RX ORDER — HALOPERIDOL 5 MG/ML
1-2 INJECTION INTRAMUSCULAR EVERY 6 HOURS PRN
Status: DISCONTINUED | OUTPATIENT
Start: 2021-03-02 | End: 2021-03-09 | Stop reason: HOSPADM

## 2021-03-02 RX ORDER — DEXTROSE MONOHYDRATE 50 MG/ML
INJECTION, SOLUTION INTRAVENOUS CONTINUOUS
Status: DISCONTINUED | OUTPATIENT
Start: 2021-03-02 | End: 2021-03-04

## 2021-03-02 RX ORDER — BISACODYL 10 MG
10 SUPPOSITORY, RECTAL RECTAL DAILY PRN
Status: DISCONTINUED | OUTPATIENT
Start: 2021-03-02 | End: 2021-03-09 | Stop reason: HOSPADM

## 2021-03-02 RX ORDER — POTASSIUM CHLORIDE 7.45 MG/ML
10 INJECTION INTRAVENOUS
Status: COMPLETED | OUTPATIENT
Start: 2021-03-02 | End: 2021-03-02

## 2021-03-02 RX ADMIN — DEXTROSE MONOHYDRATE: 50 INJECTION, SOLUTION INTRAVENOUS at 08:38

## 2021-03-02 RX ADMIN — TOPIRAMATE 200 MG: 200 TABLET ORAL at 21:38

## 2021-03-02 RX ADMIN — MIDAZOLAM HYDROCHLORIDE 1 MG: 1 INJECTION, SOLUTION INTRAMUSCULAR; INTRAVENOUS at 01:19

## 2021-03-02 RX ADMIN — POTASSIUM CHLORIDE 10 MEQ: 7.46 INJECTION, SOLUTION INTRAVENOUS at 13:14

## 2021-03-02 RX ADMIN — SODIUM CHLORIDE 100 MG: 9 INJECTION, SOLUTION INTRAVENOUS at 10:09

## 2021-03-02 RX ADMIN — POTASSIUM CHLORIDE 10 MEQ: 7.46 INJECTION, SOLUTION INTRAVENOUS at 11:01

## 2021-03-02 RX ADMIN — ENOXAPARIN SODIUM 40 MG: 40 INJECTION SUBCUTANEOUS at 18:06

## 2021-03-02 RX ADMIN — MIDAZOLAM HYDROCHLORIDE 2 MG: 1 INJECTION, SOLUTION INTRAMUSCULAR; INTRAVENOUS at 02:30

## 2021-03-02 RX ADMIN — SODIUM CHLORIDE 100 MG: 9 INJECTION, SOLUTION INTRAVENOUS at 20:55

## 2021-03-02 RX ADMIN — LEVOFLOXACIN 750 MG: 5 INJECTION, SOLUTION INTRAVENOUS at 08:26

## 2021-03-02 RX ADMIN — VALPROATE SODIUM 750 MG: 100 INJECTION, SOLUTION INTRAVENOUS at 11:03

## 2021-03-02 RX ADMIN — POTASSIUM CHLORIDE 10 MEQ: 7.46 INJECTION, SOLUTION INTRAVENOUS at 11:55

## 2021-03-02 RX ADMIN — DEXAMETHASONE SODIUM PHOSPHATE 4 MG: 4 INJECTION, SOLUTION INTRAMUSCULAR; INTRAVENOUS at 08:24

## 2021-03-02 RX ADMIN — VALPROATE SODIUM 750 MG: 100 INJECTION, SOLUTION INTRAVENOUS at 02:40

## 2021-03-02 RX ADMIN — TOPIRAMATE 200 MG: 200 TABLET ORAL at 11:56

## 2021-03-02 RX ADMIN — POTASSIUM CHLORIDE 10 MEQ: 7.46 INJECTION, SOLUTION INTRAVENOUS at 09:57

## 2021-03-02 RX ADMIN — FAMOTIDINE 20 MG: 10 INJECTION, SOLUTION INTRAVENOUS at 00:47

## 2021-03-02 RX ADMIN — VALPROATE SODIUM 750 MG: 100 INJECTION, SOLUTION INTRAVENOUS at 18:06

## 2021-03-02 RX ADMIN — DEXTROSE MONOHYDRATE 25 ML: 25 INJECTION, SOLUTION INTRAVENOUS at 23:53

## 2021-03-02 ASSESSMENT — ACTIVITIES OF DAILY LIVING (ADL)
ADLS_ACUITY_SCORE: 24
ADLS_ACUITY_SCORE: 23
ADLS_ACUITY_SCORE: 23
ADLS_ACUITY_SCORE: 24
ADLS_ACUITY_SCORE: 24
ADLS_ACUITY_SCORE: 23

## 2021-03-02 ASSESSMENT — MIFFLIN-ST. JEOR: SCORE: 1844.75

## 2021-03-02 NOTE — PROGRESS NOTES
03/02/21 1200   General Information   Onset of Illness/Injury or Date of Surgery 02/25/21   Referring Physician Dr. Rodriguez   Patient/Family Therapy Goal Statement (SLP) Pt unable to state at time of eval.   Pertinent History of Current Problem Per notes: Peter is a 40 year old man with a history of autism, epilepsy, tonic-clonic seizures, and an epidermoid tumor who initially presented on 2/24 with a 3 minute seizure. At presentation in ED, had a recurrent seizure, and required intubation for airway protection and admission to ICU. He has been slow to wake from sedation. His course has been complicated by sputum growing Klebsiella and enterobacter, now on levaquin. He was extubated on 3/1.      Hx of dysphagia per previous SLP swallow clinical evals and VFSS completed 11/11/20.  Aspiration reported with thin, nectar, and honey thick liquids, mild pharyngeal residue present.  High aspiration risk/silent aspiration risk reported; DDL2 with honey thick and strict use of safe swallow strategies were recommended along with consideration of alternative nutrition/hydration.  Pt has been on thin liquids at group home per report.  Hx includes epidermoid tumor displacing the brainstem.   General Observations Group home staff contacted today and reported that at baseline, pt eats fast, no thickener used due to insurance issues per record review.  Staff provides 1:1 assist/cues w/ a sippy cup or by straw with thin liquids and pt eats soft- regular solids with staff providing 1-2 bites at a time, occasional cough reported, pt also completes swallow exercises with staff at the group home.  Pt's father was present at the end of today's eval and reports pt is usually more verbal with repetitive speech and looks fatigued this am compared to his baseline.    Pt was restless and sitter was present to hold pt's hands to prevent him from taking off the O2 mask during the eval.   Oral Motor   Oral Musculature unable to assess due  to poor participation/comprehension   Dentition (Oral Motor)   Dentition (Oral Motor) adequate dentition   Vocal Quality/Secretion Management (Oral Motor)   Comment, Vocal Quality/Secretion Management (Oral Motor) Hoarse voice   General Swallowing Observations   Respiratory Support (General Swallowing Observations) oxygen mask  (6L - pt attempting to take off repeatedly)   Clinical Swallow Evaluation: Nectar-Thick Liquid Texture Trial   Mode of Presentation, Nectar cup;spoon   Volume of Nectar Presented large impulsive consecutive sip by cup, tsps of nectar w/SLP x 3   Oral Phase, Nectar Premature pharyngeal entry   Pharyngeal Phase, Nectar reduction in laryngeal movement;repeated swallows  (delay)   Diagnostic Statement overt extensive cough after trial by cup, cough after 3rd trial by spoon   Clinical Swallow Evaluation: Honey-Thick Liquid Texture Trial   Mode of Presentation, Honey spoon   Volume of Honey Presented tsps x 6   Oral Phase, Honey Premature pharyngeal entry   Pharyngeal Phase, Honey reduction in laryngeal movement;repeated swallows  (delay)   Diagnostic Statement no immediate cough after trials with mod-max cues to swallow big, some improved laryngeal elevation   Clinical Swallow Evaluation: Puree Solid Texture Trial   Mode of Presentation, Puree spoon   Volume of Puree Presented tsp x 1   Oral Phase, Puree Premature pharyngeal entry   Pharyngeal Phase, Puree repeated swallows;reduction in laryngeal movement  (delay)   Diagnostic Statement poor elevation on swallow initiation, decreased swallow coordination, overt cough when attempting mulitple swallows   Esophageal Phase of Swallow   Patient reports or presents with symptoms of esophageal dysphagia Yes   Esophageal comments Hx of esophageal dysmotility per report; some burping noted   Swallowing Recommendations   Diet Consistency Recommendations NPO   SLP Therapy Assessment/Plan   Criteria for Skilled Therapeutic Interventions Met (SLP Eval) yes  "  SLP Diagnosis Moderate-severe oral-pharyngeal dysphagia   Therapy Frequency (SLP Eval) daily   Predicted Duration of Therapy Intervention (SLP Eval) 1 week   Comment, Therapy Assessment/Plan (SLP) Moderate-severe oral-pharyngeal dysphagia was observed at bedside.  Pt has mod-severe dysphagia at baseline per last VFSS completed as an OP 11/11/20.  Precautions with a DDL2 and honey thick liquids were recommended due to high aspiration risk.  Alternative nutrition/hydration consideration was also recommended at that time.  Pt has been on soft food and thin liquids at his group home with staff assist, occasional cough reported.  Deficits/risk factors today include decreased attention and awareness, inability to consistently follow commands, recent intubation, hoarse voice, decreased laryngeal elevation, need for multiple swallows, and overt coughing after nectar by tsp, nectar by cup, and tsp of pudding.  Pt is at high risk for aspiration/silent aspiration.  Recommend NPO status except for crushed essential meds with 1-3 tsps of honey thick by spoon with cues to \"swallow big\".  Hold all po if aspiration signs noted.  Plan to continue Tx to assess pt's safety to initiate a modified diet and train caregiver strategies.     Therapy Plan Review/Discharge Plan (SLP)   Therapy Plan Review (SLP) evaluation/treatment results reviewed;care plan/treatment goals reviewed;risks/benefits reviewed;current/potential barriers reviewed;participants voiced agreement with care plan;participants included;father   SLP Discharge Planning    SLP Discharge Recommendation (DC Rec) home with home care speech therapy    Total Evaluation Time   Total Evaluation Time (Minutes) 20     "

## 2021-03-02 NOTE — PLAN OF CARE
CNS: Pt restless, pulling at oxymask and lines, and attempting to get out of bed. Pt alert to self, garbled incomprehensible speech, unable to follow commands, moves all extremities with purpose. Pt received versed x2.     CVS: SB/SR, pt dipped into the 30's, BP stable-MD aware    Resp: 6L oxymask, desats slowly without mask. LS clear, moderate cough, pt swallows secretions.     GI: NPO, pt may need tube placement if unable to swallow today.     : Lg inc overnight, BS for 400, straight cathed for 500mL @ 0615.     Skin: pale, ecchymotic on sternum    Access: PIVx3

## 2021-03-02 NOTE — PROGRESS NOTES
Brief Intensivist Note  He had only mild wheezing and minimal stridor on exam, and will treat with 2 doses of IV Decadron (one now and one in am). He appears very comfortable on exam at this time.     renetta alfaro  March 1, 2021

## 2021-03-02 NOTE — PROVIDER NOTIFICATION
Upper airway wheezing.  Spo2 98% on humidified aerosol mask    Jennifer ABRAMS  at bedside     See orders nebs

## 2021-03-02 NOTE — PROGRESS NOTES
Medical Student Critical Care Progress Note      03/02/2021    Name: Duane D Backes MRN#: [id number]   Age: 40 year old YOB: 1980     [facility] Day# 5  ICU DAY # 5                 Problem List:   Active Problems:    Seizure disorder (H)    Altered mental status, unspecified altered mental status type           Summary/Hospital Course:   Duane D Backes is a 40 year old male with PMH of autism, epilepsy, tonic-clonic seizures, & an epidermoid tumor who presented on 2/24 to the ED for a 3 minute witnessed seizure. He had another seizure in the ED, became somnolent and had increased secretions, prompting intubation and mechanical ventilation and admission to the ICU. Patient was off of sedation from 2/25 to 2/28, and he was slow to wake during that period, likely due to his baseline mental status. EEG did not show any epileptic or seizure activity, EEG has been discontinued. Sputum growing Klebsiella PNA and Enterobacter cloacae, now on day 4 of levaquin for pneumonia. Extubated yesterday on 3/1.         Assessment and plan :   Duane D Backes is a 40 year old male w/ PMH of autism, epilepsy, tonic-clonic seizures, and an epidermoid tumor, admitted on 2/24/2021 for treatment of seizure that led to intubation and mechanical ventilation. Patient is sedated, intubated, and mechanically ventilated. Patient's sputum grew Klebsiella pneumonia and Enterobacter cloacae, and today is on day 4 of 7 of levaquin for pneumonia treatment. Extubated yesterday 3/1. Has been on oxymask since extubation.    I have personally reviewed the daily labs, imaging studies, cultures and discussed the case with referring physician and consulting physicians.     My assessment and plan by system for this patient is as follows:    Neurology/Psychiatry:   1. Seizure disorder with new seizure  PMH of seizures, and specifically epilepsy, tonic-clonic seizures, and an enlarging epidermoid cyst. Differential for seizure leading to this  admission includes increasing size of epidermoid cyst, medications lowering the seizure threshold, and infection and sepsis. Infection is likely cause of seizure, as elevated ESR/CRP & sputum culture returned positive for Enterobacter cloacae & Klebsiella pneumonia.   - Less likely related to medications as these have not been changed recently. TPM & VPA level WNL.  - Less likely to be related to epidermoid cyst as this is likely slow growing & nothing on MRI to suggest edema &/or compression per neurology  - 3 day Video EEG showed severe diffuse encephalopathy and no seizures not seen at any point.   2. Delirium/ agitation  3. History of autism--Baseline mental status could be clouding the patient's neurological status and the process of transitioning off of sedation. Moving bilateral upper and lower extremities. Patient agitated and moving around in bed.      Plan  - Neurology consulted and signed, appreciate recs:              - Expect patient's encephalopathy to continue to improve with treatment of PNA              - Continue valproate 750 mg Q8H               - Continue PTA topiramate 200 mg BID- although patient has not been getting this due to no enteral access    currently              - Continue Lacosamine 100 mg BID              - Continue to hold lithium and other antipsychotic meds as they may lower the seizure threshold  - Limit sedating meds unless necessary for patient's safety   - Continue with sitter supervision  - Pt's father (Soren Green) to visit patient today. Hopefully this will be helpful to patient to interact with familiar face, as well as to get more information of baseline status of the patient from his father.        Cardiovascular:   1. Bradycardia--overnight was given Versed and HR dropped to 30s-40s  Plan  - Avoid Versed and Precedex for agitation  - Monitor hemodynamic status     Pulmonary/Ventilator Management:   1. Extubated yesterday 3/1, has been on 6 L O2 with oxymask;  satting 95-98%  2. Aspiration pneumonia, growing Klebsiella pna and Enterobacter cloacae  Plan  - Wean oxygen as tolerated, from facemask to NC  - Continue Levaquin for pneumonia treatment (currently day 4 of 7)  - Continue to manage secretions with suction and encourage coughing  - Continue albuterol PRN  - One more dose of Decadron for airway swelling  - Get up in chair to help respiratory status     GI and Nutrition :   1. Nutrition  2. Currently NPO   Plan  - SLP consult--to assess swallow ability to take PO pills. If not able to take PO, consider NG tube placement.    - Discontinue famotidine since pt not intubated   - Place feeding tube today if doesn't pass swallow    Renal/Fluids/Electrolytes:   1. Hypernatremia. 149 today 3/2, up from yesterday-146 3/1. Free water deficit is 3.3 L  2. Hyperchloremia. 115 today 3/2, up from yesterday-114 3/1   3. Previous respiratory alkalosis, resolved.   Plan  - Increase D5W @ 50 mL/hr to D5W @ 75 mL/hr to replace water. Stop LR  - Follow sodium and electrolytes closely   - monitor function and electrolytes as needed with replacement per ICU protocols.  - generally avoid nephrotoxic agents such as NSAID, IV contrast unless specifically required  - adjust medications as needed for renal clearance  - follow I/O's as appropriate.    Infectious Disease:   1. Aspiration pna, growing Klebsiella pna and Enterobacter cloacae  Plan  - Continue Levaquin, now on day 4 of 7    Endocrine:   1. Hx of hypothyroidism  Plan  - Restart PTA levothyroxine 150 mcg/day if able to take PO    Hematology/Oncology:   1. Normocytic anemia, Hb decreasing; today 10.8 (11.9->11.3->10.8). No signs of symptoms of active blood loss  2. Thrombocytopenia, chronic  Plan  - Continue to follow Hb and platelets closely       MSKL/Rheum:  1. No issues   Plan  -PT/OT      IV/Access:   1. Venous access - PIV  Plan  - continue current access      ICU Prophylaxis:   1. DVT: LMWH  2. VAP: Not indicated  3. Stress  Ulcer: not indicated, extubated  4. Restraints: wearing hand mitt on left hand  5. Wound care - per unit routine   6. Feeding - SLP consult to eval swallow today. If not able to swallow, will place feeding tube  7. Family Update:Updated by team this AM, father (Soren) to visit pt today   8. Disposition - ICU        Key goals for next 24 hours:   1. Continue antibiotics  2. Monitor activity status  3. Wean down O2 support as tolerated          Interim History:   Patient extubated yesterday. Received 2 doses IV decadron for mild wheezing and stridor. Had bradycardia to the 30-40s after receiving Versed. Has been restless and moving around in hospital bed.             Key Medications:       enoxaparin ANTICOAGULANT  40 mg Subcutaneous Q24H     lacosamide (VIMPAT) intermittent infusion  100 mg Intravenous BID     levofloxacin  750 mg Intravenous Q24H     potassium chloride  10 mEq Intravenous Q1H     topiramate  200 mg Oral BID     valproate (DEPACON) in NS intermittent infusion 50 mL  750 mg Intravenous Q8H       dextrose       D5W 75 mL/hr at 03/02/21 0838               Physical Examination:   Temp:  [97.2  F (36.2  C)-97.8  F (36.6  C)] 97.8  F (36.6  C)  Pulse:  [37-96] 77  Resp:  [9-28] 16  BP: ()/() 127/85  FiO2 (%):  [30 %] 30 %  SpO2:  [88 %-100 %] 92 %    Intake/Output Summary (Last 24 hours) at 3/2/2021 0838  Last data filed at 3/2/2021 0615  Gross per 24 hour   Intake 1995.6 ml   Output 1522 ml   Net 473.6 ml     Wt Readings from Last 4 Encounters:   03/02/21 85.5 kg (188 lb 7.9 oz)   12/23/19 89.8 kg (198 lb)   12/16/19 90 kg (198 lb 6.4 oz)   08/27/19 91.8 kg (202 lb 6.4 oz)     BP - Mean:  [] 105  Ventilation Mode: CPAP/PS  (Continuous positive airway pressure with Pressure Support)  FiO2 (%): 30 %  Rate Set (breaths/minute): 14 breaths/min  Tidal Volume Set (mL): 550 mL  PEEP (cm H2O): 5 cmH2O  Pressure Support (cm H2O): 5 cmH2O  Oxygen Concentration (%): 30 %  Resp: 16    Recent Labs    Lab 03/01/21  1404 02/28/21  0635 02/26/21  1505 02/25/21  0755   PH  --  7.56*  --  7.52*   PCO2  --  28*  --  33*   PO2  --  166*  --  174*   HCO3  --  25  --  27   O2PER At  30% 30 40       GEN: restless, moving around in hospital bed  HEENT: head ncat, sclera anicteric, OP patent, trachea midline   PULM: clear to auscultation bilaterally and anteriorly    CV/COR: RRR S1S2 no gallop,  No rub, no murmur  ABD: soft nontender, hypoactive bowel sounds, no mass  EXT: No edema,   warm  NEURO: Follows some commands--opens eyes, squeezes hand and moves legs on command. Does not respond verbally to questions  SKIN: no obvious rash  LINES: clean, dry intact         Data:   All data and imaging reviewed     ROUTINE ICU LABS (Last four results)  CMP  Recent Labs   Lab 03/02/21  0737 03/02/21  0604 03/01/21  0534 02/28/21  1419 02/28/21  0611 02/27/21  0515 02/26/21  1511 02/25/21  0749   NA  --  149* 146* 144  --  138 141  --    POTASSIUM 3.2* 3.3* 3.6 3.5  --  3.9 4.3  --    CHLORIDE  --  115* 114* 110*  --  109 110*  --    CO2  --  28 27 27  --  26 27  --    ANIONGAP  --  6 5 7  --  3 4  --    GLC  --  80 80 117*  --  102* 104*  --    BUN  --  28 25 22  --  15 16  --    CR  --  0.93 0.82 0.91 0.94 0.87 0.87 0.70   GFRESTIMATED  --  >90 >90 >90 >90 >90 >90 >90   GFRESTBLACK  --  >90 >90 >90 >90 >90 >90 >90   JESSICA  --  9.6 9.8 9.9  --  9.8 9.5  --    MAG  --   --  2.3  --   --  2.0  --   --    PHOS  --   --  4.0  --   --  3.7  --   --    PROTTOTAL  --   --   --   --   --   --  6.5*  --    ALBUMIN  --   --   --   --   --   --  2.9* 3.0*   BILITOTAL  --   --   --   --   --   --  0.3  --    ALKPHOS  --   --   --   --   --   --  56  --    AST  --   --   --   --   --   --  18  --    ALT  --   --   --   --   --   --  19  --      CBC  Recent Labs   Lab 03/02/21  0604 03/01/21  0534 02/28/21  1419 02/27/21  0515   WBC 7.1 7.3 9.8 10.2   RBC 3.56* 3.74* 3.89* 3.91*   HGB 10.8* 11.3* 11.9* 12.0*   HCT 34.0* 35.9* 36.9* 36.9*   MCV  96 96 95 94   MCH 30.3 30.2 30.6 30.7   MCHC 31.8 31.5 32.2 32.5   RDW 14.6 14.7 14.6 15.1*   * 95* 104* 114*     INRNo lab results found in last 7 days.  Arterial Blood Gas  Recent Labs   Lab 03/01/21  1404 02/28/21  0635 02/26/21  1505 02/25/21  0755   PH  --  7.56*  --  7.52*   PCO2  --  28*  --  33*   PO2  --  166*  --  174*   HCO3  --  25  --  27   O2PER Ta  30% 30 40       All cultures:  Recent Labs   Lab 02/25/21  1435 02/25/21  0028 02/25/21  0023   CULT Heavy growth  Normal stacey    Light growth  Enterobacter cloacae complex  *  Light growth  Klebsiella pneumoniae  * No growth after 5 days No growth after 5 days     No results found for this or any previous visit (from the past 24 hour(s)).    Samson Romero, MS4 on 3/2/2021 at 11:59 AM    Physician Attestation   I, Prasanna Rodriguez, was present with the medical/REBEKA student who participated in the service and in the documentation of the note.  I have verified the history and personally performed the physical exam and medical decision making.  I agree with the assessment and plan of care as documented in the note.      I personally reviewed vital signs, medications, labs and imaging.    See my separate note for full plan of care.     Prasanna Rodriguez MD  Date of Service (when I saw the patient): 03/02/21

## 2021-03-02 NOTE — PROGRESS NOTES
Critical Care Progress Note  03/02/2021    Name: Duane D Backes MRN#: [id number]   Age: 40 year old YOB: 1980     [facility] Day# 5           Problem List:   Active Problems:    Seizure disorder (H)    Altered mental status, unspecified altered mental status type         Summary/Hospital Course:   Mr. Green is a 40 year old man with a history of autism, epilepsy, tonic-clonic seizures, and an epidermoid tumor who initially presented on 2/24 with a 3 minute seizure. At presentation in ED, had a recurrent seizure, and required intubation for airway protection and admission to ICU. He has been slow to wake from sedation. His course has been complicated by sputum growing Klebsiella and enterobacter, now on levaquin. He was extubated on 3/1.     Assessment and plan :     Duane D Backes is a 40 year old male admitted on 2/24/2021 for seizures, with need for intubation for airway protection, in the setting of a history of autism, epilepsy, tonic-clonic seizures, and an epidermoid tumor, with course complicated by probable aspiration pneumonia.   I have personally reviewed the daily labs, imaging studies, cultures and discussed the case with referring physician and consulting physicians.   My assessment and plan by system for this patient is as follows:    Neurology/Psychiatry:   1. Seizure disorder  2. Delirium / agitation   3. Autism   Plan  - neurology was consulted, feel he is close to baseline at this point  - continue valproate 750 gm q8h  - lacosamine 100 mg BID  - topiramate 200 mg BID (not currently getting, no enteral access at this point)  - holding lithium and other antipsychotic meds that could lower seizure threshold  - would limit sedating meds in general unless needed for patient safety  - has sitter for agitation     Cardiovascular:   1. Mild bradycardia, associated with versed administration   Plan  - monitor hemodynamics   - avoid precedex and versed     Pulmonary/Ventilator Management:   1.  Probable aspiration pneumonia  2. Acute hypoxic respiratory failure: extubated on 3/1  3. Concern for upper airway wheeze/stridor vs secretions: no obvious cause of stridor, more likely related to secretions. Resolved this AM  Plan  - treatment of pneumonia as below   - wean O2 as able to nasal cannula if possible.   - up to chair today  - decadron x1 more dose for possible upper airway swelling  - did not get any racemic epi, will discharge  - continue albuterol PRN    GI/Nutrition :   1. Nutrition  Plan  -tube feeds on hold, no access  -assess for swallow via SLP this afternoon   -if unable to take PO, would try to place NG or NJ today at bedside   -stop famotidine as no longer intubated     Renal/Fluids/Electrolytes:   1. Hypernatremia: worse today. Water deficit is 3.3 L  2. Hyperchloremia   Plan  - D5W drip, increase to 75 mL/hr, stop LR for now  - follow sodium this afternoon.   - monitor function and electrolytes as needed with replacement per ICU protocols.  - generally avoid nephrotoxic agents such as NSAID, IV contrast unless specifically required  - adjust medications as needed for renal clearance  - follow I/O's as appropriate.    Infectious Disease:   1. Aspiration pneumonia, with Enterobacter and Klebsiella on culture  Plan  - Continue Levoquin day 4 of 7    Endocrine:   1. Hypothyroid  Plan  - levothyroxine 150 mcg daily, resume if able to take PO.     Hematology/Oncology:   1. Normocytic anemia, no signs, symptoms of active blood loss  2. Thrombocytopenia, chronic, relatively stable.   Plan  - monitor CBC    MSK/Rheum:  1. No acute issues. Baseline is able to use all extremities.   Plan  - monitor, may need PT/OT.     IV/Access:   1. Venous access - PIV  Plan  - continue current access     ICU Prophylaxis:   1. DVT: LMWH  2. VAP: not indicated   3. Stress Ulcer: not indicated    4. Restraints: hand mitts ordered.   5. Wound care - per unit routine   6. Feeding - tube feeds on hold due to NPO, will  try to place feeding tube today and assess for swallow.   7. Family Update: team has updated father this AM.   8. Disposition - ICU     Key goals for next 24 hours:   1. Assess for ability to swallow vs place feeding tube  2. Correct hypernatremia   3. Consider transfer to floor if remains stable.       Interim History/Overnight Events:   Extubated yesterday afternoon. Possible wheeze vs stridor in evening, given epi nebs, as well as albuterol/saline nebs for secretion clearance. Overnight, given decadron. Given versed overnight. Not following commands this morning, but moving around more.        Key Medications:       chlorhexidine  15 mL Mouth/Throat Q12H     dexamethasone  4 mg Intravenous Q12H     enoxaparin ANTICOAGULANT  40 mg Subcutaneous Q24H     famotidine  20 mg Intravenous Q12H     lacosamide (VIMPAT) intermittent infusion  100 mg Intravenous BID     levofloxacin  750 mg Intravenous Q24H     topiramate  200 mg Oral BID     valproate (DEPACON) in NS intermittent infusion 50 mL  750 mg Intravenous Q8H       dextrose       dextrose 5% lactated ringers 50 mL/hr at 03/01/21 1957     propofol (DIPRIVAN) infusion Stopped (03/01/21 1230)               Physical Examination:   Temp:  [97.2  F (36.2  C)-97.9  F (36.6  C)] 97.7  F (36.5  C)  Pulse:  [37-96] 57  Resp:  [9-28] 18  BP: ()/() 145/82  FiO2 (%):  [30 %] 30 %  SpO2:  [88 %-100 %] 97 %    Intake/Output Summary (Last 24 hours) at 3/1/2021 1016  Last data filed at 3/1/2021 1000  Gross per 24 hour   Intake 3675.86 ml   Output 1100 ml   Net 2575.86 ml     Wt Readings from Last 4 Encounters:   03/02/21 85.5 kg (188 lb 7.9 oz)   12/23/19 89.8 kg (198 lb)   12/16/19 90 kg (198 lb 6.4 oz)   08/27/19 91.8 kg (202 lb 6.4 oz)     BP - Mean:  [] 97  Ventilation Mode: CPAP/PS  (Continuous positive airway pressure with Pressure Support)  FiO2 (%): 30 %  Rate Set (breaths/minute): 14 breaths/min  Tidal Volume Set (mL): 550 mL  PEEP (cm H2O): 5  cmH2O  Pressure Support (cm H2O): 5 cmH2O  Oxygen Concentration (%): 30 %  Resp: 18    GEN: no acute distress, lying in bed.   HEENT: head ncat, sclera anicteric, trachea midline   PULM: unlabored breathing, on oxymask. No wheeze or stridor this AM. No cough noted. CTAB  CV/COR: RRR, normal S1 and S2. No gallop, rub, nor murmur  ABD: soft, non-tender, non-distended. Hypoactive bowel sounds, no mass  MSK/EXT:  No LE edema. WWP.  NEURO: opening eyes to voice, otherwise not following commands for me this morning. Moves all extremities spontaneously   SKIN: no obvious rash  LINES: clean, dry intact         Data:   All data and imaging reviewed.     ROUTINE ICU LABS (Last four results)  CMP  Recent Labs   Lab 03/02/21  0604 03/01/21  0534 02/28/21  1419 02/28/21  0611 02/27/21  0515 02/26/21  1511 02/25/21  0749   * 146* 144  --  138 141  --    POTASSIUM 3.3* 3.6 3.5  --  3.9 4.3  --    CHLORIDE 115* 114* 110*  --  109 110*  --    CO2 28 27 27  --  26 27  --    ANIONGAP 6 5 7  --  3 4  --    GLC 80 80 117*  --  102* 104*  --    BUN 28 25 22  --  15 16  --    CR 0.93 0.82 0.91 0.94 0.87 0.87 0.70   GFRESTIMATED >90 >90 >90 >90 >90 >90 >90   GFRESTBLACK >90 >90 >90 >90 >90 >90 >90   JESSICA 9.6 9.8 9.9  --  9.8 9.5  --    MAG  --  2.3  --   --  2.0  --   --    PHOS  --  4.0  --   --  3.7  --   --    PROTTOTAL  --   --   --   --   --  6.5*  --    ALBUMIN  --   --   --   --   --  2.9* 3.0*   BILITOTAL  --   --   --   --   --  0.3  --    ALKPHOS  --   --   --   --   --  56  --    AST  --   --   --   --   --  18  --    ALT  --   --   --   --   --  19  --      CBC  Recent Labs   Lab 03/02/21  0604 03/01/21  0534 02/28/21  1419 02/27/21  0515   WBC 7.1 7.3 9.8 10.2   RBC 3.56* 3.74* 3.89* 3.91*   HGB 10.8* 11.3* 11.9* 12.0*   HCT 34.0* 35.9* 36.9* 36.9*   MCV 96 96 95 94   MCH 30.3 30.2 30.6 30.7   MCHC 31.8 31.5 32.2 32.5   RDW 14.6 14.7 14.6 15.1*   * 95* 104* 114*     INRNo lab results found in last 7 days.  Arterial  Blood Gas  Recent Labs   Lab 03/01/21  1404 02/28/21  0635 02/26/21  1505 02/25/21  0755   PH  --  7.56*  --  7.52*   PCO2  --  28*  --  33*   PO2  --  166*  --  174*   HCO3  --  25  --  27   O2PER Ta  30% 30 40       All cultures:  Recent Labs   Lab 02/25/21  1435 02/25/21  0028 02/25/21  0023   CULT Heavy growth  Normal stacey    Light growth  Enterobacter cloacae complex  *  Light growth  Klebsiella pneumoniae  * No growth after 5 days No growth after 5 days     No results found for this or any previous visit (from the past 24 hour(s)).              Billing: This patient is critically ill: No, however given risk of decompensation in respiratory status, will keep in ICU today. Total care time today 40 min, excluding procedures, with >50% in counseling/coordination of care.     Prasanna Rodriguez MD    Department of Medicine, Division of Pulmonary, Allergy, Critical Care, and Sleep Medicine

## 2021-03-02 NOTE — PROGRESS NOTES
CLINICAL NUTRITION SERVICES - REASSESSMENT NOTE      RECOMMENDATIONS FOR MD/PROVIDER TO ORDER:   - If patient unable to tolerate diet advancement >FL, consider replacing TF to resume nutrition support   - Start bowel program    Recommendations Ordered by Registered Dietitian (RD):   - Will leave TF orders in EMR incase nasoenteric tube replaced    Malnutrition:   % Weight Loss:  Weight loss does not meet criteria for malnutrition   % Intake:  Decreased intake does not meet criteria for malnutrition   Subcutaneous Fat Loss:  None observed  Muscle Loss:  None observed  Fluid Retention:  None noted     Malnutrition Diagnosis: Patient does not meet two of the above criteria necessary for diagnosing malnutrition at this time but is at risk pending further decline        EVALUATION OF PROGRESS TOWARD GOALS   Diet:  NPO    Nutrition Support:  TF initiated 2/27 and goal rate achieved on 2/28. TF stopped with extubation (OGT) on 3/1 and no longer running at this time     Intake/Tolerance:    Day 1 NPO w/o nutrition support   Pt w/o a BM x5 days this admission --> recommend bowel program  Labs reviewed: Na 149 (H), K 3.2 (L)  Recent Labs   Lab 03/02/21  0757 03/02/21  0604 03/02/21  0351 03/01/21  2359 03/01/21  1959 03/01/21  0534 02/28/21  1419 02/27/21  0515 02/27/21  0356 02/26/21  2055 02/26/21  1511 02/24/21  2322 02/24/21  2322   GLC  --  80  --   --   --  80 117* 102*  --   --  104*  --  87   BGM 72  --  78 94 82  --   --   --  102* 98  --    < >  --     < > = values in this interval not displayed.     Medications reviewed: Propofol restarted yesterday and stopped same day, D5 IVF increased to 75 mL/hr today (90 gm CHO and 306 kcal/day)     ASSESSED NUTRITION NEEDS:  Dosing Weight 85.7 kg   Estimated Energy Needs: 1590-1092 kcals (25-30 Kcal/Kg)  Justification: maintenance and vented  Estimated Protein Needs: 102-128 grams protein (1.2-1.5 g pro/Kg)  Justification: preservation of lean body mass  Estimated Fluid  Needs: 1 mL/kcal  Justification: maintenance or per provider pending fluid status       NEW FINDINGS:   Awaiting SLP eval for diet tolerance (recently on DD2, HTL at group home)     Vitals:    02/25/21 0600 02/26/21 0400 02/27/21 0000 02/28/21 0400   Weight: 85.7 kg (188 lb 15 oz) 87.7 kg (193 lb 5.5 oz) 86.3 kg (190 lb 4.1 oz) 86.6 kg (190 lb 14.7 oz)    03/02/21 0500   Weight: 85.5 kg (188 lb 7.9 oz)       Previous Goals:   TF Isosource 1.5 at 65 mL/hr will meet % estimated needs within 48 hrs   Evaluation: Met within time frame; now no longer applicable     Previous Nutrition Diagnosis:   Inadequate protein-energy intake related to NPO and vented as evidenced by receiving 0% estimated needs   Evaluation: No change      CURRENT NUTRITION DIAGNOSIS  Inadequate oral intake related to recently extubated, awaiting SLP as evidenced by receiving 0% estimated needs with TF off    INTERVENTIONS  Recommendations / Nutrition Prescription  ADAT per SLP    Implementation  Collaboration and Referral of Nutrition care: patient discussed during ICU rounds     Goals  Diet tolerance >FL vs nutrition support within 48 hrs       MONITORING AND EVALUATION:  Progress towards goals will be monitored and evaluated per protocol and Practice Guidelines      Lexi Mason RD, LD  Clinical Dietitian

## 2021-03-03 ENCOUNTER — DOCUMENTATION ONLY (OUTPATIENT)
Dept: OTHER | Facility: CLINIC | Age: 41
End: 2021-03-03

## 2021-03-03 ENCOUNTER — APPOINTMENT (OUTPATIENT)
Dept: SPEECH THERAPY | Facility: CLINIC | Age: 41
DRG: 100 | End: 2021-03-03
Attending: INTERNAL MEDICINE
Payer: MEDICARE

## 2021-03-03 LAB
ANION GAP SERPL CALCULATED.3IONS-SCNC: 5 MMOL/L (ref 3–14)
BACTERIA SPEC CULT: NO GROWTH
BACTERIA SPEC CULT: NO GROWTH
BUN SERPL-MCNC: 26 MG/DL (ref 7–30)
CALCIUM SERPL-MCNC: 9.3 MG/DL (ref 8.5–10.1)
CHLORIDE SERPL-SCNC: 114 MMOL/L (ref 94–109)
CO2 SERPL-SCNC: 28 MMOL/L (ref 20–32)
CREAT SERPL-MCNC: 0.91 MG/DL (ref 0.66–1.25)
ERYTHROCYTE [DISTWIDTH] IN BLOOD BY AUTOMATED COUNT: 14.1 % (ref 10–15)
GFR SERPL CREATININE-BSD FRML MDRD: >90 ML/MIN/{1.73_M2}
GLUCOSE BLDC GLUCOMTR-MCNC: 136 MG/DL (ref 70–99)
GLUCOSE BLDC GLUCOMTR-MCNC: 68 MG/DL (ref 70–99)
GLUCOSE BLDC GLUCOMTR-MCNC: 71 MG/DL (ref 70–99)
GLUCOSE BLDC GLUCOMTR-MCNC: 77 MG/DL (ref 70–99)
GLUCOSE BLDC GLUCOMTR-MCNC: 83 MG/DL (ref 70–99)
GLUCOSE BLDC GLUCOMTR-MCNC: 94 MG/DL (ref 70–99)
GLUCOSE SERPL-MCNC: 65 MG/DL (ref 70–99)
HCT VFR BLD AUTO: 33.7 % (ref 40–53)
HGB BLD-MCNC: 11.2 G/DL (ref 13.3–17.7)
MCH RBC QN AUTO: 31.3 PG (ref 26.5–33)
MCHC RBC AUTO-ENTMCNC: 33.2 G/DL (ref 31.5–36.5)
MCV RBC AUTO: 94 FL (ref 78–100)
PLATELET # BLD AUTO: 138 10E9/L (ref 150–450)
POTASSIUM SERPL-SCNC: 3.2 MMOL/L (ref 3.4–5.3)
POTASSIUM SERPL-SCNC: 3.2 MMOL/L (ref 3.4–5.3)
RBC # BLD AUTO: 3.58 10E12/L (ref 4.4–5.9)
SODIUM SERPL-SCNC: 147 MMOL/L (ref 133–144)
SPECIMEN SOURCE: NORMAL
SPECIMEN SOURCE: NORMAL
WBC # BLD AUTO: 6.6 10E9/L (ref 4–11)

## 2021-03-03 PROCEDURE — 258N000001 HC RX 258: Performed by: INTERNAL MEDICINE

## 2021-03-03 PROCEDURE — 36415 COLL VENOUS BLD VENIPUNCTURE: CPT | Performed by: ANESTHESIOLOGY

## 2021-03-03 PROCEDURE — 92526 ORAL FUNCTION THERAPY: CPT | Mod: GN | Performed by: SPEECH-LANGUAGE PATHOLOGIST

## 2021-03-03 PROCEDURE — 99233 SBSQ HOSP IP/OBS HIGH 50: CPT | Performed by: INTERNAL MEDICINE

## 2021-03-03 PROCEDURE — 80048 BASIC METABOLIC PNL TOTAL CA: CPT | Performed by: ANESTHESIOLOGY

## 2021-03-03 PROCEDURE — 250N000009 HC RX 250: Performed by: PSYCHIATRY & NEUROLOGY

## 2021-03-03 PROCEDURE — 250N000011 HC RX IP 250 OP 636: Performed by: PHYSICAL THERAPIST

## 2021-03-03 PROCEDURE — 250N000011 HC RX IP 250 OP 636: Performed by: INTERNAL MEDICINE

## 2021-03-03 PROCEDURE — 258N000003 HC RX IP 258 OP 636: Performed by: INTERNAL MEDICINE

## 2021-03-03 PROCEDURE — 99207 PR CDG-MDM COMPONENT: MEETS MODERATE - UP CODED: CPT | Performed by: PSYCHIATRY & NEUROLOGY

## 2021-03-03 PROCEDURE — 99221 1ST HOSP IP/OBS SF/LOW 40: CPT | Performed by: NURSE PRACTITIONER

## 2021-03-03 PROCEDURE — 258N000003 HC RX IP 258 OP 636: Performed by: STUDENT IN AN ORGANIZED HEALTH CARE EDUCATION/TRAINING PROGRAM

## 2021-03-03 PROCEDURE — 84132 ASSAY OF SERUM POTASSIUM: CPT

## 2021-03-03 PROCEDURE — 250N000013 HC RX MED GY IP 250 OP 250 PS 637: Performed by: INTERNAL MEDICINE

## 2021-03-03 PROCEDURE — 250N000011 HC RX IP 250 OP 636: Performed by: STUDENT IN AN ORGANIZED HEALTH CARE EDUCATION/TRAINING PROGRAM

## 2021-03-03 PROCEDURE — 250N000013 HC RX MED GY IP 250 OP 250 PS 637: Performed by: PHYSICIAN ASSISTANT

## 2021-03-03 PROCEDURE — 258N000003 HC RX IP 258 OP 636: Performed by: PSYCHIATRY & NEUROLOGY

## 2021-03-03 PROCEDURE — 36415 COLL VENOUS BLD VENIPUNCTURE: CPT

## 2021-03-03 PROCEDURE — 250N000013 HC RX MED GY IP 250 OP 250 PS 637: Performed by: PSYCHIATRY & NEUROLOGY

## 2021-03-03 PROCEDURE — 999N001017 HC STATISTIC GLUCOSE BY METER IP

## 2021-03-03 PROCEDURE — 85027 COMPLETE CBC AUTOMATED: CPT | Performed by: ANESTHESIOLOGY

## 2021-03-03 PROCEDURE — C9254 INJECTION, LACOSAMIDE: HCPCS | Performed by: STUDENT IN AN ORGANIZED HEALTH CARE EDUCATION/TRAINING PROGRAM

## 2021-03-03 PROCEDURE — 120N000001 HC R&B MED SURG/OB

## 2021-03-03 RX ORDER — POTASSIUM CHLORIDE 7.45 MG/ML
10 INJECTION INTRAVENOUS
Status: DISPENSED | OUTPATIENT
Start: 2021-03-03 | End: 2021-03-03

## 2021-03-03 RX ORDER — POTASSIUM CHLORIDE 7.45 MG/ML
10 INJECTION INTRAVENOUS
Status: COMPLETED | OUTPATIENT
Start: 2021-03-03 | End: 2021-03-03

## 2021-03-03 RX ORDER — DIVALPROEX SODIUM 125 MG/1
125 CAPSULE, COATED PELLETS ORAL EVERY 8 HOURS SCHEDULED
Status: DISCONTINUED | OUTPATIENT
Start: 2021-03-03 | End: 2021-03-05

## 2021-03-03 RX ORDER — LEVOTHYROXINE SODIUM 75 UG/1
150 TABLET ORAL
Status: DISCONTINUED | OUTPATIENT
Start: 2021-03-04 | End: 2021-03-09 | Stop reason: HOSPADM

## 2021-03-03 RX ORDER — OLANZAPINE 5 MG/1
5 TABLET, ORALLY DISINTEGRATING ORAL 3 TIMES DAILY PRN
Status: DISCONTINUED | OUTPATIENT
Start: 2021-03-03 | End: 2021-03-09 | Stop reason: HOSPADM

## 2021-03-03 RX ORDER — OLANZAPINE 10 MG/2ML
5 INJECTION, POWDER, FOR SOLUTION INTRAMUSCULAR EVERY 6 HOURS PRN
Status: DISCONTINUED | OUTPATIENT
Start: 2021-03-03 | End: 2021-03-09 | Stop reason: HOSPADM

## 2021-03-03 RX ORDER — LACOSAMIDE 50 MG/1
100 TABLET ORAL 2 TIMES DAILY
Status: DISCONTINUED | OUTPATIENT
Start: 2021-03-03 | End: 2021-03-09 | Stop reason: HOSPADM

## 2021-03-03 RX ADMIN — SODIUM CHLORIDE 100 MG: 9 INJECTION, SOLUTION INTRAVENOUS at 09:13

## 2021-03-03 RX ADMIN — TOPIRAMATE 200 MG: 200 TABLET ORAL at 20:50

## 2021-03-03 RX ADMIN — POTASSIUM CHLORIDE 10 MEQ: 7.46 INJECTION, SOLUTION INTRAVENOUS at 08:47

## 2021-03-03 RX ADMIN — POTASSIUM CHLORIDE 10 MEQ: 7.46 INJECTION, SOLUTION INTRAVENOUS at 15:59

## 2021-03-03 RX ADMIN — VALPROATE SODIUM 750 MG: 100 INJECTION, SOLUTION INTRAVENOUS at 09:49

## 2021-03-03 RX ADMIN — POTASSIUM CHLORIDE 10 MEQ: 7.46 INJECTION, SOLUTION INTRAVENOUS at 09:49

## 2021-03-03 RX ADMIN — DEXTROSE MONOHYDRATE 25 ML: 25 INJECTION, SOLUTION INTRAVENOUS at 04:39

## 2021-03-03 RX ADMIN — LACOSAMIDE 100 MG: 50 TABLET, FILM COATED ORAL at 20:50

## 2021-03-03 RX ADMIN — LEVOFLOXACIN 750 MG: 5 INJECTION, SOLUTION INTRAVENOUS at 07:31

## 2021-03-03 RX ADMIN — DIVALPROEX SODIUM 125 MG: 125 CAPSULE, COATED PELLETS ORAL at 21:50

## 2021-03-03 RX ADMIN — POTASSIUM CHLORIDE 10 MEQ: 7.46 INJECTION, SOLUTION INTRAVENOUS at 07:30

## 2021-03-03 RX ADMIN — OLANZAPINE 5 MG: 5 TABLET, ORALLY DISINTEGRATING ORAL at 18:47

## 2021-03-03 RX ADMIN — ENOXAPARIN SODIUM 40 MG: 40 INJECTION SUBCUTANEOUS at 18:24

## 2021-03-03 RX ADMIN — VALPROATE SODIUM 750 MG: 100 INJECTION, SOLUTION INTRAVENOUS at 02:49

## 2021-03-03 RX ADMIN — POTASSIUM CHLORIDE 10 MEQ: 7.46 INJECTION, SOLUTION INTRAVENOUS at 14:42

## 2021-03-03 RX ADMIN — DEXTROSE MONOHYDRATE: 50 INJECTION, SOLUTION INTRAVENOUS at 04:39

## 2021-03-03 RX ADMIN — TOPIRAMATE 200 MG: 200 TABLET ORAL at 08:05

## 2021-03-03 RX ADMIN — POTASSIUM CHLORIDE 10 MEQ: 7.46 INJECTION, SOLUTION INTRAVENOUS at 17:41

## 2021-03-03 RX ADMIN — POTASSIUM CHLORIDE 10 MEQ: 7.46 INJECTION, SOLUTION INTRAVENOUS at 06:25

## 2021-03-03 ASSESSMENT — ACTIVITIES OF DAILY LIVING (ADL)
ADLS_ACUITY_SCORE: 23

## 2021-03-03 ASSESSMENT — MIFFLIN-ST. JEOR: SCORE: 1787.75

## 2021-03-03 NOTE — PROGRESS NOTES
SPIRITUAL HEALTH SERVICES Progress Note  [facility] ICU    Referral Source: Length of Stay    Attempted visit in room with PT. PT was up in a chair and alert but unable to sustain meaningful conversation.     will attempt to contact family.      Drake Thorpe  Chaplain Resident

## 2021-03-03 NOTE — PROGRESS NOTES
"[facility]    Medicine Progress Note - Hospitalist Service       Date of Admission:  2/24/2021  Assessment & Plan       Duane D Backes is a 40 year old male with history of autism, tonic-clonic seizures, moderate MR, benign epidermoid tumor, and hypothyroidism who presented to the ED 2/24/2021 for a 3 minute witnessed seizure. He had another seizure when he arrived in the ED and was given a total of 4 mg ativan. He became somnolent and had increased secretions so he was intubated in the ED and transferred to the ICU.   - PTA on Depakote ER 1000mg BID and Topamax 200 mg BID; he follows with Dr Washington from [facility]  - He was managed by ICU and NCC; he was successfully extubated on 03/01 and Hospitalist service was called regarding transfer of care.    1. Breakthrough seizures  2. Status epilepticus  - as above; h/o tonic-clonic seizures, follows with Dr Washington from [facility]  - PTA on Depakote ER 1000mg BID and Topamax 200 mg BID;  - had another seizure in ER and was intubated for airway protection  - started on propofol infusion and versed drip and admitted to ICU  - CT head 02/25- No acute intracranial process; No change in the large epidermoid tumor involving the right middle cranial fossa, right ambient cistern, suprasellar region, with extension along the right aspect of the midbrain and lilli and into the cervical medullary junction region with  associated mass effect   - MRI brain- 02/25-  Increased size of the epidermoid cyst along the medulla, with increased inferior extension and mass effect on the medulla, as detailed above. In addition, there is a new enhancing, partially calcified nodular component along the anterior and right lateral margins of the medulla. Given that epidermoid cysts can have malignant degeneration, short interval contrast-enhanced MRI follow-up is   recommended in 4-6 weeks  - as per NCC- \"doubt that this is the cause of his seizure or his prolonged encephalopathy. This " "cyst has probably been slowly growing over the years not suddenly\"  - PTA medications lowering the seizure threshold (lithium, Risperdal, Zoloft, and Sudafed)- were held on admission   - initially started on Keppra, then started on Vimpat  - VEEG- moderate to severe diffuse encephalopathy.  Epileptiform discharges or seizures were not seen  - he was successfully extubated on 03/01  - doing fine, seizure-free, mentation seems at baseline  - currently on Vimpat 100mg iv BID, Topamax 200 mg BID and Depacon 750 mg iv q8h; will switch iv meds to po route as ratio is 1:1  - Neurology signed off  - follow up with primary Neurologist    3. Acute hypoxic respiratory failure- resolved  - as above- intubated in ER for airways protection  - extubated on 03/01  - doing well now, sat well on RA    4. Aspiration PNA  - sputum culture- 02/25- positive for Klebsiella and Enterobacter clocae complex  - started on Levaquin on 2/27; day 5/7  - afebrile    5. Acute encephalopathy-  improved  6. Autism  7. Moderate MR  - as above- holding lithium and other antipsychotic meds that could lower seizure threshold  - would limit sedating meds in general unless needed for patient safety  - will place a Psych consult regarding if any of his PTA meds should be resumed  - sitter at bedside  - SW consult    8. Epidermoid tumor  - MRI brain shows some increase in size and some mass effect- as above  - NS consult    9. Hypothyroidism  - TSH 0.27, fT4 1.05  - continue PTA Synthroid    10. Hypernatremia  - Na 149--145--147  - continue D5W at 75cc/h  - BMP on am    11. Dysphagia  - has a known hx of silent aspiration with all liquids; however, pt was on soft food and thin liquids at the group home   - SLP consulted- recommended- NPO except for honey thick liquids by tsp with 1:1 assist/feeding by spoon only with precautions  - MD/family discussion on overall POC wishes to initiate a po diet/modified diet (DDL1 and honey thick by spoon) on 3/4 with 1:1 " "assist despite aspiration risk     Diet: NPO for Medical/Clinical Reasons Except for: Meds, NPO but receiving Tube Feeding, Other; Specify: Honey thick by tsp provided at bedside, 1:1 assist, slow rate, by spoon only, cue/verify swallows, cue \"big swallow\", crush meds and give with honey...    DVT Prophylaxis: Enoxaparin (Lovenox) SQ  Colin Catheter: not present  Code Status: Full Code           Disposition Plan   Expected discharge: couple of days, recommended to prior living arrangement once Psych arpit, ELLE consult.  Entered: Renetta Grossman MD 03/03/2021, 12:33 PM       The patient's care was discussed with the Bedside Nurse and ICU Team.    Renetta Grossman MD  Hospitalist Service  [facility]  Contact information available via [facility] Paging/Directory    ______________________________________________________________________    Interval History   Doing fine, awake, alert, NAD; a little restless;   - sitter at bedside    Data reviewed today: I reviewed all medications, new labs and imaging results over the last 24 hours. I personally reviewed the all imaging since admission- image(s) showing as above.    Physical Exam   Vital Signs: Temp: 97.2  F (36.2  C) Temp src: Axillary BP: (!) 143/92 Pulse: 71   Resp: 27 SpO2: 92 % O2 Device: None (Room air) Oxygen Delivery: 2 LPM  Weight: 175 lbs 14.83 oz     GEN: no acute distress, sitting up in bed.   HEENT: head ncat, sclera anicteric, trachea midline   PULM: unlabored breathing, CTAB, on room air.  CV/COR: RRR, normal S1 and S2. No gallop, rub, nor murmur  ABD: soft, non-tender, non-distended; BS present  MSK/EXT:  No LE edema. WWP.  NEURO: Alert, no FNDs, mildly restless, follows some commands; ? Appears at baseline.       Data   Recent Labs   Lab 03/03/21  1314 03/03/21  0435 03/02/21  1424 03/02/21  0604 03/02/21  0604 03/01/21  0534 02/26/21  1511 02/26/21  1511 02/25/21  0749   WBC  --  6.6  --   --  7.1 7.3   < > 10.8 4.6   HGB  --  " 11.2*  --   --  10.8* 11.3*   < > 12.6* 11.7*   MCV  --  94  --   --  96 96   < > 95 94   PLT  --  138*  --   --  104* 95*   < > 148* 150   NA  --  147* 145*  --  149* 146*   < > 141  --    POTASSIUM 3.2* 3.2* 3.8   < > 3.3* 3.6   < > 4.3  --    CHLORIDE  --  114*  --   --  115* 114*   < > 110*  --    CO2  --  28  --   --  28 27   < > 27  --    BUN  --  26  --   --  28 25   < > 16  --    CR  --  0.91  --   --  0.93 0.82   < > 0.87 0.70   ANIONGAP  --  5  --   --  6 5   < > 4  --    JESSICA  --  9.3  --   --  9.6 9.8   < > 9.5  --    GLC  --  65*  --   --  80 80   < > 104*  --    ALBUMIN  --   --   --   --   --   --   --  2.9* 3.0*   PROTTOTAL  --   --   --   --   --   --   --  6.5*  --    BILITOTAL  --   --   --   --   --   --   --  0.3  --    ALKPHOS  --   --   --   --   --   --   --  56  --    ALT  --   --   --   --   --   --   --  19  --    AST  --   --   --   --   --   --   --  18  --     < > = values in this interval not displayed.     No results found for this or any previous visit (from the past 24 hour(s)).  Medications     dextrose       D5W 75 mL/hr at 03/03/21 0439       enoxaparin ANTICOAGULANT  40 mg Subcutaneous Q24H     lacosamide (VIMPAT) intermittent infusion  100 mg Intravenous BID     levofloxacin  750 mg Intravenous Q24H     [START ON 3/4/2021] levothyroxine  150 mcg Oral QAM AC     potassium chloride  10 mEq Intravenous Q1H     topiramate  200 mg Oral BID     valproate (DEPACON) in NS intermittent infusion 50 mL  750 mg Intravenous Q8H

## 2021-03-03 NOTE — PROGRESS NOTES
Medical Student Critical Care Progress Note      03/03/2021    Name: Duane D Backes MRN#: [id number]   Age: 40 year old YOB: 1980     [facility]  [facility]    [facility] DAY #             Problem List:   Active Problems:    Seizure disorder (H)    Altered mental status, unspecified altered mental status type           Summary/Hospital Course:   Duane D Backes is a 40 year old male with PMH of autism, epilepsy, tonic-clonic seizures, & an epidermoid tumor who presented on 2/24 to the ED for a 3 minute witnessed seizure. He had another seizure in the ED, became somnolent and had increased secretions, prompting intubation and mechanical ventilation and admission to the ICU. Patient was off of sedation from 2/25 to 2/28, and he was slow to wake during that period, likely due to his baseline mental status. EEG did not show any epileptic or seizure activity, EEG has been discontinued. Sputum growing Klebsiella PNA and Enterobacter cloacae, now on day 5 of Levaquin for pneumonia. Extubated 2 days ago on 3/1. Has transitioned to breathing comfortably on room air currently.         Assessment and plan :   Duane D Backes is a 40 year old male w/ PMH of autism, epilepsy, tonic-clonic seizures, and an epidermoid tumor, admitted on 2/24/2021 for treatment of seizure that led to intubation and mechanical ventilation. Patient is sedated, intubated, and mechanically ventilated. Patient's sputum grew Klebsiella pneumonia and Enterobacter cloacae, and today is on day 5 of 7 of levaquin for pneumonia treatment. Extubated two days ago 3/1. Has weaned off of oxygen since extubation.   I have personally reviewed the daily labs, imaging studies, cultures and discussed the case with referring physician and consulting physicians.     My assessment and plan by system for this patient is as follows:    Neurology/Psychiatry:   1. Seizure disorder with new seizure  PMH of seizures, and specifically epilepsy, tonic-clonic seizures,  and an enlarging epidermoid cyst. Differential for seizure leading to this admission includes increasing size of epidermoid cyst, medications lowering the seizure threshold, and infection and sepsis. Infection is likely cause of seizure, as elevated ESR/CRP & sputum culture returned positive for Enterobacter cloacae & Klebsiella pneumonia.   - Less likely related to medications as these have not been changed recently. TPM & VPA level WNL.  - Less likely to be related to epidermoid cyst as this is likely slow growing & nothing on MRI to suggest edema &/or compression per neurology  - 3 day Video EEG showed severe diffuse encephalopathy and no seizures not seen at any point.   2. Delirium/ agitation  3. History of autism- Moving bilateral upper and lower extremities. Patient agitated and moving around in bed, verbal and responds to questions. Patient is likely at or close to his baseline.      Plan  - Neurology consulted and signed, appreciate recs:              - Continue valproate 750 mg Q8H               - Continue PTA topiramate 200 mg BID              - Continue Lacosamide 100 mg BID              - Continue to hold lithium and other antipsychotic meds as they may lower the seizure threshold  - Limit sedating meds unless necessary for patient's safety   - Continue with sitter supervision      Cardiovascular:   1. No acute issues  Plan  - Monitor hemodynamic status    Pulmonary/Ventilator Management:   1. Extubated 2 days ago, 3/1, now breathing on room air  2. Aspiration pneumonia, growing Klebsiella pna and Enterobacter cloacae  Plan  - Continue Levaquin for pneumonia treatment (currently day 5 of 7)  - Continue albuterol PRN  - Get up in chair to help respiratory status     GI and Nutrition :   1. Nutrition  2. Moderate-severe oral -pharyngeal dysphagia.    3. Currently NPO except for essential meds  Plan  - SLP recommends NPO status except for crushed essential meds with 1-3 tsps of honey thick by spoon with  "cues to \"swallow big.\" Hold PO if aspiration signs noted.   - Per SLP, patient qualifies for dysphagia diet level 1 with staff assist.   - Plan to touch base with family regarding diet  - Nutrition consulted, recommend if patient unable to tolerate diet advancement, consider replacing TF for nutrition. However, patient would likely pull at feeding tube if placed.       Renal/Fluids/Electrolytes:   1. Hypernatremia, improving. 147 today 3/3 (149->145->147). Free water deficit is 2.4 L.  2. Hyperchloremia. 114 today 3/3 (114->115->114)  3. Previous respiratory alkalosis, resolved.   Plan  - Continue D5W @ 75 mL/hr to replace water  - Follow sodium and electrolytes closely   - monitor function and electrolytes as needed with replacement per ICU protocols.  - generally avoid nephrotoxic agents such as NSAID, IV contrast unless specifically required  - adjust medications as needed for renal clearance  - follow I/O's as appropriate.    Infectious Disease:   1. Aspiration pna, growing Klebsiella pna and Enterobacter cloacae  Plan  - Continue Levaquin, now on day 5 of 7    Endocrine:   1. Hx of hypothyroidism  2. Hypoglycemia.   Plan  - PTA levothyroxine 150 mcg/day since able to take PO  - Glucose replacement protocol for hypoglycemia    Hematology/Oncology:   1. Normocytic anemia, Hb 11.2 today 3/3 (11.9->11.3->10.8->11.2). No signs of symptoms of active blood loss  2. Thrombocytopenia, chronic  Plan  - Continue to follow Hb and platelets closely     MSKL/Rheum:  1. No issues   Plan  - Consider PT/OT    IV/Access:   1. Venous access - PIV   Plan  - continue current access      ICU Prophylaxis:   1. DVT: not indicated  2. VAP: not indicated  3. Stress Ulcer: not indicated  4. Restraints: hand mitts  5. Wound care - per unit routine   6. Feeding - Talk with family regarding diet  7. Family Update:Family to be updated today  8. Disposition - Stable to transfer to floor.         Key goals for next 24 hours:   1. Transfer to " floor  2. Talk to family regarding diet         Interim History:   Pt restless overnight and this morning, pulling at oxymask and lines, and attempting to get out of bed. Speaks some words. Has been able to take crushed pills PO with honey thickened liquids. Patient BG was low overnight to 68, needed dextrose 25 mLx2 overnight.            Key Medications:       enoxaparin ANTICOAGULANT  40 mg Subcutaneous Q24H     lacosamide (VIMPAT) intermittent infusion  100 mg Intravenous BID     levofloxacin  750 mg Intravenous Q24H     potassium chloride  10 mEq Intravenous Q1H     topiramate  200 mg Oral BID     valproate (DEPACON) in NS intermittent infusion 50 mL  750 mg Intravenous Q8H       dextrose       D5W 75 mL/hr at 03/03/21 0439               Physical Examination:   Temp:  [97.6  F (36.4  C)-97.8  F (36.6  C)] 97.8  F (36.6  C)  Pulse:  [56-77] 57  Resp:  [10-34] 15  BP: (126-157)/() 150/86  SpO2:  [92 %-97 %] 97 %    Intake/Output Summary (Last 24 hours) at 3/3/2021 0711  Last data filed at 3/3/2021 0600  Gross per 24 hour   Intake 1800 ml   Output 2349 ml   Net -549 ml     Wt Readings from Last 4 Encounters:   03/03/21 79.8 kg (175 lb 14.8 oz)   12/23/19 89.8 kg (198 lb)   12/16/19 90 kg (198 lb 6.4 oz)   08/27/19 91.8 kg (202 lb 6.4 oz)     BP - Mean:  [] 113  Resp: 15    Recent Labs   Lab 03/01/21  1404 02/28/21  0635 02/26/21  1505 02/25/21  0755   PH  --  7.56*  --  7.52*   PCO2  --  28*  --  33*   PO2  --  166*  --  174*   HCO3  --  25  --  27   O2PER Ta  30% 30 40       GEN: no acute distress, moving around in bed  HEENT: head ncat, sclera anicteric, OP patent, trachea midline   PULM: unlabored, clear anteriorly/bilaterally to auscultation    CV/COR: RRR S1S2 no gallop,  No rub, no murmur  ABD: soft nontender, hypoactive bowel sounds, no mass  EXT:  No edema, warm  NEURO: able to follow simple commands, responds verbally to questions, more alert than yesterday, moving all extremities  purposefully  SKIN: no obvious rash  LINES: clean, dry intact         Data:   All data and imaging reviewed     ROUTINE ICU LABS (Last four results)  CMP  Recent Labs   Lab 03/03/21  0435 03/02/21  1424 03/02/21  0737 03/02/21  0604 03/01/21  0534 02/28/21  1419 02/27/21  0515 02/27/21  0515 02/26/21  1511 02/25/21  0749   * 145*  --  149* 146* 144  --  138 141  --    POTASSIUM 3.2* 3.8 3.2* 3.3* 3.6 3.5  --  3.9 4.3  --    CHLORIDE 114*  --   --  115* 114* 110*  --  109 110*  --    CO2 28  --   --  28 27 27  --  26 27  --    ANIONGAP 5  --   --  6 5 7  --  3 4  --    GLC 65*  --   --  80 80 117*  --  102* 104*  --    BUN 26  --   --  28 25 22  --  15 16  --    CR 0.91  --   --  0.93 0.82 0.91   < > 0.87 0.87 0.70   GFRESTIMATED >90  --   --  >90 >90 >90   < > >90 >90 >90   GFRESTBLACK >90  --   --  >90 >90 >90   < > >90 >90 >90   JESSICA 9.3  --   --  9.6 9.8 9.9  --  9.8 9.5  --    MAG  --   --   --   --  2.3  --   --  2.0  --   --    PHOS  --   --   --   --  4.0  --   --  3.7  --   --    PROTTOTAL  --   --   --   --   --   --   --   --  6.5*  --    ALBUMIN  --   --   --   --   --   --   --   --  2.9* 3.0*   BILITOTAL  --   --   --   --   --   --   --   --  0.3  --    ALKPHOS  --   --   --   --   --   --   --   --  56  --    AST  --   --   --   --   --   --   --   --  18  --    ALT  --   --   --   --   --   --   --   --  19  --     < > = values in this interval not displayed.     CBC  Recent Labs   Lab 03/03/21  0435 03/02/21  0604 03/01/21  0534 02/28/21  1419   WBC 6.6 7.1 7.3 9.8   RBC 3.58* 3.56* 3.74* 3.89*   HGB 11.2* 10.8* 11.3* 11.9*   HCT 33.7* 34.0* 35.9* 36.9*   MCV 94 96 96 95   MCH 31.3 30.3 30.2 30.6   MCHC 33.2 31.8 31.5 32.2   RDW 14.1 14.6 14.7 14.6   * 104* 95* 104*     INRNo lab results found in last 7 days.  Arterial Blood Gas  Recent Labs   Lab 03/01/21  1404 02/28/21  0635 02/26/21  1505 02/25/21  0755   PH  --  7.56*  --  7.52*   PCO2  --  28*  --  33*   PO2  --  166*  --  174*    HCO3  --  25  --  27   O2PER Ta  30% 30 40       All cultures:  Recent Labs   Lab 02/25/21  1435 02/25/21  0028 02/25/21  0023   CULT Heavy growth  Normal stacey    Light growth  Enterobacter cloacae complex  *  Light growth  Klebsiella pneumoniae  * No growth No growth     No results found for this or any previous visit (from the past 24 hour(s)).    Samson Romero, MS4 on 3/3/2021 at 12:22 PM      Physician Attestation   I, Prasanna Rodriguez, was present with the medical/REBEKA student who participated in the service and in the documentation of the note.  I have verified the history and personally performed the physical exam and medical decision making.  I agree with the assessment and plan of care as documented in the note.      I personally reviewed vital signs, medications, labs and imaging.    Please see my separate note for full plan of care    Prasanna Rodriguez MD  Date of Service (when I saw the patient): 03/03/21

## 2021-03-03 NOTE — PLAN OF CARE
SLP - Swallow Update:  Pt was awake and more interactive today.  Some improvement in swallow timing and ROM observed.  Pt has a known hx of silent aspiration with all liquids; however, pt was on soft food and thin liquids at the group home with staff assist.  Some coughing reported, but no respiratory difficulty.  Pt will have ongoing baseline silent aspiration risk given hx.    Recommend 1)NPO except for honey thick liquids by tsp with 1:1 assist/feeding by spoon only with precautions (see NPO order).    2)MD/family discussion on overall POC wishes to initiate a po diet/modified diet (DDL1 and honey thick by spoon) on 3/4 with 1:1 assist despite aspiration risk.  Discussed status with RN who states given pt's behavior he has a high likelihood of pulling any (NG or PEG) alternate nutrtion/hydration placement.    SLP to follow.

## 2021-03-03 NOTE — PROGRESS NOTES
Critical Care Progress Note  03/03/2021    Name: Duane D Backes MRN#: [id number]   Age: 40 year old YOB: 1980     [facility] Day# 6           Problem List:   Active Problems:    Seizure disorder (H)    Altered mental status, unspecified altered mental status type         Summary/Hospital Course:   Mr. Green is a 40 year old man with a history of autism, epilepsy, tonic-clonic seizures, and an epidermoid tumor who initially presented on 2/24 with a 3 minute seizure. At presentation in ED, had a recurrent seizure, and required intubation for airway protection and admission to ICU. He has been slow to wake from sedation. His course has been complicated by sputum growing Klebsiella and enterobacter, now on levaquin. He was extubated on 3/1.     Assessment and plan :     Duane D Backes is a 40 year old male admitted on 2/24/2021 for seizures, with need for intubation for airway protection, in the setting of a history of autism, epilepsy, tonic-clonic seizures, and an epidermoid tumor, with course complicated by probable aspiration pneumonia.   I have personally reviewed the daily labs, imaging studies, cultures and discussed the case with referring physician and consulting physicians.   My assessment and plan by system for this patient is as follows:    Neurology/Psychiatry:   1. Seizure disorder  2. Delirium / agitation: much more alert today, likely at or very close to baseline.   3. Autism   Plan  - valproate 750 gm q8h  - lacosamine 100 mg BID  - topiramate 200 mg BID  - holding lithium and other antipsychotic meds that could lower seizure threshold  - would limit sedating meds in general unless needed for patient safety  - has sitter for agitation     Cardiovascular:   1. Mild bradycardia, associated with versed administration   Plan  - monitor hemodynamics   - avoid precedex and versed     Pulmonary/Ventilator Management:   1. Probable aspiration pneumonia  2. Resolved acute hypoxic respiratory failure:  extubated on 3/1. Now on room air.   Plan  - treatment of pneumonia as below   - up to chair as much as able.   - albuterol PRN    GI/Nutrition :   1. Nutrition  Plan  - known silent aspiration. Need to discuss with family, but likely should allow him to eat, as he is likely close to his baseline.   -if unable to take PO, could consider feeding tube, but would be diffiult to keep in as he will pull at it.     Renal/Fluids/Electrolytes:   1. Hypernatremia: improved today.   2. Hyperchloremia   Plan  - D5W drip at 75 mL/hr  - monitor function and electrolytes as needed with replacement per ICU protocols.  - generally avoid nephrotoxic agents such as NSAID, IV contrast unless specifically required  - adjust medications as needed for renal clearance  - follow I/O's as appropriate.    Infectious Disease:   1. Aspiration pneumonia, with Enterobacter and Klebsiella on culture  Plan  - Continue Levoquin day 5 of 7    Endocrine:   1. Hypothyroid  Plan  - levothyroxine 150 mcg daily, resume     Hematology/Oncology:   1. Normocytic anemia, no signs, symptoms of active blood loss  2. Thrombocytopenia, chronic, relatively stable.   Plan  - monitor CBC    MSK/Rheum:  1. No acute issues. Baseline is able to use all extremities.   Plan  - monitor, may need PT/OT.     IV/Access:   1. Venous access - PIV  Plan  - continue current access     ICU Prophylaxis:   1. DVT: LMWH  2. VAP: not indicated   3. Stress Ulcer: not indicated    4. Restraints: hand mitts ordered.   5. Wound care - per unit routine   6. Feeding - need to discuss re-starting PTA diet with family given known aspiration risk.   7. Family Update: not yet done   8. Disposition - stable to transfer to floor.     Key goals for next 24 hours:   1. Stable to transfer to floor  2. Discuss restarting diet with family       Interim History/Overnight Events:   Doing well. Weaned to room air. More alert and responsive today. Denies pain. Answering questions, but not always making  sense (I think close to his baseline).         Key Medications:       enoxaparin ANTICOAGULANT  40 mg Subcutaneous Q24H     lacosamide (VIMPAT) intermittent infusion  100 mg Intravenous BID     levofloxacin  750 mg Intravenous Q24H     potassium chloride  10 mEq Intravenous Q1H     topiramate  200 mg Oral BID     valproate (DEPACON) in NS intermittent infusion 50 mL  750 mg Intravenous Q8H       dextrose       D5W 75 mL/hr at 03/03/21 0439               Physical Examination:   Temp:  [97.2  F (36.2  C)-98.1  F (36.7  C)] 97.2  F (36.2  C)  Pulse:  [50-80] 68  Resp:  [11-34] 14  BP: (118-157)/() 146/94  SpO2:  [92 %-99 %] 95 %    Intake/Output Summary (Last 24 hours) at 3/1/2021 1016  Last data filed at 3/1/2021 1000  Gross per 24 hour   Intake 3675.86 ml   Output 1100 ml   Net 2575.86 ml     Wt Readings from Last 4 Encounters:   03/03/21 79.8 kg (175 lb 14.8 oz)   12/23/19 89.8 kg (198 lb)   12/16/19 90 kg (198 lb 6.4 oz)   08/27/19 91.8 kg (202 lb 6.4 oz)     BP - Mean:  [] 115  Resp: 14    GEN: no acute distress, sitting up in bed.   HEENT: head ncat, sclera anicteric, trachea midline   PULM: unlabored breathing, CTAB, on room air.  CV/COR: RRR, normal S1 and S2. No gallop, rub, nor murmur  ABD: soft, non-tender, non-distended. Hypoactive bowel sounds, no mass  MSK/EXT:  No LE edema. WWP.  NEURO: Alert, answering questions. Appears at baseline.   SKIN: no obvious rash  LINES: clean, dry intact         Data:   All data and imaging reviewed.     ROUTINE ICU LABS (Last four results)  CMP  Recent Labs   Lab 03/03/21  0435 03/02/21  1424 03/02/21  0737 03/02/21  0604 03/01/21  0534 02/28/21  1419 02/27/21  0515 02/27/21  0515 02/26/21  1511 02/25/21  0749   * 145*  --  149* 146* 144  --  138 141  --    POTASSIUM 3.2* 3.8 3.2* 3.3* 3.6 3.5  --  3.9 4.3  --    CHLORIDE 114*  --   --  115* 114* 110*  --  109 110*  --    CO2 28  --   --  28 27 27  --  26 27  --    ANIONGAP 5  --   --  6 5 7  --  3 4  --     GLC 65*  --   --  80 80 117*  --  102* 104*  --    BUN 26  --   --  28 25 22  --  15 16  --    CR 0.91  --   --  0.93 0.82 0.91   < > 0.87 0.87 0.70   GFRESTIMATED >90  --   --  >90 >90 >90   < > >90 >90 >90   GFRESTBLACK >90  --   --  >90 >90 >90   < > >90 >90 >90   JESSICA 9.3  --   --  9.6 9.8 9.9  --  9.8 9.5  --    MAG  --   --   --   --  2.3  --   --  2.0  --   --    PHOS  --   --   --   --  4.0  --   --  3.7  --   --    PROTTOTAL  --   --   --   --   --   --   --   --  6.5*  --    ALBUMIN  --   --   --   --   --   --   --   --  2.9* 3.0*   BILITOTAL  --   --   --   --   --   --   --   --  0.3  --    ALKPHOS  --   --   --   --   --   --   --   --  56  --    AST  --   --   --   --   --   --   --   --  18  --    ALT  --   --   --   --   --   --   --   --  19  --     < > = values in this interval not displayed.     CBC  Recent Labs   Lab 03/03/21  0435 03/02/21  0604 03/01/21  0534 02/28/21  1419   WBC 6.6 7.1 7.3 9.8   RBC 3.58* 3.56* 3.74* 3.89*   HGB 11.2* 10.8* 11.3* 11.9*   HCT 33.7* 34.0* 35.9* 36.9*   MCV 94 96 96 95   MCH 31.3 30.3 30.2 30.6   MCHC 33.2 31.8 31.5 32.2   RDW 14.1 14.6 14.7 14.6   * 104* 95* 104*     INRNo lab results found in last 7 days.  Arterial Blood Gas  Recent Labs   Lab 03/01/21  1404 02/28/21  0635 02/26/21  1505 02/25/21  0755   PH  --  7.56*  --  7.52*   PCO2  --  28*  --  33*   PO2  --  166*  --  174*   HCO3  --  25  --  27   O2PER Ta  30% 30 40       All cultures:  Recent Labs   Lab 02/25/21  1435 02/25/21  0028 02/25/21  0023   CULT Heavy growth  Normal stacey    Light growth  Enterobacter cloacae complex  *  Light growth  Klebsiella pneumoniae  * No growth No growth     No results found for this or any previous visit (from the past 24 hour(s)).              Billing: This patient is critically ill: No. Spent 35 min on direct patient care today, with >50% in counseling/coordination of care.     Prasanna Rodriguez MD    Department of Medicine,  Division of Pulmonary, Allergy, Critical Care, and Sleep Medicine

## 2021-03-03 NOTE — PROGRESS NOTES
Patient vital signs stable, blood glucose stable this shift. Patient follows commands, alert, answers easy questions with one word, returning to baseline. Patient more calm this shift, but remains restless. sitter at bedside, restraints discontinued. Patient has red, blanchable rash on right shoulder - back, bilateral lower legs. Elbows reddened from patient  abasing mattress. Dr Valencia and hospialist aware and seen at bedside. Patient transferred to station 55, report called to RN. All belongings sent with patient. Patient parents called and updated per telephone.

## 2021-03-03 NOTE — PROGRESS NOTES
SPIRITUAL HEALTH SERVICES Progress Note  [facility] ICU    Attempted to contact family via telephone who did not answer. Left message indicating the availability of  services.    Attempt to follow-up with  family in 1 - 2 days.      Drake Thorpe  Chaplain Resident

## 2021-03-03 NOTE — CONSULTS
"[facility]    Neurosurgery Consultation     Date of Admission:  2/24/2021  Date of Consult (When I saw the patient): 03/03/21    Assessment & Plan   Duane D Backes is a 40 year old male who was admitted on 2/24/2021. I was asked to see the patient for \"epidermoid cyst- increase in size and some mass effect on medulla\".     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     Plan:   - Outpatient follow up with Dr. Lui in 4 weeks with new MRI brain with and without contrast  - Appreciate assistance from other services       I have discussed the following assessment and plan with Dr. Lui who is in agreement with initial plan and will follow up with further consultation recommendations.    Tiffanie Frazier, STEVEN  [facility] Neurosurgery  [address] [address]  Tel [phone]  Pager [phone]    Code Status    Full Code    Reason for Consult   Reason for consult: epidermoid cyst- increase in size and some mass effect on medulla    Primary Care Physician   Rajat Parkinson    History is obtained from the electronic health record    History of Present Illness   Duane D Backes is a 40 year old male with history of moderate MR, autism, seizures, and epidermoid cyst. Presented to ED on 2/24 for a witnessed 3 minute seizure, required intubation for airway protection and was admitted to ICU. MRI brain from 2/25 shows increased size of the epidermoid cyst along the medulla, with increased inferior extension and mass effect on the medulla, comparison from 2014. He is also being treated for aspiration pneumonia, currently on Levaquin. SLP following for dysphagia, patient typically on soft food and thin " liquids at group home. Per Neurology note on 3/1, EEG was negative for seizure activity over 3 days of recording. Patient is currently on Depakote, Vimpat, and Topamax. Patient was extubated on 3/1. On exam, patient is alert, appears comfortable, able to state his name. He follows simple commands and moves all extremities well. Per nursing/family, patient is close to his baseline now.     MR BRAIN W/O AND W CONTRAST 2021 4:36 AM  IMPRESSION:  1.  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  2.  In addition, there is a new enhancing, partially calcified nodular component along the anterior and right lateral margins of the medulla. Given that epidermoid cysts can have malignant degeneration, short interval contrast-enhanced MRI follow-up is recommended in 4-6 weeks.  3.  No acute hemorrhage or infarct.    Past Medical History   I have reviewed this patient's medical history and updated it with pertinent information if needed.   Past Medical History:   Diagnosis Date     Autistic disorder, current or active state      Dry skin      Moderate intellectual disabilities      Obsessive-compulsive disorders      Overweight (BMI 25.0-29.9) 2013     Seizure disorder (H)     Generalized tonic clonic     Unspecified constipation        Past Surgical History   I have reviewed this patient's surgical history and updated it with pertinent information if needed.  Past Surgical History:   Procedure Laterality Date     CATARACT IOL, RT/LT Left 2018       Prior to Admission Medications   Prior to Admission Medications   Prescriptions Last Dose Informant Patient Reported? Taking?   FISH OIL 1000 MG OR CAPS 2021 at Unknown time  No Yes   Si PO BID   Patient taking differently: 1 capsule 2 times per day   MILK OF MAGNESIA 400 MG/5ML suspension 2021 at Unknown time  Yes Yes   Si mLs every evening    Starch,  Thickening, POWD   No No   Sig: Combined with beverages, per instructions from Speech Therapy.   VITAMIN D3 1000 units tablet 2/24/2021 at Unknown time  Yes Yes   Sig: 3,000 Units daily   ammonium lactate (LAC-HYDRIN) 12 % external lotion 2/24/2021 at Unknown time  Yes Yes   Sig: Apply topically daily To feet and heels   divalproex sodium extended-release (DEPAKOTE ER) 500 MG 24 hr tablet 2/24/2021 at Unknown time  No Yes   Sig: Decreased depakote as advised (will fax med schedule to [facility]) to 1000 mg twice a day   docusate sodium (COLACE) 100 MG capsule 2/24/2021 at Unknown time  Yes Yes   Sig: Take 1 capsule by mouth 2 times daily.   lactulose (CHRONULAC) 10 GM/15ML solution 2/24/2021 at Unknown time  Yes Yes   Sig: Take 30 mLs by mouth every evening & additional 30mL 2 times per day as needed   lactulose (CHRONULAC) 10 GM/15ML solution prn  Yes Yes   Sig: Take 30 mLs by mouth 2 times daily as needed   levothyroxine (SYNTHROID/LEVOTHROID) 150 MCG tablet 2/24/2021 at Unknown time  No Yes   Sig: TAKE 1 TABLET BY MOUTH ONCE DAILY  FOR THYROID   *1 TOTAL FILL*   lithium ER (LITHOBID) 300 MG CR tablet 2/24/2021 at Unknown time  Yes Yes   Sig: Take 600 mg by mouth At Bedtime    order for DME   No No   Sig: Flat sole shoe   Patient not taking: Reported on 10/30/2020   polyethylene glycol (MIRALAX) powder 2/24/2021 at Unknown time  No Yes   Sig: Take 17 g by mouth daily.   pseudoePHEDrine (SUDAFED) 30 MG tablet prn  Yes Yes   Sig: Take 30 mg by mouth every evening as needed for congestion   risperiDONE (RISPERDAL) 2 MG tablet 2/24/2021 at Unknown time  Yes Yes   Sig: Take 2 mg by mouth 2 times daily 1 tab every morning & 1 tab every bedtime   sertraline (ZOLOFT) 100 MG tablet 2/24/2021 at Unknown time  Yes Yes   Sig: Take 1.5 tablets by mouth every morning.   topiramate (TOPAMAX) 200 MG tablet 2/24/2021 at Unknown time  Yes Yes   Sig: Take 200 mg by mouth 2 times daily      Facility-Administered Medications: None  "    Allergies   Allergies   Allergen Reactions     Penicillin [Penicillins] Other (See Comments)     Prozac [Fluoxetine Hcl]      Reglan [Metoclopramide Hcl]      Ritalin [Methylphenidate Derivatives] Other (See Comments)     Ritalin     Trazodone        Social History   I have reviewed this patient's social history and updated it with pertinent information if needed. Duane D Backes  reports that he has never smoked. He has never used smokeless tobacco. He reports that he does not drink alcohol or use drugs.    Family History   I have reviewed this patient's family history and updated it with pertinent information if needed.   Family History   Problem Relation Age of Onset     Unknown/Adopted No family hx of        Review of Systems   10 point ROS negative other than noted in HPI    Physical Exam   Temp: 97.1  F (36.2  C) Temp src: Axillary BP: 127/69 Pulse: 69   Resp: 27 SpO2: 96 % O2 Device: None (Room air) Oxygen Delivery: 2 LPM  Vital Signs with Ranges  Temp:  [97.1  F (36.2  C)-98.1  F (36.7  C)] 97.1  F (36.2  C)  Pulse:  [50-89] 69  Resp:  [11-29] 27  BP: (110-157)/() 127/69  SpO2:  [92 %-99 %] 96 %  175 lbs 14.83 oz     , Blood pressure 127/69, pulse 69, temperature 97.1  F (36.2  C), temperature source Axillary, resp. rate 27, height 6' 2\" (1.88 m), weight 175 lb 14.8 oz (79.8 kg), SpO2 96 %.  175 lbs 14.83 oz  HEENT:  Normocephalic, atraumatic.  PERRLA.   Heart:  No peripheral edema  Lungs:  No SOB  Abdomen:  Soft, non-tender, non-distended.  Normal bowel sounds.  Skin:  Warm and dry, good capillary refill.  Extremities:  Good radial and dorsalis pedis pulses bilaterally, no edema, cyanosis or clubbing.    NEUROLOGICAL EXAMINATION:   Alert, able to state name, appears comfortable in bed  PERRLA   Able to follow some simple commands  Moves all extremities well     Data     CBC RESULTS:   Recent Labs   Lab Test 03/03/21  0435   WBC 6.6   RBC 3.58*   HGB 11.2*   HCT 33.7*   MCV 94   MCH 31.3   MCHC 33.2 "   RDW 14.1   *     Basic Metabolic Panel:  Lab Results   Component Value Date     03/03/2021      Lab Results   Component Value Date    POTASSIUM 3.2 03/03/2021     Lab Results   Component Value Date    CHLORIDE 114 03/03/2021     Lab Results   Component Value Date    JESSICA 9.3 03/03/2021     Lab Results   Component Value Date    CO2 28 03/03/2021     Lab Results   Component Value Date    BUN 26 03/03/2021     Lab Results   Component Value Date    CR 0.91 03/03/2021     Lab Results   Component Value Date    GLC 65 03/03/2021     INR:  No results found for: INR

## 2021-03-03 NOTE — PLAN OF CARE
CNS: Pt restless, pulling at oxymask and lines, and attempting to get out of bed. Pt alert to self, garbled speech, whispers, follows commands, moves all extremities with purpose.      CVS: SB/SR     Resp: 5-6L NC,  LS clear, moderate cough, pt swallows secretions.      GI: NPO, pt may need tube placement if fails repeat swallow study, pt coughed when taking pills with honey thickened liquids. Pt's BG was 68 x2 needing 50% dextrose 25mL x2 with adequate increase in BG.      : Lg inc overnight, weighing briefs     Skin: pale, ecchymotic on sternum     Access: PIVx2    K+ 3.2, replacement initiated

## 2021-03-04 ENCOUNTER — TELEPHONE (OUTPATIENT)
Dept: NEUROSURGERY | Facility: CLINIC | Age: 41
End: 2021-03-04

## 2021-03-04 ENCOUNTER — APPOINTMENT (OUTPATIENT)
Dept: SPEECH THERAPY | Facility: CLINIC | Age: 41
DRG: 100 | End: 2021-03-04
Attending: INTERNAL MEDICINE
Payer: MEDICARE

## 2021-03-04 DIAGNOSIS — G40.909 SEIZURE DISORDER (H): Primary | ICD-10-CM

## 2021-03-04 LAB
ANION GAP SERPL CALCULATED.3IONS-SCNC: 7 MMOL/L (ref 3–14)
BUN SERPL-MCNC: 22 MG/DL (ref 7–30)
CALCIUM SERPL-MCNC: 9.6 MG/DL (ref 8.5–10.1)
CHLORIDE SERPL-SCNC: 109 MMOL/L (ref 94–109)
CO2 SERPL-SCNC: 26 MMOL/L (ref 20–32)
CREAT SERPL-MCNC: 0.95 MG/DL (ref 0.66–1.25)
GFR SERPL CREATININE-BSD FRML MDRD: >90 ML/MIN/{1.73_M2}
GLUCOSE SERPL-MCNC: 79 MG/DL (ref 70–99)
POTASSIUM SERPL-SCNC: 3 MMOL/L (ref 3.4–5.3)
POTASSIUM SERPL-SCNC: 3.4 MMOL/L (ref 3.4–5.3)
POTASSIUM SERPL-SCNC: 3.8 MMOL/L (ref 3.4–5.3)
PROCALCITONIN SERPL-MCNC: 0.14 NG/ML
SODIUM SERPL-SCNC: 142 MMOL/L (ref 133–144)

## 2021-03-04 PROCEDURE — 84132 ASSAY OF SERUM POTASSIUM: CPT | Performed by: INTERNAL MEDICINE

## 2021-03-04 PROCEDURE — 84145 PROCALCITONIN (PCT): CPT | Performed by: INTERNAL MEDICINE

## 2021-03-04 PROCEDURE — 80048 BASIC METABOLIC PNL TOTAL CA: CPT | Performed by: INTERNAL MEDICINE

## 2021-03-04 PROCEDURE — 36415 COLL VENOUS BLD VENIPUNCTURE: CPT | Performed by: INTERNAL MEDICINE

## 2021-03-04 PROCEDURE — 250N000013 HC RX MED GY IP 250 OP 250 PS 637: Performed by: PHYSICIAN ASSISTANT

## 2021-03-04 PROCEDURE — 250N000013 HC RX MED GY IP 250 OP 250 PS 637: Performed by: INTERNAL MEDICINE

## 2021-03-04 PROCEDURE — 99232 SBSQ HOSP IP/OBS MODERATE 35: CPT | Performed by: INTERNAL MEDICINE

## 2021-03-04 PROCEDURE — 258N000003 HC RX IP 258 OP 636: Performed by: INTERNAL MEDICINE

## 2021-03-04 PROCEDURE — 92526 ORAL FUNCTION THERAPY: CPT | Mod: GN | Performed by: SPEECH-LANGUAGE PATHOLOGIST

## 2021-03-04 PROCEDURE — 250N000013 HC RX MED GY IP 250 OP 250 PS 637: Performed by: PSYCHIATRY & NEUROLOGY

## 2021-03-04 PROCEDURE — 99223 1ST HOSP IP/OBS HIGH 75: CPT | Performed by: PSYCHIATRY & NEUROLOGY

## 2021-03-04 PROCEDURE — 250N000011 HC RX IP 250 OP 636: Performed by: INTERNAL MEDICINE

## 2021-03-04 PROCEDURE — 120N000001 HC R&B MED SURG/OB

## 2021-03-04 PROCEDURE — 99207 PR CONSULT E&M CHANGED TO INITIAL LEVEL: CPT | Performed by: PSYCHIATRY & NEUROLOGY

## 2021-03-04 RX ORDER — POTASSIUM CHLORIDE 1.5 G/1.58G
20 POWDER, FOR SOLUTION ORAL ONCE
Status: COMPLETED | OUTPATIENT
Start: 2021-03-04 | End: 2021-03-04

## 2021-03-04 RX ORDER — POTASSIUM CHLORIDE 1500 MG/1
20 TABLET, EXTENDED RELEASE ORAL ONCE
Status: DISCONTINUED | OUTPATIENT
Start: 2021-03-04 | End: 2021-03-04 | Stop reason: CLARIF

## 2021-03-04 RX ORDER — LITHIUM CARBONATE 300 MG/1
600 TABLET, FILM COATED, EXTENDED RELEASE ORAL AT BEDTIME
Status: DISCONTINUED | OUTPATIENT
Start: 2021-03-04 | End: 2021-03-09 | Stop reason: HOSPADM

## 2021-03-04 RX ORDER — RISPERIDONE 2 MG/1
2 TABLET ORAL 2 TIMES DAILY
Status: DISCONTINUED | OUTPATIENT
Start: 2021-03-04 | End: 2021-03-09 | Stop reason: HOSPADM

## 2021-03-04 RX ORDER — LEVOFLOXACIN 750 MG/1
750 TABLET, FILM COATED ORAL DAILY
Status: DISCONTINUED | OUTPATIENT
Start: 2021-03-05 | End: 2021-03-05

## 2021-03-04 RX ORDER — POTASSIUM CHLORIDE 1.5 G/1.58G
40 POWDER, FOR SOLUTION ORAL ONCE
Status: COMPLETED | OUTPATIENT
Start: 2021-03-04 | End: 2021-03-04

## 2021-03-04 RX ORDER — POTASSIUM CHLORIDE 1500 MG/1
40 TABLET, EXTENDED RELEASE ORAL ONCE
Status: DISCONTINUED | OUTPATIENT
Start: 2021-03-04 | End: 2021-03-04 | Stop reason: CLARIF

## 2021-03-04 RX ADMIN — RISPERIDONE 2 MG: 2 TABLET ORAL at 20:02

## 2021-03-04 RX ADMIN — LACOSAMIDE 100 MG: 50 TABLET, FILM COATED ORAL at 08:27

## 2021-03-04 RX ADMIN — OLANZAPINE 5 MG: 5 TABLET, ORALLY DISINTEGRATING ORAL at 16:32

## 2021-03-04 RX ADMIN — POTASSIUM CHLORIDE 20 MEQ: 1.5 POWDER, FOR SOLUTION ORAL at 11:51

## 2021-03-04 RX ADMIN — DEXTROSE MONOHYDRATE: 50 INJECTION, SOLUTION INTRAVENOUS at 02:24

## 2021-03-04 RX ADMIN — LEVOTHYROXINE SODIUM 150 MCG: 75 TABLET ORAL at 07:02

## 2021-03-04 RX ADMIN — TOPIRAMATE 200 MG: 200 TABLET ORAL at 20:03

## 2021-03-04 RX ADMIN — DIVALPROEX SODIUM 125 MG: 125 CAPSULE, COATED PELLETS ORAL at 22:00

## 2021-03-04 RX ADMIN — DIVALPROEX SODIUM 125 MG: 125 CAPSULE, COATED PELLETS ORAL at 05:40

## 2021-03-04 RX ADMIN — OLANZAPINE 5 MG: 5 TABLET, ORALLY DISINTEGRATING ORAL at 08:28

## 2021-03-04 RX ADMIN — ENOXAPARIN SODIUM 40 MG: 40 INJECTION SUBCUTANEOUS at 18:17

## 2021-03-04 RX ADMIN — LEVOFLOXACIN 750 MG: 5 INJECTION, SOLUTION INTRAVENOUS at 11:52

## 2021-03-04 RX ADMIN — OLANZAPINE 5 MG: 5 TABLET, ORALLY DISINTEGRATING ORAL at 22:00

## 2021-03-04 RX ADMIN — LITHIUM CARBONATE 600 MG: 300 TABLET, EXTENDED RELEASE ORAL at 22:00

## 2021-03-04 RX ADMIN — POTASSIUM CHLORIDE 40 MEQ: 1.5 POWDER, FOR SOLUTION ORAL at 09:24

## 2021-03-04 RX ADMIN — LACOSAMIDE 100 MG: 50 TABLET, FILM COATED ORAL at 20:03

## 2021-03-04 RX ADMIN — DIVALPROEX SODIUM 125 MG: 125 CAPSULE, COATED PELLETS ORAL at 15:11

## 2021-03-04 RX ADMIN — TOPIRAMATE 200 MG: 200 TABLET ORAL at 08:27

## 2021-03-04 ASSESSMENT — ACTIVITIES OF DAILY LIVING (ADL)
ADLS_ACUITY_SCORE: 22
ADLS_ACUITY_SCORE: 21
ADLS_ACUITY_SCORE: 21
ADLS_ACUITY_SCORE: 23
ADLS_ACUITY_SCORE: 23
ADLS_ACUITY_SCORE: 21

## 2021-03-04 NOTE — PROGRESS NOTES
"[facility]    Medicine Progress Note - Hospitalist Service       Date of Admission:  2/24/2021  Assessment & Plan       Duane D Backes is a 40 year old male with history of autism, tonic-clonic seizures, moderate MR, benign epidermoid tumor, and hypothyroidism who presented to the ED 2/24/2021 for a 3 minute witnessed seizure. He had another seizure when he arrived in the ED and was given a total of 4 mg ativan. He became somnolent and had increased secretions so he was intubated in the ED and transferred to the ICU.   - PTA on Depakote ER 1000mg BID and Topamax 200 mg BID; he follows with Dr Washington from [facility]  - He was managed by ICU and NCC; he was successfully extubated on 03/01 and Hospitalist service was called regarding transfer of care.    1. Breakthrough seizures  2. Status epilepticus  - h/o tonic-clonic seizures, follows with Dr Washington from [facility]  - PTA on Depakote ER 1000mg BID and Topamax 200 mg BID;  - had another seizure in ER and was intubated for airway protection  - started on propofol infusion and versed drip and admitted to ICU  - CT head 02/25- No acute intracranial process; No change in the large epidermoid tumor involving the right middle cranial fossa, right ambient cistern, suprasellar region, with extension along the right aspect of the midbrain and lilli and into the cervical medullary junction region with  associated mass effect   - MRI brain- 02/25-  Increased size of the epidermoid cyst along the medulla, with increased inferior extension and mass effect on the medulla, as detailed above. In addition, there is a new enhancing, partially calcified nodular component along the anterior and right lateral margins of the medulla. Given that epidermoid cysts can have malignant degeneration, short interval contrast-enhanced MRI follow-up is   recommended in 4-6 weeks  - as per NCC- \"doubt that this is the cause of his seizure or his prolonged encephalopathy. This cyst has " "probably been slowly growing over the years not suddenly\"  - PTA medications lowering the seizure threshold (lithium, Risperdal, Zoloft, and Sudafed)- were held on admission   - initially started on Keppra, then started on Vimpat  - VEEG- moderate to severe diffuse encephalopathy.  Epileptiform discharges or seizures were not seen  - he was successfully extubated on 03/01  - doing fine, seizure-free, mentation seems at baseline  - currently on Vimpat 100mg iv BID, Topamax 200 mg BID and Depacon 750 mg iv q8h;  switched iv meds to po route as ratio is 1:1  - Neurology signed off  - follow up with primary Neurologist  If remain stable and no more seizure able to discharge in a day or so.     3. Acute hypoxic respiratory failure- resolved  - as above- intubated in ER for airways protection  - extubated on 03/01  - doing well now, sat well on RA    4. Aspiration PNA  - sputum culture- 02/25- positive for Klebsiella and Enterobacter clocae complex  - started on Levaquin on 2/27; day 6/7  - afebrile, switch Levaquin to oral as well, last dose tomorrow.     5. Acute encephalopathy-  improved  6. Autism  7. Moderate MR  - as above- holding lithium and other antipsychotic meds that could lower seizure threshold  - would limit sedating meds in general unless needed for patient safety  - will place a Psych consult regarding if any of his PTA meds should be resumed  - sitter at bedside  - SW consult    8. Epidermoid tumor  - MRI brain shows some increase in size and some mass effect- as above  - NS consult    9. Hypothyroidism  - TSH 0.27, fT4 1.05  - continue PTA Synthroid    10. Hypernatremia  - Na 149--145--147  - continue D5W at 75cc/h  - BMP on am    11. Dysphagia  - has a known hx of silent aspiration with all liquids; however, pt was on soft food and thin liquids at the group home   - SLP consulted- recommended- NPO except for honey thick liquids by tsp with 1:1 assist/feeding by spoon only with precautions  Speech to " follow and advance diet as tolerated.      Diet: Full Liquid Diet Honey Thickened Liquids (pre-thickened or use instant food thickener) (By spoon)    DVT Prophylaxis: Enoxaparin (Lovenox) SQ  Colin Catheter: not present  Code Status: Full Code           Disposition Plan   Expected discharge: Tomorrow, recommended to prior living arrangement once Psych ELLE munson consult.  Entered: Rich Richards MD 03/04/2021, 1:12 PM       The patient's care was discussed with the Bedside Nurse and ICU Team.    Rich Richards MD  Hospitalist Service  [facility]  Contact information available via [facility] Paging/Directory    ______________________________________________________________________    Interval History   Patient at bedside, awake and alert, rip off his IV line this morning. Offer no complaints, looks comfortable. No more seizure.    Data reviewed today: I reviewed all medications, new labs and imaging results over the last 24 hours. I personally reviewed the all imaging since admission- image(s) showing as above.    Physical Exam   Vital Signs: Temp: 98  F (36.7  C) Temp src: Oral BP: 125/81 Pulse: 64   Resp: 18 SpO2: 100 % O2 Device: None (Room air)    Weight: 175 lbs 14.83 oz     GEN: no acute distress, sitting up in bed.   HEENT: head ncat, sclera anicteric, trachea midline   PULM: unlabored breathing, CTAB, on room air.  CV/COR: RRR, normal S1 and S2. No gallop, rub, nor murmur  ABD: soft, non-tender, non-distended; BS present  MSK/EXT:  No LE edema. WWP.  NEURO: Alert,  follows some commands;  Appears at baseline.       Data   Recent Labs   Lab 03/04/21  0659 03/04/21  0058 03/03/21  1314 03/03/21  0435 03/02/21  1424 03/02/21  0604 03/02/21  0604 03/01/21  0534 02/26/21  1511 02/26/21  1511   WBC  --   --   --  6.6  --   --  7.1 7.3   < > 10.8   HGB  --   --   --  11.2*  --   --  10.8* 11.3*   < > 12.6*   MCV  --   --   --  94  --   --  96 96   < > 95   PLT  --   --   --  138*  --   --  104* 95*    < > 148*     --   --  147* 145*  --  149* 146*   < > 141   POTASSIUM 3.0* 3.4 3.2* 3.2* 3.8   < > 3.3* 3.6   < > 4.3   CHLORIDE 109  --   --  114*  --   --  115* 114*   < > 110*   CO2 26  --   --  28  --   --  28 27   < > 27   BUN 22  --   --  26  --   --  28 25   < > 16   CR 0.95  --   --  0.91  --   --  0.93 0.82   < > 0.87   ANIONGAP 7  --   --  5  --   --  6 5   < > 4   JESSICA 9.6  --   --  9.3  --   --  9.6 9.8   < > 9.5   GLC 79  --   --  65*  --   --  80 80   < > 104*   ALBUMIN  --   --   --   --   --   --   --   --   --  2.9*   PROTTOTAL  --   --   --   --   --   --   --   --   --  6.5*   BILITOTAL  --   --   --   --   --   --   --   --   --  0.3   ALKPHOS  --   --   --   --   --   --   --   --   --  56   ALT  --   --   --   --   --   --   --   --   --  19   AST  --   --   --   --   --   --   --   --   --  18    < > = values in this interval not displayed.     No results found for this or any previous visit (from the past 24 hour(s)).  Medications       divalproex sodium delayed-release  125 mg Oral Q8H LUIS MIGUEL     enoxaparin ANTICOAGULANT  40 mg Subcutaneous Q24H     lacosamide  100 mg Oral BID     levofloxacin  750 mg Intravenous Q24H     levothyroxine  150 mcg Oral QAM AC     topiramate  200 mg Oral BID

## 2021-03-04 NOTE — TELEPHONE ENCOUNTER
LMTC need to schedule a 3-4 week follow up from the 2-25 - MRI prior to visit as well.  Schedule with Dr. Lui.

## 2021-03-04 NOTE — PLAN OF CARE
SLP: Patient seen for swallow treatment with nurse present for short period and sitter. Patient was given trials of honey thick liquids via the spoon and was able to tolerate without vocal changes or coughing over 4 oz. He consumed 4 oz of pudding with decreased laryngeal elevation and tolerate most before his vocal quality was slightly wet. Suspect pharyngeal residue and silent aspiration can not be ruled out. He did not tolerate trials of nectar thick. He continues to be a high risk for aspiration given history of silent aspiration with liquids and poor esophageal clearing. Now post intubation safer option for today would be to start on a full liquids honey thick and then advance to DDL 1 if tolerating.     MD/family discussion on overall POC wishes to initiate a po diet/modified diet (DDL1 and honey thick by spoon) on 3/4 with 1:1 assist despite aspiration risk.  Discussed status with RN who states given pt's behavior he has a high likelihood of pulling any (NG or PEG) alternate nutrtion/hydration placement.   SLP to follow.

## 2021-03-04 NOTE — PLAN OF CARE
PT: Attempted to see for PT evaluation. Pt with sitter present with bilateral mitts on. Unable to monica shoes or socks as pt swatting them away forcefully when attempted a few times. Unable to see pt at this time as pt was getting more agitated when prepping for safe mobility for assessment.  Discussed with RN.

## 2021-03-04 NOTE — PLAN OF CARE
Pt is alert to self only. Restless and tries to exit the bed. NPO except honey thick liquids. IV IF. Incontinent of bladder. Sitter at bedside. Will continue to monitor.

## 2021-03-04 NOTE — PROGRESS NOTES
MD Notification    Notified Person: MD    Notified Person Name: JEVONJERIKAREN MD    Notification Date/Time:3/3/21, 10 pm    Notification Interaction: [employer] Smart Web    Purpose of Notification: Need for seizure precautions    Orders Received:    Comments:

## 2021-03-04 NOTE — PLAN OF CARE
ICU tx, arrived to floor at 1630. Alert to self only, few words spoken. Agitated and restless. Pt pulled out IV, working on a new one. NPO except honey thick liquids. No prn medications, paged MD for IM or oral solution prn. Incontinent X1. Sitter at bedside. Continue to monitor.    Addendum: New IV placed. Oral zyprexa given.

## 2021-03-04 NOTE — CONSULTS
Psychiatry consult:  The patient was seen; full note to follow.    Recommendations:  May resume Lithium, Risperdal, Zoloft. These are not contraindicated in seizure disorders and will typically lower the seizure threshold if a medication overdose has occurred. Dosing is appropriate and safe to resume.     Psychiatry will sign off.   Please re consult as needed.   Guillermo Moran MD   Text Page

## 2021-03-05 ENCOUNTER — APPOINTMENT (OUTPATIENT)
Dept: SPEECH THERAPY | Facility: CLINIC | Age: 41
DRG: 100 | End: 2021-03-05
Attending: INTERNAL MEDICINE
Payer: MEDICARE

## 2021-03-05 ENCOUNTER — APPOINTMENT (OUTPATIENT)
Dept: PHYSICAL THERAPY | Facility: CLINIC | Age: 41
DRG: 100 | End: 2021-03-05
Attending: INTERNAL MEDICINE
Payer: MEDICARE

## 2021-03-05 LAB
ALBUMIN SERPL-MCNC: 2.8 G/DL (ref 3.4–5)
ANION GAP SERPL CALCULATED.3IONS-SCNC: 6 MMOL/L (ref 3–14)
BASOPHILS # BLD AUTO: 0 10E9/L (ref 0–0.2)
BASOPHILS NFR BLD AUTO: 0.2 %
BUN SERPL-MCNC: 17 MG/DL (ref 7–30)
CALCIUM SERPL-MCNC: 9.6 MG/DL (ref 8.5–10.1)
CHLORIDE SERPL-SCNC: 111 MMOL/L (ref 94–109)
CO2 SERPL-SCNC: 28 MMOL/L (ref 20–32)
CREAT SERPL-MCNC: 0.92 MG/DL (ref 0.66–1.25)
DIFFERENTIAL METHOD BLD: ABNORMAL
EOSINOPHIL # BLD AUTO: 0.2 10E9/L (ref 0–0.7)
EOSINOPHIL NFR BLD AUTO: 3 %
ERYTHROCYTE [DISTWIDTH] IN BLOOD BY AUTOMATED COUNT: 14.2 % (ref 10–15)
GFR SERPL CREATININE-BSD FRML MDRD: >90 ML/MIN/{1.73_M2}
GLUCOSE SERPL-MCNC: 100 MG/DL (ref 70–99)
HCT VFR BLD AUTO: 39.6 % (ref 40–53)
HGB BLD-MCNC: 12.8 G/DL (ref 13.3–17.7)
IMM GRANULOCYTES # BLD: 0.1 10E9/L (ref 0–0.4)
IMM GRANULOCYTES NFR BLD: 1.2 %
LABORATORY COMMENT REPORT: NORMAL
LYMPHOCYTES # BLD AUTO: 1.7 10E9/L (ref 0.8–5.3)
LYMPHOCYTES NFR BLD AUTO: 28.2 %
MCH RBC QN AUTO: 30.8 PG (ref 26.5–33)
MCHC RBC AUTO-ENTMCNC: 32.3 G/DL (ref 31.5–36.5)
MCV RBC AUTO: 95 FL (ref 78–100)
MONOCYTES # BLD AUTO: 0.7 10E9/L (ref 0–1.3)
MONOCYTES NFR BLD AUTO: 12.3 %
NEUTROPHILS # BLD AUTO: 3.3 10E9/L (ref 1.6–8.3)
NEUTROPHILS NFR BLD AUTO: 55.1 %
NRBC # BLD AUTO: 0 10*3/UL
NRBC BLD AUTO-RTO: 0 /100
PHOSPHATE SERPL-MCNC: 4.5 MG/DL (ref 2.5–4.5)
PLATELET # BLD AUTO: 185 10E9/L (ref 150–450)
POTASSIUM SERPL-SCNC: 3.1 MMOL/L (ref 3.4–5.3)
POTASSIUM SERPL-SCNC: 3.8 MMOL/L (ref 3.4–5.3)
RBC # BLD AUTO: 4.15 10E12/L (ref 4.4–5.9)
SARS-COV-2 RNA RESP QL NAA+PROBE: NEGATIVE
SODIUM SERPL-SCNC: 145 MMOL/L (ref 133–144)
SODIUM SERPL-SCNC: 146 MMOL/L (ref 133–144)
SPECIMEN SOURCE: NORMAL
WBC # BLD AUTO: 6 10E9/L (ref 4–11)

## 2021-03-05 PROCEDURE — 97161 PT EVAL LOW COMPLEX 20 MIN: CPT | Mod: GP | Performed by: PHYSICAL THERAPIST

## 2021-03-05 PROCEDURE — 87635 SARS-COV-2 COVID-19 AMP PRB: CPT | Performed by: INTERNAL MEDICINE

## 2021-03-05 PROCEDURE — 97530 THERAPEUTIC ACTIVITIES: CPT | Mod: GP | Performed by: PHYSICAL THERAPIST

## 2021-03-05 PROCEDURE — 250N000013 HC RX MED GY IP 250 OP 250 PS 637: Performed by: INTERNAL MEDICINE

## 2021-03-05 PROCEDURE — 250N000011 HC RX IP 250 OP 636: Performed by: INTERNAL MEDICINE

## 2021-03-05 PROCEDURE — 250N000013 HC RX MED GY IP 250 OP 250 PS 637: Performed by: PSYCHIATRY & NEUROLOGY

## 2021-03-05 PROCEDURE — 92526 ORAL FUNCTION THERAPY: CPT | Mod: GN | Performed by: SPEECH-LANGUAGE PATHOLOGIST

## 2021-03-05 PROCEDURE — 85025 COMPLETE CBC W/AUTO DIFF WBC: CPT | Performed by: INTERNAL MEDICINE

## 2021-03-05 PROCEDURE — 84132 ASSAY OF SERUM POTASSIUM: CPT | Performed by: INTERNAL MEDICINE

## 2021-03-05 PROCEDURE — 84295 ASSAY OF SERUM SODIUM: CPT | Performed by: INTERNAL MEDICINE

## 2021-03-05 PROCEDURE — 36415 COLL VENOUS BLD VENIPUNCTURE: CPT | Performed by: INTERNAL MEDICINE

## 2021-03-05 PROCEDURE — 99233 SBSQ HOSP IP/OBS HIGH 50: CPT | Performed by: INTERNAL MEDICINE

## 2021-03-05 PROCEDURE — 120N000001 HC R&B MED SURG/OB

## 2021-03-05 PROCEDURE — 80069 RENAL FUNCTION PANEL: CPT | Performed by: INTERNAL MEDICINE

## 2021-03-05 RX ORDER — POTASSIUM CHLORIDE 1.5 G/1.58G
40 POWDER, FOR SOLUTION ORAL ONCE
Status: COMPLETED | OUTPATIENT
Start: 2021-03-05 | End: 2021-03-05

## 2021-03-05 RX ORDER — DIVALPROEX SODIUM 125 MG/1
500 CAPSULE, COATED PELLETS ORAL EVERY 8 HOURS SCHEDULED
Status: DISCONTINUED | OUTPATIENT
Start: 2021-03-05 | End: 2021-03-09 | Stop reason: HOSPADM

## 2021-03-05 RX ADMIN — LACOSAMIDE 100 MG: 50 TABLET, FILM COATED ORAL at 08:49

## 2021-03-05 RX ADMIN — LITHIUM CARBONATE 600 MG: 300 TABLET, EXTENDED RELEASE ORAL at 21:16

## 2021-03-05 RX ADMIN — DIVALPROEX SODIUM 500 MG: 125 CAPSULE, COATED PELLETS ORAL at 21:16

## 2021-03-05 RX ADMIN — DIVALPROEX SODIUM 125 MG: 125 CAPSULE, COATED PELLETS ORAL at 14:40

## 2021-03-05 RX ADMIN — LEVOFLOXACIN 750 MG: 750 TABLET, FILM COATED ORAL at 08:49

## 2021-03-05 RX ADMIN — TOPIRAMATE 200 MG: 200 TABLET ORAL at 08:49

## 2021-03-05 RX ADMIN — TOPIRAMATE 200 MG: 200 TABLET ORAL at 20:47

## 2021-03-05 RX ADMIN — RISPERIDONE 2 MG: 2 TABLET ORAL at 08:49

## 2021-03-05 RX ADMIN — ENOXAPARIN SODIUM 40 MG: 40 INJECTION SUBCUTANEOUS at 18:03

## 2021-03-05 RX ADMIN — LACOSAMIDE 100 MG: 50 TABLET, FILM COATED ORAL at 20:47

## 2021-03-05 RX ADMIN — LEVOTHYROXINE SODIUM 150 MCG: 75 TABLET ORAL at 06:43

## 2021-03-05 RX ADMIN — RISPERIDONE 2 MG: 2 TABLET ORAL at 20:48

## 2021-03-05 RX ADMIN — SERTRALINE HYDROCHLORIDE 150 MG: 100 TABLET ORAL at 08:49

## 2021-03-05 RX ADMIN — POTASSIUM CHLORIDE 40 MEQ: 1.5 POWDER, FOR SOLUTION ORAL at 10:03

## 2021-03-05 RX ADMIN — DIVALPROEX SODIUM 125 MG: 125 CAPSULE, COATED PELLETS ORAL at 06:43

## 2021-03-05 ASSESSMENT — ACTIVITIES OF DAILY LIVING (ADL)
ADLS_ACUITY_SCORE: 22

## 2021-03-05 NOTE — PROGRESS NOTES
CLINICAL NUTRITION SERVICES - REASSESSMENT NOTE    Recommendations Ordered by Registered Dietitian (RD):   OK for magic cup, high protein jello (gelatein) w/ meals for calorie and protein density.    Malnutrition: (3/2)   % Weight Loss:  Weight loss does not meet criteria for malnutrition   % Intake:  Decreased intake does not meet criteria for malnutrition   Subcutaneous Fat Loss:  None observed  Muscle Loss:  None observed  Fluid Retention:  None noted     Malnutrition Diagnosis: Patient does not meet two of the above criteria necessary for diagnosing malnutrition at this time but is at risk pending further decline      EVALUATION OF PROGRESS TOWARD GOALS   Diet: Full Liquid Diet - Honey Thickened Liquids (3/4)     Nutrition Support: Started 2/26, goal reached 2/28, off 3/1 with extubation.     Intake/Tolerance:   - Diet just advanced yesterday morning per SLP. Per documentation: high aspiration risk, discussion w/ MD and family indicate wishes for diet advancement. Likely to pull at any type of enteral feeding equipment (NG, GT).   - This morning sitter had just ordered 4 puddings, some coke, cream of wheat and magic cup for breakfast. Will trial high protein geletain as well.   - Received 1986 kcal, 49 g pro in 2 meals yesterday with 100% intakes recorded.     - BM x3 yesterday.   - Wt is 79.8 kg today. This is down about 5.9 kg since admission. No evidence of fat or muscle wasting on exam today.     ASSESSED NUTRITION NEEDS:  Dosing Weight 85.7 kg   Estimated Energy Needs: 3292-7026 kcals (25-30 Kcal/Kg)  Justification: maintenance  Estimated Protein Needs:  grams protein (1-1.2 g pro/Kg)  Justification: preservation of lean body mass  Estimated Fluid Needs: 1 mL/kcal  Justification: maintenance or per provider pending fluid status    NEW FINDINGS:   - Discharge as soon as today    Previous Goals:   Diet tolerance >FL vs nutrition support within 48 hrs   Evaluation: Met on 3/4    Previous Nutrition  Diagnosis:   Inadequate oral intake related to recently extubated, awaiting SLP as evidenced by receiving 0% estimated needs with TF off  Evaluation: Improving with diet advancement     CURRENT NUTRITION DIAGNOSIS  Inadequate oral intake related to difficulty swallowing and slow diet adv post extubatoin as evidenced by pt is consuming 100% of meals on a FLD, HTL diet restriction x 1day.     INTERVENTIONS  Recommendations / Nutrition Prescription  Diet per SLP  OK for magic cup, high protein jello (gelatein) w/ meals for calorie and protein density.     Implementation  Collaboration and Referral of Nutrition care: discussed the above with sitter at bedside.     Goals  Intake of >75% nutritionally adequate meals TID.       MONITORING AND EVALUATION:  Progress towards goals will be monitored and evaluated per protocol and Practice Guidelines    Rosmery Barragan RD,   [facility] [place], [address], 57, [place]   Pager: [phone]  Weekend Pager: [phone]

## 2021-03-05 NOTE — PROGRESS NOTES
03/05/21 0815   Quick Adds   Type of Visit Initial PT Evaluation   Living Environment   People in home facility resident   Current Living Arrangements group home   Living Environment Comments Pt unable to provide subjective history.    Self-Care   Usual Activity Tolerance good   Current Activity Tolerance fair   Equipment Currently Used at Home none   Disability/Function   Fall history within last six months no  (Per chart, pt unable to report)   General Information   Onset of Illness/Injury or Date of Surgery 02/24/21   Referring Physician Prasanna Shine MD   Patient/Family Therapy Goals Statement (PT) None stated.    Pertinent History of Current Problem (include personal factors and/or comorbidities that impact the POC) 41 y/o male admitted after a seizure and AMS. PMH including autism, tonic clonic seizures, hypothyroidism.    Existing Precautions/Restrictions fall;seizures   General Observations Pt in supine upon arrival of therapist, CNA present bedside. B mitts donned.    Cognition   Orientation Status (Cognition) person   Affect/Mental Status (Cognition) confused   Follows Commands (Cognition) 25-49% accuracy   Cognitive Status Comments Noted impaired safety awareness.   Pain Assessment   Patient Currently in Pain No   Posture    Posture Comments Noted forward head and shoulder posture upon sitting EOB and standing.    Range of Motion (ROM)   ROM Comment B LEs WFL.    Strength   Strength Comments Not formally assessed, pt demonstrates at least 3/5 grossly in B LEs with mobility.    Bed Mobility   Comment (Bed Mobility) Supine-sit with Maggie for safety.   Transfers   Transfer Safety Comments Sit <> stand with FWW and Maggie of 2.    Gait/Stairs (Locomotion)   Comment (Gait/Stairs) Pt amb a couple steps forward with FWW and min-modA, noted L lateral trunk lean and unsteadiness.   Balance   Balance Comments Noted fair sitting balance and poor standing/dynamic balance.    Sensory Examination   Sensory  Perception Comments NT.    Clinical Impression   Criteria for Skilled Therapeutic Intervention yes, treatment indicated   PT Diagnosis (PT) Difficulty with functional mobility   Influenced by the following impairments Generalized weakness, Decreased activity tolerance, Impaired balance   Functional limitations due to impairments Limited functional mobility requiring AD and assist.    Clinical Presentation Stable/Uncomplicated   Clinical Presentation Rationale Based on PMH, current presentation, and social support.    Clinical Decision Making (Complexity) low complexity   Therapy Frequency (PT) 5x/week   Predicted Duration of Therapy Intervention (days/wks) 4 days   Planned Therapy Interventions (PT) bed mobility training;gait training;strengthening;transfer training   Anticipated Equipment Needs at Discharge (PT) walker, rolling   PT Discharge Planning    PT Discharge Recommendation (DC Rec) Transitional Care Facility   PT Rationale for DC Rec Pt is below baseline currently requiring assist of 2 due to impaired balance and confusion. Pt will benefit from continued skilled PT intervention at TCU to progress independence and safety with functional mobility.    Total Evaluation Time   Total Evaluation Time (Minutes) 5

## 2021-03-05 NOTE — PROVIDER NOTIFICATION
Group home called to update MD that pt.is due for 2nd covid vaccine. Updated Dr. Richards with above.

## 2021-03-05 NOTE — CONSULTS
Please see CM-RN consult filed 2/25/2021; CM-RN completing duplicate consults as CM-SW is continuing to provide followup, duplicate consults not required for SW to continue to follow.

## 2021-03-05 NOTE — PLAN OF CARE
Pt alert to self. Sitter at bedside. VSS on RA. Denies pain. Full liquid diet with honey thick liquids. Up A1 GB, unsteady on feet. Occasional incontinence but also using BR. Seizure precautions in place. No seizure activity. IVSL. PT consult. Will return to group home when up IND.

## 2021-03-05 NOTE — PLAN OF CARE
Pt is A & O to self. Lungs sound clear, bowel sounds active, cms intact. Up with 2 assist. No sign of pain. Impulsive and restless, received zyprexa. Incontinent of bowel. Tolerating full honey thick diet. Rash on butt, blanchable redness. Bruises scattered. No seizure activity. Will continue to monitor.

## 2021-03-05 NOTE — CONSULTS
"[facility]  Psychiatry Consultation      Patient name: Duane D Backes   MRN: [id number]    Age: 40 year old    YOB: 1980    Identifying information:   The patient is a 40 year old White male who is currently admitted to the hospitalist service on unit 55.  He is under legal guardianship of his parents.    Reason for consult: Psychotropic medications have been held due to a questionable seizure.  Assist in restarting medications.    History of present illness:   The patient has a history of autism spectrum disorder, moderate intellectual disability, right temporal epidermoid tumor, and a seizure disorder.  He was brought to the emergency room for evaluation of a possible seizure after he was found in his group home to be exhibiting tonic-clonic like movements while laying on the couch.  Records indicate that he recently had his Depakote dose reduced due to serum levels noted near 170.  Other outpatient records indicate that the patient has been maintained on high serum concentrations of Depakote ranging between 100 and 200.  His psychotropic medications were held upon admission.  He was admitted to the ICU and intubated.  He was recently extubated and transitioned out of ICU.  Psychiatry was consulted to comment regarding the safety of restarting his medications.  On examination today, the patient was laying in bed, wearing mittens, with staff at his bedside.  He was able to greet me by saying hi.  He repeated a couple of times \"ice cream and cupcakes.\"  Staff was in process of bringing him a snack.  There were no reports of aggression or other acute behavioral issues.    Psychiatric Review of Systems:    Attempted to be reviewed however the patient was not able to provide meaningful responses due to his intellectual disability.  Historical information did not appear to correlate to a depressive disorder, sage, or psychotic symptoms.    Psychiatric History:    I am not able " to note any prior psychiatric hospitalizations or suicide attempts in the electronic record system.  Patient is prescribed a combination of risperidone, lithium, and sertraline on an outpatient basis.    Substance Use History:    No knowledge of substance use disorders.    Medical History:  Refer to the internal medicine notes which I reviewed.   Past Medical History:   Diagnosis Date     Autistic disorder, current or active state      Dry skin      Moderate intellectual disabilities      Obsessive-compulsive disorders      Overweight (BMI 25.0-29.9) 8/2/2013     Seizure disorder (H)      Unspecified constipation         Medications:    Current Facility-Administered Medications:      albuterol (PROVENTIL) neb solution 2.5 mg, 2.5 mg, Nebulization, Q6H PRN, Renetta Grossman MD     bisacodyl (DULCOLAX) Suppository 10 mg, 10 mg, Rectal, Daily PRN, Renetta Grossman MD     glucose gel 15-30 g, 15-30 g, Oral, Q15 Min PRN **OR** dextrose 50 % injection 25-50 mL, 25-50 mL, Intravenous, Q15 Min PRN, 25 mL at 03/03/21 0439 **OR** glucagon injection 1 mg, 1 mg, Subcutaneous, Q15 Min PRN, Renetta Grossman MD     divalproex sodium delayed-release (DEPAKOTE SPRINKLE) DR capsule 125 mg, 125 mg, Oral, Q8H LUIS MIGUEL, Renetta Grossman MD, 125 mg at 03/04/21 2200     enoxaparin ANTICOAGULANT (LOVENOX) injection 40 mg, 40 mg, Subcutaneous, Q24H, Renetta Grossman MD, 40 mg at 03/04/21 1817     haloperidol lactate (HALDOL) injection 1-2 mg, 1-2 mg, Intravenous, Q6H PRN, Renetta Grossman MD     lacosamide (VIMPAT) tablet 100 mg, 100 mg, Oral, BID, Renetta Grossman MD, 100 mg at 03/04/21 2003     levofloxacin (LEVAQUIN) tablet 750 mg, 750 mg, Oral, Daily, Rich Richards MD     levothyroxine (SYNTHROID/LEVOTHROID) tablet 150 mcg, 150 mcg, Oral, QAM AC, Renetta Grossman MD, 150 mcg at 03/04/21 0702     lithium ER (LITHOBID) CR tablet 600 mg, 600 mg, Oral, At Bedtime, Guillermo Moran MD, 600 mg at  "03/04/21 2200     miconazole (MICATIN) 2 % powder, , Topical, Q1H PRN, Renetta Grossman MD, Given at 02/28/21 1723     OLANZapine (zyPREXA) injection 5 mg, 5 mg, Intramuscular, Q6H PRN, Anam Mcwilliams PA-C     OLANZapine zydis (zyPREXA) ODT tab 5 mg, 5 mg, Oral, TID PRN, Anam Mcwilliams PA-C, 5 mg at 03/04/21 2200     risperiDONE (risperDAL) tablet 2 mg, 2 mg, Oral, BID, Guillermo Moran MD, 2 mg at 03/04/21 2002     sertraline (ZOLOFT) tablet 150 mg, 150 mg, Oral, QAM, Guillermo Moran MD     topiramate (TOPAMAX) tablet 200 mg, 200 mg, Oral, BID, Renetta Grossman MD, 200 mg at 03/04/21 2003     Social History:  Refer to the psychosocial assessment completed by the .  Social History     Social History Narrative    Lives in group home.  Parents are legal guardians.         Family History:    Family History   Problem Relation Age of Onset     Unknown/Adopted No family hx of        Vital Signs:    B/P: 116/77, T: 97.2, P: 75, R: 16  Estimated body mass index is 22.59 kg/m  as calculated from the following:    Height as of this encounter: 1.88 m (6' 2\").    Weight as of this encounter: 79.8 kg (175 lb 14.8 oz).       Mental status examination:  Appearance: Sitting up in bed, wearing mittens, dressed in hospital scrubs, staff at his bedside.  No acute distress.  Attitude: Somewhat cooperative  Eye Contact: Occasional  Mood: None reported by the patient  Affect: Slightly blunted  Speech: Limited in content, slightly slow  Psychomotor Behavior:  No psychomotor abnormalities noted  Thought Process: [place]  Associations: no loose associations noted  Thought Content:  No obvious paranoia, delusions, ideas of reference, or grandiosity noted.  No indication of auditory or visual hallucinations..  No indication of suicidal or homicidal thoughts.  Insight: Limited  Judgment: Limited  Oriented to: Self only  Attention Span and Concentration: Limited  Recent and Remote Memory: Could not " be assessed  Language: Minimal displayed  Fund of Knowledge: Consistent with moderate intellectual disability  Muscle Strength and Tone: Normal upon visual inspection     Diagnoses:    Autism spectrum disorder  Moderate intellectual disability  Seizure disorder  Epidermoid tumor  Hypothyroidism     Recommendations:  May resume Lithium, Risperdal, Zoloft. These are not contraindicated in seizure disorders and will typically lower the seizure threshold if a medication overdose has occurred. Dosing is appropriate and safe to resume.  After 5 days of medication adherence, a serum lithium level may be checked to ensure safe levels.  If he is no longer in the hospital at that time, a serum level may be pursued on an outpatient basis since these are his typical home medications.     Psychiatry will sign off.   Please reconsult with psychiatry as needed.   Guillermo Moran MD   Text Page

## 2021-03-05 NOTE — PROGRESS NOTES
Care Management Follow Up    Length of Stay (days): 8    Expected Discharge Date: 03/08/21     Concerns to be Addressed:       Patient plan of care discussed at interdisciplinary rounds: Yes    Anticipated Discharge Disposition:       Anticipated Discharge Services:    Anticipated Discharge DME:      Patient/family educated on Medicare website which has current facility and service quality ratings:    Education Provided on the Discharge Plan:    Patient/Family in Agreement with the Plan:      Referrals Placed by CM/SW:    Private pay costs discussed: Not applicable    Additional Information:  Patient's father visited today and brought with a copy of the  signature page for conservatorship of patient. He asked for a copy to be sent to [employer]. Form sent via email.     Will continue to follow      GARLAND Regan

## 2021-03-05 NOTE — CONSULTS
[facility] [place]  [facility]    Neurology Consultation    Duane D Backes MRN# [id number]   YOB: 1980 Age: 40 year old    Code Status:Full Code   Date of Admission: 2/24/2021  Date of Consult:03/05/2021                                                                                       Assessment and Plan:                                         #. Chronic intractable seizure disorder  Characterized focal with EEG abnl past. Admitted with status, EEG x 72 hours 2/26-28no electrographic sz activity.      --check levels VPA. Suspect level low as PTA dose was higher at 1000mg bid.  Will resume higher dose 500mg tid.  Patient doses and levels were run high PTA.  (redetermine dose after level return)  Continue topiramate at 200mg bid (PTA dose)  Continue lacosamide 100mg bid (added on admission).  Plan follow-up with Dr. Washington, regular epilepsy specialist as outpatient    #. 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      #. DVT Prophylaxis  Per hospitalist service  #. PT/OT/Speech  -Per hospitalist-as tolerated  #. Nutrition / GI Prophylaxis  --Per recommendations of speech therapy per hospitalist      ----------------------------------------------------------------------------------  ----------------------------------------------------------------------------------  Reason for consult: as above.        Chief Complaint:     History is obtained from the patient / chart       History of Present Illness:   This patient is a 40 year old male who presents this admission with 3min seizure and one in ED.  Intubated and admitted to ICU.  Followed by NCC team until 3/1.  PTA ACD:  Depakote 1000 bid, Topiramate 200mg bid.  Previously followed Dr. Felix [facility].  Levels OA Results for BACKES, DUANE D (MRN [id number]) as of 3/5/2021 14:59   Ref. Range 2/24/2021 23:22   Valproic  Acid Level Latest Ref Range: 50 - 100 mg/L 102 (H)   Results for BACKES, DUANE D (MRN [id number]) as of 3/5/2021 14:59   Ref. Range 2/24/2021 23:22   Topiramate Level Latest Ref Range: 5.0 - 20.0 ug/mL 8.8   Lithium level 1.26    In ICU treated with levetiracetam x1 dose, [id number] bid added.   Now on: valproate Extended 125 tid, lacosamide 100mg bid, topiramate 200mg bid.          Past Medical History:     Past Medical History:   Diagnosis Date     Autistic disorder, current or active state      Dry skin      Moderate intellectual disabilities      Obsessive-compulsive disorders      Overweight (BMI 25.0-29.9) 8/2/2013     Seizure disorder (H)     Generalized tonic clonic     Unspecified constipation          Past Surgical History:     Past Surgical History:   Procedure Laterality Date     CATARACT IOL, RT/LT Left 11/17/2018          Social History:     Social History     Socioeconomic History     Marital status: Single     Spouse name: Not on file     Number of children: 0     Years of education: Not on file     Highest education level: Not on file   Occupational History     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      Employer: [employer]   Social Needs     Financial resource strain: Not on file     Food insecurity     Worry: Not on file     Inability: Not on file     Transportation needs     Medical: Not on file     Non-medical: Not on file   Tobacco Use     Smoking status: Never Smoker     Smokeless tobacco: Never Used   Substance and Sexual Activity     Alcohol use: No     Drug use: No     Sexual activity: Never   Lifestyle     Physical activity     Days per week: Not on file     Minutes per session: Not on file     Stress: Not on file   Relationships     Social connections     Talks on phone: Not on file     Gets together: Not on file     Attends [employer] service: Not on file     Active member of club or organization: Not on file     Attends meetings of clubs or organizations: Not on file      Relationship status: Not on file     Intimate partner violence     Fear of current or ex partner: Not on file     Emotionally abused: Not on file     Physically abused: Not on file     Forced sexual activity: Not on file   Other Topics Concern     Parent/sibling w/ CABG, MI or angioplasty before 65F 55M? Not Asked   Social History Narrative    Lives in group home.  Parents are legal guardians.     Patient denies smoking, no significant alcohol intake, denies illicit drugs use       Family History:     Family History   Problem Relation Age of Onset     Unknown/Adopted No family hx of      Reviewed and not felt to be contributory.        Home Medications:     Prior to Admission Medications   Prescriptions Last Dose Informant Patient Reported? Taking?   FISH OIL 1000 MG OR CAPS 2021 at Unknown time  No Yes   Si PO BID   Patient taking differently: 1 capsule 2 times per day   MILK OF MAGNESIA 400 MG/5ML suspension 2021 at Unknown time  Yes Yes   Si mLs every evening    Starch, Thickening, POWD   No No   Sig: Combined with beverages, per instructions from Speech Therapy.   VITAMIN D3 1000 units tablet 2021 at Unknown time  Yes Yes   Sig: 3,000 Units daily   ammonium lactate (LAC-HYDRIN) 12 % external lotion 2021 at Unknown time  Yes Yes   Sig: Apply topically daily To feet and heels   divalproex sodium extended-release (DEPAKOTE ER) 500 MG 24 hr tablet 2021 at Unknown time  No Yes   Sig: Decreased depakote as advised (will fax med schedule to [facility]) to 1000 mg twice a day   docusate sodium (COLACE) 100 MG capsule 2021 at Unknown time  Yes Yes   Sig: Take 1 capsule by mouth 2 times daily.   lactulose (CHRONULAC) 10 GM/15ML solution 2021 at Unknown time  Yes Yes   Sig: Take 30 mLs by mouth every evening & additional 30mL 2 times per day as needed   lactulose (CHRONULAC) 10 GM/15ML solution prn  Yes Yes   Sig: Take 30 mLs by mouth 2 times daily as needed   levothyroxine  (SYNTHROID/LEVOTHROID) 150 MCG tablet 2/24/2021 at Unknown time  No Yes   Sig: TAKE 1 TABLET BY MOUTH ONCE DAILY  FOR THYROID   *1 TOTAL FILL*   lithium ER (LITHOBID) 300 MG CR tablet 2/24/2021 at Unknown time  Yes Yes   Sig: Take 600 mg by mouth At Bedtime    order for DME   No No   Sig: Flat sole shoe   Patient not taking: Reported on 10/30/2020   polyethylene glycol (MIRALAX) powder 2/24/2021 at Unknown time  No Yes   Sig: Take 17 g by mouth daily.   pseudoePHEDrine (SUDAFED) 30 MG tablet prn  Yes Yes   Sig: Take 30 mg by mouth every evening as needed for congestion   risperiDONE (RISPERDAL) 2 MG tablet 2/24/2021 at Unknown time  Yes Yes   Sig: Take 2 mg by mouth 2 times daily 1 tab every morning & 1 tab every bedtime   sertraline (ZOLOFT) 100 MG tablet 2/24/2021 at Unknown time  Yes Yes   Sig: Take 1.5 tablets by mouth every morning.   topiramate (TOPAMAX) 200 MG tablet 2/24/2021 at Unknown time  Yes Yes   Sig: Take 200 mg by mouth 2 times daily      Facility-Administered Medications: None          Allergy:     Allergies   Allergen Reactions     Penicillin [Penicillins] Other (See Comments)     Prozac [Fluoxetine Hcl]      Reglan [Metoclopramide Hcl]      Ritalin [Methylphenidate Derivatives] Other (See Comments)     Ritalin     Trazodone           Inpatient Medications:   Scheduled Meds:    divalproex sodium delayed-release  125 mg Oral Q8H LUIS MIGUEL     enoxaparin ANTICOAGULANT  40 mg Subcutaneous Q24H     lacosamide  100 mg Oral BID     levothyroxine  150 mcg Oral QAM AC     lithium ER  600 mg Oral At Bedtime     risperiDONE  2 mg Oral BID     sertraline  150 mg Oral QAM     topiramate  200 mg Oral BID     PRN Meds: albuterol, bisacodyl, glucose **OR** dextrose **OR** glucagon, haloperidol lactate, miconazole, OLANZapine, OLANZapine zydis        Review of Systems    Unable to participate  CONSTITUTIONAL: negative for fever, chills, change in weight  INTEGUMENTARY/SKIN: no rash or obvious new lesions  ENT/MOUTH: no  "sore throat, new sinus pain or nasal drainage, no neck mass noted  RESP: No pleuretic pain, No cough, no hemoptysis, No SOB   CV: negative for chest pain, palpitations or peripheral edema  GI: no nausea, vomiting, change in stools  : no dysuria or hematuria  MUSCULOSKELETAL: no myalgias, arthralgias or join efffusion  ENDOCRINE: no history of polyuria, polydyspsia or symptoms of thyroid dysfunction  PSYCHIATRIC: no change in mood stable  LYMPHATIC: no new lymphadenopathy  HEME: no bleeding or easy bruisability  NEURO: see HPI       Physical Exam:   Physical Exam   Vitals:  Height:6' 2\"  Weight:175 lbs 14.83 oz   Temp: 97.6  F (36.4  C) Temp src: Axillary BP: 117/66 Pulse: 87   Resp: 16 SpO2: 96 % O2 Device: None (Room air)    General Appearance:  No acute distress  Neuro:       Mental Status Exam:    Alert and oriented to self.  Not place.  Knows home location.  Dysarthic, limited speech but able to speak in limited sentences, which I assume close to baseline.       Cranial Nerves:   Perrl. Limited eye range of motion, limitd down gaze,    Face symmetric facial sensation is intact.  Tongue and jaw and palate symmetrical.  Hearing grossly intact.  Fundus technically difficult          Motor:   Moves all 4 extremities to command.  Strength intact.  Increased tone in the legs.  Bulk normal x4           Reflexes: Symmetrically increased.  Plantar signs normal.  No clonus.       Sensory: Grossly intact light touch and vibration.                   Coordination:   Intact to gross motor movement.  Unable to cooperate with more specific testing       Gait: Unable to be tested  Neck: no nuchal rigidity, normal thyroid. No carotid bruits.    Cardiovascular: Regular rate and rhythm, no m/r/g  Extremities: No clubbing, no cyanosis, no edema       Data:   ROUTINE IP LABS   CBC RESULTS:     Recent Labs   Lab 03/05/21  0742 03/03/21  0435 03/02/21  0604   WBC 6.0 6.6 7.1   RBC 4.15* 3.58* 3.56*   HGB 12.8* 11.2* 10.8*   HCT 39.6* " 33.7* 34.0*    138* 104*     Basic Metabolic Panel:   Recent Labs   Lab Test 03/05/21  0742 03/04/21  1608 03/04/21  0659 03/03/21  0435 03/03/21  0435   *  --  142  --  147*   POTASSIUM 3.1* 3.8 3.0*   < > 3.2*   CHLORIDE 111*  --  109  --  114*   CO2 28  --  26  --  28   BUN 17  --  22  --  26   CR 0.92  --  0.95  --  0.91   *  --  79  --  65*   JESSICA 9.6  --  9.6  --  9.3    < > = values in this interval not displayed.     Liver panel:  Recent Labs   Lab Test 03/05/21  0742 02/26/21  1511 02/25/21  0749 06/16/20  1527 07/10/19  1102 02/22/19  1623 07/10/18  1554   PROTTOTAL  --  6.5*  --  7.4 7.5 7.8 7.0   ALBUMIN 2.8* 2.9* 3.0* 3.3* 3.8 3.2* 3.6   BILITOTAL  --  0.3  --  0.2 0.3 0.2 0.2   ALKPHOS  --  56  --  53 35* 43 40   AST  --  18  --  34 11 9 13   ALT  --  19  --  17 14 11 13     INR:No lab results found.   Lipid Profile:  Recent Labs   Lab Test 05/17/18  0936 08/15/17  1658 08/06/15  0946 07/31/14 08/02/13  1532   CHOL 174 152 138 137 138   HDL 58 60 59 47 53   * 73 65 75 70   TRIG 77 94 70 76 80   CHOLHDLRATIO  --   --  2.3 3 2.6     Thyroid Panel:  Recent Labs   Lab Test 02/25/21  0749 08/21/20  0850 03/04/20  1451 11/29/19  1010 08/27/19  0940 08/15/17  1658 08/15/17  1658 11/08/16  1746   TSH 0.27* 0.42 0.03* 0.85 0.34*   < > 4.38* 6.48*   T4 1.05  --  1.00  --  1.19  --  0.85 0.92    < > = values in this interval not displayed.      Vitamin B12:   Recent Labs   Lab Test 06/23/14  1252   B12 783      Vitamin D level:   Recent Labs   Lab Test 07/10/19  1102 07/10/18  1554 07/12/16  1615   VITDT 48 39 15*     A1C:   Recent Labs   Lab Test 09/23/20  0858 08/15/17  1658   A1C 4.7 4.9     CRP inflammation:   Recent Labs   Lab Test 02/28/21  1133 02/27/21  0515 02/26/21  1511 06/23/14  1252   .0* 148.0* 127.0* <5.0     ESR:   Recent Labs   Lab Test 02/27/21  0515 06/23/14  1252   SED 29* 3       BRAN:   Recent Labs   Lab Test 06/23/14  1252   MARKEL <1.0  Interpretation:   Negative              IMAGING:   All imaging studies were reviewed personally    MRI brain:   IMPRESSION:  1.  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  2.  In addition, there is a new enhancing, partially calcified nodular component along the anterior and right lateral margins of the medulla. Given that epidermoid cysts can have malignant degeneration, short interval contrast-enhanced MRI follow-up is   recommended in 4-6 weeks.  3.  No acute hemorrhage or infarct      65 minutes, majority related to review of record.  Veronica Jones M.D.  [facility]  Office [phone]

## 2021-03-05 NOTE — PROGRESS NOTES
"[facility]    Medicine Progress Note - Hospitalist Service       Date of Admission:  2/24/2021    Assessment & Plan       Duane D Backes is a 40 year old male with history of autism, tonic-clonic seizures, moderate MR, benign epidermoid tumor, and hypothyroidism who presented to the ED 2/24/2021 for a 3 minute witnessed seizure. He had another seizure when he arrived in the ED and was given a total of 4 mg ativan. He became somnolent and had increased secretions so he was intubated in the ED and transferred to the ICU.   - PTA on Depakote ER 1000mg BID and Topamax 200 mg BID; he follows with Dr Washington from [facility]  - He was managed by ICU and NCC; he was successfully extubated on 03/01 and Hospitalist service was called regarding transfer of care.    1. Breakthrough seizures  2. Status epilepticus  - h/o tonic-clonic seizures, follows with Dr Washington from [facility]  - PTA on Depakote ER 1000mg BID and Topamax 200 mg BID;  - had another seizure in ER and was intubated for airway protection  - started on propofol infusion and versed drip and admitted to ICU  - CT head 02/25- No acute intracranial process; No change in the large epidermoid tumor involving the right middle cranial fossa, right ambient cistern, suprasellar region, with extension along the right aspect of the midbrain and lilli and into the cervical medullary junction region with  associated mass effect   - MRI brain- 02/25-  Increased size of the epidermoid cyst along the medulla, with increased inferior extension and mass effect on the medulla, as detailed above. In addition, there is a new enhancing, partially calcified nodular component along the anterior and right lateral margins of the medulla. Given that epidermoid cysts can have malignant degeneration, short interval contrast-enhanced MRI follow-up is   recommended in 4-6 weeks  - as per NCC- \"doubt that this is the cause of his seizure or his prolonged encephalopathy. This cyst has " "probably been slowly growing over the years not suddenly\"  - PTA medications lowering the seizure threshold (lithium, Risperdal, Zoloft, and Sudafed)- were held on admission   - initially started on Keppra, then started on Vimpat  - VEEG- moderate to severe diffuse encephalopathy.  Epileptiform discharges or seizures were not seen  - he was successfully extubated on 03/01  - doing fine, seizure-free, mentation seems at baseline  - currently on Vimpat 100mg iv BID, Topamax 200 mg BID and Depacon 750 mg iv q8h;  switched iv meds to po route as ratio is 1:1  - Neurology signed off  - follow up with primary Neurologist  No more seizures at this time. But weak and not stable when walk with therapy, before has no difficulty in walking as per family. Will have neurology see him and adjust medications if needed.     3. Acute hypoxic respiratory failure- resolved  - as above- intubated in ER for airways protection  - extubated on 03/01  - doing well now, sat well on RA    4. Aspiration PNA  - sputum culture- 02/25- positive for Klebsiella and Enterobacter clocae complex  - started on Levaquin on 2/27; day 7/7,  - afebrile, stop Levaquin, last dose today.     5. Acute encephalopathy-  improved  6. Autism  7. Moderate MR  - as above- holding lithium and other antipsychotic meds that could lower seizure threshold  - would limit sedating meds in general unless needed for patient safety  - will place a Psych consult regarding if any of his PTA meds should be resumed  - sitter at bedside  - SW consult    8. Epidermoid tumor  - MRI brain shows some increase in size and some mass effect- as above  - NS consult    9. Hypothyroidism  - TSH 0.27, fT4 1.05  - continue PTA Synthroid    10. Hypernatremia/Hypokalemia   - sodium improve to 142 and IV fluids discontinued, his Sodium is slightly up again, encourage oral intake  Recheck later this evening if going up will need to start on IV fluids again.   Replace potassium     11. " Dysphagia  - has a known hx of silent aspiration with all liquids; however, pt was on soft food and thin liquids at the group home   - SLP consulted- and following, was on full liquid honey thick liquid now advance to DD1 with thickened liquid.  As per nursing staff has good appetite and eating or drinking well.   Speech to follow and advance diet as tolerated.       PT and OT to continue follow, as not at his baseline at this time.  Replace electrolytes.   Stop Levofloxacin after last dose today.          Diet: Dysphagia Diet Level 1 Pureed Honey Thickened Liquids (pre-thickened or use instant food thickener)    DVT Prophylaxis: Enoxaparin (Lovenox) SQ  Colin Catheter: not present  Code Status: Full Code           Disposition Plan   Expected discharge: Tomorrow, recommended to prior living arrangement once Psych ELLE munson consult.  Entered: Rich Richards MD 03/05/2021, 1:52 PM       The patient's care was discussed with the Bedside Nurse and ICU Team.    Rich Richards MD  Hospitalist Service  [facility]  Contact information available via [facility] Paging/Directory    ______________________________________________________________________    Interval History   Patient remain stable, but not able to walk by himself with therapy, feel weak and unsteady.   On liquid diet and finishing all his meal without any difficulty.     No other significant event overnight.     Data reviewed today: I reviewed all medications, new labs and imaging results over the last 24 hours. I personally reviewed the all imaging since admission- image(s) showing as above.    Physical Exam   Vital Signs: Temp: 97.6  F (36.4  C) Temp src: Axillary BP: 117/66 Pulse: 87   Resp: 16 SpO2: 96 % O2 Device: None (Room air)    Weight: 175 lbs 14.83 oz     GEN: no acute distress, sitting up in bed.   HEENT: head ncat, sclera anicteric, trachea midline   PULM: unlabored breathing, CTAB, on room air.  CV/COR: RRR, normal S1 and S2. No  gallop, rub, nor murmur  ABD: soft, non-tender, non-distended; BS present  MSK/EXT:  No LE edema. WWP.  NEURO: Alert,  follows some commands;  Appears at baseline.       Data   Recent Labs   Lab 03/05/21  0742 03/04/21  1608 03/04/21  0659 03/03/21  0435 03/03/21  0435 03/02/21  0604 03/02/21  0604 02/26/21  1511 02/26/21  1511   WBC 6.0  --   --   --  6.6  --  7.1   < > 10.8   HGB 12.8*  --   --   --  11.2*  --  10.8*   < > 12.6*   MCV 95  --   --   --  94  --  96   < > 95     --   --   --  138*  --  104*   < > 148*   *  --  142  --  147*   < > 149*   < > 141   POTASSIUM 3.1* 3.8 3.0*   < > 3.2*   < > 3.3*   < > 4.3   CHLORIDE 111*  --  109  --  114*  --  115*   < > 110*   CO2 28  --  26  --  28  --  28   < > 27   BUN 17  --  22  --  26  --  28   < > 16   CR 0.92  --  0.95  --  0.91  --  0.93   < > 0.87   ANIONGAP 6  --  7  --  5  --  6   < > 4   JESSICA 9.6  --  9.6  --  9.3  --  9.6   < > 9.5   *  --  79  --  65*  --  80   < > 104*   ALBUMIN 2.8*  --   --   --   --   --   --   --  2.9*   PROTTOTAL  --   --   --   --   --   --   --   --  6.5*   BILITOTAL  --   --   --   --   --   --   --   --  0.3   ALKPHOS  --   --   --   --   --   --   --   --  56   ALT  --   --   --   --   --   --   --   --  19   AST  --   --   --   --   --   --   --   --  18    < > = values in this interval not displayed.     No results found for this or any previous visit (from the past 24 hour(s)).  Medications       divalproex sodium delayed-release  125 mg Oral Q8H LUIS MIGUEL     enoxaparin ANTICOAGULANT  40 mg Subcutaneous Q24H     lacosamide  100 mg Oral BID     levofloxacin  750 mg Oral Daily     levothyroxine  150 mcg Oral QAM AC     lithium ER  600 mg Oral At Bedtime     risperiDONE  2 mg Oral BID     sertraline  150 mg Oral QAM     topiramate  200 mg Oral BID

## 2021-03-06 LAB
ANION GAP SERPL CALCULATED.3IONS-SCNC: 4 MMOL/L (ref 3–14)
BUN SERPL-MCNC: 12 MG/DL (ref 7–30)
CALCIUM SERPL-MCNC: 9.4 MG/DL (ref 8.5–10.1)
CHLORIDE SERPL-SCNC: 110 MMOL/L (ref 94–109)
CO2 SERPL-SCNC: 28 MMOL/L (ref 20–32)
CREAT SERPL-MCNC: 0.86 MG/DL (ref 0.66–1.25)
GFR SERPL CREATININE-BSD FRML MDRD: >90 ML/MIN/{1.73_M2}
GLUCOSE SERPL-MCNC: 81 MG/DL (ref 70–99)
PLATELET # BLD AUTO: 238 10E9/L (ref 150–450)
POTASSIUM SERPL-SCNC: 3.5 MMOL/L (ref 3.4–5.3)
SODIUM SERPL-SCNC: 142 MMOL/L (ref 133–144)
VALPROATE SERPL-MCNC: 91 MG/L (ref 50–100)

## 2021-03-06 PROCEDURE — 250N000013 HC RX MED GY IP 250 OP 250 PS 637: Performed by: INTERNAL MEDICINE

## 2021-03-06 PROCEDURE — 99232 SBSQ HOSP IP/OBS MODERATE 35: CPT | Performed by: INTERNAL MEDICINE

## 2021-03-06 PROCEDURE — 80048 BASIC METABOLIC PNL TOTAL CA: CPT | Performed by: INTERNAL MEDICINE

## 2021-03-06 PROCEDURE — 250N000013 HC RX MED GY IP 250 OP 250 PS 637: Performed by: PSYCHIATRY & NEUROLOGY

## 2021-03-06 PROCEDURE — 36415 COLL VENOUS BLD VENIPUNCTURE: CPT | Performed by: INTERNAL MEDICINE

## 2021-03-06 PROCEDURE — 85049 AUTOMATED PLATELET COUNT: CPT | Performed by: INTERNAL MEDICINE

## 2021-03-06 PROCEDURE — 80164 ASSAY DIPROPYLACETIC ACD TOT: CPT | Performed by: INTERNAL MEDICINE

## 2021-03-06 PROCEDURE — 120N000001 HC R&B MED SURG/OB

## 2021-03-06 PROCEDURE — 250N000013 HC RX MED GY IP 250 OP 250 PS 637: Performed by: PHYSICIAN ASSISTANT

## 2021-03-06 PROCEDURE — 250N000011 HC RX IP 250 OP 636: Performed by: INTERNAL MEDICINE

## 2021-03-06 RX ORDER — POTASSIUM CHLORIDE 1500 MG/1
20 TABLET, EXTENDED RELEASE ORAL ONCE
Status: COMPLETED | OUTPATIENT
Start: 2021-03-06 | End: 2021-03-06

## 2021-03-06 RX ADMIN — MICONAZOLE NITRATE: 20 POWDER TOPICAL at 13:05

## 2021-03-06 RX ADMIN — LEVOTHYROXINE SODIUM 150 MCG: 75 TABLET ORAL at 06:38

## 2021-03-06 RX ADMIN — TOPIRAMATE 200 MG: 200 TABLET ORAL at 09:37

## 2021-03-06 RX ADMIN — LACOSAMIDE 100 MG: 50 TABLET, FILM COATED ORAL at 21:05

## 2021-03-06 RX ADMIN — SERTRALINE HYDROCHLORIDE 150 MG: 100 TABLET ORAL at 09:36

## 2021-03-06 RX ADMIN — LITHIUM CARBONATE 600 MG: 300 TABLET, EXTENDED RELEASE ORAL at 21:04

## 2021-03-06 RX ADMIN — POTASSIUM CHLORIDE 20 MEQ: 1500 TABLET, EXTENDED RELEASE ORAL at 09:37

## 2021-03-06 RX ADMIN — RISPERIDONE 2 MG: 2 TABLET ORAL at 21:05

## 2021-03-06 RX ADMIN — LACOSAMIDE 100 MG: 50 TABLET, FILM COATED ORAL at 09:36

## 2021-03-06 RX ADMIN — ENOXAPARIN SODIUM 40 MG: 40 INJECTION SUBCUTANEOUS at 18:18

## 2021-03-06 RX ADMIN — RISPERIDONE 2 MG: 2 TABLET ORAL at 09:37

## 2021-03-06 RX ADMIN — DIVALPROEX SODIUM 500 MG: 125 CAPSULE, COATED PELLETS ORAL at 21:05

## 2021-03-06 RX ADMIN — OLANZAPINE 5 MG: 5 TABLET, ORALLY DISINTEGRATING ORAL at 12:58

## 2021-03-06 RX ADMIN — TOPIRAMATE 200 MG: 200 TABLET ORAL at 21:05

## 2021-03-06 RX ADMIN — DIVALPROEX SODIUM 500 MG: 125 CAPSULE, COATED PELLETS ORAL at 13:00

## 2021-03-06 RX ADMIN — OLANZAPINE 5 MG: 5 TABLET, ORALLY DISINTEGRATING ORAL at 06:39

## 2021-03-06 RX ADMIN — DIVALPROEX SODIUM 500 MG: 125 CAPSULE, COATED PELLETS ORAL at 06:38

## 2021-03-06 RX ADMIN — OLANZAPINE 5 MG: 5 TABLET, ORALLY DISINTEGRATING ORAL at 22:26

## 2021-03-06 ASSESSMENT — ACTIVITIES OF DAILY LIVING (ADL)
ADLS_ACUITY_SCORE: 22
ADLS_ACUITY_SCORE: 22
ADLS_ACUITY_SCORE: 24
ADLS_ACUITY_SCORE: 22
ADLS_ACUITY_SCORE: 22
ADLS_ACUITY_SCORE: 24

## 2021-03-06 NOTE — PLAN OF CARE
Problem: Adult Inpatient Plan of Care  Goal: Plan of Care Review  Outcome: Improving     Problem: Seizure Disorder Comorbidity  Goal: Maintenance of Seizure Control  Outcome: Improving     No more seizures this admission, pt awaiting discharge pending physical mobility improvement. Up walking twice in room and did well. Total feed, hand mits for safety and to prevent pulling lines.  Abrasions and scratches from rubbing-mepliexes applied. Please continue to monitor.

## 2021-03-06 NOTE — PLAN OF CARE
VSS, RA. Up with 2 and walker to [facility]. Unsteady on feet. [place] to person only. Seizure pads in place. No seizure activity. Tolerating DD1 with honey thick liquid. Neurology following.

## 2021-03-06 NOTE — PLAN OF CARE
Pt alert, oriented to self and birthday only. Ambulated to bathroom with assist of 2 d/t unsteady, needs constant redirection. Pt is  impulsive, attempts to get out of bed at times. PRN Zyprexa given x 1. Frequently asking for food. Tolerating pureed and HTL well. VSS. Seizure precautions in place.

## 2021-03-06 NOTE — PROGRESS NOTES
Care Management Follow Up    Length of Stay (days): 9    Expected Discharge Date: 03/08/21     Concerns to be Addressed:     placment  Patient plan of care discussed at interdisciplinary rounds: Yes    Anticipated Discharge Disposition:  TCU vs return to  with [facility]   Anticipated Discharge Services:    Anticipated Discharge DME:      Patient/family educated on Medicare website which has current facility and service quality ratings:  yes  Education Provided on the Discharge Plan:  Yes   Patient/Family in Agreement with the Plan:  yes  Referrals Placed by CM/ELLE:    Private pay costs discussed: transportation costs; Pt's mom stated they are unable to provide transport but are hopeful the  can assist.     Additional Information:  - Placed call and spoke his guardian who is his mom Seema regarding therapy recommendations for TCU at discharge.  She is agreeable to TCU at discharge if needed. She wants her  to also hear about discharge plans and will call back once he is home. She stated she would want a TCU in the [facility].    -Placed a call to the  [phone] to see if they are able to take him back as a 2 assist if we are unable to get the sitter stopped and discuss transportation.  Spoke with staff who requested the supervisor Jarrell MAR be contacted.  Left voicemail for Jarrell MAR at [phone].  Also left a voicemail for the oncall Supervisor Carolyn [phone].   When talking to  staff also inquired about pts independence. They stated he receives med administration, showers, needs assistance to cut up his food. He can dress himself. They stated he cannot be out in the community alone and staff is with him at all times. The pt is to be on thickened liquids at the  and they have yet to start that and are looking for clarification on this from their management.      Discussed case with ELLE Long referrals to be sent once parents/guardians call back.     Mercedes Vanegas RN [facility]  Inpatient Care  Coordination  [address]  [place] [address]  [email or web address]   Office: [phone]  Fax: [phone]

## 2021-03-06 NOTE — PROGRESS NOTES
"[facility]    Medicine Progress Note - Hospitalist Service       Date of Admission:  2/24/2021    Assessment & Plan       Duane D Backes is a 40 year old male with history of autism, tonic-clonic seizures, moderate MR, benign epidermoid tumor, and hypothyroidism who presented to the ED 2/24/2021 for a 3 minute witnessed seizure. He had another seizure when he arrived in the ED and was given a total of 4 mg ativan. He became somnolent and had increased secretions so he was intubated in the ED and transferred to the ICU.   - PTA on Depakote ER 1000mg BID and Topamax 200 mg BID; he follows with Dr Washington from [facility]  - He was managed by ICU and NCC; he was successfully extubated on 03/01 and Hospitalist service was called regarding transfer of care.    1. Breakthrough seizures: resolved.   2. Status epilepticus: resolved.   - h/o tonic-clonic seizures, follows with Dr Washington from [facility]  - PTA on Depakote ER 1000mg BID and Topamax 200 mg BID;  - had another seizure in ER and was intubated for airway protection  - started on propofol infusion and versed drip and admitted to ICU  - CT head 02/25- No acute intracranial process; No change in the large epidermoid tumor involving the right middle cranial fossa, right ambient cistern, suprasellar region, with extension along the right aspect of the midbrain and lilli and into the cervical medullary junction region with  associated mass effect   - MRI brain- 02/25-  Increased size of the epidermoid cyst along the medulla, with increased inferior extension and mass effect on the medulla, as detailed above. In addition, there is a new enhancing, partially calcified nodular component along the anterior and right lateral margins of the medulla. Given that epidermoid cysts can have malignant degeneration, short interval contrast-enhanced MRI follow-up is   recommended in 4-6 weeks  - as per NCC- \"doubt that this is the cause of his seizure or his prolonged " "encephalopathy. This cyst has probably been slowly growing over the years not suddenly\"  - PTA medications lowering the seizure threshold (lithium, Risperdal, Zoloft, and Sudafed)- were held on admission   Now restarted by Psychiatry, agree with that.   - initially started on Keppra, then started on Vimpat  - VEEG- moderate to severe diffuse encephalopathy.  Epileptiform discharges or seizures were not seen  - he was successfully extubated on 03/01  - doing fine, seizure-free, mentation seems at baseline  - currently on Vimpat 100mg iv BID, Topamax 200 mg BID and Depakote  500 every 8 hrs.   - General neurology consulted, appreciate input.   - follow up with primary Neurologist  No more seizures at this time. But weak and not stable when walk with therapy, before has no difficulty in walking as per family.   His Valproic acid level 91 ().   Patient from medical stand point is ready to be discharge, but given some unsteady gait PT is following. I will ask PT to re-evaluate him today, SW/CC following.     3. Acute hypoxic respiratory failure- resolved  - as above- intubated in ER for airways protection  - extubated on 03/01  - doing well now, sat well on RA    4. Aspiration PNA: Treated   - sputum culture- 02/25- positive for Klebsiella and Enterobacter clocae complex  - started on Levaquin on 2/27; day 7/7,  - afebrile, stop Levaquin, last dose 3/5    5. Acute encephalopathy-  improved  6. Autism  7. Moderate MR  - as above- holding lithium and other antipsychotic meds that could lower seizure threshold  - would limit sedating meds in general unless needed for patient safety  - will place a Psych consult regarding if any of his PTA meds should be resumed  - sitter at bedside      8. Epidermoid tumor  - MRI brain shows some increase in size and some mass effect- as above  - NS consult    9. Hypothyroidism  - TSH 0.27, fT4 1.05  - continue PTA Synthroid    10. Hypernatremia/Hypokalemia   Sodium normal now, " improve oral intake and diet.   Potassium now replaced.     11. Dysphagia  - has a known hx of silent aspiration with all liquids; however, pt was on soft food and thin liquids at the group home   - SLP consulted- and following, was on full liquid honey thick liquid now advance to DD1 with thickened liquid.  As per nursing staff has good appetite and eating or drinking well.   Speech to follow and advance diet as tolerated.       Medically stable.   Awaiting safe disposition at this time.  PT to evaluate him again today.          Diet: Dysphagia Diet Level 1 Pureed Honey Thickened Liquids (pre-thickened or use instant food thickener)    DVT Prophylaxis: Enoxaparin (Lovenox) SQ  Oclin Catheter: not present  Code Status: Full Code           Disposition Plan   Expected discharge: awaiting safe disposition.   Entered: Rich Richards MD 03/06/2021, 12:28 PM       The patient's care was discussed with the Bedside Nurse and ICU Team.    Rich Richards MD  Hospitalist Service  [facility]  Contact information available via [facility] Paging/Directory    ______________________________________________________________________    Interval History   Patient awake and alert this morning, eating well, offer no complaints. Need assist of 2 to walk to the bathroom because of unsteady gait. No fever, chills, chest pain, headache or dizziness.     No other significant event overnight.         Data reviewed today: I reviewed all medications, new labs and imaging results over the last 24 hours. I personally reviewed the all imaging since admission- image(s) showing as above.    Physical Exam   Vital Signs: Temp: 97.9  F (36.6  C) Temp src: Oral BP: 105/62 Pulse: 67   Resp: 16 SpO2: 97 % O2 Device: None (Room air)    Weight: 175 lbs 14.83 oz     GEN: no acute distress, sitting up in bed.   HEENT: head ncat, sclera anicteric, trachea midline   PULM: unlabored breathing, CTAB, on room air.  CV/COR: RRR, normal S1 and S2.  No gallop, rub, nor murmur  ABD: soft, non-tender, non-distended; BS present  MSK/EXT:  No LE edema. WWP.  NEURO: Alert,  follows some commands;  Appears at baseline.       Data   Recent Labs   Lab 03/06/21  0734 03/05/21  1514 03/05/21  0742 03/04/21  0659 03/04/21  0659 03/03/21  0435 03/03/21  0435 03/02/21  0604 03/02/21  0604   WBC  --   --  6.0  --   --   --  6.6  --  7.1   HGB  --   --  12.8*  --   --   --  11.2*  --  10.8*   MCV  --   --  95  --   --   --  94  --  96     --  185  --   --   --  138*  --  104*    146* 145*  --  142  --  147*   < > 149*   POTASSIUM 3.5 3.8 3.1*   < > 3.0*   < > 3.2*   < > 3.3*   CHLORIDE 110*  --  111*  --  109  --  114*  --  115*   CO2 28  --  28  --  26  --  28  --  28   BUN 12  --  17  --  22  --  26  --  28   CR 0.86  --  0.92  --  0.95  --  0.91  --  0.93   ANIONGAP 4  --  6  --  7  --  5  --  6   JESSICA 9.4  --  9.6  --  9.6  --  9.3  --  9.6   GLC 81  --  100*  --  79  --  65*  --  80   ALBUMIN  --   --  2.8*  --   --   --   --   --   --     < > = values in this interval not displayed.     No results found for this or any previous visit (from the past 24 hour(s)).  Medications       divalproex sodium delayed-release  500 mg Oral Q8H LUIS MIGUEL     enoxaparin ANTICOAGULANT  40 mg Subcutaneous Q24H     lacosamide  100 mg Oral BID     levothyroxine  150 mcg Oral QAM AC     lithium ER  600 mg Oral At Bedtime     risperiDONE  2 mg Oral BID     sertraline  150 mg Oral QAM     topiramate  200 mg Oral BID

## 2021-03-06 NOTE — PROGRESS NOTES
[facility]  Neuroscience and Spine [place]  Neurology Daily Note      Admission Date:2/24/2021   Date of service: 03/06/2021   Hospital Day: 11                                                   Assessment and Plan:   #. Chronic intractable seizure disorder  Characterized focal with EEG abnl past. Admitted with status, EEG x 72 hours 2/26-28no electrographic sz activity.     VPA level 3/6=91  Patient doses and levels were run high PTA(130s-170s). Will continue divalproex 500mg tid for now.   Continue topiramate at 200mg bid (PTA dose)  Continue lacosamide 100mg bid (added on admission).  Plan follow-up with Dr. Washington, regular epilepsy specialist as outpatient     #. 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       #. DVT Prophylaxis  Per hospitalist service  #. PT/OT/Speech  -Per hospitalist-as tolerated  #. Nutrition / GI Prophylaxis  --Per recommendations of speech therapy per hospitalist    Reasonable to discharge if adequate supportive care available.     Will sign off.  Please contact general neurology service if further questions or follow up needed.       Interval History:   No seizure reoccurance.  Seems back to baseline for what I can assess, other than concerns about gait.        Review of Systems:   Noncontributory.       Medications:   Scheduled Meds:    divalproex sodium delayed-release  500 mg Oral Q8H LUIS MIGUEL     enoxaparin ANTICOAGULANT  40 mg Subcutaneous Q24H     lacosamide  100 mg Oral BID     levothyroxine  150 mcg Oral QAM AC     lithium ER  600 mg Oral At Bedtime     risperiDONE  2 mg Oral BID     sertraline  150 mg Oral QAM     topiramate  200 mg Oral BID     PRN Meds: albuterol, bisacodyl, glucose **OR** dextrose **OR** glucagon, haloperidol lactate, miconazole, OLANZapine, OLANZapine zydis        Physical Exam:   Vitals: Temp: 97.9  F (36.6  C) Temp src: Oral BP: 105/62 Pulse: 67    Resp: 16 SpO2: 97 % O2 Device: None (Room air)    Vital Signs with Ranges: Temp:  [97.4  F (36.3  C)-98.5  F (36.9  C)] 97.9  F (36.6  C)  Pulse:  [60-67] 67  Resp:  [16] 16  BP: (104-119)/(60-62) 105/62  SpO2:  [95 %-97 %] 97 %    General Appearance:  No acute distress  Neuro:       Mental Status Exam:    Alert and oriented to self.  hospital with cues.  Dysarthic, limited speech but able to speak in limited sentences, which I assume close to baseline.       Cranial Nerves:   Perrl. Limited eye range of motion horizontally with siena, limitd down gaze,    Face symmetric facial sensation is intact.  Tongue and jaw and palate symmetrical.  Hearing grossly intact.  Fundus technically difficult          Motor:   Moves all 4 extremities to command.  Strength intact.  Increased tone in the legs.  Bulk normal x4           Reflexes: Symmetrically increased.  Plantar signs normal.  No clonus.       Sensory: Grossly intact light touch and vibration.                   Coordination:   Intact to gross motor movement.  Unable to cooperate with more specific testing       Gait: Unable to be tested  Neck: no nuchal rigidity, normal thyroid. No carotid bruits.    Cardiovascular: Regular rate and rhythm, no m/r/g  Extremities: No clubbing, no cyanosis, no edema       Data:   ROUTINE IP LABS (Last 3results)  CBC RESULTS:     Recent Labs   Lab Test 03/06/21  0734 03/05/21  0742 03/03/21  0435 03/02/21  0604   WBC  --  6.0 6.6 7.1   RBC  --  4.15* 3.58* 3.56*   HGB  --  12.8* 11.2* 10.8*   HCT  --  39.6* 33.7* 34.0*    185 138* 104*     Basic Metabolic Panel:  Recent Labs   Lab Test 03/06/21  0734 03/05/21  1514 03/05/21  0742 03/04/21  0659 03/04/21  0659    146* 145*  --  142   POTASSIUM 3.5 3.8 3.1*   < > 3.0*   CHLORIDE 110*  --  111*  --  109   CO2 28  --  28  --  26   BUN 12  --  17  --  22   CR 0.86  --  0.92  --  0.95   GLC 81  --  100*  --  79   JESSICA 9.4  --  9.6  --  9.6    < > = values in this interval not  displayed.     Liver panel:  Recent Labs   Lab Test 03/05/21  0742 02/26/21  1511 02/25/21  0749 06/16/20  1527 07/10/19  1102 02/22/19  1623 07/10/18  1554   PROTTOTAL  --  6.5*  --  7.4 7.5 7.8 7.0   ALBUMIN 2.8* 2.9* 3.0* 3.3* 3.8 3.2* 3.6   BILITOTAL  --  0.3  --  0.2 0.3 0.2 0.2   ALKPHOS  --  56  --  53 35* 43 40   AST  --  18  --  34 11 9 13   ALT  --  19  --  17 14 11 13     INR:No lab results found.   Lipid Profile:  Recent Labs   Lab Test 05/17/18  0936 08/15/17  1658 08/06/15  0946 07/31/14   CHOL 174 152 138 137   HDL 58 60 59 47   * 73 65 75   TRIG 77 94 70 76   CHOLHDLRATIO  --   --  2.3 3     Thyroid Panel:  Recent Labs   Lab Test 02/25/21  0749 08/21/20  0850 03/04/20  1451 08/27/19  0940 08/27/19  0940   TSH 0.27* 0.42 0.03*   < > 0.34*   T4 1.05  --  1.00  --  1.19    < > = values in this interval not displayed.      Vitamin B12:   Recent Labs   Lab Test 06/23/14  1252   B12 783      Vitamin D level:   Recent Labs   Lab Test 07/10/19  1102 07/10/18  1554 07/12/16  1615   VITDT 48 39 15*     A1C:   Recent Labs   Lab Test 09/23/20  0858 08/15/17  1658   A1C 4.7 4.9     CRP inflammation:   Recent Labs   Lab Test 02/28/21  1133 02/27/21  0515 02/26/21  1511 06/23/14  1252   .0* 148.0* 127.0* <5.0     ESR:   Recent Labs   Lab Test 02/27/21  0515 06/23/14  1252   SED 29* 3       BRAN:   Recent Labs   Lab Test 06/23/14  1252   MARKEL <1.0  Interpretation:  Negative       Ammonia:   Recent Labs   Lab Test 02/26/21  1511 02/22/19  1623   JACKY 29 30             TIME     25minutes Evaluation/managment time      Veronica Jones M.D.  Neurologist  [facility] Neurology  Office [phone]

## 2021-03-06 NOTE — PLAN OF CARE
VSS,seizure precautions maintained.Up with 1-2 assist/walker.DD2 with honey thick.Voiding per urinal.Buttocks area rashes and reddened ,mepilex applied,rashes noted bilateral lower legs.Discharge pending.

## 2021-03-07 LAB
POTASSIUM SERPL-SCNC: 3.2 MMOL/L (ref 3.4–5.3)
POTASSIUM SERPL-SCNC: 3.9 MMOL/L (ref 3.4–5.3)

## 2021-03-07 PROCEDURE — 250N000013 HC RX MED GY IP 250 OP 250 PS 637: Performed by: PSYCHIATRY & NEUROLOGY

## 2021-03-07 PROCEDURE — 250N000013 HC RX MED GY IP 250 OP 250 PS 637: Performed by: INTERNAL MEDICINE

## 2021-03-07 PROCEDURE — 120N000001 HC R&B MED SURG/OB

## 2021-03-07 PROCEDURE — 250N000011 HC RX IP 250 OP 636: Performed by: INTERNAL MEDICINE

## 2021-03-07 PROCEDURE — 36415 COLL VENOUS BLD VENIPUNCTURE: CPT | Performed by: INTERNAL MEDICINE

## 2021-03-07 PROCEDURE — 84132 ASSAY OF SERUM POTASSIUM: CPT | Performed by: INTERNAL MEDICINE

## 2021-03-07 PROCEDURE — 250N000013 HC RX MED GY IP 250 OP 250 PS 637: Performed by: PHYSICIAN ASSISTANT

## 2021-03-07 PROCEDURE — 99232 SBSQ HOSP IP/OBS MODERATE 35: CPT | Performed by: INTERNAL MEDICINE

## 2021-03-07 RX ORDER — POTASSIUM CHLORIDE 1500 MG/1
40 TABLET, EXTENDED RELEASE ORAL DAILY
Status: DISCONTINUED | OUTPATIENT
Start: 2021-03-07 | End: 2021-03-07

## 2021-03-07 RX ORDER — POTASSIUM CHLORIDE 1500 MG/1
40 TABLET, EXTENDED RELEASE ORAL ONCE
Status: COMPLETED | OUTPATIENT
Start: 2021-03-07 | End: 2021-03-07

## 2021-03-07 RX ADMIN — LACOSAMIDE 100 MG: 50 TABLET, FILM COATED ORAL at 21:34

## 2021-03-07 RX ADMIN — LEVOTHYROXINE SODIUM 150 MCG: 75 TABLET ORAL at 06:41

## 2021-03-07 RX ADMIN — RISPERIDONE 2 MG: 2 TABLET ORAL at 21:34

## 2021-03-07 RX ADMIN — LACOSAMIDE 100 MG: 50 TABLET, FILM COATED ORAL at 10:14

## 2021-03-07 RX ADMIN — TOPIRAMATE 200 MG: 200 TABLET ORAL at 10:13

## 2021-03-07 RX ADMIN — OLANZAPINE 5 MG: 5 TABLET, ORALLY DISINTEGRATING ORAL at 13:46

## 2021-03-07 RX ADMIN — TOPIRAMATE 200 MG: 200 TABLET ORAL at 21:34

## 2021-03-07 RX ADMIN — DIVALPROEX SODIUM 500 MG: 125 CAPSULE, COATED PELLETS ORAL at 21:34

## 2021-03-07 RX ADMIN — OLANZAPINE 5 MG: 5 TABLET, ORALLY DISINTEGRATING ORAL at 06:41

## 2021-03-07 RX ADMIN — DIVALPROEX SODIUM 500 MG: 125 CAPSULE, COATED PELLETS ORAL at 13:48

## 2021-03-07 RX ADMIN — RISPERIDONE 2 MG: 2 TABLET ORAL at 10:14

## 2021-03-07 RX ADMIN — SERTRALINE HYDROCHLORIDE 150 MG: 100 TABLET ORAL at 10:14

## 2021-03-07 RX ADMIN — ENOXAPARIN SODIUM 40 MG: 40 INJECTION SUBCUTANEOUS at 18:10

## 2021-03-07 RX ADMIN — LITHIUM CARBONATE 600 MG: 300 TABLET, EXTENDED RELEASE ORAL at 21:34

## 2021-03-07 RX ADMIN — POTASSIUM CHLORIDE 40 MEQ: 1500 TABLET, EXTENDED RELEASE ORAL at 10:14

## 2021-03-07 RX ADMIN — OLANZAPINE 5 MG: 5 TABLET, ORALLY DISINTEGRATING ORAL at 21:47

## 2021-03-07 RX ADMIN — HALOPERIDOL LACTATE 2 MG: 5 INJECTION, SOLUTION INTRAMUSCULAR at 04:07

## 2021-03-07 RX ADMIN — DIVALPROEX SODIUM 500 MG: 125 CAPSULE, COATED PELLETS ORAL at 06:40

## 2021-03-07 ASSESSMENT — ACTIVITIES OF DAILY LIVING (ADL)
ADLS_ACUITY_SCORE: 22

## 2021-03-07 NOTE — PLAN OF CARE
VSS,Zyprexa X1 given for anxiety, then slept well in between cares. Up with 1 assist.Voiding well. Bottom red and blanchable,Barrier cream applied several times,and pt slept on his side.Slight pink rashes noted on bilateral calf,looks more due to fricture with bed sheet.Discharge pending.

## 2021-03-07 NOTE — PLAN OF CARE
Pt alert, slept beginning of NOC shift approx 1 hour, was given food and drinks per his request, up at times and was redirected by sitter. Started getting agitated after 0300, throwing pillows, gown on the floor, attempted to pull PIV, up and about and was getting difficult to redirect, PRN Haldol IV given. Hand mitts applied x 2. Will continue to monitor.

## 2021-03-07 NOTE — PROGRESS NOTES
"[facility]    Medicine Progress Note - Hospitalist Service       Date of Admission:  2/24/2021    Assessment & Plan       Duane D Backes is a 40 year old male with history of autism, tonic-clonic seizures, moderate MR, benign epidermoid tumor, and hypothyroidism who presented to the ED 2/24/2021 for a 3 minute witnessed seizure. He had another seizure when he arrived in the ED and was given a total of 4 mg ativan. He became somnolent and had increased secretions so he was intubated in the ED and transferred to the ICU.   - PTA on Depakote ER 1000mg BID and Topamax 200 mg BID; he follows with Dr Washington from [facility]  - He was managed by ICU and NCC; he was successfully extubated on 03/01 and Hospitalist service was called regarding transfer of care.    1. Breakthrough seizures: resolved.   2. Status epilepticus: resolved.   - h/o tonic-clonic seizures, follows with Dr Washington from [facility]  - PTA on Depakote ER 1000mg BID and Topamax 200 mg BID;  - had another seizure in ER and was intubated for airway protection  - started on propofol infusion and versed drip and admitted to ICU  - CT head 02/25- No acute intracranial process; No change in the large epidermoid tumor involving the right middle cranial fossa, right ambient cistern, suprasellar region, with extension along the right aspect of the midbrain and lilli and into the cervical medullary junction region with  associated mass effect   - MRI brain- 02/25-  Increased size of the epidermoid cyst along the medulla, with increased inferior extension and mass effect on the medulla, as detailed above. In addition, there is a new enhancing, partially calcified nodular component along the anterior and right lateral margins of the medulla. Given that epidermoid cysts can have malignant degeneration, short interval contrast-enhanced MRI follow-up is   recommended in 4-6 weeks  - as per NCC- \"doubt that this is the cause of his seizure or his prolonged " "encephalopathy. This cyst has probably been slowly growing over the years not suddenly\"  - PTA medications lowering the seizure threshold (lithium, Risperdal, Zoloft, and Sudafed)- were held on admission   Now restarted by Psychiatry, agree with that.   - initially started on Keppra, then started on Vimpat  - VEEG- moderate to severe diffuse encephalopathy.  Epileptiform discharges or seizures were not seen  - he was successfully extubated on 03/01  - doing fine, seizure-free, mentation seems at baseline  - currently on Vimpat 100mg iv BID, Topamax 200 mg BID and Depakote  500 every 8 hrs.   - General neurology consulted, appreciate input.   - follow up with primary Neurologist  No more seizures at this time. Now he is walking with the assist of one only as per nursing staff, PT has to re-evaluate him again later today.   His Valproic acid level 91 ().   Patient other wise is medically stable.   g.     3. Acute hypoxic respiratory failure- resolved  - as above- intubated in ER for airways protection  - extubated on 03/01  - doing well now, sat well on RA    4. Aspiration PNA: Treated   - sputum culture- 02/25- positive for Klebsiella and Enterobacter clocae complex  - started on Levaquin on 2/27; day 7/7,  - afebrile, stop Levaquin, last dose 3/5    5. Acute encephalopathy-  improved  6. Autism  7. Moderate MR  -He is on Lithium, risperidone and Zoloft.   - Psychiatry consulted and his PRA medications are now resumed. Agree with it.   - sitter at bedside      8. Epidermoid tumor  - MRI brain shows some increase in size and some mass effect- as above  - NS consult, need follow up as per there recommendations.     9. Hypothyroidism  - TSH 0.27, fT4 1.05  - continue PTA Synthroid    10. Hypernatremia/Hypokalemia   Sodium normal now, improve oral intake and diet.   His potassium is low and need replacement.   I will start him on Kdur 40 meq daily.   Keep him on electrolytes replacement protocol.     11. " Dysphagia  - has a known hx of silent aspiration with all liquids; however, pt was on soft food and thin liquids at the group home   - SLP consulted- and following, was on full liquid honey thick liquid now advance to DD1 with thickened liquid.  As per nursing staff has good appetite and eating or drinking well.   Speech to follow and advance diet as tolerated.       Awaiting placement at this time.   PT to follow later today        Diet: Dysphagia Diet Level 1 Pureed Honey Thickened Liquids (pre-thickened or use instant food thickener)    DVT Prophylaxis: Enoxaparin (Lovenox) SQ  Colin Catheter: not present  Code Status: Full Code           Disposition Plan   Expected discharge: awaiting safe disposition.   Entered: Rich Richards MD 03/07/2021, 11:59 AM       The patient's care was discussed with the Bedside Nurse and ICU Team.    Rich Richards MD  Hospitalist Service  [facility]  Contact information available via [facility] Paging/Directory    ______________________________________________________________________    Interval History   Patient sleeping at time of my visit, was agitated overnight and received dose of haldol and Zyprexa early this morning and now sleeping comfortably.     No other significant event overnight.         Data reviewed today: I reviewed all medications, new labs and imaging results over the last 24 hours. I personally reviewed the all imaging since admission- image(s) showing as above.    Physical Exam   Vital Signs: Temp: 98.6  F (37  C) Temp src: Axillary BP: 119/86 Pulse: 60   Resp: 16 SpO2: 95 % O2 Device: None (Room air)    Weight: 175 lbs 14.83 oz     GEN: Sleeping comfortably at time of my visit. In no distress.   HEENT: head ncat, sclera anicteric, trachea midline   PULM: unlabored breathing, CTAB, on room air.  CV/COR: RRR, normal S1 and S2. No gallop, rub, nor murmur  ABD: soft, non-tender, non-distended; BS present  MSK/EXT:  No LE edema.  NEURO: no focal  deficit.       Data   Recent Labs   Lab 03/07/21  0800 03/06/21  0734 03/05/21  1514 03/05/21  0742 03/04/21  0659 03/04/21  0659 03/03/21  0435 03/03/21  0435 03/02/21  0604 03/02/21  0604   WBC  --   --   --  6.0  --   --   --  6.6  --  7.1   HGB  --   --   --  12.8*  --   --   --  11.2*  --  10.8*   MCV  --   --   --  95  --   --   --  94  --  96   PLT  --  238  --  185  --   --   --  138*  --  104*   NA  --  142 146* 145*  --  142  --  147*   < > 149*   POTASSIUM 3.2* 3.5 3.8 3.1*   < > 3.0*   < > 3.2*   < > 3.3*   CHLORIDE  --  110*  --  111*  --  109  --  114*  --  115*   CO2  --  28  --  28  --  26  --  28  --  28   BUN  --  12  --  17  --  22  --  26  --  28   CR  --  0.86  --  0.92  --  0.95  --  0.91  --  0.93   ANIONGAP  --  4  --  6  --  7  --  5  --  6   JESSICA  --  9.4  --  9.6  --  9.6  --  9.3  --  9.6   GLC  --  81  --  100*  --  79  --  65*  --  80   ALBUMIN  --   --   --  2.8*  --   --   --   --   --   --     < > = values in this interval not displayed.     No results found for this or any previous visit (from the past 24 hour(s)).  Medications       divalproex sodium delayed-release  500 mg Oral Q8H LUIS MIGUEL     enoxaparin ANTICOAGULANT  40 mg Subcutaneous Q24H     lacosamide  100 mg Oral BID     levothyroxine  150 mcg Oral QAM AC     lithium ER  600 mg Oral At Bedtime     potassium chloride  40 mEq Oral Daily     risperiDONE  2 mg Oral BID     sertraline  150 mg Oral QAM     topiramate  200 mg Oral BID

## 2021-03-07 NOTE — PLAN OF CARE
Pt alert, up and ambulated to bathroom with assist of one. Impulsive and restless, refused to gown, PRN Zyprexa ODT given x 1. Pt incontinent of BM x 1. Pt currently asleep. Sitter by bedside.

## 2021-03-08 ENCOUNTER — APPOINTMENT (OUTPATIENT)
Dept: SPEECH THERAPY | Facility: CLINIC | Age: 41
DRG: 100 | End: 2021-03-08
Attending: INTERNAL MEDICINE
Payer: MEDICARE

## 2021-03-08 ENCOUNTER — APPOINTMENT (OUTPATIENT)
Dept: PHYSICAL THERAPY | Facility: CLINIC | Age: 41
DRG: 100 | End: 2021-03-08
Attending: INTERNAL MEDICINE
Payer: MEDICARE

## 2021-03-08 ENCOUNTER — TELEPHONE (OUTPATIENT)
Dept: NEUROLOGY | Facility: CLINIC | Age: 41
End: 2021-03-08

## 2021-03-08 LAB
ALBUMIN SERPL-MCNC: 3 G/DL (ref 3.4–5)
ANION GAP SERPL CALCULATED.3IONS-SCNC: 6 MMOL/L (ref 3–14)
BASOPHILS # BLD AUTO: 0 10E9/L (ref 0–0.2)
BASOPHILS NFR BLD AUTO: 0.3 %
BUN SERPL-MCNC: 14 MG/DL (ref 7–30)
CALCIUM SERPL-MCNC: 9.5 MG/DL (ref 8.5–10.1)
CHLORIDE SERPL-SCNC: 108 MMOL/L (ref 94–109)
CO2 SERPL-SCNC: 27 MMOL/L (ref 20–32)
CREAT SERPL-MCNC: 0.75 MG/DL (ref 0.66–1.25)
DIFFERENTIAL METHOD BLD: ABNORMAL
EOSINOPHIL # BLD AUTO: 0.3 10E9/L (ref 0–0.7)
EOSINOPHIL NFR BLD AUTO: 2.9 %
ERYTHROCYTE [DISTWIDTH] IN BLOOD BY AUTOMATED COUNT: 13.7 % (ref 10–15)
GFR SERPL CREATININE-BSD FRML MDRD: >90 ML/MIN/{1.73_M2}
GLUCOSE SERPL-MCNC: 115 MG/DL (ref 70–99)
HCT VFR BLD AUTO: 35.2 % (ref 40–53)
HGB BLD-MCNC: 11.2 G/DL (ref 13.3–17.7)
IMM GRANULOCYTES # BLD: 0.1 10E9/L (ref 0–0.4)
IMM GRANULOCYTES NFR BLD: 0.8 %
LYMPHOCYTES # BLD AUTO: 1.8 10E9/L (ref 0.8–5.3)
LYMPHOCYTES NFR BLD AUTO: 20.2 %
MAGNESIUM SERPL-MCNC: 1.9 MG/DL (ref 1.6–2.3)
MCH RBC QN AUTO: 30.4 PG (ref 26.5–33)
MCHC RBC AUTO-ENTMCNC: 31.8 G/DL (ref 31.5–36.5)
MCV RBC AUTO: 96 FL (ref 78–100)
MONOCYTES # BLD AUTO: 0.8 10E9/L (ref 0–1.3)
MONOCYTES NFR BLD AUTO: 8.9 %
NEUTROPHILS # BLD AUTO: 6 10E9/L (ref 1.6–8.3)
NEUTROPHILS NFR BLD AUTO: 66.9 %
NRBC # BLD AUTO: 0 10*3/UL
NRBC BLD AUTO-RTO: 0 /100
PHOSPHATE SERPL-MCNC: 4.7 MG/DL (ref 2.5–4.5)
PLATELET # BLD AUTO: 366 10E9/L (ref 150–450)
POTASSIUM SERPL-SCNC: 3.9 MMOL/L (ref 3.4–5.3)
RBC # BLD AUTO: 3.68 10E12/L (ref 4.4–5.9)
SODIUM SERPL-SCNC: 141 MMOL/L (ref 133–144)
VALPROATE SERPL-MCNC: 102 MG/L (ref 50–100)
WBC # BLD AUTO: 9 10E9/L (ref 4–11)

## 2021-03-08 PROCEDURE — 36415 COLL VENOUS BLD VENIPUNCTURE: CPT | Performed by: INTERNAL MEDICINE

## 2021-03-08 PROCEDURE — 250N000013 HC RX MED GY IP 250 OP 250 PS 637: Performed by: INTERNAL MEDICINE

## 2021-03-08 PROCEDURE — 80164 ASSAY DIPROPYLACETIC ACD TOT: CPT | Performed by: INTERNAL MEDICINE

## 2021-03-08 PROCEDURE — 250N000011 HC RX IP 250 OP 636: Performed by: INTERNAL MEDICINE

## 2021-03-08 PROCEDURE — 92526 ORAL FUNCTION THERAPY: CPT | Mod: GN | Performed by: SPEECH-LANGUAGE PATHOLOGIST

## 2021-03-08 PROCEDURE — 85025 COMPLETE CBC W/AUTO DIFF WBC: CPT | Performed by: INTERNAL MEDICINE

## 2021-03-08 PROCEDURE — 120N000001 HC R&B MED SURG/OB

## 2021-03-08 PROCEDURE — 97116 GAIT TRAINING THERAPY: CPT | Mod: GP | Performed by: PHYSICAL THERAPIST

## 2021-03-08 PROCEDURE — 80069 RENAL FUNCTION PANEL: CPT | Performed by: INTERNAL MEDICINE

## 2021-03-08 PROCEDURE — 250N000013 HC RX MED GY IP 250 OP 250 PS 637: Performed by: PSYCHIATRY & NEUROLOGY

## 2021-03-08 PROCEDURE — 83735 ASSAY OF MAGNESIUM: CPT | Performed by: INTERNAL MEDICINE

## 2021-03-08 PROCEDURE — 99232 SBSQ HOSP IP/OBS MODERATE 35: CPT | Performed by: INTERNAL MEDICINE

## 2021-03-08 RX ORDER — LACOSAMIDE 100 MG/1
100 TABLET ORAL 2 TIMES DAILY
Qty: 60 TABLET | Refills: 1 | Status: SHIPPED | OUTPATIENT
Start: 2021-03-08 | End: 2021-03-30

## 2021-03-08 RX ADMIN — SERTRALINE HYDROCHLORIDE 150 MG: 100 TABLET ORAL at 08:24

## 2021-03-08 RX ADMIN — OLANZAPINE 5 MG: 5 TABLET, ORALLY DISINTEGRATING ORAL at 18:11

## 2021-03-08 RX ADMIN — DIVALPROEX SODIUM 500 MG: 125 CAPSULE, COATED PELLETS ORAL at 21:03

## 2021-03-08 RX ADMIN — LEVOTHYROXINE SODIUM 150 MCG: 75 TABLET ORAL at 06:40

## 2021-03-08 RX ADMIN — RISPERIDONE 2 MG: 2 TABLET ORAL at 08:24

## 2021-03-08 RX ADMIN — LACOSAMIDE 100 MG: 50 TABLET, FILM COATED ORAL at 21:03

## 2021-03-08 RX ADMIN — LACOSAMIDE 100 MG: 50 TABLET, FILM COATED ORAL at 08:24

## 2021-03-08 RX ADMIN — LITHIUM CARBONATE 600 MG: 300 TABLET, EXTENDED RELEASE ORAL at 21:03

## 2021-03-08 RX ADMIN — TOPIRAMATE 200 MG: 200 TABLET ORAL at 08:24

## 2021-03-08 RX ADMIN — TOPIRAMATE 200 MG: 200 TABLET ORAL at 21:03

## 2021-03-08 RX ADMIN — OLANZAPINE 5 MG: 5 TABLET, ORALLY DISINTEGRATING ORAL at 08:28

## 2021-03-08 RX ADMIN — DIVALPROEX SODIUM 500 MG: 125 CAPSULE, COATED PELLETS ORAL at 06:40

## 2021-03-08 RX ADMIN — RISPERIDONE 2 MG: 2 TABLET ORAL at 21:03

## 2021-03-08 RX ADMIN — DIVALPROEX SODIUM 500 MG: 125 CAPSULE, COATED PELLETS ORAL at 14:26

## 2021-03-08 ASSESSMENT — ACTIVITIES OF DAILY LIVING (ADL)
ADLS_ACUITY_SCORE: 22
ADLS_ACUITY_SCORE: 21
ADLS_ACUITY_SCORE: 22
ADLS_ACUITY_SCORE: 21
ADLS_ACUITY_SCORE: 22
ADLS_ACUITY_SCORE: 21

## 2021-03-08 ASSESSMENT — MIFFLIN-ST. JEOR: SCORE: 1801.69

## 2021-03-08 NOTE — PLAN OF CARE
SLP: Patient seen for swallow treatment at noon meal. He was upright in bed feeding himself to rapidly and I removed the food until his oral cavity was cleared. Then cut off pieces of the meat that was normal bite size. Continued to have oral residue and cues to swallow again and cough. Cough was wet sounding and able to clear with 2 coughs. Given his extremely impulsive behaviors would assist with feeding and not put a tray in front of him, but rather fill the spoon and hand it to him to decrease the risk for aspiration/choking. Put a small amount of liquids in a cup otherwise, will drink down the whole cup.   Recommend: 1. Cautiously continue on the DDL 1 with honey thick liquids. 1:1 assistance/supervision with meals due to impulsive eating/drinking.

## 2021-03-08 NOTE — TELEPHONE ENCOUNTER
What is the concern that needs to be addressed by a nurse? Pt hospitalized 2/25/21 and is being discharged Tuesday or Wednesday and would like to go over a discharge seizure plan of care and discuss when he should schedule a follow up. Please call back when available.     May a detailed message be left on [employer]? Yes, personal cell    Date of last office visit: 01/19/21    Message routed to: mincep rn pool

## 2021-03-08 NOTE — PLAN OF CARE
Patient alert, responding in short sentences, verbalizing requests for food, etc.  Slept approx 3-4 hours this shift. Up an d about when awake, steady on feet. Intermittent restlessness, throwing stuff on floor.  Pt engagement sometimes helpful to calm patient,i.e. counting, talking about food, etc. Sitter in room at all times. Ensured safety.

## 2021-03-08 NOTE — PLAN OF CARE
Pt up with CGA, refusing gait belt and socks.  Tolerating DD1 with Nector thick liquids.  Agitated at times, behavior improves with pudding, ice cream and coke.  Plan to discharge back to  @ 10am tomorrow.

## 2021-03-08 NOTE — PROGRESS NOTES
"[facility]    Medicine Progress Note - Hospitalist Service        Date of Admission:  2/24/2021 11:09 PM    Assessment & Plan:   Duane D Backes is a 40 year old male with history of autism, tonic-clonic seizures, moderate MR, benign epidermoid tumor, and hypothyroidism who presented to the ED 2/24/2021 for breakthrough witnessed seizure. He became somnolent and had increased secretions so he was intubated in the ED and transferred to the ICU.      Status epilepticus: resolved.   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 In addition, there is a new enhancing, partially calcified nodular component along the anterior and right lateral margins of the medulla  -as per [facility]- \"doubt that this is the cause of his seizure or his prolonged encephalopathy. This cyst has probably been slowly growing over the years not suddenly\"  -PTA medications lowering the seizure threshold (lithium, Risperdal, Zoloft, and Sudafed)- were held on admission; now restarted by Psychiatry.   -he was successfully extubated on 03/01  -doing fine, seizure-free, mentation seems at baseline  -currently on Vimpat 100mg iv BID, Topamax 200 mg BID and Depakote  500 every 8 hrs.   -General neurology consulted, appreciate input.   -follow up with primary Neurologist     Acute hypoxic respiratory failure- resolved  -as above- intubated in ER for airways protection  -extubated on 03/01  -doing well now, saturating well on RA     Aspiration PNA: Treated   -sputum culture- 02/25- positive for Klebsiella and " "Enterobacter clocae complex  -completed 7 days of Levaquin     Acute encephalopathy  Autism  Moderate MR  -He is on Lithium, risperidone and Zoloft.   -Psychiatry consulted and his PRA medications are now resumed.   -sitter at bedside; otherwise appears to be back to baseline     Epidermoid tumor  -MRI brain shows some increase in size and some mass effect- as above  -NS consulted,Outpatient follow up with Dr. Lui in 4 weeks with new MRI brain with and without contrast     Hypothyroidism  -continue PTA Synthroid     Hypernatremia/Hypokalemia   -resolved     Dysphagia  -has a known hx of silent aspiration with all liquids; however, pt was on soft food and thin liquids at the group home   -SLP consulted- and following, was on full liquid honey thick liquid now advance to DD1 with thickened liquid.  -Speech to follow and advance diet as tolerated.        Diet: Dysphagia Diet Level 1 Pureed Honey Thickened Liquids (pre-thickened or use instant food thickener)     DVT Prophylaxis: Pneumatic Compression Devices   Colin Catheter: not present  Code Status: Full Code     Disposition Plan    Expected discharge: 1-2 days recommended to TBD  Entered: Harrison Marshall MD 03/08/2021, 11:33 AM        The patient's care was discussed with the Bedside Nurse and Patient.    Harrison Marshall MD  Hospitalist Service  [facility]    ______________________________________________________________________    Interval History   Intermittently restless. Denies new complaints. No new seizures    Data reviewed today: I reviewed all medications, new labs and imaging results over the last 24 hours. I personally reviewed no images or EKG's today.    Physical Exam   Vital signs:  Temp: (!) 91.1  F (32.8  C) Temp src: Axillary BP: 110/70 Pulse: 86   Resp: 16 SpO2: 99 % O2 Device: None (Room air) Oxygen Delivery: 2 LPM Height: 188 cm (6' 2\") Weight: 81.2 kg (179 lb)  Estimated body mass index is 22.98 kg/m  as calculated from the " "following:    Height as of this encounter: 1.88 m (6' 2\").    Weight as of this encounter: 81.2 kg (179 lb).      Wt Readings from Last 2 Encounters:   03/08/21 81.2 kg (179 lb)   12/23/19 89.8 kg (198 lb)       Gen: AAOX1-2, NAD; slightly restless  HEENT: no pallor  Resp: CTA B/L, normal WOB  CVS: RRR, no murmur  Abd/GI: Soft, non-tender. BS- normoactive.    Skin: Warm, dry no rashes  MSK: no pedal edema  Neuro- CN- intact. No focal deficits.  Spontaneously moving all 4 extremities      Data   Recent Labs   Lab 03/07/21  1501 03/07/21  0800 03/06/21  0734 03/05/21  1514 03/05/21  0742 03/04/21  0659 03/04/21  0659 03/03/21  0435 03/03/21  0435 03/02/21  0604 03/02/21  0604   WBC  --   --   --   --  6.0  --   --   --  6.6  --  7.1   HGB  --   --   --   --  12.8*  --   --   --  11.2*  --  10.8*   MCV  --   --   --   --  95  --   --   --  94  --  96   PLT  --   --  238  --  185  --   --   --  138*  --  104*   NA  --   --  142 146* 145*  --  142  --  147*   < > 149*   POTASSIUM 3.9 3.2* 3.5 3.8 3.1*   < > 3.0*   < > 3.2*   < > 3.3*   CHLORIDE  --   --  110*  --  111*  --  109  --  114*  --  115*   CO2  --   --  28  --  28  --  26  --  28  --  28   BUN  --   --  12  --  17  --  22  --  26  --  28   CR  --   --  0.86  --  0.92  --  0.95  --  0.91  --  0.93   ANIONGAP  --   --  4  --  6  --  7  --  5  --  6   JESSICA  --   --  9.4  --  9.6  --  9.6  --  9.3  --  9.6   GLC  --   --  81  --  100*  --  79  --  65*  --  80   ALBUMIN  --   --   --   --  2.8*  --   --   --   --   --   --     < > = values in this interval not displayed.       No results found for this or any previous visit (from the past 24 hour(s)).  Medications       divalproex sodium delayed-release  500 mg Oral Q8H LUIS MIGUEL     enoxaparin ANTICOAGULANT  40 mg Subcutaneous Q24H     lacosamide  100 mg Oral BID     levothyroxine  150 mcg Oral QAM AC     lithium ER  600 mg Oral At Bedtime     risperiDONE  2 mg Oral BID     sertraline  150 mg Oral QAM     topiramate  200 " mg Oral BID

## 2021-03-08 NOTE — PROGRESS NOTES
Care Management Follow Up    Length of Stay (days): 11    Expected Discharge Date: 03/10/21     Concerns to be Addressed:       Patient plan of care discussed at interdisciplinary rounds: Yes    Anticipated Discharge Disposition:  3/9   Anticipated Discharge Services:  none  Anticipated Discharge DME:      Patient/family educated on Medicare website which has current facility and service quality ratings:  NA  Education Provided on the Discharge Plan:    Patient/Family in Agreement with the Plan:  yes    Referrals Placed by CM/SW:    Private pay costs discussed: Not applicable    Additional Information:    Spoke with Jarrell [phone] @ . They are able to take patient back if he is able to get in/out of bed independently and ambulate on his own.   Discussed with bedside RN- she will give update to Jarrell  Suggest  come to evaluate hin to see if he is at his baseline, as PT recommending TCU. They plan on coming in today  Any new meds to be filled @ [facility]- [facility] updated   can provide transportation between  M-F    Addendum 1430:  staff came to evaluate patient- he is at his physical baseline. Writer discussed w/ Jarrell  They are unable to have him return today at this time ( too late in the day for Nursing assessment/ new meds)   staff will plan on picking him up tomorrow 3/9 @ 1000. They will come to his room    Jarrell requested repeat VPA level before discharge (done)    Updated his mother Seema (Guardian) of discharge plan    Upon discharge- orders to be Faxed to [phone]    1530:  He has a hard copy Rx (on chart). Called Jarrell @  to see if it should be sent with him or faxed to [facility]. SANTOS left

## 2021-03-09 ENCOUNTER — TELEPHONE (OUTPATIENT)
Dept: FAMILY MEDICINE | Facility: CLINIC | Age: 41
End: 2021-03-09

## 2021-03-09 VITALS
BODY MASS INDEX: 22.97 KG/M2 | HEART RATE: 80 BPM | OXYGEN SATURATION: 100 % | DIASTOLIC BLOOD PRESSURE: 65 MMHG | HEIGHT: 74 IN | SYSTOLIC BLOOD PRESSURE: 109 MMHG | TEMPERATURE: 97.2 F | RESPIRATION RATE: 16 BRPM | WEIGHT: 179 LBS

## 2021-03-09 LAB
CREAT SERPL-MCNC: 0.87 MG/DL (ref 0.66–1.25)
GFR SERPL CREATININE-BSD FRML MDRD: >90 ML/MIN/{1.73_M2}
PLATELET # BLD AUTO: 407 10E9/L (ref 150–450)

## 2021-03-09 PROCEDURE — 250N000013 HC RX MED GY IP 250 OP 250 PS 637: Performed by: INTERNAL MEDICINE

## 2021-03-09 PROCEDURE — 250N000013 HC RX MED GY IP 250 OP 250 PS 637: Performed by: PSYCHIATRY & NEUROLOGY

## 2021-03-09 PROCEDURE — 85049 AUTOMATED PLATELET COUNT: CPT | Performed by: INTERNAL MEDICINE

## 2021-03-09 PROCEDURE — 36415 COLL VENOUS BLD VENIPUNCTURE: CPT | Performed by: INTERNAL MEDICINE

## 2021-03-09 PROCEDURE — 99239 HOSP IP/OBS DSCHRG MGMT >30: CPT | Performed by: INTERNAL MEDICINE

## 2021-03-09 PROCEDURE — 82565 ASSAY OF CREATININE: CPT | Performed by: INTERNAL MEDICINE

## 2021-03-09 RX ORDER — DIVALPROEX SODIUM 500 MG/1
500 TABLET, EXTENDED RELEASE ORAL 3 TIMES DAILY
Qty: 90 TABLET | Refills: 1 | Status: SHIPPED | OUTPATIENT
Start: 2021-03-09 | End: 2021-03-30

## 2021-03-09 RX ORDER — DIVALPROEX SODIUM 500 MG/1
500 TABLET, EXTENDED RELEASE ORAL 3 TIMES DAILY
Qty: 150 TABLET | Refills: 11 | Status: SHIPPED | OUTPATIENT
Start: 2021-03-09 | End: 2021-03-09

## 2021-03-09 RX ADMIN — SERTRALINE HYDROCHLORIDE 150 MG: 100 TABLET ORAL at 09:28

## 2021-03-09 RX ADMIN — LACOSAMIDE 100 MG: 50 TABLET, FILM COATED ORAL at 09:28

## 2021-03-09 RX ADMIN — LEVOTHYROXINE SODIUM 150 MCG: 75 TABLET ORAL at 06:32

## 2021-03-09 RX ADMIN — DIVALPROEX SODIUM 500 MG: 125 CAPSULE, COATED PELLETS ORAL at 06:32

## 2021-03-09 RX ADMIN — TOPIRAMATE 200 MG: 200 TABLET ORAL at 09:28

## 2021-03-09 RX ADMIN — RISPERIDONE 2 MG: 2 TABLET ORAL at 09:29

## 2021-03-09 ASSESSMENT — ACTIVITIES OF DAILY LIVING (ADL)
ADLS_ACUITY_SCORE: 21

## 2021-03-09 NOTE — DISCHARGE SUMMARY
"[facility]    Discharge Summary  Hospitalist    Date of Admission:  2/24/2021  Date of Discharge:  3/9/2021  Discharging Provider: Harrison Marshall MD    Discharge Diagnoses     Status epilepticus: resolved.   Known seizure disorder  Acute hypoxic respiratory failure  Aspiration PNA   Acute encephalopathy  Autism  Moderate MR  Epidermoid tumor   Hypothyroidism   Hypernatremia/Hypokalemia   Dysphagia    Hospital Course:  Duane D Backes is a 40 year old male with history of autism, tonic-clonic seizures, moderate MR, benign epidermoid tumor, and hypothyroidism who presented to the ED 2/24/2021 for breakthrough witnessed seizure. He became somnolent and had increased secretions so he was intubated in the ED and transferred to the ICU.      Status epilepticus: resolved.   Known seizure disorder  -h/o tonic-clonic seizures, follows with Dr Washington from [facility]  -PTA on Depakote ER and Topamax 200 mg BID  -had another seizure in ER and was intubated for airway protection  -started on propofol infusion and versed drip and admitted to ICU  -CT head 02/25- No acute intracranial process   -MRI brain- 02/25-  Increased size of the epidermoid cyst along the medulla, with increased inferior extension and mass effect on the medulla, as detailed above. In addition, there is a new enhancing, partially calcified nodular component along the anterior and right lateral margins of the medulla  -as per N[facility]- \"doubt that this is the cause of his seizure or his prolonged encephalopathy. This cyst has probably been slowly growing over the years not suddenly\"  -PTA medications lowering the seizure threshold (lithium, Risperdal, Zoloft, and Sudafed)- were held on admission; now restarted by Psychiatry.   -he was successfully extubated on 03/01  -doing fine, seizure-free, mentation seems at baseline  -will discharge on Vimpat 100mg iv BID, Topamax 200 mg BID and Depakote 500 every 8 hrs(decreased from prior to admission " dose of 1000 mg every 12 hours).   -General neurology followed and assisted with anticonvulsant management.   -follow up with primary Neurologist  -Depakote level a day prior to discharge was 102, slightly above therapeutic level; since we have just made a dose change during this hospitalization, I recommend rechecking Depakote level again on 3/12/2021 and making necessary adjustment on Depakote dosing with help from his primary neurologist.  This was communicated to his group home staff by me personally.  -Patient was initially erroneously discharged on 1000 mg twice a day of Depakote, which was changed(to the intended dose of 500 mg 3 times daily) and the staff at his group home and his pharmacy was updated.  New prescription for 5 mg 3 times daily was also sent to his pharmacy.     Acute hypoxic respiratory failure- resolved  -as above- intubated in ER for airways protection  -extubated on 03/01  -doing well now, saturating well on RA     Aspiration PNA: Treated   -sputum culture- 02/25- positive for Klebsiella and Enterobacter clocae complex  -completed 7 days of Levaquin     Acute encephalopathy  Autism  Moderate MR  -He is on Lithium, risperidone and Zoloft.   -Psychiatry consulted and his PRA medications are now resumed.   -Patient appears back to baseline, will discharge to his group home today     Epidermoid tumor  -MRI brain shows some increase in size and some mass effect- as above  -NS consulted,Outpatient follow up with Dr. Lui in 4 weeks with new MRI brain with and without contrast     Hypothyroidism  -continue PTA Synthroid     Hypernatremia/Hypokalemia   -resolved     Dysphagia  -has a known hx of silent aspiration with all liquids; however, pt was on soft food and thin liquids at the group home   -SLP consulted- and following, was on full liquid honey thick liquid now advance to DD1 with thickened liquid.      Harrison Marshall MD    Significant Results and Procedures   See below    Pending Results      Unresulted Labs Ordered in the Past 30 Days of this Admission     No orders found from 1/25/2021 to 2/25/2021.          Code Status   Full Code       Primary Care Physician   Rajat Parkinson    Physical Exam   Temp: 97.2  F (36.2  C) Temp src: Axillary BP: 109/65 Pulse: 80   Resp: 16 SpO2: 100 % O2 Device: None (Room air)      Constitutional: AA, NAD  Respiratory: CTA B/L, Normal WOB  Cardiovascular: RRR, No murmur  GI: Soft, Non- tender, BS- normoactive  Neuro: CN- grossly intact     Discharge Disposition   Discharged to home  Condition at discharge: Stable    Consultations This Hospital Stay   NEUROLOGY IP CONSULT  CARE MANAGEMENT / SOCIAL WORK IP CONSULT  NUTRITION SERVICES ADULT IP CONSULT  PHARMACY IP CONSULT  SWALLOW EVAL SPEECH PATH AT BEDSIDE IP CONSULT  PSYCHIATRY IP CONSULT  NEUROSURGERY IP CONSULT  SOCIAL WORK IP CONSULT  CARE MANAGEMENT / SOCIAL WORK IP CONSULT  SWALLOW EVAL SPEECH PATH AT BEDSIDE IP CONSULT  PHYSICAL THERAPY ADULT IP CONSULT  CARE MANAGEMENT / SOCIAL WORK IP CONSULT  NEUROLOGY IP CONSULT    Time Spent on this Encounter   I, Harrison Marshall MD, personally saw the patient today and spent greater than 30 minutes discharging this patient.    Discharge Orders      MR Brain w/o & w Contrast     Activity    Your activity upon discharge: activity as tolerated     Follow-up and recommended labs and tests     Please follow up at [facility] in 3-4 weeks with Dr. Lui with MRI brain prior.    Appointment has been scheduled   [phone]     Follow-up and recommended labs and tests    Follow up with primary care provider, Rajat Parkinson, within 7 days for hospital follow- up. Needs valproic acid levels on 3/12/21.  Rajat Parkinson   [phone]   [address] [facility] [facility] [address]      Neurology in 4 weeks. Dr Washington [phone]    Primary psychiatrist in 1-2 weeks     Full Code     Diet    Speech language therapy   Given his extremely impulsive  behaviors would assist with feeding and not put a tray in front of him, but rather fill the spoon and hand it to him to decrease the risk for aspiration/choking. Put a small amount of liquids in a cup otherwise, will drink down the whole cup.   Recommend: 1. Cautiously continue on the DDL 1 with honey thick liquids. 1:1 assistance/supervision with meals due to impulsive eating/drinking.     Diet    Follow this diet upon discharge: Orders Placed This Encounter      Dysphagia Diet Level 1 Pureed Honey Thickened Liquids (pre-thickened or use instant food thickener)      Diet       Discharge Medications         Review of your medicines      START taking      Dose / Directions   Lacosamide 100 MG Tabs tablet  Commonly known as: VIMPAT  Used for: Seizure disorder (H)      Dose: 100 mg  Take 1 tablet (100 mg) by mouth 2 times daily  Quantity: 60 tablet  Refills: 1        CONTINUE these medicines which may have CHANGED, or have new prescriptions. If we are uncertain of the size of tablets/capsules you have at home, strength may be listed as something that might have changed.      Dose / Directions   divalproex sodium extended-release 500 MG 24 hr tablet  Commonly known as: DEPAKOTE ER  This may have changed:     how much to take    how to take this    when to take this    additional instructions  Used for: Seizure disorder (H)      Dose: 500 mg  Take 1 tablet (500 mg) by mouth 3 times daily  Quantity: 90 tablet  Refills: 1     fish oil-omega-3 fatty acids 1000 MG capsule  This may have changed:     how to take this    when to take this  Used for: AUTISTIC DISORDER - CURRENT OR ACTIVE      1 PO BID  Quantity: 60  Refills: 11        CONTINUE these medicines which have NOT CHANGED      Dose / Directions   ammonium lactate 12 % external lotion  Commonly known as: LAC-HYDRIN      Apply topically daily To feet and heels  Refills: 0     docusate sodium 100 MG capsule  Commonly known as: COLACE      Dose: 1 capsule  Take 1 capsule  by mouth 2 times daily.  Refills: 0     * lactulose 10 GM/15ML solution  Commonly known as: CHRONULAC      Dose: 30 mL  Take 30 mLs by mouth every evening & additional 30mL 2 times per day as needed  Refills: 0     * lactulose 10 GM/15ML solution  Commonly known as: CHRONULAC      Dose: 30 mL  Take 30 mLs by mouth 2 times daily as needed  Refills: 0     levothyroxine 150 MCG tablet  Commonly known as: SYNTHROID/LEVOTHROID  Used for: Hypothyroidism due to acquired atrophy of thyroid      TAKE 1 TABLET BY MOUTH ONCE DAILY  FOR THYROID   *1 TOTAL FILL*  Quantity: 31 tablet  Refills: 9     lithium  MG CR tablet  Commonly known as: LITHOBID      Dose: 600 mg  Take 600 mg by mouth At Bedtime  Refills: 0     Milk of Magnesia 400 MG/5ML suspension  Generic drug: magnesium hydroxide      Dose: 30 mL  30 mLs every evening  Refills: 0     order for DME  Used for: Pain of toe of right foot, Closed nondisplaced fracture of phalanx of toe, unspecified laterality, unspecified toe, initial encounter      Flat sole shoe  Quantity: 1 Device  Refills: 0     polyethylene glycol 17 GM/Dose powder  Commonly known as: MiraLax  Used for: Constipation      Dose: 1 capful  Take 17 g by mouth daily.  Quantity: 510 g  Refills: 0     pseudoePHEDrine 30 MG tablet  Commonly known as: SUDAFED      Dose: 30 mg  Take 30 mg by mouth every evening as needed for congestion  Refills: 0     RisperDAL 2 MG tablet  Generic drug: risperiDONE      Dose: 2 mg  Take 2 mg by mouth 2 times daily 1 tab every morning & 1 tab every bedtime  Refills: 0     sertraline 100 MG tablet  Commonly known as: ZOLOFT      Dose: 1.5 tablet  Take 1.5 tablets by mouth every morning.  Refills: 0     Starch (Thickening) Powd  Used for: Pharyngeal dysphagia      Combined with beverages, per instructions from Speech Therapy.  Quantity: 850 g  Refills: 99     topiramate 200 MG tablet  Commonly known as: TOPAMAX      Dose: 200 mg  Take 200 mg by mouth 2 times daily  Refills: 0      Vitamin D3 25 mcg (1000 units) tablet  Commonly known as: CHOLECALCIFEROL      Dose: 3,000 Units  3,000 Units daily  Refills: 0         * This list has 2 medication(s) that are the same as other medications prescribed for you. Read the directions carefully, and ask your doctor or other care provider to review them with you.               Where to get your medicines      These medications were sent to [facility]. - [place], [place] - [address]  [address] [facility] [address]    Phone: [phone]     divalproex sodium extended-release 500 MG 24 hr tablet     Some of these will need a paper prescription and others can be bought over the counter. Ask your nurse if you have questions.    Bring a paper prescription for each of these medications    Lacosamide 100 MG Tabs tablet           Allergies   Allergies   Allergen Reactions     Penicillin [Penicillins] Other (See Comments)     Prozac [Fluoxetine Hcl]      Reglan [Metoclopramide Hcl]      Ritalin [Methylphenidate Derivatives] Other (See Comments)     Ritalin     Trazodone      Data   Most Recent 3 CBC's:  Recent Labs   Lab Test 03/09/21  0757 03/08/21  1216 03/06/21  0734 03/05/21  0742 03/03/21  0435   WBC  --  9.0  --  6.0 6.6   HGB  --  11.2*  --  12.8* 11.2*   MCV  --  96  --  95 94    366 238 185 138*      Most Recent 3 BMP's:  Recent Labs   Lab Test 03/09/21  0757 03/08/21  1216 03/07/21  1501 03/07/21  0800 03/06/21  0734 03/05/21  1514 03/05/21  0742   NA  --  141  --   --  142 146* 145*   POTASSIUM  --  3.9 3.9 3.2* 3.5 3.8 3.1*   CHLORIDE  --  108  --   --  110*  --  111*   CO2  --  27  --   --  28  --  28   BUN  --  14  --   --  12  --  17   CR 0.87 0.75  --   --  0.86  --  0.92   ANIONGAP  --  6  --   --  4  --  6   JESSICA  --  9.5  --   --  9.4  --  9.6   GLC  --  115*  --   --  81  --  100*     Most Recent 2 LFT's:  Recent Labs   Lab Test 02/26/21  1511 06/16/20  1527   AST 18 34   ALT 19 17   ALKPHOS 56 53    BILITOTAL 0.3 0.2     Most Recent INR's and Anticoagulation Dosing History:  Anticoagulation Dose History     There is no flowsheet data to display.        Most Recent 3 Troponin's:No lab results found.  Most Recent Cholesterol Panel:  Recent Labs   Lab Test 05/17/18  0936   CHOL 174   *   HDL 58   TRIG 77     Most Recent 6 Bacteria Isolates From Any Culture (See EPIC Reports for Culture Details):  Recent Labs   Lab Test 02/25/21  1435 02/25/21  0028 02/25/21  0023   CULT Heavy growth  Normal stacey    Light growth  Enterobacter cloacae complex  *  Light growth  Klebsiella pneumoniae  * No growth No growth     Most Recent TSH, T4 and A1c Labs:  Recent Labs   Lab Test 02/25/21  0749 09/23/20  0858   TSH 0.27*  --    T4 1.05  --    A1C  --  4.7       Results for orders placed or performed during the hospital encounter of 02/24/21   CT Head w/o Contrast    Narrative    EXAM: CT HEAD W/O CONTRAST  LOCATION: [facility]  DATE/TIME: 2/24/2021 11:42 PM    INDICATION: Seizure, nontraumatic (Age 18-40y)  COMPARISON: 02/22/2019  TECHNIQUE: Routine CT Head without IV contrast. Multiplanar reformats. Dose reduction techniques were used.    FINDINGS:  INTRACRANIAL CONTENTS: No intracranial hemorrhage. Again noted is a large low-attenuation lesion involving the right middle cranial fossa, right ambient cistern, sella and suprasellar region and bilateral CP angles extending along the right aspect of the   cervical medullary junction. This compresses the right cerebral peduncle, right lilli and medulla and the craniocervical junction is deviated to the left. No CT evidence of acute infarct. Normal parenchymal attenuation. Normal ventricles and sulci.     VISUALIZED ORBITS/SINUSES/MASTOIDS: No intraorbital abnormality. No paranasal sinus mucosal disease. No middle ear or mastoid effusion.    BONES/SOFT TISSUES: No acute abnormality.      Impression    IMPRESSION:  1.  No acute intracranial process.  2.  No  change in the large epidermoid tumor involving the right middle cranial fossa, right ambient cistern, suprasellar region, with extension along the right aspect of the midbrain and lilli and into the cervical medullary junction region with   associated mass effect as described.   XR Chest Port 1 View    Narrative    EXAM: XR CHEST PORT 1 VW  LOCATION: [facility]  DATE/TIME: 2/25/2021 12:14 AM    INDICATION: Intubated.  COMPARISON: None.      Impression    IMPRESSION: Endotracheal tube has been placed which terminates about 4 cm above the ruma. An enteric tube terminates over the left upper quadrant. Heart size is normal. Streaky bibasilar atelectasis or infiltrate. The upper lung zones are clear. No   visible pneumothorax or pleural effusion on this supine image.   MR Brain w/o & w Contrast    Narrative    EXAM: MR BRAIN W/O and W CONTRAST  LOCATION: [facility]  DATE/TIME: 2/25/2021 4:36 AM    INDICATION: Seizure, nontraumatic (Age 18-40y).  COMPARISON: 03/27/2014, 02/24/2021.  CONTRAST: 9 mL Gadavist.  TECHNIQUE: 1.5 multiplanar multisequence head MRI without and with intravenous contrast according to the seizure protocol.    FINDINGS:  INTRACRANIAL CONTENTS: The large epidermoid cyst has increased in size from 2014, with a larger component along the right lateral aspect of the medulla (series 8 image 6). There is increased mass effect on the brainstem with leftward displacement. This   area previously measured 2.2 x 2.2 cm, but now measures 3.6 x 2.9 cm in oblique AP x oblique transverse dimension. In addition, extension along the left medulla has increased from 2014 (series 5.2 image 17), measuring 2.5 x 1.8 cm, previously 1.4 x 1.4   cm. The remainder of the epidermoid cyst is grossly stable in size and extent, extending from the foramen magnum superiorly to encase the medulla and basilar artery, extending superiorly along the ventral lilli and midbrain into the suprasellar cistern    and along the undersurface of the right temporal lobe.     There is nodular enhancement and septation along the ventral and right lateral margin of the epidermoid cyst adjacent to the medulla (series 16 images 6 through 9). While some of the nodular enhancement could reflect choroid plexus traversing through the   epidermoid cyst, the thicker septations along the ventral medulla are somewhat suspicious for a solid component, measuring approximately 0.7 x 1.4 x 0.8 cm (series 16 and series 8 image 6 and series 18 image 12). In addition, there is associated   susceptibility with the areas of nodular enhancement that appear calcified on the comparison head CT.    No acute or subacute infarct. No acute intracranial hemorrhage. There is no midline shift or uncal herniation. The cerebellar tonsils are superiorly displaced without herniation. The brain parenchyma is grossly normal in signal. The ventricles are stable   in configuration and nondilated. Normal position of the cerebellar tonsils.    SELLA: The pituitary gland is grossly unremarkable. The epidermoid cyst extends superiorly to encase the pituitary infundibulum and superiorly displaces the optic chiasm.    OSSEOUS STRUCTURES/SOFT TISSUES: Normal marrow signal. The major intracranial vascular flow voids are maintained.     ORBITS: No abnormality accounting for technique.     SINUSES/MASTOIDS: Moderate mucosal thickening scattered about the paranasal sinuses. Scattered fluid/membrane thickening in the mastoid air cells bilaterally.       Impression    IMPRESSION:  1.  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  2.  In addition, there is a new enhancing, partially calcified nodular component along the anterior and right lateral margins of the medulla. Given that epidermoid cysts can have malignant degeneration, short interval contrast-enhanced MRI follow-up is    recommended in 4-6 weeks.  3.  No acute hemorrhage or infarct.    Findings were discussed with Dr. Xavier at 6:41 AM on 02/25/2021.           XR Chest Port 1 View    Narrative    CHEST ONE VIEW PORTABLE   2/28/2021 10:38 AM     HISTORY: Sputum sample shows Klebsiella. Evaluate for pneumonia.    COMPARISON: Chest x-ray 2/25/2021.      Impression    IMPRESSION: Portable chest. There is a new airspace opacity in the mid  left lung with increasing opacity in the retrocardiac region as well,  possibly reflecting left lower lobe pneumonia. Right lung is clear. No  pneumothorax or pleural fluid. Heart is normal in size. Nasogastric  tube and endotracheal tube remain in position.    DOUGLAS GONZALEZ MD

## 2021-03-09 NOTE — PROGRESS NOTES
Discharge medication Depakote dose has been changed. Spoke with Meliza SEYMOUR @ [facility] [phone]- she is aware of the change- has talked w/ MD  Updated discharge orders faxed to  [phone]. [facility]

## 2021-03-09 NOTE — PLAN OF CARE
Physical Therapy Discharge Summary    Reason for therapy discharge:    Discharged to group home    Progress towards therapy goal(s). See goals on Care Plan in [facility] electronic health record for goal details.  Goals partially met.  Barriers to achieving goals:   discharge from facility.    Therapy recommendation(s):    No further therapy is recommended.

## 2021-03-09 NOTE — PLAN OF CARE
VSS, up with SBA refuses to wear gown, socks or gait belt. Pt restless at times zyprexa given x1. Plan is for discharge back to group home tomorrow morning.

## 2021-03-09 NOTE — PLAN OF CARE
Pt A&Ox to self only. VSS on RA. Voiding adequately. Up with sba in room, refuses socks and gait belt. Dd1 honey thickened liquids. Slept on an off tonight. Pt restless at times but redirectable. Discharge to group home at 1000.

## 2021-03-09 NOTE — PROGRESS NOTES
Care Management Discharge Note    Discharge Date: 03/09/21(return  @ 1000)       Discharge Disposition:      Discharge Services:  none    Discharge DME:      Discharge Transportation:  Via  staff    Private pay costs discussed: Not applicable    PAS Confirmation Code:  NA  Patient/family educated on Medicare website which has current facility and service quality ratings:      Education Provided on the Discharge Plan:  yes  Persons Notified of Discharge Plans: Guardian ( on 3/8) and  staff Kimberlee  Patient/Family in Agreement with the Plan:  yes    Handoff Referral Completed: No    Additional Information:    Spoke with  DAWN Mohan. She will  patient up @ 1000  Discharge orders faxed to  [phone]  Per request, Vimpat Rx hard copy faxed to Alvaro @ [phone]

## 2021-03-09 NOTE — TELEPHONE ENCOUNTER
" Beatris is calling from the pt\"s group home and states that pt is going to be having a brain MRI on the 25th of march and is requesting that Duane have some sedation such as Ativan to help with this , he is really restless and they are hoping it will help. Beatris states that the neurologist told her to contact his PCP for this.     Viry Samuels, [facility]  Staff    "

## 2021-03-09 NOTE — PLAN OF CARE
Speech Language Therapy Discharge Summary    Reason for therapy discharge:    Discharged to home with home therapy.    Progress towards therapy goal(s). See goals on Care Plan in [facility] electronic health record for goal details.  Goals not met.  Barriers to achieving goals:   discharge from facility.    Therapy recommendation(s):    Continued therapy is recommended.  Rationale/Recommendations:  Continue ST needs at home for diet tolerance and staff education. Patient discharged on a DDL 1 with honey thick liquids. Advance diet as tolerated.

## 2021-03-10 ENCOUNTER — VIRTUAL VISIT (OUTPATIENT)
Dept: NEUROLOGY | Facility: CLINIC | Age: 41
End: 2021-03-10
Payer: MEDICARE

## 2021-03-10 ENCOUNTER — TELEPHONE (OUTPATIENT)
Dept: NEUROLOGY | Facility: CLINIC | Age: 41
End: 2021-03-10

## 2021-03-10 DIAGNOSIS — G40.909 SEIZURE DISORDER (H): Primary | ICD-10-CM

## 2021-03-10 NOTE — PATIENT INSTRUCTIONS
Continue topiramate 200 mg am and 200 mg pm and depakote 500 mg three times a day and vimpat 100 mg twice a day   Follow up with psychiatrist for mood    Follow up  With Felix 3-4 months   Follow up  With Dr. Bautista for epidermoid tumor treatment plan since last MRI shows changes    Lauren Washington MD   Epilepsy Staff

## 2021-03-10 NOTE — PROGRESS NOTES
Not able to reach patient. Tried mother, father, and group home estates contact numbers and numbers are either constantly busy or not working numbers.

## 2021-03-10 NOTE — PROGRESS NOTES
Duane is a 40 year old who is being evaluated via a billable video visit.      Telephone visit was 18  Minutes.     [facility]    Patient:  Duane D Backes  :  1980   Age:  40 year old   Today's Visit:  3/10/2021    Referring Provider:    Wilner Carter MD  [address] [place] [address]    Epilepsy history copied forward:  Duane D Backes is 40 year old right handed with history of cognitive disability and epilepsy referred to us by Dr. Carter for high depakote levels. Per medical records from  he had a generalized tonic-clonic convulsion at [place], EEG showed rare left temporal epileptiform discharges. Per medical records he moved to current halfway in 2017.  He is has been treated for epilepsy with topiramate (low levels) and depakote with high concentration for many years. His most recent depakote 2020 at 144 and  2020 at 172.  1st depakote level in 2015 was 136. He has always had persistently high depakote. In  2020 he had two seizure in one day. Per Brain he was sitting on couch, whole body was rigidity with eye lid twitching for 1 minute. There was no obvious shaking.  In the last 3 years he has lost 40 pounds.    Seizure type 1: Generalized tonic-clonic convulsion. Last one 2020. Prior to this .   Interval History: Spoke to Maria G on phone. He was discharged from the hospital due to seizures. He had one 3-4 minute generalized tonic-clonic convulsions 2021 and a second seizure in the emergency room.  He was given 4 mg of Ativan in the emergency room and he was intubated for airway protection.  Dring this hospital admission divalproex was reduced and Vimpat was started.  Per Maria G he was sleep deprived 2 weeks prior to his seizure. She states antiepileptic drug were not missed, no active infection.  His depakote was reduced from 2000 mg per day to 1500 mg per day. Vimpat was started. He has been hyper and more behavioral  issues since discharge. He is very restless.  Prior to seizure in February 2021 his last seizure was 6/2020.  I reviewed his behavioral outbursts may be secondary to reduction in divalproex.  I encouraged them to work with psychiatrist for mood stabilization medications.  She is agreeable to this.  Additionally we reviewed that his epidermoid tumor has grown on the MRI of the brain.  I recommend that they see Dr. Bautista for consult and further management.  She is agreeable to this.  Current antiepileptic drug:   Depakote 500 mg three times a day   Topiramate  200 mg twice a day  vimpat 100 mg twice a day   Medication Notes:   AED Medication Compliance:  compliant most of the time  Using a pill box:  Yes  Past AEDs notes:   Topiramate: We increase topiramate to 200 mg twice a day in winter 2020.    Depakote was decreased from 1500 mg twice a day to 1000 mg twice a day and 2020 due to blood concentrations near 170. Lowering depakote caused more behavioral issues.   vimpat started 2/2021  Psycho-Social History: he moved to current group home 2017. He grew up with parents, after high school he was institutionalized (may be due to behavioral) and then transferred to group home 2013. No drinking, no drugs, and no smoking.     Review of Systems:  Lethargy / Tiredness:  No  Nausea / Vomiting:  No  Double Vision:  No  Sleepiness:  No  Depression:  No  Slowed Cognitive Function:  yes  Memory Problems:  No  Poor Balance:  No  Dizziness:  No  Appetite Changes:  Increased   Blurred Vision:  No  Sleep Changes:  No  Behavioral Changes: yes  Skin: negative  Respiratory: No shortness of breath, dyspnea on exertion, cough, or hemoptysis  Cardiovascular: negative  Have you experienced a traumatic fall related to your events: No  Are these falls related to your seizures: No      Exam:    There were no vitals taken for this visit.     Wt Readings from Last 5 Encounters:   03/08/21 179 lb (81.2 kg)   12/23/19 198 lb (89.8 kg)   12/16/19  198 lb 6.4 oz (90 kg)   08/27/19 202 lb 6.4 oz (91.8 kg)   07/10/19 208 lb (94.3 kg)     No exam due to call.     Impression:    Epilepsy (possible focal epilepsy)  History of Autism   History of right temporal epidermoid tumor   Behavioral outburst have worsened      Discussion:  40-year-old male with a history of autism, epilepsy, and right temporal lobe epilepsy epidermoid tumor.  Prior EEGs have shown rare left temporal sharp waves (opposite side of tumor).  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  Patient has been managed on Depakote and topiramate for several years with no seizures.  In February 24, 2021 he had 1 generalized tonic-clonic seizure for 4 minutes followed by another 1 in the emergency room.  He was intubated for airway protection.  During this hospital elicit his divalproex was reduced by 500 mg and he was started on Vimpat.  Changes in seizure medications have resulted in worsening behavior.  I encouraged caregivers to follow-up with psychiatrist.  Additionally recent MRI of the brain shows growth of epidermoid tumor.  They should follow-up with Dr. Bautista for evaluation and treatment of epidermoid tumor.  Maria G is agreeable to this plan of care.      Overall goal for seizure medication is to reduce or wean off of divalproex due to thrombocytopenia as tolerated.  We may further optimize Vimpat.  I suspect as we reduce divalproex further his behavioral outburst will increase.     Plan :   Continue topiramate 200 mg am and 200 mg pm and depakote 500 mg three times a day and vimpat 100 mg twice a day   Follow up with psychiatrist for mood    Follow up  With Felix 3-4 months   Follow up  With Dr. Bautista for epidermoid tumor treatment plan since last MRI shows changes    Lauren Washington MD   Epilepsy Staff

## 2021-03-10 NOTE — PROGRESS NOTES
I tied calling x3 to do the rooming but phone is giving busy signal, I also tried patient mom phone and cassidy tot VM.    Kartik Lewis, CMA

## 2021-03-10 NOTE — LETTER
3/10/2021       RE: Duane D Backes  : 1980   MRN: [id number]      Dear Colleague,    Thank you for referring your patient, Duane D Backes, to the [facility] EPILEPSY CARE at [facility]. Please see a copy of my visit note below.    Duane is a 40 year old who is being evaluated via a billable video visit.      Telephone visit was 18  Minutes.     [facility] Epilepsy [facility] Progress Note    Patient:  Duane D Backes  :  1980   Age:  40 year old   Today's Visit:  3/10/2021    Referring Provider:    Wilner Carter MD  [address] [place] [id number]    Epilepsy history copied forward:  Duane D Backes is 40 year old right handed with history of cognitive disability and epilepsy referred to us by Dr. Carter for high depakote levels. Per medical records from  he had a generalized tonic-clonic convulsion at [place], EEG showed rare left temporal epileptiform discharges. Per medical records he moved to current skilled nursing in .  He is has been treated for epilepsy with topiramate (low levels) and depakote with high concentration for many years. His most recent depakote 2020 at 144 and  2020 at 172.  1st depakote level in 2015 was 136. He has always had persistently high depakote. In  2020 he had two seizure in one day. Per Brain he was sitting on couch, whole body was rigidity with eye lid twitching for 1 minute. There was no obvious shaking.  In the last 3 years he has lost 40 pounds.    Seizure type 1: Generalized tonic-clonic convulsion. Last one 2020. Prior to this .   Interval History: Spoke to Maria G on phone. He was discharged from the hospital due to seizures. He had one 3-4 minute generalized tonic-clonic convulsions 2021 and a second seizure in the emergency room.  He was given 4 mg of Ativan in the emergency room and he was intubated for airway protection.  Dring this hospital admission divalproex was reduced and  Vimpat was started.  Per Maria G he was sleep deprived 2 weeks prior to his seizure. She states antiepileptic drug were not missed, no active infection.  His depakote was reduced from 2000 mg per day to 1500 mg per day. Vimpat was started. He has been hyper and more behavioral issues since discharge. He is very restless.  Prior to seizure in February 2021 his last seizure was 6/2020.  I reviewed his behavioral outbursts may be secondary to reduction in divalproex.  I encouraged them to work with psychiatrist for mood stabilization medications.  She is agreeable to this.  Additionally we reviewed that his epidermoid tumor has grown on the MRI of the brain.  I recommend that they see Dr. Bautista for consult and further management.  She is agreeable to this.  Current antiepileptic drug:   Depakote 500 mg three times a day   Topiramate  200 mg twice a day  vimpat 100 mg twice a day   Medication Notes:   AED Medication Compliance:  compliant most of the time  Using a pill box:  Yes  Past AEDs notes:   Topiramate: We increase topiramate to 200 mg twice a day in winter 2020.    Depakote was decreased from 1500 mg twice a day to 1000 mg twice a day and 2020 due to blood concentrations near 170. Lowering depakote caused more behavioral issues.   vimpat started 2/2021  Psycho-Social History: he moved to current group home 2017. He grew up with parents, after high school he was institutionalized (may be due to behavioral) and then transferred to group home 2013. No drinking, no drugs, and no smoking.     Review of Systems:  Lethargy / Tiredness:  No  Nausea / Vomiting:  No  Double Vision:  No  Sleepiness:  No  Depression:  No  Slowed Cognitive Function:  yes  Memory Problems:  No  Poor Balance:  No  Dizziness:  No  Appetite Changes:  Increased   Blurred Vision:  No  Sleep Changes:  No  Behavioral Changes: yes  Skin: negative  Respiratory: No shortness of breath, dyspnea on exertion, cough, or hemoptysis  Cardiovascular:  negative  Have you experienced a traumatic fall related to your events: No  Are these falls related to your seizures: No      Exam:    There were no vitals taken for this visit.     Wt Readings from Last 5 Encounters:   03/08/21 179 lb (81.2 kg)   12/23/19 198 lb (89.8 kg)   12/16/19 198 lb 6.4 oz (90 kg)   08/27/19 202 lb 6.4 oz (91.8 kg)   07/10/19 208 lb (94.3 kg)     No exam due to call.     Impression:    Epilepsy (possible focal epilepsy)  History of Autism   History of right temporal epidermoid tumor   Behavioral outburst have worsened      Discussion:  40-year-old male with a history of autism, epilepsy, and right temporal lobe epilepsy epidermoid tumor.  Prior EEGs have shown rare left temporal sharp waves (opposite side of tumor).  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  Patient has been managed on Depakote and topiramate for several years with no seizures.  In February 24, 2021 he had 1 generalized tonic-clonic seizure for 4 minutes followed by another 1 in the emergency room.  He was intubated for airway protection.  During this hospital elicit his divalproex was reduced by 500 mg and he was started on Vimpat.  Changes in seizure medications have resulted in worsening behavior.  I encouraged caregivers to follow-up with psychiatrist.  Additionally recent MRI of the brain shows growth of epidermoid tumor.  They should follow-up with Dr. Bautista for evaluation and treatment of epidermoid tumor.  Maria G is agreeable to this plan of care.      Overall goal for seizure medication is to reduce or wean off of divalproex due to thrombocytopenia as tolerated.  We may further optimize Vimpat.  I suspect as we reduce divalproex further his behavioral outburst will increase.     Plan :   Continue topiramate 200 mg am and 200 mg pm and depakote 500 mg three times a day and vimpat 100 mg twice a day   Follow up with  psychiatrist for mood    Follow up  With Felix 3-4 months   Follow up  With Dr. Bautista for epidermoid tumor treatment plan since last MRI shows changes    Lauren Washington MD   Epilepsy Staff             I tied calling x3 to do the rooming but phone is giving busy signal, I also tried patient mom phone and cassidy tot VM.    Kartik Lewis CMA    Not able to reach patient. Tried mother, father, and group home [facility] contact numbers and numbers are either constantly busy or not working numbers.           Lauren Washington MD

## 2021-03-10 NOTE — TELEPHONE ENCOUNTER
Seizure protocol faxed and mailed to group home, please also send AVS from 3/10/21 appt.   Fax: [phone]  Address is pt's home

## 2021-03-11 NOTE — TELEPHONE ENCOUNTER
[facility] is calling back stating that they still have no received this for Duane. Please re-send this over to the pharmacy again and call to confirm at [phone] or [phone]

## 2021-03-23 ENCOUNTER — OFFICE VISIT (OUTPATIENT)
Dept: FAMILY MEDICINE | Facility: CLINIC | Age: 41
End: 2021-03-23
Payer: MEDICARE

## 2021-03-23 VITALS
SYSTOLIC BLOOD PRESSURE: 109 MMHG | DIASTOLIC BLOOD PRESSURE: 72 MMHG | BODY MASS INDEX: 23.61 KG/M2 | HEIGHT: 74 IN | OXYGEN SATURATION: 100 % | WEIGHT: 184 LBS | HEART RATE: 72 BPM

## 2021-03-23 DIAGNOSIS — Z86.16 HISTORY OF COVID-19: ICD-10-CM

## 2021-03-23 DIAGNOSIS — Z01.818 PREOP GENERAL PHYSICAL EXAM: Primary | ICD-10-CM

## 2021-03-23 DIAGNOSIS — Z01.20 ENCOUNTER FOR DENTAL EXAMINATION: ICD-10-CM

## 2021-03-23 DIAGNOSIS — F71 MODERATE INTELLECTUAL DISABILITY: ICD-10-CM

## 2021-03-23 DIAGNOSIS — E03.4 HYPOTHYROIDISM DUE TO ACQUIRED ATROPHY OF THYROID: ICD-10-CM

## 2021-03-23 DIAGNOSIS — G40.309 GENERALIZED TONIC CLONIC EPILEPSY (H): ICD-10-CM

## 2021-03-23 DIAGNOSIS — K05.6 PERIODONTAL DISEASE: ICD-10-CM

## 2021-03-23 DIAGNOSIS — F84.0 ACTIVE AUTISTIC DISORDER: ICD-10-CM

## 2021-03-23 PROCEDURE — 99214 OFFICE O/P EST MOD 30 MIN: CPT | Performed by: FAMILY MEDICINE

## 2021-03-23 RX ORDER — LORAZEPAM 2 MG/1
2 TABLET ORAL
Qty: 1 TABLET | Refills: 0 | Status: SHIPPED | OUTPATIENT
Start: 2021-03-23 | End: 2021-03-30

## 2021-03-23 ASSESSMENT — PAIN SCALES - GENERAL: PAINLEVEL: NO PAIN (0)

## 2021-03-23 ASSESSMENT — MIFFLIN-ST. JEOR: SCORE: 1824.37

## 2021-03-23 NOTE — PATIENT INSTRUCTIONS
At [facility], we strive to deliver an exceptional experience to you, every time we see you. If you receive a survey, please complete it as we do value your feedback.  If you have MyChart, you can expect to receive results automatically within 24 hours of their completion.  Your provider will send a note interpreting your results as well.   If you do not have MyChart, you should receive your results in about a week by mail.    Your care team:                            Family Medicine Internal Medicine   MD Scooter Gao MD Shantel Branch-Fleming, MD Srinivasa Vaka, MD Katya Belousova, PABENOIT Rider, APRN CNP    Lee Delgado, MD Pediatrics   Jarrell Tinajero, PABENOIT Joseph, CNP MD Shirin Fraser APRN CNP   MD Estelle Marvin MD Deborah Mielke, MD Alejandra Stephens, APRN [facility]      Clinic hours: Monday - Thursday 7 am-6 pm; Fridays 7 am-5 pm.   Urgent care: Monday - Friday 11 am-9 pm; Saturday and Sunday 9 am-5 pm.    Clinic: [phone]       [facility]: Monday - Thursday 8 am - 7 pm; Friday 8 am - 6 pm  [facility]: [phone]     Use [email or web address] for 24/7 diagnosis and treatment of dozens of conditions.    Preparing for Your Surgery  Getting started  A nurse will call you to review your health history and instructions. They will give you an arrival time based on your scheduled surgery time.  Please be ready to share the following:    Your doctor's clinic name and phone number    Your medical, surgical and anesthesia history    A list of allergies and sensitivities    A list of medicines, including herbal treatments and over-the-counter drugs    Whether the patient has a legal guardian (ask how to send us the papers in advance)  If you have a child who's having surgery, please ask for a copy of Preparing for Your Child's Surgery.    Preparing for  surgery    Within 30 days of surgery: Have a pre-op exam (sometimes called an H&P, or History and Physical). This can be done at a clinic or pre-operative center.  ? If you're having a , you may not need this exam. Talk to your care team    At your pre-op exam, talk to your care team about all medicines you take. If you need to stop any medicines before surgery, ask when to start taking them again.  ? We do this for your safety. Many medicines can make you bleed too much during surgery. Some change how well surgery (anesthesia) drugs work.    Call your insurance company to let them know you're having surgery. (If you don't have insurance, call [phone].)    Call your clinic if there's any change in your health. This includes signs of a cold or flu (sore throat, runny nose, cough, rash, fever). It also includes a scrape or scratch near the surgery site.    If you have questions on the day of surgery, call your hospital or surgery center.  Eating and drinking guidelines  For your safety: Unless your surgeon tells you otherwise, follow the guidelines below.    Eat and drink as usual until 8 hours before surgery. After that, no food or milk.    Drink clear liquids until 2 hours before surgery. These are liquids you can see through, like water, Gatorade and Propel Water. You may also have black coffee and tea (no cream or milk).    Nothing by mouth within 2 hours of surgery. This includes gum, candy and breath mints.    If you drink, stop drinking alcohol the night before surgery.    If your care team tells you to take medicine on the morning of surgery, it's okay to take it with a sip of water.  Preventing infection    Shower or bathe the night before and morning of your surgery. Follow the instructions your clinic gave you. (If no instructions, use regular soap.)    Don't shave or clip hair near your surgery site. We'll remove the hair if needed.    Don't smoke or vape the morning of surgery. You may chew  nicotine gum up to 2 hours before surgery. A nicotine patch is okay.  ? Note: Some surgeries require you to completely quit smoking and nicotine. Check with your surgeon.    Your care team will make every effort to keep you safe from infection. We will:  ? Clean our hands often with soap and water (or an alcohol-based hand rub).  ? Clean the skin at your surgery site with a special soap that kills germs.  ? Give you a special gown to keep you warm. (Cold raises the risk of infection.)  ? Wear special hair covers, masks, gowns and gloves during surgery.  ? Give antibiotic medicine, if prescribed. Not all surgeries need antibiotics.  What to bring on the day of surgery    Photo ID and insurance card    Copy of your health care directive, if you have one    Glasses and hearing aides (bring cases)  ? You can't wear contacts during surgery    Inhaler and eye drops, if you use them (tell us about these when you arrive)    CPAP machine or breathing device, if you use them    A few personal items, if spending the night    If you have . . .  ? A pacemaker or ICD (cardiac defibrillator): Bring the ID card.  ? An implanted stimulator: Bring the remote control.  ? A legal guardian: Bring a copy of the certified (court-stamped) guardianship papers.  Please remove any jewelry, including body piercings. Leave jewelry and other valuables at home.  If you're going home the day of surgery  Important: If you don't follow the rules below, we must cancel your surgery.     Arrange for someone to drive you home after surgery. You may not drive, take a taxi or take public transportation by yourself (unless you'll have local anesthesia only).    Arrange for a responsible adult to stay with you overnight. If you don't, we may keep you in the hospital overnight, and you may need to pay the costs yourself.  Questions?   If you have any questions for your care team, list them here:  _________________________________________________________________________________________________________________________________________________________________________________________________________________________________________________________________________________________________________________________  For informational purposes only. Not to replace the advice of your health care provider. Copyright   2003, 2019 [facility]. All rights reserved. Clinically reviewed by Bhavna Lombardi MD. [employer] [id number] - REV 4/20.

## 2021-03-23 NOTE — PROGRESS NOTES
[address] [address]  Phone: [phone]  Primary Provider: Rajat Parkinson        PREOPERATIVE EVALUATION:  Today's date: 3/23/2021    Duane D Backes is a 40 year old male who presents for a preoperative evaluation. He is accompanied by his group home attendant.     Surgical Information:  Surgery/Procedure: Dental  Surgery Location: [facility]  Surgeon: Dr. Elicia Major  Surgery Date: 04/01/2021  Time of Surgery: 6:30 AM  Where patient plans to recover: At home with Home Care  Fax number for surgical facility:     Type of Anesthesia Anticipated: General    Assessment & Plan     The proposed surgical procedure is considered LOW risk.    (Z01.818) Preop general physical exam  (primary encounter diagnosis)  (K05.6) Periodontal disease  (Z01.20) Encounter for dental examination and cleaning  Comment: Pre-op evaluation of fitness for dental cleaning & proposed anesthesia   Plan:     (F84.0) Active autistic disorder  (F71) Moderate mental retardation  Comment: STABLE SINCE HOSPITALIZATION  Plan: LORazepam (ATIVAN) 2 MG tablet        Return in about 6 months (around 9/23/2021) for Medicare annual wellness exam.      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            Subjective     HPI related to upcoming procedure: This 40 year old male complains of needing general anesthesia for his dental care. He does not tolerate or cooperate for dental exam & cleaning otherwise, due to his autism and mental retardation.       Preop Questions 3/23/2021   1. Have you ever had a heart attack or stroke? No   2. Have you ever had surgery on your heart or blood vessels, such as a stent placement, a coronary artery bypass,  or surgery on an artery in your head, neck, heart, or legs? No   3. Do you have chest pain with activity? No   4. Do you have a history of  heart failure? No   5. Do you currently have a cold, bronchitis or symptoms of other infection? No   6. Do you have a cough, shortness of breath, or wheezing? No   7. Do you or anyone in your family have previous history of blood clots? No   8. Do you or does anyone in your family have a serious bleeding problem such as prolonged bleeding following surgeries or cuts? No   9. Have you ever had problems with anemia or been told to take iron pills? No   10. Have you had any abnormal blood loss such as black, tarry or bloody stools? No   11. Have you ever had a blood transfusion? No   12. Are you willing to have a blood transfusion if it is medically needed before, during, or after your surgery? Yes   13. Have you or any of your relatives ever had problems with anesthesia? No   14. Do you have sleep apnea, excessive snoring or daytime drowsiness? No   15. Do you have any artifical heart valves or other implanted medical devices like a pacemaker, defibrillator, or continuous glucose monitor? No   16. Do you have artificial joints? No   17. Are you allergic to latex? No     Health Care Directive:  Patient has a Health Care Directive on file      Preoperative Review of :   reviewed - no record of controlled substances prescribed.      Status of Chronic Conditions:  See problem list for active medical problems.  Problems all longstanding and stable, except as noted/documented.  See ROS for pertinent symptoms related to these conditions.  PMHx notable for his seizure disorder, where he was hospitalized 2/24/21 - 3/9/21 for status epilepticus       Review of Systems  CONSTITUTIONAL: NEGATIVE for fever, chills, change in weight  ENT/MOUTH: NEGATIVE for ear, mouth and throat problems  RESP: NEGATIVE for significant cough or SOB  CV: NEGATIVE for chest pain, palpitations or peripheral  edema    Patient Active Problem List    Diagnosis Date Noted     History of COVID-19 03/23/2021     Priority: Medium     Altered mental status, unspecified altered mental status type 02/25/2021     Priority: Medium     Seizure disorder (H) 06/18/2020     Priority: Medium     Obesity, Class I, BMI 30-34.9 08/02/2013     Priority: Medium     Benign neoplasm of brain (H) 04/26/2013     Priority: Medium     Epidermoid brain tumor, 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 Provider to review       Moderate mental retardation 04/13/2005     Priority: Medium     Active autistic disorder 04/13/2005     Priority: Medium     Obsessive-compulsive disorder 04/13/2005     Priority: Medium     Problem list name updated by automated process. Provider to review       Chronic constipation 04/13/2005     Priority: Medium      Past Medical History:   Diagnosis Date     Autistic disorder, current or active state      Dry skin      Moderate intellectual disabilities      Obsessive-compulsive disorders      Overweight (BMI 25.0-29.9) 8/2/2013     Seizure disorder (H)     Generalized tonic clonic     Unspecified constipation      Past Surgical History:   Procedure Laterality Date     CATARACT IOL, RT/LT Left 11/17/2018     Current Outpatient Medications   Medication Sig Dispense Refill     ammonium lactate (LAC-HYDRIN) 12 % external lotion Apply topically daily To feet and heels       divalproex sodium extended-release (DEPAKOTE ER) 500 MG 24 hr tablet Take 1 tablet (500 mg) by mouth 3 times daily  90 tablet 1     docusate sodium (COLACE) 100 MG capsule Take 1 capsule by mouth 2 times daily.       FISH OIL 1000 MG OR CAPS 1 PO BID (Patient taking differently: 1 capsule 2 times per day) 60 11     lacosamide (VIMPAT) 100 MG TABS tablet Take 1 tablet (100 mg) by mouth 2 times daily 60 tablet 1     lactulose (CHRONULAC) 10 GM/15ML solution Take 30 mLs by mouth every evening & additional 30mL 2 times per day as needed       levothyroxine (SYNTHROID/LEVOTHROID) 150 MCG tablet TAKE 1 TABLET BY MOUTH ONCE DAILY  FOR THYROID   *1 TOTAL FILL* 31 tablet 9     lithium ER (LITHOBID) 300 MG CR tablet Take 600 mg by mouth At Bedtime        LORazepam (ATIVAN) 2 MG tablet Take 1 tablet (2 mg) by mouth once as needed 1 hour prior to head scan or medical procedure. 1 tablet 0     MILK OF MAGNESIA 400 MG/5ML suspension 30 mLs every evening        polyethylene glycol (MIRALAX) powder Take 17 g by mouth daily. 510 g 0     risperiDONE (RISPERDAL) 2 MG tablet Take 2 mg by mouth 2 times daily 1 tab every morning & 1 tab every bedtime       sertraline (ZOLOFT) 100 MG tablet Take 1.5 tablets by mouth every morning.       Starch, Thickening, POWD Combined with beverages, per instructions from Speech Therapy. 850 g 99     topiramate (TOPAMAX) 200 MG tablet Take 200 mg by mouth 2 times daily       VITAMIN D3 1000 units tablet 3,000 Units daily         Allergies   Allergen Reactions     Penicillin [Penicillins] Other (See Comments)     Prozac [Fluoxetine Hcl]      Reglan [Metoclopramide Hcl]      Ritalin [Methylphenidate Derivatives] Other (See Comments)     Ritalin     Trazodone         Social History     Tobacco Use     Smoking status: Never Smoker     Smokeless tobacco: Never Used   Substance Use Topics     Alcohol use: No     Family History   Problem Relation Age of Onset     Unknown/Adopted No family hx of      History   Drug Use No         Objective     /72 (BP Location: Left arm, Patient Position: Sitting, Cuff Size: Adult  "Large)   Pulse 72   Ht 1.88 m (6' 2\")   Wt 83.5 kg (184 lb)   SpO2 100%   BMI 23.62 kg/m      Physical Exam    GENERAL APPEARANCE: healthy, alert and no distress     EYES: EOMI,  PERRL     HENT: ear canals and TM's normal and nose and mouth without ulcers or lesions     NECK: no adenopathy, no asymmetry, masses, or scars and thyroid normal to palpation     RESP: lungs clear to auscultation - no rales, rhonchi or wheezes     CV: regular rates and rhythm, normal S1 S2, no S3 or S4 and no murmur, click or rub     ABDOMEN:  soft, nontender, no HSM or masses and bowel sounds normal     MS: extremities normal- no gross deformities noted, no evidence of inflammation in joints, FROM in all extremities.     SKIN: no suspicious lesions or rashes     NEURO: Normal strength and tone, sensory exam grossly normal, mentation intact and speech normal     PSYCH: autistic, repeats some phrases but not conversant     LYMPHATICS: No cervical adenopathy    Recent Labs   Lab Test 03/09/21  0757 03/08/21  1216 03/07/21  1501 03/06/21  0734 03/06/21  0734 03/05/21  0742 03/05/21  0742 09/23/20  0858 09/23/20  0858   HGB  --  11.2*  --   --   --   --  12.8*   < >  --     366  --   --  238  --  185   < >  --    NA  --  141  --   --  142   < > 145*   < >  --    POTASSIUM  --  3.9 3.9   < > 3.5   < > 3.1*   < >  --    CR 0.87 0.75  --   --  0.86  --  0.92   < >  --    A1C  --   --   --   --   --   --   --   --  4.7    < > = values in this interval not displayed.        Diagnostics:  No labs were ordered during this visit.   No EKG required for low risk surgery (cataract, skin procedure, breast biopsy, etc).    Revised Cardiac Risk Index (RCRI):  The patient has the following serious cardiovascular risks for perioperative complications:   - No serious cardiac risks = 0 points     RCRI Interpretation: 0 points: Class I (very low risk - 0.4% complication rate)           Signed Electronically by: Rajat Parkinson MD  Copy of this " evaluation report is provided to requesting physician.

## 2021-03-24 NOTE — PROGRESS NOTES
Faxed pre op notes to [facility], [phone], right fax confirmed at 8:13 am today, 3/24/2021.  [facility]  2nd Floor  Primary Care

## 2021-03-25 ENCOUNTER — HOSPITAL ENCOUNTER (OUTPATIENT)
Dept: MRI IMAGING | Facility: CLINIC | Age: 41
Discharge: HOME | End: 2021-03-25
Attending: NURSE PRACTITIONER | Admitting: NURSE PRACTITIONER
Payer: MEDICARE

## 2021-03-25 DIAGNOSIS — G40.909 SEIZURE DISORDER (H): ICD-10-CM

## 2021-03-25 PROCEDURE — 255N000002 HC RX 255 OP 636: Performed by: NURSE PRACTITIONER

## 2021-03-25 PROCEDURE — A9585 GADOBUTROL INJECTION: HCPCS | Performed by: NURSE PRACTITIONER

## 2021-03-25 PROCEDURE — 70553 MRI BRAIN STEM W/O & W/DYE: CPT | Mod: MG

## 2021-03-25 RX ORDER — GADOBUTROL 604.72 MG/ML
7 INJECTION INTRAVENOUS ONCE
Status: COMPLETED | OUTPATIENT
Start: 2021-03-25 | End: 2021-03-25

## 2021-03-25 RX ADMIN — GADOBUTROL 7 ML: 604.72 INJECTION INTRAVENOUS at 11:57

## 2021-03-29 ENCOUNTER — OFFICE VISIT (OUTPATIENT)
Dept: NEUROSURGERY | Facility: CLINIC | Age: 41
End: 2021-03-29
Attending: NEUROLOGICAL SURGERY
Payer: MEDICARE

## 2021-03-29 VITALS
BODY MASS INDEX: 24.37 KG/M2 | DIASTOLIC BLOOD PRESSURE: 85 MMHG | OXYGEN SATURATION: 98 % | HEIGHT: 74 IN | SYSTOLIC BLOOD PRESSURE: 129 MMHG | WEIGHT: 189.9 LBS | HEART RATE: 74 BPM

## 2021-03-29 DIAGNOSIS — L72.0 EPIDERMOID CYST: Primary | ICD-10-CM

## 2021-03-29 PROCEDURE — G0463 HOSPITAL OUTPT CLINIC VISIT: HCPCS

## 2021-03-29 PROCEDURE — 99213 OFFICE O/P EST LOW 20 MIN: CPT | Performed by: NEUROLOGICAL SURGERY

## 2021-03-29 ASSESSMENT — PAIN SCALES - GENERAL: PAINLEVEL: NO PAIN (0)

## 2021-03-29 ASSESSMENT — MIFFLIN-ST. JEOR: SCORE: 1851.13

## 2021-03-29 NOTE — NURSING NOTE
"March 29, 2021 11:57 AM   Duane D Backes is a 40 year old male who presents for:    Chief Complaint   Patient presents with     Consult     4 week hospital follow-up, dx: epidermoid cyst     Initial Vitals: /85   Pulse 74   Ht 6' 2\" (1.88 m)   Wt 189 lb 14.4 oz (86.1 kg)   SpO2 98%   BMI 24.38 kg/m   Estimated body mass index is 24.38 kg/m  as calculated from the following:    Height as of this encounter: 6' 2\" (1.88 m).    Weight as of this encounter: 189 lb 14.4 oz (86.1 kg). Body surface area is 2.12 meters squared.  No Pain (0) Comment: Data Unavailable       Clinical concerns: Duane D Backes is here today for 4 week hospital follow-up, dx: epidermoid cyst.    Álvaro Washburn MA  "

## 2021-03-29 NOTE — LETTER
3/29/2021         RE: Duane D Backes  [facility]  [address] [facility] [address]        Dear Colleague,    Thank you for referring your patient, Duane D Backes, to the [facility] [place]. Please see a copy of my visit note below.    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  Lives in assisted living with regular medical support.  Was admitted to [facility] with episode of status in late February, managed with adjustment of medication regimen.  Since the admission, has not had further seizures.  Staff at his assisted living facility describe that he is back at his baseline.  Denies headaches, weakness, and visual changes.  New MRI shows slight interval growth of the cyst, which is now 9 x 7 cm in largest dimensions, extending from the CP angle and foramen magnum to the middle fossa with compression of the temporal lobe.  When compared back to imaging from 2006, the lesion is markedly larger, was previously 4 x 2 cm.       Past Medical History:   Diagnosis Date     Autistic disorder, current or active state      Dry skin      Moderate intellectual disabilities      Obsessive-compulsive disorders      Overweight (BMI 25.0-29.9) 8/2/2013     Seizure disorder (H)     Generalized tonic clonic     Unspecified constipation      Past Surgical History:   Procedure Laterality Date     CATARACT IOL, RT/LT Left 11/17/2018     Social History     Socioeconomic History     Marital status: Single     Spouse name: Not on file     Number of children: 0     Years of education: Not on file     Highest education level: Not on file   Occupational History     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      Employer: [employer]   Social Needs     Financial resource strain: Not on file     Food insecurity     Worry: Not on file     Inability: Not on file     Transportation needs     Medical: Not on file     Non-medical: Not on file  "  Tobacco Use     Smoking status: Never Smoker     Smokeless tobacco: Never Used   Substance and Sexual Activity     Alcohol use: No     Drug use: No     Sexual activity: Never   Lifestyle     Physical activity     Days per week: Not on file     Minutes per session: Not on file     Stress: Not on file   Relationships     Social connections     Talks on phone: Not on file     Gets together: Not on file     Attends Sikh service: Not on file     Active member of club or organization: Not on file     Attends meetings of clubs or organizations: Not on file     Relationship status: Not on file     Intimate partner violence     Fear of current or ex partner: Not on file     Emotionally abused: Not on file     Physically abused: Not on file     Forced sexual activity: Not on file   Other Topics Concern     Parent/sibling w/ CABG, MI or angioplasty before 65F 55M? Not Asked   Social History Narrative    Lives in group home.  Parents are legal guardians.     Family History   Problem Relation Age of Onset     Unknown/Adopted No family hx of         ROS: 10 point ROS neg other than the symptoms noted above in the HPI.    Physical Exam  /85   Pulse 74   Ht 1.88 m (6' 2\")   Wt 86.1 kg (189 lb 14.4 oz)   SpO2 98%   BMI 24.38 kg/m    HEENT:  Normocephalic, atraumatic.  PERRLA.  EOM s intact.  Visual fields full to gross exam  Neck:  Supple, non-tender, without lymphadenopathy.  Heart:  No peripheral edema  Lungs:  No SOB  Abdomen:  Non-distended.   Skin:  Warm and dry.  Extremities:  No edema, cyanosis or clubbing.  Psychiatric:  No apparent distress  Musculoskeletal:  Normal bulk and tone    NEUROLOGICAL EXAMINATION:     Mental status:  Alert and Oriented x 3, speech is fluent.  Cranial nerves:  II-XII intact.   Motor:    Shoulder Abduction:  Right:  5/5   Left:  5/5  Biceps:                      Right:  5/5   Left:  5/5  Triceps:                     Right:  5/5   Left:  5/5  Wrist Extensors:       Right:  5/5   " Left:  5/5  Wrist Flexors:           Right:  5/5   Left:  5/5  interosseus :            Right:  5/5   Left:  5/5  Hip Flexion:                Right: 5/5  Left:  5/5  Quadriceps:             Right:  5/5  Left:  5/5  Hamstrings:             Right:  5/5  Left:  5/5  Gastroc Soleus:        Right:  5/5  Left:  5/5  Tib/Ant:                      Right:  5/5  Left:  5/5  EHL:                     Right:  5/5  Left:  5/5  Sensation:  Intact  Coordination:  Smooth tandem walking.    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        Sincerely,        Ashok Lui MD

## 2021-03-29 NOTE — PROGRESS NOTES
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  Lives in assisted living with regular medical support.  Was admitted to [facility] with episode of status in late February, managed with adjustment of medication regimen.  Since the admission, has not had further seizures.  Staff at his assisted living facility describe that he is back at his baseline.  Denies headaches, weakness, and visual changes.  New MRI shows slight interval growth of the cyst, which is now 9 x 7 cm in largest dimensions, extending from the CP angle and foramen magnum to the middle fossa with compression of the temporal lobe.  When compared back to imaging from 2006, the lesion is markedly larger, was previously 4 x 2 cm.       Past Medical History:   Diagnosis Date     Autistic disorder, current or active state      Dry skin      Moderate intellectual disabilities      Obsessive-compulsive disorders      Overweight (BMI 25.0-29.9) 8/2/2013     Seizure disorder (H)     Generalized tonic clonic     Unspecified constipation      Past Surgical History:   Procedure Laterality Date     CATARACT IOL, RT/LT Left 11/17/2018     Social History     Socioeconomic History     Marital status: Single     Spouse name: Not on file     Number of children: 0     Years of education: Not on file     Highest education level: Not on file   Occupational History     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      Employer: [employer]   Social Needs     Financial resource strain: Not on file     Food insecurity     Worry: Not on file     Inability: Not on file     Transportation needs     Medical: Not on file     Non-medical: Not on file   Tobacco Use     Smoking status: Never Smoker     Smokeless tobacco: Never Used   Substance and Sexual Activity     Alcohol use: No     Drug use: No     Sexual activity: Never   Lifestyle     Physical activity     Days per week: Not on file     Minutes per session: Not on file  "    Stress: Not on file   Relationships     Social connections     Talks on phone: Not on file     Gets together: Not on file     Attends [employer] service: Not on file     Active member of club or organization: Not on file     Attends meetings of clubs or organizations: Not on file     Relationship status: Not on file     Intimate partner violence     Fear of current or ex partner: Not on file     Emotionally abused: Not on file     Physically abused: Not on file     Forced sexual activity: Not on file   Other Topics Concern     Parent/sibling w/ CABG, MI or angioplasty before 65F 55M? Not Asked   Social History Narrative    Lives in group home.  Parents are legal guardians.     Family History   Problem Relation Age of Onset     Unknown/Adopted No family hx of         ROS: 10 point ROS neg other than the symptoms noted above in the HPI.    Physical Exam  /85   Pulse 74   Ht 1.88 m (6' 2\")   Wt 86.1 kg (189 lb 14.4 oz)   SpO2 98%   BMI 24.38 kg/m    HEENT:  Normocephalic, atraumatic.  PERRLA.  EOM s intact.  Visual fields full to gross exam  Neck:  Supple, non-tender, without lymphadenopathy.  Heart:  No peripheral edema  Lungs:  No SOB  Abdomen:  Non-distended.   Skin:  Warm and dry.  Extremities:  No edema, cyanosis or clubbing.  Psychiatric:  No apparent distress  Musculoskeletal:  Normal bulk and tone    NEUROLOGICAL EXAMINATION:     Mental status:  Alert and Oriented x 3, speech is fluent.  Cranial nerves:  II-XII intact.   Motor:    Shoulder Abduction:  Right:  5/5   Left:  5/5  Biceps:                      Right:  5/5   Left:  5/5  Triceps:                     Right:  5/5   Left:  5/5  Wrist Extensors:       Right:  5/5   Left:  5/5  Wrist Flexors:           Right:  5/5   Left:  5/5  interosseus :            Right:  5/5   Left:  5/5  Hip Flexion:                Right: 5/5  Left:  5/5  Quadriceps:             Right:  5/5  Left:  5/5  Hamstrings:             Right:  5/5  Left:  5/5  Gastroc Soleus:   "      Right:  5/5  Left:  5/5  Tib/Ant:                      Right:  5/5  Left:  5/5  EHL:                     Right:  5/5  Left:  5/5  Sensation:  Intact  Coordination:  Smooth tandem walking.    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

## 2021-03-29 NOTE — PATIENT INSTRUCTIONS
Patient Next Steps:      Order placed for brain MRI imaging to be completed in 6 months. You can schedule at our  today, or you can call [place] at [phone] ([facility]) or [phone] ([facility]). We will call you with the results and next steps once imaging is completed.    Please call us if you have any further questions or concerns.    LEEANNA Cummins  [facility]   Phone: [phone]  Fax: [phone]

## 2021-03-30 ENCOUNTER — TELEPHONE (OUTPATIENT)
Dept: NEUROLOGY | Facility: CLINIC | Age: 41
End: 2021-03-30

## 2021-03-30 ENCOUNTER — VIRTUAL VISIT (OUTPATIENT)
Dept: NEUROLOGY | Facility: CLINIC | Age: 41
End: 2021-03-30
Payer: MEDICARE

## 2021-03-30 DIAGNOSIS — G40.909 SEIZURE DISORDER (H): ICD-10-CM

## 2021-03-30 RX ORDER — DIVALPROEX SODIUM 500 MG/1
500 TABLET, EXTENDED RELEASE ORAL 3 TIMES DAILY
Qty: 270 TABLET | Refills: 1 | Status: SHIPPED | OUTPATIENT
Start: 2021-03-30 | End: 2021-10-01

## 2021-03-30 RX ORDER — LACOSAMIDE 100 MG/1
100 TABLET ORAL 2 TIMES DAILY
Qty: 180 TABLET | Refills: 1 | Status: SHIPPED | OUTPATIENT
Start: 2021-03-30 | End: 2021-09-22

## 2021-03-30 RX ORDER — TOPIRAMATE 200 MG/1
TABLET, FILM COATED ORAL
Qty: 180 TABLET | Refills: 3 | Status: SHIPPED | OUTPATIENT
Start: 2021-03-30 | End: 2022-03-01

## 2021-03-30 RX ORDER — DIAZEPAM ORAL SOLUTION (CONCENTRATE) 5 MG/ML
SOLUTION ORAL
Qty: 30 ML | Refills: 5 | Status: SHIPPED | OUTPATIENT
Start: 2021-03-30 | End: 2024-03-06

## 2021-03-30 NOTE — LETTER
3/30/2021       RE: Duane D Backes  : 1980   MRN: [id number]      Dear Colleague,    Thank you for referring your patient, Duane D Backes, to the [facility] at [facility]. Please see a copy of my visit note below.    Duane is a 40 year old who is being evaluated via a billable video visit.      How would you like to obtain your AVS? Mail a copy  If the video visit is dropped, the invitation should be resent by: Send to e-mail at: [email or web address].   Will anyone else be joining your video visit? Yes: Kerrie . How would they like to receive their invitation? Other e-mail: [email or web address].       Video Start Time: 10:51 AM  Video-Visit Details    Type of service:  Video Visit    Video End Time:11:18 AM    Originating Location (pt. Location): Home    Distant Location (provider location):  [facility]     Platform used for Video Visit: Psychiatric/[facility] Progress Note    Patient:  Duane D Backes  :  1980   Age:  40 year old   Today's Visit:  3/30/2021     Referring Provider:    Wilner Carter MD  [address] [place] [id number]    Epilepsy history copied forward:  Duane D Backes is 40 year old right handed with history of cognitive disability and epilepsy referred to us by Dr. Carter for high depakote levels. Per medical records from  he had a generalized tonic-clonic convulsion at [place], EEG showed rare left temporal epileptiform discharges. Per medical records he moved to current [facility] in .  He is has been treated for epilepsy with topiramate (low levels) and depakote with high concentration for many years. His most recent depakote 2020 at 144 and  2020 at 172.  1st depakote level in 2015 was 136. He has always had persistently high depakote. In  2020 he had two seizure in one day. Per Brain he was sitting on couch, whole body was  rigidity with eye lid twitching for 1 minute. There was no obvious shaking.  In the last 3 years he has lost 40 pounds.    Seizure type 1: Generalized tonic-clonic convulsion. Last one 6/2020. Prior to this 2006.   Interval History: Spoke to Kerrie on phone. Last 3-4 minute generalized tonic-clonic convulsions 2/24/2021, second generalized tonic-clonic convulsion at ER, 4 mg of Ativan in the emergency room and he was intubated for airway protection.  Since last visit he continues to be impulsive, perseveration, he can re directed, less restless. Behaviors have improved slightly. He seems to be back to baseline.   Prior to seizure in February 2021 his last seizure was 6/2020.  I reviewed his behavioral outbursts may be secondary to reduction in divalproex.  I encouraged them to work with psychiatrist for mood stabilization medications.  She is agreeable to this.  Additionally we reviewed that his epidermoid tumor has grown on the MRI of the brain and labs below.  I recommend that they see Dr. Bautista and primary care provider for consult and further management.  She is agreeable to this.    Component      Latest Ref Rng & Units 3/8/2021   WBC      4.0 - 11.0 10e9/L 9.0   RBC Count      4.4 - 5.9 10e12/L 3.68 (L)   Hemoglobin      13.3 - 17.7 g/dL 11.2 (L)   Hematocrit      40.0 - 53.0 % 35.2 (L)   MCV      78 - 100 fl 96   MCH      26.5 - 33.0 pg 30.4   MCHC      31.5 - 36.5 g/dL 31.8   RDW      10.0 - 15.0 % 13.7   Platelet Count      150 - 450 10e9/L 366   Diff Method       Automated Method   % Neutrophils      % 66.9   % Lymphocytes      % 20.2   % Monocytes      % 8.9   % Eosinophils      % 2.9   % Basophils      % 0.3   % Immature Granulocytes      % 0.8   Nucleated RBCs      0 /100 0   Absolute Neutrophil      1.6 - 8.3 10e9/L 6.0   Absolute Lymphocytes      0.8 - 5.3 10e9/L 1.8   Absolute Monocytes      0.0 - 1.3 10e9/L 0.8   Absolute Eosinophils      0.0 - 0.7 10e9/L 0.3   Absolute Basophils      0.0 - 0.2 10e9/L  0.0   Abs Immature Granulocytes      0 - 0.4 10e9/L 0.1   Absolute Nucleated RBC       0.0   Sodium      133 - 144 mmol/L 141   Potassium      3.4 - 5.3 mmol/L 3.9   Chloride      94 - 109 mmol/L 108   Carbon Dioxide      20 - 32 mmol/L 27   Anion Gap      3 - 14 mmol/L 6   Glucose      70 - 99 mg/dL 115 (H)   Urea Nitrogen      7 - 30 mg/dL 14   Creatinine      0.66 - 1.25 mg/dL 0.75   GFR Estimate      >60 mL/min/1.73:m2 >90   GFR Estimate If Black      >60 mL/min/1.73:m2 >90   Calcium      8.5 - 10.1 mg/dL 9.5   Phosphorus      2.5 - 4.5 mg/dL 4.7 (H)   Albumin      3.4 - 5.0 g/dL 3.0 (L)   Magnesium      1.6 - 2.3 mg/dL 1.9   Valproic Acid Level      50 - 100 mg/L 102 (H)       Current antiepileptic drug:   Depakote 500 mg three times a day   Topiramate  200 mg twice a day  vimpat 100 mg twice a day   Medication Notes:   AED Medication Compliance:  compliant most of the time  Using a pill box:  Yes  Past AEDs notes:   Topiramate: We increase topiramate to 200 mg twice a day in winter 2020.    Depakote was decreased from 1500 mg twice a day to 1000 mg twice a day and 2020 due to blood concentrations near 170. Lowering depakote caused more behavioral issues.   vimpat started 2/2021  Psycho-Social History: he moved to current group home 2017. He grew up with parents, after high school he was institutionalized (may be due to behavioral) and then transferred to group [place] 2013. No drinking, no drugs, and no smoking.     Review of Systems:  Lethargy / Tiredness:  No  Nausea / Vomiting:  No  Double Vision:  No  Sleepiness:  No  Depression:  No  Slowed Cognitive Function:  yes  Memory Problems:  No  Poor Balance:  No  Dizziness:  No  Appetite Changes:  Increased   Blurred Vision:  No  Sleep Changes:  No  Behavioral Changes: yes  Skin: negative  Respiratory: No shortness of breath, dyspnea on exertion, cough, or hemoptysis  Cardiovascular: negative  Have you experienced a traumatic fall related to your events: No  Are  these falls related to your seizures: No      Exam:    There were no vitals taken for this visit.     Wt Readings from Last 5 Encounters:   03/29/21 86.1 kg (189 lb 14.4 oz)   03/23/21 83.5 kg (184 lb)   03/08/21 81.2 kg (179 lb)   12/23/19 89.8 kg (198 lb)   12/16/19 90 kg (198 lb 6.4 oz)     Cognitive delay, Alert,  Able to say few words and follow simple commands, face symmetric, extra ocular movements in tact, able to life upper extremities in the air, able to walk.      Impression:    Epilepsy (possible focal epilepsy)  History of Autism   History of right temporal epidermoid tumor   Behavioral outburst have worsened  History of Depakote thrombocytopenia       Discussion:  40-year-old male with a history of autism, epilepsy, and right temporal lobe epilepsy epidermoid tumor.  Prior EEGs have shown rare left temporal sharp waves (opposite side of tumor).  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  Patient has been managed on Depakote and topiramate for several years with no seizures.  In February 24, 2021 he had 2 generalized tonic-clonic seizure and was intubated for airway protection.      On last visit we increase depakote and mood improved. I encouraged caregivers to follow-up with psychiatrist.  Additionally recent MRI of the brain shows growth of epidermoid tumor.  They should follow-up with Dr. Bautista for evaluation and treatment of epidermoid tumor.  Maria G is agreeable to this plan of care.      Overall goal for seizure medication is to reduce or wean off of divalproex due to thrombocytopenia as tolerated. We may further optimize Vimpat.  I suspect as we reduce divalproex further his behavioral outburst will increase.     Plan :     Continue current seizure medications:   Depakote 500 mg three times a day   Topiramate  200 mg twice a day  vimpat 100 mg twice a day     Follow up with psychiatrist for mood, may  "consider depakote increase to levels of 150s. Review with psychiatrist since it helps his mood and behaviors.     Follow up  With Felix 6 months     Follow up  With Dr. Bautista for epidermoid tumor treatment plan since last MRI shows changes    Follow up  With primary care provider for anemia and high glucose    Diazepam: Give 1 ml (5mg): Give 1 ml for ANY seizure. May repeat once 1 ml (5 mg). Monitor breathing, if there any concerns call 911. Do not exceed 10 mg per day.    Seizure action plan [facility] nurse to complete     Check labs prior to next visit.     I spent 33 minutes for patient's medical care. During this time key medical decisions were made with review of medical chart prior to visit, visit with patient, counseling/education, and post visit work, including documentation on the day of visit. I addressed all questions the patient/caregiver raised in regards to the patient's medical care. This note was created with voice recognition software. Inadvertent grammatical errors, typographical errors, and \"sound a like\" substitutions may occur due to limitations of the software.  Read the note carefully and apply context when erroneous substitutions have occurred. Thank you.     Lauren Washington MD   Epilepsy Staff       "

## 2021-03-30 NOTE — PATIENT INSTRUCTIONS
Continue current seizure medications:   Depakote 500 mg three times a day   Topiramate  200 mg twice a day  vimpat 100 mg twice a day     Follow up with psychiatrist for mood, may consider depakote increase to levels of 150s. Review with psychiatrist since it helps his mood and behaviors.     Follow up  With Felix 6 months     Follow up  With Dr. Bautista for epidermoid tumor treatment plan since last MRI shows changes    Follow up  With primary care provider for anemia and high glucose    Seizure action plan [facility] nurse to complete     Diazepam: Give 1 ml (5mg): Give 1 ml for ANY seizure. May repeat once 1 ml (5 mg). Monitor breathing, if there any concerns call 911. Do not exceed 10 mg per day.      Lauren Washington MD         Component      Latest Ref Rng & Units 3/8/2021   WBC      4.0 - 11.0 10e9/L 9.0   RBC Count      4.4 - 5.9 10e12/L 3.68 (L)   Hemoglobin      13.3 - 17.7 g/dL 11.2 (L)   Hematocrit      40.0 - 53.0 % 35.2 (L)   MCV      78 - 100 fl 96   MCH      26.5 - 33.0 pg 30.4   MCHC      31.5 - 36.5 g/dL 31.8   RDW      10.0 - 15.0 % 13.7   Platelet Count      150 - 450 10e9/L 366   Diff Method       Automated Method   % Neutrophils      % 66.9   % Lymphocytes      % 20.2   % Monocytes      % 8.9   % Eosinophils      % 2.9   % Basophils      % 0.3   % Immature Granulocytes      % 0.8   Nucleated RBCs      0 /100 0   Absolute Neutrophil      1.6 - 8.3 10e9/L 6.0   Absolute Lymphocytes      0.8 - 5.3 10e9/L 1.8   Absolute Monocytes      0.0 - 1.3 10e9/L 0.8   Absolute Eosinophils      0.0 - 0.7 10e9/L 0.3   Absolute Basophils      0.0 - 0.2 10e9/L 0.0   Abs Immature Granulocytes      0 - 0.4 10e9/L 0.1   Absolute Nucleated RBC       0.0   Sodium      133 - 144 mmol/L 141   Potassium      3.4 - 5.3 mmol/L 3.9   Chloride      94 - 109 mmol/L 108   Carbon Dioxide      20 - 32 mmol/L 27   Anion Gap      3 - 14 mmol/L 6   Glucose      70 - 99 mg/dL 115 (H)   Urea Nitrogen      7 - 30 mg/dL 14   Creatinine       0.66 - 1.25 mg/dL 0.75   GFR Estimate      >60 mL/min/1.73:m2 >90   GFR Estimate If Black      >60 mL/min/1.73:m2 >90   Calcium      8.5 - 10.1 mg/dL 9.5   Phosphorus      2.5 - 4.5 mg/dL 4.7 (H)   Albumin      3.4 - 5.0 g/dL 3.0 (L)   Magnesium      1.6 - 2.3 mg/dL 1.9   Valproic Acid Level      50 - 100 mg/L 102 (H)

## 2021-03-30 NOTE — LETTER
"4/8/2021       RE: Duane D Backes  [facility]  [address] [facility] [address]        SEIZURE PLAN AND PROTOCOL    Seizure description:    Full body stiffening and/or jerking, urinary incontinence, verbally unresponsive. \"Convulsions\"      Plan of Care:      Follow general seizure care and First Aid guidelines for seizures.    Anticonvulsant medications will be administered as prescribed.    All seizures will be documented on a Seizure Report including:  date, time, length of seizure, specific body movements and behaviors exhibited during the seizure, and post-seizure state.    Antiepileptic blood levels will be ordered by the physician based upon client's condition and medications.    Missed Doses:    IF YOU REALIZE WITHIN 24 HOURS:    ...You MISSED ONE DOSE of your anticonvulsant medication(s), take the missed dose immediately unless it is time for your next scheduled dose. If that is the case, take your next scheduled dose, wait two hours, and then take the missed dose.    ...You MISSED TWO OR MORE DOSES, take one of the missed doses immediately, even if it is time for your next scheduled dose. Then call the [facility] clinic to schedule an appointment.     IF YOU REALIZE NOW YOU MISSED A DOSE MORE THAN 24 HOURS AGO, francisco it on your calendar. DO NOT take an extra dose.      Medication Errors:    Your facility nurse should be notified of any medication errors. The nurse should observe the urgency of the error. It is not necessary to notify the MD on call unless the facility nurse or delegate believes it to be life threatening or could possibly result in a serious complication. Routine notices required by regulation should be telephoned to the clinic during office hours.    Extra Dose:    An extra dose of an antiepileptic drug is not serious. Ordinarily, at most, the client may experience increased side effects for several hours. Contact your facility nurse immediately if an extra dose was " "given.    Seizure Protocol:    Diazepam Intensol 5mg/ml solution (diazepam high concentrate solution)  Place 1ml (5mg) in the mouth between the cheek and the gum for any seizure activity. May repeat one time within 24 hours if another seizure occurs.(maximum 10mg in any 24 hour period)  Monitor breathing. Activate emergency medical services (EMS)  if there are any concerns (\"call 9-1-1\")      When to call 911 for Seizures:    Call 911 if:    Duane does not start breathing within 1 minute after the seizure. If this happens call 911 immediately and start CPR.    Duane sustains an injury    Duane has one seizure right after another without regaining consciousness in between    A GTC seizure lasts over 5 minutes    Facility protocol requires EMS evaluation          Provider:            Lauren Washington M.D.        "

## 2021-03-30 NOTE — TELEPHONE ENCOUNTER
Received the Seizure Plan of Care form to be completed.  Form saved to the [address].  Encounter routed.

## 2021-03-30 NOTE — PROGRESS NOTES
Duane is a 40 year old who is being evaluated via a billable video visit.      How would you like to obtain your AVS? Mail a copy  If the video visit is dropped, the invitation should be resent by: Send to e-mail at: [email or web address].   Will anyone else be joining your video visit? Yes: Kerrie . How would they like to receive their invitation? Other e-mail: [email or web address].       Video Start Time: 10:51 AM  Video-Visit Details    Type of service:  Video Visit    Video End Time:11:18 AM    Originating Location (pt. Location): Home    Distant Location (provider location):  [facility]     Platform used for Video Visit: Mix & Meet         [facility]    Patient:  Duane D Backes  :  1980   Age:  40 year old   Today's Visit:  3/30/2021     Referring Provider:    Wilner Carter MD  [address] [place] [id number]    Epilepsy history copied forward:  Duane D Backes is 40 year old right handed with history of cognitive disability and epilepsy referred to us by Dr. Carter for high depakote levels. Per medical records from  he had a generalized tonic-clonic convulsion at [place], EEG showed rare left temporal epileptiform discharges. Per medical records he moved to current [facility] in 2017.  He is has been treated for epilepsy with topiramate (low levels) and depakote with high concentration for many years. His most recent depakote 2020 at 144 and  2020 at 172.  1st depakote level in 2015 was 136. He has always had persistently high depakote. In  2020 he had two seizure in one day. Per Brain he was sitting on couch, whole body was rigidity with eye lid twitching for 1 minute. There was no obvious shaking.  In the last 3 years he has lost 40 pounds.    Seizure type 1: Generalized tonic-clonic convulsion. Last one 2020. Prior to this .   Interval History: Spoke to Kerrie on phone. Last 3-4 minute generalized  tonic-clonic convulsions 2/24/2021, second generalized tonic-clonic convulsion at ER, 4 mg of Ativan in the emergency room and he was intubated for airway protection.  Since last visit he continues to be impulsive, perseveration, he can re directed, less restless. Behaviors have improved slightly. He seems to be back to baseline.   Prior to seizure in February 2021 his last seizure was 6/2020.  I reviewed his behavioral outbursts may be secondary to reduction in divalproex.  I encouraged them to work with psychiatrist for mood stabilization medications.  She is agreeable to this.  Additionally we reviewed that his epidermoid tumor has grown on the MRI of the brain and labs below.  I recommend that they see Dr. Bautista and primary care provider for consult and further management.  She is agreeable to this.    Component      Latest Ref Rng & Units 3/8/2021   WBC      4.0 - 11.0 10e9/L 9.0   RBC Count      4.4 - 5.9 10e12/L 3.68 (L)   Hemoglobin      13.3 - 17.7 g/dL 11.2 (L)   Hematocrit      40.0 - 53.0 % 35.2 (L)   MCV      78 - 100 fl 96   MCH      26.5 - 33.0 pg 30.4   MCHC      31.5 - 36.5 g/dL 31.8   RDW      10.0 - 15.0 % 13.7   Platelet Count      150 - 450 10e9/L 366   Diff Method       Automated Method   % Neutrophils      % 66.9   % Lymphocytes      % 20.2   % Monocytes      % 8.9   % Eosinophils      % 2.9   % Basophils      % 0.3   % Immature Granulocytes      % 0.8   Nucleated RBCs      0 /100 0   Absolute Neutrophil      1.6 - 8.3 10e9/L 6.0   Absolute Lymphocytes      0.8 - 5.3 10e9/L 1.8   Absolute Monocytes      0.0 - 1.3 10e9/L 0.8   Absolute Eosinophils      0.0 - 0.7 10e9/L 0.3   Absolute Basophils      0.0 - 0.2 10e9/L 0.0   Abs Immature Granulocytes      0 - 0.4 10e9/L 0.1   Absolute Nucleated RBC       0.0   Sodium      133 - 144 mmol/L 141   Potassium      3.4 - 5.3 mmol/L 3.9   Chloride      94 - 109 mmol/L 108   Carbon Dioxide      20 - 32 mmol/L 27   Anion Gap      3 - 14 mmol/L 6   Glucose       70 - 99 mg/dL 115 (H)   Urea Nitrogen      7 - 30 mg/dL 14   Creatinine      0.66 - 1.25 mg/dL 0.75   GFR Estimate      >60 mL/min/1.73:m2 >90   GFR Estimate If Black      >60 mL/min/1.73:m2 >90   Calcium      8.5 - 10.1 mg/dL 9.5   Phosphorus      2.5 - 4.5 mg/dL 4.7 (H)   Albumin      3.4 - 5.0 g/dL 3.0 (L)   Magnesium      1.6 - 2.3 mg/dL 1.9   Valproic Acid Level      50 - 100 mg/L 102 (H)       Current antiepileptic drug:   Depakote 500 mg three times a day   Topiramate  200 mg twice a day  vimpat 100 mg twice a day   Medication Notes:   AED Medication Compliance:  compliant most of the time  Using a pill box:  Yes  Past AEDs notes:   Topiramate: We increase topiramate to 200 mg twice a day in winter 2020.    Depakote was decreased from 1500 mg twice a day to 1000 mg twice a day and 2020 due to blood concentrations near 170. Lowering depakote caused more behavioral issues.   vimpat started 2/2021  Psycho-Social History: he moved to current group home 2017. He grew up with parents, after high school he was institutionalized (may be due to behavioral) and then transferred to group home 2013. No drinking, no drugs, and no smoking.     Review of Systems:  Lethargy / Tiredness:  No  Nausea / Vomiting:  No  Double Vision:  No  Sleepiness:  No  Depression:  No  Slowed Cognitive Function:  yes  Memory Problems:  No  Poor Balance:  No  Dizziness:  No  Appetite Changes:  Increased   Blurred Vision:  No  Sleep Changes:  No  Behavioral Changes: yes  Skin: negative  Respiratory: No shortness of breath, dyspnea on exertion, cough, or hemoptysis  Cardiovascular: negative  Have you experienced a traumatic fall related to your events: No  Are these falls related to your seizures: No      Exam:    There were no vitals taken for this visit.     Wt Readings from Last 5 Encounters:   03/29/21 86.1 kg (189 lb 14.4 oz)   03/23/21 83.5 kg (184 lb)   03/08/21 81.2 kg (179 lb)   12/23/19 89.8 kg (198 lb)   12/16/19 90 kg (198 lb 6.4  oz)     Cognitive delay, Alert,  Able to say few words and follow simple commands, face symmetric, extra ocular movements in tact, able to life upper extremities in the air, able to walk.      Impression:    Epilepsy (possible focal epilepsy)  History of Autism   History of right temporal epidermoid tumor   Behavioral outburst have worsened  History of Depakote thrombocytopenia       Discussion:  40-year-old male with a history of autism, epilepsy, and right temporal lobe epilepsy epidermoid tumor.  Prior EEGs have shown rare left temporal sharp waves (opposite side of tumor).  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  Patient has been managed on Depakote and topiramate for several years with no seizures.  In February 24, 2021 he had 2 generalized tonic-clonic seizure and was intubated for airway protection.      On last visit we increase depakote and mood improved. I encouraged caregivers to follow-up with psychiatrist.  Additionally recent MRI of the brain shows growth of epidermoid tumor.  They should follow-up with Dr. Bautista for evaluation and treatment of epidermoid tumor.  Maria G is agreeable to this plan of care.      Overall goal for seizure medication is to reduce or wean off of divalproex due to thrombocytopenia as tolerated. We may further optimize Vimpat.  I suspect as we reduce divalproex further his behavioral outburst will increase.     Plan :     Continue current seizure medications:   Depakote 500 mg three times a day   Topiramate  200 mg twice a day  vimpat 100 mg twice a day     Follow up with psychiatrist for mood, may consider depakote increase to levels of 150s. Review with psychiatrist since it helps his mood and behaviors.     Follow up  With Felix 6 months     Follow up  With Dr. Bautista for epidermoid tumor treatment plan since last MRI shows changes    Follow up  With primary care provider for  "anemia and high glucose    Diazepam: Give 1 ml (5mg): Give 1 ml for ANY seizure. May repeat once 1 ml (5 mg). Monitor breathing, if there any concerns call 911. Do not exceed 10 mg per day.    Seizure action plan [facility] nurse to complete     Check labs prior to next visit.     I spent 33 minutes for patient's medical care. During this time key medical decisions were made with review of medical chart prior to visit, visit with patient, counseling/education, and post visit work, including documentation on the day of visit. I addressed all questions the patient/caregiver raised in regards to the patient's medical care. This note was created with voice recognition software. Inadvertent grammatical errors, typographical errors, and \"sound a like\" substitutions may occur due to limitations of the software.  Read the note carefully and apply context when erroneous substitutions have occurred. Thank you.     Lauren Washington MD   Epilepsy Staff             "

## 2021-04-02 DIAGNOSIS — G40.909 SEIZURE DISORDER (H): ICD-10-CM

## 2021-04-02 DIAGNOSIS — Z79.899 ENCOUNTER FOR LONG-TERM (CURRENT) USE OF HIGH-RISK MEDICATION: Primary | ICD-10-CM

## 2021-04-02 LAB
AMMONIA PLAS-SCNC: 26 UMOL/L (ref 10–50)
BASOPHILS # BLD AUTO: 0 10E9/L (ref 0–0.2)
BASOPHILS NFR BLD AUTO: 0.2 %
DIFFERENTIAL METHOD BLD: ABNORMAL
EOSINOPHIL # BLD AUTO: 0.1 10E9/L (ref 0–0.7)
EOSINOPHIL NFR BLD AUTO: 2.4 %
ERYTHROCYTE [DISTWIDTH] IN BLOOD BY AUTOMATED COUNT: 15.6 % (ref 10–15)
HCT VFR BLD AUTO: 34.8 % (ref 40–53)
HGB BLD-MCNC: 11.1 G/DL (ref 13.3–17.7)
LYMPHOCYTES # BLD AUTO: 1.5 10E9/L (ref 0.8–5.3)
LYMPHOCYTES NFR BLD AUTO: 36.5 %
MCH RBC QN AUTO: 31.6 PG (ref 26.5–33)
MCHC RBC AUTO-ENTMCNC: 31.9 G/DL (ref 31.5–36.5)
MCV RBC AUTO: 99 FL (ref 78–100)
MONOCYTES # BLD AUTO: 0.5 10E9/L (ref 0–1.3)
MONOCYTES NFR BLD AUTO: 12.1 %
NEUTROPHILS # BLD AUTO: 2.1 10E9/L (ref 1.6–8.3)
NEUTROPHILS NFR BLD AUTO: 48.8 %
PLATELET # BLD AUTO: 113 10E9/L (ref 150–450)
RBC # BLD AUTO: 3.51 10E12/L (ref 4.4–5.9)
VALPROATE SERPL-MCNC: 97 MG/L (ref 50–100)
WBC # BLD AUTO: 4.2 10E9/L (ref 4–11)

## 2021-04-02 PROCEDURE — 80235 DRUG ASSAY LACOSAMIDE: CPT | Mod: 90 | Performed by: PSYCHIATRY & NEUROLOGY

## 2021-04-02 PROCEDURE — 36415 COLL VENOUS BLD VENIPUNCTURE: CPT | Performed by: PSYCHIATRY & NEUROLOGY

## 2021-04-02 PROCEDURE — 80164 ASSAY DIPROPYLACETIC ACD TOT: CPT | Performed by: PSYCHIATRY & NEUROLOGY

## 2021-04-02 PROCEDURE — 99000 SPECIMEN HANDLING OFFICE-LAB: CPT | Performed by: PSYCHIATRY & NEUROLOGY

## 2021-04-02 PROCEDURE — 80178 ASSAY OF LITHIUM: CPT | Performed by: INTERNAL MEDICINE

## 2021-04-02 PROCEDURE — 82140 ASSAY OF AMMONIA: CPT | Performed by: PSYCHIATRY & NEUROLOGY

## 2021-04-02 PROCEDURE — 85025 COMPLETE CBC W/AUTO DIFF WBC: CPT | Performed by: PSYCHIATRY & NEUROLOGY

## 2021-04-02 NOTE — LETTER
Patient:  Duane D Backes  :   1980  MRN:     [id number]        Mr.Duane D Backes  [facility]  [address] [place] [id number]        2021    Dear ,    We are writing to inform you of your test results.    Your test results fall within the expected range(s) or remain unchanged from previous results.  Please see primary care doctor for low hematocrit and hemoglobin which indicates anemia.  Please continue with current treatment plan.    Resulted Orders   CBC with platelets differential   Result Value Ref Range    WBC 4.2 4.0 - 11.0 10e9/L    RBC Count 3.51 (L) 4.4 - 5.9 10e12/L    Hemoglobin 11.1 (L) 13.3 - 17.7 g/dL    Hematocrit 34.8 (L) 40.0 - 53.0 %    MCV 99 78 - 100 fl    MCH 31.6 26.5 - 33.0 pg    MCHC 31.9 31.5 - 36.5 g/dL    RDW 15.6 (H) 10.0 - 15.0 %    Platelet Count 113 (L) 150 - 450 10e9/L    % Neutrophils 48.8 %    % Lymphocytes 36.5 %    % Monocytes 12.1 %    % Eosinophils 2.4 %    % Basophils 0.2 %    Absolute Neutrophil 2.1 1.6 - 8.3 10e9/L    Absolute Lymphocytes 1.5 0.8 - 5.3 10e9/L    Absolute Monocytes 0.5 0.0 - 1.3 10e9/L    Absolute Eosinophils 0.1 0.0 - 0.7 10e9/L    Absolute Basophils 0.0 0.0 - 0.2 10e9/L    Diff Method Automated Method    Valproic acid   Result Value Ref Range    Valproic Acid Level 97 50 - 100 mg/L   Ammonia   Result Value Ref Range    Ammonia 26 10 - 50 umol/L   Lacosamide Level   Result Value Ref Range    Lacosamide 4.5 (L) 5.0 - 10.0 ug/mL      Comment:      (Note)  INTERPRETIVE INFORMATION: Lacosamide, Serum or Plasma  Therapeutic Range: Not well established.   Suggested range 5.0 - 10.0 ug/mL  Dose-related range (values at doses of 200-600 mg/day):   2.5 - 18.0 ug/mL  Toxic: Not well established.  Adverse effects may include dizziness, fatigue, nausea,   vomiting, blurred vision and tremor.  This test was developed and its performance characteristics   determined by [facility]. It has not been cleared or   approved by the [place]  Food and Drug Administration. This test   was performed in a CLIA certified laboratory and is   intended for clinical purposes.  Performed By: [facility]  [address] [place] [id number]  : MD Lauren Persaud MD               [phone]  1980

## 2021-04-02 NOTE — LETTER
Patient:  Duane D Backes  :   1980  MRN:     [id number]        Mr.Duane D Backes  [facility]  [address] [facility] [address]        2021    Dear ,    We are writing to inform you of your test results.    Your test results fall within the expected range(s) or remain unchanged from previous results.  His hemoglobin and hematocrit are low which indicate anemia.  Please follow-up with primary care doctor for anemia.  Please continue with current seizure treatment plan.    Resulted Orders   CBC with platelets differential   Result Value Ref Range    WBC 4.2 4.0 - 11.0 10e9/L    RBC Count 3.51 (L) 4.4 - 5.9 10e12/L    Hemoglobin 11.1 (L) 13.3 - 17.7 g/dL    Hematocrit 34.8 (L) 40.0 - 53.0 %    MCV 99 78 - 100 fl    MCH 31.6 26.5 - 33.0 pg    MCHC 31.9 31.5 - 36.5 g/dL    RDW 15.6 (H) 10.0 - 15.0 %    Platelet Count 113 (L) 150 - 450 10e9/L    % Neutrophils 48.8 %    % Lymphocytes 36.5 %    % Monocytes 12.1 %    % Eosinophils 2.4 %    % Basophils 0.2 %    Absolute Neutrophil 2.1 1.6 - 8.3 10e9/L    Absolute Lymphocytes 1.5 0.8 - 5.3 10e9/L    Absolute Monocytes 0.5 0.0 - 1.3 10e9/L    Absolute Eosinophils 0.1 0.0 - 0.7 10e9/L    Absolute Basophils 0.0 0.0 - 0.2 10e9/L    Diff Method Automated Method    Valproic acid   Result Value Ref Range    Valproic Acid Level 97 50 - 100 mg/L   Ammonia   Result Value Ref Range    Ammonia 26 10 - 50 umol/L       Lauren Washington MD               [phone]  1980

## 2021-04-05 LAB — LITHIUM SERPL-SCNC: 0.9 MMOL/L (ref 0.6–1.2)

## 2021-04-06 ENCOUNTER — NURSE TRIAGE (OUTPATIENT)
Dept: NURSING | Facility: CLINIC | Age: 41
End: 2021-04-06

## 2021-04-06 NOTE — TELEPHONE ENCOUNTER
Called LEEANNA Sy at the group home.   Correction, Kerrie did not test positive for Covid    This patient received the Moderna vaccine given on 1/27/21 while at his group home.     His second does was due on 2/24/21 however he missed this dose due to a hospitalization.  The group home no longer has this offered anymore.    Should this patient get second Moderna vaccine or should he get Hardeep and Hardeep vaccine?    Please advise.       Sophia Gold RN, [facility]

## 2021-04-06 NOTE — TELEPHONE ENCOUNTER
LEEANNA Sy tested positive for Covid around thanksgiving time.  Duane was given the first dose of Moderna around January 27th and missed second dose and still has not had the second dose.  LEEANNA Sy requesting to speak with MD Parkinson regarding second dose.  Can Duane still get the second dose or do they need to start ovee and also wondering if he needs a second dose.   Please phone Kerrie at [phone].      COVID 19 Nurse Triage Plan/Patient Instructions    Please be aware that novel coronavirus (COVID-19) may be circulating in the community. If you develop symptoms such as fever, cough, or SOB or if you have concerns about the presence of another infection including coronavirus (COVID-19), please contact your health care provider or visit [email or web address].     Disposition/Instructions    Home care recommended. Follow home care protocol based instructions.    Thank you for taking steps to prevent the spread of this virus.  o Limit your contact with others.  o Wear a simple mask to cover your cough.  o Wash your hands well and often.    Resources    [facility]: About COVID-19: [email or web address]    CDC: What to Do If You're Sick: www.cdc.gov/coronavirus/2019-ncov/about/steps-when-sick.html    CDC: Ending Home Isolation: www.cdc.gov/coronavirus/2019-ncov/hcp/disposition-in-home-patients.html     CDC: Caring for Someone: www.cdc.gov/coronavirus/2019-ncov/if-you-are-sick/care-for-someone.html     [facility]: Interim Guidance for Hospital Discharge to Home: www.health.[facility].mn.us/diseases/coronavirus/hcp/hospdischarge.pdf    [facility] clinical trials (COVID-19 research studies): [email or web address]/[email or web address]-clinical-trials     Below are the COVID-19 hotlines at the [place] Department of Health ([facility]). Interpreters are available.   o For health questions: Call 561-809-5884 or 1-806.463.7316 (7 a.m. to 7 p.m.)  o For questions about schools and childcare: Call 788-478-8277 or  3-634-286-7289 (7 a.m. to 7 p.m.)                     Reason for Disposition    COVID-19 vaccine, Frequently Asked Questions (FAQs)    Additional Information    Negative: [1] Difficulty breathing or swallowing AND [2] starts within 2 hours after injection    Negative: Sounds like a life-threatening emergency to the triager    Negative: Fever > 104 F (40 C)    Negative: Sounds like a severe, unusual reaction to the triager    Negative: [1] Redness or red streak around the injection site AND [2] started > 48 hours after getting vaccine AND [3] fever    Negative: [1] Fever > 101 F (38.3 C) AND [2] age > 60 AND [3] started > 48 hours after getting vaccine    Negative: [1] Fever > 100.0 F (37.8 C) AND [2] bedridden (e.g., nursing home patient, CVA, chronic illness, recovering from surgery) AND [3] started > 48 hours after getting vaccine    Negative: [1] Fever > 100.0 F (37.8 C) AND [2] diabetes mellitus or weak immune system (e.g., HIV positive, cancer chemo, splenectomy, organ transplant, chronic steroids) AND [3] started > 48 hours after getting vaccine    Negative: [1] Redness or red streak around the injection site AND [2] started > 48 hours after getting vaccine AND [3] no fever  (Exception: red area < 1 inch or 2.5 cm wide)    Negative: [1] Pain, tenderness, or swelling at the injection site AND [2] over 3 days (72 hours) since vaccine AND [3] getting worse    Negative: Fever > 100.0 F (37.8 C) present > 3 days (72 hours)    Negative: [1] Fever > 100.0 F (37.8 C) AND [2] healthcare worker    Negative: [1] Pain, tenderness, or swelling at the injection site AND [2] lasts > 7 days    Negative: [1] Requesting COVID-19 vaccine AND [2] healthcare worker (e.g., EMS first responders, doctors, nurses)    Negative: [1] Requesting COVID-19 vaccine AND [2] resident of a long-term care facility (e.g., nursing home)    Negative: [1] Requesting COVID-19 vaccine AND [2] vaccine available in the community for this patient  group    Negative: COVID-19 vaccine, injection site reaction (e.g., pain, redness, swelling), question about    Negative: COVID-19 vaccine, systemic reactions (e.g., fatigue, fever, muscle aches), questions about    Protocols used: CORONAVIRUS (COVID-19) VACCINE QUESTIONS AND [id number] 1.3

## 2021-04-07 LAB — LACOSAMIDE SERPL-MCNC: 4.5 UG/ML (ref 5–10)

## 2021-04-07 NOTE — TELEPHONE ENCOUNTER
Called and informed Kerrie of the need for the second moderna.     Chanelle López RN, [facility] Triage

## 2021-04-08 ENCOUNTER — TRANSFERRED RECORDS (OUTPATIENT)
Dept: HEALTH INFORMATION MANAGEMENT | Facility: CLINIC | Age: 41
End: 2021-04-08

## 2021-04-08 NOTE — TELEPHONE ENCOUNTER
Form completed for visit, seizure protocol created.  Both placed for MD signature on the MD work pile (SIP)  Forms to be returned, with the [facility] first aid for epilepsy handout

## 2021-04-26 ENCOUNTER — MEDICAL CORRESPONDENCE (OUTPATIENT)
Dept: HEALTH INFORMATION MANAGEMENT | Facility: CLINIC | Age: 41
End: 2021-04-26

## 2021-05-01 ENCOUNTER — OFFICE VISIT (OUTPATIENT)
Dept: URGENT CARE | Facility: URGENT CARE | Age: 41
End: 2021-05-01
Payer: MEDICARE

## 2021-05-01 ENCOUNTER — HOSPITAL ENCOUNTER (EMERGENCY)
Facility: CLINIC | Age: 41
Discharge: HOME | End: 2021-05-01
Attending: EMERGENCY MEDICINE | Admitting: EMERGENCY MEDICINE
Payer: MEDICARE

## 2021-05-01 VITALS
SYSTOLIC BLOOD PRESSURE: 129 MMHG | OXYGEN SATURATION: 97 % | RESPIRATION RATE: 14 BRPM | DIASTOLIC BLOOD PRESSURE: 66 MMHG | HEART RATE: 80 BPM | TEMPERATURE: 97.4 F

## 2021-05-01 VITALS
TEMPERATURE: 98.7 F | DIASTOLIC BLOOD PRESSURE: 74 MMHG | HEART RATE: 84 BPM | SYSTOLIC BLOOD PRESSURE: 117 MMHG | OXYGEN SATURATION: 96 % | BODY MASS INDEX: 24.14 KG/M2 | RESPIRATION RATE: 20 BRPM | WEIGHT: 188 LBS

## 2021-05-01 DIAGNOSIS — H57.12 PAIN OF LEFT ORBIT: ICD-10-CM

## 2021-05-01 DIAGNOSIS — S05.02XA ABRASION OF LEFT CORNEA, INITIAL ENCOUNTER: ICD-10-CM

## 2021-05-01 DIAGNOSIS — S09.90XA TRAUMATIC INJURY OF HEAD, INITIAL ENCOUNTER: Primary | ICD-10-CM

## 2021-05-01 PROCEDURE — 99215 OFFICE O/P EST HI 40 MIN: CPT | Performed by: NURSE PRACTITIONER

## 2021-05-01 PROCEDURE — 99283 EMERGENCY DEPT VISIT LOW MDM: CPT

## 2021-05-01 RX ORDER — ERYTHROMYCIN 5 MG/G
0.5 OINTMENT OPHTHALMIC
Qty: 3.5 G | Refills: 0 | Status: SHIPPED | OUTPATIENT
Start: 2021-05-01 | End: 2021-05-06

## 2021-05-01 RX ORDER — PROPARACAINE HYDROCHLORIDE 5 MG/ML
1 SOLUTION/ DROPS OPHTHALMIC ONCE
Status: DISCONTINUED | OUTPATIENT
Start: 2021-05-01 | End: 2021-05-01 | Stop reason: HOSPADM

## 2021-05-01 ASSESSMENT — ENCOUNTER SYMPTOMS: EYE PAIN: 1
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
## 2021-05-04 ENCOUNTER — TELEPHONE (OUTPATIENT)
Dept: NEUROLOGY | Facility: CLINIC | Age: 41
End: 2021-05-04

## 2021-05-04 NOTE — TELEPHONE ENCOUNTER
What is the concern that needs to be addressed by a nurse? Raul from patient's group home called wanting to discuss nighttime monitoring equipment they could possibly purchase    May a detailed message be left on voicemail? Yes      Date of last office visit: 3/30/21    Message routed to: [facility] RN pool

## 2021-05-21 NOTE — TELEPHONE ENCOUNTER
Caregiver calls the clinic to have a discussion on overnight monitoring of Duane. He asks if there are devices on the market to monitor for seizures. Duane has primarily tonic seizures so a wrist device with motion detection wouldn't be helpful here. He doesn't make any characteristic sounds, so a baby monitor seems unlikely to be helpful. I told him that even a video baby monitor may not be helpful because his seizures don't have a lot of movement and when covered by blankets, it may be very difficult to detect seizures movements. He asked if there was anything to lose, which is doesn't seem so. He will discuss this further with guardians.   Pt is a 72M with PMHx of HTN, DM, hypothyroidism, and BPH, admitted to [facility]  4/7/20 with COVID-19 pneumonia,  hypoxic respiratory failure requiring intubation s/p trach/PEG, protracted hospital course including DVT, sepsis, and pseudomonas pneumonia,  spontaneous right gluteal hematoma requiring 3 units PRBC,  SVT, and Bradycardia with Junctional beats with critical illness myopathy.   Pt passed MBS on 7/20 with recs for mechanical soft/minced & moist c nectar consistency fluids. Pt has not needed PEG for nutrition/hydration, therefore noted plan for removal per GI on Monday, 7/27. Pt reports tolerating diet well with good intake- has been consuming 100% of meals. Pt is pescovegetarian, accepts dairy and eggs. Reports good acceptance of glucerna supplements. Would suggest adding consistent carbohydrate restriction given hx DMII (A1c 5.8%). Weight obtained from previous RD note (5/30) 211.6lbs indicates 28lbs weight loss (13%) within < 3 months. Pt also noted with evidence of severe muscle wasting/fat loss per NFPE. Pt denies GI distress- last BM 7/24. +1 edema right hand, DTI lateral midfoot.

## 2021-06-14 NOTE — TELEPHONE ENCOUNTER
See [facility] Triage 1/26/21.    Lina Guzman RN  [facility]

## 2021-09-22 DIAGNOSIS — G40.909 SEIZURE DISORDER (H): ICD-10-CM

## 2021-09-22 RX ORDER — LACOSAMIDE 100 MG/1
100 TABLET ORAL 2 TIMES DAILY
Qty: 180 TABLET | Refills: 1 | Status: SHIPPED | OUTPATIENT
Start: 2021-09-22 | End: 2022-04-15

## 2021-09-22 NOTE — TELEPHONE ENCOUNTER
Vimpat 100 MG Tablet   Last Written Prescription Date:  3/30/2021  Last Fill Quantity: 180,   # refills: 1  Last Office Visit :  3/30/2021  Future Office visit:  None    Routing refill request to provider for review/approval because:  Pt will be out of this med this week.  Drug not on the [facility], P or [facility] refill protocol or controlled substance      Lexi Braden RN  [facility] Triage Red Flags/Med Refills

## 2021-09-29 ENCOUNTER — TELEPHONE (OUTPATIENT)
Dept: NEUROSURGERY | Facility: CLINIC | Age: 41
End: 2021-09-29

## 2021-09-29 DIAGNOSIS — L72.0 EPIDERMOID CYST: Primary | ICD-10-CM

## 2021-09-29 RX ORDER — DIAZEPAM 5 MG
5 TABLET ORAL
Qty: 2 TABLET | Refills: 0 | Status: ON HOLD | OUTPATIENT
Start: 2021-09-29 | End: 2022-08-24

## 2021-09-29 NOTE — TELEPHONE ENCOUNTER
Writer called Kerrie back. She couldn't remember what patient was given prior to March 2021 MRI. I see in Epic that patient was prescribed valium in the past but nothing prior to most recent MRI. Valium is a common medication. Pended and routed to provider.    Corinne Gonsalves RN on 9/29/2021 at 4:32 PM

## 2021-09-29 NOTE — TELEPHONE ENCOUNTER
Reason for call: Kerrie from pt's group home advised that pt will need prescription for sedation for MRI scheduled on 10/4/21. Please call her at [phone]. Thank you

## 2021-09-30 DIAGNOSIS — G40.909 SEIZURE DISORDER (H): ICD-10-CM

## 2021-10-01 RX ORDER — DIVALPROEX SODIUM 500 MG/1
500 TABLET, EXTENDED RELEASE ORAL 3 TIMES DAILY
Qty: 90 TABLET | Refills: 0 | Status: SHIPPED | OUTPATIENT
Start: 2021-10-01 | End: 2021-10-25

## 2021-10-01 NOTE — TELEPHONE ENCOUNTER
Last Clinic Visit: 3/30/21 recommended 6 month follow up, no upcoming appointments scheduled.  Last AED 4/2/21 within limits, last platelets 4/2/21 at 113, last AST 6/16/20 within limits.  30 day refill provided per protocol, routed to clinic scheduling for follow up

## 2021-10-08 DIAGNOSIS — G40.909 SEIZURE DISORDER (H): ICD-10-CM

## 2021-10-10 ENCOUNTER — HOSPITAL ENCOUNTER (OUTPATIENT)
Dept: MRI IMAGING | Facility: CLINIC | Age: 41
Discharge: HOME | End: 2021-10-10
Attending: NEUROLOGICAL SURGERY | Admitting: NEUROLOGICAL SURGERY
Payer: MEDICARE

## 2021-10-10 DIAGNOSIS — L72.0 EPIDERMOID CYST: ICD-10-CM

## 2021-10-10 PROCEDURE — 70551 MRI BRAIN STEM W/O DYE: CPT | Mod: MG

## 2021-10-10 RX ORDER — GADOBUTROL 604.72 MG/ML
8 INJECTION INTRAVENOUS ONCE
Status: DISCONTINUED | OUTPATIENT
Start: 2021-10-10 | End: 2021-10-11 | Stop reason: HOSPADM

## 2021-10-12 RX ORDER — TOPIRAMATE 200 MG/1
TABLET, FILM COATED ORAL
Qty: 60 TABLET | Refills: 11 | OUTPATIENT
Start: 2021-10-12

## 2021-10-12 NOTE — TELEPHONE ENCOUNTER
topiramate (TOPAMAX) 200 MG tablet    Take 200 mg by mouth 2 times daily    Last Written Prescription Date:  3/30/21  Last Fill Quantity: 180,   # refills: 3  Last Office Visit : 3/30/21  Plan :      Continue current seizure medications:   Depakote 500 mg three times a day   Topiramate  200 mg twice a day  vimpat 100 mg twice a day   Future Office visit:  10/15/21    Request refill too soon.

## 2021-10-15 ENCOUNTER — VIRTUAL VISIT (OUTPATIENT)
Dept: NEUROLOGY | Facility: CLINIC | Age: 41
End: 2021-10-15
Payer: MEDICARE

## 2021-10-15 DIAGNOSIS — G40.909 SEIZURE DISORDER (H): Primary | ICD-10-CM

## 2021-10-15 NOTE — PROGRESS NOTES
Duane is a 41 year old who is being evaluated via a billable video visit.      How would you like to obtain your AVS? Mail a copy  If the video visit is dropped, the invitation should be resent by: Text to cell phone: Call [phone]  Will anyone else be joining your video visit? Yes: Gaurav and patient. How would they like to receive their invitation? Text to cell phone: [email or web address]      Video Start Time: 2:39 PM  Video-Visit Details    Type of service:  Video Visit    Video End Time:2:58 pm     Originating Location (pt. Location): Home    Distant Location (provider location):  [facility] EPILEPSY C[facility]     Platform used for Video Visit: [facility]    Patient:  Duane D Backes  :  1980   Age:  41 year old   Today's Visit:  10/15/2021    Referring Provider:    Wilner Carter MD  [address] [place] [id number]    Epilepsy history copied forward:  Duane D Backes is 40 year old right handed with history of cognitive disability and epilepsy referred to us by Dr. Carter for high depakote levels. Per medical records from  he had a generalized tonic-clonic convulsion at [place], EEG showed rare left temporal epileptiform discharges. He is has been treated for epilepsy with topiramate (low levels) and depakote with high concentration for many years.   Interval History: Spoke to Gaurav (). He moved to a new home past .  Since last visit he has moved and new group home and they do not know seizure frequency. No major outburst, he can be re directed, less restless. Last seizure in 2021 (intubated in ICU, status) and prior to this his last seizure was 2020. They are not sure about side effects.   Current antiepileptic drug:   Depakote 500 mg three times a day   Topiramate  200 mg twice a day  vimpat 100 mg twice a day   Medication Notes:   AED Medication Compliance:  compliant most of the time  Using a pill box:   Yes  Past AEDs notes:   Topiramate: We increase topiramate to 200 mg twice a day in winter 2020.    Depakote was decreased from 1500 mg twice a day to 1000 mg twice a day and 2020 due to blood concentrations near 170. Lowering depakote caused more behavioral issues.   vimpat started 2/2021  Psycho-Social History: he moved to current group home 2017. He grew up with parents, after high school he was institutionalized (may be due to behavioral) and then transferred to group home 2013. No drinking, no drugs, and no smoking.     Exam:    There were no vitals taken for this visit.     Wt Readings from Last 5 Encounters:   05/01/21 188 lb (85.3 kg)   03/29/21 189 lb 14.4 oz (86.1 kg)   03/23/21 184 lb (83.5 kg)   03/08/21 179 lb (81.2 kg)   12/23/19 198 lb (89.8 kg)     Cognitive delay, Alert, face symmetric, extra ocular movements in tact, able to life upper extremities in the air    Impression:    Epilepsy (possible focal epilepsy)  History of Autism   History of right temporal epidermoid tumor   Behavioral outburst have worsened  History of Depakote thrombocytopenia       Discussion:  40-year-old male with a history of autism, epilepsy, and right temporal lobe epilepsy epidermoid tumor.  Prior EEGs have shown rare left temporal sharp waves (opposite side of tumor).  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  Patient has been managed on Depakote and topiramate for several years with no seizures.  In February 24, 2021 he had 2 generalized tonic-clonic seizure and was intubated for airway protection.      Overall goal for seizure medication is to reduce or wean off of divalproex due to thrombocytopenia as tolerated. I am not sure if we will be able to reduce depakote. We may further optimize Vimpat.  I suspect as we reduce divalproex further his behavioral outburst will increase.     Plan :     Continue current seizure  "medications:   Depakote 500 mg three times a day   Topiramate  200 mg twice a day  vimpat 100 mg twice a day     Follow up with psychiatrist for mood    Nurse education for new group home - nurse safety, seizure protocol review, clinic workflow    Follow up  With Felix 6 months     Follow up  With Dr. Bautista for epidermoid tumor treatment plan     Diazepam: Give 1 ml (5mg): Give 1 ml for ANY seizure. May repeat once 1 ml (5 mg). Monitor breathing, if there any concerns call 911. Do not exceed 10 mg per day.    Seizure action plan MINCEP nurse to complete       I spent 22 minutes for patient's medical care. During this time key medical decisions were made with review of medical chart prior to visit, visit with patient, counseling/education, and post visit work, including documentation on the day of visit. I addressed all questions the patient/caregiver raised in regards to the patient's medical care. This note was created with voice recognition software. Inadvertent grammatical errors, typographical errors, and \"sound a like\" substitutions may occur due to limitations of the software.  Read the note carefully and apply context when erroneous substitutions have occurred. Thank you.     Lauren Washington MD   Epilepsy Staff             "

## 2021-10-15 NOTE — LETTER
10/15/2021     RE: Duane D Backes  : 1980   MRN: [id number]      Dear Colleague,    Thank you for referring your patient, Duane D Backes, to the [facility] EPILEPSY [facility]ARE at [facility]. Please see a copy of my visit note below.    Duane is a 41 year old who is being evaluated via a billable video visit.      How would you like to obtain your AVS? Mail a copy  If the video visit is dropped, the invitation should be resent by: Text to cell phone: Call [phone]  Will anyone else be joining your video visit? Yes: Gaurav and patient. How would they like to receive their invitation? Text to cell phone: [email or web address]    Video Start Time: 2:39 PM  Video-Visit Details  Type of service:  Video Visit  Video End Time:2:58 pm   Originating Location (pt. Location): Home  Distant Location (provider location):  [facility] EPILEPSY CARE   Platform used for Video Visit: [facility] Epilepsy [facility] Progress Note    Patient:  Duane D Backes  :  1980   Age:  41 year old   Today's Visit:  10/15/2021    Referring Provider:    Wilner Carter MD  [address] [place] [address]    Epilepsy history copied forward:  Duane D Backes is 40 year old right handed with history of cognitive disability and epilepsy referred to us by Dr. Carter for high depakote levels. Per medical records from  he had a generalized tonic-clonic convulsion at [place], EEG showed rare left temporal epileptiform discharges. He is has been treated for epilepsy with topiramate (low levels) and depakote with high concentration for many years.   Interval History: Spoke to Gaurav (). He moved to a new home past .  Since last visit he has moved and new group home and they do not know seizure frequency. No major outburst, he can be re directed, less restless. Last seizure in 2021 (intubated in ICU, status) and prior to this his last seizure was  6/2020. They are not sure about side effects.   Current antiepileptic drug:   Depakote 500 mg three times a day   Topiramate  200 mg twice a day  vimpat 100 mg twice a day   Medication Notes:   AED Medication Compliance:  compliant most of the time  Using a pill box:  Yes  Past AEDs notes:   Topiramate: We increase topiramate to 200 mg twice a day in winter 2020.    Depakote was decreased from 1500 mg twice a day to 1000 mg twice a day and 2020 due to blood concentrations near 170. Lowering depakote caused more behavioral issues.   vimpat started 2/2021  Psycho-Social History: he moved to current group home 2017. He grew up with parents, after high school he was institutionalized (may be due to behavioral) and then transferred to group home 2013. No drinking, no drugs, and no smoking.     Exam:    There were no vitals taken for this visit.     Wt Readings from Last 5 Encounters:   05/01/21 188 lb (85.3 kg)   03/29/21 189 lb 14.4 oz (86.1 kg)   03/23/21 184 lb (83.5 kg)   03/08/21 179 lb (81.2 kg)   12/23/19 198 lb (89.8 kg)     Cognitive delay, Alert, face symmetric, extra ocular movements in tact, able to life upper extremities in the air    Impression:    Epilepsy (possible focal epilepsy)  History of Autism   History of right temporal epidermoid tumor   Behavioral outburst have worsened  History of Depakote thrombocytopenia       Discussion:  40-year-old male with a history of autism, epilepsy, and right temporal lobe epilepsy epidermoid tumor.  Prior EEGs have shown rare left temporal sharp waves (opposite side of tumor).  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  Patient has been managed on Depakote and topiramate for several years with no seizures.  In February 24, 2021 he had 2 generalized tonic-clonic seizure and was intubated for airway protection.      Overall goal for seizure medication is to reduce or wean off  "of divalproex due to thrombocytopenia as tolerated. I am not sure if we will be able to reduce depakote. We may further optimize Vimpat.  I suspect as we reduce divalproex further his behavioral outburst will increase.     Plan :     Continue current seizure medications:   Depakote 500 mg three times a day   Topiramate  200 mg twice a day  vimpat 100 mg twice a day     Follow up with psychiatrist for mood    Nurse education for new group home - nurse safety, seizure protocol review, clinic workflow    Follow up  With Felix 6 months     Follow up  With Dr. Bautista for epidermoid tumor treatment plan     Diazepam: Give 1 ml (5mg): Give 1 ml for ANY seizure. May repeat once 1 ml (5 mg). Monitor breathing, if there any concerns call 911. Do not exceed 10 mg per day.    Seizure action plan MINCEP nurse to complete     I spent 22 minutes for patient's medical care. During this time key medical decisions were made with review of medical chart prior to visit, visit with patient, counseling/education, and post visit work, including documentation on the day of visit. I addressed all questions the patient/caregiver raised in regards to the patient's medical care. This note was created with voice recognition software. Inadvertent grammatical errors, typographical errors, and \"sound a like\" substitutions may occur due to limitations of the software.  Read the note carefully and apply context when erroneous substitutions have occurred. Thank you.     Lauren Washington MD   Epilepsy Staff     "

## 2021-10-15 NOTE — PATIENT INSTRUCTIONS
Continue current seizure medications:   Depakote 500 mg three times a day   Topiramate  200 mg twice a day  vimpat 100 mg twice a day     Follow up with psychiatrist for mood    Nurse education for new group home - nurse safety, seizure protocol review, clinic workflow    Follow up  With Felix 6 months     Follow up  With Dr. Bauitsta for epidermoid tumor treatment plan     Diazepam: Give 1 ml (5mg): Give 1 ml for ANY seizure. May repeat once 1 ml (5 mg). Monitor breathing, if there any concerns call 911. Do not exceed 10 mg per day.    Lauren Washington MD

## 2021-10-21 DIAGNOSIS — G40.909 SEIZURE DISORDER (H): ICD-10-CM

## 2021-10-25 NOTE — TELEPHONE ENCOUNTER
"  divalproex sodium extended-release (DEPAKOTE ER) 500 MG 24 hr tablet   Last Written Prescription Date:  10/1/21  Last Fill Quantity: 90,   # refills: 0  Last Office Visit : 10/15/21  Future Office visit:  4/15/22    aed  4/21  Platelet 4/21  Ast. 2/21    \"  Depakote 500 mg three times a day \"  ng  Routing refill request to provider for review/approval because: dosing alert.  "

## 2021-10-26 RX ORDER — DIVALPROEX SODIUM 500 MG/1
500 TABLET, EXTENDED RELEASE ORAL
Qty: 93 TABLET | Refills: 11 | Status: SHIPPED | OUTPATIENT
Start: 2021-10-26 | End: 2022-04-15

## 2021-11-02 NOTE — PROGRESS NOTES
"  Assessment & Plan     (Z00.00) Encounter for Medicare annual wellness exam  (primary encounter diagnosis)  Comment: Negative screening exam; up-to-date on preventive services after today  Plan: Hepatitis C Screen Reflex to HCV RNA Quant and         Genotype, Glucose, Lipid panel reflex to direct        LDL Non-fasting, COVID-19,PF,PFIZER (12+ Yrs         PURPLE LABEL)        Follow-up in 1 year    (E03.4) Hypothyroidism due to acquired atrophy of thyroid  Comment: Historically euthyroid  Plan: TSH, TSH        If his TSH is in the normal range, recheck in 6 months, which could be just a lab appointment (the future order was entered today)    (Z13.220) Screening for lipid disorders  Comment:   Plan: Lipid panel reflex to direct LDL Non-fasting          (Z13.1) Screening for diabetes mellitus  Comment:   Plan: Glucose          (Z11.59) Need for hepatitis C screening test  Comment: indications for screening discussed with the attendant  Plan: Hepatitis C Screen Reflex to HCV RNA Quant and         Genotype            (Z23) Need for vaccination  Comment: I want him to get the COVID-19 booster today.  It is about 6 months since his last dose (13 days early, and clinically there will be no significant difference between receiving it today and receiving it 13 days from now; he might as well get today since he is here)  Plan: COVID-19,PF,PFIZER (12+ Yrs PURPLE LABEL)                   BMI:   Estimated body mass index is 27.09 kg/m  as calculated from the following:    Height as of this encounter: 1.88 m (6' 2\").    Weight as of this encounter: 95.7 kg (211 lb).   Weight management plan: Discussed healthy diet and exercise guidelines , weight graph and BMI table with group home staff        Return in about 1 year (around 11/4/2022) for Medicare annual wellness exam.    Rajat Parkinson MD  [facility] Subjective Duane is a 41 year old who presents for the following health issues. He is " "accompanied by his group home attendant who gives all history for the patient.     Healthy Habits:     In general, how would you rate your overall health?  Excellent    Frequency of exercise:  2-3 days/week    Duration of exercise:  15-30 minutes    Do you usually eat at least 4 servings of fruit and vegetables a day, include whole grains    & fiber and avoid regularly eating high fat or \"junk\" foods?  Yes    Taking medications regularly:  Yes    Medication side effects:  None    Ability to successfully perform activities of daily living:  Telephone requires assistance, transportation requires assistance, shopping requires assistance, preparing meals requires assistance, housework requires assistance, laundry requires assistance, medication administration requires assistance and money management requires assistance    Home Safety:  No safety concerns identified    Hearing Impairment:  No hearing concerns    In the past 6 months, have you been bothered by leaking of urine?  No    In general, how would you rate your overall mental or emotional health?  Excellent      PHQ-2 Total Score: 0    Additional concerns today:  No       Hypothyroidism Follow-up      Since last visit, patient describes the following symptoms: constipation and anxiety (which are chronic)        Review of Systems   Constitutional: Negative for chills and fever.   HENT: Negative for congestion, ear pain, hearing loss and sore throat.    Eyes: Negative for pain and visual disturbance.   Respiratory: Negative for cough and shortness of breath.    Cardiovascular: Negative for chest pain, palpitations and peripheral edema.   Gastrointestinal: Negative for abdominal pain, constipation, diarrhea, heartburn, hematochezia and nausea.   Genitourinary: Negative for dysuria, frequency, genital sores, hematuria and urgency.   Musculoskeletal: Negative for arthralgias, joint swelling and myalgias.   Skin: Negative for rash.   Neurological: Negative for " "dizziness, weakness, headaches and paresthesias.   Psychiatric/Behavioral: Negative for mood changes. The patient is not nervous/anxious.             Objective    Pulse 88   Temp 97.4  F (36.3  C) (Tympanic)   Ht 1.88 m (6' 2\")   Wt 95.7 kg (211 lb)   SpO2 96%   BMI 27.09 kg/m    Body mass index is 27.09 kg/m .  Physical Exam   GENERAL: healthy, alert and no distress  EYES: Eyes grossly normal to inspection, PERRL and conjunctivae and sclerae normal  HENT: ear canals and TM's normal, nose and mouth without ulcers or lesions  NECK: no adenopathy, no asymmetry, masses, or scars and thyroid normal to palpation  RESP: lungs clear to auscultation - no rales, rhonchi or wheezes  CV: regular rate and rhythm, normal S1 S2, no S3 or S4, no murmur, click or rub, no peripheral edema and peripheral pulses strong  ABDOMEN: soft, nontender, no hepatosplenomegaly, no masses and bowel sounds normal   (male): normal male genitalia without lesions or urethral discharge, no hernia  MS: no gross musculoskeletal defects noted, no edema  SKIN: no suspicious lesions or rashes  NEURO: Normal strength and tone, infrequent speech is otherwise normal  PSYCH: Autistic, follows commands, cooperative with the exam, repeats some phrases, but he is not conversant                "

## 2021-11-02 NOTE — PATIENT INSTRUCTIONS
At [facility], we strive to deliver an exceptional experience to you, every time we see you. If you receive a survey, please complete it as we do value your feedback.  If you have MyChart, you can expect to receive results automatically within 24 hours of their completion.  Your provider will send a note interpreting your results as well.   If you do not have MyChart, you should receive your results in about a week by mail.    Your care team:                            Family Medicine Internal Medicine   MD Scooter Gao MD Shantel Branch-Fleming, MD Srinivasa Vaka, MD Katya Belousova, PABENOIT Rider, APRN CNP    Lee Delgado, MD Pediatrics   Jarrell Tinajero, PABENOIT Joseph, CNP MD Shirin Fraser APRN CNP   MD Estelle Marvin MD Deborah Mielke, MD Alejandra Stephens, APRN [facility]      Clinic hours: Monday - Thursday 7 am-6 pm; Fridays 7 am-5 pm.   Urgent care: Monday - Friday 10 am- 8 pm; Saturday and Sunday 9 am-5 pm.    Clinic: [phone]       [facility]: Monday - Thursday 8 am - 7 pm; Friday 8 am - 6 pm  [facility]: [phone]     Use [email or web address] for 24/7 diagnosis and treatment of dozens of conditions.

## 2021-11-04 ENCOUNTER — OFFICE VISIT (OUTPATIENT)
Dept: FAMILY MEDICINE | Facility: CLINIC | Age: 41
End: 2021-11-04
Payer: MEDICARE

## 2021-11-04 VITALS
OXYGEN SATURATION: 96 % | HEIGHT: 74 IN | WEIGHT: 211 LBS | BODY MASS INDEX: 27.08 KG/M2 | DIASTOLIC BLOOD PRESSURE: 72 MMHG | TEMPERATURE: 97.4 F | HEART RATE: 88 BPM | SYSTOLIC BLOOD PRESSURE: 112 MMHG

## 2021-11-04 DIAGNOSIS — Z13.220 SCREENING FOR LIPID DISORDERS: ICD-10-CM

## 2021-11-04 DIAGNOSIS — Z13.1 SCREENING FOR DIABETES MELLITUS: ICD-10-CM

## 2021-11-04 DIAGNOSIS — Z00.00 ENCOUNTER FOR MEDICARE ANNUAL WELLNESS EXAM: Primary | ICD-10-CM

## 2021-11-04 DIAGNOSIS — E03.4 HYPOTHYROIDISM DUE TO ACQUIRED ATROPHY OF THYROID: ICD-10-CM

## 2021-11-04 DIAGNOSIS — Z11.59 NEED FOR HEPATITIS C SCREENING TEST: ICD-10-CM

## 2021-11-04 DIAGNOSIS — Z23 NEED FOR VACCINATION: ICD-10-CM

## 2021-11-04 DIAGNOSIS — Z79.899 ENCOUNTER FOR LONG-TERM (CURRENT) USE OF HIGH-RISK MEDICATION: ICD-10-CM

## 2021-11-04 LAB
CHOLEST SERPL-MCNC: 165 MG/DL
FASTING STATUS PATIENT QL REPORTED: NO
FASTING STATUS PATIENT QL REPORTED: NO
GLUCOSE BLD-MCNC: 88 MG/DL (ref 70–99)
HDLC SERPL-MCNC: 67 MG/DL
LDLC SERPL CALC-MCNC: 69 MG/DL
LITHIUM SERPL-SCNC: <0.2 MMOL/L
NONHDLC SERPL-MCNC: 98 MG/DL
TRIGL SERPL-MCNC: 143 MG/DL
TSH SERPL DL<=0.005 MIU/L-ACNC: 1.78 MU/L (ref 0.4–4)

## 2021-11-04 PROCEDURE — 84443 ASSAY THYROID STIM HORMONE: CPT | Performed by: FAMILY MEDICINE

## 2021-11-04 PROCEDURE — 99207 PR ANNUAL WELLNESS VISIT, PPS, INITIAL STAT: CPT | Mod: 25 | Performed by: FAMILY MEDICINE

## 2021-11-04 PROCEDURE — 86803 HEPATITIS C AB TEST: CPT | Performed by: FAMILY MEDICINE

## 2021-11-04 PROCEDURE — 80178 ASSAY OF LITHIUM: CPT | Performed by: FAMILY MEDICINE

## 2021-11-04 PROCEDURE — 91300 COVID-19,PF,PFIZER (12+ YRS): CPT | Performed by: FAMILY MEDICINE

## 2021-11-04 PROCEDURE — 99213 OFFICE O/P EST LOW 20 MIN: CPT | Mod: 25 | Performed by: FAMILY MEDICINE

## 2021-11-04 PROCEDURE — 80061 LIPID PANEL: CPT | Performed by: FAMILY MEDICINE

## 2021-11-04 PROCEDURE — 82947 ASSAY GLUCOSE BLOOD QUANT: CPT | Performed by: FAMILY MEDICINE

## 2021-11-04 PROCEDURE — 36415 COLL VENOUS BLD VENIPUNCTURE: CPT | Performed by: FAMILY MEDICINE

## 2021-11-04 PROCEDURE — 0003A COVID-19,PF,PFIZER (12+ YRS): CPT | Performed by: FAMILY MEDICINE

## 2021-11-04 RX ORDER — OMEGA-3 FATTY ACIDS CAP 1000 MG 1000 MG
1 CAP ORAL 2 TIMES DAILY
COMMUNITY
End: 2022-02-22

## 2021-11-04 ASSESSMENT — ACTIVITIES OF DAILY LIVING (ADL)
CURRENT_FUNCTION: LAUNDRY REQUIRES ASSISTANCE
CURRENT_FUNCTION: HOUSEWORK REQUIRES ASSISTANCE
CURRENT_FUNCTION: SHOPPING REQUIRES ASSISTANCE
CURRENT_FUNCTION: TRANSPORTATION REQUIRES ASSISTANCE
CURRENT_FUNCTION: PREPARING MEALS REQUIRES ASSISTANCE
CURRENT_FUNCTION: TELEPHONE REQUIRES ASSISTANCE
CURRENT_FUNCTION: MONEY MANAGEMENT REQUIRES ASSISTANCE
CURRENT_FUNCTION: MEDICATION ADMINISTRATION REQUIRES ASSISTANCE

## 2021-11-04 ASSESSMENT — ENCOUNTER SYMPTOMS
HEMATURIA: 0
FEVER: 0
DIZZINESS: 0
HEARTBURN: 0
NERVOUS/ANXIOUS: 0
ARTHRALGIAS: 0
PARESTHESIAS: 0
EYE PAIN: 0
PALPITATIONS: 0
DIARRHEA: 0
ABDOMINAL PAIN: 0
NAUSEA: 0
FREQUENCY: 0
JOINT SWELLING: 0
SORE THROAT: 0
COUGH: 0
DYSURIA: 0
CHILLS: 0
SHORTNESS OF BREATH: 0
HEMATOCHEZIA: 0
WEAKNESS: 0
CONSTIPATION: 0
HEADACHES: 0
MYALGIAS: 0

## 2021-11-04 ASSESSMENT — MIFFLIN-ST. JEOR: SCORE: 1941.84

## 2021-11-05 LAB — HCV AB SERPL QL IA: NONREACTIVE

## 2021-11-30 ENCOUNTER — TELEPHONE (OUTPATIENT)
Dept: FAMILY MEDICINE | Facility: CLINIC | Age: 41
End: 2021-11-30
Payer: MEDICARE

## 2021-11-30 NOTE — TELEPHONE ENCOUNTER
Orders received via fax from [facility] with additional information requested regarding pt's dysphagia.    Placed in provider's bin to address.    Cris Ford,   [facility]

## 2021-12-02 ENCOUNTER — MEDICAL CORRESPONDENCE (OUTPATIENT)
Dept: HEALTH INFORMATION MANAGEMENT | Facility: CLINIC | Age: 41
End: 2021-12-02
Payer: MEDICARE

## 2021-12-03 NOTE — TELEPHONE ENCOUNTER
Faxed Standing order-Food Thickener form to [facility], [phone],right fax confirmed at 8:20 am today, 12/3/2021. Sent to abstracting.  Linda Bhatt [facility]  2nd Floor  Primary Care

## 2022-01-11 ENCOUNTER — TELEPHONE (OUTPATIENT)
Dept: FAMILY MEDICINE | Facility: CLINIC | Age: 42
End: 2022-01-11
Payer: MEDICARE

## 2022-01-11 NOTE — TELEPHONE ENCOUNTER
Evie with [facility] called to clinic regarding prescription for Thick-It thickening powder.    In order for insurance plan to cover thickening agent, they require patient to have a swallow study done annually (every 12 months).  [facility] earlier this month, but they are needing additional chart notes/documentation of a swallow study done within last 12 months. Last swallow study Nav[facility] has noted was 11/11/2020. Writer confirmed this, do not note a more recent swallow study since Nov 2020.    Evie stated she will inform patient's GH of this and last swallow study date and have patient's staff reach out to schedule or contact provider office about ordering and scheduling this. Evie stated GH did not know when last study was done.  Patient uses Thick-it on daily basis and can not go without so they will have GH contact provider office ASAP.  Writer agreed.      FYI to provider, patient will need an updated swallow study done. Insurance plan requires annually to cover Thick-It powder. Last one done Nov 2020.  is supposed to be calling at later time to request order. Updating provider now. Contact  for scheduling.        Angelina Ac RN  [facility]

## 2022-01-13 ENCOUNTER — OFFICE VISIT (OUTPATIENT)
Dept: FAMILY MEDICINE | Facility: CLINIC | Age: 42
End: 2022-01-13
Payer: MEDICARE

## 2022-01-13 VITALS
SYSTOLIC BLOOD PRESSURE: 106 MMHG | WEIGHT: 211 LBS | HEART RATE: 80 BPM | OXYGEN SATURATION: 96 % | DIASTOLIC BLOOD PRESSURE: 71 MMHG | TEMPERATURE: 98.2 F | BODY MASS INDEX: 27.09 KG/M2

## 2022-01-13 DIAGNOSIS — Z91.89 AT RISK FOR ASPIRATION: Primary | ICD-10-CM

## 2022-01-13 DIAGNOSIS — Z23 NEED FOR VACCINATION: ICD-10-CM

## 2022-01-13 DIAGNOSIS — F84.0 ACTIVE AUTISTIC DISORDER: ICD-10-CM

## 2022-01-13 PROCEDURE — 90686 IIV4 VACC NO PRSV 0.5 ML IM: CPT | Performed by: FAMILY MEDICINE

## 2022-01-13 PROCEDURE — 99213 OFFICE O/P EST LOW 20 MIN: CPT | Mod: 25 | Performed by: FAMILY MEDICINE

## 2022-01-13 PROCEDURE — G0008 ADMIN INFLUENZA VIRUS VAC: HCPCS | Performed by: FAMILY MEDICINE

## 2022-01-13 RX ORDER — LACTULOSE 10 G/15ML
SOLUTION ORAL; RECTAL
COMMUNITY
Start: 2022-01-10 | End: 2022-03-23

## 2022-01-13 NOTE — PROGRESS NOTES
Assessment & Plan     Group home staff were reassured that flatus is not a medical problem but rather a normal bodily function, and the odor comes from chemical reactions.  Well inconvenient, and is not indicate disease.  I will have him try Beano.  After a 2 to 4-week trial, is a noted difference, we can ask for a prescription for long-term use    (Z91.89) At risk for aspiration  (primary encounter diagnosis)  (F84.0) Active autistic disorder  Comment: His insurance requires a swallow study to be done yearly in order for them to continue to cover the secondary he uses  Plan: Speech Therapy Referral, Speech Therapy         Referral            (Z23) Need for vaccination  Comment:   Plan: INFLUENZA VACCINE IM > 6 MONTHS VALENT IIV4         (AFLURIA/FLUZONE)              Return in about 10 months (around 11/4/2022) for Medicare annual wellness exam, recheck medications.    Rajat Parkinson MD  [facility] Subjective Duane is a 41 year old who presents for the following health issues  accompanied by his Group home staff.    HPI     Staff of noted the patient passing flatus frequently, usually malodorous.  They are concerned that the medical issues are present        Review of Systems   Constitutional, HEENT, cardiovascular, pulmonary, GI, , musculoskeletal, neuro, skin, endocrine and psych systems are negative, except as otherwise noted.      Objective    /71 (BP Location: Left arm, Patient Position: Sitting, Cuff Size: Adult Large)   Pulse 80   Temp 98.2  F (36.8  C) (Oral)   Wt 95.7 kg (211 lb)   SpO2 96%   BMI 27.09 kg/m    Body mass index is 27.09 kg/m .  Physical Exam   GENERAL: healthy, alert and no distress  EYES: Eyes grossly normal to inspection, PERRL, EOMI, sclerae white and conjunctivae normal  RESP: lungs clear to auscultation - no crackles or wheezes, no areas of dullness, no tachypnea  CV: Heart regular rate and rhythm without murmur, click or rub. No  peripheral edema and peripheral pulses strong  MS: no gross musculoskeletal defects noted, no edema  SKIN: no suspicious lesions or rashes to visible skin

## 2022-01-13 NOTE — PATIENT INSTRUCTIONS
At [facility], we strive to deliver an exceptional experience to you, every time we see you. If you receive a survey, please complete it as we do value your feedback.  If you have MyChart, you can expect to receive results automatically within 24 hours of their completion.  Your provider will send a note interpreting your results as well.   If you do not have MyChart, you should receive your results in about a week by mail.    Your care team:                            Family Medicine Internal Medicine   MD Scooter Gao MD Shantel Branch-Fleming, MD Srinivasa Vaka, MD Katya Belousova, PABENOIT Rider, APRN CNP    Lee Delgado, MD Pediatrics   Jarrell Tinajero, PABENOIT Joseph, CNP MD Shirin Fraser APRN CNP   MD Estelle Marvin MD Deborah Mielke, MD Alejandra Stephens, APRN [facility]      Clinic hours: Monday - Thursday 7 am-6 pm; Fridays 7 am-5 pm.   Urgent care: Monday - Friday 10 am- 8 pm; Saturday and Sunday 9 am-5 pm.    Clinic: [phone]       [facility]: Monday - Thursday 8 am - 7 pm; Friday 8 am - 6 pm  [facility]: [phone]     Use [email or web address] for 24/7 diagnosis and treatment of dozens of conditions.

## 2022-01-13 NOTE — NURSING NOTE
Prior to immunization administration, verified patients identity using patient s name and date of birth. Please see Immunization Activity for additional information.     Screening Questionnaire for Adult Immunization    Are you sick today?   No   Do you have allergies to medications, food, a vaccine component or latex?   No   Have you ever had a serious reaction after receiving a vaccination?   No   Do you have a long-term health problem with heart, lung, kidney, or metabolic disease (e.g., diabetes), asthma, a blood disorder, no spleen, complement component deficiency, a cochlear implant, or a spinal fluid leak?  Are you on long-term aspirin therapy?   No   Do you have cancer, leukemia, HIV/AIDS, or any other immune system problem?   No   Do you have a parent, brother, or sister with an immune system problem?   No   In the past 3 months, have you taken medications that affect  your immune system, such as prednisone, other steroids, or anticancer drugs; drugs for the treatment of rheumatoid arthritis, Crohn s disease, or psoriasis; or have you had radiation treatments?   No   Have you had a seizure, or a brain or other nervous system problem?   No   During the past year, have you received a transfusion of blood or blood    products, or been given immune (gamma) globulin or antiviral drug?   No   For women: Are you pregnant or is there a chance you could become       pregnant during the next month?   No   Have you received any vaccinations in the past 4 weeks?   No     Immunization questionnaire answers were all negative.        Per orders of Dr. Parkinson, injection of Fluzone given by Joya Simons RN. Patient instructed to remain in clinic for 15 minutes afterwards, and to report any adverse reaction to me immediately.       Screening performed by Joya Simons RN on 1/13/2022 at 11:01 AM.

## 2022-01-28 DIAGNOSIS — E03.4 HYPOTHYROIDISM DUE TO ACQUIRED ATROPHY OF THYROID: ICD-10-CM

## 2022-01-28 DIAGNOSIS — K59.09 CHRONIC CONSTIPATION: Primary | ICD-10-CM

## 2022-01-28 RX ORDER — LEVOTHYROXINE SODIUM 150 UG/1
TABLET ORAL
Qty: 31 TABLET | Refills: 8 | Status: SHIPPED | OUTPATIENT
Start: 2022-01-28 | End: 2022-10-29

## 2022-02-02 RX ORDER — DOCUSATE SODIUM 100 MG/1
CAPSULE, LIQUID FILLED ORAL
Qty: 62 CAPSULE | Refills: 11 | Status: SHIPPED | OUTPATIENT
Start: 2022-02-02

## 2022-02-04 ENCOUNTER — HOSPITAL ENCOUNTER (OUTPATIENT)
Dept: SPEECH THERAPY | Facility: CLINIC | Age: 42
End: 2022-02-04
Attending: FAMILY MEDICINE
Payer: MEDICARE

## 2022-02-04 DIAGNOSIS — F84.0 ACTIVE AUTISTIC DISORDER: ICD-10-CM

## 2022-02-04 DIAGNOSIS — Z91.89 AT RISK FOR ASPIRATION: ICD-10-CM

## 2022-02-04 PROCEDURE — 92610 EVALUATE SWALLOWING FUNCTION: CPT | Mod: GN | Performed by: SPEECH-LANGUAGE PATHOLOGIST

## 2022-02-04 PROCEDURE — 92526 ORAL FUNCTION THERAPY: CPT | Mod: GN | Performed by: SPEECH-LANGUAGE PATHOLOGIST

## 2022-02-04 NOTE — PROGRESS NOTES
[facility]      OUTPATIENT SWALLOW  EVALUATION  PLAN OF TREATMENT FOR OUTPATIENT REHABILITATION  (COMPLETE FOR INITIAL CLAIMS ONLY)  Patient's Last Name, First Name, M.I.  YOB: 1980  Backes,Duane D     Provider's Name   [facility]   Medical Record No.  [id number]     Start of Care Date:  02/04/22   Onset Date:  01/13/22 (Date of MD orders. )   Type:     ___PT   ____OT  ___X_SLP Medical Diagnosis:  Dysphagia     Treatment Diagnosis:  Moderate oropharyngeal dysphagia  Visits from SOC:  1     _________________________________________________________________________________  Plan of Treatment/Functional Goals:  Planned Therapy Interventions: Dysphagia Treatment  Dysphagia treatment: Modified diet education,Instruction of safe swallow strategies,Compensatory strategies for swallowing          Intervention Comments: Treatment to focus on caregiver education.       Goals   1. Goal Identifier: Goal 1: Dysphagia        Goal Description: Caregiver will verbalize understanding of all results and recommendations with 100% in order to facilitate reduced risk of aspiration on least restrictive diet for Pt.                          Predicted Duration of Therapy Intervention (days/wks): 1 session following evaluation     WILMER Albert       I CERTIFY THE NEED FOR THESE SERVICES FURNISHED UNDER        THIS PLAN OF TREATMENT AND WHILE UNDER MY CARE     (Physician co-signature of this document indicates review and certification of the therapy plan).                  Certification date from: 02/04/22 Certification date to: 02/04/22          Referring Physician: Dr. BAYLEE Parkinson MD     Initial Assessment        See Epic Evaluation Start Of Care Date: 02/04/22                    Speech-Language Pathology Department   EVALUATION  [facility]  [facility]    02/04/22 1300   General Information   Type Of Visit Initial   Start Of Care Date 02/04/22   Referring Physician Dr. BAYLEE Parkinson MD    Orders Evaluate And Treat   Orders Comment At risk for aspiration, Clinical Swallow Study   Medical Diagnosis Dysphagia   Onset Of Illness/injury Or Date Of Surgery 01/13/22  (Date of MD orders. )   Precautions/limitations No Known Precautions/limitations   Hearing WFL   Pertinent History of Current Problem/OT: Additional Occupational Profile Info Pt is a 41 y.o. male with long standing history of dysphagia, Pt evaluated by this SLP in 2018 and 2020 d/t ongoing dysphagia concerns. Pt completed an OP VFSS on 11/11/2020 with results indicating moderately-severe oropharyngeal dysphagia with aspiration observed across thin, nectar, and honey thick consistencies as well as delayed swallow response resulting in premature pharyngeal entry of liquids. Per the OP VFSS report Pt was recommended honey thick liquids and NDD2. Pt referred for repeat clinical swallow evaluation d/t ongoing dysphagia and insurance requirements of annual swallow study. Pt moved to a new group home in October, caregiver reports Pt has been receiving nectar thick consistencies with 1:1 support for all oral intake and a regular diet. No reports of coughing/choking and no recent illness or pneumonias. PMH significant for seizure dx and ASD.    Respiratory Status Room air   Prior Level Of Function Swallowing   Prior Level Of Function Comment Ongoing dysphagia.    Patient Role/employment History Disabled   Living Environment Group Home   General Observations Pt alert, limited engagement and direction following/verbal responses d/t autism.    Patient/family Goals Reassess swallow function, obtain new perscription for Thick It.    Fall Risk Screen   Fall screen comments Unable to assess as Pt inconsistent in responses. Ambulates independently.    Abuse Screen (yes response referral indicated)   Feels Unsafe at Home or  "Work/School unable to answer (comment required)  (ASD)   Feels Threatened by Someone unable to answer (comment required)  (ASD)   Does Anyone Try to Keep You From Having Contact with Others or Doing Things Outside Your Home? unable to answer (comment required)  (ASD)   Physical Signs of Abuse Present no   Clinical Swallow Evaluation   Oral Musculature generally intact   Structural Abnormalities none present   Dentition present and adequate   Mucosal Quality good   Mandibular Strength and Mobility intact   Oral Labial Strength and Mobility   (Unable to assess)   Lingual Strength and Mobility   (Unable to assess)   Buccal Strength and Mobility   (Unable to assess)   Laryngeal Function Cough;Voicing initiated   Oral Musculature Comments Limited oral mechanism examination d/t impaired receptive language.    Additional Documentation Yes   Additional evaluation(s) completed today Yes   Rationale for completing additional evaluation Based on Pt medical history, caregiver reports, SLP clinical judgment, and MD orders.    Clinical Swallow Eval: Thin Liquid Texture Trial   Mode of Presentation, Thin Liquids cup;self-fed   Volume of Liquid or Food Presented 1 tsp   Oral Phase of Swallow Premature pharyngeal entry  (Concern for premature entry. )   Pharyngeal Phase of Swallow impaired;coughing/choking   Successful Strategies Trialed During Procedure other (see comments)  (Cued hard cough)   Diagnostic Statement Pt demonstrated overt s/sx of aspiration/penetration following 1 trial of thin liquid as evidenced by coughing. SLP cued addtional cough and Pt followed with dry swallow.    Clinical Swallow Eval: Mildly Thick Liquids    Mode of Presentation cup;self-fed   Volume Presented 6-8 oz  (Caregiver cued for single sips and slowed rate.)   Oral Phase WFL   Pharyngeal Phase intact   Diagnostic Statement No overt s/sx noted, Pt completed intake with single sips given direct caregiver cues and support for \"slow down, swallow.\" Pt " "demonstrated medial head position for all intake w/o cueing required.    Clinical Swallow Eval: Moderately Thick Liquids   Mode of Presentation cup;self-fed   Volume Presented 6-8 oz   Oral Phase WFL   Pharyngeal Phase intact   Diagnostic Statement No overt s/sx noted, Pt completed intake with single sips given direct caregiver cues and support for \"slow down, swallow.\" Pt demonstrated medial head position for all intake w/o cueing required.    Clinical Swallow Eval: Puree Solid Texture Trial   Mode of Presentation, Puree spoon;self-fed   Volume of Puree Presented 1-2 tbs  (Pt taking large bites even w/ cueing. )   Oral Phase, Puree WFL   Pharyngeal Phase, Puree intact   Diagnostic Statement No overt s/sx of aspiration nor penetration. Pt continues to benefit from direct verbal cues and mild hand over hand for reduced rate as needed.    Clinical Swallow Eval: Soft & Bite-sized   Mode of Presentation spoon;self-fed   Volume Presented 1 tbs  (SLP drained thin liquid from fruit cup. )   Oral Phase WFL  (Cueing for reduced rate and increased mastication. )   Pharyngeal Phase intact   Diagnostic Statement No overt s/sx of aspiration nor penetration on intake of fruit pieces, Pt lifted cup and drank small amt of remaining liquid with 1-2 pieces of fruit at end and demonstrated a cough.    Swallow Compensations   Swallow Compensations Pacing;Reduce amounts;Multiple swallow  (Pt benefits from 1:1 support to reduce impulsivity and speed)   Results   (Pt at high risk for aspiration on thin liquids/regular diet.)   Educational Assessment   Barriers to Learning Cognitive   Preferred Learning Style Demonstration;Other  (1:1 support and direct cues, [place] as need. )   General Therapy Interventions   Planned Therapy Interventions Dysphagia Treatment   Dysphagia treatment Modified diet education;Instruction of safe swallow strategies;Compensatory strategies for swallowing   Intervention Comments Treatment to focus on caregiver " education.    Swallow Eval: Clinical Impressions   Skilled Criteria for Therapy Intervention Skilled criteria met.  Treatment indicated.   Functional Assessment Scale (FAS) 3   Dysphagia Outcome Severity Scale (JACQUELINE) Level 3 - JACQUELINE   Treatment Diagnosis Moderate oropharyngeal dysphagia    Diet texture recommendations Moderately thick liquids/liquidized (level 3);Easy to Chew diet (level 7);Soft & Bite Sized diet (level 6)   Recommended Feeding/Eating Techniques small sips/bites;other (see comments)  (Cues+1:1 support for all PO intake to reduce rate and amt. )   Rehab Potential good, to achieve stated therapy goals   Predicted Duration of Therapy Intervention (days/wks) 1 session following evaluation    Anticipated Discharge Disposition home w/ assist   Risks and Benefits of Treatment have been explained. Yes   Patient, family and/or staff in agreement with Plan of Care Yes   Clinical Impression Comments Pt presents with moderate oropharyngeal dysphagia on evaluation today characterized by suspected premature pharyngeal entry of thin liquids, coughing on thin liquids and thin liquid in fruit cup (semisolid), rapid rate of intake, and reduced mastication. Pt is at increased risk for aspiration based on reduced cognitive capacity, limited insight into mealtime safety, impulsivity with all PO intake and overt s/sx of aspiration noted on thin liquids, additionally Pt previously demonstrated aspiration across all textures on OP VFSS completed in 2021. RECOMMEND: 1. Soft and easy to chew (NDD2) diet with NECTAR THICK liquids (including cereal milk, soup broth, fruit cup liquid and extreme caution with ice cream). 2. 1:1 supervision for all PO intake with direct cues/support for reduced amounts and reduced rate of intake as well as direct cues for increased/appropriate mastication. Caregivers should select moist foods or add moisture to food as able, cut foods into small easy to chew pieces, and closely monitor Pt for  overt s/sx of aspiration/penetration as well as signs of infection including; wet voice/vocal changes, coughing/choking, and fever. Caregiver educated to seek medical attention if signs of infection noted. Caregiver present and verbalized understanding of all results and recommendations.    Swallow Goals   SLP Swallow Goals 1   Swallow Goal 1   Goal Identifier Goal 1: Dysphagia    Goal Description Caregiver will verbalize understanding of all results and recommendations with 100% in order to facilitate reduced risk of aspiration on least restrictive diet for Pt.    Goal Date 2/4/2022   Total Session Time   SLP Eval: oral/pharyngeal swallow function, clinical minutes (73000) 62   Total Evaluation Time 30   Therapy Certification   Certification date from 02/04/22   Certification date to 02/04/22   Medical Diagnosis Dysphagia       Thank you for referring Duane Backes to outpatient therapy at [facility]. Please call Nani Alcocer MA, SLP-CCC at [phone] or email [email or web address] with any questions or concerns.      Nani Alcocer M.A., SLP-CCC  Speech-Language Pathologist

## 2022-02-10 ENCOUNTER — TELEPHONE (OUTPATIENT)
Dept: FAMILY MEDICINE | Facility: CLINIC | Age: 42
End: 2022-02-10
Payer: MEDICARE

## 2022-02-10 NOTE — TELEPHONE ENCOUNTER
Ranjan,  at patient's  called to office to inquire about previous brain imaging and need for patient to possibly have a sedated CT scan of brain, to check on growth of brain tumor.    Per review of chart, most recent MRI of brain was done on 10/10/21 at [facility], ordered by neurosurgeon, Dr. Lui.  This is not being followed by Dr. Parkinson, appears patient is being followed by Neurology and Neurosurgery for monitoring of brain and benign neoplasm of brain.    Writer referred Ranjan to connect with Neurosurgery or Neurology team regarding her questions and discussing additional imaging of brain.    Ranjan agreed, will outreach to these specialties for further information.  Dr. Lui at [facility] Hali Washington at [facility]        Angelina Ac RN  [facility]

## 2022-02-14 DIAGNOSIS — R13.13 PHARYNGEAL DYSPHAGIA: ICD-10-CM

## 2022-02-14 NOTE — TELEPHONE ENCOUNTER
"Group Home calling for a refill,  Patient has had swallow study, of the \" Thick it\"  to be sent to [facility].  Linda Bhatt MA  [facility]  2nd Floor  Primary Care    "

## 2022-02-14 NOTE — TELEPHONE ENCOUNTER
Routing refill request to provider for review/approval because:  Drug not on the [facility] refill protocol     Orly VERGARAN, RN

## 2022-02-16 DIAGNOSIS — K59.09 CHRONIC CONSTIPATION: Primary | ICD-10-CM

## 2022-02-19 DIAGNOSIS — K59.00 CONSTIPATION: ICD-10-CM

## 2022-02-22 RX ORDER — POLYETHYLENE GLYCOL 3350 17 G/17G
POWDER, FOR SOLUTION ORAL
Qty: 510 G | Refills: 11 | Status: SHIPPED | OUTPATIENT
Start: 2022-02-22

## 2022-02-22 RX ORDER — CHLORAL HYDRATE 500 MG
CAPSULE ORAL
Qty: 100 CAPSULE | Refills: 11 | Status: SHIPPED | OUTPATIENT
Start: 2022-02-22

## 2022-02-24 DIAGNOSIS — G40.909 SEIZURE DISORDER (H): Primary | ICD-10-CM

## 2022-03-01 RX ORDER — TOPIRAMATE 200 MG/1
TABLET, FILM COATED ORAL
Qty: 62 TABLET | Refills: 0 | Status: SHIPPED | OUTPATIENT
Start: 2022-03-01 | End: 2022-04-15

## 2022-03-10 RX ORDER — MAGNESIUM HYDROXIDE 1200 MG/15ML
30 SUSPENSION ORAL EVERY EVENING
Qty: 3780 ML | Refills: 0 | Status: SHIPPED | OUTPATIENT
Start: 2022-03-10

## 2022-03-10 RX ORDER — MAGNESIUM HYDROXIDE 1200 MG/15ML
SUSPENSION ORAL
Qty: 946 ML | Refills: 11 | OUTPATIENT
Start: 2022-03-10

## 2022-03-10 RX ORDER — MAGNESIUM HYDROXIDE 1200 MG/15ML
30 SUSPENSION ORAL EVERY EVENING
OUTPATIENT
Start: 2022-03-10

## 2022-03-11 DIAGNOSIS — Z53.9 DIAGNOSIS NOT YET DEFINED: Primary | ICD-10-CM

## 2022-03-11 NOTE — TELEPHONE ENCOUNTER
Need diagnosis code  Prescription approved per [facility] Refill Protocol.  Ashley Cardoso RN, BSN   [facility]

## 2022-03-15 RX ORDER — CHOLECALCIFEROL (VITAMIN D3) 25 MCG
TABLET ORAL
Qty: 100 TABLET | Refills: 11 | Status: SHIPPED | OUTPATIENT
Start: 2022-03-15

## 2022-03-20 DIAGNOSIS — K59.09 CHRONIC CONSTIPATION: Primary | ICD-10-CM

## 2022-03-21 NOTE — TELEPHONE ENCOUNTER
Routing refill request to provider for review/approval because:  Drug/ supplies not on the [facility] refill protocol     Orly Peñaloza BSN, RN

## 2022-03-23 RX ORDER — LACTULOSE 10 G/15ML
SOLUTION ORAL; RECTAL
Qty: 2880 ML | Refills: 11 | Status: SHIPPED | OUTPATIENT
Start: 2022-03-23

## 2022-04-15 ENCOUNTER — TELEPHONE (OUTPATIENT)
Dept: NEUROLOGY | Facility: CLINIC | Age: 42
End: 2022-04-15

## 2022-04-15 ENCOUNTER — VIRTUAL VISIT (OUTPATIENT)
Dept: NEUROLOGY | Facility: CLINIC | Age: 42
End: 2022-04-15
Payer: MEDICARE

## 2022-04-15 DIAGNOSIS — G47.00 INSOMNIA, UNSPECIFIED TYPE: Primary | ICD-10-CM

## 2022-04-15 DIAGNOSIS — G40.909 SEIZURE DISORDER (H): ICD-10-CM

## 2022-04-15 RX ORDER — TOPIRAMATE 200 MG/1
TABLET, FILM COATED ORAL
Qty: 60 TABLET | Refills: 11 | Status: SHIPPED | OUTPATIENT
Start: 2022-04-15 | End: 2023-04-10

## 2022-04-15 RX ORDER — LACOSAMIDE 100 MG/1
100 TABLET ORAL 2 TIMES DAILY
Qty: 60 TABLET | Refills: 5 | Status: SHIPPED | OUTPATIENT
Start: 2022-04-15 | End: 2022-04-15

## 2022-04-15 RX ORDER — DIVALPROEX SODIUM 500 MG/1
500 TABLET, EXTENDED RELEASE ORAL
Qty: 90 TABLET | Refills: 11 | Status: SHIPPED | OUTPATIENT
Start: 2022-04-15 | End: 2023-04-10

## 2022-04-15 RX ORDER — LANOLIN ALCOHOL/MO/W.PET/CERES
3 CREAM (GRAM) TOPICAL
Qty: 30 TABLET | Refills: 11 | Status: SHIPPED | OUTPATIENT
Start: 2022-04-15 | End: 2022-12-05

## 2022-04-15 NOTE — TELEPHONE ENCOUNTER
What is the concern that needs to be addressed by a nurse? Pharmacy wants quantity change for vimpat, 62 tablets, please call back or send new rx.     May a detailed message be left on [email or web address]? yes    Date of last office visit: 4/15/22    Message routed to: mincep rn pool

## 2022-04-15 NOTE — PROGRESS NOTES
Duane is a 41 year old who is being evaluated via a billable video visit.      How would you like to obtain your AVS? Mail a copy  If the video visit is dropped, the invitation should be resent by: Send to e-mail at:[email or web address]     Will anyone else be joining your video visit? Yes , staff send email to : [email or web address]      Video Start Time: 2:36 PM  Video-Visit Details    Type of service:  Video Visit    Video End Time:2:56 pm  Originating Location (pt. Location): Home    Distant Location (provider [facility]     Platform used for Video Visit: [place]       [facility]    Patient:  Duane D Backes  :  1980   Age:  41 year old   Today's Visit: 4/15/2022    Referring Provider:    Wilner Carter MD  [address] [place] [id number]    Epilepsy history copied forward:  Duane D Backes is right handed with history of cognitive disability and epilepsy referred to us by Dr. Carter for high depakote levels. Per medical records from  he had a generalized tonic-clonic convulsion at [place], EEG showed rare left temporal epileptiform discharges. He is has been treated for epilepsy with topiramate (low levels) and depakote with high concentration for many years.   Interval History: Spoke to Gaurav (). His behaviors have worsened (slam door, throwing things). He has lived in [facility] 2021. They ran out of vimpat and stopped it one month ago. Since he moved into [facility] he has no seizures. No major outburst, he can be re directed, less restless. He has established follow up with psychiatrist.  Last seizure in 2021 (intubated in ICU, status) and prior to this his last seizure was 2020. They are not sure about side effects.   Current antiepileptic drug:   Divalproex  mg three times a day   Topiramate  200 mg twice a day  Medication Notes:   AED Medication Compliance:  compliant most of the  time  Using a pill box:  Yes  Past AEDs notes:   Topiramate: We increase topiramate to 200 mg twice a day in winter 2020.    Depakote was decreased from 1500 mg twice a day to 1000 mg twice a day and 2020 due to blood concentrations near 170. Lowering depakote caused more behavioral issues.   vimpat started 2/2021 after status epilepticus  Psycho-Social History: he moved to current group home 11/2021. He grew up with parents, after high school he was institutionalized (may be due to behavioral) and then transferred to group [facility] 2013. No drinking, no drugs, and no smoking.     Exam:    There were no vitals taken for this visit.     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  Prior EEGs have shown rare left temporal sharp waves (opposite side of tumor).  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  Patient has been managed on Depakote and topiramate for several years with no seizures. Vimpat was stopped last month because they ran out of medications and he did not have seizure or status epilepticus. We will continue depakote and topiramate, we will not restart vimpat. Check EEG for seizures.       Plan :     Continue current seizure medications:   Depakote 500 mg three times a day   Topiramate  200 mg twice a day    Check labs     Follow up with  "psychiatrist for behavioral issues and sleep    Follow up  With Felix 8 months     Follow up  With Dr. Bautista for epidermoid tumor treatment plan     Diazepam: Give 1 ml (5mg): Give 1 ml for ANY seizure. May repeat once 1 ml (5 mg). Monitor breathing, if there any concerns call 911. Do not exceed 10 mg per day.    Try melatonin 3 mg for sleep (avoid sugar/caffeine at night)    I spent 20 minutes for patient's medical care. During this time key medical decisions were made with review of medical chart prior to visit, visit with patient, counseling/education, and post visit work, including documentation on the day of visit. I addressed all questions the patient/caregiver raised in regards to the patient's medical care. This note was created with voice recognition software. Inadvertent grammatical errors, typographical errors, and \"sound a like\" substitutions may occur due to limitations of the software.  Read the note carefully and apply context when erroneous substitutions have occurred. Thank you.     Lauren Washington MD   Epilepsy Staff             "

## 2022-04-15 NOTE — LETTER
4/15/2022       RE: Duane D Backes  : 1980   MRN: [id number]      Dear Colleague,    Thank you for referring your patient, Duane D Backes, to the [facility] EPILEPSY CARE at [facility]. Please see a copy of my visit note below.    Duane is a 41 year old who is being evaluated via a billable video visit.      How would you like to obtain your AVS? Mail a copy  If the video visit is dropped, the invitation should be resent by: Send to e-mail at:[email or web address].     Will anyone else be joining your video visit? Yes , staff send email to : [email or web address].      Video Start Time: 2:36 PM  Video-Visit Details    Type of service:  Video Visit    Video End Time:2:56 pm  Originating Location (pt. Location): Home    Distant Location (provider location):  [facility] EPILEPSY CARE     Platform used for Video Visit: [facility] Epilepsy Care Progress Note    Patient:  Duane D Backes  :  1980   Age:  41 year old   Today's Visit: 4/15/2022    Referring Provider:    Wilner Carter MD  [address] [place] [id number]    Epilepsy history copied forward:  Duane D Backes is right handed with history of cognitive disability and epilepsy referred to us by Dr. Carter for high depakote levels. Per medical records from  he had a generalized tonic-clonic convulsion at [place], EEG showed rare left temporal epileptiform discharges. He is has been treated for epilepsy with topiramate (low levels) and depakote with high concentration for many years.   Interval History: Spoke to Gaurav (). His behaviors have worsened (slam door, throwing things). He has lived in new [place] 2021. They ran out of vimpat and stopped it one month ago. Since he moved into new home he has no seizures. No major outburst, he can be re directed, less restless. He has established follow up with psychiatrist.  Last seizure in 2021 (intubated in  ICU, status) and prior to this his last seizure was 6/2020. They are not sure about side effects.   Current antiepileptic drug:   Divalproex  mg three times a day   Topiramate  200 mg twice a day  Medication Notes:   AED Medication Compliance:  compliant most of the time  Using a pill box:  Yes  Past AEDs notes:   Topiramate: We increase topiramate to 200 mg twice a day in winter 2020.    Depakote was decreased from 1500 mg twice a day to 1000 mg twice a day and 2020 due to blood concentrations near 170. Lowering depakote caused more behavioral issues.   vimpat started 2/2021 after status epilepticus  Psycho-Social History: he moved to current group home 11/2021. He grew up with parents, after high school he was institutionalized (may be due to behavioral) and then transferred to group [place] 2013. No drinking, no drugs, and no smoking.     Exam:    There were no vitals taken for this visit.     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  Prior EEGs have shown rare left temporal sharp waves (opposite side of tumor).  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  Patient has been managed on Depakote and topiramate for several years with no seizures. Vimpat was stopped last month because they ran out of medications and he did not have seizure  "or status epilepticus. We will continue depakote and topiramate, we will not restart vimpat. Check EEG for seizures.       Plan :     Continue current seizure medications:   Depakote 500 mg three times a day   Topiramate  200 mg twice a day    Check labs     Follow up with psychiatrist for behavioral issues and sleep    Follow up  With Felix 8 months     Follow up  With Dr. Bautista for epidermoid tumor treatment plan     Diazepam: Give 1 ml (5mg): Give 1 ml for ANY seizure. May repeat once 1 ml (5 mg). Monitor breathing, if there any concerns call 911. Do not exceed 10 mg per day.    Try melatonin 3 mg for sleep (avoid sugar/caffeine at night)    I spent 20 minutes for patient's medical care. During this time key medical decisions were made with review of medical chart prior to visit, visit with patient, counseling/education, and post visit work, including documentation on the day of visit. I addressed all questions the patient/caregiver raised in regards to the patient's medical care. This note was created with voice recognition software. Inadvertent grammatical errors, typographical errors, and \"sound a like\" substitutions may occur due to limitations of the software.  Read the note carefully and apply context when erroneous substitutions have occurred. Thank you.     Lauren Washington MD   Epilepsy Staff     "

## 2022-04-15 NOTE — PATIENT INSTRUCTIONS
Continue current seizure medications:   Depakote 500 mg three times a day   Topiramate  200 mg twice a day    Check labs     Follow up with psychiatrist for behavioral issues and sleep    Follow up  With Felix 8 months     Follow up  With Dr. Bautista for epidermoid tumor treatment plan     Diazepam: Give 1 ml (5mg): Give 1 ml for ANY seizure. May repeat once 1 ml (5 mg). Monitor breathing, if there any concerns call 911. Do not exceed 10 mg per day.    Try melatonin 3 mg for sleep (avoid sugar/caffeine at night)      Lauren Washington MD

## 2022-04-21 NOTE — TELEPHONE ENCOUNTER
Patient no longer taking Vimpat  Call placed to [facility] and this was discussed.  They note the prescription has now been cancelled  No other questions at this time

## 2022-08-23 ENCOUNTER — HOSPITAL ENCOUNTER (INPATIENT)
Facility: CLINIC | Age: 42
LOS: 6 days | Discharge: NURSING, REHAB OR LONG-TERM CARE | DRG: 644 | End: 2022-08-29
Attending: EMERGENCY MEDICINE | Admitting: INTERNAL MEDICINE
Payer: MEDICARE

## 2022-08-23 ENCOUNTER — APPOINTMENT (OUTPATIENT)
Dept: GENERAL RADIOLOGY | Facility: CLINIC | Age: 42
DRG: 644 | End: 2022-08-23
Attending: EMERGENCY MEDICINE
Payer: MEDICARE

## 2022-08-23 DIAGNOSIS — K59.09 CHRONIC CONSTIPATION: ICD-10-CM

## 2022-08-23 DIAGNOSIS — E87.1 HYPONATREMIA: ICD-10-CM

## 2022-08-23 DIAGNOSIS — Z13.6 CARDIOVASCULAR SCREENING; LDL GOAL LESS THAN 160: ICD-10-CM

## 2022-08-23 DIAGNOSIS — Z86.16 HISTORY OF COVID-19: ICD-10-CM

## 2022-08-23 DIAGNOSIS — F84.0 ACTIVE AUTISTIC DISORDER: ICD-10-CM

## 2022-08-23 DIAGNOSIS — L85.3 DRY SKIN: ICD-10-CM

## 2022-08-23 DIAGNOSIS — R41.82 ALTERED MENTAL STATUS, UNSPECIFIED ALTERED MENTAL STATUS TYPE: ICD-10-CM

## 2022-08-23 DIAGNOSIS — G40.901 STATUS EPILEPTICUS (H): Primary | ICD-10-CM

## 2022-08-23 DIAGNOSIS — E83.42 HYPOMAGNESEMIA: ICD-10-CM

## 2022-08-23 DIAGNOSIS — E03.9 HYPOTHYROIDISM, UNSPECIFIED TYPE: ICD-10-CM

## 2022-08-23 DIAGNOSIS — G40.909 SEIZURE DISORDER (H): ICD-10-CM

## 2022-08-23 DIAGNOSIS — F71 MODERATE INTELLECTUAL DISABILITY: ICD-10-CM

## 2022-08-23 DIAGNOSIS — E03.4 HYPOTHYROIDISM DUE TO ACQUIRED ATROPHY OF THYROID: ICD-10-CM

## 2022-08-23 DIAGNOSIS — E66.811 OBESITY, CLASS I, BMI 30-34.9: ICD-10-CM

## 2022-08-23 DIAGNOSIS — G40.309 GENERALIZED TONIC CLONIC EPILEPSY (H): ICD-10-CM

## 2022-08-23 LAB
ALBUMIN SERPL BCG-MCNC: 4.1 G/DL (ref 3.5–5.2)
ALBUMIN UR-MCNC: NEGATIVE MG/DL
ALP SERPL-CCNC: 56 U/L (ref 40–129)
ALT SERPL W P-5'-P-CCNC: 12 U/L (ref 10–50)
ANION GAP SERPL CALCULATED.3IONS-SCNC: 8 MMOL/L (ref 7–15)
APPEARANCE UR: CLEAR
AST SERPL W P-5'-P-CCNC: 26 U/L (ref 10–50)
BASOPHILS # BLD AUTO: 0 10E3/UL (ref 0–0.2)
BASOPHILS NFR BLD AUTO: 0 %
BILIRUB SERPL-MCNC: 0.3 MG/DL
BILIRUB UR QL STRIP: NEGATIVE
BUN SERPL-MCNC: 10.3 MG/DL (ref 6–20)
BUN SERPL-MCNC: 12 MG/DL (ref 7–30)
CA-I BLD-MCNC: 4.7 MG/DL (ref 4.4–5.2)
CALCIUM SERPL-MCNC: 8.9 MG/DL (ref 8.6–10)
CHLORIDE BLD-SCNC: 84 MMOL/L (ref 94–109)
CHLORIDE SERPL-SCNC: 82 MMOL/L (ref 98–107)
CO2 BLD-SCNC: 24 MMOL/L (ref 20–32)
COLOR UR AUTO: ABNORMAL
CREAT BLD-MCNC: 0.6 MG/DL (ref 0.7–1.3)
CREAT SERPL-MCNC: 0.56 MG/DL (ref 0.67–1.17)
DEPRECATED HCO3 PLAS-SCNC: 26 MMOL/L (ref 22–29)
EOSINOPHIL # BLD AUTO: 0.1 10E3/UL (ref 0–0.7)
EOSINOPHIL NFR BLD AUTO: 1 %
ERYTHROCYTE [DISTWIDTH] IN BLOOD BY AUTOMATED COUNT: 11.8 % (ref 10–15)
GFR SERPL CREATININE-BSD FRML MDRD: >90 ML/MIN/1.73M2
GLUCOSE BLD-MCNC: 144 MG/DL (ref 70–99)
GLUCOSE BLDC GLUCOMTR-MCNC: 159 MG/DL (ref 70–99)
GLUCOSE SERPL-MCNC: 141 MG/DL (ref 70–99)
GLUCOSE UR STRIP-MCNC: 300 MG/DL
HCO3 BLDV-SCNC: 30 MMOL/L (ref 21–28)
HCT VFR BLD AUTO: 41.6 % (ref 40–53)
HCT VFR BLD CALC: 43 % (ref 40–53)
HGB BLD-MCNC: 14 G/DL (ref 13.3–17.7)
HGB BLD-MCNC: 14.6 G/DL (ref 13.3–17.7)
HGB UR QL STRIP: ABNORMAL
HOLD SPECIMEN: NORMAL
IMM GRANULOCYTES # BLD: 0.1 10E3/UL
IMM GRANULOCYTES NFR BLD: 1 %
KETONES UR STRIP-MCNC: NEGATIVE MG/DL
LACTATE BLD-SCNC: 1.7 MMOL/L
LEUKOCYTE ESTERASE UR QL STRIP: NEGATIVE
LYMPHOCYTES # BLD AUTO: 1.5 10E3/UL (ref 0.8–5.3)
LYMPHOCYTES NFR BLD AUTO: 15 %
MAGNESIUM SERPL-MCNC: 1.3 MG/DL (ref 1.7–2.3)
MCH RBC QN AUTO: 29.2 PG (ref 26.5–33)
MCHC RBC AUTO-ENTMCNC: 33.7 G/DL (ref 31.5–36.5)
MCV RBC AUTO: 87 FL (ref 78–100)
MONOCYTES # BLD AUTO: 0.7 10E3/UL (ref 0–1.3)
MONOCYTES NFR BLD AUTO: 7 %
MUCOUS THREADS #/AREA URNS LPF: PRESENT /LPF
NEUTROPHILS # BLD AUTO: 7.6 10E3/UL (ref 1.6–8.3)
NEUTROPHILS NFR BLD AUTO: 76 %
NITRATE UR QL: NEGATIVE
NRBC # BLD AUTO: 0 10E3/UL
NRBC BLD AUTO-RTO: 0 /100
PCO2 BLDV: 66 MM HG (ref 40–50)
PH BLDV: 7.27 [PH] (ref 7.32–7.43)
PH UR STRIP: 7.5 [PH] (ref 5–7)
PLATELET # BLD AUTO: 213 10E3/UL (ref 150–450)
PO2 BLDV: 19 MM HG (ref 25–47)
POTASSIUM BLD-SCNC: 4 MMOL/L (ref 3.4–5.3)
POTASSIUM SERPL-SCNC: 4 MMOL/L (ref 3.4–5.3)
PROT SERPL-MCNC: 7.1 G/DL (ref 6.4–8.3)
RBC # BLD AUTO: 4.79 10E6/UL (ref 4.4–5.9)
RBC URINE: 2 /HPF
SAO2 % BLDV: 22 % (ref 94–100)
SARS-COV-2 RNA RESP QL NAA+PROBE: NEGATIVE
SODIUM BLD-SCNC: 121 MMOL/L (ref 133–144)
SODIUM SERPL-SCNC: 116 MMOL/L (ref 136–145)
SP GR UR STRIP: 1.01 (ref 1–1.03)
SQUAMOUS EPITHELIAL: <1 /HPF
T4 FREE SERPL-MCNC: 1.13 NG/DL (ref 0.9–1.7)
TSH SERPL DL<=0.005 MIU/L-ACNC: 8.03 UIU/ML (ref 0.3–4.2)
UROBILINOGEN UR STRIP-MCNC: NORMAL MG/DL
VALPROATE SERPL-MCNC: 80.5 UG/ML
WBC # BLD AUTO: 10 10E3/UL (ref 4–11)
WBC URINE: <1 /HPF

## 2022-08-23 PROCEDURE — 99291 CRITICAL CARE FIRST HOUR: CPT | Mod: 25

## 2022-08-23 PROCEDURE — 80047 BASIC METABLC PNL IONIZED CA: CPT

## 2022-08-23 PROCEDURE — 83735 ASSAY OF MAGNESIUM: CPT | Performed by: EMERGENCY MEDICINE

## 2022-08-23 PROCEDURE — 81001 URINALYSIS AUTO W/SCOPE: CPT | Performed by: EMERGENCY MEDICINE

## 2022-08-23 PROCEDURE — 85025 COMPLETE CBC W/AUTO DIFF WBC: CPT | Performed by: EMERGENCY MEDICINE

## 2022-08-23 PROCEDURE — C9803 HOPD COVID-19 SPEC COLLECT: HCPCS

## 2022-08-23 PROCEDURE — 93005 ELECTROCARDIOGRAM TRACING: CPT

## 2022-08-23 PROCEDURE — 80164 ASSAY DIPROPYLACETIC ACD TOT: CPT | Performed by: EMERGENCY MEDICINE

## 2022-08-23 PROCEDURE — 83605 ASSAY OF LACTIC ACID: CPT

## 2022-08-23 PROCEDURE — 200N000001 HC R&B ICU

## 2022-08-23 PROCEDURE — 96365 THER/PROPH/DIAG IV INF INIT: CPT | Mod: 59

## 2022-08-23 PROCEDURE — 258N000003 HC RX IP 258 OP 636: Performed by: EMERGENCY MEDICINE

## 2022-08-23 PROCEDURE — 80201 ASSAY OF TOPIRAMATE: CPT | Performed by: EMERGENCY MEDICINE

## 2022-08-23 PROCEDURE — 84300 ASSAY OF URINE SODIUM: CPT | Performed by: EMERGENCY MEDICINE

## 2022-08-23 PROCEDURE — 36415 COLL VENOUS BLD VENIPUNCTURE: CPT | Performed by: EMERGENCY MEDICINE

## 2022-08-23 PROCEDURE — 250N000011 HC RX IP 250 OP 636: Performed by: EMERGENCY MEDICINE

## 2022-08-23 PROCEDURE — 80053 COMPREHEN METABOLIC PANEL: CPT | Performed by: EMERGENCY MEDICINE

## 2022-08-23 PROCEDURE — 71045 X-RAY EXAM CHEST 1 VIEW: CPT

## 2022-08-23 PROCEDURE — 83935 ASSAY OF URINE OSMOLALITY: CPT | Performed by: EMERGENCY MEDICINE

## 2022-08-23 PROCEDURE — 84443 ASSAY THYROID STIM HORMONE: CPT | Performed by: EMERGENCY MEDICINE

## 2022-08-23 PROCEDURE — 84439 ASSAY OF FREE THYROXINE: CPT | Performed by: EMERGENCY MEDICINE

## 2022-08-23 PROCEDURE — 82803 BLOOD GASES ANY COMBINATION: CPT

## 2022-08-23 PROCEDURE — 93005 ELECTROCARDIOGRAM TRACING: CPT | Mod: 76

## 2022-08-23 PROCEDURE — U0003 INFECTIOUS AGENT DETECTION BY NUCLEIC ACID (DNA OR RNA); SEVERE ACUTE RESPIRATORY SYNDROME CORONAVIRUS 2 (SARS-COV-2) (CORONAVIRUS DISEASE [COVID-19]), AMPLIFIED PROBE TECHNIQUE, MAKING USE OF HIGH THROUGHPUT TECHNOLOGIES AS DESCRIBED BY CMS-2020-01-R: HCPCS | Performed by: EMERGENCY MEDICINE

## 2022-08-23 PROCEDURE — 96361 HYDRATE IV INFUSION ADD-ON: CPT

## 2022-08-23 RX ORDER — LEVETIRACETAM 10 MG/ML
2000 INJECTION INTRAVASCULAR ONCE
Status: COMPLETED | OUTPATIENT
Start: 2022-08-23 | End: 2022-08-23

## 2022-08-23 RX ORDER — MAGNESIUM SULFATE HEPTAHYDRATE 40 MG/ML
2 INJECTION, SOLUTION INTRAVENOUS ONCE
Status: COMPLETED | OUTPATIENT
Start: 2022-08-23 | End: 2022-08-24

## 2022-08-23 RX ORDER — LEVETIRACETAM 10 MG/ML
1000 INJECTION INTRAVASCULAR ONCE
Status: DISCONTINUED | OUTPATIENT
Start: 2022-08-23 | End: 2022-08-23

## 2022-08-23 RX ORDER — SODIUM CHLORIDE 3 G/100ML
50 INJECTION, SOLUTION INTRAVENOUS ONCE
Status: DISCONTINUED | OUTPATIENT
Start: 2022-08-23 | End: 2022-08-23

## 2022-08-23 RX ORDER — SODIUM CHLORIDE 3 G/100ML
50 INJECTION, SOLUTION INTRAVENOUS ONCE
Status: COMPLETED | OUTPATIENT
Start: 2022-08-23 | End: 2022-08-24

## 2022-08-23 RX ADMIN — MIDAZOLAM 2 MG: 1 INJECTION INTRAMUSCULAR; INTRAVENOUS at 21:31

## 2022-08-23 RX ADMIN — SODIUM CHLORIDE, POTASSIUM CHLORIDE, SODIUM LACTATE AND CALCIUM CHLORIDE 1000 ML: 600; 310; 30; 20 INJECTION, SOLUTION INTRAVENOUS at 22:09

## 2022-08-23 RX ADMIN — LEVETIRACETAM 2000 MG: 10 INJECTION INTRAVENOUS at 21:38

## 2022-08-23 RX ADMIN — MAGNESIUM SULFATE HEPTAHYDRATE 2 G: 40 INJECTION, SOLUTION INTRAVENOUS at 23:07

## 2022-08-23 RX ADMIN — SODIUM CHLORIDE 50 ML: 3 INJECTION, SOLUTION INTRAVENOUS at 23:35

## 2022-08-23 ASSESSMENT — ACTIVITIES OF DAILY LIVING (ADL): ADLS_ACUITY_SCORE: 35

## 2022-08-24 LAB
ANION GAP SERPL CALCULATED.3IONS-SCNC: 9 MMOL/L (ref 7–15)
ATRIAL RATE - MUSE: 81 BPM
ATRIAL RATE - MUSE: 82 BPM
BUN SERPL-MCNC: 9.2 MG/DL (ref 6–20)
CALCIUM SERPL-MCNC: 8.4 MG/DL (ref 8.6–10)
CHLORIDE SERPL-SCNC: 86 MMOL/L (ref 98–107)
CREAT SERPL-MCNC: 0.47 MG/DL (ref 0.67–1.17)
CREAT SERPL-MCNC: 0.48 MG/DL (ref 0.67–1.17)
DEPRECATED HCO3 PLAS-SCNC: 23 MMOL/L (ref 22–29)
DIASTOLIC BLOOD PRESSURE - MUSE: NORMAL MMHG
DIASTOLIC BLOOD PRESSURE - MUSE: NORMAL MMHG
ERYTHROCYTE [DISTWIDTH] IN BLOOD BY AUTOMATED COUNT: 11.8 % (ref 10–15)
GFR SERPL CREATININE-BSD FRML MDRD: >90 ML/MIN/1.73M2
GFR SERPL CREATININE-BSD FRML MDRD: >90 ML/MIN/1.73M2
GLUCOSE BLDC GLUCOMTR-MCNC: 115 MG/DL (ref 70–99)
GLUCOSE BLDC GLUCOMTR-MCNC: 133 MG/DL (ref 70–99)
GLUCOSE BLDC GLUCOMTR-MCNC: 138 MG/DL (ref 70–99)
GLUCOSE BLDC GLUCOMTR-MCNC: 146 MG/DL (ref 70–99)
GLUCOSE BLDC GLUCOMTR-MCNC: 170 MG/DL (ref 70–99)
GLUCOSE BLDC GLUCOMTR-MCNC: 189 MG/DL (ref 70–99)
GLUCOSE SERPL-MCNC: 145 MG/DL (ref 70–99)
HCT VFR BLD AUTO: 39.6 % (ref 40–53)
HGB BLD-MCNC: 13.7 G/DL (ref 13.3–17.7)
HOLD SPECIMEN: NORMAL
INTERPRETATION ECG - MUSE: NORMAL
INTERPRETATION ECG - MUSE: NORMAL
LITHIUM SERPL-SCNC: <0.1 MMOL/L (ref 0.6–1.2)
MAGNESIUM SERPL-MCNC: 1.7 MG/DL (ref 1.7–2.3)
MCH RBC QN AUTO: 28.8 PG (ref 26.5–33)
MCHC RBC AUTO-ENTMCNC: 34.6 G/DL (ref 31.5–36.5)
MCV RBC AUTO: 83 FL (ref 78–100)
OSMOLALITY UR: 359 MMOL/KG (ref 100–1200)
P AXIS - MUSE: 41 DEGREES
P AXIS - MUSE: 49 DEGREES
PLATELET # BLD AUTO: 194 10E3/UL (ref 150–450)
POTASSIUM SERPL-SCNC: 4.3 MMOL/L (ref 3.4–5.3)
PR INTERVAL - MUSE: 182 MS
PR INTERVAL - MUSE: 190 MS
QRS DURATION - MUSE: 106 MS
QRS DURATION - MUSE: 112 MS
QT - MUSE: 398 MS
QT - MUSE: 410 MS
QTC - MUSE: 464 MS
QTC - MUSE: 476 MS
R AXIS - MUSE: 47 DEGREES
R AXIS - MUSE: 55 DEGREES
RBC # BLD AUTO: 4.75 10E6/UL (ref 4.4–5.9)
SODIUM SERPL-SCNC: 118 MMOL/L (ref 136–145)
SODIUM SERPL-SCNC: 123 MMOL/L (ref 136–145)
SODIUM SERPL-SCNC: 123 MMOL/L (ref 136–145)
SODIUM SERPL-SCNC: 125 MMOL/L (ref 136–145)
SODIUM SERPL-SCNC: 126 MMOL/L (ref 136–145)
SODIUM SERPL-SCNC: 126 MMOL/L (ref 136–145)
SODIUM UR-SCNC: 112 MMOL/L
SYSTOLIC BLOOD PRESSURE - MUSE: NORMAL MMHG
SYSTOLIC BLOOD PRESSURE - MUSE: NORMAL MMHG
T AXIS - MUSE: 46 DEGREES
T AXIS - MUSE: 53 DEGREES
VENTRICULAR RATE- MUSE: 81 BPM
VENTRICULAR RATE- MUSE: 82 BPM
WBC # BLD AUTO: 10.4 10E3/UL (ref 4–11)

## 2022-08-24 PROCEDURE — 85027 COMPLETE CBC AUTOMATED: CPT | Performed by: INTERNAL MEDICINE

## 2022-08-24 PROCEDURE — 84295 ASSAY OF SERUM SODIUM: CPT | Performed by: INTERNAL MEDICINE

## 2022-08-24 PROCEDURE — 36415 COLL VENOUS BLD VENIPUNCTURE: CPT | Performed by: EMERGENCY MEDICINE

## 2022-08-24 PROCEDURE — 250N000011 HC RX IP 250 OP 636: Performed by: INTERNAL MEDICINE

## 2022-08-24 PROCEDURE — 80178 ASSAY OF LITHIUM: CPT | Performed by: EMERGENCY MEDICINE

## 2022-08-24 PROCEDURE — 250N000013 HC RX MED GY IP 250 OP 250 PS 637: Performed by: INTERNAL MEDICINE

## 2022-08-24 PROCEDURE — 200N000001 HC R&B ICU

## 2022-08-24 PROCEDURE — 99223 1ST HOSP IP/OBS HIGH 75: CPT | Mod: AI | Performed by: INTERNAL MEDICINE

## 2022-08-24 PROCEDURE — 36415 COLL VENOUS BLD VENIPUNCTURE: CPT | Performed by: INTERNAL MEDICINE

## 2022-08-24 PROCEDURE — 99222 1ST HOSP IP/OBS MODERATE 55: CPT | Performed by: INTERNAL MEDICINE

## 2022-08-24 PROCEDURE — 96375 TX/PRO/DX INJ NEW DRUG ADDON: CPT

## 2022-08-24 PROCEDURE — 84295 ASSAY OF SERUM SODIUM: CPT | Performed by: EMERGENCY MEDICINE

## 2022-08-24 PROCEDURE — 250N000011 HC RX IP 250 OP 636: Performed by: STUDENT IN AN ORGANIZED HEALTH CARE EDUCATION/TRAINING PROGRAM

## 2022-08-24 PROCEDURE — 83735 ASSAY OF MAGNESIUM: CPT | Performed by: STUDENT IN AN ORGANIZED HEALTH CARE EDUCATION/TRAINING PROGRAM

## 2022-08-24 PROCEDURE — 999N000127 HC STATISTIC PERIPHERAL IV START W US GUIDANCE

## 2022-08-24 PROCEDURE — 250N000011 HC RX IP 250 OP 636

## 2022-08-24 PROCEDURE — 82565 ASSAY OF CREATININE: CPT | Performed by: INTERNAL MEDICINE

## 2022-08-24 PROCEDURE — 258N000003 HC RX IP 258 OP 636: Performed by: INTERNAL MEDICINE

## 2022-08-24 RX ORDER — ENOXAPARIN SODIUM 100 MG/ML
40 INJECTION SUBCUTANEOUS EVERY 24 HOURS
Status: DISCONTINUED | OUTPATIENT
Start: 2022-08-24 | End: 2022-08-29 | Stop reason: HOSPADM

## 2022-08-24 RX ORDER — MAGNESIUM SULFATE HEPTAHYDRATE 40 MG/ML
2 INJECTION, SOLUTION INTRAVENOUS ONCE
Status: COMPLETED | OUTPATIENT
Start: 2022-08-24 | End: 2022-08-24

## 2022-08-24 RX ORDER — AMOXICILLIN 250 MG
1 CAPSULE ORAL 2 TIMES DAILY PRN
Status: DISCONTINUED | OUTPATIENT
Start: 2022-08-24 | End: 2022-08-29 | Stop reason: HOSPADM

## 2022-08-24 RX ORDER — DEXTROSE MONOHYDRATE 50 MG/ML
INJECTION, SOLUTION INTRAVENOUS CONTINUOUS
Status: DISCONTINUED | OUTPATIENT
Start: 2022-08-24 | End: 2022-08-24

## 2022-08-24 RX ORDER — DIAZEPAM 5 MG
5 TABLET ORAL
Status: DISCONTINUED | OUTPATIENT
Start: 2022-08-24 | End: 2022-08-29 | Stop reason: HOSPADM

## 2022-08-24 RX ORDER — HALOPERIDOL 5 MG/ML
2 INJECTION INTRAMUSCULAR EVERY 6 HOURS PRN
Status: DISCONTINUED | OUTPATIENT
Start: 2022-08-24 | End: 2022-08-29 | Stop reason: HOSPADM

## 2022-08-24 RX ORDER — RISPERIDONE 2 MG/1
2 TABLET ORAL 2 TIMES DAILY
Status: DISCONTINUED | OUTPATIENT
Start: 2022-08-24 | End: 2022-08-29 | Stop reason: HOSPADM

## 2022-08-24 RX ORDER — LACTULOSE 10 G/15ML
30 SOLUTION ORAL EVERY EVENING
Status: DISCONTINUED | OUTPATIENT
Start: 2022-08-24 | End: 2022-08-29 | Stop reason: HOSPADM

## 2022-08-24 RX ORDER — ONDANSETRON 2 MG/ML
4 INJECTION INTRAMUSCULAR; INTRAVENOUS ONCE
Status: COMPLETED | OUTPATIENT
Start: 2022-08-24 | End: 2022-08-24

## 2022-08-24 RX ORDER — TOPIRAMATE 50 MG/1
200 TABLET, FILM COATED ORAL DAILY
Status: DISCONTINUED | OUTPATIENT
Start: 2022-08-24 | End: 2022-08-29 | Stop reason: HOSPADM

## 2022-08-24 RX ORDER — SODIUM CHLORIDE 3 G/100ML
50 INJECTION, SOLUTION INTRAVENOUS ONCE
Status: COMPLETED | OUTPATIENT
Start: 2022-08-24 | End: 2022-08-24

## 2022-08-24 RX ORDER — NICOTINE POLACRILEX 4 MG
15-30 LOZENGE BUCCAL
Status: DISCONTINUED | OUTPATIENT
Start: 2022-08-24 | End: 2022-08-29 | Stop reason: HOSPADM

## 2022-08-24 RX ORDER — LEVOTHYROXINE SODIUM 150 UG/1
150 TABLET ORAL
Status: DISCONTINUED | OUTPATIENT
Start: 2022-08-24 | End: 2022-08-29 | Stop reason: HOSPADM

## 2022-08-24 RX ORDER — PROCHLORPERAZINE MALEATE 5 MG
10 TABLET ORAL EVERY 6 HOURS PRN
Status: DISCONTINUED | OUTPATIENT
Start: 2022-08-24 | End: 2022-08-29 | Stop reason: HOSPADM

## 2022-08-24 RX ORDER — PROCHLORPERAZINE 25 MG
25 SUPPOSITORY, RECTAL RECTAL EVERY 12 HOURS PRN
Status: DISCONTINUED | OUTPATIENT
Start: 2022-08-24 | End: 2022-08-29 | Stop reason: HOSPADM

## 2022-08-24 RX ORDER — LORAZEPAM 2 MG/ML
1 INJECTION INTRAMUSCULAR
Status: DISCONTINUED | OUTPATIENT
Start: 2022-08-24 | End: 2022-08-29 | Stop reason: HOSPADM

## 2022-08-24 RX ORDER — DESMOPRESSIN ACETATE 4 UG/ML
3 INJECTION, SOLUTION INTRAVENOUS; SUBCUTANEOUS ONCE
Status: COMPLETED | OUTPATIENT
Start: 2022-08-24 | End: 2022-08-24

## 2022-08-24 RX ORDER — LACOSAMIDE 100 MG/1
100 TABLET ORAL 2 TIMES DAILY
COMMUNITY
End: 2022-08-30

## 2022-08-24 RX ORDER — DIVALPROEX SODIUM 500 MG/1
500 TABLET, EXTENDED RELEASE ORAL
Status: DISCONTINUED | OUTPATIENT
Start: 2022-08-24 | End: 2022-08-29 | Stop reason: HOSPADM

## 2022-08-24 RX ORDER — ONDANSETRON 2 MG/ML
4 INJECTION INTRAMUSCULAR; INTRAVENOUS EVERY 6 HOURS PRN
Status: DISCONTINUED | OUTPATIENT
Start: 2022-08-24 | End: 2022-08-29 | Stop reason: HOSPADM

## 2022-08-24 RX ORDER — POLYETHYLENE GLYCOL 3350 17 G/17G
17 POWDER, FOR SOLUTION ORAL DAILY PRN
Status: DISCONTINUED | OUTPATIENT
Start: 2022-08-24 | End: 2022-08-29 | Stop reason: HOSPADM

## 2022-08-24 RX ORDER — DEXTROSE MONOHYDRATE 25 G/50ML
25-50 INJECTION, SOLUTION INTRAVENOUS
Status: DISCONTINUED | OUTPATIENT
Start: 2022-08-24 | End: 2022-08-29 | Stop reason: HOSPADM

## 2022-08-24 RX ORDER — LORAZEPAM 2 MG/ML
2 INJECTION INTRAMUSCULAR
Status: DISCONTINUED | OUTPATIENT
Start: 2022-08-24 | End: 2022-08-29 | Stop reason: HOSPADM

## 2022-08-24 RX ORDER — AMOXICILLIN 250 MG
2 CAPSULE ORAL 2 TIMES DAILY PRN
Status: DISCONTINUED | OUTPATIENT
Start: 2022-08-24 | End: 2022-08-29 | Stop reason: HOSPADM

## 2022-08-24 RX ORDER — ONDANSETRON 2 MG/ML
INJECTION INTRAMUSCULAR; INTRAVENOUS
Status: COMPLETED
Start: 2022-08-24 | End: 2022-08-24

## 2022-08-24 RX ORDER — ONDANSETRON 4 MG/1
4 TABLET, ORALLY DISINTEGRATING ORAL EVERY 6 HOURS PRN
Status: DISCONTINUED | OUTPATIENT
Start: 2022-08-24 | End: 2022-08-29 | Stop reason: HOSPADM

## 2022-08-24 RX ADMIN — DIVALPROEX SODIUM 500 MG: 500 TABLET, FILM COATED, EXTENDED RELEASE ORAL at 09:10

## 2022-08-24 RX ADMIN — ONDANSETRON 4 MG: 2 INJECTION INTRAMUSCULAR; INTRAVENOUS at 00:14

## 2022-08-24 RX ADMIN — ONDANSETRON 4 MG: 2 INJECTION INTRAMUSCULAR; INTRAVENOUS at 08:44

## 2022-08-24 RX ADMIN — ENOXAPARIN SODIUM 40 MG: 40 INJECTION SUBCUTANEOUS at 09:13

## 2022-08-24 RX ADMIN — DEXTROSE MONOHYDRATE: 50 INJECTION, SOLUTION INTRAVENOUS at 14:28

## 2022-08-24 RX ADMIN — LORAZEPAM 1 MG: 2 INJECTION INTRAMUSCULAR; INTRAVENOUS at 05:59

## 2022-08-24 RX ADMIN — RISPERIDONE 2 MG: 2 TABLET ORAL at 20:43

## 2022-08-24 RX ADMIN — SODIUM CHLORIDE 50 ML: 3 INJECTION, SOLUTION INTRAVENOUS at 01:24

## 2022-08-24 RX ADMIN — DIVALPROEX SODIUM 500 MG: 500 TABLET, FILM COATED, EXTENDED RELEASE ORAL at 17:43

## 2022-08-24 RX ADMIN — SODIUM CHLORIDE 50 ML: 3 INJECTION, SOLUTION INTRAVENOUS at 03:57

## 2022-08-24 RX ADMIN — DESMOPRESSIN ACETATE 3 MCG: 4 SOLUTION INTRAVENOUS at 09:02

## 2022-08-24 RX ADMIN — TOPIRAMATE 200 MG: 50 TABLET, FILM COATED ORAL at 09:09

## 2022-08-24 RX ADMIN — DESMOPRESSIN ACETATE 3 MCG: 4 SOLUTION INTRAVENOUS at 22:14

## 2022-08-24 RX ADMIN — LEVOTHYROXINE SODIUM 150 MCG: 150 TABLET ORAL at 09:06

## 2022-08-24 RX ADMIN — RISPERIDONE 2 MG: 2 TABLET ORAL at 09:10

## 2022-08-24 RX ADMIN — MAGNESIUM SULFATE HEPTAHYDRATE 2 G: 40 INJECTION, SOLUTION INTRAVENOUS at 11:03

## 2022-08-24 ASSESSMENT — ACTIVITIES OF DAILY LIVING (ADL)
ADLS_ACUITY_SCORE: 40
TOILETING_ISSUES: OTHER (SEE COMMENTS)
DOING_ERRANDS_INDEPENDENTLY_DIFFICULTY: OTHER (SEE COMMENTS)
CHANGE_IN_FUNCTIONAL_STATUS_SINCE_ONSET_OF_CURRENT_ILLNESS/INJURY: YES
ADLS_ACUITY_SCORE: 45
ADLS_ACUITY_SCORE: 40
ADLS_ACUITY_SCORE: 45
FALL_HISTORY_WITHIN_LAST_SIX_MONTHS: NO
ADLS_ACUITY_SCORE: 40
ADLS_ACUITY_SCORE: 41
ADLS_ACUITY_SCORE: 40
ADLS_ACUITY_SCORE: 35
ADLS_ACUITY_SCORE: 48
DRESSING/BATHING_DIFFICULTY: OTHER (SEE COMMENTS)
WEAR_GLASSES_OR_BLIND: NO
ADLS_ACUITY_SCORE: 41
WALKING_OR_CLIMBING_STAIRS_DIFFICULTY: NO
ADLS_ACUITY_SCORE: 40
EQUIPMENT_CURRENTLY_USED_AT_HOME: OTHER (SEE COMMENTS)
ADLS_ACUITY_SCORE: 40
DIFFICULTY_EATING/SWALLOWING: NO
CONCENTRATING,_REMEMBERING_OR_MAKING_DECISIONS_DIFFICULTY: YES

## 2022-08-24 NOTE — PHARMACY-ADMISSION MEDICATION HISTORY
Admission medication history interview status for this patient is complete. See [facility] admission navigator for allergy information, prior to admission medications and immunization status.     Medication history interview done, indicate source(s): Patient and Caregiver  Medication history resources (including written lists, pill bottles, clinic record):Med list sent by [facility] ([phone])  Pharmacy: [facility]    Changes made to PTA medication list:  Added: Lacosamide   Changed: None   Reported as Not Taking: Melatonin (not on [facility] Med list)  Removed: Diazepam 5mg tablet and lactulose 30ml (not on [facility] Med list, outdated and each had no refills)    Actions taken by pharmacist (provider contacted, etc):None     Additional medication history information:  only sent med list. Discrepancies noted.     Medication reconciliation/reorder completed by provider prior to medication history?  n   (Y/N)     For patients on insulin therapy: No      Medication Sig Last Dose Taking?   ammonium lactate (LAC-HYDRIN) 12 % external lotion Apply topically daily To feet and heels 8/23/2022 at PM Yes   diazepam (VALIUM) 5 MG/ML (HIGH CONC) solution Diazepam: Give 1 ml (5mg): Give 1 ml for ANY seizure. May repeat once 1 ml (5 mg). Monitor breathing, if there any concerns call 911. Do not exceed 10 mg per day. Unknown at Unknown time Yes   divalproex sodium extended-release (DEPAKOTE ER) 500 MG 24 hr tablet Take 1 tablet (500 mg) by mouth 3 times daily 8/23/2022 at PM Yes   docusate sodium (COLACE) 100 MG capsule TAKE 1 CAPSULE BY MOUTH TWICE DAILY. 8/23/2022 at PM Yes   ENULOSE 10 GM/15ML SOLUTION TAKE 30ML BY MOUTH EVERY EVENING AT 5PM;TAKE 30ML BY MOUTH TWICE DAILY AS NEEDED Unknown at Unknown time Yes   fish oil-omega-3 fatty acids 1000 MG capsule TAKE 1 CAPSULE BY MOUTH TWICE DAILY  **NON-COVERED MED**  **PACK IN CARDS* 8/23/2022 at PM Yes   GAVILAX 17 GM/SCOOP powder MIX 17 GRAMS IN LIQUID AND DRINK BY MOUTH ONCE DAILY FOR  CONSTIPATION. 8/23/2022 at AM Yes   Lacosamide (VIMPAT) 100 MG TABS tablet Take 100 mg by mouth 2 times daily 8/23/2022 at PM Yes   levothyroxine (SYNTHROID/LEVOTHROID) 150 MCG tablet TAKE 1 TABLET BY MOUTH ONCE DAILY  FOR THYROID 8/23/2022 at AM Yes   lithium ER (LITHOBID) 300 MG CR tablet Take 600 mg by mouth At Bedtime  8/23/2022 at PM Yes   MILK OF MAGNESIA 400 MG/5ML suspension Take 30 mLs by mouth every evening 8/23/2022 at PM Yes   risperiDONE (RISPERDAL) 2 MG tablet Take 2 mg by mouth 2 times daily 1 tab every morning & 1 tab every bedtime 8/23/2022 at PM Yes   sertraline (ZOLOFT) 100 MG tablet Take 1.5 tablets by mouth every morning. 8/23/2022 at AM Yes   topiramate (TOPAMAX) 200 MG tablet TAKE 1 TABLET BY MOUTH TWICE DAILY. 8/23/2022 at PM Yes   VITAMIN D3 25 MCG (1000 UT) tablet TAKE 3 TABLETS (3000 UNITS) BY MOUTH ONCE DAILY 8/23/2022 at AM Yes   melatonin 3 MG tablet Take 1 tablet (3 mg) by mouth nightly as needed for sleep  Patient not taking: Reported on 8/24/2022 Not Taking at Unknown time    Starch, Thickening, POWD Combined with beverages, per instructions from Speech Therapy.

## 2022-08-24 NOTE — PROGRESS NOTES
Consult received.  Full note to follow.   42 yr male presenting with seizure  in setting  Of severe hyponatremia      03/08/21 12:16 08/23/22 21:32 08/24/22 00:05 08/24/22 01:24 08/24/22 04:17 08/24/22 06:44   Sodium 141 116 (LL) 118 (LL) 118 (LL) 118 (LL)  118 (LL) 123 (L)     sodium has corrected rapidly by 7 meq over 7 hours and will likely continue to rise rapidly given large UOP ( 1950 ml noted over last couple hours ?? , 4 L so far and continues to make large amt of urine)   This suggests sudden release of ADH     Plan -   Given DDAVP 3 mcg now   Stop IV fluid ( 3% or NS)   Check sodium now and q 2 hrs  Correct with D5W if Sodium higher than 124 . Bring it to 122-124 before stopping. This will be the goal until 9 PM today and a goal of upto 130 over next 24 hrs.     Dilcia Castillo MD  [facility] - Nephrology   [phone]

## 2022-08-24 NOTE — ED PROVIDER NOTES
History   Chief Complaint:  Seizures     History limited due to altered mental status.    HPI Duane D Backes is a 42 year old male with history of autism, baseline non-verbal, and seizure disorder presents to the ER for concern of status epilepticus.  History limited as patient is baseline nonverbal and postictal at time evaluation.  History provided by EMS.  Per EMS, patient had 2-hour history of acting odd and not at baseline.  Upon EMS arrival, patient slumped over while in the EMS gurney with seizure-like activity.  Per EMS, patient has no return to baseline for at least 2 hours.  Patient is coming from group home with limited history.    Review of Systems   Unable to perform ROS: Mental status change     Allergies:  Penicillin [Penicillins]  Prozac [Fluoxetine Hcl]  Reglan [Metoclopramide Hcl]  Ritalin [Methylphenidate Derivatives]  Trazodone    Medications:  ammonium lactate (LAC-HYDRIN) 12 % external lotion  diazepam (VALIUM) 5 MG/ML (HIGH CONC) solution  divalproex sodium extended-release (DEPAKOTE ER) 500 MG 24 hr tablet  docusate sodium (COLACE) 100 MG capsule  ENULOSE 10 GM/15ML SOLUTION  fish oil-omega-3 fatty acids 1000 MG capsule  GAVILAX 17 GM/SCOOP powder  Lacosamide (VIMPAT) 100 MG TABS tablet  levothyroxine (SYNTHROID/LEVOTHROID) 150 MCG tablet  melatonin 5 MG tablet  MILK OF MAGNESIA 400 MG/5ML suspension  risperiDONE (RISPERDAL) 2 MG tablet  sertraline (ZOLOFT) 100 MG tablet  topiramate (TOPAMAX) 200 MG tablet  VITAMIN D3 25 MCG (1000 UT) tablet  melatonin 3 MG tablet  Starch, Thickening, POWD        Past Medical History:       Patient Active Problem List   Diagnosis     Moderate mental retardation     Active autistic disorder     Obsessive-compulsive disorder     Chronic constipation     Hearing loss     Generalized tonic clonic epilepsy (H)     Dry skin     CARDIOVASCULAR SCREENING; LDL GOAL LESS THAN 160     Hypothyroidism due to acquired atrophy of thyroid     Benign neoplasm of brain (H)      Obesity, Class I, BMI 30-34.9     Seizure disorder (H)     Altered mental status, unspecified altered mental status type     History of COVID-19     Hypomagnesemia     Status epilepticus (H)     Hyponatremia     Hypothyroidism, unspecified type        Past Surgical History:      Past Surgical History:   Procedure Laterality Date     CATARACT IOL, RT/LT Left 11/17/2018        Family History:      Family History   Problem Relation Age of Onset     Unknown/Adopted No family hx of        Social History:  Lives in group home.  PCP: Rajat Parkinson       Physical Exam     Patient Vitals for the past 24 hrs:   BP Temp Temp src Pulse Resp SpO2 Weight   08/24/22 0115 112/75 -- -- 75 14 95 % --   08/24/22 0100 117/74 -- -- 77 13 93 % --   08/24/22 0045 (!) 144/96 96.8  F (36  C) Axillary 86 8 94 % 113.3 kg (249 lb 12.5 oz)   08/24/22 0000 134/84 -- -- 80 13 94 % --   08/23/22 2300 132/80 -- -- 81 24 94 % --   08/23/22 2200 (!) 142/96 -- -- 84 12 95 % --   08/23/22 2130 (!) 139/97 -- -- 93 20 95 % --   08/23/22 2127 (!) 162/112 -- -- 83 20 93 % --       Physical Exam  Constitutional:       Comments: Post-ictal   HENT:      Head: Normocephalic and atraumatic.      Mouth/Throat:      Mouth: Mucous membranes are moist.   Eyes:      Pupils: Pupils are equal, round, and reactive to light.      Comments: No gaze deviation   Cardiovascular:      Rate and Rhythm: Normal rate and regular rhythm.   Pulmonary:      Effort: Pulmonary effort is normal. No respiratory distress.      Breath sounds: Normal breath sounds.   Abdominal:      General: Abdomen is flat. There is no distension.      Palpations: Abdomen is soft.   Genitourinary:     Comments: Incontinent  Musculoskeletal:         General: No deformity or signs of injury.      Cervical back: Normal range of motion. No rigidity.   Skin:     General: Skin is warm and dry.   Neurological:      Comments: Patient is postictal.  Nonetheless, the patient has purposeful withdrawal from  stimulation.  Patient pulling arm away from IV stick.  Patient attempting to sit upright in bed.  Patient picking at nose. No tonic-clonic seizure activity observed.   Psychiatric:      Comments: Altered mentation/post-ictal       Emergency Department Course   ECG  ECG results from 08/23/22   EKG 12-lead, tracing only     Value    Systolic Blood Pressure     Diastolic Blood Pressure     Ventricular Rate 82    Atrial Rate 82    RI Interval 182    QRS Duration 106        QTc 464    P Axis 49    R AXIS 55    T Axis 53    Interpretation ECG      Sinus rhythm  Normal ECG  When compared with ECG of 31-AUG-2006 23:00,  ST no longer elevated in Anterior leads     EKG 12 lead     Value    Systolic Blood Pressure     Diastolic Blood Pressure     Ventricular Rate 81    Atrial Rate 81    RI Interval 190    QRS Duration 112        QTc 476    P Axis 41    R AXIS 47    T Axis 46    Interpretation ECG      Sinus rhythm with occasional Premature ventricular complexes and Fusion complexes  Otherwise normal ECG  When compared with ECG of 23-AUG-2022 21:40, (unconfirmed)  Fusion complexes are now Present  Premature ventricular complexes are now Present         Imaging:  XR Chest Port 1 View   Final Result   IMPRESSION: There is a shallow inspiratory effort which accentuates heart size and pulmonary vascularity. No obvious acute cardiopulmonary findings are identified. Since 02/28/2021, the appliances have been removed and the prior seen area of increased    density in the mid to lower junction of the left lung has resolved.        Report per radiology    Laboratory:  Labs Ordered and Resulted from Time of ED Arrival to Time of ED Departure   GLUCOSE BY METER - Abnormal       Result Value    GLUCOSE BY METER POCT 159 (*)    ISTAT GASES LACTATE VENOUS POCT - Abnormal    Lactic Acid POCT 1.7      Bicarbonate Venous POCT 30 (*)     O2 Sat, Venous POCT 22 (*)     pCO2V Venous POCT 66 (*)     pH Venous POCT 7.27 (*)     pO2  Venous POCT 19 (*)    ISTAT BASIC CHEM ICA HEMATOCRIT POCT - Abnormal    TOTAL CO2 POCT 24      Creatinine POCT 0.6 (*)     Hematocrit POCT 43      Calcium, Ionized Whole Blood POCT 4.7      UREA NITROGEN POCT 12      Glucose Whole Blood POCT 144 (*)     Potassium POCT 4.0      Sodium POCT 121 (*)     Hemoglobin POCT 14.6      Chloride POCT 84 (*)    COMPREHENSIVE METABOLIC PANEL - Abnormal    Sodium 116 (*)     Potassium 4.0      Creatinine 0.56 (*)     Urea Nitrogen 10.3      Chloride 82 (*)     Carbon Dioxide (CO2) 26      Anion Gap 8      Glucose 141 (*)     Calcium 8.9      Protein Total 7.1      Albumin 4.1      Bilirubin Total 0.3      Alkaline Phosphatase 56      AST 26      ALT 12      GFR Estimate >90     MAGNESIUM - Abnormal    Magnesium 1.3 (*)    TSH WITH FREE T4 REFLEX - Abnormal    TSH 8.03 (*)    ROUTINE UA WITH MICROSCOPIC REFLEX TO CULTURE - Abnormal    Color Urine Straw      Appearance Urine Clear      Glucose Urine 300  (*)     Bilirubin Urine Negative      Ketones Urine Negative      Specific Gravity Urine 1.009      Blood Urine Trace (*)     pH Urine 7.5 (*)     Protein Albumin Urine Negative      Urobilinogen Urine Normal      Nitrite Urine Negative      Leukocyte Esterase Urine Negative      Mucus Urine Present (*)     RBC Urine 2      WBC Urine <1      Squamous Epithelials Urine <1     COVID-19 VIRUS (CORONAVIRUS) BY PCR - Normal    SARS CoV2 PCR Negative     VALPROIC ACID - Normal    Valproic acid 80.5     T4 FREE - Normal    Free T4 1.13     CBC WITH PLATELETS AND DIFFERENTIAL    WBC Count 10.0      RBC Count 4.79      Hemoglobin 14.0      Hematocrit 41.6      MCV 87      MCH 29.2      MCHC 33.7      RDW 11.8      Platelet Count 213      % Neutrophils 76      % Lymphocytes 15      % Monocytes 7      % Eosinophils 1      % Basophils 0      % Immature Granulocytes 1      NRBCs per 100 WBC 0      Absolute Neutrophils 7.6      Absolute Lymphocytes 1.5      Absolute Monocytes 0.7      Absolute  Eosinophils 0.1      Absolute Basophils 0.0      Absolute Immature Granulocytes 0.1      Absolute NRBCs 0.0     TOPIRAMATE LEVEL   OSMOLALITY, RANDOM URINE   SODIUM      Emergency Department Course:     Reviewed:  I reviewed nursing notes, vitals and past medical history    Assessments/Consults:  ED Course as of 08/24/22 0157   Tue Aug 23, 2022   2136 Sodium POCT(!): 121  LR ordered, holding off on hypertonic saline as patient not having seizures at this time. Will re-evaluate.   2140 EKG 12 LEAD  Normal sinus rhythm.  No acute ST elevation or depression as compared with prior ECG.  Rate of 82.   2204 Within an hour after restraint an in person face to face assessment was completed at 2138, including an evaluation of the patient's immediate reaction to the intervention, behavioral assessment and review/assessment of history, drugs and medications, recent labs, etc., and behavioral condition.  The patient experienced: No adverse physical outcome from seclusion/restraint initiation.  The intervention of restraint or seclusion needs to continue     2225 XR Chest Port 1 View  Neg for acute issues   2250 On reevaluation, patient having purposeful movement in bed, scratching face, and reaching for Colin catheter.  Pupils round equal and reactive to light bilaterally.  Nursing staff contacted patient's guardian and group home.   2258 Sodium(!!): 116  Will discuss with pharmacy potential need for 3% hypertonic saline given history of acute mentation change and seizure despite known history of seizures. No prior hyponatremia history.   2317 Pharmacy confirmed hypertonic may be given via PIV at slow rate.   2349 Discussed patient with Dr. Quintana will admit patient to ICU.  Dr. Quintana agrees with treatment plan.  Dr. Gaston recommends holding on all home medication until reevaluation of sodium levels.     Interventions:  2131 IV versed 2mg  2138 IV keppra 2g  2209 1L LR  2307 IV magnesium 2g  2335 3% hypertonic saline  0014  zofran    Disposition:  The patient was admitted to the hospital under the care of Dr. Quintana.     Impression & Plan     CMS Diagnoses: None    Medical Decision Makin-year-old male as described above presents to the emergency department for suspected status epilepticus.  Patient hemodynamically stable at time evaluation.  On my examination to bed after a team activation, patient moving all 4 extremities in bed.  Patient does not appear to be having tonic-clonic seizures.  Patient has purposeful withdrawal of extremities from stimulation.  No gaze deviation.  Nonetheless, patient not at baseline and having urinary incontinence.  2 mg of IV Versed administered for seizure treatment given high likelihood of patient having status epilepticus prior due to history of seizure disorder and status epilepticus. 2 g of Keppra was also administered for seizure prophylaxis.  Has intact airway.  Patient was sat upright and capnography was placed.  Patient was transferred from a smaller room to a larger resuscitation bay.  Given history of prior seizures and clinical presentation suggestive of seizure episode in addition to mildly elevated lactate, will hold off on CT imaging of the head given no history of trauma.  Patient has mild hyponatremia with sodium of 121, but not currently seizing, will administer lactated Ringer solution at this time with careful monitoring of sodium levels.  Consider hypertonic saline if sodium level drops were patient has worsening seizure episodes.  Altered mental status work-up.  Topiramate and valproic acid levels.  Additional work-up and orders as listed in chart.    Please refer to ED course above for details on the patient's treatment course and any changes or updates in care plan beyond my initial evaluation and MDM.    Critical Care:     I have determined that Duane D Backes is critically ill with high probability of imminent, life threatening deterioration that could result in  multi-organ failure.     Total Critical Care time was approximately 60 minutes for this patient exclusive of separately billable procedures, treating other patients, and teaching time.       This patient required my bedside presence to direct initial and ongoing resuscitation, complex medical decision making at the bedside, and multiple emergency interventions to stabilize life and body functions.     Additional critical care services included obtaining a history, examining the patient, pulse oximetry, ordering and review of studies, arranging urgent treatment with development of a management plan, evaluation of patient's response to treatment, frequent reassessment, and discussions with other providers. He is critically ill but stabilized upon admission.       Please see MDM section and the rest of the note for further information on patient assessment and treatment.    Diagnosis:    ICD-10-CM    1. Status epilepticus (H)  G40.901    2. Hyponatremia  E87.1    3. Hypomagnesemia  E83.42    4. Hypothyroidism, unspecified type  E03.9        Discharge Medications:  Current Discharge Medication List           Monty Ann DO  08/24/22 0157       Monty Ann DO  08/30/22 1642

## 2022-08-24 NOTE — CONSULTS
Nephrology Initial Consult  August 24, 2022      Duane D Backes MRN:[id number] YOB: 1980  Date of Admission:8/23/2022  Primary care provider: Rajat Parkinson  Requesting physician: No att. providers found    ASSESSMENT AND RECOMMENDATIONS:   1 severe hyponatremia-presumed chronic.  Unclear what the inciting etiology was.  Urine osmolality and serum sodium consistent with SIADH.  Multiple medications on board that can induce SIADH but likely has been on it for long periods of time.  No pulmonary etiology identified.  MRI brain without any acute abnormality.  Renal function is normal and TSH is slightly high but unlikely to be primary culprit.  Unclear how much fluid he was drinking prior to presentation.    -Brisk urine output of more than 5 L since presentation suggestive of ADH release.  -Sodium is overcorrected at this point.  Goal of 6-8 mEq/L over 24 hours.  Currently at 10 mEq/L in less than 24 hours.  -Urine output has slowed down after DDAVP 3 mcg given this morning.  Will likely need another dose in evening.  -D5 water started at 100 cc an hour.   -Continue to check serum sodium every 4 hours.  -Stop D5 water once serum sodium is 124 or less (please page me with results)  -Goal of 122-124 until 9 PM this evening and then later increase up to 130 over  Next 24 hours.  Corrected D5 water if higher than goal.    2 seizure disorder  -presented with seizures.  Possibly induced by hyponatremia.  Being treated    3 hypomagnesemia-being replaced    4 hypothyroidism-on levothyroxine.  TSH slightly high.    Thank you for the consult. Will continue to follow along with you .    Recommendations were communicated to primary team in person      Dilcia Castillo MD  [facility] - Nephrology   [phone]        REASON FOR CONSULT: Hyponatremia , seizure    HISTORY OF PRESENT ILLNESS:  Duane D Backes is a 42 year old male with hx of seizure dis, hypothyroidism, mod MR, nonverbal at baseline who  presented from his group home with altered mental status presumed secondary to seizures.  On evaluation in ED, labs were significant for critical hyponatremia with a sodium of 116, low magnesium of 1.3 but otherwise normal renal function.  Potassium was normal.  COVID-19 negative.  Chest x-ray without acute abnormality.  No acute abnormality on MRI of brain.  Patient received Keppra and benzodiazepines.  Review of outpatient medications significant for Depakote, lithium, risperidone, Topamax, Zoloft  Unclear how much fluid he was drinking prior to arrival.  Initial urine osmolality of 359 and urinary sodium 112 did not suggest primary polydipsia (unclear if done after 3% saline initiated)  Patient was started on 3% saline and also received a bolus of 1 L of normal saline.  Soon after presentation, he had remarkable increase in urine output and had 4 L urine overnight and 1.6 L over last shift.  With that his sodium increased rapidly from 1 16-1 23 within 7 hours.  He was given 3 mcg DDAVP and his urine output slowed down but serum sodium still continues to rise at a slower rate and is up to 126 now.    Unable to obtain history from patient.  He remains agitated and in restraints.     08/23/22 21:32 08/24/22 00:05 08/24/22 01:24 08/24/22 04:17 08/24/22 06:44 08/24/22 08:54 08/24/22 12:47   Sodium 116 (LL) 118 (LL) 118 (LL) 118 (LL)  118 (LL) 123 (L) 125 (L) 126 (L)     PAST MEDICAL HISTORY:  Reviewed from chart and is as listed in HPI.       MEDICATIONS:  PTA Meds  Prior to Admission medications    Medication Sig Last Dose Taking? Auth Provider Long Term End Date   fish oil-omega-3 fatty acids 1000 MG capsule TAKE 1 CAPSULE BY MOUTH TWICE DAILY  **NON-COVERED MED**  **PACK IN CARDS*   Rajat Parkinson MD     GAVILAX 17 GM/SCOOP powder MIX 17 GRAMS IN LIQUID AND DRINK BY MOUTH ONCE DAILY FOR CONSTIPATION.   Rajat Parkinson MD     ammonium lactate (LAC-HYDRIN) 12 % external lotion Apply topically daily To feet and  heels   Reported, Patient     diazepam (VALIUM) 5 MG tablet Take 1 tablet (5 mg) by mouth once as needed for anxiety Take 1 tab (5mg) 60 minutes prior to MRI. Take 2nd tab (5mg) immediately before MRI if continued intolerable anxiety.   Tiffanie Muller, NP No    diazepam (VALIUM) 5 MG/ML (HIGH CONC) solution Diazepam: Give 1 ml (5mg): Give 1 ml for ANY seizure. May repeat once 1 ml (5 mg). Monitor breathing, if there any concerns call 911. Do not exceed 10 mg per day.   Lauren Washington MD No    divalproex sodium extended-release (DEPAKOTE ER) 500 MG 24 hr tablet Take 1 tablet (500 mg) by mouth 3 times daily   Lauren Washington MD Yes    docusate sodium (COLACE) 100 MG capsule TAKE 1 CAPSULE BY MOUTH TWICE DAILY.   Rajat Parkinson MD     ENULOSE 10 GM/15ML SOLUTION TAKE 30ML BY MOUTH EVERY EVENING AT 5PM;TAKE 30ML BY MOUTH TWICE DAILY AS NEEDED   Rajat Parkinson MD     lactulose (CHRONULAC) 10 GM/15ML solution Take 30 mLs by mouth every evening & additional 30mL 2 times per day as needed   Reported, Patient     levothyroxine (SYNTHROID/LEVOTHROID) 150 MCG tablet TAKE 1 TABLET BY MOUTH ONCE DAILY  FOR THYROID   Rajat Parkinson MD Yes    lithium ER (LITHOBID) 300 MG CR tablet Take 600 mg by mouth At Bedtime    Reported, Patient     melatonin 3 MG tablet Take 1 tablet (3 mg) by mouth nightly as needed for sleep   Lauren Washington MD     MILK OF MAGNESIA 400 MG/5ML suspension Take 30 mLs by mouth every evening   Rajat Parkinson MD     risperiDONE (RISPERDAL) 2 MG tablet Take 2 mg by mouth 2 times daily 1 tab every morning & 1 tab every bedtime   Reported, Patient Yes    sertraline (ZOLOFT) 100 MG tablet Take 1.5 tablets by mouth every morning.   Ronal Tavera MD     Starch, Thickening, POWD Combined with beverages, per instructions from Speech Therapy.   Rajat Parkinson MD     topiramate (TOPAMAX) 200 MG tablet TAKE 1 TABLET BY MOUTH TWICE DAILY.   Lauren Washington MD Yes    VITAMIN D3 25 MCG (1000  UT) tablet TAKE 3 TABLETS (3000 UNITS) BY MOUTH ONCE DAILY   Rajat Parkinson MD        Current Meds    sodium chloride 0.9%  1,000 mL Intravenous Once     divalproex sodium extended-release  500 mg Oral TID     enoxaparin ANTICOAGULANT  40 mg Subcutaneous Q24H     lactulose  30 mL Oral QPM     levothyroxine  150 mcg Oral QAM AC     risperiDONE  2 mg Oral BID     topiramate  200 mg Oral Daily     Infusion Meds      ALLERGIES:    Allergies   Allergen Reactions     Penicillin [Penicillins] Other (See Comments)     Prozac [Fluoxetine Hcl]      Reglan [Metoclopramide Hcl]      Ritalin [Methylphenidate Derivatives] Other (See Comments)     Ritalin     Trazodone        REVIEW OF SYSTEMS:  Could not be obtained.  Patient is encephalopathic.    SOCIAL HISTORY:   Could not be obtained.  Patient is encephalopathic.    FAMILY MEDICAL HISTORY:   Family History   Problem Relation Age of Onset     Unknown/Adopted No family hx of      Reviewed with patient on 2022     PHYSICAL EXAM:   Temp  Av.2  F (36.2  C)  Min: 96.8  F (36  C)  Max: 97.8  F (36.6  C)      Pulse  Av.8  Min: 73  Max: 129 Resp  Av.8  Min: 8  Max: 24  SpO2  Av.4 %  Min: 91 %  Max: 97 %       BP (!) 144/85   Pulse 102   Temp 97.8  F (36.6  C) (Axillary)   Resp 17   Wt 113.3 kg (249 lb 12.5 oz)   SpO2 97%   BMI 32.07 kg/m     Date 22 0700 - 22 0659   Shift 8583-7075 8132-6983 4651-4524 24 Hour Total   INTAKE   P.O. 360   360   Shift Total(mL/kg) 360(3.18)   360(3.18)   OUTPUT   Urine 1400   1400   Shift Total(mL/kg) 1400(12.36)   1400(12.36)   Weight (kg) 113.3 113.3 113.3 113.3      Admit Weight: 113.3 kg (249 lb 12.5 oz)     GENERAL APPEARANCE: Agitated, mumbling  EYES: no scleral icterus, pupils equal  HENT: NC/AT,  mouth  without ulcers or lesions  Lymphatics: no cervical or supraclavicular LAD  Endo: no moon facies, no goiter  Pulmonary: lungs clear to auscultation with equal breath sounds bilaterally, no  clubbing  CV: regular rhythm, normal rate, no rub   - JVP -   - Edema-  GI: soft, nontender,   MS: no evidence of inflammation in joints  : + abel  SKIN: no rash, warm, dry, no cyanosis      LABS:   [facility]  Recent Labs   Lab 08/24/22  0854 08/24/22  0824 08/24/22  0644 08/24/22  0437 08/24/22  0417 08/24/22  0124 08/24/22  0055 08/24/22  0005 08/23/22  2136 08/23/22  2132   *  --  123*  --  118*  118* 118*  --    < > 121* 116*   POTASSIUM  --   --   --   --  4.3  --   --   --  4.0 4.0   CHLORIDE  --   --   --   --  86*  --   --   --  84* 82*   CO2  --   --   --   --  23  --   --   --   --  26   ANIONGAP  --   --   --   --  9  --   --   --   --  8   GLC  --  146*  --  138* 145*  --  189*  --  144* 141*   BUN  --   --   --   --  9.2  --   --   --  12 10.3   CR  --   --   --   --  0.48* 0.47*  --   --  0.6* 0.56*   GFRESTIMATED  --   --   --   --  >90 >90  --   --   --  >90   JESSICA  --   --   --   --  8.4*  --   --   --   --  8.9   MAG  --   --  1.7  --   --   --   --   --   --  1.3*   PROTTOTAL  --   --   --   --   --   --   --   --   --  7.1   ALBUMIN  --   --   --   --   --   --   --   --   --  4.1   BILITOTAL  --   --   --   --   --   --   --   --   --  0.3   ALKPHOS  --   --   --   --   --   --   --   --   --  56   AST  --   --   --   --   --   --   --   --   --  26   ALT  --   --   --   --   --   --   --   --   --  12    < > = values in this interval not displayed.     CBC  Recent Labs   Lab 08/24/22  0124 08/23/22  2136 08/23/22 2132   HGB 13.7 14.6 14.0   WBC 10.4  --  10.0   RBC 4.75  --  4.79   HCT 39.6* 43 41.6   MCV 83  --  87   MCH 28.8  --  29.2   MCHC 34.6  --  33.7   RDW 11.8  --  11.8     --  213     INRNo lab results found in last 7 days.  ABG  Recent Labs   Lab 08/23/22 2135   PH 7.27*      URINE STUDIES  Recent Labs   Lab Test 08/23/22  2211 02/25/21  0037 09/23/20  0900 02/22/19  1615 08/29/15  1139   COLOR Straw Straw Yellow Yellow Yellow   APPEARANCE Clear Clear Clear Clear Clear    URINEGLC 300 * Negative Negative 70* Negative   URINEBILI Negative Negative Negative Negative Negative   URINEKETONE Negative Negative Trace* Negative Negative   SG 1.009 1.004 1.020 1.024 1.010   UBLD Trace* Negative Negative Trace* Negative   URINEPH 7.5* 7.0 7.5* 6.0 5.5   PROTEIN Negative Negative Negative 10* Negative   UROBILINOGEN  --   --  0.2  --  0.2   NITRITE Negative Negative Negative Negative Negative   LEUKEST Negative Negative Negative Negative Negative   RBCU 2  --   --  3*  --    WBCU <1  --   --  1  --      Recent Labs   Lab Test 06/23/14  1251   UTPG 0.04       IMAGING:  Personally reviewed the images and findings are as listed in HPI     Dilcia Castillo MD

## 2022-08-24 NOTE — PLAN OF CARE
Shift Events: Sodium overcorrected. Nephrology consulted. Received DDAVP x1. D5 infusing. Mg replaced.    Neuro: More verbal, answers some questions appropriately.   CV: SR/ST  ID: NA  Pulm: Clear LS on RA  GI: Good PO intake  : Urine output slowing.   Lines/gtts: PIV x1  Skin: bruising to left foot    Plan: Monitoring Na q4. Nephrology following    Right wrist and Left wrist restraints continued 8/24/2022    Clinical Justification: Pulling lines, pulling tubes, and pulling equipment  Less Restrictive Alternative: Re-evaluate equipment, Disguise equipment, Alarm, De-escalation, Reorientation  Attending Physician Notified: MD ordered restraint,     New orders placed Yes  Length of Order: 1 Day      April Hanley RN

## 2022-08-24 NOTE — PLAN OF CARE
ICU End of Shift Summary.  For vital signs and complete assessments, please see documentation flowsheets.      Pertinent assessments: Pt mostly lethargic. Has moments where he is awake and agitated. Pt is nonverbal at baseline per group home/ED but is able to communicate some needs through yes/no & one worded answers. When pt is agitated it can be difficult to redirect. Tele SR with frequent PVCs, PVCs becoming less frequent as electrolytes are corrected. VSS, afebrile. KALEB Colin in place with significant UOP, 2550cc since pt came up from ED. LBM unknown. BS+. Had an episode of emesis in ED prior to transfer to ICU. Regular diet, ate a snack when pt was more awake, alert and calm.       Major Shift Events:     -0300 pt extremely agitated, trying to crawl out of bed, requested agitation meds from telehub, PRN ativan and haldol ordered, not given as pt calmed down without.    -0600 pt extremely agitated again, attempting to crawl out of bed with soft restraints on, difficult to redirect, took 3 staff members to get pt back into bed. PRN ativan given.    - Na 118, 3% Sodium Chloride 50ml bolus given x2. Na remained at 118, at 0600 when 2nd bolus was finishing (<10mls remaining) called telehub asking if they wanted to do another bolus, deferred to rounding day team.    Plan (Upcoming Events): monitor and gradually correct Na    Discharge/Transfer Needs: tbd     Bedside Shift Report Completed : yes  Bedside Safety Check Completed: yes

## 2022-08-24 NOTE — PROVIDER NOTIFICATION
DATE:  8/24/2022   TIME OF RECEIPT FROM LAB:  0102  LAB TEST:  Na  LAB VALUE:  118  RESULTS GIVEN WITH READ-BACK TO (PROVIDER):  Teja Quintana MD  TIME LAB VALUE REPORTED TO PROVIDER:   Paged at [phone]

## 2022-08-24 NOTE — H&P
Admitted: 08/23/2022    CHIEF COMPLAINT:  The patient was brought from group home for suspected seizure.    HISTORY OF PRESENT ILLNESS:  Duane Backes is a 42-year-old gentleman with a significant past medical history of status epilepticus, seizure disorder, encephalopathy, moderate MR, hypothyroidism, electrolyte disturbance, who presents today from group home for suspected seizure.  The patient is nonverbal at baseline and history is very  limited.  Per ED physician, when he was evaluated, he was postictal.  EMS saw the patient 2 hours after the episode as the patient was acting odd and not at baseline.  When EMS arrived, he slumped over while in the EMS gurney with seizure-like activity.  The patient did not return to baseline for about 2 hours.    In the Emergency Room here, he was evaluated.  Discussed with the ED physician.  The patient was given a load of Keppra in addition to benzodiazepine.  Initially, the patient was agitated and placed on restraints, but later on it was taken off.  The patient was calm and resting comfortably.    PAST MEDICAL HISTORY:     1.  History of status epilepticus.  2.  Seizure disorder.  3.  Aspiration pneumonia.  4.  Autism.  5.  Hypothyroidism.  6.  Hypernatremia.  7.  Hypokalemia.  8.  Dysphagia.    PAST SURGICAL HISTORY:  As in electronic medical record.    HOME MEDICATIONS:    Prior to Admission Medications   Prescriptions Last Dose Informant Patient Reported? Taking?   ENULOSE 10 GM/15ML SOLUTION Unknown at Unknown time  No Yes   Sig: TAKE 30ML BY MOUTH EVERY EVENING AT 5PM;TAKE 30ML BY MOUTH TWICE DAILY AS NEEDED   GAVILAX 17 GM/SCOOP powder 8/23/2022 at AM  No Yes   Sig: MIX 17 GRAMS IN LIQUID AND DRINK BY MOUTH ONCE DAILY FOR CONSTIPATION.   Lacosamide (VIMPAT) 100 MG TABS tablet 8/23/2022 at PM  Yes Yes   Sig: Take 100 mg by mouth 2 times daily   MILK OF MAGNESIA 400 MG/5ML suspension 8/23/2022 at PM  No Yes   Sig: Take 30 mLs by mouth every evening   Starch, Thickening,  POWD   No No   Sig: Combined with beverages, per instructions from Speech Therapy.   VITAMIN D3 25 MCG (1000 UT) tablet 8/23/2022 at AM  No Yes   Sig: TAKE 3 TABLETS (3000 UNITS) BY MOUTH ONCE DAILY   ammonium lactate (LAC-HYDRIN) 12 % external lotion 8/23/2022 at PM  Yes Yes   Sig: Apply topically daily To feet and heels   diazepam (VALIUM) 5 MG/ML (HIGH CONC) solution Unknown at Unknown time  No Yes   Sig: Diazepam: Give 1 ml (5mg): Give 1 ml for ANY seizure. May repeat once 1 ml (5 mg). Monitor breathing, if there any concerns call 911. Do not exceed 10 mg per day.   divalproex sodium extended-release (DEPAKOTE ER) 500 MG 24 hr tablet 8/23/2022 at PM  No Yes   Sig: Take 1 tablet (500 mg) by mouth 3 times daily   docusate sodium (COLACE) 100 MG capsule 8/23/2022 at PM  No Yes   Sig: TAKE 1 CAPSULE BY MOUTH TWICE DAILY.   fish oil-omega-3 fatty acids 1000 MG capsule 8/23/2022 at PM  No Yes   Sig: TAKE 1 CAPSULE BY MOUTH TWICE DAILY  **NON-COVERED MED**  **PACK IN CARDS*   levothyroxine (SYNTHROID/LEVOTHROID) 150 MCG tablet 8/23/2022 at AM  No Yes   Sig: TAKE 1 TABLET BY MOUTH ONCE DAILY  FOR THYROID   lithium ER (LITHOBID) 300 MG CR tablet 8/23/2022 at PM  Yes Yes   Sig: Take 600 mg by mouth At Bedtime    melatonin 3 MG tablet Not Taking at Unknown time  No No   Sig: Take 1 tablet (3 mg) by mouth nightly as needed for sleep   Patient not taking: Reported on 8/24/2022   risperiDONE (RISPERDAL) 2 MG tablet 8/23/2022 at PM  Yes Yes   Sig: Take 2 mg by mouth 2 times daily 1 tab every morning & 1 tab every bedtime   sertraline (ZOLOFT) 100 MG tablet 8/23/2022 at AM  Yes Yes   Sig: Take 1.5 tablets by mouth every morning.   topiramate (TOPAMAX) 200 MG tablet 8/23/2022 at PM  No Yes   Sig: TAKE 1 TABLET BY MOUTH TWICE DAILY.      Facility-Administered Medications: None     ALLERGIES:  PENICILLIN, PROZAC, REGLAN, TRAZODONE AND RITALIN.    SOCIAL HISTORY:  The patient is from a group home.  Details not available.  He has a  legal guardian.    REVIEW OF SYSTEMS:  Unable to review as the patient is nonverbal.    PHYSICAL EXAMINATION:    GENERAL:  The patient is awake, opens his eye, does not follow any instruction.  VITAL SIGNS:  Blood pressure 112/75, pulse rate 75, temperature 96.8, oxygen saturation 95%.  HEENT:  Pink, nonicteric.  Extraocular muscle movement intact.  NECK:  Supple.  No JVD, no thyromegaly.  CHEST:  Good air entry bilaterally.  No wheezing, crackles or rales.  CARDIOVASCULAR SYSTEM:  S1 and S2 well heard.  No gallop or murmur.  ABDOMEN:  Soft, nontender.  EXTREMITIES:  No edema, cyanosis or clubbing.    LABORATORY:  Sodium 116, repeat was 118.  Electrolytes are otherwise normal except magnesium is 1.3, glucose 141.  TSH is 8.0.  Free T4 is 1.10.  CBC is essentially normal.  COVID-19 test is negative.  Chest x-ray showed shallow inspiratory effort with accentuated heart size and pulmonary vascularity.    ASSESSMENT AND PLAN:  Duane Backes is a 42-year-old gentleman with autism, tonic-clonic seizure, benign epidermoid tumor, hypothyroidism, previous history of status epilepticus.  He was brought by ambulance to the Emergency Room for seizure with postictal versus status epilepticus.  1.  Seizure episode in a patient with seizure disorder and history of status epilepticus.  The patient has extensive seizure history in the past, it is not clear when the last one was as history was not available.  He will be on Depakote 1000 mg b.i.d. and topiramate 200 mg b.i.d.  He also had a witnessed episode of seizure today and we will continue to monitor the patient.  Keppra was loaded in the Emergency Room and he was also given benzodiazepine.  At this time, there is no seizure episode and he seems to be back to his baseline.   2.  Severe hyponatremia.  The patient has no prior history of hyponatremia.  His sodium on presentation is 116.  He has also significant urine output, suspected self correcting it.  He was started on 3%  sodium chloride at 20 mL per hour.  His sodium improved to 118.  We will check sodium every two hours and if sodium level increases to 122, we will discontinue 3% sodium and keep him off IV fluid and continue to check monitor the sodium level.  The patient is producing good amount of urine and likely start correcting his sodium.  Urine osmolality, urine sodium and serum osmolality pending.  Lithium will be on hold and lithium level will be checked.  3.  Hypothyroidism.  He will be on levothyroxine at 150 mcg p.o. daily and will be continued.  4.  Autism, nonverbal at baseline.  Needs help with all his diet and is to monitor.  5.  Incomplete data.  Further information needs to be obtained tomorrow from group home when staff are available.     The patient is FULL CODE.  ED physician contacted legal guardian earlier.    Teja Quintana MD        D: 2022   T: 2022   MT: ANNA    Name:     BACKES, DUANE D.  MRN:      [id number]        Account:     [id number]   :      1980           Admitted:    2022       Document: [id number]

## 2022-08-24 NOTE — PROGRESS NOTES
Duane Backes is a 42-year-old gentleman with autism, tonic-clonic seizure, benign epidermoid tumor, hypothyroidism, previous history of status epilepticus.  He was brought by ambulance to the Emergency Room for seizure and postictal versus status epilepticus.  To be severely hyponatremic.  Nephrology has been consulted and has been managing hyponatremia.  Seems to have responded to DDAVP and D5 water.  Continue cares per nephrology. Plans otherwise as outlined in the H&P note from earlier today.  Agree with Dr. Quintana's note.     Queenie Vivar, DO

## 2022-08-24 NOTE — PROGRESS NOTES
"BRIEF NUTRITION ASSESSMENT    REASON FOR ASSESSMENT:  Duane D Backes is a 42 year old male seen by Registered Dietitian for Admission Nutrition Risk Screen for positive    Have you recently lost weight without trying? Unsure (2)   Have you been eating poorly because of a decreased appetite? No (0)     NUTRITION HISTORY:  - Information obtained from chart  - Food Allergies: NKFA    CURRENT DIET AND INTAKE:  Diet: ADAT Regular Diet     No information to comment on in the flowsheets, pt recently admitted.     ANTHROPOMETRICS:  Height: 6' 2\" --> taken on 11/4/2021  Weight: 113.3 kg ( 249 lbs 12.5 oz)   Body mass index is 32.07 kg/m .   Weight Status: Obesity Grade I BMI 30-34.9    Weight History: No weight loss noted  Wt Readings from Last 10 Encounters:   08/24/22 : 113.3 kg (249 lb 12.5 oz)   01/13/22 : 95.7 kg (211 lb)   11/04/21 : 95.7 kg (211 lb)   05/01/21 : 85.3 kg (188 lb)   03/29/21 : 86.1 kg (189 lb 14.4 oz)   03/23/21 : 83.5 kg (184 lb)   03/08/21 : 81.2 kg (179 lb)   12/23/19 : 89.8 kg (198 lb)   12/16/19 : 90 kg (198 lb 6.4 oz)   08/27/19 : 91.8 kg (202 lb 6.4 oz)     Per care everywhere:    86.7 kg (191 lb 3.2 oz) 04/01/2021 7:11 AM CDT     LABS:  Labs noted  Labs:  Electrolytes  Potassium (mmol/L)   Date Value   08/24/2022 4.3   08/23/2022 4.0   03/08/2021 3.9   03/07/2021 3.9   03/07/2021 3.2 (L)     Potassium POCT (mmol/L)   Date Value   08/23/2022 4.0     Phosphorus (mg/dL)   Date Value   03/08/2021 4.7 (H)   03/05/2021 4.5   03/01/2021 4.0   02/27/2021 3.7   09/04/2006 3.9    Blood Glucose  Glucose (mg/dL)   Date Value   08/24/2022 145 (H)   08/23/2022 141 (H)   11/04/2021 88   03/08/2021 115 (H)   03/06/2021 81   03/05/2021 100 (H)   03/04/2021 79   03/03/2021 65 (L)     GLUCOSE BY METER POCT (mg/dL)   Date Value   08/24/2022 146 (H)   08/24/2022 138 (H)   08/24/2022 189 (H)   08/23/2022 159 (H)     Glucose Whole Blood POCT (mg/dL)   Date Value   08/23/2022 144 (H)     Hemoglobin A1C (%)   Date Value "   09/23/2020 4.7   08/15/2017 4.9   01/20/2006 4.8   09/21/2005 4.9    Inflammatory Markers  CRP Inflammation (mg/L)   Date Value   02/28/2021 126.0 (H)   02/27/2021 148.0 (H)   02/26/2021 127.0 (H)     WBC (10e9/L)   Date Value   04/02/2021 4.2   03/08/2021 9.0   03/05/2021 6.0     WBC Count (10e3/uL)   Date Value   08/24/2022 10.4   08/23/2022 10.0     Albumin (g/dL)   Date Value   08/23/2022 4.1   03/08/2021 3.0 (L)   03/05/2021 2.8 (L)   02/26/2021 2.9 (L)      Magnesium (mg/dL)   Date Value   08/23/2022 1.3 (L)   03/08/2021 1.9   03/01/2021 2.3   02/27/2021 2.0     Sodium (mmol/L)   Date Value   08/24/2022 123 (L)   08/24/2022 118 (LL)   08/24/2022 118 (LL)   03/08/2021 141   03/06/2021 142   03/05/2021 146 (H)    Renal  Urea Nitrogen (mg/dL)   Date Value   08/24/2022 9.2   08/23/2022 10.3   03/08/2021 14   03/06/2021 12   03/05/2021 17     UREA NITROGEN POCT (mg/dL)   Date Value   08/23/2022 12     Creatinine (mg/dL)   Date Value   08/24/2022 0.48 (L)   08/24/2022 0.47 (L)   08/23/2022 0.56 (L)   03/09/2021 0.87   03/08/2021 0.75   03/06/2021 0.86     Creatinine POCT (mg/dL)   Date Value   08/23/2022 0.6 (L)     Additional  Triglycerides (mg/dL)   Date Value   11/04/2021 143   05/17/2018 77   08/15/2017 94   08/06/2015 70     Ketones Urine (mg/dL)   Date Value   08/23/2022 Negative   02/25/2021 Negative        MALNUTRITION:  Visual Nutrition Focused Physical Assessment (NFPA) not completed - pt does not meet criteria for positive MST     NUTRITION INTERVENTION:  Nutrition Diagnosis:  No nutrition diagnosis at this time.    Implementation:  Nutrition Education: Per Provider order if indicated    FOLLOW UP/MONITORING:   Will re-evaluate per protocol if remains admitted     Sia Parnell RD, LD  Clinical Dietitian     3rd floor/ICU: [phone]  All other floors: [phone]  Weekend/holiday: [phone]  Office: [phone]

## 2022-08-24 NOTE — ED TRIAGE NOTES
Pt BIBA after being found minimally responsive by Group home staff for approximatley 2 hours after group trip to [facility]. EMS noted pt to be unresponsive upon arrival as well as seizure-like activity lasting approximately 30-45 seconds.. Pt is Autistic and non-verbal at baseline. Pt minimally responsive only to pain upon arrival, noted to be incontinent of bowel and bladder.

## 2022-08-25 ENCOUNTER — APPOINTMENT (OUTPATIENT)
Dept: SPEECH THERAPY | Facility: CLINIC | Age: 42
DRG: 644 | End: 2022-08-25
Attending: STUDENT IN AN ORGANIZED HEALTH CARE EDUCATION/TRAINING PROGRAM
Payer: MEDICARE

## 2022-08-25 LAB
ANION GAP SERPL CALCULATED.3IONS-SCNC: 11 MMOL/L (ref 7–15)
BUN SERPL-MCNC: 12.3 MG/DL (ref 6–20)
CALCIUM SERPL-MCNC: 8.9 MG/DL (ref 8.6–10)
CHLORIDE SERPL-SCNC: 89 MMOL/L (ref 98–107)
CREAT SERPL-MCNC: 0.64 MG/DL (ref 0.67–1.17)
DEPRECATED HCO3 PLAS-SCNC: 22 MMOL/L (ref 22–29)
GFR SERPL CREATININE-BSD FRML MDRD: >90 ML/MIN/1.73M2
GLUCOSE BLDC GLUCOMTR-MCNC: 106 MG/DL (ref 70–99)
GLUCOSE BLDC GLUCOMTR-MCNC: 134 MG/DL (ref 70–99)
GLUCOSE BLDC GLUCOMTR-MCNC: 138 MG/DL (ref 70–99)
GLUCOSE BLDC GLUCOMTR-MCNC: 140 MG/DL (ref 70–99)
GLUCOSE BLDC GLUCOMTR-MCNC: 143 MG/DL (ref 70–99)
GLUCOSE BLDC GLUCOMTR-MCNC: 150 MG/DL (ref 70–99)
GLUCOSE SERPL-MCNC: 125 MG/DL (ref 70–99)
GLUCOSE SERPL-MCNC: 145 MG/DL (ref 70–99)
MAGNESIUM SERPL-MCNC: 1.6 MG/DL (ref 1.7–2.3)
OSMOLALITY UR: 199 MMOL/KG (ref 100–1200)
POTASSIUM SERPL-SCNC: 4.1 MMOL/L (ref 3.4–5.3)
POTASSIUM SERPL-SCNC: 4.3 MMOL/L (ref 3.4–5.3)
SODIUM SERPL-SCNC: 120 MMOL/L (ref 136–145)
SODIUM SERPL-SCNC: 120 MMOL/L (ref 136–145)
SODIUM SERPL-SCNC: 121 MMOL/L (ref 136–145)
SODIUM SERPL-SCNC: 122 MMOL/L (ref 136–145)
SODIUM SERPL-SCNC: 123 MMOL/L (ref 136–145)
SODIUM UR-SCNC: <20 MMOL/L
TOPIRAMATE SERPL-MCNC: 4.4 UG/ML

## 2022-08-25 PROCEDURE — 250N000011 HC RX IP 250 OP 636: Performed by: STUDENT IN AN ORGANIZED HEALTH CARE EDUCATION/TRAINING PROGRAM

## 2022-08-25 PROCEDURE — 84132 ASSAY OF SERUM POTASSIUM: CPT | Performed by: STUDENT IN AN ORGANIZED HEALTH CARE EDUCATION/TRAINING PROGRAM

## 2022-08-25 PROCEDURE — 250N000011 HC RX IP 250 OP 636: Performed by: INTERNAL MEDICINE

## 2022-08-25 PROCEDURE — 82947 ASSAY GLUCOSE BLOOD QUANT: CPT | Performed by: STUDENT IN AN ORGANIZED HEALTH CARE EDUCATION/TRAINING PROGRAM

## 2022-08-25 PROCEDURE — 83935 ASSAY OF URINE OSMOLALITY: CPT | Performed by: INTERNAL MEDICINE

## 2022-08-25 PROCEDURE — 99207 PR NO BILLABLE SERVICE THIS VISIT: CPT | Performed by: STUDENT IN AN ORGANIZED HEALTH CARE EDUCATION/TRAINING PROGRAM

## 2022-08-25 PROCEDURE — 99233 SBSQ HOSP IP/OBS HIGH 50: CPT | Performed by: STUDENT IN AN ORGANIZED HEALTH CARE EDUCATION/TRAINING PROGRAM

## 2022-08-25 PROCEDURE — 83735 ASSAY OF MAGNESIUM: CPT | Performed by: STUDENT IN AN ORGANIZED HEALTH CARE EDUCATION/TRAINING PROGRAM

## 2022-08-25 PROCEDURE — 84295 ASSAY OF SERUM SODIUM: CPT | Performed by: INTERNAL MEDICINE

## 2022-08-25 PROCEDURE — 84300 ASSAY OF URINE SODIUM: CPT | Performed by: INTERNAL MEDICINE

## 2022-08-25 PROCEDURE — 250N000013 HC RX MED GY IP 250 OP 250 PS 637: Performed by: INTERNAL MEDICINE

## 2022-08-25 PROCEDURE — 92610 EVALUATE SWALLOWING FUNCTION: CPT | Mod: GN

## 2022-08-25 PROCEDURE — 200N000001 HC R&B ICU

## 2022-08-25 PROCEDURE — 92526 ORAL FUNCTION THERAPY: CPT | Mod: GN

## 2022-08-25 PROCEDURE — 99232 SBSQ HOSP IP/OBS MODERATE 35: CPT | Performed by: INTERNAL MEDICINE

## 2022-08-25 PROCEDURE — 36415 COLL VENOUS BLD VENIPUNCTURE: CPT | Performed by: INTERNAL MEDICINE

## 2022-08-25 PROCEDURE — 36415 COLL VENOUS BLD VENIPUNCTURE: CPT | Performed by: STUDENT IN AN ORGANIZED HEALTH CARE EDUCATION/TRAINING PROGRAM

## 2022-08-25 RX ORDER — MAGNESIUM SULFATE HEPTAHYDRATE 40 MG/ML
2 INJECTION, SOLUTION INTRAVENOUS ONCE
Status: COMPLETED | OUTPATIENT
Start: 2022-08-25 | End: 2022-08-25

## 2022-08-25 RX ORDER — MAGNESIUM SULFATE HEPTAHYDRATE 40 MG/ML
2 INJECTION, SOLUTION INTRAVENOUS ONCE
Status: DISCONTINUED | OUTPATIENT
Start: 2022-08-25 | End: 2022-08-25

## 2022-08-25 RX ADMIN — DIVALPROEX SODIUM 500 MG: 500 TABLET, FILM COATED, EXTENDED RELEASE ORAL at 08:25

## 2022-08-25 RX ADMIN — MAGNESIUM SULFATE HEPTAHYDRATE 2 G: 40 INJECTION, SOLUTION INTRAVENOUS at 08:25

## 2022-08-25 RX ADMIN — LEVOTHYROXINE SODIUM 150 MCG: 150 TABLET ORAL at 08:24

## 2022-08-25 RX ADMIN — DIVALPROEX SODIUM 500 MG: 500 TABLET, FILM COATED, EXTENDED RELEASE ORAL at 17:39

## 2022-08-25 RX ADMIN — TOPIRAMATE 200 MG: 50 TABLET, FILM COATED ORAL at 08:24

## 2022-08-25 RX ADMIN — LORAZEPAM 2 MG: 2 INJECTION INTRAMUSCULAR; INTRAVENOUS at 18:03

## 2022-08-25 RX ADMIN — ENOXAPARIN SODIUM 40 MG: 40 INJECTION SUBCUTANEOUS at 08:25

## 2022-08-25 RX ADMIN — DIVALPROEX SODIUM 500 MG: 500 TABLET, FILM COATED, EXTENDED RELEASE ORAL at 12:08

## 2022-08-25 RX ADMIN — PROCHLORPERAZINE EDISYLATE 10 MG: 5 INJECTION INTRAMUSCULAR; INTRAVENOUS at 10:03

## 2022-08-25 RX ADMIN — RISPERIDONE 2 MG: 2 TABLET ORAL at 08:24

## 2022-08-25 ASSESSMENT — ACTIVITIES OF DAILY LIVING (ADL)
ADLS_ACUITY_SCORE: 39

## 2022-08-25 NOTE — PROGRESS NOTES
Right wrist and Left wrist restraints discontinued at 08:00 AM on 8/25/2022.    Restraint discontinue criteria met, patient is calm, cooperative and safe. Restraints removed.     Patient's Response: Needs reinforcement  Family Notification: Other  Attending Physician Notified: Yes,        [id number]  ICU End of Shift Summary.  For vital signs and complete assessments, please see documentation flowsheets.     Pertinent assessments: Hemodynamics stable. Neurologically alert. No seizure activity during shift, precautions continued. Trending Na per nephrology. Colin intact, uop increasing gradually(100-300cc/hr). OOB to chair. Sitter at bedside. Family updated via phone   Major Shift Events: As outlined above.   Plan (Upcoming Events): Trend serum sodium q 6hrs. Restrict fluid intake per Neph  Discharge/Transfer Needs: TBD    Bedside Shift Report Completed : Y  Bedside Safety Check Completed: Y

## 2022-08-25 NOTE — PROGRESS NOTES
Sodium down to 123 and in range ( 7 meq/ L correction over last 24 hrs )     Plan -   #Stop D5W  #Give DDAVP 3 mcg X 1 dose to prevent sudden rise in urine output and overcorrection overnight ( noted 300 ml urine over last 2 hrs )   #Can decrease frequency of sodium checks to q 6hrs    #Can let sodium rise upto 127-129 by 6 Am labs   #Restart D5W @ 100 ml/ hr and recheck sodium in 4 hrs if sodium is 130 or more by AM labs tomorrow.   ICU team can adjust D5W based on above recc.       Dilcia Castillo MD  [facility] - Nephrology   [phone]     Principal Discharge DX:	Bronchiolitis

## 2022-08-25 NOTE — PROGRESS NOTES
"Clinical Swallow Evaluation (CSE):    Regular solids, mildly thick liquids (pt's baseline diet) when fully upright, encourage slow pacing (placing one item in front of pt at a time as needed, moving cup away from pt, etc)       08/25/22 1043   General Information   Onset of Illness/Injury or Date of Surgery 08/24/22   Referring Physician Dr. Vivar   Patient/Family Therapy Goal Statement (SLP) wants ice cream   Pertinent History of Current Problem   \"Duane Backes is a 42-year-old gentleman with autism, tonic-clonic seizure, benign epidermoid tumor, hypothyroidism, previous history of status epilepticus.  He was brought by ambulance to the Emergency Room for seizure and postictal versus status epilepticus\"     Pt noted to be coughing with thin liquids and therefore SLP consulted.     General Observations   Pt alert, pleasant, able to answer some simple questions verbally, perseverating on \"ice cream\", impulsive with eating/drinking but IND with self-feeding     Past History of Dysphagia   PMHx oropharyngeal dysphagia.     VFSS 11/11/2020: aspiration with thin, mildly thick, moderately thick liquids and cervical esophageal stasis with solids -- recommended soft/moist solids, moderately thick liquids.     Clinical swallow evaluation 2/4/2022: Pt moved to a new group home in October, caregiver reports Pt has been receiving nectar thick consistencies with 1:1 support for all oral intake and a regular diet. No reports of coughing/choking and no recent illness or pneumonias. SLP recommended continue regular/mildly thick liquids.     Pt currently admitted with stable respiratory status     Pain Assessment   Patient Currently in Pain No   Type of Evaluation   Type of Evaluation Swallow Evaluation   Oral Motor   Oral Musculature unable to assess due to poor participation/comprehension  (no overt asymmetry/weakness noted)   General Swallowing Observations   Respiratory Support (General Swallowing Observations) none "   Current Diet/Method of Nutritional Intake (General Swallowing Observations, NIS) regular diet;thin liquids (level 0)   Swallowing Evaluation Clinical swallow evaluation   Clinical Swallow Evaluation   Feeding Assistance no assistance needed   Clinical Swallow Evaluation Textures Trialed thin liquids;mildly thick liquids;pureed;solid foods   Clinical Swallow Eval: Thin Liquid Texture Trial   Mode of Presentation, Thin Liquids cup;spoon   Volume of Liquid or Food Presented ice chips via spoon, sip via cup   Oral Phase of Swallow premature pharyngeal entry   Pharyngeal Phase of Swallow coughing/choking   Diagnostic Statement immediate coughs   Clinical Swallow Eval: Mildly Thick Liquids   Mode of Presentation cup;self-fed   Volume Presented 4 oz   Oral Phase WFL   Pharyngeal Phase intact   Diagnostic Statement no overt signs/sx aspiration noted   Clinical Swallow Evaluation: Puree Solid Texture Trial   Mode of Presentation, Puree spoon;self-fed   Volume of Puree Presented 4 oz   Oral Phase, Puree WFL   Pharyngeal Phase, Puree coughing/choking   Diagnostic Statement cough x1   Clinical Swallow Evaluation: Solid Food Texture Trial   Mode of Presentation self-fed   Volume Presented 1 tom cracker   Oral Phase WFL   Pharyngeal Phase coughing/choking   Diagnostic Statement cough x1   Swallowing Recommendations   Diet Consistency Recommendations regular diet;mildly thick liquids (level 2)   Supervision Level for Intake 1:1 supervision needed   Mode of Delivery Recommendations slow rate of intake   Swallowing Maneuver Recommendations alternate food and liquid intake   Monitoring/Assistance Required (Eating/Swallowing) stop eating activities when fatigue is present;monitor for cough or change in vocal quality with intake   Recommended Feeding/Eating Techniques (Swallow Eval) maintain upright sitting position for eating;maintain upright posture during/after eating for 30 minutes   Medication Administration Recommendations,  Swallowing (SLP) whole as tolerated   General Therapy Interventions   Planned Therapy Interventions Dysphagia Treatment   Clinical Impression   Criteria for Skilled Therapeutic Interventions Met (SLP Eval) Yes, treatment indicated   SLP Diagnosis suspected pharyngeal dysphagia   Clinical Impression Comments   Clinical swallow evaluation completed with thin liquids, mildly thick liquids, puree solids, regular solids. Pt impulsive with self-feeding. Mastication appears complete, oral clearance appears complete, oral phase overall timely. No percievable swallow delay/quick swallow response. Notable laryngeal elevation to palpation. Immediate coughing with thin liquids, no overt clinical signs/sx with mildly thick liquids. Delayed cough x2 across both puree/regular solids, dry in quality. Recommend baseline diet.     SLP Discharge Planning   SLP Discharge Recommendation home   SLP Rationale for DC Rec Pt appears near baseline, anticipate x1 follow up with meal until goals will likely be met   SLP Brief overview of current status    Regular solids, mildly thick liquids when fully upright, encourage slow pacing (placing one item in front of pt at a time as needed, moving cup away from pt, etc)      Total Evaluation Time   Total Evaluation Time (Minutes) 12   SLP Goals   Therapy Frequency (SLP Eval) 2 times/wk   SLP Predicted Duration/Target Date for Goal Attainment 09/01/22   SLP Goals Swallow   SLP: Safely tolerate diet without signs/symptoms of aspiration Regular diet;Mildly thick liquids;With assistance/supervision

## 2022-08-25 NOTE — PROGRESS NOTES
DATE:  8/25/2022   TIME OF RECEIPT FROM LAB:  0140  LAB TEST:  sodium  LAB VALUE:  121  RESULTS GIVEN WITH READ-BACK TO (PROVIDER):    Dr. Zamora   TIME LAB VALUE REPORTED TO PROVIDER:  0149    (First notified Tele Hub was instructed to contact nephrology.)

## 2022-08-25 NOTE — PROVIDER NOTIFICATION
Notified Dr. Castillo- Nephrology last pts urine output for the last 2 hrs was 1300 ml. Pt also had a seizure at 1800. Reviewed last na+ and it was 122, up from last check. MD said can draw a Na+ between now and 7 pm.  Will let MD if Na+ drops.         Notified Tele ICu at 18:15 that pt had a seizure lasting 5 min. Gave 2 mg of Ativan I.V. Nephrologist made aware. Tele ICU mentioned that this pt can get aggressive post seizure. Will continue monitor.

## 2022-08-25 NOTE — PROGRESS NOTES
Nephrology Progress Note  08/25/2022         ASSESSMENT AND RECOMMENDATIONS:   1 severe hyponatremia-presumed chronic.  Unclear what the inciting etiology was but given how vigorously he is drinking fluid here, likely polydipsia with potential underlying SIADH in setting of multiple anticonvulsants.  Urine osmolality and serum sodium consistent with SIADH.  Multiple medications on board that can induce SIADH but likely has been on it for long periods of time.  No pulmonary etiology identified.  MRI brain without any acute abnormality.  Renal function is normal and TSH is slightly high but unlikely to be primary culprit.  Unclear how much fluid he was drinking prior to presentation.    -DDAVP if it graduating up.  Patient is making 200 cc an hour urine over last couple hours.  -Serum sodium likely went down.  Secondary to large amount of p.o. free water intake this morning.    Recommendation-  -placed on p.o. fluid restriction of 1.5 L/day.  He already had more than 2 L oral intake.  For today, plan to no more than 1 L fluid for rest of the day.  -Sodium target for next 24 hours   -Target of 130 with upper limit of correction up to 132   -Plan to reverse with D5 water if more than 132   -Let him drink more liberally if serum sodium reaches up to 130   -Monitor urine output closely   -Check serum sodium in 6 hours   -Recheck urine sodium and osmolality        2 seizure disorder  -presented with seizures.  Possibly induced by hyponatremia.  Being treated     3 hypomagnesemia-being replaced     4 hypothyroidism-on levothyroxine.  TSH slightly high.       Dilcia Castillo MD  [facility] - Nephrology   [phone]      Interval History :   Seen / examined.   Cannot obtain history from patient.  Nonverbal for most part .  Sitter at bedside.  Seems more calm than yesterday.  Sitter reports she has already drank 2 L of water this morning and continues to ask for more.  He received DDAVP last night to prevent sharp  rise in sodium.  Sodium was at 122 this morning but down to 120 by the time of this note.  Urine output 200 cc/h over last few hours.    Review of Systems:   Could not be obtained.    Physical Exam:   I/O last 3 completed shifts:  In: 2634.83 [P.O.:1809; I.V.:825.83]  Out: 2830 [Urine:2830]  BP (!) 151/82 (BP Location: Left arm)   Pulse 90   Temp 98.1  F (36.7  C) (Oral)   Resp 26   Wt 113.3 kg (249 lb 12.5 oz)   SpO2 93%   BMI 32.07 kg/m      GENERAL APPEARANCE: alert and no distress  Pulmonary: lungs clear to auscultation with equal breath sounds bilaterally, no clubbing  CV: regular rhythm, normal rate, no rub   - JVP -   - Edema-  GI: soft, nontender,   MS: no evidence of inflammation in joints  : + roman  SKIN: no rash, warm, dry, no cyanosis    Labs:   All labs reviewed by me  Electrolytes/Renal - Recent Labs   Lab Test 08/25/22  1234 08/25/22  1159 08/25/22  0857 08/25/22  0813 08/25/22  0506 08/24/22  0824 08/24/22  0644 08/24/22  0437 08/24/22  0417 08/24/22  0124 08/24/22  0005 08/23/22  2136 08/23/22  2132 03/09/21  0757 03/08/21  1216 03/05/21  1514 03/05/21  0742 03/02/21  0604 03/01/21  0534   NA  --  120* 122*  122*  --  120*   < > 123*  --  118*  118* 118*   < > 121* 116*  --  141   < > 145*   < > 146*   POTASSIUM  --   --  4.1  --  4.3  --   --   --  4.3  --   --  4.0 4.0   < > 3.9   < > 3.1*   < > 3.6   CHLORIDE  --   --  89*  --   --   --   --   --  86*  --   --  84* 82*  --  108   < > 111*   < > 114*   CO2  --   --  22  --   --   --   --   --  23  --   --   --  26  --  27   < > 28   < > 27   BUN  --   --  12.3  --   --   --   --   --  9.2  --   --  12 10.3  --  14   < > 17   < > 25   CR  --   --  0.64*  --   --   --   --   --  0.48* 0.47*  --  0.6* 0.56*   < > 0.75   < > 0.92   < > 0.82   *  --  145* 106*  --    < >  --    < > 145*  --    < > 144* 141*   < > 115*   < > 100*   < > 80   JESSICA  --   --  8.9  --   --   --   --   --  8.4*  --   --   --  8.9  --  9.5   < > 9.6   < > 9.8    MAG  --   --   --   --  1.6*  --  1.7  --   --   --   --   --  1.3*  --  1.9  --   --   --  2.3   PHOS  --   --   --   --   --   --   --   --   --   --   --   --   --   --  4.7*  --  4.5  --  4.0    < > = values in this interval not displayed.       CBC -   Recent Labs   Lab Test 08/24/22  0124 08/23/22 2136 08/23/22 2132 04/02/21  1423   WBC 10.4  --  10.0 4.2   HGB 13.7 14.6 14.0 11.1*     --  213 113*       LFTs -   Recent Labs   Lab Test 08/23/22 2132 03/08/21  1216 03/05/21  0742 02/26/21  1511 02/25/21  0749 06/16/20  1527   ALKPHOS 56  --   --  56  --  53   BILITOTAL 0.3  --   --  0.3  --  0.2   ALT 12  --   --  19  --  17   AST 26  --   --  18  --  34   PROTTOTAL 7.1  --   --  6.5*  --  7.4   ALBUMIN 4.1 3.0* 2.8* 2.9*   < > 3.3*    < > = values in this interval not displayed.       Current Medications:    sodium chloride 0.9%  1,000 mL Intravenous Once     divalproex sodium extended-release  500 mg Oral TID     enoxaparin ANTICOAGULANT  40 mg Subcutaneous Q24H     lactulose  30 mL Oral QPM     levothyroxine  150 mcg Oral QAM AC     risperiDONE  2 mg Oral BID     topiramate  200 mg Oral Daily       Dilcia Castillo MD

## 2022-08-25 NOTE — PLAN OF CARE
Shift Events: Sodium now 126.  Contacted Dr. Zamora on call Neph: Turn D5 up to 150/hr. Pt tolerated a regular diet.  Able to state one word at a time to communicate and answer questions.     Neuro: Verbal with one-to two word answers.  Cooperative and pleasant.  MALCOM intact  CV: SR/ST, no edema  Pulm: Lung sounds are clear, room air  GI: +bowel sounds  : Urine output slower.  Colin will cont I/Os  Lines/gtts: PIV x2  Skin: bruising to left foot     Plan: Monitoring Na q4, next check 2000.    Dispo: TBD, back to group home when able  Bedside report: Yes     Right wrist and Left wrist restraints continued 8/24/2022     Clinical Justification: Pulling lines, pulling tubes, and pulling equipment  Less Restrictive Alternative: Re-evaluate equipment, Disguise equipment, Alarm, De-escalation, Reorientation  Attending Physician Notified: MD ordered restraint,     New orders placed Yes  Length of Order: 1 Day

## 2022-08-25 NOTE — PROGRESS NOTES
[facility]  Medicine Progress Note - Hospitalist Service  Date of Admission:  8/23/2022    Assessment & Plan        Duane Backes is a 42-year-old gentleman with a significant past medical history of status epilepticus, seizure disorder, encephalopathy, moderate MR, hypothyroidism, electrolyte disturbance, who presents today from group home for suspected seizure, found to be hyponatremic. Nephrology consulted, pending clinical improvement and resolution of hyponatremia.     Seizure episode in a patient with seizure disorder and history of status epilepticus  Severe hyponatremia likely precipitant as below, improving    The patient has extensive seizure history in the past, it is not clear when the last one was as history was not available. On PTA Depakote 1000 mg b.i.d. and topiramate 200 mg b.i.d.  He also had a witnessed episode of seizure today and we will continue to monitor the patient.  Keppra was loaded in the Emergency Room, was also given benzodiazepine.  reportedly at baseline at time of admission.   - PTA Depakote 500 mg TID   - PTA Topiramate 200 mg daily   - PTA Risperidone 200 mg daily   - PTA Lactulose nightly   - holding PTA lithium in setting of hyponatremia   - no evidence of ongoing seizure at this time     Severe hyponatremia , presumably chronic , improving    Likely polydipsia with potential underlying SIADH in setting of multiple anticonvulsants  The patient has no prior history of hyponatremia.  His sodium on presentation is 116.  He has also significant urine output,  Nephrology consulted. Urine osmolality and serum sodium consistent with SIADH.  Multiple medications on board that can induce SIADH but likely has been on it for long periods of time.  No pulmonary etiology identified.  MRI brain without any acute abnormality.  Renal function is normal and TSH is slightly high but unlikely to be primary culprit.  - fluid restriction 1500 ml / day   - sodium target for next  24 hours               -Target of 130 with upper limit of correction up to 132               -Plan to reverse with D5 water if more than 132               -Let him drink more liberally if serum sodium reaches up to 130               -Monitor urine output closely               -Check serum sodium in 6 hours               -Recheck urine sodium and osmolality   - holding PTA lithium for now     Hypothyroidism  - PTA levothyroxine at 150 mcg p.o. daily     Autism, minimally to nonverbal at baseline   Needs help with all his diet and is to monitor.    Dysphagia  - SLP eval in setting of dysphagia     GOC   Will consider palliative consult to discuss goals of care and code status moving forward given significant cognitive delay.     Chronic:   History of status epilepticus  Seizure disorder  Aspiration pneumonia  Autism  Hypothyroidism  Hypernatremia  Hypokalemia       Diet: Regular Diet Adult Mildly Thick (level 2)  Snacks/Supplements Adult: Magic Cup; Between Meals    DVT Prophylaxis: Enoxaparin (Lovenox) SQ  Colin Catheter: PRESENT, indication: Strict 1-2 Hour I&O  Central Lines: None  Cardiac Monitoring: ACTIVE order. Indication: ICU  Code Status: Full Code      Disposition Plan     Expected Discharge Date: 08/28/2022                The patient's care was discussed with the Bedside Nurse, Patient and Patient's Family.    Queenie Vivar DO  Hospitalist Service  [facility]  Securely message with the [employer] Web Console (learn more here)  Text page via [employer] Paging/Directory     Clinically Significant Risk Factors Present on Admission                     ______________________________________________________________________    Interval History   No ongoing seizures  Patient has no other concerns or complaints today  Noted that he has been hiccuping during exam  No other changes at this time    Data reviewed today: I reviewed all medications, new labs and imaging results over the last 24 hours.  I personally reviewed no images or EKG's today.    Physical Exam   Vital Signs: Temp: 98.1  F (36.7  C) Temp src: Oral BP: (!) 171/107 Pulse: 101   Resp: 11 SpO2: 93 % O2 Device: None (Room air)    Weight: 249 lbs 12.5 oz     Constitutional: awake, alert, cooperative, no apparent distress, and appears stated age  HEENT: Normocephalic, atraumatic  hiccupping during exam   Respiratory: Normal work of breathing, good air exchange, clear to auscultation bilaterally, no crackles or wheezing   Cardiovascular: Regular rate and rhythm, no murmurs appreciated  GI: Soft and nontender to palpation, nondistended, no rebound or guarding  Skin: normal skin color, texture, turgor  Musculoskeletal: No deformities, no edema present  Neurologic:  Moves all extremities spontaneously   Neuropsychiatric: smiling, happy, responds hi, unable to assess otherwise given developmental delay     Data   Recent Labs   Lab 08/25/22  1234 08/25/22  1159 08/25/22  0857 08/25/22  0813 08/25/22  0506 08/24/22  0437 08/24/22  0417 08/24/22  0124 08/24/22  0005 08/23/22  2136 08/23/22  2132   WBC  --   --   --   --   --   --   --  10.4  --   --  10.0   HGB  --   --   --   --   --   --   --  13.7  --  14.6 14.0   MCV  --   --   --   --   --   --   --  83  --   --  87   PLT  --   --   --   --   --   --   --  194  --   --  213   NA  --  120* 122*  122*  --  120*   < > 118*  118* 118*   < > 121* 116*   POTASSIUM  --   --  4.1  --  4.3  --  4.3  --   --  4.0 4.0   CHLORIDE  --   --  89*  --   --   --  86*  --   --  84* 82*   CO2  --   --  22  --   --   --  23  --   --   --  26   BUN  --   --  12.3  --   --   --  9.2  --   --  12 10.3   CR  --   --  0.64*  --   --   --  0.48* 0.47*  --  0.6* 0.56*   ANIONGAP  --   --  11  --   --   --  9  --   --   --  8   JESSICA  --   --  8.9  --   --   --  8.4*  --   --   --  8.9   *  --  145* 106*  --    < > 145*  --    < > 144* 141*   ALBUMIN  --   --   --   --   --   --   --   --   --   --  4.1   PROTTOTAL  --    --   --   --   --   --   --   --   --   --  7.1   BILITOTAL  --   --   --   --   --   --   --   --   --   --  0.3   ALKPHOS  --   --   --   --   --   --   --   --   --   --  56   ALT  --   --   --   --   --   --   --   --   --   --  12   AST  --   --   --   --   --   --   --   --   --   --  26    < > = values in this interval not displayed.     No results found for this or any previous visit (from the past 24 hour(s)).  Medications       sodium chloride 0.9%  1,000 mL Intravenous Once     divalproex sodium extended-release  500 mg Oral TID     enoxaparin ANTICOAGULANT  40 mg Subcutaneous Q24H     lactulose  30 mL Oral QPM     levothyroxine  150 mcg Oral QAM AC     risperiDONE  2 mg Oral BID     topiramate  200 mg Oral Daily

## 2022-08-25 NOTE — PLAN OF CARE
ICU End of Shift Summary.  For vital signs and complete assessments, please see documentation flowsheets.      Pertinent assessments: Alert, follows commands. Afebrile. Tele SR, BP elevated at times. LS clear, sats high 90s on room air. Apneic episodes with sleep. UOP 325mL in last 3.5 hrs. No BM. Good appetite. Occasional cough after drinking fluids.   Major Shift Events: Na 123 @ 2000, D5 infusion stopped. DDAVP dose given.  Plan (Upcoming Events): Recheck Na @ 0000, then q6hrs. See Nephrology note for guidance on Na management. Sitter at bedside. Offer sensory regulation tools PRN  Discharge/Transfer Needs: TBD     Bedside Shift Report Completed : Y  Bedside Safety Check Completed: Y     Goal Outcome Evaluation: Progressing      Right wrist and Left wrist restraints continued 8/24/2022    Clinical Justification: Pulling lines, pulling tubes, and pulling equipment  Less Restrictive Alternative: Disguise equipment, Re-evaluate equipment, 1:1 patient care  Attending Physician Notified: MD ordered restraint,     New orders placed Yes  Length of Order: 1 Day      Sada Colin RN

## 2022-08-26 ENCOUNTER — APPOINTMENT (OUTPATIENT)
Dept: SPEECH THERAPY | Facility: CLINIC | Age: 42
DRG: 644 | End: 2022-08-26
Payer: MEDICARE

## 2022-08-26 LAB
ANION GAP SERPL CALCULATED.3IONS-SCNC: 7 MMOL/L (ref 7–15)
BUN SERPL-MCNC: 8.6 MG/DL (ref 6–20)
CALCIUM SERPL-MCNC: 9.8 MG/DL (ref 8.6–10)
CHLORIDE SERPL-SCNC: 103 MMOL/L (ref 98–107)
CREAT SERPL-MCNC: 0.62 MG/DL (ref 0.67–1.17)
DEPRECATED HCO3 PLAS-SCNC: 25 MMOL/L (ref 22–29)
ERYTHROCYTE [DISTWIDTH] IN BLOOD BY AUTOMATED COUNT: 12.7 % (ref 10–15)
GFR SERPL CREATININE-BSD FRML MDRD: >90 ML/MIN/1.73M2
GLUCOSE BLDC GLUCOMTR-MCNC: 135 MG/DL (ref 70–99)
GLUCOSE BLDC GLUCOMTR-MCNC: 154 MG/DL (ref 70–99)
GLUCOSE BLDC GLUCOMTR-MCNC: 174 MG/DL (ref 70–99)
GLUCOSE BLDC GLUCOMTR-MCNC: 186 MG/DL (ref 70–99)
GLUCOSE SERPL-MCNC: 133 MG/DL (ref 70–99)
HCT VFR BLD AUTO: 43.1 % (ref 40–53)
HGB BLD-MCNC: 14.3 G/DL (ref 13.3–17.7)
LACTATE SERPL-SCNC: 1 MMOL/L (ref 0.7–2)
MAGNESIUM SERPL-MCNC: 1.9 MG/DL (ref 1.7–2.3)
MCH RBC QN AUTO: 29 PG (ref 26.5–33)
MCHC RBC AUTO-ENTMCNC: 33.2 G/DL (ref 31.5–36.5)
MCV RBC AUTO: 87 FL (ref 78–100)
PLATELET # BLD AUTO: 228 10E3/UL (ref 150–450)
POTASSIUM SERPL-SCNC: 3.9 MMOL/L (ref 3.4–5.3)
RBC # BLD AUTO: 4.93 10E6/UL (ref 4.4–5.9)
SODIUM SERPL-SCNC: 132 MMOL/L (ref 136–145)
SODIUM SERPL-SCNC: 134 MMOL/L (ref 136–145)
SODIUM SERPL-SCNC: 135 MMOL/L (ref 136–145)
SODIUM SERPL-SCNC: 136 MMOL/L (ref 136–145)
WBC # BLD AUTO: 7.1 10E3/UL (ref 4–11)

## 2022-08-26 PROCEDURE — 250N000011 HC RX IP 250 OP 636: Performed by: STUDENT IN AN ORGANIZED HEALTH CARE EDUCATION/TRAINING PROGRAM

## 2022-08-26 PROCEDURE — 83605 ASSAY OF LACTIC ACID: CPT | Performed by: STUDENT IN AN ORGANIZED HEALTH CARE EDUCATION/TRAINING PROGRAM

## 2022-08-26 PROCEDURE — 36415 COLL VENOUS BLD VENIPUNCTURE: CPT | Performed by: STUDENT IN AN ORGANIZED HEALTH CARE EDUCATION/TRAINING PROGRAM

## 2022-08-26 PROCEDURE — 99233 SBSQ HOSP IP/OBS HIGH 50: CPT | Performed by: STUDENT IN AN ORGANIZED HEALTH CARE EDUCATION/TRAINING PROGRAM

## 2022-08-26 PROCEDURE — 82310 ASSAY OF CALCIUM: CPT | Performed by: STUDENT IN AN ORGANIZED HEALTH CARE EDUCATION/TRAINING PROGRAM

## 2022-08-26 PROCEDURE — 83735 ASSAY OF MAGNESIUM: CPT | Performed by: STUDENT IN AN ORGANIZED HEALTH CARE EDUCATION/TRAINING PROGRAM

## 2022-08-26 PROCEDURE — 92526 ORAL FUNCTION THERAPY: CPT | Mod: GN

## 2022-08-26 PROCEDURE — 250N000011 HC RX IP 250 OP 636: Performed by: INTERNAL MEDICINE

## 2022-08-26 PROCEDURE — 85027 COMPLETE CBC AUTOMATED: CPT | Performed by: STUDENT IN AN ORGANIZED HEALTH CARE EDUCATION/TRAINING PROGRAM

## 2022-08-26 PROCEDURE — 99222 1ST HOSP IP/OBS MODERATE 55: CPT | Performed by: CLINICAL NURSE SPECIALIST

## 2022-08-26 PROCEDURE — 36415 COLL VENOUS BLD VENIPUNCTURE: CPT | Performed by: INTERNAL MEDICINE

## 2022-08-26 PROCEDURE — 258N000003 HC RX IP 258 OP 636: Performed by: INTERNAL MEDICINE

## 2022-08-26 PROCEDURE — 84295 ASSAY OF SERUM SODIUM: CPT | Performed by: INTERNAL MEDICINE

## 2022-08-26 PROCEDURE — 84295 ASSAY OF SERUM SODIUM: CPT | Performed by: STUDENT IN AN ORGANIZED HEALTH CARE EDUCATION/TRAINING PROGRAM

## 2022-08-26 PROCEDURE — 250N000013 HC RX MED GY IP 250 OP 250 PS 637: Performed by: INTERNAL MEDICINE

## 2022-08-26 PROCEDURE — 99232 SBSQ HOSP IP/OBS MODERATE 35: CPT | Performed by: INTERNAL MEDICINE

## 2022-08-26 PROCEDURE — 200N000001 HC R&B ICU

## 2022-08-26 RX ORDER — DEXTROSE MONOHYDRATE 50 MG/ML
INJECTION, SOLUTION INTRAVENOUS CONTINUOUS
Status: DISCONTINUED | OUTPATIENT
Start: 2022-08-26 | End: 2022-08-26

## 2022-08-26 RX ORDER — DESMOPRESSIN ACETATE 4 UG/ML
2 INJECTION, SOLUTION INTRAVENOUS; SUBCUTANEOUS ONCE
Status: COMPLETED | OUTPATIENT
Start: 2022-08-26 | End: 2022-08-26

## 2022-08-26 RX ORDER — MAGNESIUM SULFATE HEPTAHYDRATE 40 MG/ML
2 INJECTION, SOLUTION INTRAVENOUS ONCE
Status: COMPLETED | OUTPATIENT
Start: 2022-08-26 | End: 2022-08-26

## 2022-08-26 RX ADMIN — LACTULOSE 30 ML: 20 SOLUTION ORAL at 19:56

## 2022-08-26 RX ADMIN — DESMOPRESSIN ACETATE 2 MCG: 4 SOLUTION INTRAVENOUS at 09:45

## 2022-08-26 RX ADMIN — DIVALPROEX SODIUM 500 MG: 500 TABLET, FILM COATED, EXTENDED RELEASE ORAL at 17:33

## 2022-08-26 RX ADMIN — DEXTROSE MONOHYDRATE: 50 INJECTION, SOLUTION INTRAVENOUS at 13:04

## 2022-08-26 RX ADMIN — DEXTROSE MONOHYDRATE: 50 INJECTION, SOLUTION INTRAVENOUS at 08:06

## 2022-08-26 RX ADMIN — RISPERIDONE 2 MG: 2 TABLET ORAL at 08:18

## 2022-08-26 RX ADMIN — DIVALPROEX SODIUM 500 MG: 500 TABLET, FILM COATED, EXTENDED RELEASE ORAL at 08:18

## 2022-08-26 RX ADMIN — TOPIRAMATE 200 MG: 50 TABLET, FILM COATED ORAL at 08:18

## 2022-08-26 RX ADMIN — ENOXAPARIN SODIUM 40 MG: 40 INJECTION SUBCUTANEOUS at 08:19

## 2022-08-26 RX ADMIN — RISPERIDONE 2 MG: 2 TABLET ORAL at 19:57

## 2022-08-26 RX ADMIN — LEVOTHYROXINE SODIUM 150 MCG: 150 TABLET ORAL at 08:18

## 2022-08-26 RX ADMIN — MAGNESIUM SULFATE HEPTAHYDRATE 2 G: 40 INJECTION, SOLUTION INTRAVENOUS at 08:28

## 2022-08-26 RX ADMIN — DIVALPROEX SODIUM 500 MG: 500 TABLET, FILM COATED, EXTENDED RELEASE ORAL at 12:59

## 2022-08-26 ASSESSMENT — ACTIVITIES OF DAILY LIVING (ADL)
ADLS_ACUITY_SCORE: 39
ADLS_ACUITY_SCORE: 39
DEPENDENT_IADLS:: CLEANING;COOKING;LAUNDRY;SHOPPING;MEAL PREPARATION;MEDICATION MANAGEMENT;MONEY MANAGEMENT;TRANSPORTATION
ADLS_ACUITY_SCORE: 39
ADLS_ACUITY_SCORE: 41
ADLS_ACUITY_SCORE: 39
ADLS_ACUITY_SCORE: 39
ADLS_ACUITY_SCORE: 41
ADLS_ACUITY_SCORE: 39
ADLS_ACUITY_SCORE: 41
ADLS_ACUITY_SCORE: 41

## 2022-08-26 NOTE — PROGRESS NOTES
Sodium increased from 123 to 135 overnight with more than 8 L UOP and is overcorrected at current level of 136     Plan -   Give DDAVP 2 mcg IV X 1  D5W 200 ml/ hr X 4 hrs  Recheck sodium .   Goal - correct sodium to 132 or less. Hold D5W at that point and can let sodium rise slowly over next 24 hrs to normal range.     Dilcia Castillo MD  [facility] - Nephrology   [phone]

## 2022-08-26 NOTE — PROGRESS NOTES
Nephrology Progress Note  08/26/2022         ASSESSMENT AND RECOMMENDATIONS:   1 severe hyponatremia-presumed chronic.  Unclear what the inciting etiology was but given how vigorously he is drinking fluid here, likely polydipsia with potential underlying SIADH in setting of multiple anticonvulsants.  Urine osmolality and serum sodium consistent with SIADH.  Multiple medications on board that can induce SIADH but likely has been on it for long periods of time.  No pulmonary etiology identified.  MRI brain without any acute abnormality.  Renal function is normal and TSH is slightly high but unlikely to be primary culprit.  Unclear how much fluid he was drinking prior to presentation.    -Sodium is overcorrected at the moment secondary to sudden ADH release and 8 L of urine output yesterday.    Plan-  -received DDAVP 2 mcg IV x1  -Sodium down to 134 with D5 water at 200 cc an hour  -Continue D5 water at current rate.  Recheck sodium at 3 PM.  -If serum sodium is less than 132, stop D5 and continue p.o. fluid restriction alone.  Okay to let sodium rise to normal range over next 24 hours.   I expect urine output increase again in 8 hours or so once DDAVP wears off and sodium will rise up again, which is okay.    High risk of recurrent hyponatremia given the amount of fluid he drinks when allowed.       2 seizure disorder  -presented with seizures.  Possibly induced by hyponatremia.  Being treated     3 hypomagnesemia- replaced     4 hypothyroidism-on levothyroxine.  TSH slightly high.    Discussed with primary team in person       Dilcia Castillo MD  [facility] - Nephrology   [phone]      Interval History :   Seen / examined.   Another seizure episode yesterday  Sodium increased from 123 to 136 overnight with more than 8 L UOP  Give 2 mcg DDAVP and started on D5 water 200 cc an hour.  Repeat sodium is down to 134  Cannot obtain history from patient.  Nonverbal for most part .  Sitter at bedside.      Review  of Systems:   Could not be obtained.    Physical Exam:   I/O last 3 completed shifts:  In: 1476 [P.O.:1476]  Out: 8275 [Urine:8275]  /75   Pulse 112   Temp 98.4  F (36.9  C) (Oral)   Resp 18   Wt 106.4 kg (234 lb 9.1 oz)   SpO2 99%   BMI 30.12 kg/m      GENERAL APPEARANCE: alert and no distress  Pulmonary: lungs clear to auscultation with equal breath sounds bilaterally, no clubbing  CV: regular rhythm, normal rate, no rub   - JVP -   - Edema-  GI: soft, nontender,   MS: no evidence of inflammation in joints  : + roman  SKIN: no rash, warm, dry, no cyanosis    Labs:   All labs reviewed by me  Electrolytes/Renal -   Recent Labs   Lab Test 08/26/22  1149 08/26/22  1143 08/26/22  0823 08/26/22  0549 08/26/22  0322 08/25/22  1159 08/25/22  0857 08/25/22  0813 08/25/22  0506 08/24/22  0824 08/24/22  0644 08/24/22  0437 08/24/22  0417 03/09/21  0757 03/08/21  1216 03/05/21  1514 03/05/21  0742 03/02/21  0604 03/01/21  0534   NA  --  134* 136 135*  --    < > 122*  122*  --  120*   < > 123*  --  118*  118*   < > 141   < > 145*   < > 146*   POTASSIUM  --   --   --  3.9  --   --  4.1  --  4.3  --   --   --  4.3   < > 3.9   < > 3.1*   < > 3.6   CHLORIDE  --   --   --  103  --   --  89*  --   --   --   --   --  86*   < > 108   < > 111*   < > 114*   CO2  --   --   --  25  --   --  22  --   --   --   --   --  23   < > 27   < > 28   < > 27   BUN  --   --   --  8.6  --   --  12.3  --   --   --   --   --  9.2   < > 14   < > 17   < > 25   CR  --   --   --  0.62*  --   --  0.64*  --   --   --   --   --  0.48*   < > 0.75   < > 0.92   < > 0.82   *  --   --  133* 186*   < > 145*   < >  --    < >  --    < > 145*   < > 115*   < > 100*   < > 80   JESSICA  --   --   --  9.8  --   --  8.9  --   --   --   --   --  8.4*   < > 9.5   < > 9.6   < > 9.8   MAG  --   --   --  1.9  --   --   --   --  1.6*  --  1.7  --   --    < > 1.9  --   --   --  2.3   PHOS  --   --   --   --   --   --   --   --   --   --   --   --   --   --  4.7*   --  4.5  --  4.0    < > = values in this interval not displayed.       CBC -   Recent Labs   Lab Test 08/26/22  0549 08/24/22  0124 08/23/22 2136 08/23/22 2132   WBC 7.1 10.4  --  10.0   HGB 14.3 13.7 14.6 14.0    194  --  213       LFTs -   Recent Labs   Lab Test 08/23/22  2132 03/08/21  1216 03/05/21  0742 02/26/21  1511 02/25/21  0749 06/16/20  1527   ALKPHOS 56  --   --  56  --  53   BILITOTAL 0.3  --   --  0.3  --  0.2   ALT 12  --   --  19  --  17   AST 26  --   --  18  --  34   PROTTOTAL 7.1  --   --  6.5*  --  7.4   ALBUMIN 4.1 3.0* 2.8* 2.9*   < > 3.3*    < > = values in this interval not displayed.       Current Medications:    divalproex sodium extended-release  500 mg Oral TID     enoxaparin ANTICOAGULANT  40 mg Subcutaneous Q24H     lactulose  30 mL Oral QPM     levothyroxine  150 mcg Oral QAM AC     risperiDONE  2 mg Oral BID     topiramate  200 mg Oral Daily       D5W 200 mL/hr at 08/26/22 0900     Dilcia Castillo MD

## 2022-08-26 NOTE — PROGRESS NOTES
Sodium is down to 132 on last check .     Plan -   Discontinue D5W   More liberal fluid restriction . Suggest 2 L / day   I expect urine output increase again in 8 hours or so once DDAVP wears off and sodium will rise up again, which is okay.   Okay to let sodium rise to normal range over next 24 hrs      Will sign off . Plz call back with questions.       Dilcia Castillo MD  [facility] - Nephrology   [phone]

## 2022-08-26 NOTE — PROGRESS NOTES
[facility]  Medicine Progress Note - Hospitalist Service  Date of Admission:  8/23/2022    Assessment & Plan        Duane Backes is a 42-year-old gentleman with a significant past medical history of status epilepticus, seizure disorder, encephalopathy, moderate MR, hypothyroidism, electrolyte disturbance, who presents today from group home for suspected seizure, found to be hyponatremic. Nephrology consulted, pending clinical improvement and resolution of hyponatremia.     Seizure episode in a patient with seizure disorder and history of status epilepticus  Severe hyponatremia likely precipitant as below, improving    The patient has extensive seizure history in the past, it is not clear when the last one was as history was not available. On PTA Depakote 1000 mg b.i.d. and topiramate 200 mg b.i.d.  He also had a witnessed episode of seizure today and we will continue to monitor the patient.  Keppra was loaded in the Emergency Room, was also given benzodiazepine.  Reportedly at baseline at time of admission.   - PTA Depakote 500 mg TID   - PTA Topiramate 200 mg daily   - PTA Risperidone 200 mg daily   - PTA Lactulose nightly   - holding PTA lithium in setting of hyponatremia   - had a seizure on the evening of 8/25 , approximately 5 minutes in duration aborted with benzos     Severe hyponatremia , presumably chronic , improving    Likely polydipsia with potential underlying SIADH in setting of multiple anticonvulsants  The patient has no prior history of hyponatremia.  His sodium on presentation is 116.  He has also significant urine output,  Nephrology consulted. Urine osmolality and serum sodium consistent with SIADH.  Multiple medications on board that can induce SIADH but likely has been on it for long periods of time.  No pulmonary etiology identified.  MRI brain without any acute abnormality.  Renal function is normal and TSH is slightly high but unlikely to be primary culprit.  - fluid  restriction 1500 ml / day   - sodium target for next 24 hours          -received DDAVP 2 mcg IV x1   -Sodium down to 134 with D5 water at 200 cc an hour   -Continue D5 water at current rate.  Recheck sodium at 3 PM.   -If serum sodium is less than 132, stop D5 and continue p.o. fluid restriction alone.  Okay to let sodium rise to normal range over next 24 hours.    - I expect urine output increase again in 8 hours or so once DDAVP wears off and sodium will rise up again, which is okay  - holding PTA lithium for now   - palliative consult in place as patient is unlikely to follow fluid restriction at group home and will likely have ongoing episodes, would be prudent to discuss goals of care and have specific goals on when to return to the hospital    Hypothyroidism  - PTA levothyroxine at 150 mcg p.o. daily     Autism, minimally to nonverbal at baseline     Dysphagia  - SLP eval in setting of dysphagia     [facility]   Palliative consult placed today to help define goals of care and return to hospital goals with family who act as his guardians     Chronic:   History of status epilepticus  Seizure disorder  Aspiration pneumonia  Autism  Hypothyroidism  Hypernatremia  Hypokalemia       Diet: Regular Diet Adult Mildly Thick (level 2)  Snacks/Supplements Adult: Magic Cup; Between Meals    DVT Prophylaxis: Enoxaparin (Lovenox) SQ  Colin Catheter: PRESENT, indication: Strict 1-2 Hour I&O  Central Lines: None  Cardiac Monitoring: ACTIVE order. Indication: ICU  Code Status: Full Code      Disposition Plan     Expected Discharge Date: 08/28/2022      Destination: group home          The patient's care was discussed with the Bedside Nurse, Patient and Patient's Family.    Queenie Vivar DO  Hospitalist Service  [facility]  Securely message with the [employer] Web Console (learn more here)  Text page via [employer] Paging/Directory     Clinically Significant Risk Factors Present on Admission                      ______________________________________________________________________    Interval History   Patient had a seizure last night that aborted with benzos within 5 minutes  Reportedly postictal after the episode  Continues to drink a lot of water and have significant amount of urine output  Patient does not complain of any pain and denies pain when specifically asked, interview was otherwise limited based on patient's underlying cognitive deficits  No other changes at this time    Data reviewed today: I reviewed all medications, new labs and imaging results over the last 24 hours. I personally reviewed no images or EKG's today.    Physical Exam   Vital Signs: Temp: 98.2  F (36.8  C) Temp src: Oral BP: (!) 130/100 Pulse: 112   Resp: 8 SpO2: 98 % O2 Device: None (Room air)    Weight: 234 lbs 9.11 oz     Constitutional: awake, alert, no apparent distress   HEENT: Normocephalic, atraumatic  Respiratory: Normal work of breathing, good air exchange, clear to auscultation bilaterally, no crackles or wheezing   Cardiovascular: Regular rate and rhythm, no murmurs appreciated  GI: Soft and nontender to palpation, nondistended, no rebound or guarding  Skin: normal skin color, texture, turgor   Musculoskeletal: No deformities, no edema present  Neurologic:  Moves all extremities spontaneously , unable to follow commands  Neuropsychiatric: smiling, happy, responds hi, unable to assess otherwise given developmental delay     Data   Recent Labs   Lab 08/26/22  1149 08/26/22  1143 08/26/22  0823 08/26/22  0549 08/26/22  0322 08/25/22  1159 08/25/22  0857 08/25/22  0813 08/25/22  0506 08/24/22  0437 08/24/22  0417 08/24/22  0124 08/24/22  0005 08/23/22  2136 08/23/22  2132   WBC  --   --   --  7.1  --   --   --   --   --   --   --  10.4  --   --  10.0   HGB  --   --   --  14.3  --   --   --   --   --   --   --  13.7  --  14.6 14.0   MCV  --   --   --  87  --   --   --   --   --   --   --  83  --   --  87   PLT  --   --   --  228  --    --   --   --   --   --   --  194  --   --  213   NA  --  134* 136 135*  --    < > 122*  122*  --  120*   < > 118*  118* 118*   < > 121* 116*   POTASSIUM  --   --   --  3.9  --   --  4.1  --  4.3  --  4.3  --   --  4.0 4.0   CHLORIDE  --   --   --  103  --   --  89*  --   --   --  86*  --   --  84* 82*   CO2  --   --   --  25  --   --  22  --   --   --  23  --   --   --  26   BUN  --   --   --  8.6  --   --  12.3  --   --   --  9.2  --   --  12 10.3   CR  --   --   --  0.62*  --   --  0.64*  --   --   --  0.48* 0.47*  --  0.6* 0.56*   ANIONGAP  --   --   --  7  --   --  11  --   --   --  9  --   --   --  8   JESSICA  --   --   --  9.8  --   --  8.9  --   --   --  8.4*  --   --   --  8.9   *  --   --  133* 186*   < > 145*   < >  --    < > 145*  --    < > 144* 141*   ALBUMIN  --   --   --   --   --   --   --   --   --   --   --   --   --   --  4.1   PROTTOTAL  --   --   --   --   --   --   --   --   --   --   --   --   --   --  7.1   BILITOTAL  --   --   --   --   --   --   --   --   --   --   --   --   --   --  0.3   ALKPHOS  --   --   --   --   --   --   --   --   --   --   --   --   --   --  56   ALT  --   --   --   --   --   --   --   --   --   --   --   --   --   --  12   AST  --   --   --   --   --   --   --   --   --   --   --   --   --   --  26    < > = values in this interval not displayed.     No results found for this or any previous visit (from the past 24 hour(s)).  Medications     D5W 200 mL/hr at 08/26/22 1304       divalproex sodium extended-release  500 mg Oral TID     enoxaparin ANTICOAGULANT  40 mg Subcutaneous Q24H     lactulose  30 mL Oral QPM     levothyroxine  150 mcg Oral QAM AC     risperiDONE  2 mg Oral BID     topiramate  200 mg Oral Daily

## 2022-08-26 NOTE — PLAN OF CARE
Shift Events: D5 initiated for rising sodium. DDAVP x1. D5 discontinued this afternoon. FR increased to 2000ml per Nephrology. Palliative consulted for goals of care.     Neuro: Appears at baseline. No evidence of seizure this shift.   CV: -130's. Hypertensive at times when pt restless and difficulty with staying still for monitoring.  Pulm: RA. Clear LS  GI: Great appetite. No BM. Frequent hiccups. Coughing again noted with drinking. Speech following.   : UO slowed. Mg replaced.   Lines/gtts: PIV x2.    Plan: Continue with current cares. Nephrology signed off.

## 2022-08-26 NOTE — PROGRESS NOTES
Labs reviewed.   RN notified of pt having seizure lasting 5 mins at 1800   Sodium check at 18:30 shows rising sodium of 123  UOP has increased briskly and pt made 1700 urine over 2 hrs.  Anticipate sodium to continue to rise with rise in UOP     Given issues with seizures, will not use DDAVP and let sodium rise as outline below  Plan  -        -Target of 130 with upper limit of correction up to 132 by  6 AM labs tomorrow                -Plan to reverse with D5 water if sodium rises to more than 132               -Let him drink more liberally once  serum sodium reaches up to 128-130               -Monitor urine output closely               -Check sodium q 6 hrs. Next one ordered for 11 PM . ICU team can adjust / start IVF based on  reccs above.     If sodium drops lower again and reaches 122 or lower , plan on starting 2% saline ( doubt will need it given sharp rise in UOP)    Dilcia Castillo MD  [facility] - Nephrology   [phone]

## 2022-08-26 NOTE — PLAN OF CARE
Speech Language Therapy Discharge Summary    Reason for therapy discharge:    All goals and outcomes met, no further needs identified.    Progress towards therapy goal(s). See goals on Care Plan in Epic electronic health record for goal details.  Goals met. Pt at baseline function/diet: Regular solids, mildly thick liquids when fully upright, encourage slow pacing (placing one item in front of pt at a time using a separate plate, pouring single sips into the cup at a time, etc)    Therapy recommendation(s):    No further therapy is recommended.

## 2022-08-26 NOTE — CONSULTS
"    [facility]  Palliative Care Consultation   Text Page    Assessment & Plan   Duane D Backes is a 42 year old male who was admitted on 8/23/2022.   Consulted by Hospitalist Queenie Vivar DO to establish goals of care, psychogenic polydipsia, difficulty with fluid restriction.    Recommendations:  1. Goals of Care- Full Code  - Restorative care.  Hospitalization goals discussed with the patient's co-guardians/parents Soren and Seema Green.    Decisional Capacity- Unreliable, as patient is minimally verbal. Patient has co-guardians, his parents Soren and Seema Green.  See paperwork scanned under ACP tab.  All medical decisions and consents should be addressed to the guardians.  POLST - Consider completion prior to dismissal    2. Recurrent seizures - History of status epilepticus with severe hyponatremia lowering seizure threshold. Appreciate nephrology input. Parents aware of recurrent seizures and potential for fatal event. They  request \"everything be done. If it's bad we'll have to make a decision then.\"      3. Spiritual Care  Oriented to Spiritual Health as part of Palliative Care team. Spiritual Health Services declined at this time. Parents explained \"He wouldn't understand what they are talking about.\"  Spiritual Background: No Islam    4. Care Planning  Appreciate information from  Lina Forrest RN regarding group home  Medications for discharge - none from a palliative perspective    Medical Decision Making and Goals of Care:  Discussed on August 26, 2022 with VERONICA Ayala CNS:     Phoned the patient's co-guardians/parents Soren and Seema Green at [phone]. I inquired what they understood about Duane's hospitalization. Soren offered \"Some electrolyte was off and they fixed that. He's fine.\" I offered concern regarding Duane's low sodium and inability to follow a fluid restriction leading to further seizures and death. Rashel " "explained \"Well we're in our 80's and we're all going to die sometime.\" He explained that they have four children \"Duane is our youngest and then we've got on that's a . He's a  and went to the [facility] and spent 6 years in [place].\" Their other children live in [place], [place] and [place]. Soren offered \"We don't want anything bad to happen to Duane. He's autistic and doesn't understand anything that's going on there. He doesn't know why he's there and he won't every remember that he's not supposed to drink that much.\" I offered concern regarding repeat and worsening events leading to a fatal event. eSema acknowledged \"I know he doesn't understand, but we've got to try.\" I acknowledged the possibility of requiring CPR and intubation. Soren offered \"He's not going to know what happens, but we've got to try to keep him alive. It's only right.\"    Thank you for involving us in the patient's care.     Ethel Woodruff MS, RN, CNS, APRN, ACHPN, FAACVPR  Pain and Palliative Care  Pager [phone]  Office [phone]     Time Spent on this Encounter   Total unit/floor time 3:15 PM until 4:20 PM minutes, time consisted of the following, examination of the patient, reviewing the record and completing documentation. >50% of time spent in counseling and coordination of care, Bedside Nurse April Hanley RN and Hospitalist Queenie Vivar DO.  Time spend counseling with family consisted of the following topics, goals of care, education about diagnosis and education about prognosis.    Understanding of disease process:   This has been discussed with patient's co-guardians/parents Soren and Seema Green who limited understanding of disease process.    History of Present Illness   History is obtained from the electronic health record and patient's family    Duane D Backes is a 42 year old male who was admitted 8/24/2022 for a suspected seizure at his group home. The " patient's medical history is significant for status epilepticus, seizure disorder, encephalopathy, hypothyroidism.      Past Medical History   I have reviewed this patient's medical history and updated it with pertinent information if needed.   Past Medical History:   Diagnosis Date     Autistic disorder, current or active state      Dry skin      Moderate intellectual disabilities      Obsessive-compulsive disorders      Overweight (BMI 25.0-29.9) 8/2/2013     Seizure disorder (H)     Generalized tonic clonic     Unspecified constipation        Past Surgical History   I have reviewed this patient's surgical history and updated it with pertinent information if needed.  Past Surgical History:   Procedure Laterality Date     CATARACT IOL, RT/LT Left 11/17/2018       Prior to Admission Medications   Prior to Admission Medications   Prescriptions Last Dose Informant Patient Reported? Taking?   ENULOSE 10 GM/15ML SOLUTION Unknown at Unknown time  No Yes   Sig: TAKE 30ML BY MOUTH EVERY EVENING AT 5PM;TAKE 30ML BY MOUTH TWICE DAILY AS NEEDED   GAVILAX 17 GM/SCOOP powder 8/23/2022 at AM  No Yes   Sig: MIX 17 GRAMS IN LIQUID AND DRINK BY MOUTH ONCE DAILY FOR CONSTIPATION.   Lacosamide (VIMPAT) 100 MG TABS tablet 8/23/2022 at PM  Yes Yes   Sig: Take 100 mg by mouth 2 times daily   MILK OF MAGNESIA 400 MG/5ML suspension 8/23/2022 at PM  No Yes   Sig: Take 30 mLs by mouth every evening   Starch, Thickening, POWD   No No   Sig: Combined with beverages, per instructions from Speech Therapy.   VITAMIN D3 25 MCG (1000 UT) tablet 8/23/2022 at AM  No Yes   Sig: TAKE 3 TABLETS (3000 UNITS) BY MOUTH ONCE DAILY   ammonium lactate (LAC-HYDRIN) 12 % external lotion 8/23/2022 at PM  Yes Yes   Sig: Apply topically daily To feet and heels   diazepam (VALIUM) 5 MG/ML (HIGH CONC) solution Unknown at Unknown time  No Yes   Sig: Diazepam: Give 1 ml (5mg): Give 1 ml for ANY seizure. May repeat once 1 ml (5 mg). Monitor breathing, if there any  concerns call 911. Do not exceed 10 mg per day.   divalproex sodium extended-release (DEPAKOTE ER) 500 MG 24 hr tablet 8/23/2022 at PM  No Yes   Sig: Take 1 tablet (500 mg) by mouth 3 times daily   docusate sodium (COLACE) 100 MG capsule 8/23/2022 at PM  No Yes   Sig: TAKE 1 CAPSULE BY MOUTH TWICE DAILY.   fish oil-omega-3 fatty acids 1000 MG capsule 8/23/2022 at PM  No Yes   Sig: TAKE 1 CAPSULE BY MOUTH TWICE DAILY  **NON-COVERED MED**  **PACK IN CARDS*   levothyroxine (SYNTHROID/LEVOTHROID) 150 MCG tablet 8/23/2022 at AM  No Yes   Sig: TAKE 1 TABLET BY MOUTH ONCE DAILY  FOR THYROID   lithium ER (LITHOBID) 300 MG CR tablet 8/23/2022 at PM  Yes Yes   Sig: Take 600 mg by mouth At Bedtime    melatonin 3 MG tablet Not Taking at Unknown time  No No   Sig: Take 1 tablet (3 mg) by mouth nightly as needed for sleep   Patient not taking: Reported on 8/24/2022   risperiDONE (RISPERDAL) 2 MG tablet 8/23/2022 at PM  Yes Yes   Sig: Take 2 mg by mouth 2 times daily 1 tab every morning & 1 tab every bedtime   sertraline (ZOLOFT) 100 MG tablet 8/23/2022 at AM  Yes Yes   Sig: Take 1.5 tablets by mouth every morning.   topiramate (TOPAMAX) 200 MG tablet 8/23/2022 at PM  No Yes   Sig: TAKE 1 TABLET BY MOUTH TWICE DAILY.      Facility-Administered Medications: None     Allergies   Allergies   Allergen Reactions     Penicillin [Penicillins] Other (See Comments)     Prozac [Fluoxetine Hcl]      Reglan [Metoclopramide Hcl]      Ritalin [Methylphenidate Derivatives] Other (See Comments)     Ritalin     Trazodone        Social History   I have updated and reviewed the following Social History Narrative:   Social History     Social History Narrative    Lives in group home.  Parents are legal guardians.     History of substance use/abuse:  reports that he has never smoked. He has never used smokeless tobacco.  reports no history of alcohol use.     Family History   I have reviewed this patient's family history and updated it with pertinent  information if needed.   Family History   Problem Relation Age of Onset     Unknown/Adopted No family hx of        Review of Systems   Review of systems not obtained due to patient factors - mental status    Physical Exam   Temp:  [98.1  F (36.7  C)-98.7  F (37.1  C)] 98.2  F (36.8  C)  Pulse:  [] 115  Resp:  [8-27] 8  BP: (110-171)/() 159/100  SpO2:  [90 %-99 %] 98 %  234 lbs 9.11 oz  Exam:  GEN:  male rocking back and forth and playing with a soft green toy. Alert, no verbal response, appears comfortable, no apparent distress.  HEENT:  Normocephalic/atraumatic, no scleral icterus, no nasal discharge, mouth moist.  CV:  RRR, S1, S2; no murmurs or other irregularities noted.  +3 DP/PT pulses bilaterally; no edema BLE.  RESP:  Clear to auscultation bilaterally without rales/rhonchi/wheezing/retractions.  Symmetric chest rise on inhalation noted.  Normal respiratory effort.  ABD:  Rounded, soft, non-tender/non-distended.  +BS  EXT:  Moves all four extremities.     M/S:   No wincing or grimace with physical assessment and palpation of extremities.    SKIN:  Warm and dry to touch, no exanthems noted in the visualized areas.    NEURO: Does not follow commands.    Data   Results for orders placed or performed during the hospital encounter of 08/23/22   XR Chest Port 1 View     Status: None    Narrative    EXAM: XR CHEST PORT 1 VIEW  LOCATION: [facility]  DATE/TIME: 8/23/2022 10:00 PM    INDICATION: Concern for aspiration.  COMPARISON: 02/28/2021.      Impression    IMPRESSION: There is a shallow inspiratory effort which accentuates heart size and pulmonary vascularity. No obvious acute cardiopulmonary findings are identified. Since 02/28/2021, the appliances have been removed and the prior seen area of increased   density in the mid to lower junction of the left lung has resolved.   [place] Draw     Status: None    Narrative    The following orders were created for panel order  [place] Draw.  Procedure                               Abnormality         Status                     ---------                               -----------         ------                     Extra Blue Top Tube[[id number]]                              Final result               Extra Red Top Tube[[id number]]                               Final result               Extra Green Top (Lithium...[[id number]]                      Final result               Extra Purple Top Tube[[id number]]                            Final result                 Please view results for these tests on the individual orders.   Extra Blue Top Tube     Status: None   Result Value Ref Range    Hold Specimen JIC    Extra Red Top Tube     Status: None   Result Value Ref Range    Hold Specimen JIC    Extra Green Top (Lithium Heparin) Tube     Status: None   Result Value Ref Range    Hold Specimen JIC    Extra Purple Top Tube     Status: None   Result Value Ref Range    Hold Specimen JIC    Glucose by meter     Status: Abnormal   Result Value Ref Range    GLUCOSE BY METER POCT 159 (H) 70 - 99 mg/dL   iStat Gases (lactate) venous, POCT     Status: Abnormal   Result Value Ref Range    Lactic Acid POCT 1.7 <=2.0 mmol/L    Bicarbonate Venous POCT 30 (H) 21 - 28 mmol/L    O2 Sat, Venous POCT 22 (L) 94 - 100 %    pCO2V Venous POCT 66 (H) 40 - 50 mm Hg    pH Venous POCT 7.27 (L) 7.32 - 7.43    pO2 Venous POCT 19 (L) 25 - 47 mm Hg   iStat Basic Chem ICA Hematocrit, POCT     Status: Abnormal   Result Value Ref Range    TOTAL CO2 POCT 24 20 - 32 mmol/L    Creatinine POCT 0.6 (L) 0.7 - 1.3 mg/dL    Hematocrit POCT 43 40 - 53 %    Calcium, Ionized Whole Blood POCT 4.7 4.4 - 5.2 mg/dL    UREA NITROGEN POCT 12 7 - 30 mg/dL    Glucose Whole Blood POCT 144 (H) 70 - 99 mg/dL    Potassium POCT 4.0 3.4 - 5.3 mmol/L    Sodium POCT 121 (L) 133 - 144 mmol/L    Hemoglobin POCT 14.6 13.3 - 17.7 g/dL    Chloride POCT 84 (L) 94 - 109 mmol/L   Asymptomatic COVID-19 Virus  (Coronavirus) by PCR Nasopharyngeal     Status: Normal    Specimen: Nasopharyngeal; Swab   Result Value Ref Range    SARS CoV2 PCR Negative Negative    Narrative    Testing was performed using the Xpert Xpress SARS-CoV-2 Assay on the   Cepheid Gene-Xpert Instrument Systems. Additional information about   this Emergency Use Authorization (EUA) assay can be found via the Lab   Guide. This test should be ordered for the detection of SARS-CoV-2 in   individuals who meet SARS-CoV-2 clinical and/or epidemiological   criteria. Test performance is unknown in asymptomatic patients. This   test is for in vitro diagnostic use under the FDA EUA for   laboratories certified under CLIA to perform high complexity testing.   This test has not been FDA cleared or approved. A negative result   does not rule out the presence of PCR inhibitors in the specimen or   target RNA in concentration below the limit of detection for the   assay. The possibility of a false negative should be considered if   the patient's recent exposure or clinical presentation suggests   COVID-19. This test was validated by the [facility]. This laboratory is certified under the Clinical Laboratory Improvement Amendments of 1988 (CLIA-88) as qualified to perform high complexity laboratory testing.     Comprehensive metabolic panel     Status: Abnormal   Result Value Ref Range    Sodium 116 (LL) 136 - 145 mmol/L    Potassium 4.0 3.4 - 5.3 mmol/L    Creatinine 0.56 (L) 0.67 - 1.17 mg/dL    Urea Nitrogen 10.3 6.0 - 20.0 mg/dL    Chloride 82 (L) 98 - 107 mmol/L    Carbon Dioxide (CO2) 26 22 - 29 mmol/L    Anion Gap 8 7 - 15 mmol/L    Glucose 141 (H) 70 - 99 mg/dL    Calcium 8.9 8.6 - 10.0 mg/dL    Protein Total 7.1 6.4 - 8.3 g/dL    Albumin 4.1 3.5 - 5.2 g/dL    Bilirubin Total 0.3 <=1.2 mg/dL    Alkaline Phosphatase 56 40 - 129 U/L    AST 26 10 - 50 U/L    ALT 12 10 - 50 U/L    GFR Estimate >90 >60 mL/min/1.73m2   Magnesium      Status: Abnormal   Result Value Ref Range    Magnesium 1.3 (L) 1.7 - 2.3 mg/dL   TSH with free T4 reflex     Status: Abnormal   Result Value Ref Range    TSH 8.03 (H) 0.30 - 4.20 uIU/mL   UA with Microscopic reflex to Culture     Status: Abnormal    Specimen: Urine, Colin Catheter   Result Value Ref Range    Color Urine Straw Colorless, Straw, Light Yellow, Yellow    Appearance Urine Clear Clear    Glucose Urine 300  (A) Negative mg/dL    Bilirubin Urine Negative Negative    Ketones Urine Negative Negative mg/dL    Specific Gravity Urine 1.009 1.003 - 1.035    Blood Urine Trace (A) Negative    pH Urine 7.5 (H) 5.0 - 7.0    Protein Albumin Urine Negative Negative mg/dL    Urobilinogen Urine Normal Normal, 2.0 mg/dL    Nitrite Urine Negative Negative    Leukocyte Esterase Urine Negative Negative    Mucus Urine Present (A) None Seen /LPF    RBC Urine 2 <=2 /HPF    WBC Urine <1 <=5 /HPF    Squamous Epithelials Urine <1 <=1 /HPF    Narrative    Urine Culture not indicated   Topiramate Level     Status: Abnormal   Result Value Ref Range    Topiramate 4.4 (L) 5.0 - 20.0 ug/mL   Valproic acid (Depakote level)     Status: Normal   Result Value Ref Range    Valproic acid 80.5   ug/mL   CBC with platelets and differential     Status: None   Result Value Ref Range    WBC Count 10.0 4.0 - 11.0 10e3/uL    RBC Count 4.79 4.40 - 5.90 10e6/uL    Hemoglobin 14.0 13.3 - 17.7 g/dL    Hematocrit 41.6 40.0 - 53.0 %    MCV 87 78 - 100 fL    MCH 29.2 26.5 - 33.0 pg    MCHC 33.7 31.5 - 36.5 g/dL    RDW 11.8 10.0 - 15.0 %    Platelet Count 213 150 - 450 10e3/uL    % Neutrophils 76 %    % Lymphocytes 15 %    % Monocytes 7 %    % Eosinophils 1 %    % Basophils 0 %    % Immature Granulocytes 1 %    NRBCs per 100 WBC 0 <1 /100    Absolute Neutrophils 7.6 1.6 - 8.3 10e3/uL    Absolute Lymphocytes 1.5 0.8 - 5.3 10e3/uL    Absolute Monocytes 0.7 0.0 - 1.3 10e3/uL    Absolute Eosinophils 0.1 0.0 - 0.7 10e3/uL    Absolute Basophils 0.0 0.0 - 0.2  10e3/uL    Absolute Immature Granulocytes 0.1 <=0.4 10e3/uL    Absolute NRBCs 0.0 10e3/uL   T4 free     Status: Normal   Result Value Ref Range    Free T4 1.13 0.90 - 1.70 ng/dL   Osmolality urine     Status: Normal   Result Value Ref Range    Osmolality Urine 359 100 - 1,200 mmol/kg    Narrative    Reference Ranges depend on patient's hydration status and renal function.   Neonates:  mmol/kg   2 years and older, random specimens: 100-1200 mmol/kg; Greater than 850 mmol/kg after 12 hour fluid restriction  Urine/serum osmolality ratio: 2 years and older: 1.0-3.0; 3.0-4.7 after 12 hour fluid restriction   Sodium random urine     Status: None   Result Value Ref Range    Sodium Urine mmol/L 112 mmol/L   Lithium level     Status: Abnormal   Result Value Ref Range    Lithium <0.1 (L) 0.6 - 1.2 mmol/L   Sodium     Status: Abnormal   Result Value Ref Range    Sodium 118 (LL) 136 - 145 mmol/L   Sodium     Status: Abnormal   Result Value Ref Range    Sodium 118 (LL) 136 - 145 mmol/L   Glucose by meter     Status: Abnormal   Result Value Ref Range    GLUCOSE BY METER POCT 189 (H) 70 - 99 mg/dL   Basic metabolic panel     Status: Abnormal   Result Value Ref Range    Creatinine 0.48 (L) 0.67 - 1.17 mg/dL    Sodium 118 (LL) 136 - 145 mmol/L    Potassium 4.3 3.4 - 5.3 mmol/L    Urea Nitrogen 9.2 6.0 - 20.0 mg/dL    Chloride 86 (L) 98 - 107 mmol/L    Carbon Dioxide (CO2) 23 22 - 29 mmol/L    Anion Gap 9 7 - 15 mmol/L    Glucose 145 (H) 70 - 99 mg/dL    GFR Estimate >90 >60 mL/min/1.73m2    Calcium 8.4 (L) 8.6 - 10.0 mg/dL   CBC with platelets     Status: Abnormal   Result Value Ref Range    WBC Count 10.4 4.0 - 11.0 10e3/uL    RBC Count 4.75 4.40 - 5.90 10e6/uL    Hemoglobin 13.7 13.3 - 17.7 g/dL    Hematocrit 39.6 (L) 40.0 - 53.0 %    MCV 83 78 - 100 fL    MCH 28.8 26.5 - 33.0 pg    MCHC 34.6 31.5 - 36.5 g/dL    RDW 11.8 10.0 - 15.0 %    Platelet Count 194 150 - 450 10e3/uL   Creatinine     Status: Abnormal   Result Value  Ref Range    Creatinine 0.47 (L) 0.67 - 1.17 mg/dL    GFR Estimate >90 >60 mL/min/1.73m2   Sodium     Status: Abnormal   Result Value Ref Range    Sodium 118 (LL) 136 - 145 mmol/L   Sodium     Status: Abnormal   Result Value Ref Range    Sodium 123 (L) 136 - 145 mmol/L   Sodium     Status: Abnormal   Result Value Ref Range    Sodium 125 (L) 136 - 145 mmol/L   Glucose by meter     Status: Abnormal   Result Value Ref Range    GLUCOSE BY METER POCT 138 (H) 70 - 99 mg/dL   Magnesium     Status: Normal   Result Value Ref Range    Magnesium 1.7 1.7 - 2.3 mg/dL   Glucose by meter     Status: Abnormal   Result Value Ref Range    GLUCOSE BY METER POCT 146 (H) 70 - 99 mg/dL   Glucose by meter     Status: Abnormal   Result Value Ref Range    GLUCOSE BY METER POCT 115 (H) 70 - 99 mg/dL   Sodium     Status: Abnormal   Result Value Ref Range    Sodium 126 (L) 136 - 145 mmol/L   Sodium     Status: Abnormal   Result Value Ref Range    Sodium 126 (L) 136 - 145 mmol/L   Glucose by meter     Status: Abnormal   Result Value Ref Range    GLUCOSE BY METER POCT 170 (H) 70 - 99 mg/dL   Extra Tube     Status: None    Narrative    The following orders were created for panel order Extra Tube.  Procedure                               Abnormality         Status                     ---------                               -----------         ------                     Extra Purple Top Tube[[id number]]                            Final result                 Please view results for these tests on the individual orders.   Extra Purple Top Tube     Status: None   Result Value Ref Range    Hold Specimen [facility]    Sodium     Status: Abnormal   Result Value Ref Range    Sodium 123 (L) 136 - 145 mmol/L   Glucose by meter     Status: Abnormal   Result Value Ref Range    GLUCOSE BY METER POCT 133 (H) 70 - 99 mg/dL   Sodium     Status: Abnormal   Result Value Ref Range    Sodium 121 (L) 136 - 145 mmol/L   Magnesium     Status: Abnormal   Result Value Ref Range     Magnesium 1.6 (L) 1.7 - 2.3 mg/dL   Potassium     Status: Normal   Result Value Ref Range    Potassium 4.3 3.4 - 5.3 mmol/L   Sodium     Status: Abnormal   Result Value Ref Range    Sodium 120 (L) 136 - 145 mmol/L   Glucose by meter     Status: Abnormal   Result Value Ref Range    GLUCOSE BY METER POCT 134 (H) 70 - 99 mg/dL   Glucose     Status: Abnormal   Result Value Ref Range    Glucose 125 (H) 70 - 99 mg/dL   Glucose by meter     Status: Abnormal   Result Value Ref Range    GLUCOSE BY METER POCT 138 (H) 70 - 99 mg/dL   Sodium     Status: Abnormal   Result Value Ref Range    Sodium 120 (L) 136 - 145 mmol/L   Glucose by meter     Status: Abnormal   Result Value Ref Range    GLUCOSE BY METER POCT 106 (H) 70 - 99 mg/dL   Sodium     Status: Abnormal   Result Value Ref Range    Sodium 122 (L) 136 - 145 mmol/L   Basic metabolic panel     Status: Abnormal   Result Value Ref Range    Creatinine 0.64 (L) 0.67 - 1.17 mg/dL    Sodium 122 (L) 136 - 145 mmol/L    Potassium 4.1 3.4 - 5.3 mmol/L    Urea Nitrogen 12.3 6.0 - 20.0 mg/dL    Chloride 89 (L) 98 - 107 mmol/L    Carbon Dioxide (CO2) 22 22 - 29 mmol/L    Anion Gap 11 7 - 15 mmol/L    Glucose 145 (H) 70 - 99 mg/dL    GFR Estimate >90 >60 mL/min/1.73m2    Calcium 8.9 8.6 - 10.0 mg/dL   Glucose by meter     Status: Abnormal   Result Value Ref Range    GLUCOSE BY METER POCT 150 (H) 70 - 99 mg/dL   Sodium random urine     Status: None   Result Value Ref Range    Sodium Urine mmol/L <20 mmol/L   Osmolality urine     Status: Normal   Result Value Ref Range    Osmolality Urine 199 100 - 1,200 mmol/kg    Narrative    Reference Ranges depend on patient's hydration status and renal function.   Neonates:  mmol/kg   2 years and older, random specimens: 100-1200 mmol/kg; Greater than 850 mmol/kg after 12 hour fluid restriction  Urine/serum osmolality ratio: 2 years and older: 1.0-3.0; 3.0-4.7 after 12 hour fluid restriction   Sodium     Status: Abnormal   Result Value Ref  Range    Sodium 122 (L) 136 - 145 mmol/L   Sodium     Status: Abnormal   Result Value Ref Range    Sodium 123 (L) 136 - 145 mmol/L   Glucose by meter     Status: Abnormal   Result Value Ref Range    GLUCOSE BY METER POCT 140 (H) 70 - 99 mg/dL   Glucose by meter     Status: Abnormal   Result Value Ref Range    GLUCOSE BY METER POCT 143 (H) 70 - 99 mg/dL   Basic metabolic panel     Status: Abnormal   Result Value Ref Range    Creatinine 0.62 (L) 0.67 - 1.17 mg/dL    Sodium 135 (L) 136 - 145 mmol/L    Potassium 3.9 3.4 - 5.3 mmol/L    Urea Nitrogen 8.6 6.0 - 20.0 mg/dL    Chloride 103 98 - 107 mmol/L    Carbon Dioxide (CO2) 25 22 - 29 mmol/L    Anion Gap 7 7 - 15 mmol/L    Glucose 133 (H) 70 - 99 mg/dL    GFR Estimate >90 >60 mL/min/1.73m2    Calcium 9.8 8.6 - 10.0 mg/dL   CBC with platelets     Status: Normal   Result Value Ref Range    WBC Count 7.1 4.0 - 11.0 10e3/uL    RBC Count 4.93 4.40 - 5.90 10e6/uL    Hemoglobin 14.3 13.3 - 17.7 g/dL    Hematocrit 43.1 40.0 - 53.0 %    MCV 87 78 - 100 fL    MCH 29.0 26.5 - 33.0 pg    MCHC 33.2 31.5 - 36.5 g/dL    RDW 12.7 10.0 - 15.0 %    Platelet Count 228 150 - 450 10e3/uL   Magnesium     Status: Normal   Result Value Ref Range    Magnesium 1.9 1.7 - 2.3 mg/dL   Lactic acid whole blood     Status: Normal   Result Value Ref Range    Lactic Acid 1.0 0.7 - 2.0 mmol/L   Glucose by meter     Status: Abnormal   Result Value Ref Range    GLUCOSE BY METER POCT 186 (H) 70 - 99 mg/dL   Sodium     Status: Normal   Result Value Ref Range    Sodium 136 136 - 145 mmol/L   Sodium     Status: Abnormal   Result Value Ref Range    Sodium 134 (L) 136 - 145 mmol/L   Glucose by meter     Status: Abnormal   Result Value Ref Range    GLUCOSE BY METER POCT 135 (H) 70 - 99 mg/dL   EKG 12-lead, tracing only     Status: None   Result Value Ref Range    Systolic Blood Pressure  mmHg    Diastolic Blood Pressure  mmHg    Ventricular Rate 82 BPM    Atrial Rate 82 BPM    UT Interval 182 ms    QRS Duration  106 ms     ms    QTc 464 ms    P Axis 49 degrees    R AXIS 55 degrees    T Axis 53 degrees    Interpretation ECG       Sinus rhythm  Normal ECG  When compared with ECG of 31-AUG-2006 23:00,  ST no longer elevated in Anterior leads  Confirmed by - EMERGENCY ROOM, PHYSICIAN ([id number]),  FREDA MCKEON ([id number]) on 8/24/2022 6:36:54 AM     EKG 12 lead     Status: None   Result Value Ref Range    Systolic Blood Pressure  mmHg    Diastolic Blood Pressure  mmHg    Ventricular Rate 81 BPM    Atrial Rate 81 BPM    MA Interval 190 ms    QRS Duration 112 ms     ms    QTc 476 ms    P Axis 41 degrees    R AXIS 47 degrees    T Axis 46 degrees    Interpretation ECG       Sinus rhythm with occasional Premature ventricular complexes and Fusion complexes  Otherwise normal ECG  When compared with ECG of 23-AUG-2022 21:40, (unconfirmed)  Fusion complexes are now Present  Premature ventricular complexes are now Present  Confirmed by - EMERGENCY ROOM, PHYSICIAN ([id number]),  FREDA MCKEON ([id number]) on 8/24/2022 6:36:58 AM     CBC with platelets differential     Status: None    Narrative    The following orders were created for panel order CBC with platelets differential.  Procedure                               Abnormality         Status                     ---------                               -----------         ------                     CBC with platelets and d...[[id number]]                      Final result                 Please view results for these tests on the individual orders.

## 2022-08-26 NOTE — PLAN OF CARE
ICU End of Shift Summary.  For vital signs and complete assessments, please see documentation flowsheets.      Pertinent assessments: Pt slept most of shift. Restless at times but cooperative and redirectable. Nonverbal at baseline but speaks 1-2 word phrases. Seizure precautions in place. Tele SR, ST when pt awake/has hiccups. Pt had frequent episodes of hiccups throughout night. VSS. RA. Woke up around 0300 and ate 2 magic cups. BS+. No BM. Colin in place with significant UOP, approx. 5L this shift.     Major Events:   - 6am Na came back at 135, provider notified, plan to recheck at 0800    Plan (Upcoming Events): trend sodium, monitor UOP, monitor for seizure activity  Discharge/Transfer Needs: tbd     Bedside Shift Report Completed: yes  Bedside Safety Check Completed: yes

## 2022-08-26 NOTE — CONSULTS
Care Management Initial Consult    General Information  Assessment completed with: Care Team MemberGaurav, Staff at [facility]  Type of CM/SW Visit: Initial Assessment    Primary Care Provider verified and updated as needed: Yes   Readmission within the last 30 days:        Reason for Consult: care coordination/care conference, discharge planning       Communication Assessment  Patient's communication style: spoken language (English or Bilingual)    Hearing Difficulty or Deaf: no   Wear Glasses or Blind: no    Cognitive  Cognitive/Neuro/Behavioral: .WDL except  Level of Consciousness: alert  Arousal Level: opens eyes spontaneously  Orientation: other (see comments) (Doesn't answer questions appropriately)  Mood/Behavior: behavior appropriate to situation  Best Language: 1 - Mild to moderate  Speech: spontaneous    Living Environment:   Current living Arrangements: group home      Able to return to prior arrangements: yes       Family/Social Support:  Care provided by: homecare agency, self  Provides care for: no one, unable/limited ability to care for self     Parent(s), Sibling(s), Other (specify) (Group Home Staff)          Description of Support System: Supportive, Involved    Support Assessment: Adequate family and caregiver support    Current Resources:   Patient receiving home care services: No     Community Resources: None  Equipment currently used at home: none        Lifestyle & Psychosocial Needs:  Social Determinants of Health     Tobacco Use: Low Risk      Smoking Tobacco Use: Never Smoker     Smokeless Tobacco Use: Never Used   Alcohol Use: Not on file   Financial Resource Strain: Not on file   Food Insecurity: Not on file   Transportation Needs: Not on file   Physical Activity: Not on file   Stress: Not on file   Social Connections: Not on file   Intimate Partner Violence: Not on file   Depression: Not at risk     PHQ-2 Score: 0   Housing Stability: Not on file       Functional Status:  Prior to  admission patient needed assistance:   Dependent ADLs:: Ambulation-no assistive device, Bathing, Toileting  Dependent IADLs:: Cleaning, Cooking, Laundry, Shopping, Meal Preparation, Medication Management, Money Management, Transportation       Care Management Follow Up    Length of Stay (days): 3    Expected Discharge Date: 08/28/2022     Concerns to be Addressed: care coordination/care conferences, discharge planning     Patient plan of care discussed at interdisciplinary rounds: Yes    Anticipated Discharge Disposition: Group Home     Anticipated Discharge Services: None  Anticipated Discharge DME: None    Patient/family educated on Medicare website which has current facility and service quality ratings: no/ NA  Education Provided on the Discharge Plan:  Yes  Patient/Family in Agreement with the Plan: unable to assess    Referrals Placed by CM/SW: External Care Coordination  Private pay costs discussed: Not applicable    Additional Information:  RN CC/SW is following/consulted for discharge planning. Per chart review, pt resides in an [facility].   Left VM for patient's parents, who are co-guardians, requesting call back.  Spoke with Gaurav, staff at [facility], [phone]. Verified that patient lives at [facility].    Patient receives services as follows: Patient does not have any outside services.  [facility] manages and administers medications and assists with ADL's. Patient is independent with mobility, not requiring any DME. Patient is independent with dressing, needing minimal assist with toileting, and receives assistance, mostly verbal reminders, for showering.     [facility] Contact: [phone]  Fax #: [phone]  Barriers to pt returning: None  Baseline mobility: Independent  Time of preferred return: Anytime, able to return on weekends.   New meds/prescriptions: Fill at [facility]  Transportation: TBD      Other: Baseline Behavior- Per [facility] patient requires and needs  attention. Patient's behaviors and agitation increase with decreased attention or fixation on food/ drink. Per group home singing songs about food/ drink sometimes helps to deescalate patient behavior.     RN CC/SW will continue to follow for discharge planning and return to group home.      Lina Forrest, LEEANNA Forrest RN Case Manager  Inpatient Care Coordination  [facility]   [phone]

## 2022-08-27 LAB
ANION GAP SERPL CALCULATED.3IONS-SCNC: 11 MMOL/L (ref 7–15)
BUN SERPL-MCNC: 12.3 MG/DL (ref 6–20)
CALCIUM SERPL-MCNC: 9.5 MG/DL (ref 8.6–10)
CHLORIDE SERPL-SCNC: 104 MMOL/L (ref 98–107)
CREAT SERPL-MCNC: 0.61 MG/DL (ref 0.67–1.17)
DEPRECATED HCO3 PLAS-SCNC: 20 MMOL/L (ref 22–29)
ERYTHROCYTE [DISTWIDTH] IN BLOOD BY AUTOMATED COUNT: 13.2 % (ref 10–15)
GFR SERPL CREATININE-BSD FRML MDRD: >90 ML/MIN/1.73M2
GLUCOSE BLDC GLUCOMTR-MCNC: 101 MG/DL (ref 70–99)
GLUCOSE BLDC GLUCOMTR-MCNC: 127 MG/DL (ref 70–99)
GLUCOSE BLDC GLUCOMTR-MCNC: 157 MG/DL (ref 70–99)
GLUCOSE SERPL-MCNC: 93 MG/DL (ref 70–99)
HCT VFR BLD AUTO: 45.5 % (ref 40–53)
HGB BLD-MCNC: 14.4 G/DL (ref 13.3–17.7)
MAGNESIUM SERPL-MCNC: 1.7 MG/DL (ref 1.7–2.3)
MCH RBC QN AUTO: 28.9 PG (ref 26.5–33)
MCHC RBC AUTO-ENTMCNC: 31.6 G/DL (ref 31.5–36.5)
MCV RBC AUTO: 91 FL (ref 78–100)
PLATELET # BLD AUTO: 223 10E3/UL (ref 150–450)
POTASSIUM SERPL-SCNC: 3.9 MMOL/L (ref 3.4–5.3)
RBC # BLD AUTO: 4.99 10E6/UL (ref 4.4–5.9)
SODIUM SERPL-SCNC: 135 MMOL/L (ref 136–145)
WBC # BLD AUTO: 7.3 10E3/UL (ref 4–11)

## 2022-08-27 PROCEDURE — 80048 BASIC METABOLIC PNL TOTAL CA: CPT | Performed by: STUDENT IN AN ORGANIZED HEALTH CARE EDUCATION/TRAINING PROGRAM

## 2022-08-27 PROCEDURE — 250N000011 HC RX IP 250 OP 636: Performed by: STUDENT IN AN ORGANIZED HEALTH CARE EDUCATION/TRAINING PROGRAM

## 2022-08-27 PROCEDURE — 250N000011 HC RX IP 250 OP 636: Performed by: INTERNAL MEDICINE

## 2022-08-27 PROCEDURE — 250N000013 HC RX MED GY IP 250 OP 250 PS 637: Performed by: STUDENT IN AN ORGANIZED HEALTH CARE EDUCATION/TRAINING PROGRAM

## 2022-08-27 PROCEDURE — 36415 COLL VENOUS BLD VENIPUNCTURE: CPT | Performed by: STUDENT IN AN ORGANIZED HEALTH CARE EDUCATION/TRAINING PROGRAM

## 2022-08-27 PROCEDURE — 200N000001 HC R&B ICU

## 2022-08-27 PROCEDURE — 85018 HEMOGLOBIN: CPT | Performed by: STUDENT IN AN ORGANIZED HEALTH CARE EDUCATION/TRAINING PROGRAM

## 2022-08-27 PROCEDURE — 99233 SBSQ HOSP IP/OBS HIGH 50: CPT | Performed by: STUDENT IN AN ORGANIZED HEALTH CARE EDUCATION/TRAINING PROGRAM

## 2022-08-27 PROCEDURE — 250N000013 HC RX MED GY IP 250 OP 250 PS 637: Performed by: INTERNAL MEDICINE

## 2022-08-27 PROCEDURE — 83735 ASSAY OF MAGNESIUM: CPT | Performed by: STUDENT IN AN ORGANIZED HEALTH CARE EDUCATION/TRAINING PROGRAM

## 2022-08-27 RX ORDER — LACOSAMIDE 100 MG/1
100 TABLET ORAL 2 TIMES DAILY
Status: DISCONTINUED | OUTPATIENT
Start: 2022-08-27 | End: 2022-08-29 | Stop reason: HOSPADM

## 2022-08-27 RX ORDER — DOCUSATE SODIUM 100 MG/1
100 CAPSULE, LIQUID FILLED ORAL 2 TIMES DAILY
Status: DISCONTINUED | OUTPATIENT
Start: 2022-08-27 | End: 2022-08-29 | Stop reason: HOSPADM

## 2022-08-27 RX ORDER — LACTULOSE 10 G/15ML
30 SOLUTION ORAL; RECTAL DAILY
Status: DISCONTINUED | OUTPATIENT
Start: 2022-08-27 | End: 2022-08-27

## 2022-08-27 RX ORDER — LITHIUM CARBONATE 300 MG/1
600 TABLET, FILM COATED, EXTENDED RELEASE ORAL AT BEDTIME
Status: CANCELLED | OUTPATIENT
Start: 2022-08-27

## 2022-08-27 RX ORDER — MAGNESIUM SULFATE HEPTAHYDRATE 40 MG/ML
2 INJECTION, SOLUTION INTRAVENOUS ONCE
Status: COMPLETED | OUTPATIENT
Start: 2022-08-27 | End: 2022-08-27

## 2022-08-27 RX ORDER — VITAMIN B COMPLEX
50 TABLET ORAL DAILY
Status: DISCONTINUED | OUTPATIENT
Start: 2022-08-27 | End: 2022-08-29 | Stop reason: HOSPADM

## 2022-08-27 RX ADMIN — HALOPERIDOL LACTATE 2 MG: 5 INJECTION, SOLUTION INTRAMUSCULAR at 16:24

## 2022-08-27 RX ADMIN — LEVOTHYROXINE SODIUM 150 MCG: 150 TABLET ORAL at 07:49

## 2022-08-27 RX ADMIN — DOCUSATE SODIUM 100 MG: 100 CAPSULE, LIQUID FILLED ORAL at 11:20

## 2022-08-27 RX ADMIN — Medication 50 MCG: at 11:21

## 2022-08-27 RX ADMIN — ENOXAPARIN SODIUM 40 MG: 40 INJECTION SUBCUTANEOUS at 07:49

## 2022-08-27 RX ADMIN — SERTRALINE HYDROCHLORIDE 150 MG: 100 TABLET ORAL at 11:20

## 2022-08-27 RX ADMIN — DIVALPROEX SODIUM 500 MG: 500 TABLET, FILM COATED, EXTENDED RELEASE ORAL at 11:29

## 2022-08-27 RX ADMIN — RISPERIDONE 2 MG: 2 TABLET ORAL at 07:49

## 2022-08-27 RX ADMIN — LACOSAMIDE 100 MG: 100 TABLET, FILM COATED ORAL at 11:20

## 2022-08-27 RX ADMIN — MAGNESIUM SULFATE HEPTAHYDRATE 2 G: 40 INJECTION, SOLUTION INTRAVENOUS at 09:31

## 2022-08-27 RX ADMIN — LORAZEPAM 1 MG: 2 INJECTION INTRAMUSCULAR; INTRAVENOUS at 15:47

## 2022-08-27 RX ADMIN — DIVALPROEX SODIUM 500 MG: 500 TABLET, FILM COATED, EXTENDED RELEASE ORAL at 18:50

## 2022-08-27 RX ADMIN — TOPIRAMATE 200 MG: 50 TABLET, FILM COATED ORAL at 07:49

## 2022-08-27 RX ADMIN — DIVALPROEX SODIUM 500 MG: 500 TABLET, FILM COATED, EXTENDED RELEASE ORAL at 07:49

## 2022-08-27 ASSESSMENT — ACTIVITIES OF DAILY LIVING (ADL)
ADLS_ACUITY_SCORE: 45
ADLS_ACUITY_SCORE: 41
ADLS_ACUITY_SCORE: 41
ADLS_ACUITY_SCORE: 45
ADLS_ACUITY_SCORE: 41
ADLS_ACUITY_SCORE: 43
ADLS_ACUITY_SCORE: 41
ADLS_ACUITY_SCORE: 41

## 2022-08-27 NOTE — PLAN OF CARE
ICU End of Shift Summary.  For vital signs and complete assessments, please see documentation flowsheets.      Pertinent assessments: Afebrile. Awake most of night, maybe slept 1.5 broken hours.  Neuro: Pleasant, alert, VIDYA orientation- does not answer questions most of time. Follows commands. No seizure activity noted tonight.  Cardiac: SR/ST mostly 's  Resp: LS clear on RA  GI: Obese, passing flatus no BM  : Good urine output  Skin: Bruised foot  Lines: PIV x 2  Drips: None    Major Shift Events:   Sleept/rest promoted  PSC present for safety, patient impulsive  Plan (Upcoming Events): continue to trend NA level  Discharge/Transfer Needs: TBD     Bedside Shift Report Completed   Bedside Safety Check Completed         Goal Outcome Evaluation:    Plan of Care Reviewed With: patient     Overall Patient Progress: no change

## 2022-08-27 NOTE — PROGRESS NOTES
[facility]  Medicine Progress Note - Hospitalist Service  Date of Admission:  8/23/2022    Assessment & Plan        Duane Backes is a 42-year-old gentleman with a significant past medical history of status epilepticus, seizure disorder, encephalopathy, moderate MR, hypothyroidism, electrolyte disturbance, who presented from group home 8/23 for suspected seizure, found to be hyponatremic. Nephrology consulted with suspicion for psychogenic polydipsia vs SIADH as source. Sodium improving, pending monitoring after reinitiating PTA medications.     Seizure episode in a patient with seizure disorder and history of status epilepticus, resolved   Severe hyponatremia likely precipitant as below, resolving   The patient has extensive seizure history in the past, it is not clear when the last one was as history was not available. On PTA Depakote 1000 mg b.i.d. and topiramate 200 mg b.i.d.  He also had a witnessed episode of seizure today and we will continue to monitor the patient.  Keppra was loaded in the Emergency Room, was also given benzodiazepine.  Reportedly at baseline at time of admission. had a seizure on the evening of 8/25, approximately 5 minutes in duration aborted with benzos. No further seizures.   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  His sodium on presentation is 116.  He has also significant urine output,  Nephrology consulted. Urine osmolality and serum sodium consistent with SIADH.  Multiple medications on board that can induce SIADH but likely has been on it for long periods of time.  No  pulmonary etiology identified.  MRI brain without any acute abnormality.  Renal function is normal and TSH is slightly high but unlikely to be primary culprit.  - Nephrology following, signed off    - fluid restriction 2000 ml / day    - discontinue D5W    - expect urine output increase again in 8 hours or so once DDAVP wears off and sodium will rise up again    - BMP in AM    - palliative consult placed given concern for recurrence of hyponatremia related to psychogenic polydipsia once discharged, family requesting FULL CODE and full restorative cares at this time (see palliative note for details)     GOC  , FULL CODE, full restorative cares Palliative consult placed today to help define goals of care and return to hospital goals with family who act as his guardians     Chronic:   Hypothyroidism - PTA levothyroxine at 150 mcg p.o. daily   Autism, minimally to nonverbal at baseline - mood stabilizers as above   Dysphagia - diet per SLP eval   History of status epilepticus, Seizure disorder - PTA PTA Depakote 500 mg TID,  Topiramate 200 mg daily, Risperidone 200 mg daily, Lactulose nightly, lithium 600 mg at bedtime     Diet: Regular Diet Adult Mildly Thick (level 2)  Snacks/Supplements Adult: Magic Cup; Between Meals    DVT Prophylaxis: Enoxaparin (Lovenox) SQ  Colin Catheter: PRESENT, indication: Strict 1-2 Hour I&O  Central Lines: None  Cardiac Monitoring: ACTIVE order. Indication: ICU  Code Status: Full Code      Disposition Plan   { TIP  Click here to update expected discharge date and refresh note (tipsheet)     :[id number]}  Expected Discharge Date: 08/28/2022      Destination: group home          The patient's care was discussed with the Bedside Nurse, Patient and Patient's Family.    Queenie Vivar DO  Hospitalist Service  [facility]  Securely message with the Vocera Web Console (learn more here)  Text page via [employer] Paging/Directory     Clinically Significant Risk Factors Present on  Admission                     ______________________________________________________________________    Interval History   Patient is pleasant and denies any pain today   Cooperative with exam, more than previous   Continues to drink a lot of water and have significant amount of urine output  Interview was otherwise limited based on patient's underlying cognitive deficits  No other changes at this time    Data reviewed today: I reviewed all medications, new labs and imaging results over the last 24 hours. I personally reviewed no images or EKG's today.    Physical Exam   Vital Signs: Temp: 97.3  F (36.3  C) Temp src: Axillary BP: (!) 143/93 Pulse: 103   Resp: 20 SpO2: 99 % O2 Device: None (Room air)    Weight: 234 lbs 9.11 oz     Constitutional: awake, alert, no apparent distress   HEENT: Normocephalic, atraumatic  Respiratory: Normal work of breathing, good air exchange, clear to auscultation bilaterally, no crackles or wheezing   Cardiovascular: Regular rate and rhythm, no murmurs appreciated  GI: Soft and nontender to palpation, nondistended, no rebound or guarding  Skin: normal skin color, texture, turgor   Musculoskeletal: No deformities, no edema present  Neurologic:  Moves all extremities spontaneously, sits up for auscultation today, cooperates with deep breaths - following simple command s  Neuropsychiatric: smiling, happy, responds hi, unable to assess otherwise given developmental delay     Data   Recent Labs   Lab 08/27/22  0758 08/27/22  0520 08/27/22  0340 08/26/22  1551 08/26/22  1456 08/26/22  1149 08/26/22  1143 08/26/22  0823 08/26/22  0549 08/25/22  1159 08/25/22  0857 08/24/22  0417 08/24/22  0124 08/23/22  2136 08/23/22  2132   WBC  --  7.3  --   --   --   --   --   --  7.1  --   --   --  10.4  --  10.0   HGB  --  14.4  --   --   --   --   --   --  14.3  --   --   --  13.7   < > 14.0   MCV  --  91  --   --   --   --   --   --  87  --   --   --  83  --  87   PLT  --  223  --   --   --   --   --    --  228  --   --   --  194  --  213   NA  --  135*  --   --  132*  --  134*   < > 135*   < > 122*  122*   < > 118*   < > 116*   POTASSIUM  --  3.9  --   --   --   --   --   --  3.9  --  4.1   < >  --    < > 4.0   CHLORIDE  --  104  --   --   --   --   --   --  103  --  89*   < >  --    < > 82*   CO2  --  20*  --   --   --   --   --   --  25  --  22   < >  --   --  26   BUN  --  12.3  --   --   --   --   --   --  8.6  --  12.3   < >  --    < > 10.3   CR  --  0.61*  --   --   --   --   --   --  0.62*  --  0.64*   < > 0.47*   < > 0.56*   ANIONGAP  --  11  --   --   --   --   --   --  7  --  11   < >  --   --  8   JESSICA  --  9.5  --   --   --   --   --   --  9.8  --  8.9   < >  --   --  8.9   * 93 101*   < >  --    < >  --   --  133*   < > 145*   < >  --    < > 141*   ALBUMIN  --   --   --   --   --   --   --   --   --   --   --   --   --   --  4.1   PROTTOTAL  --   --   --   --   --   --   --   --   --   --   --   --   --   --  7.1   BILITOTAL  --   --   --   --   --   --   --   --   --   --   --   --   --   --  0.3   ALKPHOS  --   --   --   --   --   --   --   --   --   --   --   --   --   --  56   ALT  --   --   --   --   --   --   --   --   --   --   --   --   --   --  12   AST  --   --   --   --   --   --   --   --   --   --   --   --   --   --  26    < > = values in this interval not displayed.     No results found for this or any previous visit (from the past 24 hour(s)).  Medications       divalproex sodium extended-release  500 mg Oral TID     docusate sodium  100 mg Oral BID     enoxaparin ANTICOAGULANT  40 mg Subcutaneous Q24H     Lacosamide  100 mg Oral BID     lactulose  30 mL Oral QPM     levothyroxine  150 mcg Oral QAM AC     risperiDONE  2 mg Oral BID     sertraline  150 mg Oral QAM     topiramate  200 mg Oral Daily     Vitamin D3  50 mcg Oral Daily

## 2022-08-27 NOTE — PLAN OF CARE
Right wrist and Left wrist restraints initiated on patient on 8/27/2022 at 4:19 PM    Clinical Justification: Pulling lines, pulling tubes, and pulling equipment  Less Restrictive Alternative: Reorientation, De-escalation  Attending Physician Notified: MD ordered restraint,     Order received: Yes   (paged physician for order)  Family Notification: Other   Criteria explained to Patient  Patient's Response: No evidence of learning  Restraint care Plan initiated: Yes    April Hanley, RN

## 2022-08-27 NOTE — PROGRESS NOTES
Pt increasingly more agitated and became aggressive, despite ativan. TRestraints applied. MD paged. Pt began kicking staff and biting at tubes. Haldol administered.

## 2022-08-28 LAB
ANION GAP SERPL CALCULATED.3IONS-SCNC: 11 MMOL/L (ref 7–15)
BUN SERPL-MCNC: 18.5 MG/DL (ref 6–20)
CALCIUM SERPL-MCNC: 9.7 MG/DL (ref 8.6–10)
CHLORIDE SERPL-SCNC: 104 MMOL/L (ref 98–107)
CREAT SERPL-MCNC: 0.67 MG/DL (ref 0.67–1.17)
DEPRECATED HCO3 PLAS-SCNC: 25 MMOL/L (ref 22–29)
GFR SERPL CREATININE-BSD FRML MDRD: >90 ML/MIN/1.73M2
GLUCOSE SERPL-MCNC: 103 MG/DL (ref 70–99)
MAGNESIUM SERPL-MCNC: 1.8 MG/DL (ref 1.7–2.3)
POTASSIUM SERPL-SCNC: 3.8 MMOL/L (ref 3.4–5.3)
SODIUM SERPL-SCNC: 140 MMOL/L (ref 136–145)

## 2022-08-28 PROCEDURE — 250N000013 HC RX MED GY IP 250 OP 250 PS 637: Performed by: STUDENT IN AN ORGANIZED HEALTH CARE EDUCATION/TRAINING PROGRAM

## 2022-08-28 PROCEDURE — 99233 SBSQ HOSP IP/OBS HIGH 50: CPT | Performed by: STUDENT IN AN ORGANIZED HEALTH CARE EDUCATION/TRAINING PROGRAM

## 2022-08-28 PROCEDURE — 250N000013 HC RX MED GY IP 250 OP 250 PS 637: Performed by: INTERNAL MEDICINE

## 2022-08-28 PROCEDURE — 250N000011 HC RX IP 250 OP 636: Performed by: STUDENT IN AN ORGANIZED HEALTH CARE EDUCATION/TRAINING PROGRAM

## 2022-08-28 PROCEDURE — 36415 COLL VENOUS BLD VENIPUNCTURE: CPT | Performed by: STUDENT IN AN ORGANIZED HEALTH CARE EDUCATION/TRAINING PROGRAM

## 2022-08-28 PROCEDURE — 250N000011 HC RX IP 250 OP 636: Performed by: INTERNAL MEDICINE

## 2022-08-28 PROCEDURE — 80048 BASIC METABOLIC PNL TOTAL CA: CPT | Performed by: STUDENT IN AN ORGANIZED HEALTH CARE EDUCATION/TRAINING PROGRAM

## 2022-08-28 PROCEDURE — 83735 ASSAY OF MAGNESIUM: CPT | Performed by: STUDENT IN AN ORGANIZED HEALTH CARE EDUCATION/TRAINING PROGRAM

## 2022-08-28 PROCEDURE — 200N000001 HC R&B ICU

## 2022-08-28 RX ORDER — MAGNESIUM SULFATE HEPTAHYDRATE 40 MG/ML
2 INJECTION, SOLUTION INTRAVENOUS ONCE
Status: COMPLETED | OUTPATIENT
Start: 2022-08-28 | End: 2022-08-28

## 2022-08-28 RX ORDER — MAGNESIUM OXIDE 400 MG/1
400 TABLET ORAL EVERY 4 HOURS
Status: DISCONTINUED | OUTPATIENT
Start: 2022-08-28 | End: 2022-08-28 | Stop reason: CLARIF

## 2022-08-28 RX ADMIN — LEVOTHYROXINE SODIUM 150 MCG: 150 TABLET ORAL at 08:00

## 2022-08-28 RX ADMIN — SERTRALINE HYDROCHLORIDE 150 MG: 100 TABLET ORAL at 08:00

## 2022-08-28 RX ADMIN — Medication 5 MG: at 21:13

## 2022-08-28 RX ADMIN — TOPIRAMATE 200 MG: 50 TABLET, FILM COATED ORAL at 08:00

## 2022-08-28 RX ADMIN — Medication 50 MCG: at 08:00

## 2022-08-28 RX ADMIN — DOCUSATE SODIUM 100 MG: 100 CAPSULE, LIQUID FILLED ORAL at 21:13

## 2022-08-28 RX ADMIN — LACTULOSE 30 ML: 20 SOLUTION ORAL at 19:59

## 2022-08-28 RX ADMIN — HALOPERIDOL LACTATE 2 MG: 5 INJECTION, SOLUTION INTRAMUSCULAR at 13:18

## 2022-08-28 RX ADMIN — ENOXAPARIN SODIUM 40 MG: 40 INJECTION SUBCUTANEOUS at 07:52

## 2022-08-28 RX ADMIN — LACOSAMIDE 100 MG: 100 TABLET, FILM COATED ORAL at 05:20

## 2022-08-28 RX ADMIN — DIVALPROEX SODIUM 500 MG: 500 TABLET, FILM COATED, EXTENDED RELEASE ORAL at 19:59

## 2022-08-28 RX ADMIN — DIVALPROEX SODIUM 500 MG: 500 TABLET, FILM COATED, EXTENDED RELEASE ORAL at 08:00

## 2022-08-28 RX ADMIN — LACOSAMIDE 100 MG: 100 TABLET, FILM COATED ORAL at 21:12

## 2022-08-28 RX ADMIN — DIVALPROEX SODIUM 500 MG: 500 TABLET, FILM COATED, EXTENDED RELEASE ORAL at 11:41

## 2022-08-28 RX ADMIN — RISPERIDONE 2 MG: 2 TABLET ORAL at 21:12

## 2022-08-28 RX ADMIN — DOCUSATE SODIUM 100 MG: 100 CAPSULE, LIQUID FILLED ORAL at 08:00

## 2022-08-28 RX ADMIN — MAGNESIUM SULFATE HEPTAHYDRATE 2 G: 40 INJECTION, SOLUTION INTRAVENOUS at 07:47

## 2022-08-28 RX ADMIN — RISPERIDONE 2 MG: 2 TABLET ORAL at 05:20

## 2022-08-28 ASSESSMENT — ACTIVITIES OF DAILY LIVING (ADL)
ADLS_ACUITY_SCORE: 43

## 2022-08-28 NOTE — PROGRESS NOTES
[facility]  Medicine Progress Note - Hospitalist Service  Date of Admission:  8/23/2022    Assessment & Plan        Duane Backes is a 42-year-old gentleman with a significant past medical history of status epilepticus, seizure disorder, encephalopathy, moderate MR, hypothyroidism, electrolyte disturbance, who presented from group home 8/23 for suspected seizure, found to be hyponatremic. Nephrology consulted with suspicion for psychogenic polydipsia vs SIADH as source. Sodium WNL, plan to monitor after reinitiating PTA medications with plans for discharge to group home 8/29/22.     Seizure episode in a patient with seizure disorder and history of status epilepticus, resolved   Severe hyponatremia likely precipitant as below, resolved   The patient has extensive seizure history in the past, it is not clear when the last one was as history was not available. On PTA Depakote 1000 mg b.i.d. and topiramate 200 mg b.i.d.  He also had a witnessed episode of seizure today and we will continue to monitor the patient.  Keppra was loaded in the Emergency Room, was also given benzodiazepine.  Reportedly at baseline at time of admission. had a seizure on the evening of 8/25, approximately 5 minutes in duration aborted with benzos. No further seizures as of 08/28.   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  His sodium on presentation is 116 with significant urine output, Nephrology consulted, labs consistent with SIADH likely medication related with psychogenic polydipsia related to underlying cognitive delays. No  pulmonary etiology identified.  MRI brain without any acute abnormalities. Renal function is normal and TSH is slightly high but unlikely to be primary culprit.  - Nephrology following, signed off    - fluid restriction 2000 ml / day at discharge     - BMP in AM    - palliative consult placed given concern for recurrence of hyponatremia related to psychogenic polydipsia once discharged, family requesting FULL CODE and full restorative cares at this time (see palliative note for details)     Goals of Care   FULL CODE with full restorative cares Palliative consult placed to help define goals of care and return to hospital goals with family who act as his guardians given difficulty with cognitive delay and concern for recurrence of hyponatremia that may lead to recurrent seizures.     Chronic:   Hypothyroidism - PTA levothyroxine at 150 mcg p.o. daily   Autism, minimally to nonverbal at baseline - mood stabilizers as above   Dysphagia - diet per SLP eval   History of status epilepticus, Seizure disorder - PTA PTA Depakote 500 mg TID,  Topiramate 200 mg daily, Risperidone 200 mg daily, Lactulose nightly, lithium 600 mg at bedtime     Diet: Regular Diet Adult Mildly Thick (level 2)  Snacks/Supplements Adult: Magic Cup; Between Meals    DVT Prophylaxis: Enoxaparin (Lovenox) SQ  Colin Catheter: Not present  Central Lines: None  Cardiac Monitoring: None  Code Status: Full Code      Disposition Plan      Expected Discharge Date: 08/30/2022      Destination: group home          The patient's care was discussed with the Bedside Nurse, Patient and Patient's Family.    Queenie Vivar DO  Hospitalist Service  [facility]  Securely message with the [employer] Web Console (learn more here)  Text page via [employer] Paging/Directory     Clinically Significant Risk Factors Present on Admission                      ______________________________________________________________________    Interval History   Episode of  agitation yesterday after his parents came to visit   Electrolytes now normalized   Patient is pleasant and denies any pain today   Continues to drink a lot of water and have significant amount of urine output  Interview was otherwise limited based on patient's underlying cognitive deficits  No other changes noted at this time , discussed with bedside nurse who also did not have new concerns     Data reviewed today: I reviewed all medications, new labs and imaging results over the last 24 hours. I personally reviewed no images or EKG's today.    Physical Exam   Vital Signs: Temp: (!) 96.5  F (35.8  C) Temp src: Axillary BP: (!) 148/92 Pulse: 107   Resp: 16 SpO2: 98 %      Weight: 234 lbs 9.11 oz     Constitutional: awake, alert, no apparent distress   HEENT: Normocephalic, atraumatic  Respiratory: Normal work of breathing, good air exchange, clear to auscultation bilaterally, no crackles or wheezing   Cardiovascular: Regular rate and rhythm, no murmurs appreciated  GI: Soft and nontender to palpation, nondistended, no rebound or guarding  Skin: normal skin color, texture, turgor   Musculoskeletal: No deformities, no edema present  Neurologic:  Moves all extremities spontaneously, sits up for auscultation today, cooperates with deep breaths - following simple command s  Neuropsychiatric: smiling, happy, responds hi, unable to assess otherwise given developmental delay     Data   Recent Labs   Lab 08/28/22  0542 08/27/22  0758 08/27/22  0520 08/26/22  1551 08/26/22  1456 08/26/22  0823 08/26/22  0549 08/24/22  0417 08/24/22  0124 08/23/22  2136 08/23/22  2132   WBC  --   --  7.3  --   --   --  7.1  --  10.4  --  10.0   HGB  --   --  14.4  --   --   --  14.3  --  13.7   < > 14.0   MCV  --   --  91  --   --   --  87  --  83  --  87   PLT  --   --  223  --   --   --  228  --  194  --  213     --  135*  --  132*   < > 135*   < > 118*   < > 116*   POTASSIUM 3.8  --  3.9  --   --   --  3.9   < >  --    < > 4.0    CHLORIDE 104  --  104  --   --   --  103   < >  --    < > 82*   CO2 25  --  20*  --   --   --  25   < >  --   --  26   BUN 18.5  --  12.3  --   --   --  8.6   < >  --    < > 10.3   CR 0.67  --  0.61*  --   --   --  0.62*   < > 0.47*   < > 0.56*   ANIONGAP 11  --  11  --   --   --  7   < >  --   --  8   JESSICA 9.7  --  9.5  --   --   --  9.8   < >  --   --  8.9   * 127* 93   < >  --    < > 133*   < >  --    < > 141*   ALBUMIN  --   --   --   --   --   --   --   --   --   --  4.1   PROTTOTAL  --   --   --   --   --   --   --   --   --   --  7.1   BILITOTAL  --   --   --   --   --   --   --   --   --   --  0.3   ALKPHOS  --   --   --   --   --   --   --   --   --   --  56   ALT  --   --   --   --   --   --   --   --   --   --  12   AST  --   --   --   --   --   --   --   --   --   --  26    < > = values in this interval not displayed.     No results found for this or any previous visit (from the past 24 hour(s)).  Medications       divalproex sodium extended-release  500 mg Oral TID     docusate sodium  100 mg Oral BID     enoxaparin ANTICOAGULANT  40 mg Subcutaneous Q24H     Lacosamide  100 mg Oral BID     lactulose  30 mL Oral QPM     levothyroxine  150 mcg Oral QAM AC     magnesium oxide  400 mg Oral Q4H     melatonin  5 mg Oral Daily with supper     risperiDONE  2 mg Oral BID     sertraline  150 mg Oral QAM     topiramate  200 mg Oral Daily     Vitamin D3  50 mcg Oral Daily

## 2022-08-28 NOTE — PLAN OF CARE
Shift Events: Mg+ replaced. Voiding post roman catheter removal. Haldol x1 for agitation non-redirectable.     Neuro: Alert to self, appears to be at baseline. Impulsive- sitter remains at bedside  CV: Telemetry dc'd. Normotensive.  Pulm: Clr LS. RA  GI: Passing flatus. No BM. Good PO intake.  : Voiding spontaneously  Lines/gtts: PIV x1  Skin: Bruising to left foot    For vital signs and complete assessments, see documentation flowsheets.     Plan: Group home staff plans to be here tomorrow (Monday) at 10 a.m. for pt discharge, per SW

## 2022-08-28 NOTE — PLAN OF CARE
Right wrist and Left wrist restraints discontinued at 2225  on 8/27/2022.    Restraint discontinue criteria met, patient is calm, cooperative and safe. Restraints removed.     Patient's Response: No evidence of learning  Family Notification: Other  Attending Physician Notified: MD ordered restraint,      Rebecca Funez RN

## 2022-08-28 NOTE — PLAN OF CARE
ICU End of Shift Summary.  For vital signs and complete assessments, please see documentation flowsheets.      Pertinent assessments: Patient slept well tonight (did not sleep at all the night before). Restraints removed, mitts removed to allow use of sensory objects. Many reminders needed to leave heart monitor on.   Neuro: Afebrile, VIDYA orientation, follows commands  Cardiac: SR most of night  Resp: LS clear, on RA  GI: Obese, due for BM  : Urine output slowed down tonight from previous night  Skin: Bruised foot  Lines: PIV x 2  Drips: None    Major Shift Events:   Slept from 9748-8413, PO meds held during this time since patient did not sleep much in last 24 hours.   Plan (Upcoming Events): Promote calm environment and diversion activities.   Discharge/Transfer Needs: TBD pending progress, ready to transfer to medical floor when bed available.     Bedside Shift Report Completed   Bedside Safety Check Completed         Goal Outcome Evaluation:    Plan of Care Reviewed With: patient     Overall Patient Progress: improving

## 2022-08-28 NOTE — PROGRESS NOTES
Care Management Follow Up    Length of Stay (days): 5    Expected Discharge Date: 08/29/2022     Concerns to be Addressed: care coordination/care conferences, discharge planning     Patient plan of care discussed at interdisciplinary rounds: Yes    Anticipated Discharge Disposition: Group Home     Anticipated Discharge Services: None  Anticipated Discharge DME: None    Referrals Placed by CM/SW: External Care Coordination    Additional Information:  ICU staff called to make sure GROUP HOME was aware of plan for d.c tomorrow    Called [facility] and Spoke with Gaurav [phone]  Fax [phone]. He is aware of the plan and able to provide transportation home. ELLE asked if possible  before 11:00 am. Gaurav stated that should be fine. He requested PC in the morning for update and to verify he is still able to  before 11:00 am.    Reminder any new meds sent to [facility],  Called and left VM message for  Seema, pts Mom and guardian.       Corinne C. White, LSW   In Patient Care mgt team   [phone]

## 2022-08-28 NOTE — CONSULTS
Care Management Follow Up    Length of Stay (days): 5    Expected Discharge Date: 08/29/2022     Concerns to be Addressed: care coordination/care conferences, discharge planning     Patient plan of care discussed at interdisciplinary rounds: Yes    Anticipated Discharge Disposition: Group Home     Anticipated Discharge Services: None  Anticipated Discharge DME: None    Patient/family educated on Medicare website which has current facility and service quality ratings: no  Education Provided on the Discharge Plan:  yes  Patient/Family in Agreement with the Plan: unable to assess    Referrals Placed by CM/SW: External Care Coordination  Private pay costs discussed: Not applicable    Additional Information:  Notified by ICU charge, patient likely to discharge tomorrow 8/29. Patient resides at [facility]#[phone]  Fax #: [phone]. CC called [facility] and spoke with [facility] staff, Gaurav. He verifies the [facility] will be able to accept patient discharging tomorrow. [facility] has an accessible van and plans to provide transportation. Gaurav states staff can be available at bedside around 10 am tomorrow 8/29.     New meds/prescriptions to be filled at [facility].     Spoke with patient co-guardian/mother, Seema, she is agreeable to patient discharging tomorrow and return to .     CC will continue to follow for discharge planning to [facility]. Will fax discharge instructions to [facility] once available.     Celeste Clarke RN Case Manager  Inpatient Care Coordination      [phone]        Celeste Clarke RN

## 2022-08-29 VITALS
OXYGEN SATURATION: 99 % | RESPIRATION RATE: 19 BRPM | WEIGHT: 234.57 LBS | SYSTOLIC BLOOD PRESSURE: 147 MMHG | HEART RATE: 96 BPM | DIASTOLIC BLOOD PRESSURE: 81 MMHG | BODY MASS INDEX: 30.12 KG/M2 | TEMPERATURE: 97.8 F

## 2022-08-29 LAB
ANION GAP SERPL CALCULATED.3IONS-SCNC: 9 MMOL/L (ref 7–15)
BUN SERPL-MCNC: 18.9 MG/DL (ref 6–20)
CALCIUM SERPL-MCNC: 9.5 MG/DL (ref 8.6–10)
CHLORIDE SERPL-SCNC: 106 MMOL/L (ref 98–107)
CREAT SERPL-MCNC: 0.76 MG/DL (ref 0.67–1.17)
DEPRECATED HCO3 PLAS-SCNC: 26 MMOL/L (ref 22–29)
GFR SERPL CREATININE-BSD FRML MDRD: >90 ML/MIN/1.73M2
GLUCOSE SERPL-MCNC: 105 MG/DL (ref 70–99)
HOLD SPECIMEN: NORMAL
MAGNESIUM SERPL-MCNC: 1.8 MG/DL (ref 1.7–2.3)
POTASSIUM SERPL-SCNC: 4.5 MMOL/L (ref 3.4–5.3)
SODIUM SERPL-SCNC: 141 MMOL/L (ref 136–145)

## 2022-08-29 PROCEDURE — 83735 ASSAY OF MAGNESIUM: CPT | Performed by: STUDENT IN AN ORGANIZED HEALTH CARE EDUCATION/TRAINING PROGRAM

## 2022-08-29 PROCEDURE — 80048 BASIC METABOLIC PNL TOTAL CA: CPT | Performed by: STUDENT IN AN ORGANIZED HEALTH CARE EDUCATION/TRAINING PROGRAM

## 2022-08-29 PROCEDURE — 36415 COLL VENOUS BLD VENIPUNCTURE: CPT | Performed by: STUDENT IN AN ORGANIZED HEALTH CARE EDUCATION/TRAINING PROGRAM

## 2022-08-29 PROCEDURE — 250N000013 HC RX MED GY IP 250 OP 250 PS 637: Performed by: INTERNAL MEDICINE

## 2022-08-29 PROCEDURE — 99239 HOSP IP/OBS DSCHRG MGMT >30: CPT | Performed by: STUDENT IN AN ORGANIZED HEALTH CARE EDUCATION/TRAINING PROGRAM

## 2022-08-29 PROCEDURE — 250N000013 HC RX MED GY IP 250 OP 250 PS 637: Performed by: STUDENT IN AN ORGANIZED HEALTH CARE EDUCATION/TRAINING PROGRAM

## 2022-08-29 PROCEDURE — 250N000011 HC RX IP 250 OP 636: Performed by: INTERNAL MEDICINE

## 2022-08-29 RX ADMIN — DIVALPROEX SODIUM 500 MG: 500 TABLET, FILM COATED, EXTENDED RELEASE ORAL at 08:45

## 2022-08-29 RX ADMIN — ENOXAPARIN SODIUM 40 MG: 40 INJECTION SUBCUTANEOUS at 08:46

## 2022-08-29 RX ADMIN — LEVOTHYROXINE SODIUM 150 MCG: 150 TABLET ORAL at 08:45

## 2022-08-29 RX ADMIN — DOCUSATE SODIUM 100 MG: 100 CAPSULE, LIQUID FILLED ORAL at 08:46

## 2022-08-29 RX ADMIN — SERTRALINE HYDROCHLORIDE 150 MG: 100 TABLET ORAL at 08:45

## 2022-08-29 RX ADMIN — TOPIRAMATE 200 MG: 50 TABLET, FILM COATED ORAL at 08:45

## 2022-08-29 RX ADMIN — RISPERIDONE 2 MG: 2 TABLET ORAL at 08:45

## 2022-08-29 RX ADMIN — Medication 50 MCG: at 08:45

## 2022-08-29 RX ADMIN — LACOSAMIDE 100 MG: 100 TABLET, FILM COATED ORAL at 08:45

## 2022-08-29 ASSESSMENT — ACTIVITIES OF DAILY LIVING (ADL)
ADLS_ACUITY_SCORE: 43

## 2022-08-29 NOTE — PLAN OF CARE
ICU End of Shift Summary.  For vital signs and complete assessments, please see documentation flowsheets.      Pertinent assessments: Denies pain. Calm throughout much of night. Slept most of night. Woke patient up at start of shift for assessment and meds. Encouraged to stay awake a while to stay on track and not gets days and nights mixed up. Patient slept from about 2200 and rest of night with occasional use of urinal.   Neuro: Afebrile. Follows commands, redirectable.  Cardiac: Regular, no tele  Resp: remained on RA  GI: no BM's bowel meds given  : Voiding without difficulty  Skin: bruised foot  Lines: PIV x 1  Drips: None    Major Shift Events:     Plan (Upcoming Events): Dicharge back to group home today at 10am staff will be here to take back.  Discharge/Transfer Needs:      Bedside Shift Report Completed   Bedside Safety Check Completed         Goal Outcome Evaluation:    Plan of Care Reviewed With: patient     Overall Patient Progress: improving

## 2022-08-29 NOTE — DISCHARGE SUMMARY
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  Lithium held at   discharge, levels were undetectable while hospitalized, melatonin started   at bedtime. Reassess need/restart lithium at follow up appt. Repeat BMP to   check sodium levels.     Needs to follow a 2 L fluid restriction daily.    Holding PTA lithium 600 mg at bedtime until follow up with PCP, may be   implicated in presentation. Although lithium levels were undetectable on   arrival, unclear if lithium caused polyuria which led to hypovolemic   hyponatremia vs SIADH vs other which ultimately caused seizures. Sodium   remained stable on all PTA medications with the exception of Lithium which   was being help. He will need close follow up with PCP and repeat BMP to   ensure Na stays stable. In addition to a 2 L fluid restriction.             Discharge Disposition   Discharged to group home   Condition at discharge: Stable    Hospital Course          Duane Backes is a 42-year-old gentleman with a significant past medical  history of status epilepticus, seizure disorder, encephalopathy, moderate MR, hypothyroidism, electrolyte disturbance, who presented from group home 8/23 for suspected seizure, found to be hyponatremic. Nephrology consulted with suspicion for psychogenic polydipsia vs SIADH as source. Sodium WNL prior to discharge with no recurrent seizures.    Holding PTA lithium 600 mg at bedtime until follow up with PCP, may be implicated in presentation. Although lithium levels were undetectable on arrival, unclear if lithium caused polyuria which led to hypovolemic hyponatremia vs SIADH vs other which ultimately caused seizures. Sodium remained stable on all PTA medications with the exception of Lithium which was being held. He will need close follow up with PCP and repeat BMP to ensure Na stays stable. In addition to a 2 L fluid restriction.     Clear discharge instructions were provided for group home and for follow up. They understood and agreed the plan. Guardians updated by phone, understood and agreed to plan. He was discharged in stable condition with plan otherwise as outlined below.      Seizure episode in a patient with seizure disorder and history of status epilepticus, resolved   Severe hyponatremia likely precipitant as below, resolved   The patient has extensive seizure history in the past, is on PTA Depakote 1000 mg b.i.d. and topiramate 200 mg b.i.d.  He also had a witnessed episode of seizure on day of arrival. Keppra was loaded in the Emergency Room, was also given benzodiazepine.  Reportedly at baseline at time of admission. had a seizure on the evening of 8/25, approximately 5 minutes in duration aborted with benzos. No further seizures as of 08/28.   - holding PTA lithium 600 mg at bedtime until follow up with PCP, may be implicated in presentation. Although lithium levels were undetectable on arrival, unclear if lithium caused polyuria which led to hypovolemic hyponatremia vs SIADH vs other which  ultimately caused seizures. Sodium remained stable on all PTA medications with the exception of Lithium which was being help. He will need close follow up with PCP and repeat BMP to ensure Na stays stable. In addition to a 2 L fluid restriction.   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  His sodium on presentation is 116 with significant urine output, Nephrology consulted, labs consistent with SIADH likely medication related with psychogenic polydipsia related to underlying cognitive delays although cannot rule out lithium related polyuria or DI. Despite levels of lithium being undetectable will hold lithium on discharge as outlined above. No pulmonary etiology identified.  MRI brain without any acute abnormalities. Renal function is normal and TSH is slightly high but unlikely to be primary culprit.  - Nephrology following, signed off    - fluid restriction 2000 ml / day at discharge     Goals of Care   FULL CODE with full restorative cares Palliative consult placed to help define goals of care and return to hospital goals with family who act as his guardians given difficulty with cognitive delay and concern for recurrence of hyponatremia that may lead to recurrent seizures. Family requesting ongoing FULL CODE and full restorative cares at this time (see palliative note for details)     Chronic:   Hypothyroidism - PTA levothyroxine at 150 mcg p.o. daily   Autism, minimally to nonverbal at baseline - mood stabilizers as above   Dysphagia - diet per SLP eval   History of  status epilepticus, Seizure disorder - PTA PTA Depakote 500 mg TID,  Topiramate 200 mg daily, Risperidone 200 mg daily, Lactulose nightly, holding lithium 600 mg at bedtime    Consultations This Hospital Stay   NEPHROLOGY IP CONSULT  VASCULAR ACCESS ADULT IP CONSULT  SPEECH LANGUAGE PATH ADULT IP CONSULT  PODIATRY IP CONSULT  PALLIATIVE CARE ADULT IP CONSULT  CARE MANAGEMENT / SOCIAL WORK IP CONSULT    Code Status   Full Code    Time Spent on this Encounter   I, Queenie Vivar DO, personally saw the patient today and spent greater than 30 minutes discharging this patient.       Queenie Vivar DO  [facility] ICU  [address] [place] [address]  Phone: [phone]  Fax: [phone]  ______________________________________________________________________    Physical Exam   Vital Signs: Temp: 97.8  F (36.6  C) Temp src: Oral BP: (!) 147/81 Pulse: 96   Resp: 19 SpO2: 99 % O2 Device: None (Room air)    Weight: 234 lbs 9.11 oz     Constitutional: awake, alert, no apparent distress   HEENT: Normocephalic, atraumatic  Respiratory: Normal work of breathing, good air exchange, clear to auscultation bilaterally, no crackles or wheezing   Cardiovascular: Regular rate and rhythm, no murmurs appreciated  GI: Soft and nontender to palpation, nondistended, no rebound or guarding  Skin: normal skin color, texture, turgor   Musculoskeletal: No deformities, no edema present  Neurologic:  Moves all extremities spontaneously, sits up for auscultation today, cooperates with deep breaths - following simple command s  Neuropsychiatric: smiling, happy, responds hi, unable to assess otherwise given developmental delay      Primary Care Physician   Rajat Parkinson    Discharge Orders      Reason for your hospital stay    Low sodium levels , possibly related to medications or too much fluid intake     Activity    Your activity upon discharge: activity as tolerated     Follow-up and recommended labs and  tests     Follow up with primary care provider, Rajat Parkinson, within 7 days to evaluate medication change and for hospital follow- up.  Lithium held at discharge, levels were undetectable while hospitalized, melatonin started at bedtime. Reassess need/restart lithium at follow up appt. Repeat BMP to check sodium levels.     Needs to follow a 2 L fluid restriction daily.    Holding PTA lithium 600 mg at bedtime until follow up with PCP, may be implicated in presentation. Although lithium levels were undetectable on arrival, unclear if lithium caused polyuria which led to hypovolemic hyponatremia vs SIADH vs other which ultimately caused seizures. Sodium remained stable on all PTA medications with the exception of Lithium which was being help. He will need close follow up with PCP and repeat BMP to ensure Na stays stable. In addition to a 2 L fluid restriction.     Diet    Follow this diet upon discharge: continue thickened diet and 2 liter fluid restriction daily     Orders Placed This Encounter      Snacks/Supplements Adult: Magic Cup; Between Meals      Regular Diet Adult Mildly Thick (level 2)       Significant Results and Procedures   Most Recent 3 CBC's:  Recent Labs   Lab Test 08/27/22  0520 08/26/22  0549 08/24/22  0124   WBC 7.3 7.1 10.4   HGB 14.4 14.3 13.7   MCV 91 87 83    228 194     Most Recent 3 BMP's:  Recent Labs   Lab Test 08/29/22  0544 08/28/22  0542 08/27/22  0758 08/27/22  0520    140  --  135*   POTASSIUM 4.5 3.8  --  3.9   CHLORIDE 106 104  --  104   CO2 26 25  --  20*   BUN 18.9 18.5  --  12.3   CR 0.76 0.67  --  0.61*   ANIONGAP 9 11  --  11   JESSICA 9.5 9.7  --  9.5   * 103* 127* 93   ,   Results for orders placed or performed during the hospital encounter of 08/23/22   XR Chest Port 1 View    Narrative    EXAM: XR CHEST PORT 1 VIEW  LOCATION: [facility]  DATE/TIME: 8/23/2022 10:00 PM    INDICATION: Concern for aspiration.  COMPARISON:  02/28/2021.      Impression    IMPRESSION: There is a shallow inspiratory effort which accentuates heart size and pulmonary vascularity. No obvious acute cardiopulmonary findings are identified. Since 02/28/2021, the appliances have been removed and the prior seen area of increased   density in the mid to lower junction of the left lung has resolved.       Discharge Medications   Current Discharge Medication List      START taking these medications    Details   !! melatonin 5 MG tablet Take 1 tablet (5 mg) by mouth At Bedtime  Qty: 30 tablet, Refills: 0    Associated Diagnoses: Status epilepticus (H); Hyponatremia; Hypomagnesemia; Hypothyroidism, unspecified type; History of COVID-19; Altered mental status, unspecified altered mental status type; Seizure disorder (H); Obesity, Class I, BMI 30-34.9; Hypothyroidism due to acquired atrophy of thyroid; CARDIOVASCULAR SCREENING; LDL GOAL LESS THAN 160; Dry skin; Generalized tonic clonic epilepsy (H); Chronic constipation; Active autistic disorder; Moderate intellectual disability       !! - Potential duplicate medications found. Please discuss with provider.      CONTINUE these medications which have NOT CHANGED    Details   ammonium lactate (LAC-HYDRIN) 12 % external lotion Apply topically daily To feet and heels      diazepam (VALIUM) 5 MG/ML (HIGH CONC) solution Diazepam: Give 1 ml (5mg): Give 1 ml for ANY seizure. May repeat once 1 ml (5 mg). Monitor breathing, if there any concerns call 911. Do not exceed 10 mg per day.  Qty: 30 mL, Refills: 5    Associated Diagnoses: Seizure disorder (H)      divalproex sodium extended-release (DEPAKOTE ER) 500 MG 24 hr tablet Take 1 tablet (500 mg) by mouth 3 times daily  Qty: 90 tablet, Refills: 11    Associated Diagnoses: Seizure disorder (H)      docusate sodium (COLACE) 100 MG capsule TAKE 1 CAPSULE BY MOUTH TWICE DAILY.  Qty: 62 capsule, Refills: 11    Comments: SD21  Associated Diagnoses: Chronic constipation      ENULOSE 10  GM/15ML SOLUTION TAKE 30ML BY MOUTH EVERY EVENING AT 5PM;TAKE 30ML BY MOUTH TWICE DAILY AS NEEDED  Qty: 2880 mL, Refills: 11    Associated Diagnoses: Chronic constipation      fish oil-omega-3 fatty acids 1000 MG capsule TAKE 1 CAPSULE BY MOUTH TWICE DAILY  **NON-COVERED MED**  **PACK IN CARDS*  Qty: 100 capsule, Refills: 11    Associated Diagnoses: Constipation      GAVILAX 17 GM/SCOOP powder MIX 17 GRAMS IN LIQUID AND DRINK BY MOUTH ONCE DAILY FOR CONSTIPATION.  Qty: 510 g, Refills: 11    Associated Diagnoses: Constipation      Lacosamide (VIMPAT) 100 MG TABS tablet Take 100 mg by mouth 2 times daily      levothyroxine (SYNTHROID/LEVOTHROID) 150 MCG tablet TAKE 1 TABLET BY MOUTH ONCE DAILY  FOR THYROID  Qty: 31 tablet, Refills: 8    Comments: SD21  Associated Diagnoses: Hypothyroidism due to acquired atrophy of thyroid      MILK OF MAGNESIA 400 MG/5ML suspension Take 30 mLs by mouth every evening  Qty: 3780 mL, Refills: 0    Associated Diagnoses: Chronic constipation      risperiDONE (RISPERDAL) 2 MG tablet Take 2 mg by mouth 2 times daily 1 tab every morning & 1 tab every bedtime      sertraline (ZOLOFT) 100 MG tablet Take 1.5 tablets by mouth every morning.      topiramate (TOPAMAX) 200 MG tablet TAKE 1 TABLET BY MOUTH TWICE DAILY.  Qty: 60 tablet, Refills: 11    Associated Diagnoses: Seizure disorder (H)      VITAMIN D3 25 MCG (1000 UT) tablet TAKE 3 TABLETS (3000 UNITS) BY MOUTH ONCE DAILY  Qty: 100 tablet, Refills: 11    Comments: RX -PLEASE ADVISE-THANK YOU  Associated Diagnoses: DIAGNOSIS NOT YET DEFINED      !! melatonin 3 MG tablet Take 1 tablet (3 mg) by mouth nightly as needed for sleep  Qty: 30 tablet, Refills: 11    Associated Diagnoses: Seizure disorder (H)      Starch, Thickening, POWD Combined with beverages, per instructions from Speech Therapy.  Qty: 850 g, Refills: 99    Associated Diagnoses: Pharyngeal dysphagia       !! - Potential duplicate medications found. Please discuss with  provider.      STOP taking these medications       diazepam (VALIUM) 5 MG tablet Comments:   Reason for Stopping:         lactulose (CHRONULAC) 10 GM/15ML solution Comments:   Reason for Stopping:         lithium ER (LITHOBID) 300 MG CR tablet Comments:   Reason for Stopping:             Allergies   Allergies   Allergen Reactions     Penicillin [Penicillins] Other (See Comments)     Prozac [Fluoxetine Hcl]      Reglan [Metoclopramide Hcl]      Ritalin [Methylphenidate Derivatives] Other (See Comments)     Ritalin     Trazodone

## 2022-08-29 NOTE — PROGRESS NOTES
Care Management Discharge Note    Discharge Date: 08/29/2022       Discharge Disposition: Group Home    Discharge Services: None    Discharge DME: None    Discharge Transportation: agency -  [employer] w/c    Private pay costs discussed: transportation costs   Reviewed out of pocket cost for [facility] transport, $81.80 for base rate and $5.26 per mile to the destination. Spoke with the pt's dad/guardian, they expressed understanding and are agreeable to this.     PAS Confirmation Code:  (N/A)  Patient/family educated on Medicare website which has current facility and service quality ratings: no    Education Provided on the Discharge Plan:  yes  Persons Notified of Discharge Plans: Pt's dad/guardian Soren,   Patient/Family in Agreement with the Plan: yes    Handoff Referral Completed: No    Additional Information:  The pt will return to [facility] today via [facility] w/c at 1130.  The pt's  had called and said that they were no longer able to provide transport.    Medina spoke with the pt's dad/guardian, Soren, who agreed to the w/c transport.  He had no additional questions or concerns for Sw.    Medina confirmed the pt's discharge time with Gaurav at the pt's  P: [phone] F: [phone].  [facility]ina faxed the pt's discharge orders to Gaurav.  Gaurav requested that a copy also be sent with the pt at time of transport.    Sw will continue to be available as needed until discharge.      LINK Kenny, [facility]  Inpatient Care Coordination  [facility]  [phone]

## 2022-08-29 NOTE — PROGRESS NOTES
Pt discharged via wheelchair transport. Discharge paperwork sent with pt and transport representative. Family notified of discharge.

## 2022-08-30 DIAGNOSIS — G40.909 SEIZURE DISORDER (H): Primary | ICD-10-CM

## 2022-08-30 NOTE — PLAN OF CARE
Goal Outcome Evaluation:    Plan of Care Reviewed With: patient     ICU End of Shift Summary.  For vital signs and complete assessments, please see documentation flowsheets.      Pertinent assessments: Patient pleasant, calm and cooperative. Afebrile. VSS. RA. Lungs clear. Toelrating thickened liquids. 2L fluid restriction. Voiding without complications.     Major Shift Events: -Discharged    Plan (Upcoming Events): None    Discharge/Transfer Needs: Transferred home    1140 Transferred to [facility] via EMS

## 2022-09-06 RX ORDER — LACOSAMIDE 100 MG/1
100 TABLET ORAL 2 TIMES DAILY
Qty: 60 TABLET | Refills: 5 | Status: SHIPPED | OUTPATIENT
Start: 2022-09-06 | End: 2023-04-10

## 2022-10-18 DIAGNOSIS — E03.4 HYPOTHYROIDISM DUE TO ACQUIRED ATROPHY OF THYROID: ICD-10-CM

## 2022-10-18 NOTE — TELEPHONE ENCOUNTER
Routed to provider for refills.     Gaurav (group home staff) contacted clinic regarding refills below.     Ashley Cardoso RN, BSN   [facility]

## 2022-10-25 ENCOUNTER — TELEPHONE (OUTPATIENT)
Dept: FAMILY MEDICINE | Facility: CLINIC | Age: 42
End: 2022-10-25

## 2022-10-25 NOTE — TELEPHONE ENCOUNTER
"Shakira from [facility] called today [facility] concerns that patient has been urinating is \"strange places in large amounts (sink, bedroom floor).\" She explains that he has had urgency as well. Also, that he has been more tired and aggressive over the past 2 days as well.    Shakira denies that patient has had a fever, diabetes, urinary pain, seizures, incontinence or further sicknesses.      RN advised Shakira that if patient develops confusion, fevers and further worsening symptoms he needs to be evaluated by a physician immediately.  RN scheduled an appointment with PCP for 11/2/22 and advised staff of Urgent Care hours.     Ashley Cardoso RN, BSN   [facility]         "

## 2022-10-28 DIAGNOSIS — G40.909 SEIZURE DISORDER (H): ICD-10-CM

## 2022-10-29 RX ORDER — LEVOTHYROXINE SODIUM 150 UG/1
TABLET ORAL
Qty: 31 TABLET | Refills: 11 | Status: SHIPPED | OUTPATIENT
Start: 2022-10-29

## 2022-10-29 RX ORDER — SERTRALINE HYDROCHLORIDE 100 MG/1
150 TABLET, FILM COATED ORAL EVERY MORNING
Qty: 31 TABLET | Refills: 3 | OUTPATIENT
Start: 2022-10-29

## 2022-10-29 RX ORDER — RISPERIDONE 2 MG/1
2 TABLET ORAL 2 TIMES DAILY
Qty: 60 TABLET | Refills: 3 | OUTPATIENT
Start: 2022-10-29

## 2022-11-02 RX ORDER — DIVALPROEX SODIUM 500 MG/1
TABLET, EXTENDED RELEASE ORAL
Qty: 93 TABLET | Refills: 11 | OUTPATIENT
Start: 2022-11-02

## 2022-11-21 ENCOUNTER — TELEPHONE (OUTPATIENT)
Dept: FAMILY MEDICINE | Facility: CLINIC | Age: 42
End: 2022-11-21

## 2022-11-21 DIAGNOSIS — G40.909 SEIZURE DISORDER (H): ICD-10-CM

## 2022-11-21 RX ORDER — SERTRALINE HYDROCHLORIDE 100 MG/1
150 TABLET, FILM COATED ORAL EVERY MORNING
Status: CANCELLED | OUTPATIENT
Start: 2022-11-21

## 2022-11-21 RX ORDER — DIVALPROEX SODIUM 500 MG/1
500 TABLET, EXTENDED RELEASE ORAL
Qty: 90 TABLET | Refills: 11 | Status: CANCELLED | OUTPATIENT
Start: 2022-11-21

## 2022-11-21 RX ORDER — RISPERIDONE 2 MG/1
2 TABLET ORAL 2 TIMES DAILY
Status: CANCELLED | OUTPATIENT
Start: 2022-11-21

## 2022-11-21 NOTE — TELEPHONE ENCOUNTER
Reason for Call:  Other prescription    Detailed comments: ALL MEDICATIONS NEED REFILLS UNTIL 12/23 APPOINTMENT.    Phone Number Patient can be reached at: Other phone number: [phone]    Best Time: 7AM-3PM     Can we leave a detailed message on this number? YES    Call taken on 11/21/2022 at 10:07 AM by Olya Davis

## 2022-11-21 NOTE — TELEPHONE ENCOUNTER
Called and spoke to the Care Coordinator and she states that there are 3 medications that need to be sent to [facility]. T'd up  Pharmacy and pended meds sending to the refill pool.  Linda Bhatt [facility]  2nd Floor  Primary Care

## 2022-11-23 ENCOUNTER — TELEPHONE (OUTPATIENT)
Dept: FAMILY MEDICINE | Facility: CLINIC | Age: 42
End: 2022-11-23

## 2022-11-23 NOTE — TELEPHONE ENCOUNTER
Debi Davis,  from [facility] calling to schedule an appointment for redness-rash on right side of lip that is itching and bothersome. Now has rash on the face.    Also has diarrhea of two episodes.  Advised to take Imodium OTC following instructions on the bottle.    OV scheduled with Dr. Sanchez on Friday.    Kayla Dewey RN  [facility]

## 2022-11-26 ENCOUNTER — HOSPITAL ENCOUNTER (EMERGENCY)
Facility: CLINIC | Age: 42
Discharge: HOME | End: 2022-11-26
Attending: EMERGENCY MEDICINE | Admitting: EMERGENCY MEDICINE
Payer: MEDICARE

## 2022-11-26 VITALS
SYSTOLIC BLOOD PRESSURE: 118 MMHG | DIASTOLIC BLOOD PRESSURE: 76 MMHG | TEMPERATURE: 97.6 F | RESPIRATION RATE: 16 BRPM | OXYGEN SATURATION: 97 % | HEART RATE: 88 BPM

## 2022-11-26 DIAGNOSIS — L30.9 ECZEMA OF FACE: ICD-10-CM

## 2022-11-26 PROCEDURE — 250N000013 HC RX MED GY IP 250 OP 250 PS 637: Performed by: EMERGENCY MEDICINE

## 2022-11-26 PROCEDURE — 99283 EMERGENCY DEPT VISIT LOW MDM: CPT

## 2022-11-26 RX ORDER — LORATADINE 10 MG/1
10 TABLET ORAL DAILY
Qty: 14 TABLET | Refills: 0 | Status: SHIPPED | OUTPATIENT
Start: 2022-11-26 | End: 2022-12-10

## 2022-11-26 RX ORDER — CETIRIZINE HYDROCHLORIDE 10 MG/1
10 TABLET ORAL ONCE
Status: COMPLETED | OUTPATIENT
Start: 2022-11-26 | End: 2022-11-26

## 2022-11-26 RX ORDER — BENZOCAINE/MENTHOL 6 MG-10 MG
LOZENGE MUCOUS MEMBRANE 2 TIMES DAILY
Qty: 30 G | Refills: 0 | Status: SHIPPED | OUTPATIENT
Start: 2022-11-26 | End: 2022-12-10

## 2022-11-26 RX ADMIN — CETIRIZINE HYDROCHLORIDE 10 MG: 10 TABLET, FILM COATED ORAL at 10:27

## 2022-11-26 ASSESSMENT — ACTIVITIES OF DAILY LIVING (ADL): ADLS_ACUITY_SCORE: 35

## 2022-11-26 NOTE — DISCHARGE INSTRUCTIONS
"Diagnosis: I think the symptoms are likely due to an irritation to the face rather than a skin infection, though the situation can change so you should monitor for changes in the rash.  What do you do next:   Continue your home medications unless we have specifically changed them  START: Using \"hydrocortisone\" cream and \"Claritin\" tablets to help reduce the redness on the face.  A thin area of the steroid cream should be applied to the red areas (trying to avoid the eyes) and then a thin layer of unscented lotion such as Eucerin or Rosio could be applied over the top of this.  Follow up as indicated below    When do you return: If you have worsening rash, trouble breathing/swallowing, swelling of the face or eyes, wheezing breath sounds, or any other symptoms that concern you, please return to the ED for reevaluation.    Thank you for allowing us to care for you today.    "

## 2022-11-26 NOTE — ED PROVIDER NOTES
History   Chief Complaint:  Allergic Reaction    HPI   Duane D Backes is a 42 year old male who presents from a group home due to a facial rash.  The patient is not able to give me any information about this and no other information was available to nursing.  The patient was reported to have been able to eat breakfast without issue today but because the group home noted redness of his face they sent him here.  The patient does not appear to have any shortness of breath.    Per record review, it appears that he does have autism and is at baseline not verbal.    A worker from the patient's group home arrived later.  He notes that the patient's face is not usually red and this seems to be an acute finding this morning.  The last time the  was with the patient there was no facial redness.  There were no reported new soaps or detergents.    Review of Systems   Unable to perform ROS: Patient nonverbal   Skin: Positive for rash.     Allergies:  Penicillin [Penicillins]  Prozac [Fluoxetine Hcl]  Reglan [Metoclopramide Hcl]  Ritalin [Methylphenidate Derivatives]  Trazodone    Medications:    Diazepam   Divalproex    Lacosamide   Lactulose   Levothyroxine   Melatonin   Risperidone   Sertraline   Topiramate     Past Medical History:   Generalized tonic clonic epilepsy   Benign neoplasm of brain   Seizure disorder  Status epilepticus   Hypothyroidism   Moderate mental retardation   Active autistic disorder  OCD  Obesity   AMS    Social History:  The patient presents to the emergency department alone.   The patient lives in a group home.   PCP: Rajat Parkinson MD    Physical Exam     Patient Vitals for the past 24 hrs:   BP Temp Temp src Pulse Resp SpO2   11/26/22 1030 -- -- -- -- 16 97 %   11/26/22 0958 118/76 97.6  F (36.4  C) Temporal 88 18 95 %     Physical Exam  Constitutional: Vital signs reviewed as above.   Head: No external signs of trauma noted.  Eyes: Pupils are equal, round, and reactive to  light.   Neck: No JVD noted. No stridor noted.  Cardiovascular: normal rate, Regular rhythm and normal heart sounds.  No murmur heard. Equal B/L peripheral pulses.  Pulmonary/Chest: Effort normal and breath sounds normal. No respiratory distress. Patient has no wheezes. Patient has no rales.   Gastrointestinal: Soft. There is no tenderness.   Musculoskeletal/Extremities: No deformities noted. Normal tone.  Neurological: Patient is alert and oriented to person, place, and time.   Skin: Erythema of the face. This seems somewhat dry/eczematous in nature.  No gross signs of cellulitis/erysipelas.  Psychiatric: The patient appears calm.    Emergency Department Course     Emergency Department Course:  ED Course as of 11/26/22 1207   Sat Nov 26, 2022   1012 Dr. Jacobo' evaluation   1023 Rechecked and discussed with patient's GH worker.   1056 Rechecked     Reviewed:  I reviewed nursing notes, vitals, past history and care everywhere    Interventions:  1026 Cetirizine 10 MG PO    Disposition:  The patient was discharged to home.    Impression & Plan      Medical Decision Making:    This 50-year-old male patient presents to the ED due to facial redness.  Please see the HPI and exam for specifics.  The patient remained stable in the ED.  I am less suspicious that his redness represents cellulitis and more suspicious of something like atopic dermatitis.  The patient was given Claritin in the emergency department and I think continuing this at home along with short course of topical hydrocortisone may be reasonable.  The patient should follow closely in the outpatient setting and certainly return to the ED with any new or worsening symptoms especially since he is nonverbal.  This was discussed with the patient's .  Anticipatory guidance given prior to discharge    Diagnosis:    ICD-10-CM    1. Eczema of face  L30.9         Discharge Medications:  New Prescriptions    HYDROCORTISONE (CORTAID) 1 % EXTERNAL CREAM     Apply topically 2 times daily for 14 days Apply topically to the affected area on the face.    LORATADINE (CLARITIN) 10 MG TABLET    Take 1 tablet (10 mg) by mouth daily for 14 days     Scribe Disclosure:  I, Ingrid Mohr, am serving as a scribe at 10:42 AM on 11/26/2022 to document services personally performed by Michael Jacobo DO based on my observations and the provider's statements to me.     Michael Jacobo DO  11/26/22 5621

## 2022-11-26 NOTE — ED TRIAGE NOTES
43 yo male arrives via ems from a group home ,has non raised facial rash no swelling ,tolerated breakfast and swallowing without issue      Triage Assessment     Row Name 11/26/22 1000       Triage Assessment (Adult)    Airway WDL WDL       Respiratory WDL    Respiratory WDL WDL       Skin Circulation/Temperature WDL    Skin Circulation/Temperature WDL X  rash non raised face       Cardiac WDL    Cardiac WDL WDL       Peripheral/Neurovascular WDL    Peripheral Neurovascular WDL WDL

## 2022-11-29 NOTE — TELEPHONE ENCOUNTER
RN contacted patients care coordinator (Tian)  and relayed message below.  She verbalized understanding and will follow up with different providers for the psych medications. RN also notified her of the 11 refills of the Depakote.     Ashley SARABIA RN, BSN   [facility]

## 2022-11-29 NOTE — TELEPHONE ENCOUNTER
Please call care coordinator to get more information, respiridone and sertraline were last prescribed 8 and 9 years ago. Does psychiatry prescribe these? They haven't been prescribed by Dr. Parkinson. Depakote is also not prescribed by Dr. Parkinson, but was filled 4/21 for 1 year supply, so should not need refills at this time.  Serenity Bustamante RN  [facility]

## 2022-11-29 NOTE — TELEPHONE ENCOUNTER
Care coordinator calling for the refills again, please advise.  Linda Bhatt MA  [facility]  2nd Floor  Primary Care

## 2022-12-01 ENCOUNTER — TELEPHONE (OUTPATIENT)
Dept: FAMILY MEDICINE | Facility: CLINIC | Age: 42
End: 2022-12-01

## 2022-12-01 ENCOUNTER — TELEPHONE (OUTPATIENT)
Dept: NEUROLOGY | Facility: CLINIC | Age: 42
End: 2022-12-01

## 2022-12-01 NOTE — TELEPHONE ENCOUNTER
See telephone encounter dated 10/24/2022  A similar request was received at that time.  Patient has an in-person visit scheduled with  on Monday December 5th.  Will defer to that appointment for further recommendations    Patient has a recent history of No Shows for appointments

## 2022-12-01 NOTE — TELEPHONE ENCOUNTER
[facility] Call Center    Phone Message    May a detailed message be left on voicemail: yes     Reason for Call: Order(s): Other:   Reason for requested: MRI w/ sedation   Date needed: asap  Provider name: Lauren Washington from the patient's group home is requesting an MRI order to be submitted with sedation. With questions please call Tian at # [phone].          Action Taken: Message routed to:  Other: [facility]    Travel Screening: Not Applicable

## 2022-12-01 NOTE — TELEPHONE ENCOUNTER
Muriel,  at patient's group home, calling to request an update on requested order for sedated MRI to check patient's tumor. RN notes telephone encounter from 2/10/2022 requesting sedated CT scan for brain tumor and that the  at that time was advised to call neurosurgeon Dr. Lui as they have been monitoring his diagnosis.     RN called Muriel and informed her that they will need to call Neurosurgery/Neurology clinic to follow up. RN provided her with clinic number [phone]. Muriel verbalized understanding and agreed to plan.    Santa Terrazas RN    [facility]

## 2022-12-05 ENCOUNTER — OFFICE VISIT (OUTPATIENT)
Dept: NEUROLOGY | Facility: CLINIC | Age: 42
End: 2022-12-05
Payer: MEDICARE

## 2022-12-05 VITALS
DIASTOLIC BLOOD PRESSURE: 89 MMHG | TEMPERATURE: 98.5 F | SYSTOLIC BLOOD PRESSURE: 110 MMHG | WEIGHT: 230 LBS | BODY MASS INDEX: 29.52 KG/M2 | HEART RATE: 96 BPM | HEIGHT: 74 IN

## 2022-12-05 DIAGNOSIS — G93.0 EPIDERMOID CYST OF BRAIN: ICD-10-CM

## 2022-12-05 DIAGNOSIS — G40.909 SEIZURE DISORDER (H): Primary | ICD-10-CM

## 2022-12-05 NOTE — LETTER
2022     RE: Duane D Backes  : 1980   MRN: [id number]      Dear Colleague,    Thank you for referring your patient, Duane D Backes, to the [facility] EPILEPSY CARE at [facility]. Please see a copy of my visit note below.    [facility]    Patient:  Duane D Backes  :  1980   Age:  42 year old   Today's Office Visit:  2022      Epilepsy history copied forward:  Duane D Backes is right handed with history of cognitive disability and epilepsy referred to us by Dr. Carter for high depakote levels. Per medical records from  he had a generalized tonic-clonic convulsion at [place], EEG showed rare left temporal epileptiform discharges. He is has been treated for epilepsy with topiramate (low levels) and depakote with high concentration for many years.   Interval History: Spoke to Kelby Davis (). His behaviors have worsened (slam door, throwing things). He has lived in new home 2021. He is very disruptive to other clients, home is thinking he has to find another placement. I encouraged them to work with psychiatrist and regular 2-3 month follow up to manage behaviors.  He has established follow up with psychiatrist.  Last seizure in 2021 (intubated in ICU, status) and prior to this his last seizure was 2020. They are not sure about side effects.   Current antiepileptic drug:   Divalproex  mg three times a day   Topiramate  200 mg twice a day  Medication Notes:   AED Medication Compliance:  compliant most of the time  Using a pill box:  Yes  Past AEDs notes:   Topiramate: We increase topiramate to 200 mg twice a day in winter 2020.    Depakote was decreased from 1500 mg twice a day to 1000 mg twice a day and  due to blood concentrations near 170. Lowering depakote caused more behavioral issues.   vimpat started 2021 after status epilepticus  Psycho-Social History: he moved to  "current group home 11/2021. He grew up with parents, after high school he was institutionalized (may be due to behavioral) and then transferred to group home 2013. No drinking, no drugs, and no smoking.     Exam:    /89   Pulse 96   Temp 98.5  F (36.9  C) (Temporal)   Ht 6' 2\" (188 cm)   Wt 230 lb (104.3 kg)   BMI 29.53 kg/m       Wt Readings from Last 5 Encounters:   12/05/22 230 lb (104.3 kg)   08/26/22 234 lb 9.1 oz (106.4 kg)   01/13/22 211 lb (95.7 kg)   11/04/21 211 lb (95.7 kg)   05/01/21 188 lb (85.3 kg)     Cognitive delay, Alert, face symmetric, extra ocular movements in tact, able to life upper extremities. Very agitated, restless, walking around, not able to sit, paced in hallways 5 times and left room.     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  Prior EEGs have shown rare left temporal sharp waves (opposite side of tumor).  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  Patient has been managed on Depakote and topiramate for several years with no seizures. We will continue depakote and topiramate, we will not restart vimpat. Check EEG for seizures.       Plan :     Continue current seizure medications:   Depakote 500 mg three times a day   Topiramate  200 mg twice a day    Check labs     MRI brain W/WO with sedation. After this if tumor is stable repeat 2-3 years Follow up  With Dr. Bautista for epidermoid tumor treatment plan     Follow up with psychiatrist for behavioral issues and optimize medications to help behavior.  I encouraged them to work with psychiatrist and regular 2-3 month follow up to manage behaviors. " "    Follow up  With Felix 4 months     Diazepam: Give 1 ml (5mg): Give 1 ml for ANY seizure. May repeat once 1 ml (5 mg). Monitor breathing, if there any concerns call 911. Do not exceed 10 mg per day.      I spent 20 minutes for patient's medical care. During this time key medical decisions were made with review of medical chart prior to visit, visit with patient, counseling/education, and post visit work, including documentation on the day of visit. I addressed all questions the patient/caregiver raised in regards to the patient's medical care. This note was created with voice recognition software. Inadvertent grammatical errors, typographical errors, and \"sound a like\" substitutions may occur due to limitations of the software.  Read the note carefully and apply context when erroneous substitutions have occurred. Thank you.     Lauren Washington MD   Epilepsy Staff   "

## 2022-12-05 NOTE — PROGRESS NOTES
[facility]    Patient:  Duane D Backes  :  1980   Age:  42 year old   Today's Office Visit:  2022      Epilepsy history copied forward:  Duane D Backes is right handed with history of cognitive disability and epilepsy referred to us by Dr. Carter for high depakote levels. Per medical records from  he had a generalized tonic-clonic convulsion at [place], EEG showed rare left temporal epileptiform discharges. He is has been treated for epilepsy with topiramate (low levels) and depakote with high concentration for many years.   Interval History: Spoke to Kelby Davis (). His behaviors have worsened (slam door, throwing things). He has lived in new home 2021. He is very disruptive to other clients, home is thinking he has to find another placement. I encouraged them to work with psychiatrist and regular 2-3 month follow up to manage behaviors.  He has established follow up with psychiatrist.  Last seizure in 2021 (intubated in ICU, status) and prior to this his last seizure was 2020. They are not sure about side effects.   Current antiepileptic drug:   Divalproex  mg three times a day   Topiramate  200 mg twice a day  Medication Notes:   AED Medication Compliance:  compliant most of the time  Using a pill box:  Yes  Past AEDs notes:   Topiramate: We increase topiramate to 200 mg twice a day in winter 2020.    Depakote was decreased from 1500 mg twice a day to 1000 mg twice a day and  due to blood concentrations near 170. Lowering depakote caused more behavioral issues.   vimpat started 2021 after status epilepticus  Psycho-Social History: he moved to current group home 2021. He grew up with parents, after high school he was institutionalized (may be due to behavioral) and then transferred to group home . No drinking, no drugs, and no smoking.     Exam:    /89   Pulse 96   Temp 98.5  F (36.9  C) (Temporal)   Ht 6'  "2\" (188 cm)   Wt 230 lb (104.3 kg)   BMI 29.53 kg/m       Wt Readings from Last 5 Encounters:   12/05/22 230 lb (104.3 kg)   08/26/22 234 lb 9.1 oz (106.4 kg)   01/13/22 211 lb (95.7 kg)   11/04/21 211 lb (95.7 kg)   05/01/21 188 lb (85.3 kg)     Cognitive delay, Alert, face symmetric, extra ocular movements in tact, able to life upper extremities. Very agitated, restless, walking around, not able to sit, paced in hallways 5 times and left room.     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  Prior EEGs have shown rare left temporal sharp waves (opposite side of tumor).  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  Patient has been managed on Depakote and topiramate for several years with no seizures. We will continue depakote and topiramate, we will not restart vimpat. Check EEG for seizures.       Plan :     Continue current seizure medications:   Depakote 500 mg three times a day   Topiramate  200 mg twice a day    Check labs     MRI brain W/WO with sedation. After this if tumor is stable repeat 2-3 years Follow up  With Dr. Bautista for epidermoid tumor treatment plan     Follow up with psychiatrist for behavioral issues and optimize medications to help behavior.  I encouraged them to work with psychiatrist and regular 2-3 month follow up to manage behaviors.     Follow up  With Felix 4 months     Diazepam: Give 1 ml (5mg): Give 1 ml for ANY seizure. May repeat once 1 ml (5 mg). Monitor breathing, if there any concerns call 911. Do not exceed 10 mg per day.      I spent 20 minutes for patient's medical care. During this time key medical " "decisions were made with review of medical chart prior to visit, visit with patient, counseling/education, and post visit work, including documentation on the day of visit. I addressed all questions the patient/caregiver raised in regards to the patient's medical care. This note was created with voice recognition software. Inadvertent grammatical errors, typographical errors, and \"sound a like\" substitutions may occur due to limitations of the software.  Read the note carefully and apply context when erroneous substitutions have occurred. Thank you.     Lauren Washington MD   Epilepsy Staff           "

## 2022-12-05 NOTE — PATIENT INSTRUCTIONS
Continue current seizure medications:   Depakote 500 mg three times a day   Topiramate  200 mg twice a day    Check labs     MRI brain W/WO with sedation. After this if tumor is stable repeat 2-3 years Follow up  With Dr. Bautista for epidermoid tumor treatment plan     Follow up with psychiatrist for behavioral issues and optimize medications to help behavior. Current psychiatrist dose are too low and do not address acute issues, this has to be addressed promptly I encouraged them to work with psychiatrist and regular 2-3 month follow up to manage behaviors.     Follow up  With Felix 4 months     Diazepam: Give 1 ml (5mg): Give 1 ml for ANY seizure. May repeat once 1 ml (5 mg). Monitor breathing, if there any concerns call 911. Do not exceed 10 mg per day.

## 2022-12-07 ENCOUNTER — DOCUMENTATION ONLY (OUTPATIENT)
Dept: LAB | Facility: CLINIC | Age: 42
End: 2022-12-07

## 2022-12-07 ENCOUNTER — ANCILLARY PROCEDURE (OUTPATIENT)
Dept: NEUROLOGY | Facility: CLINIC | Age: 42
End: 2022-12-07
Attending: PSYCHIATRY & NEUROLOGY
Payer: MEDICARE

## 2022-12-07 DIAGNOSIS — G40.909 SEIZURE DISORDER (H): ICD-10-CM

## 2022-12-07 NOTE — PROGRESS NOTES
Duane D Backes has an upcoming lab appointment:    Future Appointments   Date Time Provider Department Center   12/7/2022 12:30 PM MEEEG2 [facility] Owned   12/8/2022  2:15 PM [facility]   12/23/2022  3:30 PM Rajat Parkinson MD [facility]     Patient is scheduled for the following lab(s): I looks like the patient is wanting a BMP. They have a lab appointment on 12/8/22. They will have an appointment with Dr. Parkinson on 12/23/22. Please place labs prior to tomorrows appointment.    There is no order available. Please review and place either future orders or HMPO (Review of Health Maintenance Protocol Orders), as appropriate.    Health Maintenance Due   Topic     ANNUAL REVIEW OF HM ORDERS      Holly Eng

## 2022-12-07 NOTE — PROGRESS NOTES
Duane D Backes has an upcoming lab appointment:    Future Appointments   Date Time Provider Department Center   12/7/2022 12:30 PM [facility]G2 [facility] Owned   12/8/2022  2:15 PM [facility] LAB [facility]   12/23/2022  3:30 PM Rajat Parkinson MD [facility]     Patient is scheduled for the following lab(s): LABS from Dr Washington but requesting BMP. Has an OV 12/23 with Tesha      There is no order available. Please review and place either future orders or HMPO (Review of Health Maintenance Protocol Orders), as appropriate.    Health Maintenance Due   Topic     ANNUAL REVIEW OF HM ORDERS      Sophia Peñaloza

## 2022-12-13 NOTE — TELEPHONE ENCOUNTER
This nurse called imaging dept to trouble shoot the issue with the order. Imaging scheduler stated that there were no further needs from Dr. Washington's office.  will reach out to patient's caregiver to arrange the test.

## 2022-12-13 NOTE — TELEPHONE ENCOUNTER
Health Call Center    Phone Message    May a detailed message be left on voicemail: yes     Reason for Call: Other: Tian called on the behalf of Pt to inform Dr. Washington and team that the imaging dept needs to know what type of sedation medication is needed for MRI.    Please call Tian at [phone] to confirm that imaging has updated order.    Action Taken: Message routed to:  Other: [facility]    Travel Screening: Not Applicable

## 2022-12-15 ENCOUNTER — VIRTUAL VISIT (OUTPATIENT)
Dept: NEUROLOGY | Facility: CLINIC | Age: 42
End: 2022-12-15
Payer: MEDICARE

## 2022-12-15 DIAGNOSIS — G40.309 GENERALIZED TONIC CLONIC EPILEPSY (H): Primary | ICD-10-CM

## 2022-12-15 RX ORDER — LITHIUM CARBONATE 300 MG/1
1 TABLET, FILM COATED, EXTENDED RELEASE ORAL AT BEDTIME
Status: ON HOLD | COMMUNITY
Start: 2022-11-29 | End: 2023-02-01

## 2022-12-15 RX ORDER — PROPRANOLOL HYDROCHLORIDE 40 MG/1
1 TABLET ORAL 2 TIMES DAILY PRN
COMMUNITY
Start: 2022-12-09

## 2022-12-15 RX ORDER — SERTRALINE HYDROCHLORIDE 25 MG/1
1 TABLET, FILM COATED ORAL DAILY
COMMUNITY
Start: 2022-12-06 | End: 2023-11-22

## 2022-12-15 RX ORDER — RISPERIDONE 1 MG/1
1 TABLET ORAL 2 TIMES DAILY PRN
COMMUNITY
Start: 2022-12-05

## 2022-12-15 NOTE — PROGRESS NOTES
Duane is a 42 year old who is being evaluated via a billable telephone visit.      What phone number would you like to be contacted at? [phone]  How would you like to obtain your AVS? Mail a copy    Distant Location (provider location):  On-site  Phone call duration: 5 minutes    Medication and allergies have been reviewed.       GABRIELA Manuel        We reviewed EEG. Lauren Washington MD

## 2022-12-15 NOTE — LETTER
12/15/2022       RE: Duane D Backes  : 1980   MRN: [id number]      Dear Colleague,    Thank you for referring your patient, Duane D Backes, to the [facility] EPILEPSY CARE at [facility]. Please see a copy of my visit note below.    Duane is a 42 year old who is being evaluated via a billable telephone visit.      What phone number would you like to be contacted at? [phone]  How would you like to obtain your AVS? Mail a copy  {PROVIDER LOCATION On-site should be selected for visits conducted from your clinic location or adjoining [facility] hospital, academic office, or other nearby [facility] building. Off-site should be selected for all other provider locations, including home:[id number]}  Distant Location (provider location):  {virtual location provider:[id number]}  Phone call duration: ___ minutes    Medication and allergies have been reviewed.       Ciro Rothman, GABRIELA        We reviewed EEG. aLuren Washington MD

## 2022-12-19 ENCOUNTER — LAB (OUTPATIENT)
Dept: LAB | Facility: CLINIC | Age: 42
End: 2022-12-19
Payer: MEDICARE

## 2022-12-19 DIAGNOSIS — G40.909 SEIZURE DISORDER (H): ICD-10-CM

## 2022-12-19 LAB
ALT SERPL W P-5'-P-CCNC: 6 U/L (ref 10–50)
AMMONIA PLAS-SCNC: 77 UMOL/L (ref 16–60)
AST SERPL W P-5'-P-CCNC: 19 U/L (ref 10–50)
BASOPHILS # BLD AUTO: 0 10E3/UL (ref 0–0.2)
BASOPHILS NFR BLD AUTO: 0 %
EOSINOPHIL # BLD AUTO: 0.1 10E3/UL (ref 0–0.7)
EOSINOPHIL NFR BLD AUTO: 2 %
ERYTHROCYTE [DISTWIDTH] IN BLOOD BY AUTOMATED COUNT: 12.6 % (ref 10–15)
HCT VFR BLD AUTO: 44.4 % (ref 40–53)
HGB BLD-MCNC: 14.9 G/DL (ref 13.3–17.7)
LYMPHOCYTES # BLD AUTO: 1.7 10E3/UL (ref 0.8–5.3)
LYMPHOCYTES NFR BLD AUTO: 33 %
MCH RBC QN AUTO: 29.8 PG (ref 26.5–33)
MCHC RBC AUTO-ENTMCNC: 33.6 G/DL (ref 31.5–36.5)
MCV RBC AUTO: 89 FL (ref 78–100)
MONOCYTES # BLD AUTO: 0.5 10E3/UL (ref 0–1.3)
MONOCYTES NFR BLD AUTO: 9 %
NEUTROPHILS # BLD AUTO: 2.9 10E3/UL (ref 1.6–8.3)
NEUTROPHILS NFR BLD AUTO: 56 %
PLATELET # BLD AUTO: 290 10E3/UL (ref 150–450)
RBC # BLD AUTO: 5 10E6/UL (ref 4.4–5.9)
VALPROATE SERPL-MCNC: 93.3 UG/ML
WBC # BLD AUTO: 5.2 10E3/UL (ref 4–11)

## 2022-12-19 PROCEDURE — 36415 COLL VENOUS BLD VENIPUNCTURE: CPT

## 2022-12-19 PROCEDURE — 82140 ASSAY OF AMMONIA: CPT

## 2022-12-19 PROCEDURE — 80201 ASSAY OF TOPIRAMATE: CPT | Mod: 90

## 2022-12-19 PROCEDURE — 99000 SPECIMEN HANDLING OFFICE-LAB: CPT

## 2022-12-19 PROCEDURE — 84450 TRANSFERASE (AST) (SGOT): CPT

## 2022-12-19 PROCEDURE — 84460 ALANINE AMINO (ALT) (SGPT): CPT

## 2022-12-19 PROCEDURE — 80164 ASSAY DIPROPYLACETIC ACD TOT: CPT

## 2022-12-19 PROCEDURE — 85025 COMPLETE CBC W/AUTO DIFF WBC: CPT

## 2022-12-20 LAB — TOPIRAMATE SERPL-MCNC: 4.7 UG/ML

## 2022-12-27 ENCOUNTER — TELEPHONE (OUTPATIENT)
Dept: NEUROLOGY | Facility: CLINIC | Age: 42
End: 2022-12-27

## 2022-12-27 DIAGNOSIS — G40.309 GENERALIZED TONIC CLONIC EPILEPSY (H): Primary | ICD-10-CM

## 2022-12-27 DIAGNOSIS — Z51.81 ENCOUNTER FOR THERAPEUTIC DRUG MONITORING: ICD-10-CM

## 2022-12-27 NOTE — TELEPHONE ENCOUNTER
Group home calling for pt's lab results that were done on 12/19/22.    Is requesting for a call back with result interpretation and would also like a copy of labs faxed to them.    Fax: [phone].      Routing to ordering provider.      Shanique Jennings RN  [facility]

## 2022-12-27 NOTE — TELEPHONE ENCOUNTER
Writer responding to the following message from neurology provider:        To provider - please send the result message over to a member of your team to relay to patient, as these labs were ordered by neurology and neurology should discuss with patient, not PCP's office RN. Thank you      Elba Blanco, JAIN, RN  [facility]

## 2022-12-27 NOTE — TELEPHONE ENCOUNTER
Daede calling again to request results from recent neurology appointment. Informed Daede that there is no result note current attached to those labs, and we are unable to review the results at this time. Recommended that Daede get in contact with the neurologist's office, as they ordered these labs. Daede verbalized understanding, no further questions or concerns.    Encounter has already been routed to provider, will route as high priority as group home has tried contacting x2 today.      Elba Blanco, BSN, RN  [facility] Primary [facility]

## 2022-12-27 NOTE — TELEPHONE ENCOUNTER
Group Home calling regarding results.  States Neurology ordered so they need to contact Neurology.  States she did and was told they did not have results.  Advised of below that they are waiting for provider to comment on labs.  Ashley Avila RN

## 2023-01-05 NOTE — TELEPHONE ENCOUNTER
"Writer spoke with , Dominique (\"[facility]\"). I notified her that the labs were normal with the exception of ammonia which needs to be rechecked. I advised that she could ask this to be drawn when he has other lab draws done for his MRI with sedation. She was unaware of the MRI with sedation being scheduled. I also brought to her attention that patient missed his appointment with Dr. Parkinson in primary care last month and should contact his office to reschedule. She was advised to call MRI is she has any follow up questions regarding the upcoming imaging studies.   "

## 2023-01-11 ENCOUNTER — ANESTHESIA EVENT (OUTPATIENT)
Dept: SURGERY | Facility: CLINIC | Age: 43
End: 2023-01-11
Payer: MEDICARE

## 2023-01-11 ENCOUNTER — TELEPHONE (OUTPATIENT)
Dept: FAMILY MEDICINE | Facility: CLINIC | Age: 43
End: 2023-01-11

## 2023-01-11 NOTE — TELEPHONE ENCOUNTER
Group home calling because tomorrow, 1/12/23, pt is going to have a sedated MRI done and the [facility] needs a letter of consent from the provider that it is okay for the pt to be sedated.     Is requesting the letter to be faxed to [phone].  Phone number [phone]      Shanique Jennings RN  [facility]

## 2023-01-12 ENCOUNTER — HOSPITAL ENCOUNTER (OUTPATIENT)
Facility: CLINIC | Age: 43
Discharge: NURSING, REHAB OR LONG-TERM CARE | End: 2023-01-12
Attending: PSYCHIATRY & NEUROLOGY | Admitting: PSYCHIATRY & NEUROLOGY
Payer: MEDICARE

## 2023-01-12 ENCOUNTER — HOSPITAL ENCOUNTER (OUTPATIENT)
Dept: MRI IMAGING | Facility: CLINIC | Age: 43
Discharge: HOME | End: 2023-01-12
Attending: PSYCHIATRY & NEUROLOGY | Admitting: PSYCHIATRY & NEUROLOGY
Payer: MEDICARE

## 2023-01-12 ENCOUNTER — ANESTHESIA (OUTPATIENT)
Dept: SURGERY | Facility: CLINIC | Age: 43
End: 2023-01-12
Payer: MEDICARE

## 2023-01-12 ENCOUNTER — TELEPHONE (OUTPATIENT)
Dept: NEUROLOGY | Facility: CLINIC | Age: 43
End: 2023-01-12

## 2023-01-12 VITALS
TEMPERATURE: 97.6 F | HEART RATE: 73 BPM | HEIGHT: 73 IN | OXYGEN SATURATION: 97 % | RESPIRATION RATE: 16 BRPM | BODY MASS INDEX: 33.31 KG/M2 | SYSTOLIC BLOOD PRESSURE: 107 MMHG | WEIGHT: 251.32 LBS | DIASTOLIC BLOOD PRESSURE: 58 MMHG

## 2023-01-12 DIAGNOSIS — G93.0 EPIDERMOID CYST OF BRAIN: ICD-10-CM

## 2023-01-12 DIAGNOSIS — G40.909 SEIZURE DISORDER (H): ICD-10-CM

## 2023-01-12 PROCEDURE — 999N000141 HC STATISTIC PRE-PROCEDURE NURSING ASSESSMENT

## 2023-01-12 PROCEDURE — 70553 MRI BRAIN STEM W/O & W/DYE: CPT

## 2023-01-12 PROCEDURE — 250N000011 HC RX IP 250 OP 636: Performed by: ANESTHESIOLOGY

## 2023-01-12 PROCEDURE — 250N000011 HC RX IP 250 OP 636: Performed by: NURSE ANESTHETIST, CERTIFIED REGISTERED

## 2023-01-12 PROCEDURE — 370N000017 HC ANESTHESIA TECHNICAL FEE, PER MIN

## 2023-01-12 PROCEDURE — 258N000003 HC RX IP 258 OP 636: Performed by: NURSE ANESTHETIST, CERTIFIED REGISTERED

## 2023-01-12 PROCEDURE — 255N000002 HC RX 255 OP 636: Performed by: PSYCHIATRY & NEUROLOGY

## 2023-01-12 PROCEDURE — 710N000012 HC RECOVERY PHASE 2, PER MINUTE

## 2023-01-12 PROCEDURE — 70553 MRI BRAIN STEM W/O & W/DYE: CPT | Mod: 26 | Performed by: RADIOLOGY

## 2023-01-12 PROCEDURE — A9585 GADOBUTROL INJECTION: HCPCS | Performed by: PSYCHIATRY & NEUROLOGY

## 2023-01-12 RX ORDER — FENTANYL CITRATE 50 UG/ML
25 INJECTION, SOLUTION INTRAMUSCULAR; INTRAVENOUS EVERY 5 MIN PRN
Status: CANCELLED | OUTPATIENT
Start: 2023-01-12

## 2023-01-12 RX ORDER — ONDANSETRON 2 MG/ML
INJECTION INTRAMUSCULAR; INTRAVENOUS PRN
Status: DISCONTINUED | OUTPATIENT
Start: 2023-01-12 | End: 2023-01-12

## 2023-01-12 RX ORDER — PROPOFOL 10 MG/ML
INJECTION, EMULSION INTRAVENOUS PRN
Status: DISCONTINUED | OUTPATIENT
Start: 2023-01-12 | End: 2023-01-12

## 2023-01-12 RX ORDER — PROPOFOL 10 MG/ML
INJECTION, EMULSION INTRAVENOUS CONTINUOUS PRN
Status: DISCONTINUED | OUTPATIENT
Start: 2023-01-12 | End: 2023-01-12

## 2023-01-12 RX ORDER — SODIUM CHLORIDE, SODIUM LACTATE, POTASSIUM CHLORIDE, CALCIUM CHLORIDE 600; 310; 30; 20 MG/100ML; MG/100ML; MG/100ML; MG/100ML
INJECTION, SOLUTION INTRAVENOUS CONTINUOUS PRN
Status: DISCONTINUED | OUTPATIENT
Start: 2023-01-12 | End: 2023-01-12

## 2023-01-12 RX ORDER — HYDROMORPHONE HYDROCHLORIDE 1 MG/ML
0.4 INJECTION, SOLUTION INTRAMUSCULAR; INTRAVENOUS; SUBCUTANEOUS EVERY 5 MIN PRN
Status: CANCELLED | OUTPATIENT
Start: 2023-01-12

## 2023-01-12 RX ORDER — SODIUM CHLORIDE, SODIUM LACTATE, POTASSIUM CHLORIDE, CALCIUM CHLORIDE 600; 310; 30; 20 MG/100ML; MG/100ML; MG/100ML; MG/100ML
INJECTION, SOLUTION INTRAVENOUS CONTINUOUS
Status: CANCELLED | OUTPATIENT
Start: 2023-01-12

## 2023-01-12 RX ORDER — SODIUM CHLORIDE, SODIUM LACTATE, POTASSIUM CHLORIDE, CALCIUM CHLORIDE 600; 310; 30; 20 MG/100ML; MG/100ML; MG/100ML; MG/100ML
INJECTION, SOLUTION INTRAVENOUS CONTINUOUS
Status: DISCONTINUED | OUTPATIENT
Start: 2023-01-12 | End: 2023-01-12 | Stop reason: HOSPADM

## 2023-01-12 RX ORDER — FENTANYL CITRATE 50 UG/ML
50 INJECTION, SOLUTION INTRAMUSCULAR; INTRAVENOUS EVERY 5 MIN PRN
Status: CANCELLED | OUTPATIENT
Start: 2023-01-12

## 2023-01-12 RX ORDER — ONDANSETRON 4 MG/1
4 TABLET, ORALLY DISINTEGRATING ORAL EVERY 30 MIN PRN
Status: CANCELLED | OUTPATIENT
Start: 2023-01-12

## 2023-01-12 RX ORDER — GADOBUTROL 604.72 MG/ML
10 INJECTION INTRAVENOUS ONCE
Status: COMPLETED | OUTPATIENT
Start: 2023-01-12 | End: 2023-01-12

## 2023-01-12 RX ORDER — MEPERIDINE HYDROCHLORIDE 25 MG/ML
12.5 INJECTION INTRAMUSCULAR; INTRAVENOUS; SUBCUTANEOUS
Status: CANCELLED | OUTPATIENT
Start: 2023-01-12

## 2023-01-12 RX ORDER — FENTANYL CITRATE 50 UG/ML
25 INJECTION, SOLUTION INTRAMUSCULAR; INTRAVENOUS
Status: CANCELLED | OUTPATIENT
Start: 2023-01-12

## 2023-01-12 RX ORDER — ONDANSETRON 2 MG/ML
4 INJECTION INTRAMUSCULAR; INTRAVENOUS EVERY 30 MIN PRN
Status: CANCELLED | OUTPATIENT
Start: 2023-01-12

## 2023-01-12 RX ORDER — LIDOCAINE 40 MG/G
CREAM TOPICAL
Status: DISCONTINUED | OUTPATIENT
Start: 2023-01-12 | End: 2023-01-12 | Stop reason: HOSPADM

## 2023-01-12 RX ORDER — HYDROMORPHONE HYDROCHLORIDE 1 MG/ML
0.2 INJECTION, SOLUTION INTRAMUSCULAR; INTRAVENOUS; SUBCUTANEOUS EVERY 5 MIN PRN
Status: CANCELLED | OUTPATIENT
Start: 2023-01-12

## 2023-01-12 RX ADMIN — SODIUM CHLORIDE, POTASSIUM CHLORIDE, SODIUM LACTATE AND CALCIUM CHLORIDE: 600; 310; 30; 20 INJECTION, SOLUTION INTRAVENOUS at 14:15

## 2023-01-12 RX ADMIN — PROPOFOL 50 MG: 10 INJECTION, EMULSION INTRAVENOUS at 14:16

## 2023-01-12 RX ADMIN — MIDAZOLAM 2 MG: 1 INJECTION INTRAMUSCULAR; INTRAVENOUS at 14:10

## 2023-01-12 RX ADMIN — GADOBUTROL 10 ML: 604.72 INJECTION INTRAVENOUS at 15:08

## 2023-01-12 RX ADMIN — ONDANSETRON 4 MG: 2 INJECTION INTRAMUSCULAR; INTRAVENOUS at 14:56

## 2023-01-12 RX ADMIN — PROPOFOL 75 MCG/KG/MIN: 10 INJECTION, EMULSION INTRAVENOUS at 14:18

## 2023-01-12 ASSESSMENT — ACTIVITIES OF DAILY LIVING (ADL)
ADLS_ACUITY_SCORE: 35
ADLS_ACUITY_SCORE: 35

## 2023-01-12 ASSESSMENT — LIFESTYLE VARIABLES: TOBACCO_USE: 0

## 2023-01-12 ASSESSMENT — ENCOUNTER SYMPTOMS: SEIZURES: 1

## 2023-01-12 NOTE — ANESTHESIA PREPROCEDURE EVALUATION
Anesthesia Pre-Procedure Evaluation    Patient: Duane D Backes   MRN: [id number] : 1980        Procedure : Procedure(s):  ANESTHESIA OUT OF OR  Magnetic resonance imaging Brain with and without @1400          Past Medical History:   Diagnosis Date     Autistic disorder, current or active state      Dry skin      Moderate intellectual disabilities      Obsessive-compulsive disorders      Overweight (BMI 25.0-29.9) 2013     Seizure disorder (H)     Generalized tonic clonic     Unspecified constipation       Past Surgical History:   Procedure Laterality Date     CATARACT IOL, RT/LT Left 2018      Allergies   Allergen Reactions     Penicillin [Penicillins] Other (See Comments)     Prozac [Fluoxetine Hcl]      Reglan [Metoclopramide Hcl]      Ritalin [Methylphenidate Derivatives] Other (See Comments)     Ritalin     Trazodone       Social History     Tobacco Use     Smoking status: Never     Smokeless tobacco: Never   Substance Use Topics     Alcohol use: No      Wt Readings from Last 1 Encounters:   22 104.3 kg (230 lb)        Anesthesia Evaluation            ROS/MED HX  ENT/Pulmonary:    (-) tobacco use   Neurologic: Comment: Autism, epilepsy, and right temporal lobe epilepsy     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    Other:            Physical Exam    Airway        Mallampati: III   TM distance: > 3 FB   Neck ROM: full   Mouth opening: > 3 cm    Respiratory Devices and Support         Dental  no notable dental history         Cardiovascular   cardiovascular exam  normal          Pulmonary                   OUTSIDE LABS:  CBC:   Lab Results   Component Value Date    WBC 5.2 12/19/2022    WBC 7.3 08/27/2022    HGB 14.9 12/19/2022    HGB 14.4 08/27/2022    HCT 44.4 12/19/2022    HCT 45.5 08/27/2022     12/19/2022     08/27/2022     BMP:   Lab Results   Component Value Date     08/29/2022     08/28/2022    POTASSIUM 4.5 08/29/2022    POTASSIUM 3.8 08/28/2022    CHLORIDE 106 08/29/2022    CHLORIDE 104 08/28/2022    CO2 26 08/29/2022    CO2 25 08/28/2022    BUN 18.9 08/29/2022    BUN 18.5 08/28/2022    CR 0.76 08/29/2022    CR 0.67 08/28/2022     (H) 08/29/2022     (H) 08/28/2022     COAGS: No results found for: PTT, INR, FIBR  POC:   Lab Results   Component Value Date    BGM 77 03/03/2021     HEPATIC:   Lab Results   Component Value Date    ALBUMIN 4.1 08/23/2022    PROTTOTAL 7.1 08/23/2022    ALT 6 (L) 12/19/2022    AST 19 12/19/2022    ALKPHOS 56 08/23/2022    BILITOTAL 0.3 08/23/2022    JACKY 77 (H) 12/19/2022     OTHER:   Lab Results   Component Value Date    PH 7.27 (L) 08/23/2022    LACT 1.0 08/26/2022    A1C 4.7 09/23/2020    JESSICA 9.5 08/29/2022    PHOS 4.7 (H) 03/08/2021    MAG 1.8 08/29/2022    LIPASE 84 11/20/2003    TSH 8.03 (H) 08/23/2022    T4 1.13 08/23/2022    .0 (H) 02/28/2021    SED 29 (H) 02/27/2021       Anesthesia Plan    ASA Status:  3   NPO Status:  NPO Appropriate    Anesthesia Type: MAC.     - Reason for MAC: straight local not clinically adequate   Induction: Intravenous.   Maintenance: TIVA.        Consents    Anesthesia Plan(s) and associated risks, benefits, and realistic alternatives discussed. Questions answered and patient/representative(s) expressed understanding.     - Discussed: Risks, Benefits and Alternatives for BOTH SEDATION and the PROCEDURE were discussed     - Discussed with:  Legal Guardian, Patient         Postoperative Care    Pain management: IV analgesics, Multi-modal analgesia.   PONV  prophylaxis: Ondansetron (or other 5HT-3)     Comments:                Sonny Dennis MD

## 2023-01-12 NOTE — TELEPHONE ENCOUNTER
Tian,  with patient's group home is calling regarding this request for note/letter for sedated MRI.     Writer reviewed chart and MRI is ordered by patient's neurologist, Dr. Lauren Washington.     Tian also requesting order for hibiclens, pre procedure.     Writer advised Tian to reach out to patient's neurologist/ordering provider with these requests. She verbalized understanding and agreement to plan.     Serenity Bustamante RN    [facility]- Primary Care

## 2023-01-12 NOTE — TELEPHONE ENCOUNTER
[facility] Call Center    Phone Message    May a detailed message be left on voicemail: yes     Reason for Call:     Tian  called to speak to care team, pt is scheduled for MRI at noon and would like to discuss about his sedation. Please call Tian at  [phone]    Action Taken: Other: [facility]    Travel Screening: Not Applicable

## 2023-01-12 NOTE — ANESTHESIA CARE TRANSFER NOTE
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    Anesthesia Type:   MAC     Note:    Oropharynx: oropharynx clear of all foreign objects  Level of Consciousness: awake  Oxygen Supplementation: room air    Independent Airway: airway patency satisfactory and stable  Dentition: dentition unchanged  Vital Signs Stable: post-procedure vital signs reviewed and stable  Report to RN Given: handoff report given  Patient transferred to: Phase II    Handoff Report: Identifed the Patient, Identified the Reponsible Provider, Reviewed the pertinent medical history, Discussed the surgical course, Reviewed Intra-OP anesthesia mangement and issues during anesthesia, Set expectations for post-procedure period and Allowed opportunity for questions and acknowledgement of understanding      Vitals:  Vitals Value Taken Time   BP     Temp     Pulse     Resp     SpO2         Electronically Signed By: VERONICA Leija CRNA  January 12, 2023  3:16 PM

## 2023-01-12 NOTE — DISCHARGE INSTRUCTIONS
[facility]  Same-Day Surgery   Adult Discharge Orders & Instructions     For 24 hours after surgery    Get plenty of rest.  A responsible adult must stay with you for at least 24 hours after you leave the hospital.   Do not drive or use heavy equipment.  If you have weakness or tingling, don't drive or use heavy equipment until this feeling goes away.  Do not drink alcohol.  Avoid strenuous or risky activities.  Ask for help when climbing stairs.   You may feel lightheaded.  IF so, sit for a few minutes before standing.  Have someone help you get up.   If you have nausea (feel sick to your stomach): Drink only clear liquids such as apple juice, ginger ale, broth or 7-Up.  Rest may also help.  Be sure to drink enough fluids.  Move to a regular diet as you feel able.  You may have a slight fever. Call the doctor if your fever is over 100 F (37.7 C) (taken under the tongue) or lasts longer than 24 hours.  You may have a dry mouth, a sore throat, muscle aches or trouble sleeping.  These should go away after 24 hours.  Do not make important or legal decisions.   Call your doctor for any of the followin.  Signs of infection (fever, growing tenderness at the surgery site, a large amount of drainage or bleeding, severe pain, foul-smelling drainage, redness, swelling).    2. It has been over 8 to 10 hours since surgery and you are still not able to urinate (pass water).    3.  Headache for over 24 hours.    4.  Numbness, tingling or weakness the day after surgery (if you had spinal anesthesia).  To contact a doctor, call:    '   [phone] and ask for the resident on call for   Anesthesia (answered 24 hours a day)  '   Emergency Department:    [facility]: [phone]       (TTY for hearing impaired: [phone])    [facility]: [phone]       (TTY for hearing impaired: [phone])

## 2023-01-12 NOTE — TELEPHONE ENCOUNTER
This nurse updated  staff Tian that the pre-op team was contacted directly to discuss their needs for letter for sedation. She asked me about hibiclens. I advised that I was not aware that it would be needed, but that it is available over the counter if pre-op staff require.

## 2023-01-13 NOTE — ANESTHESIA POSTPROCEDURE EVALUATION
Patient: Duane D Backes    Procedure: Procedure(s):  ANESTHESIA OUT OF OR  Magnetic resonance imaging Brain with and without @1400       Anesthesia Type:  MAC    Note:  Disposition: Outpatient   Postop Pain Control: Uneventful            Sign Out: Well controlled pain   PONV: No   Neuro/Psych: Uneventful            Sign Out: Acceptable/Baseline neuro status   Airway/Respiratory: Uneventful            Sign Out: Acceptable/Baseline resp. status   CV/Hemodynamics: Uneventful            Sign Out: Acceptable CV status; No obvious hypovolemia; No obvious fluid overload   Other NRE: NONE   DID A NON-ROUTINE EVENT OCCUR? No         /67  HR 74  SaO2 100%        Electronically Signed By: Sonny Dennis MD  January 13, 2023  7:28 AM

## 2023-01-24 ENCOUNTER — APPOINTMENT (OUTPATIENT)
Dept: CT IMAGING | Facility: CLINIC | Age: 43
DRG: 101 | End: 2023-01-24
Attending: EMERGENCY MEDICINE
Payer: MEDICARE

## 2023-01-24 ENCOUNTER — TELEPHONE (OUTPATIENT)
Dept: NEUROLOGY | Facility: CLINIC | Age: 43
End: 2023-01-24

## 2023-01-24 ENCOUNTER — HOSPITAL ENCOUNTER (EMERGENCY)
Facility: CLINIC | Age: 43
Discharge: ANOTHER HOSPITAL | DRG: 101 | End: 2023-01-24
Attending: EMERGENCY MEDICINE | Admitting: EMERGENCY MEDICINE
Payer: MEDICARE

## 2023-01-24 ENCOUNTER — HOSPITAL ENCOUNTER (INPATIENT)
Facility: CLINIC | Age: 43
LOS: 9 days | Discharge: HOME HEALTH | DRG: 101 | End: 2023-02-02
Attending: HOSPITALIST | Admitting: INTERNAL MEDICINE
Payer: MEDICARE

## 2023-01-24 VITALS
TEMPERATURE: 96.9 F | RESPIRATION RATE: 11 BRPM | SYSTOLIC BLOOD PRESSURE: 133 MMHG | HEART RATE: 73 BPM | OXYGEN SATURATION: 96 % | DIASTOLIC BLOOD PRESSURE: 102 MMHG

## 2023-01-24 DIAGNOSIS — R56.9 SEIZURES (H): Primary | ICD-10-CM

## 2023-01-24 DIAGNOSIS — E87.1 HYPONATREMIA: ICD-10-CM

## 2023-01-24 DIAGNOSIS — G40.901 STATUS EPILEPTICUS (H): ICD-10-CM

## 2023-01-24 DIAGNOSIS — F42.9 OBSESSIVE-COMPULSIVE DISORDER, UNSPECIFIED TYPE: ICD-10-CM

## 2023-01-24 DIAGNOSIS — F84.0 ACTIVE AUTISTIC DISORDER: ICD-10-CM

## 2023-01-24 DIAGNOSIS — B35.6 TINEA CRURIS: ICD-10-CM

## 2023-01-24 LAB
ALBUMIN SERPL BCG-MCNC: 4.5 G/DL (ref 3.5–5.2)
ALP SERPL-CCNC: 56 U/L (ref 40–129)
ALT SERPL W P-5'-P-CCNC: 8 U/L (ref 10–50)
ANION GAP SERPL CALCULATED.3IONS-SCNC: 8 MMOL/L (ref 7–15)
AST SERPL W P-5'-P-CCNC: 20 U/L (ref 10–50)
BASOPHILS # BLD AUTO: 0 10E3/UL (ref 0–0.2)
BASOPHILS NFR BLD AUTO: 1 %
BILIRUB DIRECT SERPL-MCNC: <0.2 MG/DL (ref 0–0.3)
BILIRUB SERPL-MCNC: 0.4 MG/DL
BUN SERPL-MCNC: 11 MG/DL (ref 6–20)
CALCIUM SERPL-MCNC: 9.1 MG/DL (ref 8.6–10)
CHLORIDE SERPL-SCNC: 86 MMOL/L (ref 98–107)
CREAT SERPL-MCNC: 0.73 MG/DL (ref 0.67–1.17)
DEPRECATED HCO3 PLAS-SCNC: 27 MMOL/L (ref 22–29)
EOSINOPHIL # BLD AUTO: 0.2 10E3/UL (ref 0–0.7)
EOSINOPHIL NFR BLD AUTO: 3 %
ERYTHROCYTE [DISTWIDTH] IN BLOOD BY AUTOMATED COUNT: 12.2 % (ref 10–15)
FLUAV RNA SPEC QL NAA+PROBE: NEGATIVE
FLUBV RNA RESP QL NAA+PROBE: NEGATIVE
GFR SERPL CREATININE-BSD FRML MDRD: >90 ML/MIN/1.73M2
GLUCOSE BLDC GLUCOMTR-MCNC: 130 MG/DL (ref 70–99)
GLUCOSE SERPL-MCNC: 107 MG/DL (ref 70–99)
HCT VFR BLD AUTO: 41.6 % (ref 40–53)
HGB BLD-MCNC: 14.2 G/DL (ref 13.3–17.7)
IMM GRANULOCYTES # BLD: 0 10E3/UL
IMM GRANULOCYTES NFR BLD: 0 %
LITHIUM SERPL-SCNC: <0.1 MMOL/L (ref 0.6–1.2)
LYMPHOCYTES # BLD AUTO: 1.8 10E3/UL (ref 0.8–5.3)
LYMPHOCYTES NFR BLD AUTO: 28 %
MCH RBC QN AUTO: 29.7 PG (ref 26.5–33)
MCHC RBC AUTO-ENTMCNC: 34.1 G/DL (ref 31.5–36.5)
MCV RBC AUTO: 87 FL (ref 78–100)
MONOCYTES # BLD AUTO: 0.6 10E3/UL (ref 0–1.3)
MONOCYTES NFR BLD AUTO: 9 %
NEUTROPHILS # BLD AUTO: 3.7 10E3/UL (ref 1.6–8.3)
NEUTROPHILS NFR BLD AUTO: 59 %
NRBC # BLD AUTO: 0 10E3/UL
NRBC BLD AUTO-RTO: 0 /100
OSMOLALITY SERPL: 252 MMOL/KG (ref 275–295)
PLATELET # BLD AUTO: 204 10E3/UL (ref 150–450)
POTASSIUM SERPL-SCNC: 3.8 MMOL/L (ref 3.4–5.3)
PROT SERPL-MCNC: 7.1 G/DL (ref 6.4–8.3)
RBC # BLD AUTO: 4.78 10E6/UL (ref 4.4–5.9)
RSV RNA SPEC NAA+PROBE: NEGATIVE
SARS-COV-2 RNA RESP QL NAA+PROBE: NEGATIVE
SODIUM SERPL-SCNC: 121 MMOL/L (ref 136–145)
VALPROATE SERPL-MCNC: 91.3 UG/ML
WBC # BLD AUTO: 6.4 10E3/UL (ref 4–11)

## 2023-01-24 PROCEDURE — 70450 CT HEAD/BRAIN W/O DYE: CPT | Mod: MG

## 2023-01-24 PROCEDURE — 96365 THER/PROPH/DIAG IV INF INIT: CPT

## 2023-01-24 PROCEDURE — 258N000003 HC RX IP 258 OP 636: Performed by: EMERGENCY MEDICINE

## 2023-01-24 PROCEDURE — 80178 ASSAY OF LITHIUM: CPT | Performed by: EMERGENCY MEDICINE

## 2023-01-24 PROCEDURE — 80164 ASSAY DIPROPYLACETIC ACD TOT: CPT | Performed by: EMERGENCY MEDICINE

## 2023-01-24 PROCEDURE — 250N000009 HC RX 250: Performed by: EMERGENCY MEDICINE

## 2023-01-24 PROCEDURE — 99285 EMERGENCY DEPT VISIT HI MDM: CPT | Mod: 25

## 2023-01-24 PROCEDURE — 82248 BILIRUBIN DIRECT: CPT | Performed by: EMERGENCY MEDICINE

## 2023-01-24 PROCEDURE — 87637 SARSCOV2&INF A&B&RSV AMP PRB: CPT | Performed by: EMERGENCY MEDICINE

## 2023-01-24 PROCEDURE — 85025 COMPLETE CBC W/AUTO DIFF WBC: CPT | Performed by: EMERGENCY MEDICINE

## 2023-01-24 PROCEDURE — 80053 COMPREHEN METABOLIC PANEL: CPT | Performed by: EMERGENCY MEDICINE

## 2023-01-24 PROCEDURE — 99223 1ST HOSP IP/OBS HIGH 75: CPT | Mod: AI | Performed by: INTERNAL MEDICINE

## 2023-01-24 PROCEDURE — 82962 GLUCOSE BLOOD TEST: CPT

## 2023-01-24 PROCEDURE — 80201 ASSAY OF TOPIRAMATE: CPT | Performed by: EMERGENCY MEDICINE

## 2023-01-24 PROCEDURE — 96361 HYDRATE IV INFUSION ADD-ON: CPT

## 2023-01-24 PROCEDURE — 200N000001 HC R&B ICU

## 2023-01-24 PROCEDURE — 85004 AUTOMATED DIFF WBC COUNT: CPT | Performed by: EMERGENCY MEDICINE

## 2023-01-24 PROCEDURE — 83930 ASSAY OF BLOOD OSMOLALITY: CPT | Performed by: EMERGENCY MEDICINE

## 2023-01-24 PROCEDURE — 36415 COLL VENOUS BLD VENIPUNCTURE: CPT | Performed by: EMERGENCY MEDICINE

## 2023-01-24 RX ORDER — LORAZEPAM 2 MG/ML
2 INJECTION INTRAMUSCULAR
Status: DISCONTINUED | OUTPATIENT
Start: 2023-01-24 | End: 2023-01-29

## 2023-01-24 RX ORDER — ONDANSETRON 4 MG/1
4 TABLET, ORALLY DISINTEGRATING ORAL EVERY 6 HOURS PRN
Status: DISCONTINUED | OUTPATIENT
Start: 2023-01-24 | End: 2023-02-02 | Stop reason: HOSPADM

## 2023-01-24 RX ORDER — POLYETHYLENE GLYCOL 3350 17 G/17G
17 POWDER, FOR SOLUTION ORAL DAILY PRN
Status: DISCONTINUED | OUTPATIENT
Start: 2023-01-24 | End: 2023-02-02 | Stop reason: HOSPADM

## 2023-01-24 RX ORDER — ENOXAPARIN SODIUM 100 MG/ML
40 INJECTION SUBCUTANEOUS EVERY 24 HOURS
Status: DISCONTINUED | OUTPATIENT
Start: 2023-01-25 | End: 2023-01-29

## 2023-01-24 RX ORDER — ACETAMINOPHEN 650 MG/1
650 SUPPOSITORY RECTAL EVERY 6 HOURS PRN
Status: DISCONTINUED | OUTPATIENT
Start: 2023-01-24 | End: 2023-02-02 | Stop reason: HOSPADM

## 2023-01-24 RX ORDER — SODIUM CHLORIDE 9 MG/ML
INJECTION, SOLUTION INTRAVENOUS CONTINUOUS
Status: DISCONTINUED | OUTPATIENT
Start: 2023-01-24 | End: 2023-01-24 | Stop reason: HOSPADM

## 2023-01-24 RX ORDER — BISACODYL 10 MG
10 SUPPOSITORY, RECTAL RECTAL DAILY PRN
Status: DISCONTINUED | OUTPATIENT
Start: 2023-01-24 | End: 2023-02-02 | Stop reason: HOSPADM

## 2023-01-24 RX ORDER — LIDOCAINE 40 MG/G
CREAM TOPICAL
Status: DISCONTINUED | OUTPATIENT
Start: 2023-01-24 | End: 2023-01-29

## 2023-01-24 RX ORDER — ACETAMINOPHEN 325 MG/1
650 TABLET ORAL EVERY 6 HOURS PRN
Status: DISCONTINUED | OUTPATIENT
Start: 2023-01-24 | End: 2023-02-02 | Stop reason: HOSPADM

## 2023-01-24 RX ORDER — DIAZEPAM 10 MG/2ML
INJECTION, SOLUTION INTRAMUSCULAR; INTRAVENOUS
Status: DISCONTINUED
Start: 2023-01-24 | End: 2023-01-24 | Stop reason: WASHOUT

## 2023-01-24 RX ORDER — ONDANSETRON 2 MG/ML
4 INJECTION INTRAMUSCULAR; INTRAVENOUS EVERY 6 HOURS PRN
Status: DISCONTINUED | OUTPATIENT
Start: 2023-01-24 | End: 2023-02-02 | Stop reason: HOSPADM

## 2023-01-24 RX ADMIN — SODIUM CHLORIDE 1000 ML: 9 INJECTION, SOLUTION INTRAVENOUS at 19:40

## 2023-01-24 RX ADMIN — VALPROATE SODIUM 500 MG: 100 INJECTION, SOLUTION INTRAVENOUS at 18:48

## 2023-01-24 RX ADMIN — SODIUM CHLORIDE: 9 INJECTION, SOLUTION INTRAVENOUS at 20:31

## 2023-01-24 ASSESSMENT — ACTIVITIES OF DAILY LIVING (ADL)
ADLS_ACUITY_SCORE: 35
ADLS_ACUITY_SCORE: 35

## 2023-01-24 NOTE — TELEPHONE ENCOUNTER
What is the concern that needs to be addressed by a nurse?  Tian would like a call back to discuss patient MRI result , I tried to schedule a telephone call with  but no opening soon.    May a detailed message be left on voicemail? yes    Date of last office visit: 12/05/2022    Message routed to: [facility] RN POOL.

## 2023-01-24 NOTE — ED TRIAGE NOTES
Pt presents via EMS for evaluation of seizure. He comes from a group home in [place] and has hx of epilepsy. Per staff seizure happened around 16:30 and lasted 30 sec. Pt has missed two doses of Depakote. Staff gave rescue medication of diazepam 5mg around 17:16. Per EMS pt had another seizure enroute that lasted 30 seconds. Pt is incontinent of urine upon. Pt is alert but not speaking.  per EMS.      Triage Assessment     Row Name 01/24/23 3262       Triage Assessment (Adult)    Airway WDL WDL       Respiratory WDL    Respiratory WDL WDL       Skin Circulation/Temperature WDL    Skin Circulation/Temperature WDL WDL       Cardiac WDL    Cardiac WDL WDL       Peripheral/Neurovascular WDL    Peripheral Neurovascular WDL WDL       Cognitive/Neuro/Behavioral WDL    Cognitive/Neuro/Behavioral WDL WDL

## 2023-01-25 ENCOUNTER — HOSPITAL ENCOUNTER (OUTPATIENT)
Dept: NEUROLOGY | Facility: CLINIC | Age: 43
Discharge: HOME | DRG: 101 | End: 2023-01-25
Attending: INTERNAL MEDICINE | Admitting: INTERNAL MEDICINE
Payer: MEDICARE

## 2023-01-25 ENCOUNTER — APPOINTMENT (OUTPATIENT)
Dept: SPEECH THERAPY | Facility: CLINIC | Age: 43
DRG: 101 | End: 2023-01-25
Attending: STUDENT IN AN ORGANIZED HEALTH CARE EDUCATION/TRAINING PROGRAM
Payer: MEDICARE

## 2023-01-25 LAB
ANION GAP SERPL CALCULATED.3IONS-SCNC: 9 MMOL/L (ref 7–15)
BUN SERPL-MCNC: 8.9 MG/DL (ref 6–20)
CALCIUM SERPL-MCNC: 9 MG/DL (ref 8.6–10)
CHLORIDE SERPL-SCNC: 96 MMOL/L (ref 98–107)
CREAT SERPL-MCNC: 0.58 MG/DL (ref 0.67–1.17)
CREAT SERPL-MCNC: 0.79 MG/DL (ref 0.67–1.17)
DEPRECATED HCO3 PLAS-SCNC: 27 MMOL/L (ref 22–29)
ERYTHROCYTE [DISTWIDTH] IN BLOOD BY AUTOMATED COUNT: 12.4 % (ref 10–15)
GFR SERPL CREATININE-BSD FRML MDRD: >90 ML/MIN/1.73M2
GFR SERPL CREATININE-BSD FRML MDRD: >90 ML/MIN/1.73M2
GLUCOSE BLDC GLUCOMTR-MCNC: 143 MG/DL (ref 70–99)
GLUCOSE BLDC GLUCOMTR-MCNC: 98 MG/DL (ref 70–99)
GLUCOSE SERPL-MCNC: 116 MG/DL (ref 70–99)
HCT VFR BLD AUTO: 43.3 % (ref 40–53)
HGB BLD-MCNC: 14.9 G/DL (ref 13.3–17.7)
MCH RBC QN AUTO: 29.6 PG (ref 26.5–33)
MCHC RBC AUTO-ENTMCNC: 34.4 G/DL (ref 31.5–36.5)
MCV RBC AUTO: 86 FL (ref 78–100)
PLATELET # BLD AUTO: 229 10E3/UL (ref 150–450)
POTASSIUM SERPL-SCNC: 4.4 MMOL/L (ref 3.4–5.3)
RBC # BLD AUTO: 5.03 10E6/UL (ref 4.4–5.9)
SODIUM SERPL-SCNC: 123 MMOL/L (ref 136–145)
SODIUM SERPL-SCNC: 131 MMOL/L (ref 136–145)
SODIUM SERPL-SCNC: 132 MMOL/L (ref 136–145)
SODIUM SERPL-SCNC: 132 MMOL/L (ref 136–145)
SODIUM SERPL-SCNC: 135 MMOL/L (ref 136–145)
SODIUM SERPL-SCNC: 135 MMOL/L (ref 136–145)
SODIUM SERPL-SCNC: 136 MMOL/L (ref 136–145)
WBC # BLD AUTO: 7.9 10E3/UL (ref 4–11)

## 2023-01-25 PROCEDURE — 95705 EEG W/O VID 2-12 HR UNMNTR: CPT

## 2023-01-25 PROCEDURE — 99232 SBSQ HOSP IP/OBS MODERATE 35: CPT | Performed by: STUDENT IN AN ORGANIZED HEALTH CARE EDUCATION/TRAINING PROGRAM

## 2023-01-25 PROCEDURE — 120N000001 HC R&B MED SURG/OB

## 2023-01-25 PROCEDURE — 84295 ASSAY OF SERUM SODIUM: CPT | Performed by: INTERNAL MEDICINE

## 2023-01-25 PROCEDURE — 36415 COLL VENOUS BLD VENIPUNCTURE: CPT | Performed by: INTERNAL MEDICINE

## 2023-01-25 PROCEDURE — 250N000013 HC RX MED GY IP 250 OP 250 PS 637: Performed by: INTERNAL MEDICINE

## 2023-01-25 PROCEDURE — 84295 ASSAY OF SERUM SODIUM: CPT | Performed by: STUDENT IN AN ORGANIZED HEALTH CARE EDUCATION/TRAINING PROGRAM

## 2023-01-25 PROCEDURE — 82565 ASSAY OF CREATININE: CPT | Performed by: INTERNAL MEDICINE

## 2023-01-25 PROCEDURE — 250N000011 HC RX IP 250 OP 636: Performed by: INTERNAL MEDICINE

## 2023-01-25 PROCEDURE — 258N000003 HC RX IP 258 OP 636: Performed by: INTERNAL MEDICINE

## 2023-01-25 PROCEDURE — 80048 BASIC METABOLIC PNL TOTAL CA: CPT | Performed by: INTERNAL MEDICINE

## 2023-01-25 PROCEDURE — 36415 COLL VENOUS BLD VENIPUNCTURE: CPT | Performed by: STUDENT IN AN ORGANIZED HEALTH CARE EDUCATION/TRAINING PROGRAM

## 2023-01-25 PROCEDURE — 85027 COMPLETE CBC AUTOMATED: CPT | Performed by: INTERNAL MEDICINE

## 2023-01-25 PROCEDURE — 99223 1ST HOSP IP/OBS HIGH 75: CPT | Mod: 25 | Performed by: NURSE PRACTITIONER

## 2023-01-25 PROCEDURE — 92610 EVALUATE SWALLOWING FUNCTION: CPT | Mod: GN | Performed by: SPEECH-LANGUAGE PATHOLOGIST

## 2023-01-25 PROCEDURE — 250N000013 HC RX MED GY IP 250 OP 250 PS 637: Performed by: NURSE PRACTITIONER

## 2023-01-25 PROCEDURE — 95717 EEG PHYS/QHP 2-12 HR W/O VID: CPT | Performed by: PSYCHIATRY & NEUROLOGY

## 2023-01-25 PROCEDURE — 84295 ASSAY OF SERUM SODIUM: CPT | Performed by: NURSE PRACTITIONER

## 2023-01-25 PROCEDURE — 250N000013 HC RX MED GY IP 250 OP 250 PS 637: Performed by: PHYSICIAN ASSISTANT

## 2023-01-25 PROCEDURE — 250N000009 HC RX 250: Performed by: INTERNAL MEDICINE

## 2023-01-25 PROCEDURE — 36415 COLL VENOUS BLD VENIPUNCTURE: CPT | Performed by: NURSE PRACTITIONER

## 2023-01-25 PROCEDURE — 99418 PROLNG IP/OBS E/M EA 15 MIN: CPT | Performed by: NURSE PRACTITIONER

## 2023-01-25 PROCEDURE — 92526 ORAL FUNCTION THERAPY: CPT | Mod: GN | Performed by: SPEECH-LANGUAGE PATHOLOGIST

## 2023-01-25 PROCEDURE — 258N000003 HC RX IP 258 OP 636: Performed by: STUDENT IN AN ORGANIZED HEALTH CARE EDUCATION/TRAINING PROGRAM

## 2023-01-25 RX ORDER — DEXTROSE MONOHYDRATE 50 MG/ML
INJECTION, SOLUTION INTRAVENOUS CONTINUOUS
Status: DISCONTINUED | OUTPATIENT
Start: 2023-01-25 | End: 2023-01-26

## 2023-01-25 RX ORDER — SODIUM CHLORIDE 3 G/100ML
100 INJECTION, SOLUTION INTRAVENOUS ONCE
Status: COMPLETED | OUTPATIENT
Start: 2023-01-25 | End: 2023-01-25

## 2023-01-25 RX ORDER — TOPIRAMATE 50 MG/1
200 TABLET, FILM COATED ORAL EVERY 12 HOURS SCHEDULED
Status: DISCONTINUED | OUTPATIENT
Start: 2023-01-25 | End: 2023-02-02 | Stop reason: HOSPADM

## 2023-01-25 RX ORDER — OLANZAPINE 5 MG/1
5 TABLET, ORALLY DISINTEGRATING ORAL DAILY PRN
Status: DISCONTINUED | OUTPATIENT
Start: 2023-01-25 | End: 2023-01-27

## 2023-01-25 RX ORDER — DEXTROSE MONOHYDRATE 50 MG/ML
INJECTION, SOLUTION INTRAVENOUS CONTINUOUS
Status: DISCONTINUED | OUTPATIENT
Start: 2023-01-25 | End: 2023-01-25

## 2023-01-25 RX ORDER — LACOSAMIDE 100 MG/1
100 TABLET ORAL 2 TIMES DAILY
Status: DISCONTINUED | OUTPATIENT
Start: 2023-01-25 | End: 2023-02-02 | Stop reason: HOSPADM

## 2023-01-25 RX ADMIN — VALPROATE SODIUM 500 MG: 100 INJECTION, SOLUTION INTRAVENOUS at 12:01

## 2023-01-25 RX ADMIN — DEXTROSE MONOHYDRATE: 50 INJECTION, SOLUTION INTRAVENOUS at 15:46

## 2023-01-25 RX ADMIN — SODIUM CHLORIDE 100 ML: 3 INJECTION, SOLUTION INTRAVENOUS at 03:26

## 2023-01-25 RX ADMIN — VALPROATE SODIUM 500 MG: 100 INJECTION, SOLUTION INTRAVENOUS at 23:30

## 2023-01-25 RX ADMIN — LACOSAMIDE 100 MG: 100 TABLET, FILM COATED ORAL at 21:43

## 2023-01-25 RX ADMIN — ENOXAPARIN SODIUM 40 MG: 40 INJECTION SUBCUTANEOUS at 07:56

## 2023-01-25 RX ADMIN — VALPROATE SODIUM 500 MG: 100 INJECTION, SOLUTION INTRAVENOUS at 03:29

## 2023-01-25 RX ADMIN — ACETAMINOPHEN 650 MG: 325 TABLET, FILM COATED ORAL at 22:37

## 2023-01-25 RX ADMIN — OLANZAPINE 5 MG: 5 TABLET, ORALLY DISINTEGRATING ORAL at 22:34

## 2023-01-25 RX ADMIN — TOPIRAMATE 200 MG: 200 TABLET, FILM COATED ORAL at 13:29

## 2023-01-25 RX ADMIN — LACOSAMIDE 100 MG: 100 TABLET, FILM COATED ORAL at 13:28

## 2023-01-25 RX ADMIN — DEXTROSE MONOHYDRATE: 50 INJECTION, SOLUTION INTRAVENOUS at 17:50

## 2023-01-25 RX ADMIN — Medication 1 MG: at 22:13

## 2023-01-25 RX ADMIN — TOPIRAMATE 200 MG: 200 TABLET, FILM COATED ORAL at 21:43

## 2023-01-25 ASSESSMENT — ACTIVITIES OF DAILY LIVING (ADL)
ADLS_ACUITY_SCORE: 43
ADLS_ACUITY_SCORE: 39
ADLS_ACUITY_SCORE: 43
ADLS_ACUITY_SCORE: 41
ADLS_ACUITY_SCORE: 43
ADLS_ACUITY_SCORE: 43
ADLS_ACUITY_SCORE: 39
ADLS_ACUITY_SCORE: 43
ADLS_ACUITY_SCORE: 39
ADLS_ACUITY_SCORE: 43

## 2023-01-25 NOTE — CONSULTS
"[facility]    Stroke Consult Note    Reason for Consult:  seizure    Chief Complaint: No chief complaint on file.       HPI Duane D Backes is a 42 year old male with past medical history significant for epilepsy, autism, epidermoid cyst who was brought to the [facility] ED 1/24 for evaluation following a seizure. Staff at his group home reported 30 seconds of seizure activity at 1630. He had subsequent seizures en route and in the emergency room. He reportedly missed two doses of Depakote. Initial sodium 121. CTH was negative for acute pathology.      Per chart, he has been consuming large amounts of fluids and having excessive urination. He was admitted to [facility] in 08/2022 for breakthrough seizure in the setting of hyponatremia. He follows with Dr. Lauren Washington for seizure management and note from 12/2022 documents current anti-seizure regimen of Depakote 500 mg TID and Topamax 200 mg BID. Per group home staff, he is also taking Vimpat and Lithium.     Today on exam, he is near baseline. He was unable to tolerate Ceribell monitoring overnight.     Impression  Breakthrough seizure in the setting of hyponatremia.     Recommendations  - PTA anti-seizure regimen ordered (Depakote 500 mg TID, Topamax 200 mg BID, Vimpat 100 mg BID)  - Given return to baseline, no strong indication for EEG at this time  - Evaluation of excessive urination, labile sodium per primary team  - Okay to transfer to floor with Q4 hour neuro checks. When patient reaches floor, please consult general neurology for further evaluation.     Patient Follow-up    - with Dr. Lauren Washington (epilepsy) in 2 weeks.     Thank you for this consult. We will continue to follow while he is in the ICU.     Shirin Torres, CNP  Vascular Neurology    To page me or covering stroke neurology team member, click here: [facility]  Choose \"On Call\" tab at top, then select \"NEUROLOGY/ALL SITES\" from middle drop-down box, press Enter, then look for " "\"stroke\" or \"telestroke\" for your site.    _____________________________________________________    Clinically Significant Risk Factors Present on Admission         # Hyponatremia: Lowest Na = 121 mmol/L in last 2 days, will monitor as appropriate               # Obesity: Estimated body mass index is 33.74 kg/m  as calculated from the following:    Height as of 1/12/23: 1.854 m (6' 1\").    Weight as of this encounter: 116 kg (255 lb 11.7 oz).          Past Medical History   Past Medical History:   Diagnosis Date     Autistic disorder, current or active state      Dry skin      Moderate intellectual disabilities      Obsessive-compulsive disorders      Overweight (BMI 25.0-29.9) 8/2/2013     Seizure disorder (H)     Generalized tonic clonic     Unspecified constipation      Past Surgical History   Past Surgical History:   Procedure Laterality Date     ANESTHESIA OUT OF OR MRI N/A 1/12/2023    Procedure: ANESTHESIA OUT OF OR  Magnetic resonance imaging Brain with and without @1400;  Surgeon: GENERIC ANESTHESIA PROVIDER;  Location: [facility] OR     CATARACT IOL, RT/LT Left 11/17/2018     Medications   Home Meds  Prior to Admission medications    Medication Sig Start Date End Date Taking? Authorizing Provider   ammonium lactate (LAC-HYDRIN) 12 % external lotion Apply topically daily To feet and heels   Yes Reported, Patient   diazepam (VALIUM) 5 MG/ML (HIGH CONC) solution Diazepam: Give 1 ml (5mg): Give 1 ml for ANY seizure. May repeat once 1 ml (5 mg). Monitor breathing, if there any concerns call 911. Do not exceed 10 mg per day. 3/30/21  Yes Lauren Washington MD   divalproex sodium extended-release (DEPAKOTE ER) 500 MG 24 hr tablet Take 1 tablet (500 mg) by mouth 3 times daily 4/15/22  Yes Lauren Washington MD   docusate sodium (COLACE) 100 MG capsule TAKE 1 CAPSULE BY MOUTH TWICE DAILY. 2/2/22  Yes Rajat Parkinson MD   ENULOSE 10 GM/15ML SOLUTION TAKE 30ML BY MOUTH EVERY EVENING AT 5PM;TAKE 30ML BY MOUTH TWICE DAILY AS NEEDED " 3/23/22  Yes Rajat Parkinson MD   fish oil-omega-3 fatty acids 1000 MG capsule TAKE 1 CAPSULE BY MOUTH TWICE DAILY  **NON-COVERED MED**  **PACK IN CARDS* 2/22/22  Yes Rajat Parkinson MD   GAVILAX 17 GM/SCOOP powder MIX 17 GRAMS IN LIQUID AND DRINK BY MOUTH ONCE DAILY FOR CONSTIPATION. 2/22/22  Yes Rajat Parkinson MD   Lacosamide (VIMPAT) 100 MG TABS tablet Take 1 tablet (100 mg) by mouth 2 times daily 9/6/22  Yes Lauren Washington MD   levothyroxine (SYNTHROID/LEVOTHROID) 150 MCG tablet TAKE 1 TABLET BY MOUTH ONCE DAILY FOR THYROID. 10/29/22  Yes Rajat Parkinson MD   lithium ER (LITHOBID) 300 MG CR tablet Take 1 tablet by mouth At Bedtime 11/29/22  Yes Reported, Patient   melatonin 5 MG tablet Take 1 tablet (5 mg) by mouth At Bedtime 8/29/22  Yes Queenie Vivar,    MILK OF MAGNESIA 400 MG/5ML suspension Take 30 mLs by mouth every evening 3/10/22  Yes Rajat Parkinson MD   propranolol (INDERAL) 40 MG tablet Take 1 tablet by mouth 2 times daily as needed 12/9/22  Yes Reported, Patient   risperiDONE (RISPERDAL) 1 MG tablet Take 1 mg by mouth 2 times daily as needed 12/5/22  Yes Reported, Patient   risperiDONE (RISPERDAL) 2 MG tablet Take 2 mg by mouth 2 times daily   Yes Reported, Patient   sertraline (ZOLOFT) 100 MG tablet Take 200 mg by mouth every morning   Yes Ronal Tavera MD   sertraline (ZOLOFT) 25 MG tablet Take 1 tablet by mouth daily 12/6/22  Yes Reported, Patient   Starch, Thickening, POWD Combined with beverages, per instructions from Speech Therapy. 3/9/22  Yes Rajat Parkinson MD   topiramate (TOPAMAX) 200 MG tablet TAKE 1 TABLET BY MOUTH TWICE DAILY. 4/15/22  Yes Lauren Washington MD   VITAMIN D3 25 MCG (1000 UT) tablet TAKE 3 TABLETS (3000 UNITS) BY MOUTH ONCE DAILY 3/15/22  Yes Rajat Parkinson MD       Scheduled Meds    enoxaparin ANTICOAGULANT  40 mg Subcutaneous Q24H     Lacosamide  100 mg Oral BID     sodium chloride (PF)  3 mL Intracatheter Q8H     topiramate  200  mg Oral Q12H [facility] (08/20)     valproate  500 mg Intravenous Q8H       Infusion Meds      PRN Meds  acetaminophen **OR** acetaminophen, bisacodyl, lidocaine 4%, lidocaine (buffered or not buffered), LORazepam, melatonin, ondansetron **OR** ondansetron, polyethylene glycol, sodium chloride (PF)    Allergies   Allergies   Allergen Reactions     Penicillin [Penicillins] Other (See Comments)     Prozac [Fluoxetine Hcl]      Reglan [Metoclopramide Hcl]      Ritalin [Methylphenidate Derivatives] Other (See Comments)     Ritalin     Trazodone      Family History   Family History   Problem Relation Age of Onset     Unknown/Adopted No family hx of      Social History   Social History     Tobacco Use     Smoking status: Never     Smokeless tobacco: Never   Substance Use Topics     Alcohol use: No     Drug use: No       Review of Systems   Review of systems not obtained due to patient factors - mental status       PHYSICAL EXAMINATION   Temp:  [96.6  F (35.9  C)-98.6  F (37  C)] 98.6  F (37  C)  Pulse:  [64-94] 89  Resp:  [9-33] 11  BP: ()/() 141/83  SpO2:  [93 %-98 %] 96 %    Neurologic  Mental Status:  alert, follows simple commands, able to say birthdate but not age or month, speech minimal  Cranial Nerves:  EOMI with normal smooth pursuit, facial movements symmetric, hearing not formally tested but intact to conversation, no dysarthria, tongue protrusion midline, blinks to threat bilaterally  Motor:  normal muscle tone and bulk, no abnormal movements, able to move all limbs spontaneously, no pronator drift  Reflexes:  deferred   Sensory:  light touch sensation intact and symmetric throughout upper and lower extremities  Coordination:  unable to participate in formal assessment  Station/Gait:  deferred    Dysphagia Screen  Per Nursing    Imaging  I personally reviewed all imaging; relevant findings per HPI.    Labs Data   CBC  Recent Labs   Lab 01/25/23  0521 01/24/23  1756   WBC 7.9 6.4   RBC 5.03 4.78   HGB 14.9  14.2   HCT 43.3 41.6    204     Basic Metabolic Panel   Recent Labs   Lab 01/25/23  0952 01/25/23  0521 01/25/23  0012 01/24/23  2316 01/24/23  1756   * 132*  132* 123*  --  121*   POTASSIUM  --  4.4  --   --  3.8   CHLORIDE  --  96*  --   --  86*   CO2  --  27  --   --  27   BUN  --  8.9  --   --  11.0   CR  --  0.79 0.58*  --  0.73   GLC 98 116*  --  130* 107*   JESSICA  --  9.0  --   --  9.1     Liver Panel  Recent Labs   Lab 01/24/23  1756   PROTTOTAL 7.1   ALBUMIN 4.5   BILITOTAL 0.4   ALKPHOS 56   AST 20   ALT 8*     INR  No lab results found.   Lipid Profile    Recent Labs   Lab Test 11/04/21  1607 05/17/18  0936 08/15/17  1658 08/06/15  0946   CHOL 165 174 152 138   HDL 67 58 60 59   LDL 69 101* 73 65   TRIG 143 77 94 70   CHOLHDLRATIO  --   --   --  2.3     A1C    Recent Labs   Lab Test 09/23/20  0858 08/15/17  1658   A1C 4.7 4.9     Troponin  No results for input(s): CTROPT, TROPONINIS, TROPONINI, GHTROP in the last 168 hours.       Stroke Consult Data Data   This was a non-emergent, non-telestroke consult.  I personally examined and evaluated the patient today. At the time of my evaluation and management the patient was in critical condition today due to possible seizure. I personally managed exam. Key decisions made today included exam. I spent a total of 30 minutes providing critical care services, evaluating the patient, directing care and reviewing laboratory values and radiologic reports.

## 2023-01-25 NOTE — PROGRESS NOTES
[facility]    Medicine Progress Note - Hospitalist Service    Date of Admission:  1/24/2023  Date of Service: 01/25/2023    Assessment & Plan   Duane D Backes is a 42 year-old male with past medical history including autism with behavioral disturbance, epilepsy, epidermoid tumor who is admitted on 1/24/2023 as transfer from [facility] ED for seizures.     Epilepsy with breakthrough seizure  Epidermoid tumor with associated mass-effect  *Presents with witnessed seizure at group home, additional seizure with EMS and again in ED   *Per review of outside notes, has reportedly has missed home depakote doses recently  *Hyponatremia likely lowering seizure threshold as well   *CT head with grossly stable epidermoid tumor with mass effect  Plan:  - Ceribell device overnight and continuous EEG monitoring ordered  - Continue depakote IV  - Seizure precautions  - IV lorazepam ordered for breakthrough seizures   - Resume topiramate PO when confirmed by pharmacy pending neurology evaluation    Hypoosmolar hyponatremia  *Sodium 121, serum osm 252  *Received 1L fluid bolus + mIVF prior to transfer  *Reportedly on lithium, but lithium level <0.1 at [facility]  Plan:  - D5W started for Na overcorrection  - Sodium Q4H, goal Na ~130    Autism with behavioral disturbance  Cognitive impairment  - Resume PTA regimen when confirmed by pharmacy   - Restraints as needed  - Sitter at bedside, continue as needed         Diet: Combination Diet Easy to Chew (level 7); Mildly Thick (level 2) (By tsp with 1:1 assist); No Straws  Snacks/Supplements Adult: Magic Cup; Between Meals    DVT Prophylaxis: Pneumatic Compression Devices  Colin Catheter: Not present  Lines: None     Cardiac Monitoring: ACTIVE order. Indication: ICU  Code Status: Full Code      Clinically Significant Risk Factors Present on Admission         # Hyponatremia: Lowest Na = 121 mmol/L in last 2 days, will monitor as appropriate               #  "Obesity: Estimated body mass index is 33.74 kg/m  as calculated from the following:    Height as of 1/12/23: 1.854 m (6' 1\").    Weight as of this encounter: 116 kg (255 lb 11.7 oz).           Disposition Plan      Expected Discharge Date: 01/27/2023                  Levi Lambert MD  Hospitalist Service  [facility]  Securely message with [employer] (more info)  Text page via [employer] Paging/Directory   ______________________________________________________________________    Interval History     No seizures today  No CP  No fevers  Alert, pleasant, smiling  No new complaints    Physical Exam   Vital Signs: Temp: 98.6  F (37  C) Temp src: Axillary BP: (!) 153/96 Pulse: 90   Resp: 17 SpO2: 96 %      Weight: 255 lbs 11.74 oz     Constitutional:  NAD  Eyes: PERRL, unable to assess EOM  HENT: Oropharynx clear, MMM  Respiratory: Clear to auscultation bilaterally, good air movement, normal effort   Cardiovascular: RRR, no m/r/g. No peripheral edema.    GI: Soft, non-tender, non-distended. No rebound tenderness or guarding.    Skin: Warm, dry   Neurologic: alert and Tesha, no focal deficits    Medical Decision Making       40 MINUTES SPENT BY ME on the date of service doing chart review, history, exam, documentation & further activities per the note.      Data     I have personally reviewed the following data over the past 24 hrs:    7.9  \   14.9   / 229     135 (L) 96 (L) 8.9 /  143 (H)   4.4 27 0.79 \       ALT: 8 (L) AST: 20 AP: 56 TBILI: 0.4   ALB: 4.5 TOT PROTEIN: 7.1 LIPASE: N/A       Imaging results reviewed over the past 24 hrs:   Recent Results (from the past 24 hour(s))   Head CT w/o contrast    Narrative    EXAM: CT HEAD W/O CONTRAST  LOCATION: [facility]  DATE/TIME: 1/24/2023 7:16 PM    INDICATION: Seizure, epidermoid cyst  COMPARISON: MRI brain 1/12/2023 and 10/10/2021  TECHNIQUE: Routine CT Head without IV contrast. Multiplanar reformats. Dose reduction techniques were " used.    FINDINGS:  INTRACRANIAL CONTENTS: Grossly stable appearance of previously demonstrated multilobulated epidermoid cyst insinuating throughout the basilar cisterns, encasing the medulla, extending through the right foramen of Luschka inferiorly, and asymmetrically   extending superiorly into the right middle cranial fossa. Grossly stable mass effect upon the right temporal lobe, brainstem, and right cerebellar hemisphere, as well as upon the posterior third and fourth ventricles. Ventricular size and morphology also   appear stable dating back to MRI brain 10/10/2021. No hydrocephalus. No intracranial hemorrhage or CT evidence for acute infarction.    VISUALIZED ORBITS/SINUSES/MASTOIDS: Prior left cataract surgery. Visualized portions of the orbits are otherwise unremarkable. No paranasal sinus mucosal disease. No middle ear or mastoid effusion.    BONES/SOFT TISSUES: No acute abnormality. Diffuse calvarial thickening, which may be secondary to chronic antiepileptic medication use.        Impression    IMPRESSION:  1.  No acute intracranial abnormality.  2.  Grossly stable multilobulated epidermoid cyst and associated mass effect, as described above.     Most Recent 3 CBC's:Recent Labs   Lab Test 01/25/23  0521 01/24/23  1756 12/19/22  1102   WBC 7.9 6.4 5.2   HGB 14.9 14.2 14.9   MCV 86 87 89    204 290     Most Recent 3 BMP's:Recent Labs   Lab Test 01/25/23  1426 01/25/23  0952 01/25/23  0521 01/25/23  0012 01/24/23  2316 01/24/23  1756 08/29/22  0544   NA  --  135* 132*  132* 123*  --  121* 141   POTASSIUM  --   --  4.4  --   --  3.8 4.5   CHLORIDE  --   --  96*  --   --  86* 106   CO2  --   --  27  --   --  27 26   BUN  --   --  8.9  --   --  11.0 18.9   CR  --   --  0.79 0.58*  --  0.73 0.76   ANIONGAP  --   --  9  --   --  8 9   JESSICA  --   --  9.0  --   --  9.1 9.5   * 98 116*  --    < > 107* 105*    < > = values in this interval not displayed.     Most Recent 2 LFT's:Recent Labs   Lab  Test 01/24/23  1756 12/19/22  1102 08/23/22  2132   AST 20 19 26   ALT 8* 6* 12   ALKPHOS 56  --  56   BILITOTAL 0.4  --  0.3     Most Recent 3 INR's:No lab results found.  Most Recent 3 Troponin's:No lab results found.  Most Recent 3 BNP's:No lab results found.

## 2023-01-25 NOTE — ED PROVIDER NOTES
History     Chief Complaint:  Seizures       The history is provided by the EMS personnel. The history is limited by the condition of the patient.      Duane D Backes is a 42 year old male with a history of epilepsy and autistic disorder who presents with seizures. Per nurse triage note, the patient was transported to the ED from his group home in [place]. The patient was experiencing  seizure around 1630 and lasted 30 seconds. Patient missed 2 doses of Depakote today. EMS gave the patient Diazepam 5mg around 1716. EMS reports that the patient had another seizure en route that lasted 30 seconds. Patient was incontinent of urine. Patient alert but not speaking on arrival.  per EMS.     Group home staff reports that the patient was not sitting at his usual seat when dinner was being prepared. The patient was found having seizure like activity while sitting on the couch. No falls or injuries. No recent illnesses.     Father reports that at baseline patient will talk but not much. No recent illnesses. Father thinks he has seizures every few months.     MR Brain Perfusion w/o and w Contrast on 1/12/23 at [facility]   Impression:   1. No appreciable change in the large epidermoid cyst centered in the  right middle cranial fossa with extension and mass effect on the basal  cisterns and posterior fossa, dating back to 3/25/2021.  2. Unchanged enhancing nodular components of about the periphery of  the mass.      Independent Historian:    EMS, group home staff, and father    Review of External Notes: I reviewed MRI from 1/12/23 and neurology note from 12/5/22    ROS:  Review of Systems   Unable to perform ROS: Acuity of condition       Allergies:  Penicillin [Penicillins]  Prozac [Fluoxetine Hcl]  Reglan [Metoclopramide Hcl]  Ritalin [Methylphenidate Derivatives]  Trazodone     Medications:    Diazepam   Divalproex    Lacosamide   Lactulose   Levothyroxine   Melatonin   Risperidone    Sertraline   Topiramate      Past Medical History:   Generalized tonic clonic epilepsy   Benign neoplasm of brain   Seizure disorder  Status epilepticus   Hypothyroidism   Moderate mental retardation   Active autistic disorder  OCD  Obesity   AMS    Past Surgical History:   Cataract IOL      Social History:  Reports that he has never smoked. He has never used smokeless tobacco. He reports that he does not drink alcohol and does not use drugs.  PCP: Rajat Parkinson     Physical Exam     Patient Vitals for the past 24 hrs:   BP Temp Temp src Pulse Resp SpO2   01/24/23 2139 (!) 158/98 -- -- 68 30 94 %   01/24/23 2135 (!) 158/98 -- -- 73 29 95 %   01/24/23 2104 131/71 -- -- 65 12 93 %   01/24/23 2049 128/71 -- -- 64 13 95 %   01/24/23 2034 (!) 142/83 -- -- 66 19 95 %   01/24/23 2019 (!) 168/88 -- -- 74 16 96 %   01/24/23 2000 (!) 152/94 -- -- 71 -- 96 %   01/24/23 1945 (!) 150/92 -- -- 67 16 93 %   01/24/23 1943 (!) 146/89 -- -- 70 -- 93 %   01/24/23 1851 (!) 138/91 -- -- 73 (!) 33 94 %   01/24/23 1821 135/75 -- -- 71 16 96 %   01/24/23 1800 134/88 -- -- 70 22 95 %   01/24/23 1749 -- 96.9  F (36.1  C) Oral -- 16 --   01/24/23 1748 -- -- -- -- -- 96 %   01/24/23 1746 (!) 146/94 -- -- 76 -- --        Physical Exam  VS: Reviewed per above  HENT: Mucous membranes moist, no nuchal rigidity, no external signs head trauma.   EYES: sclera anicteric  CV: Rate as noted,  regular rhythm.   RESP: Effort normal. Breath sounds are normal bilaterally.  GI: no discernable tenderness/rebound/guarding, not distended.  NEURO:    GCS:   Motor 5=Localizes pain   Verbal 1=None   Eye Opening 2=To pain   Total: 8   No facial asymmetry, PERRL, MITTAL, localizes pain  MSK: No deformity of the extremities  SKIN: Warm and dry      Emergency Department Course   Imaging:  Head CT w/o contrast   Final Result   IMPRESSION:   1.  No acute intracranial abnormality.   2.  Grossly stable multilobulated epidermoid cyst and associated mass effect, as  described above.         Report per radiology    Laboratory:  Labs Ordered and Resulted from Time of ED Arrival to Time of ED Departure   BASIC METABOLIC PANEL - Abnormal       Result Value    Sodium 121 (*)     Potassium 3.8      Chloride 86 (*)     Carbon Dioxide (CO2) 27      Anion Gap 8      Urea Nitrogen 11.0      Creatinine 0.73      Calcium 9.1      Glucose 107 (*)     GFR Estimate >90     HEPATIC FUNCTION PANEL - Abnormal    Protein Total 7.1      Albumin 4.5      Bilirubin Total 0.4      Alkaline Phosphatase 56      AST 20      ALT 8 (*)     Bilirubin Direct <0.20     VALPROIC ACID - Normal    Valproic acid 91.3     INFLUENZA A/B & SARS-COV2 PCR MULTIPLEX - Normal    Influenza A PCR Negative      Influenza B PCR Negative      RSV PCR Negative      SARS CoV2 PCR Negative     CBC WITH PLATELETS AND DIFFERENTIAL    WBC Count 6.4      RBC Count 4.78      Hemoglobin 14.2      Hematocrit 41.6      MCV 87      MCH 29.7      MCHC 34.1      RDW 12.2      Platelet Count 204      % Neutrophils 59      % Lymphocytes 28      % Monocytes 9      % Eosinophils 3      % Basophils 1      % Immature Granulocytes 0      NRBCs per 100 WBC 0      Absolute Neutrophils 3.7      Absolute Lymphocytes 1.8      Absolute Monocytes 0.6      Absolute Eosinophils 0.2      Absolute Basophils 0.0      Absolute Immature Granulocytes 0.0      Absolute NRBCs 0.0     TOPIRAMATE LEVEL   ROUTINE UA WITH MICROSCOPIC REFLEX TO CULTURE   OSMOLALITY, RANDOM URINE   SODIUM RANDOM URINE   OSMOLALITY   LITHIUM LEVEL        Emergency Department Course & Assessments:  Interventions:  Medications   0.9% sodium chloride BOLUS (0 mLs Intravenous Stopped 1/24/23 2030)     Followed by   sodium chloride 0.9% infusion ( Intravenous New Bag 1/24/23 2031)   valproate (DEPACON) 500 mg in sodium chloride 0.9 % 50 mL intermittent infusion (0 mg Intravenous Stopped 1/24/23 2002)      Consultations/Discussion of Management or Tests:  1954 I spoke with Dr. Bangura,  neurology, regarding the patient    2011 I spoke with group home staff   2033 I spoke with Raul Gonzales, hospitalist at [facility], who accepts admission     Social Determinants of Health affecting care:  None    Assessments:  ED Course as of 01/24/23 2205 Tue Jan 24, 2023 1811 I obtained history and examined the patient as noted above    1942 I rechecked the patient. Patient had another seizure   2027 I spoke with the patient's dad, over the phone. The patient likes to drink a lot of liquids   2102 I reassessed the patient      Disposition:  The patient was transferred to [facility] via EMS. Dr. Gonzales accepted the patient for transfer.     Impression & Plan    Medical Decision Making:  Patient presents to the ER for evaluation of seizure-like activity.  On arrival vital signs are reassuring.  On exam, patient is sleepy, moving all extremities, not following commands.  No ongoing seizure-like activity appreciated.    Per report given to us, patient had 30 second seizure-like episode at the group home and then another seizure en route.  It also sounds as though he missed 2 doses of Depakote today.  Depakote was provided IV.  Patient had another 30 second seizure-like episode while this medication was infusing.    I reviewed recent neurology notes and brain MRI showing stable brain epidermoid cyst with mass-effect.  CT head today stable.  Labs did reveal hyponatremia of 121 which appears to be new compared to 4 months ago.  In discussion with father, patient does d they have been trying to restrict patient's fluid intake.  In discussion with group home, patient does also take lithium, which could also be a contributing factor.  Sodium studies sent.  Normal saline bolus provided.  No fevers to suggest occult CNS infectious process.    I discussed with neurology regarding recurrent seizures and lack of return to apparent baseline.  It is difficult to ascertain  what patient's baseline is, as apparently he does not talk much per his father.  However patient does verbalize and this was not appreciated here in the ER.  Recommendation by neurology was for transfer to [facility] for consideration of continuous EEG monitoring.  On serial reassessments prior to transfer, patient is seemingly protecting his airway and resting comfortably on the gurney.    Critical Care time:  was 45 minutes for this patient excluding procedures.    Diagnosis:    ICD-10-CM    1. Status epilepticus (H)  G40.901       2. Hyponatremia  E87.1          1/24/2023   Juan Petty MD Lindenbaum, Elan, MD  01/24/23 [id number]

## 2023-01-25 NOTE — TELEPHONE ENCOUNTER
Called and phone hung up. We were able to connect on second call. He had seizures and is at [facility]. Patient states antiepileptic drug were not missed, no active infection, no sleep deprivation, no excessive alcohol use or recreational drug use, his Na+ was low which may have cause the seizure.     Reviewed MRI brain is unchanged.     Follow up  With Felix Washington MD

## 2023-01-25 NOTE — PLAN OF CARE
Pt. In room air, clear lungs, neuro exam appears to be at pt's baseline with limited verbal communication given pt's autism, extremities are strong and moving purposefully, pt's following very simple commands at times, pupils equal and reactive, CV: SR with no PVCs observed, BP within parameters, brisk light Coloured urine into external urinary catheter, pt. Agitated with external items (I.e. EKG cords and BP cuff etc.) and quickly and impulsively tearing them off, sitter requested for pt., pt. Cleared by speech and taking thickened PO fluids, pills down easy with pudding, ate all of one meal after speech saw him, transferred by myself and sitter/NA to 7th floor on cart without incident and with all pt. Belongings (change of clothes), serial sodium checks in place.

## 2023-01-25 NOTE — PHARMACY-ADMISSION MEDICATION HISTORY
Pharmacy Medication History  Admission medication history interview status for the 1/24/2023  admission is complete. See EPIC admission navigator for prior to admission medications     Location of Interview: Phone  Medication history sources: Spoke w/ nurse at [facility] ([phone]).    In the past week, patient estimated taking medication this percent of the time: greater than 90%    Medication Affordability:  Not including over the counter (OTC) medications, was there a time in the past 12 months when you did not take your medications as prescribed because of cost?: Unable to Assess (Patient resides in Group [place].)    Medication reconciliation completed by provider prior to medication history? No    Time spent in this activity: 20 minutes    Prior to Admission medications    Medication Sig Last Dose Taking? Auth Provider Long Term End Date   ammonium lactate (LAC-HYDRIN) 12 % external lotion Apply topically daily To feet and heels  Yes Reported, Patient     diazepam (VALIUM) 5 MG/ML (HIGH CONC) solution Diazepam: Give 1 ml (5mg): Give 1 ml for ANY seizure. May repeat once 1 ml (5 mg). Monitor breathing, if there any concerns call 911. Do not exceed 10 mg per day.  Yes Lauren Washington MD No    divalproex sodium extended-release (DEPAKOTE ER) 500 MG 24 hr tablet Take 1 tablet (500 mg) by mouth 3 times daily  Yes Lauren Washington MD Yes    docusate sodium (COLACE) 100 MG capsule TAKE 1 CAPSULE BY MOUTH TWICE DAILY.  Yes Rajat Parkinson MD     ENULOSE 10 GM/15ML SOLUTION TAKE 30ML BY MOUTH EVERY EVENING AT 5PM;TAKE 30ML BY MOUTH TWICE DAILY AS NEEDED  Yes Rajat Parkinson MD     fish oil-omega-3 fatty acids 1000 MG capsule TAKE 1 CAPSULE BY MOUTH TWICE DAILY  **NON-COVERED MED**  **PACK IN CARDS*  Yes Rajat Parkinson MD     GAVILAX 17 GM/SCOOP powder MIX 17 GRAMS IN LIQUID AND DRINK BY MOUTH ONCE DAILY FOR CONSTIPATION.  Yes Rajat Parkinson MD     Lacosamide (VIMPAT) 100 MG TABS tablet Take 1 tablet  (100 mg) by mouth 2 times daily  Yes Lauren Washington MD Yes    levothyroxine (SYNTHROID/LEVOTHROID) 150 MCG tablet TAKE 1 TABLET BY MOUTH ONCE DAILY FOR THYROID.  Yes Rajat Parkinson MD Yes    lithium ER (LITHOBID) 300 MG CR tablet Take 1 tablet by mouth At Bedtime  Yes Reported, Patient     melatonin 5 MG tablet Take 1 tablet (5 mg) by mouth At Bedtime  Yes Queenie Vivar,      MILK OF MAGNESIA 400 MG/5ML suspension Take 30 mLs by mouth every evening  Yes Rajat Parkinson MD     propranolol (INDERAL) 40 MG tablet Take 1 tablet by mouth 2 times daily as needed  Yes Reported, Patient Yes    risperiDONE (RISPERDAL) 1 MG tablet Take 1 mg by mouth 2 times daily as needed  Yes Reported, Patient Yes    risperiDONE (RISPERDAL) 2 MG tablet Take 2 mg by mouth 2 times daily  Yes Reported, Patient Yes    sertraline (ZOLOFT) 100 MG tablet Take 200 mg by mouth every morning  Yes Ronal Tavera MD     sertraline (ZOLOFT) 25 MG tablet Take 1 tablet by mouth daily  Yes Reported, Patient     Starch, Thickening, POWD Combined with beverages, per instructions from Speech Therapy.  Yes Rajat Parkinson MD     topiramate (TOPAMAX) 200 MG tablet TAKE 1 TABLET BY MOUTH TWICE DAILY.  Yes Lauren Washington MD Yes    VITAMIN D3 25 MCG (1000 UT) tablet TAKE 3 TABLETS (3000 UNITS) BY MOUTH ONCE DAILY  Yes Rajat Parkinson MD         The information provided in this note is only as accurate as the sources available at the time of update(s)     Pat Goss, PharmD, BCPS

## 2023-01-25 NOTE — H&P
[facility]    History and Physical - Hospitalist Service       Date of Admission:  1/24/2023    Assessment & Plan   Duane D Backes is a 42 year-old male with past medical history including autism with behavioral disturbance, epilepsy, epidermoid tumor who is admitted on 1/24/2023 as transfer from [facility] ED for seizures.     Epilepsy with breakthrough seizure  Epidermoid tumor with associated mass-effect  *Presents with witnessed seizure at group home, additional seizure with EMS and again in ED   *Per review of outside notes, has reportedly has missed home depakote doses recently  *Hyponatremia likely lowering seizure threshold as well   *CT head with grossly stable epidermoid tumor with mass effect  - Ceribell device overnight and continuous EEG monitoring ordered when available   - Continue depakote IV  - Seizure precautions  - IV lorazepam ordered for breakthrough seizures   - Resume topiramate PO when confirmed by pharmacy pending neurology evaluation    Hypoosmolar hyponatremia  *Sodium 121, serum osm 252  *Received 1L fluid bolus + mIVF prior to transfer  *Reportedly on lithium, but lithium level <0.1 at [facility]  - STAT sodium now, further management/work-up pending response to fluids    ADDENDUM:   Sodium returned 123. Given seizures and known intracranial tumor will give 100 ml 3% saline x1 and check sodium q2H x2, goal to raise to ~125-127.     Autism with behavioral disturbance  Cognitive impairment  - Resume PTA regimen when confirmed by pharmacy   - Restraints as needed  - Sitter at bedside, continue as needed       Diet: NPO for Medical/Clinical Reasons Except for: No Exceptions NPO until more alert and safely able to tolerate diet  DVT Prophylaxis: Enoxaparin (Lovenox) SQ  Colin Catheter: Not present  Code Status: Full Code Full code     Disposition Plan   Admit to inpatient. Anticipate greater than or equal to 2 midnights prior to discharge.     Entered: Cristino FINNEGAN  "MD Keli 01/25/2023, 12:29 AM     The patient's care was discussed with the bedside RN, charge RN      Clinically Significant Risk Factors Present on Admission         # Hyponatremia: Lowest Na = 121 mmol/L in last 2 days, will monitor as appropriate               # Obesity: Estimated body mass index is 33.74 kg/m  as calculated from the following:    Height as of 1/12/23: 1.854 m (6' 1\").    Weight as of this encounter: 116 kg (255 lb 11.7 oz).                Cristino Dickey MD  [facility]    ______________________________________________________________________    Chief Complaint   Seizures    History of Present Illness   Duane D Backes is a 42 year old male who presents with the above chief complaint.    History is obtained from review of EMS records and review of [facility] notes. History from patient unable to be obtained due to his sedated status.  EMS was reportedly called to the patient's group home after his group home staff witnessed generalized seizure activity.  He has reportedly missed 2 doses of his Depakote recently, for unknown reasons.  EMS witnessed a tonic-clonic seizures and administered rescue diazepam. He reportedly had another seizure in the ED shortly after receiving IV Depakote.  Head CT and [facility] with grossly stable epidermoid cyst with mass-effect.  His case was discussed with neurology on-call, who recommended transfer to [facility] ICU for continuous EEG monitoring and further recommendations.  In addition to IV Depakote, he received 1 L normal saline followed by maintenance fluids for a sodium noted to be 121 on arrival.    Review of Systems    Complete 10 point review of systems assessed and negative except as noted in HPI.    Past Medical History    I have reviewed this patient's medical history and updated it with pertinent information if needed.   Past Medical History:   Diagnosis Date     Autistic disorder, current or active state      Dry " skin      Moderate intellectual disabilities      Obsessive-compulsive disorders      Overweight (BMI 25.0-29.9) 8/2/2013     Seizure disorder (H)     Generalized tonic clonic     Unspecified constipation        Past Surgical History   I have reviewed this patient's surgical history and updated it with pertinent information if needed.  Past Surgical History:   Procedure Laterality Date     ANESTHESIA OUT OF OR MRI N/A 1/12/2023    Procedure: ANESTHESIA OUT OF OR  Magnetic resonance imaging Brain with and without @1400;  Surgeon: GENERIC ANESTHESIA PROVIDER;  Location: [facility] OR     CATARACT IOL, RT/LT Left 11/17/2018         Social History   I have reviewed this patient's social history and updated it with pertinent information if needed.  Social History     Tobacco Use     Smoking status: Never     Smokeless tobacco: Never   Substance Use Topics     Alcohol use: No     Drug use: No     Lives in group home.      Family History   I have reviewed this patient's family history and updated it with pertinent information if needed.   Family History   Problem Relation Age of Onset     Unknown/Adopted No family hx of         Prior to Admission Medications  - Pending pharmacy reconciliation   Prior to Admission Medications   Prescriptions Last Dose Informant Patient Reported? Taking?   ENULOSE 10 GM/15ML SOLUTION   No No   Sig: TAKE 30ML BY MOUTH EVERY EVENING AT 5PM;TAKE 30ML BY MOUTH TWICE DAILY AS NEEDED   GAVILAX 17 GM/SCOOP powder   No No   Sig: MIX 17 GRAMS IN LIQUID AND DRINK BY MOUTH ONCE DAILY FOR CONSTIPATION.   Lacosamide (VIMPAT) 100 MG TABS tablet   No No   Sig: Take 1 tablet (100 mg) by mouth 2 times daily   MILK OF MAGNESIA 400 MG/5ML suspension   No No   Sig: Take 30 mLs by mouth every evening   Starch, Thickening, POWD   No No   Sig: Combined with beverages, per instructions from Speech Therapy.   VITAMIN D3 25 MCG (1000 UT) tablet   No No   Sig: TAKE 3 TABLETS (3000 UNITS) BY MOUTH ONCE DAILY   ammonium lactate  (LAC-HYDRIN) 12 % external lotion   Yes No   Sig: Apply topically daily To feet and heels   diazepam (VALIUM) 5 MG/ML (HIGH CONC) solution   No No   Sig: Diazepam: Give 1 ml (5mg): Give 1 ml for ANY seizure. May repeat once 1 ml (5 mg). Monitor breathing, if there any concerns call 911. Do not exceed 10 mg per day.   divalproex sodium extended-release (DEPAKOTE ER) 500 MG 24 hr tablet   No No   Sig: Take 1 tablet (500 mg) by mouth 3 times daily   docusate sodium (COLACE) 100 MG capsule   No No   Sig: TAKE 1 CAPSULE BY MOUTH TWICE DAILY.   fish oil-omega-3 fatty acids 1000 MG capsule   No No   Sig: TAKE 1 CAPSULE BY MOUTH TWICE DAILY  **NON-COVERED MED**  **PACK IN CARDS*   levothyroxine (SYNTHROID/LEVOTHROID) 150 MCG tablet   No No   Sig: TAKE 1 TABLET BY MOUTH ONCE DAILY FOR THYROID.   lithium ER (LITHOBID) 300 MG CR tablet   Yes No   Sig: Take 1 tablet by mouth At Bedtime   melatonin 5 MG tablet   No No   Sig: Take 1 tablet (5 mg) by mouth At Bedtime   propranolol (INDERAL) 40 MG tablet   Yes No   Sig: Take 1 tablet by mouth 2 times daily as needed   risperiDONE (RISPERDAL) 1 MG tablet   Yes No   Sig: Take 1 mg by mouth 2 times daily as needed   risperiDONE (RISPERDAL) 2 MG tablet   Yes No   Sig: Take 2 mg by mouth 2 times daily 1 tab every morning & 1 tab every bedtime   sertraline (ZOLOFT) 100 MG tablet   Yes No   Sig: Take 1 tablet by mouth every morning   sertraline (ZOLOFT) 25 MG tablet   Yes No   Sig: Take 1 tablet by mouth daily   sertraline (ZOLOFT) 50 MG tablet   Yes No   Si mg daily   topiramate (TOPAMAX) 200 MG tablet   No No   Sig: TAKE 1 TABLET BY MOUTH TWICE DAILY.      Facility-Administered Medications: None     Allergies   Allergies   Allergen Reactions     Penicillin [Penicillins] Other (See Comments)     Prozac [Fluoxetine Hcl]      Reglan [Metoclopramide Hcl]      Ritalin [Methylphenidate Derivatives] Other (See Comments)     Ritalin     Trazodone        Physical Exam   Vital Signs: Temp:  (!) 96.6  F (35.9  C) Temp src: Axillary                Weight: 255 lbs 11.74 oz    Constitutional:  NAD  Eyes: PERRL, unable to assess EOM  HENT: Oropharynx clear, MMM  Respiratory: Clear to auscultation bilaterally, good air movement, normal effort   Cardiovascular: RRR, no m/r/g. No peripheral edema.    GI: Soft, non-tender, non-distended. No rebound tenderness or guarding.    Skin: Warm, dry   Neurologic: Sedated. Responds to physical stimuli, though does not open eyes. Unable to consistently follow commands.   Psychiatric: Unable to assess due to sedation       Data   Data reviewed today: I reviewed all medications, new labs and imaging results over the last 24 hours. I personally reviewed no images or EKG's today.    Recent Labs   Lab 01/24/23  2316 01/24/23  1756   WBC  --  6.4   HGB  --  14.2   MCV  --  87   PLT  --  204   NA  --  121*   POTASSIUM  --  3.8   CHLORIDE  --  86*   CO2  --  27   BUN  --  11.0   CR  --  0.73   ANIONGAP  --  8   JESSICA  --  9.1   * 107*   ALBUMIN  --  4.5   PROTTOTAL  --  7.1   BILITOTAL  --  0.4   ALKPHOS  --  56   ALT  --  8*   AST  --  20       Recent Results (from the past 24 hour(s))   Head CT w/o contrast    Narrative    EXAM: CT HEAD W/O CONTRAST  LOCATION: [facility]  DATE/TIME: 1/24/2023 7:16 PM    INDICATION: Seizure, epidermoid cyst  COMPARISON: MRI brain 1/12/2023 and 10/10/2021  TECHNIQUE: Routine CT Head without IV contrast. Multiplanar reformats. Dose reduction techniques were used.    FINDINGS:  INTRACRANIAL CONTENTS: Grossly stable appearance of previously demonstrated multilobulated epidermoid cyst insinuating throughout the basilar cisterns, encasing the medulla, extending through the right foramen of Luschka inferiorly, and asymmetrically   extending superiorly into the right middle cranial fossa. Grossly stable mass effect upon the right temporal lobe, brainstem, and right cerebellar hemisphere, as well as upon the posterior third  and fourth ventricles. Ventricular size and morphology also   appear stable dating back to MRI brain 10/10/2021. No hydrocephalus. No intracranial hemorrhage or CT evidence for acute infarction.    VISUALIZED ORBITS/SINUSES/MASTOIDS: Prior left cataract surgery. Visualized portions of the orbits are otherwise unremarkable. No paranasal sinus mucosal disease. No middle ear or mastoid effusion.    BONES/SOFT TISSUES: No acute abnormality. Diffuse calvarial thickening, which may be secondary to chronic antiepileptic medication use.        Impression    IMPRESSION:  1.  No acute intracranial abnormality.  2.  Grossly stable multilobulated epidermoid cyst and associated mass effect, as described above.

## 2023-01-25 NOTE — PLAN OF CARE
Oxygen: Room Air  Drips: No continous drips    Events: Hyponatremia, 3%NS bolus given per order, Na increasing, now 132    Patient tolerated ceribell until becoming more alert, impulsively removed device multiple times, discussed with charge nurse, remains off.    Patient opens eyes spontaneously and is calm but did not tolerate devices near or around head and face    No witnessed seizure activity overnight, depakote given as ordered

## 2023-01-25 NOTE — PROGRESS NOTES
"  SLP Bedside Swallow Evaluation  01/25/23 1928   Appointment Info   Signing Clinician's Name / Credentials (SLP) Corinne Ge MA [facility] SLP   General Information   Onset of Illness/Injury or Date of Surgery 01/24/23   Referring Physician Dr. Lambert   Patient/Family Therapy Goal Statement (SLP) \"Ice cream\"   Pertinent History of Current Problem Per notes: Duane D Backes is a 42 year old male with a history of epilepsy and autistic disorder who presents with seizures. Per nurse triage note, the patient was transported to the ED from his group home in [place]. The patient was experiencing  seizure around 1630 and lasted 30 seconds. Patient missed 2 doses of Depakote today. EMS gave the patient Diazepam 5mg around 1716. EMS reports that the patient had another seizure en route that lasted 30 seconds. Patient was incontinent of urine. Patient alert but not speaking on arrival.  per EMS.      Group home staff reports that the patient was not sitting at his usual seat when dinner was being prepared. The patient was found having seizure like activity while sitting on the couch. No falls or injuries. No recent illnesses.      Father reports that at baseline patient will talk but not much. No recent illnesses. Father thinks he has seizures every few months.        MR Brain Perfusion w/o and w Contrast on 1/12/23 at [facility]   Impression:   1. No appreciable change in the large epidermoid cyst centered in the  right middle cranial fossa with extension and mass effect on the basal  cisterns and posterior fossa, dating back to 3/25/2021.  2. Unchanged enhancing nodular components of about the periphery of  the mass.      Hx of dysphagia: Esophagram completed 9/28/20 with aspiration of thick barium reported; VFSS completed 11/11/20 - aspiration of thin, mildly thick and moderately thick liquids (silent at times), mild residue, and delayed esophageal emptying observed; recommended diet was DDL2 " and honey thick liquids; Bedside eval and Tx in 3/2021 with family wanting pt to take po despite high aspiration risk at the time, diet at discharge was IDDSI diet level 4 puree and mod thick liquids by spoon; OP Clinical evaluation 2/4/22 - IDDSI Diet Level 6 soft and bite size and mildly thick liquids recommended with 1:1 assist.      Group home staff contacted and stated pt is on a regular diet and thickened liquids; however, pt drinks thin water from faucets and grabs cups of water impulsively.  No recent respiratory difficulty reported.   General Observations Pt was alert and cooperative.  Pt was impulsive, distracted, and talking prior to swallows at times.  Cues were given to swallow at times.   Type of Evaluation   Type of Evaluation Swallow Evaluation   Oral Motor   Oral Musculature unable to assess due to poor participation/comprehension   Dentition (Oral Motor)   Dentition (Oral Motor)   (Unable to fully visualize)   Vocal Quality/Secretion Management (Oral Motor)   Comment, Vocal Quality/Secretion Management (Oral Motor) WFL   General Swallowing Observations   Respiratory Support (General Swallowing Observations) none   Swallowing Evaluation Clinical swallow evaluation   Clinical Swallow Evaluation   Feeding Assistance frequent cues/help required   Clinical Swallow Evaluation Textures Trialed thin liquids;mildly thick liquids;pureed;soft & bite-sized;solid foods   Clinical Swallow Eval: Thin Liquid Texture Trial   Mode of Presentation, Thin Liquids cup;spoon   Volume of Liquid or Food Presented tsps x 3, sip by cup x 1   Oral Phase of Swallow premature pharyngeal entry   Pharyngeal Phase of Swallow   (delay)   Diagnostic Statement overt coughing after trial by cup   Clinical Swallow Eval: Mildly Thick Liquids   Mode of Presentation spoon;cup   Volume Presented tsps x 3, sips by cup x 2   Oral Phase premature pharyngeal entry   Pharyngeal Phase   (delay)   Diagnostic Statement cues needed to swallow at  times with trials by tsp, pt talking and distracted prior to swallows, impulsive large sip 2nd trial by cup without hand over hand assist - cough noted   Clinical Swallow Evaluation: Puree Solid Texture Trial   Mode of Presentation, Puree spoon   Volume of Puree Presented tsps x 5   Oral Phase, Puree premature pharyngeal entry   Pharyngeal Phase, Puree   (delay)   Diagnostic Statement cues needed to swallow at times, pt talking and distracted prior to swallows, no aspiration signs   Clinical Swallow Eval: Soft & Bite Sized   Mode of Presentation spoon   Volume Presented tsps x 5   Oral Phase WFL;premature pharyngeal entry   Pharyngeal Phase   (delay)   Diagnostic Statement cue needed to chew after first trial, good oral clearance after swallow, no aspiration signs   Clinical Swallow Evaluation: Solid Food Texture Trial   Mode of Presentation fed by clinician;self-fed   Volume Presented bites x 3   Oral Phase WFL;premature pharyngeal entry   Pharyngeal Phase   (delay)   Diagnostic Statement good oral clearance after swallow, no aspiration signs   Esophageal Phase of Swallow   Esophageal comments Hx of delayed esophageal emptying.   Swallowing Recommendations   Diet Consistency Recommendations easy to chew (level 7);mildly thick liquids (level 2)   Supervision Level for Intake 1:1 supervision needed   Mode of Delivery Recommendations liquids via spoon only;slow rate of intake;bolus size, small  (verify/cue swallows after each trial)   Swallowing Maneuver Recommendations alternate food and liquid intake   Monitoring/Assistance Required (Eating/Swallowing) monitor for cough or change in vocal quality with intake;check mouth frequently for oral residue/pocketing   Recommended Feeding/Eating Techniques (Swallow Eval) maintain upright posture during/after eating for 30 minutes;minimize distractions during oral intake;provide assist with feeding   Medication Administration Recommendations, Swallowing (SLP) Meds with puree    Comment, Swallowing Recommendations Hold po if increased aspiration signs are noted/respiratory status declines.   Clinical Impression   Criteria for Skilled Therapeutic Interventions Met (SLP Eval) Yes, treatment indicated   SLP Diagnosis Mild-moderate oral-pharyngeal dysphagia   Risks & Benefits of therapy have been explained evaluation/treatment results reviewed;care plan/treatment goals reviewed;risks/benefits reviewed;current/potential barriers reviewed;participants voiced agreement with care plan;participants included;patient   Clinical Impression Comments Mild-moderate oral-pharyngeal dysphagia was observed at bedside during today's SLP bedside swallow evaluation.  Deficits/risk factors include hx of oral-pharyngeal and esophageal dysphagia including delayed esophageal emptying and aspiration of thin, mildly thick, and moderately thick liquids, impulsive behavior, decreased oral awareness, cues needed to swallow at times, delayed swallows, and overt coughing after thin and mildly thick liquids by cup.  Pt tolerated mildly thick liquids by tsp and solid food well with 1:1 assist and careful use of precautions/strategies.  Recommend initiation of an IDDSI Diet Level 7 and mildly thick liquids by tsp with 1:1 assist and cues to use swallow strategies/precautions.  Hold po if increased aspiration signs are noted/respiratory status declines.  Plan to continue Tx to assess diet tolerance, train caregiver strategies, and trial ice chips/thin liquids with a free water protocol as indicated.   SLP Total Evaluation Time   Eval: oral/pharyngeal swallow function, clinical swallow Minutes (08283) 15   Interventions   Interventions Quick Adds Swallowing Dysfunction   Swallowing Dysfunction &/or Oral Function for Feeding   Treatment of Swallowing Dysfunction &/or Oral Function for Feeding Minutes (68873) 15   Symptoms Noted During/After Treatment None   Treatment Detail/Skilled Intervention Swallow: Educated pt, RN, and  group home staff regarding current findings, recommended easy to chew diet, mildly thick liquids by tsp, and 1:1 assist/cues for strategies.  Pt will need continuous monitioring and cues.  RN verbalized understanding.  Pt was not able to follow mod cues to take a single sip of mildly thick liquids by cup.  Cough noted.  Pt was able to produce delayed swallows to mod-max cues to swallow at times during trials.  No aspiration signs noted after trials by tsp.   SLP Discharge Planning   SLP Plan SLP - assess diet tolerance, train caregiver strategies, trial ice chips/thin water with a free water protocol as indicated   SLP Discharge Recommendation home with home care speech therapy;home with assist   SLP Rationale for DC Rec Swallow function appears close to baseline; Assist/education recommended after discharge back to group home   SLP Brief overview of current status  Mild-moderate dysphagia; Rec:  initiation of an IDDSI Diet Level 7 and mildly thick liquids by tsp with 1:1 assist and cues to use swallow strategies/precautions.  Hold po if increased aspiration signs are noted/respiratory status declines   Total Session Time   Total Session Time (sum of timed and untimed services) 30

## 2023-01-25 NOTE — CONSULTS
Care Management Initial Consult    General Information  Assessment completed with: Care Team Member, Crhisashli  Manager [phone]  [facility] [place]   ph#[phone]  Fax #: [phone].  Type of CM/SW Visit: Initial Assessment    Primary Care Provider verified and updated as needed: Yes   Readmission within the last 30 days: no previous admission in last 30 days      Reason for Consult: discharge planning  Advance Care Planning:            Communication Assessment  Patient's communication style:               Cognitive  Cognitive/Neuro/Behavioral: WDL  Level of Consciousness: alert  Arousal Level: opens eyes spontaneously  Orientation: other (see comments) (VIDYA given pt's limited verbal state.)  Mood/Behavior: calm, uncooperative (pt will remove devices quickly (blood pressure cuff etc) with left mitten off).)  Best Language: 3 - Mute  Speech: word-finding difficulty (very limited vocabulary)    Living Environment:   People in home: facility resident     Current living Arrangements: group home      Able to return to prior arrangements: yes       Family/Social Support:  Care provided by: self, other (see comments) ( staff)  Provides care for: no one, unable/limited ability to care for self  Marital Status: Single  Parent(s), Facility resident(s)/Staff, Sibling(s)          Description of Support System: Involved, Supportive         Current Resources:   Patient receiving home care services: No     Community Resources: Group Home  Equipment currently used at home: none  Supplies currently used at home: None    Employment/Financial:  Employment Status: disabled        Financial Concerns:             Lifestyle & Psychosocial Needs:  Social Determinants of Health     Tobacco Use: Low Risk      Smoking Tobacco Use: Never     Smokeless Tobacco Use: Never     Passive Exposure: Not on file   Alcohol Use: Not on file   Financial Resource Strain: Not on file   Food Insecurity: Not on file   Transportation  Needs: Not on file   Physical Activity: Not on file   Stress: Not on file   Social Connections: Not on file   Intimate Partner Violence: Not on file   Depression: Not at risk     PHQ-2 Score: 0   Housing Stability: Not on file       Functional Status:  Prior to admission patient needed assistance:              Mental Health Status:          Chemical Dependency Status:                Values/Beliefs:  Spiritual, Cultural Beliefs, Sabianism Practices, Values that affect care: no               Additional Information:  Per consult for discharge planning and chart review pt resides at [facility] Called the  ph#[phone] and spoke to [facility] Manager.  Per [facility] pt ambulates independently without equipment and that pt receives assistance with the following:grooming, showers, dressing, food preparation, laundry, shopping for groceries, personal care items and is transported to medical appointments by  staff.  [facility] requested any new medications at discharge be filled at [facility] updated and discharge orders faxed to [phone].  Per [facility],  staff will be able to transport pt home at discharge.  Inpatient Care Coordinator will continue to follow for discharge planning.  LEEANNA Jaimes RN, BSN, OCN   Inpatient Care Coordination [address]  Office: [phone]

## 2023-01-25 NOTE — ED NOTES
Report called to [facility] ICU for transfer, spoke with Nori. [facility] will arrange for transport. Patient continues to need soft wrist restraints as he continues to pull at his monitoring devices.

## 2023-01-25 NOTE — ED NOTES
Prior to seizure, patient was saturated in urine and this RN attempted to remove wet bedding and patient awoke and began removing monitoring devices and attempted to pull out IV. Soft restraints obtained and sitter obtained to keep patient from removing devices.

## 2023-01-26 ENCOUNTER — APPOINTMENT (OUTPATIENT)
Dept: SPEECH THERAPY | Facility: CLINIC | Age: 43
DRG: 101 | End: 2023-01-26
Attending: HOSPITALIST
Payer: MEDICARE

## 2023-01-26 LAB
SODIUM SERPL-SCNC: 132 MMOL/L (ref 136–145)
TOPIRAMATE SERPL-MCNC: 6 UG/ML

## 2023-01-26 PROCEDURE — 250N000013 HC RX MED GY IP 250 OP 250 PS 637: Performed by: HOSPITALIST

## 2023-01-26 PROCEDURE — 84295 ASSAY OF SERUM SODIUM: CPT | Performed by: STUDENT IN AN ORGANIZED HEALTH CARE EDUCATION/TRAINING PROGRAM

## 2023-01-26 PROCEDURE — 99418 PROLNG IP/OBS E/M EA 15 MIN: CPT

## 2023-01-26 PROCEDURE — 258N000003 HC RX IP 258 OP 636: Performed by: INTERNAL MEDICINE

## 2023-01-26 PROCEDURE — 250N000013 HC RX MED GY IP 250 OP 250 PS 637: Performed by: NURSE PRACTITIONER

## 2023-01-26 PROCEDURE — 36415 COLL VENOUS BLD VENIPUNCTURE: CPT | Performed by: STUDENT IN AN ORGANIZED HEALTH CARE EDUCATION/TRAINING PROGRAM

## 2023-01-26 PROCEDURE — 250N000013 HC RX MED GY IP 250 OP 250 PS 637: Performed by: PHYSICIAN ASSISTANT

## 2023-01-26 PROCEDURE — 120N000001 HC R&B MED SURG/OB

## 2023-01-26 PROCEDURE — 258N000003 HC RX IP 258 OP 636: Performed by: STUDENT IN AN ORGANIZED HEALTH CARE EDUCATION/TRAINING PROGRAM

## 2023-01-26 PROCEDURE — 250N000011 HC RX IP 250 OP 636

## 2023-01-26 PROCEDURE — 250N000009 HC RX 250

## 2023-01-26 PROCEDURE — 99232 SBSQ HOSP IP/OBS MODERATE 35: CPT | Performed by: STUDENT IN AN ORGANIZED HEALTH CARE EDUCATION/TRAINING PROGRAM

## 2023-01-26 PROCEDURE — 92526 ORAL FUNCTION THERAPY: CPT | Mod: GN | Performed by: SPEECH-LANGUAGE PATHOLOGIST

## 2023-01-26 PROCEDURE — 250N000009 HC RX 250: Performed by: STUDENT IN AN ORGANIZED HEALTH CARE EDUCATION/TRAINING PROGRAM

## 2023-01-26 PROCEDURE — 250N000013 HC RX MED GY IP 250 OP 250 PS 637: Performed by: STUDENT IN AN ORGANIZED HEALTH CARE EDUCATION/TRAINING PROGRAM

## 2023-01-26 PROCEDURE — 250N000011 HC RX IP 250 OP 636: Performed by: INTERNAL MEDICINE

## 2023-01-26 PROCEDURE — 250N000009 HC RX 250: Performed by: INTERNAL MEDICINE

## 2023-01-26 RX ORDER — OLANZAPINE 10 MG/2ML
INJECTION, POWDER, FOR SOLUTION INTRAMUSCULAR
Status: COMPLETED
Start: 2023-01-26 | End: 2023-01-26

## 2023-01-26 RX ORDER — DIPHENHYDRAMINE HCL 25 MG
25 CAPSULE ORAL EVERY 6 HOURS PRN
Status: DISCONTINUED | OUTPATIENT
Start: 2023-01-26 | End: 2023-02-01

## 2023-01-26 RX ORDER — LITHIUM CARBONATE 300 MG/1
300 TABLET, FILM COATED, EXTENDED RELEASE ORAL AT BEDTIME
Status: DISCONTINUED | OUTPATIENT
Start: 2023-01-26 | End: 2023-01-30

## 2023-01-26 RX ORDER — SERTRALINE HYDROCHLORIDE 25 MG/1
25 TABLET, FILM COATED ORAL DAILY
Status: DISCONTINUED | OUTPATIENT
Start: 2023-01-26 | End: 2023-02-02 | Stop reason: HOSPADM

## 2023-01-26 RX ORDER — LEVOTHYROXINE SODIUM 150 UG/1
150 TABLET ORAL
Status: DISCONTINUED | OUTPATIENT
Start: 2023-01-27 | End: 2023-02-02 | Stop reason: HOSPADM

## 2023-01-26 RX ORDER — RISPERIDONE 1 MG/1
1 TABLET ORAL 2 TIMES DAILY PRN
Status: DISCONTINUED | OUTPATIENT
Start: 2023-01-26 | End: 2023-02-02 | Stop reason: HOSPADM

## 2023-01-26 RX ORDER — WATER 10 ML/10ML
INJECTION INTRAMUSCULAR; INTRAVENOUS; SUBCUTANEOUS
Status: COMPLETED
Start: 2023-01-26 | End: 2023-01-26

## 2023-01-26 RX ORDER — RISPERIDONE 1 MG/1
2 TABLET ORAL 2 TIMES DAILY
Status: DISCONTINUED | OUTPATIENT
Start: 2023-01-26 | End: 2023-02-02 | Stop reason: HOSPADM

## 2023-01-26 RX ORDER — SERTRALINE HYDROCHLORIDE 100 MG/1
200 TABLET, FILM COATED ORAL EVERY MORNING
Status: DISCONTINUED | OUTPATIENT
Start: 2023-01-27 | End: 2023-02-02 | Stop reason: HOSPADM

## 2023-01-26 RX ORDER — OLANZAPINE 10 MG/2ML
5 INJECTION, POWDER, FOR SOLUTION INTRAMUSCULAR ONCE
Status: COMPLETED | OUTPATIENT
Start: 2023-01-26 | End: 2023-01-26

## 2023-01-26 RX ADMIN — VALPROATE SODIUM 500 MG: 100 INJECTION, SOLUTION INTRAVENOUS at 04:07

## 2023-01-26 RX ADMIN — SERTRALINE HYDROCHLORIDE 25 MG: 25 TABLET ORAL at 16:39

## 2023-01-26 RX ADMIN — LITHIUM CARBONATE 300 MG: 300 TABLET, FILM COATED, EXTENDED RELEASE ORAL at 21:16

## 2023-01-26 RX ADMIN — WATER 10 ML: 1 INJECTION INTRAMUSCULAR; INTRAVENOUS; SUBCUTANEOUS at 16:30

## 2023-01-26 RX ADMIN — VALPROATE SODIUM 500 MG: 100 INJECTION, SOLUTION INTRAVENOUS at 12:54

## 2023-01-26 RX ADMIN — RISPERIDONE 2 MG: 2 TABLET ORAL at 21:02

## 2023-01-26 RX ADMIN — OLANZAPINE 5 MG: 10 INJECTION, POWDER, FOR SOLUTION INTRAMUSCULAR at 16:23

## 2023-01-26 RX ADMIN — DIPHENHYDRAMINE HYDROCHLORIDE 25 MG: 25 CAPSULE ORAL at 21:01

## 2023-01-26 RX ADMIN — OLANZAPINE 5 MG: 5 TABLET, ORALLY DISINTEGRATING ORAL at 23:27

## 2023-01-26 RX ADMIN — TOPIRAMATE 200 MG: 200 TABLET, FILM COATED ORAL at 09:45

## 2023-01-26 RX ADMIN — Medication 1 MG: at 21:01

## 2023-01-26 RX ADMIN — VALPROATE SODIUM 500 MG: 100 INJECTION, SOLUTION INTRAVENOUS at 21:02

## 2023-01-26 RX ADMIN — LACOSAMIDE 100 MG: 100 TABLET, FILM COATED ORAL at 21:02

## 2023-01-26 RX ADMIN — TOPIRAMATE 200 MG: 200 TABLET, FILM COATED ORAL at 21:02

## 2023-01-26 RX ADMIN — ENOXAPARIN SODIUM 40 MG: 40 INJECTION SUBCUTANEOUS at 09:45

## 2023-01-26 RX ADMIN — LACOSAMIDE 100 MG: 100 TABLET, FILM COATED ORAL at 09:45

## 2023-01-26 ASSESSMENT — ACTIVITIES OF DAILY LIVING (ADL)
ADLS_ACUITY_SCORE: 39
ADLS_ACUITY_SCORE: 39
ADLS_ACUITY_SCORE: 41
ADLS_ACUITY_SCORE: 39
ADLS_ACUITY_SCORE: 39
ADLS_ACUITY_SCORE: 41
ADLS_ACUITY_SCORE: 39
ADLS_ACUITY_SCORE: 39
ADLS_ACUITY_SCORE: 41
ADLS_ACUITY_SCORE: 39
ADLS_ACUITY_SCORE: 41
ADLS_ACUITY_SCORE: 39

## 2023-01-26 NOTE — PROGRESS NOTES
[facility]    Medicine Progress Note - Hospitalist Service    Date of Admission:  1/24/2023  Date of Service: 01/26/2023    Assessment & Plan      Duane D Backes is a 42 year-old male with past medical history including autism with behavioral disturbance, epilepsy, epidermoid tumor who is admitted on 1/24/2023 as transfer from [facility] ED for seizures.     Epilepsy with breakthrough seizure  Epidermoid tumor with associated mass-effect  *Presents with witnessed seizure at group home, additional seizure with EMS and again in ED   *Per review of outside notes, has reportedly has missed home depakote doses recently  *Hyponatremia likely lowering seizure threshold as well   *CT head with grossly stable epidermoid tumor with mass effect  Plan:  - Ceribell device overnight and continuous EEG monitoring ordered  - Continue depakote IV  - Seizure precautions  - IV lorazepam ordered for breakthrough seizures   - Resume topiramate PO when confirmed by pharmacy pending neurology evaluation    Hypoosmolar hyponatremia  *Sodium 121, serum osm 252 -> 132 today which is appropriate after 48 hours   *Received 1L fluid bolus + mIVF prior to transfer  *Reportedly on lithium, but lithium level <0.1 at [facility]  Plan:  - D5W started for Na overcorrection -> now stopped  - BMP in AM    Autism with behavioral disturbance  Cognitive impairment  - Resume PTA regimen when confirmed by pharmacy   - Restraints as needed  - Sitter at bedside, continue as needed         Diet: Combination Diet Easy to Chew (level 7); Mildly Thick (level 2) (By tsp with 1:1 assist); No Straws  Snacks/Supplements Adult: Magic Cup; Between Meals    DVT Prophylaxis: Pneumatic Compression Devices  Colin Catheter: Not present  Lines: None     Cardiac Monitoring: None  Code Status: Full Code      Clinically Significant Risk Factors         # Hyponatremia: Lowest Na = 121 mmol/L in last 2 days, will monitor as appropriate                "  # Obesity: Estimated body mass index is 33.74 kg/m  as calculated from the following:    Height as of 1/12/23: 1.854 m (6' 1\").    Weight as of this encounter: 116 kg (255 lb 11.7 oz)., PRESENT ON ADMISSION         Disposition Plan      Expected Discharge Date: 01/27/2023                  Levi Lambert MD  Hospitalist Service  [facility]  Securely message with [employer] (more info)  Text page via [employer] Paging/Directory   ______________________________________________________________________    Interval History     No seizures today  No CP  No fevers  Restless, needing sitter and mitts  No new complaints    Physical Exam   Vital Signs: Temp: 97.7  F (36.5  C) Temp src: Axillary BP: 111/89 Pulse: 74   Resp: 18 SpO2: 97 % O2 Device: None (Room air)    Weight: 255 lbs 11.74 oz     Constitutional:  NAD  Eyes: PERRL, unable to assess EOM  HENT: Oropharynx clear, MMM  Respiratory: Clear to auscultation bilaterally, good air movement, normal effort   Cardiovascular: RRR, no m/r/g. No peripheral edema.    GI: Soft, non-tender, non-distended. No rebound tenderness or guarding.    Skin: Warm, dry   Neurologic: alert and Tesha, no focal deficits    Medical Decision Making       40 MINUTES SPENT BY ME on the date of service doing chart review, history, exam, documentation & further activities per the note.      Data     I have personally reviewed the following data over the past 24 hrs:    N/A  \   N/A   / N/A     132 (L) N/A N/A /  N/A   N/A N/A N/A \       Imaging results reviewed over the past 24 hrs:   No results found for this or any previous visit (from the past 24 hour(s)).  Most Recent 3 CBC's:  Recent Labs   Lab Test 01/25/23  0521 01/24/23  1756 12/19/22  1102   WBC 7.9 6.4 5.2   HGB 14.9 14.2 14.9   MCV 86 87 89    204 290     Most Recent 3 BMP's:  Recent Labs   Lab Test 01/26/23  0252 01/25/23  2257 01/25/23  1828 01/25/23  1430 01/25/23  1426 01/25/23  0952 01/25/23  0521 01/25/23  0012 " 01/24/23  2316 01/24/23  1756 08/29/22  0544   * 131* 135*   < >  --  135* 132*  132* 123*  --  121* 141   POTASSIUM  --   --   --   --   --   --  4.4  --   --  3.8 4.5   CHLORIDE  --   --   --   --   --   --  96*  --   --  86* 106   CO2  --   --   --   --   --   --  27  --   --  27 26   BUN  --   --   --   --   --   --  8.9  --   --  11.0 18.9   CR  --   --   --   --   --   --  0.79 0.58*  --  0.73 0.76   ANIONGAP  --   --   --   --   --   --  9  --   --  8 9   JESSICA  --   --   --   --   --   --  9.0  --   --  9.1 9.5   GLC  --   --   --   --  143* 98 116*  --    < > 107* 105*    < > = values in this interval not displayed.     Most Recent 2 LFT's:  Recent Labs   Lab Test 01/24/23  1756 12/19/22  1102 08/23/22  2132   AST 20 19 26   ALT 8* 6* 12   ALKPHOS 56  --  56   BILITOTAL 0.4  --  0.3     Most Recent 3 INR's:No lab results found.  Most Recent 3 Troponin's:No lab results found.  Most Recent 3 BNP's:No lab results found.

## 2023-01-26 NOTE — PROGRESS NOTES
Alert and oriented to self; baseline. History of autism and disruptive behavior. Experiences severe word difficulty. VSS. On RA, sating at 97%. Denies SOB. Tolerating easy to chew diet and mildly thicken liquid. Incontinent of bladder, Continent of bowel. Pt is impulsive, tends to often get aggressive with staff. Can be redirected for a short while. Pulled out his IV earlier, it was replaced. Pt has mittens on currently. Has a sit in his room.

## 2023-01-26 NOTE — PROGRESS NOTES
Date: January 25, 2023  Shift: [id number]  Name: Duane D Backes  Age: 42 year old  YOB: 1980  Date of Admission: 01/24/2023  Reason for Admission: Seizures (H) [R56.9]     Cognitive/Mentation: Seems to be at baseline, oriented to self, was able to say name and birthday. Hx of autism  Neuros/CMS: VIDYA most of assessment. Severe word-finding difficulty, unable to communicate well     VS: VSS on RA  Cardiac: WDL - refuses tele, MD aware  GI: WDL  : WDL - pulls off external cath  Pulmonary: WDL  Pain: Does not appear to be in pain, pt unable to verbalize     Lines/Drains: PIV x1 - IVF @ 100mL/hr  Skin: WDL  Activity: Ax1-2, pt has not gotten out of bed since transfer to unit but appears strong and moves self in bed well. 1:1 sitter in place, soft white ghada on R hand to prevent pt from pulling L AC IV    Diet: Easy to chew, mildly thick     Discharge: Back to group home, [facility], when medically stable     Shift summary: Pt transferred from ICU @ approx 1530

## 2023-01-26 NOTE — PROGRESS NOTES
Date: January 25, 2023  Shift: [id number]  Name: Duane D Backes  Age: 42 year old  YOB: 1980  Date of Admission: 01/24/2023    Reason for Admission: Seizures (H) [R56.9]     Cognitive/Mentation: Seems to be at baseline, oriented to self, was able to say name and birthday. Hx of autism  Neuros/CMS: VIDYA most of assessment. Severe word-finding difficulty, unable to communicate well      VS: VSS on RA  Cardiac: WDL - refuses tele, MD aware  GI: WDL  : WDL - pulls off external cath able to use urinal and get up to the bathroom.  Pulmonary: WDL  Pain: Does not appear to be in pain, pt unable to verbalize     Lines/Drains: PIV x1 - IVF @ 100mL/hr  Skin: WDL  Activity: Ax1, pt Patient gait steady appears strong and moves self in bed well and ambulates well. 1:1 sitter in place, soft white ghada on bilateral hands to prevent pt from pulling IV     Diet: Easy to chew, mildly thick      Discharge: [facility] [place] when [facility]      Shift summary: Pt agitated overnight, pacing naked , pulled IV and name bracelets off. Able to distract briefly with snack and utilized PRN Zyprexa for agitation.

## 2023-01-26 NOTE — PROGRESS NOTES
Patient agitated and pacing in the room. Pulled the IV off, patches and bracelets. Reaches for the water trying to drink from the tap. Able to distract briefly with snack but continues to be very restless and agitated. Dr. Hall and remains 1:1 with staff for safety.

## 2023-01-27 LAB
CREAT SERPL-MCNC: 0.64 MG/DL (ref 0.67–1.17)
GFR SERPL CREATININE-BSD FRML MDRD: >90 ML/MIN/1.73M2
PLATELET # BLD AUTO: 244 10E3/UL (ref 150–450)
POTASSIUM SERPL-SCNC: 3.7 MMOL/L (ref 3.4–5.3)
VALPROATE SERPL-MCNC: 68.7 UG/ML

## 2023-01-27 PROCEDURE — 250N000013 HC RX MED GY IP 250 OP 250 PS 637: Performed by: STUDENT IN AN ORGANIZED HEALTH CARE EDUCATION/TRAINING PROGRAM

## 2023-01-27 PROCEDURE — 82565 ASSAY OF CREATININE: CPT | Performed by: STUDENT IN AN ORGANIZED HEALTH CARE EDUCATION/TRAINING PROGRAM

## 2023-01-27 PROCEDURE — 99232 SBSQ HOSP IP/OBS MODERATE 35: CPT | Performed by: HOSPITALIST

## 2023-01-27 PROCEDURE — 250N000011 HC RX IP 250 OP 636: Performed by: STUDENT IN AN ORGANIZED HEALTH CARE EDUCATION/TRAINING PROGRAM

## 2023-01-27 PROCEDURE — 85049 AUTOMATED PLATELET COUNT: CPT | Performed by: STUDENT IN AN ORGANIZED HEALTH CARE EDUCATION/TRAINING PROGRAM

## 2023-01-27 PROCEDURE — 84132 ASSAY OF SERUM POTASSIUM: CPT

## 2023-01-27 PROCEDURE — 250N000013 HC RX MED GY IP 250 OP 250 PS 637: Performed by: HOSPITALIST

## 2023-01-27 PROCEDURE — 36415 COLL VENOUS BLD VENIPUNCTURE: CPT | Performed by: STUDENT IN AN ORGANIZED HEALTH CARE EDUCATION/TRAINING PROGRAM

## 2023-01-27 PROCEDURE — 250N000013 HC RX MED GY IP 250 OP 250 PS 637

## 2023-01-27 PROCEDURE — 80164 ASSAY DIPROPYLACETIC ACD TOT: CPT

## 2023-01-27 PROCEDURE — 120N000001 HC R&B MED SURG/OB

## 2023-01-27 PROCEDURE — 250N000011 HC RX IP 250 OP 636: Performed by: HOSPITALIST

## 2023-01-27 PROCEDURE — 36415 COLL VENOUS BLD VENIPUNCTURE: CPT

## 2023-01-27 PROCEDURE — 99221 1ST HOSP IP/OBS SF/LOW 40: CPT | Mod: AI | Performed by: NURSE PRACTITIONER

## 2023-01-27 RX ORDER — OLANZAPINE 10 MG/2ML
5 INJECTION, POWDER, FOR SOLUTION INTRAMUSCULAR DAILY PRN
Status: DISCONTINUED | OUTPATIENT
Start: 2023-01-27 | End: 2023-01-30

## 2023-01-27 RX ORDER — DIVALPROEX SODIUM 500 MG/1
500 TABLET, DELAYED RELEASE ORAL EVERY 8 HOURS SCHEDULED
Status: DISCONTINUED | OUTPATIENT
Start: 2023-01-27 | End: 2023-01-31

## 2023-01-27 RX ORDER — OLANZAPINE 5 MG/1
5 TABLET, ORALLY DISINTEGRATING ORAL 3 TIMES DAILY PRN
Status: DISCONTINUED | OUTPATIENT
Start: 2023-01-27 | End: 2023-01-30

## 2023-01-27 RX ORDER — LANOLIN ALCOHOL/MO/W.PET/CERES
3 CREAM (GRAM) TOPICAL
Status: DISCONTINUED | OUTPATIENT
Start: 2023-01-27 | End: 2023-02-01

## 2023-01-27 RX ADMIN — RISPERIDONE 2 MG: 2 TABLET ORAL at 08:01

## 2023-01-27 RX ADMIN — LEVOTHYROXINE SODIUM 150 MCG: 150 TABLET ORAL at 07:53

## 2023-01-27 RX ADMIN — OLANZAPINE 5 MG: 10 INJECTION, POWDER, FOR SOLUTION INTRAMUSCULAR at 01:55

## 2023-01-27 RX ADMIN — ENOXAPARIN SODIUM 40 MG: 40 INJECTION SUBCUTANEOUS at 07:51

## 2023-01-27 RX ADMIN — OLANZAPINE 5 MG: 5 TABLET, ORALLY DISINTEGRATING ORAL at 09:47

## 2023-01-27 RX ADMIN — DIVALPROEX SODIUM 500 MG: 500 TABLET, DELAYED RELEASE ORAL at 07:53

## 2023-01-27 RX ADMIN — SERTRALINE HYDROCHLORIDE 25 MG: 25 TABLET ORAL at 08:02

## 2023-01-27 RX ADMIN — LITHIUM CARBONATE 300 MG: 300 TABLET, FILM COATED, EXTENDED RELEASE ORAL at 22:35

## 2023-01-27 RX ADMIN — OLANZAPINE 5 MG: 5 TABLET, ORALLY DISINTEGRATING ORAL at 22:35

## 2023-01-27 RX ADMIN — LACOSAMIDE 100 MG: 100 TABLET, FILM COATED ORAL at 08:01

## 2023-01-27 RX ADMIN — TOPIRAMATE 200 MG: 200 TABLET, FILM COATED ORAL at 22:35

## 2023-01-27 RX ADMIN — SERTRALINE HYDROCHLORIDE 200 MG: 100 TABLET ORAL at 08:01

## 2023-01-27 RX ADMIN — TOPIRAMATE 200 MG: 200 TABLET, FILM COATED ORAL at 07:51

## 2023-01-27 RX ADMIN — DIVALPROEX SODIUM 500 MG: 500 TABLET, DELAYED RELEASE ORAL at 13:36

## 2023-01-27 RX ADMIN — LACOSAMIDE 100 MG: 100 TABLET, FILM COATED ORAL at 22:34

## 2023-01-27 RX ADMIN — DIVALPROEX SODIUM 500 MG: 500 TABLET, DELAYED RELEASE ORAL at 22:35

## 2023-01-27 RX ADMIN — MELATONIN TAB 3 MG 3 MG: 3 TAB at 22:34

## 2023-01-27 RX ADMIN — RISPERIDONE 2 MG: 2 TABLET ORAL at 22:35

## 2023-01-27 ASSESSMENT — ACTIVITIES OF DAILY LIVING (ADL)
ADLS_ACUITY_SCORE: 39
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
## 2023-01-27 NOTE — CONSULTS
[facility]    Neurology Consultation     Date of Admission:  1/24/2023    Assessment & Plan   Duane D Backes is a 42 year old male who was admitted on 1/24/2023. I was asked to see the patient for breakthrough seizure, med adjustment.  Patient with mental disabilities, epilepsy behavior changes who was admitted with breakthrough seizure probable due to significant hyponatremia.  No seizures during hospitalization sodium is back to almost normal.  At this time I would recommend    - Continue with prior antiepileptic drugs.  Of note Dr. Washington wanted Vimpat discontinued, this needs to be tapered off I would recommend to be done this as an outpatient after discussion with Dr. Washington.  -If patient is at baseline I am okay with him being discharged and followed as an outpatient.  -The patient has had episodes of hyponatremia in the past, lithium can cause this sertraline also.  I would recommend psychiatry to readjust psychiatric medication to try to avoid hyponatremia in the future.    Neurology will sign off if there are any questions or concerns please call us.      Jonna Johnson MD    Code Status    Full Code    Reason for Consult   Reason for consult: I was asked by Dr Villarreal to evaluate this patient for seizures.    Primary Care Physician   Rajat Parkinson    Chief Complaint   seizures    History is obtained from  electronic health record and talking to primary team    History of Present Illness   Duane D Backes is a 42 year old male who was admitted with weakness tonicoclonic seizure x2 as well is hyponatremia.  The patient is not able to give history.  He was admitted on January 24, supervised in ICU with EEG monitoring with no further seizures recorded.  Clinically no further seizures noticed.  The patient has been noncompliant and agitated moving in the room playing with water.  He has history of mentally challenged, autism.  Sodium on admission was 121.  The patient was  initially admitted at [facility] and then transferred here.    Reviewing the notes the patient has a history of a seizure disorder which started in 2006 with generalized tonicoclonic seizure.  EEG at that time shows left temporal epileptiform activity.  He was started on topiramate and Depakote.  Last seizure prior to this 1 was in February 2021, during this admission the patient was started on Vimpat and continued on prior antiepileptic drugs.  Prior to that he has had 1 seizure in June 2020.    The patient has been followed by Dr. Washington who evaluated the patient in December 2022 at which point Vimpat was discontinued.  The patient was having behavior changes disruptive and agitated and aggressive behavior.    On admission to our hospital medication list mentions Vimpat.    Valproic acid on admission was 91.3, topiramate level was 6.0, lithium level was less than 0.1, sodium on admission was 121, sodium yesterday was 132.    Past Medical History   I have reviewed this patient's medical history and updated it with pertinent information if needed.   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    Past Surgical History   I have reviewed this patient's surgical history and updated it with pertinent information if needed.  Past Surgical History:   Procedure Laterality Date     ANESTHESIA OUT OF OR MRI N/A 1/12/2023    Procedure: ANESTHESIA OUT OF OR  Magnetic resonance imaging Brain with and without @1400;  Surgeon: GENERIC ANESTHESIA PROVIDER;  Location: [facility] OR     CATARACT IOL, RT/LT Left 11/17/2018       Prior to Admission Medications   Prior to Admission  Medications   Prescriptions Last Dose Informant Patient Reported? Taking?   ENULOSE 10 GM/15ML SOLUTION  Nursing Home No Yes   Sig: TAKE 30ML BY MOUTH EVERY EVENING AT 5PM;TAKE 30ML BY MOUTH TWICE DAILY AS NEEDED   GAVILAX 17 GM/SCOOP powder  Nursing Home No Yes   Sig: MIX 17 GRAMS IN LIQUID AND DRINK BY MOUTH ONCE DAILY FOR CONSTIPATION.   Lacosamide (VIMPAT) 100 MG TABS tablet  Nursing Home No Yes   Sig: Take 1 tablet (100 mg) by mouth 2 times daily   MILK OF MAGNESIA 400 MG/5ML suspension  Nursing Home No Yes   Sig: Take 30 mLs by mouth every evening   Starch, Thickening, POWD  Nursing Home No Yes   Sig: Combined with beverages, per instructions from Speech Therapy.   VITAMIN D3 25 MCG (1000 UT) tablet  Nursing Home No Yes   Sig: TAKE 3 TABLETS (3000 UNITS) BY MOUTH ONCE DAILY   ammonium lactate (LAC-HYDRIN) 12 % external lotion  Nursing Home Yes Yes   Sig: Apply topically daily To feet and heels   diazepam (VALIUM) 5 MG/ML (HIGH CONC) solution  Nursing Home No Yes   Sig: Diazepam: Give 1 ml (5mg): Give 1 ml for ANY seizure. May repeat once 1 ml (5 mg). Monitor breathing, if there any concerns call 911. Do not exceed 10 mg per day.   divalproex sodium extended-release (DEPAKOTE ER) 500 MG 24 hr tablet  Nursing Home No Yes   Sig: Take 1 tablet (500 mg) by mouth 3 times daily   docusate sodium (COLACE) 100 MG capsule  Nursing Home No Yes   Sig: TAKE 1 CAPSULE BY MOUTH TWICE DAILY.   fish oil-omega-3 fatty acids 1000 MG capsule  Nursing Home No Yes   Sig: TAKE 1 CAPSULE BY MOUTH TWICE DAILY  **NON-COVERED MED**  **PACK IN CARDS*   levothyroxine (SYNTHROID/LEVOTHROID) 150 MCG tablet  Nursing Home No Yes   Sig: TAKE 1 TABLET BY MOUTH ONCE DAILY FOR THYROID.   lithium ER (LITHOBID) 300 MG CR tablet  Nursing Home Yes Yes   Sig: Take 1 tablet by mouth At Bedtime   melatonin 5 MG tablet  Nursing Home No Yes   Sig: Take 1 tablet (5 mg) by mouth At Bedtime   propranolol (INDERAL) 40 MG tablet  Nursing Home Yes Yes   Sig:  Take 1 tablet by mouth 2 times daily as needed   risperiDONE (RISPERDAL) 1 MG tablet  Nursing Home Yes Yes   Sig: Take 1 mg by mouth 2 times daily as needed   risperiDONE (RISPERDAL) 2 MG tablet  Nursing Home Yes Yes   Sig: Take 2 mg by mouth 2 times daily   sertraline (ZOLOFT) 100 MG tablet  Nursing Home Yes Yes   Sig: Take 200 mg by mouth every morning   sertraline (ZOLOFT) 25 MG tablet  Nursing Home Yes Yes   Sig: Take 1 tablet by mouth daily   topiramate (TOPAMAX) 200 MG tablet  Nursing Home No Yes   Sig: TAKE 1 TABLET BY MOUTH TWICE DAILY.      Facility-Administered Medications: None     Allergies   Allergies   Allergen Reactions     Penicillin [Penicillins] Other (See Comments)     Prozac [Fluoxetine Hcl]      Reglan [Metoclopramide Hcl]      Ritalin [Methylphenidate Derivatives] Other (See Comments)     Ritalin     Trazodone        Social History   I have reviewed this patient's social history and updated it with pertinent information if needed. Duane D Backes  reports that he has never smoked. He has never used smokeless tobacco. He reports that he does not drink alcohol and does not use drugs.    Family History   I have reviewed this patient's family history and updated it with pertinent information if needed.   Family History   Problem Relation Age of Onset     Unknown/Adopted No family hx of        Review of Systems   The 10 point Review of Systems is negative other than noted in the HPI or here.     Physical Exam   Temp: 97.5  F (36.4  C) Temp src: Axillary BP: (!) 136/97 Pulse: 109   Resp: 16 SpO2: 96 % O2 Device: None (Room air)    Vital Signs with Ranges  Temp:  [97.5  F (36.4  C)-98.3  F (36.8  C)] 97.5  F (36.4  C)  Pulse:  [104-110] 109  Resp:  [16] 16  BP: (124-148)/(78-99) 136/97  SpO2:  [96 %-98 %] 96 %  255 lbs 11.74 oz    Constitutional: Normal  Eyes: No conjunctival erythema  ENT: Neck is supple  Respiratory: CTA  Cardiovascular: RRR  Skin: No obvious lesions  Musculoskeletal: No pedal  edema  The patient is naked under the blanket sleeping.  He wakes up to voice looks at me but falls asleep right away.  Exam is mainly by observation.  Face is symmetric, eyes are conjugate.  Muscle tone is normal in all 4 extremities.  Reflexes are present symmetric in upper and lower extremities toes are upgoing bilaterally.  Neuropsychiatric: Unable to assess    Data   1/24/2023 Na 121    MRI of the head done in January 2023 shows a large epidermoid cyst centered in the right middle cranial fossa with extension and mass-effect on the basal cistern and posterior fossa unchanged compared to 2021.  Unchanged enhancing nodular component of periphery of the mass. Films were reviewed by me.    75 min spend with team, nurse, MD reviewing chart, prior neurology notes, eeg, labs  and MRI results.

## 2023-01-27 NOTE — CONSULTS
"  [facility]  Initial Psychiatric Consult   Consult date: January 27, 2023         Reason for Consult, requesting source:    Ongoing agitation, reportedly on lithium but level is low  Requesting source: Prasanth Villarreal        HPI:   Duane D Backes is a 42 year old male who was admitted to [facility] on 1/24/23 with seizures after missing two doses of his Depakote. PMH significant for autism spectrum disorder, behavioral disturbance, intellectual disability, epilepsy, epidermoid tumor. Transferred from [facility] ED. Psychiatry is consulted for agitation and low lithium level.     On interview with patient today, he is seen standing in his hospital room naked. Speech is quite limited. He only verbalizes a few words, such as \"mom and dad,\" and \"coke.\" Apparently he enjoys drinking coke and eating ice cream. There was an episode of agitation overnight and he did require olanzapine IM. Patient unable to answer any questions about how he is feeling, sleeping etc.     Called and spoke to his mother who feels he has been doing well overall recently when they have visited. States he has been communicating more with them lately. She does not feel patient needs any changes to his psychotropic regimen.     Called and spoke to the group home staff. They confirm that patient is taking lithium  mg at bedtime. They have no concerns about patient returning but would like to know steps they can take to ensure patient's sodium does not drop. Apparently patient experiences dry mouth and drinks quite a bit of liquid. Discussed that they could look into Biotene for his dry mouth.         Past Psychiatric History:   No known hx of psychiatric hospitalizations or suicide attempts. Patient with history of behavioral issues. Managed on sertraline 225 mg daily, lithium  mg at bedtime, risperidone 2 mg bid. Group home has a provider managing his medications. No hx of ECT or MH commitment. "         Substance Use and History:   No known hx of alcohol or illicit substance use.         Past Medical History:   PAST MEDICAL HISTORY:   Past Medical History:   Diagnosis Date     Autistic disorder, current or active state      Dry skin      Moderate intellectual disabilities      Obsessive-compulsive disorders      Overweight (BMI 25.0-29.9) 8/2/2013     Seizure disorder (H)     Generalized tonic clonic     Unspecified constipation        PAST SURGICAL HISTORY:   Past Surgical History:   Procedure Laterality Date     ANESTHESIA OUT OF OR MRI N/A 1/12/2023    Procedure: ANESTHESIA OUT OF OR  Magnetic resonance imaging Brain with and without @1400;  Surgeon: GENERIC ANESTHESIA PROVIDER;  Location: [facility] OR     CATARACT IOL, RT/LT Left 11/17/2018             Family History:   FAMILY HISTORY:   Family History   Problem Relation Age of Onset     Unknown/Adopted No family hx of            Social History:   Patient reportedly lives in a group home in [place], [place]. Parents are his guardian. He requires assistance for activities of daily living.          Physical ROS:   The patient is unable to participate in ROS due to intellectual disability.          PTA Medications:     Medications Prior to Admission   Medication Sig Dispense Refill Last Dose     ammonium lactate (LAC-HYDRIN) 12 % external lotion Apply topically daily To feet and heels        diazepam (VALIUM) 5 MG/ML (HIGH CONC) solution Diazepam: Give 1 ml (5mg): Give 1 ml for ANY seizure. May repeat once 1 ml (5 mg). Monitor breathing, if there any concerns call 911. Do not exceed 10 mg per day. 30 mL 5      divalproex sodium extended-release (DEPAKOTE ER) 500 MG 24 hr tablet Take 1 tablet (500 mg) by mouth 3 times daily 90 tablet 11      docusate sodium (COLACE) 100 MG capsule TAKE 1 CAPSULE BY MOUTH TWICE DAILY. 62 capsule 11      ENULOSE 10 GM/15ML SOLUTION TAKE 30ML BY MOUTH EVERY EVENING AT 5PM;TAKE 30ML BY MOUTH TWICE DAILY AS NEEDED 2880 mL 11      fish  oil-omega-3 fatty acids 1000 MG capsule TAKE 1 CAPSULE BY MOUTH TWICE DAILY  **NON-COVERED MED**  **PACK IN CARDS* 100 capsule 11      GAVILAX 17 GM/SCOOP powder MIX 17 GRAMS IN LIQUID AND DRINK BY MOUTH ONCE DAILY FOR CONSTIPATION. 510 g 11      Lacosamide (VIMPAT) 100 MG TABS tablet Take 1 tablet (100 mg) by mouth 2 times daily 60 tablet 5      levothyroxine (SYNTHROID/LEVOTHROID) 150 MCG tablet TAKE 1 TABLET BY MOUTH ONCE DAILY FOR THYROID. 31 tablet 11      lithium ER (LITHOBID) 300 MG CR tablet Take 1 tablet by mouth At Bedtime        melatonin 5 MG tablet Take 1 tablet (5 mg) by mouth At Bedtime 30 tablet 0      MILK OF MAGNESIA 400 MG/5ML suspension Take 30 mLs by mouth every evening 3780 mL 0      propranolol (INDERAL) 40 MG tablet Take 1 tablet by mouth 2 times daily as needed        risperiDONE (RISPERDAL) 1 MG tablet Take 1 mg by mouth 2 times daily as needed        risperiDONE (RISPERDAL) 2 MG tablet Take 2 mg by mouth 2 times daily        sertraline (ZOLOFT) 100 MG tablet Take 200 mg by mouth every morning        sertraline (ZOLOFT) 25 MG tablet Take 1 tablet by mouth daily        Starch, Thickening, POWD Combined with beverages, per instructions from Speech Therapy. 850 g 99      topiramate (TOPAMAX) 200 MG tablet TAKE 1 TABLET BY MOUTH TWICE DAILY. 60 tablet 11      VITAMIN D3 25 MCG (1000 UT) tablet TAKE 3 TABLETS (3000 UNITS) BY MOUTH ONCE DAILY 100 tablet 11           Allergies:     Allergies   Allergen Reactions     Penicillin [Penicillins] Other (See Comments)     Prozac [Fluoxetine Hcl]      Reglan [Metoclopramide Hcl]      Ritalin [Methylphenidate Derivatives] Other (See Comments)     Ritalin     Trazodone           Labs:     Recent Results (from the past 48 hour(s))   Glucose by meter    Collection Time: 01/25/23  2:26 PM   Result Value Ref Range    GLUCOSE BY METER POCT 143 (H) 70 - 99 mg/dL   Sodium    Collection Time: 01/25/23  2:30 PM   Result Value Ref Range    Sodium 136 136 - 145 mmol/L  "  Sodium    Collection Time: 01/25/23  6:28 PM   Result Value Ref Range    Sodium 135 (L) 136 - 145 mmol/L   Sodium    Collection Time: 01/25/23 10:57 PM   Result Value Ref Range    Sodium 131 (L) 136 - 145 mmol/L   Sodium    Collection Time: 01/26/23  2:52 AM   Result Value Ref Range    Sodium 132 (L) 136 - 145 mmol/L   Platelet count    Collection Time: 01/27/23 10:12 AM   Result Value Ref Range    Platelet Count 244 150 - 450 10e3/uL   Creatinine    Collection Time: 01/27/23 10:12 AM   Result Value Ref Range    Creatinine 0.64 (L) 0.67 - 1.17 mg/dL    GFR Estimate >90 >60 mL/min/1.73m2   Potassium    Collection Time: 01/27/23 10:12 AM   Result Value Ref Range    Potassium 3.7 3.4 - 5.3 mmol/L          Physical and Psychiatric Examination:     BP (!) 136/97 (BP Location: Left arm)   Pulse 109   Temp 97.5  F (36.4  C) (Axillary)   Resp 16   Wt 116 kg (255 lb 11.7 oz)   SpO2 96%   BMI 33.74 kg/m    Weight is 255 lbs 11.74 oz  Body mass index is 33.74 kg/m .    Physical Exam:  I have reviewed the physical exam as documented by by the medical team and agree with findings and assessment and have no additional findings to add at this time.    Mental Status Exam:  Appearance: age-appearing, unkempt, disrobed, walking around room  Behavior: pacing, restless  Eye contact: fair  Orientation: alert and oriented to self. Not oriented to date, place, or situation  Movements: did not observe tics, tremors, or dystonia  Mood: \"good\"  Affect: stable, restricted range  Speech: very limited; normal tone and volume  Language: impaired; only speaks about 20 to 30 words  Memory: recall of recent and remote events appears limited  Fund of knowledge: below average  Concentration: distractible  Thought process and content: disorganized; does not appear to respond to internal stimuli. No paranoia or delusions elicited.   Associations: no loosening  Insight: impaired  Judgement: impaired  Safety: does not voice suicidal or homicidal " ideation. Has been combative toward staff at times.            DSM-5 Diagnosis:   #1 Autism Spectrum Disorder  #2 Intellectual disability          Assessment:   Duane D Backes is a 42 year old male who was admitted to [facility] on 1/24/23 with seizures after missing two doses of his Depakote. PMH significant for autism spectrum disorder, behavioral disturbance, intellectual disability, epilepsy, epidermoid tumor. Transferred from [facility] ED. Psychiatry is consulted for agitation and low lithium level.     Patient seen. Spoke with mother as well as group home staff. It appears patient is at his baseline currently. At baseline, he communicates very minimally, apparently only speaks 20 to 30 words. He did have an episode of agitation overnight, requiring IM olanzapine.     This behavior would not be uncommon for someone with severe autism spectrum disorder who is out of their normal environment and routine. They tend to do much better at home in a structured environment when they know what to expect. I do not feel this warrants a change to his baseline psychotropic medications. Group home staff do not feel he needs any changes.           Summary of Recommendations:   1) Continue prior to admission psychiatric medications, including lithium  mg at bedtime, risperidone 2 mg bid, sertraline 225 mg daily.   2) Defer changes to his antiepileptic regimen to neurology  3) Group home staff wondering what they can do to prevent future episodes of hyponatremia. Discussed looking into Biotene to help with dry mouth and polydipsia.  4) Patient appears to be at baseline. No psychiatric barriers to discharge  5) Spoke with group home as well as his mother, who have no concerns regarding patient returning to the group home.         Kris Mortensen, GROVERP-BC, APRN, CNP  Consult/Liaison Psychiatry  [facility]  Provider can be paged via [facility] Paging/Directory  If I am unavailable, please contact [facility] at  [phone] to reach the on-call provider.

## 2023-01-27 NOTE — CODE/RAPID RESPONSE
[facility]    RRT Note:  CODE 21/Restraint check  1/26/2023   Time Called: 4:12 PM    RRT called for: Code 21  Assessment & Plan     Agitation  Code 21 called for agitation, and impulsiveness.  Patient not redirectable.  He is attempting to get out of the bed, throwing water on the floor increasing the risk for falls.  Upon arrival patient is resting in bed, he is not aggressive towards staff, however he appears agitated.  He is repeatedly asking for his mitts to come off.  -- Sitter bedside.  -- 5 mg IM Zyprexa    At the end of evaluation patient is calm, cooperative. Defer further cares to Levi Lambert.    Interval History     Duane D Backes is a 42 year old male who was admitted on 1/24/2023 for seizures.    Medical history significant for: Epilepsy, autism with behavioral disturbance, and epidermoid tumor.    Code Status: Full Code    Gal Herbert NP    15 MINUTES SPENT BY ME on the date of service doing chart review, history, exam, documentation & further activities per the note.
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
## 2023-01-27 NOTE — PROGRESS NOTES
[facility]    Medicine Progress Note - Hospitalist Service    Date of Admission:  1/24/2023  Date of Service: 01/27/2023    Assessment & Plan      Duane D Backes is a 42 year-old male with past medical history including autism with behavioral disturbance, epilepsy, epidermoid tumor who is admitted on 1/24/2023 as transfer from [facility] ED for seizures.     Epilepsy with breakthrough seizure  Epidermoid tumor with associated mass-effect  *Presents with witnessed seizure at group home, additional seizure with EMS and again in ED   *Per review of outside notes, has reportedly has missed home depakote doses recently  *Hyponatremia likely lowering seizure threshold as well   *CT head with grossly stable epidermoid tumor with mass effect   -was admitted to ICU given concern for status, with Ceribell device overnight and continuous EEG monitoring. No clear abnormalities/no epileptiform discharges were observed except for possible focal slowing over the right hemisphere.   - was placed on IV Depakote, transition to p.o.  PTA Vimpat and Topamax as well continued. Per chart review, patient's neurologist Dr Washington had recommended stopping Vimpat which he was taking PTA per group home.   - Seizure precautions, IV lorazepam ordered for breakthrough seizures   - General neurology consulted and discussed, will be evaluating this afternoon.    Hypoosmolar hyponatremia  *Sodium 121, serum osm 252.  Given presentation with seizure, received 3% saline and was corrected, up to 136 after 48 hours.  -  D5 was restarted and sodium down to 132-131.  IVF has now been discontinued.    Autism with behavioral disturbance  Cognitive impairment  -  PTA regimen including Risperdal 2 Mg p.o. twice daily, Zoloft 200 Mg p.o. every morning and 25 Mg in the evening, lithium 300 Mg at bedtime resumed.  - Restraints as needed  - Sitter at bedside, continue as needed  - Reportedly on lithium, but lithium level <0.1 at [facility]  "Radha. PTA dose resumed.   - Patient with multiple code 21, psychiatry has been consulted.       Diet: Snacks/Supplements Adult: Magic Cup; Between Meals  Regular Diet Adult    DVT Prophylaxis: Pneumatic Compression Devices  Colin Catheter: Not present  Lines: None     Cardiac Monitoring: None  Code Status: Full Code      Clinically Significant Risk Factors                         # Obesity: Estimated body mass index is 33.74 kg/m  as calculated from the following:    Height as of 1/12/23: 1.854 m (6' 1\").    Weight as of this encounter: 116 kg (255 lb 11.7 oz)., PRESENT ON ADMISSION         Disposition Plan      Expected Discharge Date: 01/27/2023                  Prasanth Villarreal MD  Hospitalist Service  [facility]  Securely message with [employer] (more info)  Text page via [employer] Paging/Directory   ______________________________________________________________________    Interval History     No seizure-like activity reported by staff in the last 24 hours.  Patient has been restless and agitated at times, multiple code 21/RRT, has been managed with IM Zyprexa.  It appears patient likes ice cream, pudding and soda and offering these also has helped him calm down.    - patient is walking in room naked. Not participating in conversation    Physical Exam   Vital Signs: Temp: 97.5  F (36.4  C) Temp src: Axillary BP: (!) 136/97 Pulse: 109   Resp: 16 SpO2: 96 % O2 Device: None (Room air)    Weight: 255 lbs 11.74 oz     Constitutional:  NAD. Up in the room naked.   HENT: Oropharynx clear, MMM  Respiratory: breathing comfortably   Skin: Warm, dry   Neurologic: alert, moving all extremities.     Medical Decision Making       40 MINUTES SPENT BY ME on the date of service doing chart review, history, exam, documentation & further activities per the note.      Data     I have personally reviewed the following data over the past 24 hrs:    N/A  \   N/A   / 244     N/A N/A N/A /  N/A   3.7 N/A 0.64 (L) \ "       Imaging results reviewed over the past 24 hrs:   No results found for this or any previous visit (from the past 24 hour(s)).  Most Recent 3 CBC's:  Recent Labs   Lab Test 01/27/23 1012 01/25/23  0521 01/24/23 1756 12/19/22  1102   WBC  --  7.9 6.4 5.2   HGB  --  14.9 14.2 14.9   MCV  --  86 87 89    229 204 290     Most Recent 3 BMP's:  Recent Labs   Lab Test 01/27/23  1012 01/26/23  0252 01/25/23  2257 01/25/23  1828 01/25/23  1430 01/25/23  1426 01/25/23  0952 01/25/23  0521 01/25/23  0012 01/24/23  2316 01/24/23 1756 08/29/22  0544   NA  --  132* 131* 135*   < >  --  135* 132*  132* 123*  --  121* 141   POTASSIUM 3.7  --   --   --   --   --   --  4.4  --   --  3.8 4.5   CHLORIDE  --   --   --   --   --   --   --  96*  --   --  86* 106   CO2  --   --   --   --   --   --   --  27  --   --  27 26   BUN  --   --   --   --   --   --   --  8.9  --   --  11.0 18.9   CR 0.64*  --   --   --   --   --   --  0.79 0.58*  --  0.73 0.76   ANIONGAP  --   --   --   --   --   --   --  9  --   --  8 9   JESSICA  --   --   --   --   --   --   --  9.0  --   --  9.1 9.5   GLC  --   --   --   --   --  143* 98 116*  --    < > 107* 105*    < > = values in this interval not displayed.     Most Recent 2 LFT's:  Recent Labs   Lab Test 01/24/23 1756 12/19/22  1102 08/23/22  2132   AST 20 19 26   ALT 8* 6* 12   ALKPHOS 56  --  56   BILITOTAL 0.4  --  0.3     Most Recent 3 INR's:No lab results found.  Most Recent 3 Troponin's:No lab results found.  Most Recent 3 BNP's:No lab results found.

## 2023-01-27 NOTE — PLAN OF CARE
Goal Outcome Evaluation:      Plan of Care Reviewed With: parent    Overall Patient Progress: no changeOverall Patient Progress: no change     Pt  is  a transfer from [facility] where he presented with seizures from his group home, Hx of autism, epilepsy, et.c.He is alert to self, disoriented to place, time and situation, impulsive and restless which seems like baseline for him, code 21 called for excessive impulsiveness and outburst of behavior, I.M Zyprexa given with little effect.Up with SBA, abrupt while standing and moving, does not let clothes or pull up stay on him, easily re-directed and distracted with snacking, continues to try to take off his mittens and trying to pull out his I.V.Great appetite, incontinent, voiding, no BM this shift.Seems like plan is to discharge him back to his group home tomorrow.
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
## 2023-01-27 NOTE — PROGRESS NOTES
"Care Management Follow Up    Length of Stay (days): 3    Expected Discharge Date: 01/27/2023     Concerns to be Addressed:     Coordination with group home  Patient plan of care discussed at interdisciplinary rounds: Yes   And per chart review, Psych note indicates:   Called and spoke to the group home staff. They confirm that patient is taking lithium  mg at bedtime. They have no concerns about patient returning but would like to know steps they can take to ensure patient's sodium does not drop. Apparently patient experiences dry mouth and drinks quite a bit of liquid. Discussed that they could look into Biotene for his dry mouth.     Anticipated Discharge Disposition: Group Home     Anticipated Discharge Services: None  Anticipated Discharge DME: None    Patient/family educated on Medicare website which has current facility and service quality ratings: no  Education Provided on the Discharge Plan:  n/a  Patient/Family in Agreement with the Plan: yes    Referrals Placed by CM/SW: Internal Clinic Care Coordination, Communication hand-offs to next level of Care Providers, Scheduled Follow-up appointments, Community Residential Settings ([facility])  Private pay costs discussed: Not applicable    Additional Information:  Dr. Villarreal stopped by at 3:01 PM and states pt medically ready for discharge.  Per notes \"[facility]  staff will be able to transport pt home at discharge.\"      Called [facility] [phone], spoke with caregiver who will notify .  Faxed this note to  fax [phone].     Provided update to co-guardian parents Laith Green at [phone] (no order / discharge time / transportation / known med changes yet but MD said medically ready), they would appreciate updates as they become available.      ADDENDUM 4:09 PM   No callback yet from , reached out to [facility] again and provided unit number to talk to bedside RN Mariposa Monge instructed to ask " "about 10am discharge on Saturday.  Mariposa states she talked to someone earlier today who indicated \"management not available after 3pm, no weekends.\"  No anticipated med changes.  If unable to discharge Saturday will need to do 10am Monday.      Updated Guardians, who responded with they would be able to pick pt up themselves.  Explained we would like [facility] to be prepared to received pt.  They would like to be updated if discharge orders placed.       ADDENDUM 4:34 PM   Did finally receive callback from  [facility] [phone], she confirms they have no after hours protocols; post-hospital documentation needs to happen by upper staff at group [facility] M-F 7am-3pm.  Pt may return 7am Monday.  Alternatively if parents (guardains) want to take pt to their own home over the weekend they may do that and pt would return to group [facility] 7am Monday.  Updated MD.       Sharon Abdi RN, BSN, PHN  [facility]  Inpatient Care Management - FLOAT  Neuroscience [facility] Mobile: [phone] daily 7:30-4:00          "

## 2023-01-28 ENCOUNTER — APPOINTMENT (OUTPATIENT)
Dept: SPEECH THERAPY | Facility: CLINIC | Age: 43
DRG: 101 | End: 2023-01-28
Attending: HOSPITALIST
Payer: MEDICARE

## 2023-01-28 LAB
ANION GAP SERPL CALCULATED.3IONS-SCNC: 11 MMOL/L (ref 7–15)
BUN SERPL-MCNC: 3.9 MG/DL (ref 6–20)
CALCIUM SERPL-MCNC: 9.2 MG/DL (ref 8.6–10)
CHLORIDE SERPL-SCNC: 90 MMOL/L (ref 98–107)
CREAT SERPL-MCNC: 0.63 MG/DL (ref 0.67–1.17)
DEPRECATED HCO3 PLAS-SCNC: 24 MMOL/L (ref 22–29)
GFR SERPL CREATININE-BSD FRML MDRD: >90 ML/MIN/1.73M2
GLUCOSE SERPL-MCNC: 149 MG/DL (ref 70–99)
HOLD SPECIMEN: NORMAL
POTASSIUM SERPL-SCNC: 3.6 MMOL/L (ref 3.4–5.3)
SODIUM SERPL-SCNC: 125 MMOL/L (ref 136–145)

## 2023-01-28 PROCEDURE — 250N000013 HC RX MED GY IP 250 OP 250 PS 637: Performed by: STUDENT IN AN ORGANIZED HEALTH CARE EDUCATION/TRAINING PROGRAM

## 2023-01-28 PROCEDURE — 99231 SBSQ HOSP IP/OBS SF/LOW 25: CPT | Performed by: HOSPITALIST

## 2023-01-28 PROCEDURE — 250N000013 HC RX MED GY IP 250 OP 250 PS 637: Performed by: HOSPITALIST

## 2023-01-28 PROCEDURE — 120N000001 HC R&B MED SURG/OB

## 2023-01-28 PROCEDURE — 250N000011 HC RX IP 250 OP 636: Performed by: STUDENT IN AN ORGANIZED HEALTH CARE EDUCATION/TRAINING PROGRAM

## 2023-01-28 PROCEDURE — 80048 BASIC METABOLIC PNL TOTAL CA: CPT | Performed by: HOSPITALIST

## 2023-01-28 PROCEDURE — 36415 COLL VENOUS BLD VENIPUNCTURE: CPT | Performed by: HOSPITALIST

## 2023-01-28 PROCEDURE — 250N000013 HC RX MED GY IP 250 OP 250 PS 637

## 2023-01-28 PROCEDURE — 92526 ORAL FUNCTION THERAPY: CPT | Mod: GN

## 2023-01-28 RX ORDER — SODIUM CHLORIDE 1 G/1
1 TABLET ORAL
Status: DISCONTINUED | OUTPATIENT
Start: 2023-01-28 | End: 2023-01-29

## 2023-01-28 RX ADMIN — SERTRALINE HYDROCHLORIDE 200 MG: 100 TABLET ORAL at 08:00

## 2023-01-28 RX ADMIN — LITHIUM CARBONATE 300 MG: 300 TABLET, FILM COATED, EXTENDED RELEASE ORAL at 22:07

## 2023-01-28 RX ADMIN — ENOXAPARIN SODIUM 40 MG: 40 INJECTION SUBCUTANEOUS at 08:01

## 2023-01-28 RX ADMIN — DIVALPROEX SODIUM 500 MG: 500 TABLET, DELAYED RELEASE ORAL at 22:07

## 2023-01-28 RX ADMIN — LEVOTHYROXINE SODIUM 150 MCG: 150 TABLET ORAL at 08:00

## 2023-01-28 RX ADMIN — DIPHENHYDRAMINE HYDROCHLORIDE 25 MG: 25 CAPSULE ORAL at 04:55

## 2023-01-28 RX ADMIN — RISPERIDONE 2 MG: 2 TABLET ORAL at 22:07

## 2023-01-28 RX ADMIN — LACOSAMIDE 100 MG: 100 TABLET, FILM COATED ORAL at 22:07

## 2023-01-28 RX ADMIN — SODIUM CHLORIDE TAB 1 GM 1 G: 1 TAB at 18:20

## 2023-01-28 RX ADMIN — DIVALPROEX SODIUM 500 MG: 500 TABLET, DELAYED RELEASE ORAL at 13:53

## 2023-01-28 RX ADMIN — RISPERIDONE 2 MG: 2 TABLET ORAL at 08:10

## 2023-01-28 RX ADMIN — LACOSAMIDE 100 MG: 100 TABLET, FILM COATED ORAL at 08:00

## 2023-01-28 RX ADMIN — TOPIRAMATE 200 MG: 200 TABLET, FILM COATED ORAL at 22:07

## 2023-01-28 RX ADMIN — DIVALPROEX SODIUM 500 MG: 500 TABLET, DELAYED RELEASE ORAL at 05:18

## 2023-01-28 RX ADMIN — SERTRALINE HYDROCHLORIDE 25 MG: 25 TABLET ORAL at 08:03

## 2023-01-28 RX ADMIN — POLYETHYLENE GLYCOL 3350 17 G: 17 POWDER, FOR SOLUTION ORAL at 08:01

## 2023-01-28 RX ADMIN — TOPIRAMATE 200 MG: 200 TABLET, FILM COATED ORAL at 08:01

## 2023-01-28 RX ADMIN — RISPERIDONE 1 MG: 1 TABLET ORAL at 05:18

## 2023-01-28 ASSESSMENT — ACTIVITIES OF DAILY LIVING (ADL)
ADLS_ACUITY_SCORE: 40
ADLS_ACUITY_SCORE: 39
ADLS_ACUITY_SCORE: 40
ADLS_ACUITY_SCORE: 40
ADLS_ACUITY_SCORE: 39
ADLS_ACUITY_SCORE: 40
ADLS_ACUITY_SCORE: 39
ADLS_ACUITY_SCORE: 39
ADLS_ACUITY_SCORE: 40
ADLS_ACUITY_SCORE: 39

## 2023-01-28 NOTE — SIGNIFICANT EVENT
[facility]    Fall Note  1/28/2023   Time Called: 0534    Date of Admission:  1/24/2023  Code Status: Full Code      Summary:  I was called to evaluate Duane D Backes, a 42 year old male following a fall. The patient is admitted for evaluation of seizures. PMH includes autism w/ impulsive behavior, cognitive impairment, epilepsy, epidermoid tumor w/ associated mass effect.      Assessment:  Per NA, pt has been pacing, frequently showering. He was pacing in the room when he slipped on some water onto his butt. He quickly got up.     On my assessment, the patient is naked in the chair and upon my arrival, jumps up and begins pacing the room again, asking for coca cola, snacks. He bears weight without apparent injury, no nonverbal indicators of injury. Ambulating without issue, gait unchanged from baseline.     Temp: 97  F (36.1  C) Temp src: Axillary BP: (!) 153/98 Pulse: 112   Resp: 20 SpO2: 96 % O2 Device: None (Room air)       Plan:  - continue bedside sitter  - ensure dry floor    Not all injuries from a fall are obvious on initial exam, please to not hesitate to call for reassessment by House provider should the patient's clinical status change, report new pain, or patient/nursing concern.         VERONICA Sosa [facility]  Hospitalist Service  House Officer  Pager: [phone] (5k - 6p)

## 2023-01-28 NOTE — PROGRESS NOTES
[facility]    Medicine Progress Note - Hospitalist Service    Date of Admission:  1/24/2023  Date of Service: 01/28/2023    Assessment & Plan      Duane D Backes is a 42 year-old male with past medical history including autism with behavioral disturbance, epilepsy, epidermoid tumor who is admitted on 1/24/2023 as transfer from [facility] ED for seizures.     Epilepsy with breakthrough seizure  Epidermoid tumor with associated mass-effect  *Presents with witnessed seizure at group home, additional seizure with EMS and again in ED   *Per review of outside notes, has reportedly has missed home depakote doses recently  *Hyponatremia likely lowering seizure threshold as well   *CT head with grossly stable epidermoid tumor with mass effect   -was admitted to ICU given concern for status, with Ceribell device overnight and continuous EEG monitoring. No clear abnormalities/no epileptiform discharges were observed except for possible focal slowing over the right hemisphere.   - was placed on IV Depakote, transition to p.o.  PTA Vimpat and Topamax as well continued. Per chart review, patient's neurologist Dr Washington had recommended stopping Vimpat which he was taking PTA per group home.   - Seizure precautions, IV lorazepam ordered for breakthrough seizures   - General neurology consulted and discussed, no changes in PTA medications.    Hypoosmolar hyponatremia  *Sodium 121, serum osm 252.  Given presentation with seizure, received 3% saline and was corrected, up to 136 after 48 hours.  Appears dilutional likely due to drinking more free water as he is doing here.  -  D5 was restarted and sodium down to 132-131.  IVF has now been discontinued.  -Discussed with nursing staff to provide electrolyte solution so that he gets some sodium and less likely to cause dilutional hyponatremia.    -Needs close follow-up with sodium level as outpatient, if it drops, may need salt tablets as difficult to restrict  "p.o. fluid for him.  -Patient has been on sertraline for several years, less likely to be contributed by this although possible.  Since this is a stable regimen, discussed with psychiatry service and no plan to change medication at this time.    Autism with behavioral disturbance  Cognitive impairment  -  PTA regimen including Risperdal 2 Mg p.o. twice daily, Zoloft 200 Mg p.o. every morning and 25 Mg in the evening, lithium 300 Mg at bedtime resumed.  - has not needed Restraints >24 hours  - Sitter at bedside, continue   - Reportedly on lithium, but lithium level <0.1 at [facility]. PTA dose resumed.   - Patient with multiple code 21, psychiatry has been consulted and evaluated patient, no medication change at this point.  -Plan is to send him back to his family environment/group home but they will not be able to accept him over the weekend.       Diet: Snacks/Supplements Adult: Magic Cup; Between Meals  Regular Diet Adult    DVT Prophylaxis: Pneumatic Compression Devices  Colin Catheter: Not present  Lines: None     Cardiac Monitoring: None  Code Status: Full Code      Clinically Significant Risk Factors                         # Obesity: Estimated body mass index is 33.74 kg/m  as calculated from the following:    Height as of 1/12/23: 1.854 m (6' 1\").    Weight as of this encounter: 116 kg (255 lb 11.7 oz)., PRESENT ON ADMISSION         Disposition Plan      Expected Discharge Date: 01/30/2023,  7:00 AM                Prasanth Villarreal MD  Hospitalist Service  [facility]  Securely message with [employer] (more info)  Text page via [facility] Paging/Directory   ______________________________________________________________________    Interval History     Overnight event reviewed and discussed with nursing staff.  Patient is being up in the room, pacing, has soured several times.  Fell to the floor on his bottom and was evaluated overnight.  Reportedly drinking a lot of water.      Physical Exam "   Vital Signs: Temp: 97  F (36.1  C) Temp src: Axillary BP: (!) 153/98 Pulse: 112   Resp: 20 SpO2: 96 % O2 Device: None (Room air)    Weight: 255 lbs 11.74 oz     Constitutional:  NAD. Up in the room naked/only brief on.   HENT: Oropharynx clear, MMM  Respiratory: breathing comfortably   Skin: Warm, dry   Neurologic: alert, moving all extremities.     Medical Decision Making       20 MINUTES SPENT BY ME on the date of service doing chart review, history, exam, documentation & further activities per the note.      Data     I have personally reviewed the following data over the past 24 hrs:    N/A  \   N/A   / 244     N/A N/A N/A /  N/A   3.7 N/A 0.64 (L) \       Imaging results reviewed over the past 24 hrs:   No results found for this or any previous visit (from the past 24 hour(s)).  Most Recent 3 CBC's:  Recent Labs   Lab Test 01/27/23  1012 01/25/23  0521 01/24/23  1756 12/19/22  1102   WBC  --  7.9 6.4 5.2   HGB  --  14.9 14.2 14.9   MCV  --  86 87 89    229 204 290     Most Recent 3 BMP's:  Recent Labs   Lab Test 01/27/23  1012 01/26/23  0252 01/25/23  2257 01/25/23  1828 01/25/23  1430 01/25/23  1426 01/25/23  0952 01/25/23  0521 01/25/23  0012 01/24/23  2316 01/24/23  1756 08/29/22  0544   NA  --  132* 131* 135*   < >  --  135* 132*  132* 123*  --  121* 141   POTASSIUM 3.7  --   --   --   --   --   --  4.4  --   --  3.8 4.5   CHLORIDE  --   --   --   --   --   --   --  96*  --   --  86* 106   CO2  --   --   --   --   --   --   --  27  --   --  27 26   BUN  --   --   --   --   --   --   --  8.9  --   --  11.0 18.9   CR 0.64*  --   --   --   --   --   --  0.79 0.58*  --  0.73 0.76   ANIONGAP  --   --   --   --   --   --   --  9  --   --  8 9   JESSICA  --   --   --   --   --   --   --  9.0  --   --  9.1 9.5   GLC  --   --   --   --   --  143* 98 116*  --    < > 107* 105*    < > = values in this interval not displayed.     Most Recent 2 LFT's:  Recent Labs   Lab Test 01/24/23  1756 12/19/22  1105  08/23/22 2132   AST 20 19 26   ALT 8* 6* 12   ALKPHOS 56  --  56   BILITOTAL 0.4  --  0.3     Most Recent 3 INR's:No lab results found.  Most Recent 3 Troponin's:No lab results found.  Most Recent 3 BNP's:No lab results found.

## 2023-01-28 NOTE — PROGRESS NOTES
"Patient alert to self. Very restless and anxious overnight. Frequently getting up to shower/use the bathroom. Patient very difficult to redirect. Requesting \"coke and pudding\". If denied patient gets very agitated. Patient requested to wear socks, but refused. Patient refusing clothing as well. At 0530 patient was in the bathroom using the shower when he came briskly walking out without assistance and slipped on the wet floor. Patient landed on butt without hitting head or injury to self. Quickly got back up and went to chair. MD notified and family called. Will continue to use 1:1 and attempt to keep him from using the shower. Will Continue to reassess. PRN medications given as needing for agitation.       "

## 2023-01-28 NOTE — PLAN OF CARE
Goal Outcome Evaluation:    Admitting Diagnosis: Seizures.  Patient alert to self, can say his full name, follow command when redirected. . Patient was restless, agitated at the beginning of the shift, PRN Zyprexa was given one time was not effective, patient did have 2 shower later felt comfortable asleep in bed. Regular diet diet. Take pills with pudding/ ice cream. Plan is to c.h to group home on Monday.

## 2023-01-28 NOTE — PLAN OF CARE
Neuros/CMS: VIDYA most of assessment d/t autism. Severe word-finding difficulty, oriented to self, can say few words  VS: VSS   Cardiac: WDL - refuses tele MD aware  GI/ - Easy to chew, mildly thick diet, urinal, and intermittently incontinent  Pulmonary: RA  Pain: no signs of pain   Lines/Drains: PIV x1   Activity: pt Patient gait steady 1:1 sitter at bedside    Discharge: Back to group home Monday, Ingrid Kaplan

## 2023-01-28 NOTE — PROVIDER NOTIFICATION
Dr. Villarreal paged: Wondering if a sodium lab should be ordered today? Trying to manage fluid amount patient is intaking. Gatorade being sent up.       Heaven Salinas RN on 1/28/2023 at 11:43 AM

## 2023-01-28 NOTE — PROVIDER NOTIFICATION
Paged Dr. Villarreal: Saw sodium resulted at 125. Any new orders or continue to monitor?     Heaven Salinas RN on 1/28/2023 at 4:17 PM

## 2023-01-28 NOTE — PROGRESS NOTES
Speech Language Therapy Discharge Summary    Reason for therapy discharge:    All goals and outcomes met, no further needs identified.    Progress towards therapy goal(s). [facility] for goal details.  Goals met    Therapy recommendation(s):    No further therapy is recommended. Continue Regular textures and thin liquids. Limit quantity of liquid or solid given at a time to help manage impulsivity (pour 1-2 oz of liquid into a cup at a time and cut food to bite size).

## 2023-01-29 LAB
ANION GAP SERPL CALCULATED.3IONS-SCNC: 10 MMOL/L (ref 7–15)
BUN SERPL-MCNC: 3.4 MG/DL (ref 6–20)
CALCIUM SERPL-MCNC: 9.1 MG/DL (ref 8.6–10)
CHLORIDE SERPL-SCNC: 87 MMOL/L (ref 98–107)
CREAT SERPL-MCNC: 0.56 MG/DL (ref 0.67–1.17)
DEPRECATED HCO3 PLAS-SCNC: 24 MMOL/L (ref 22–29)
GFR SERPL CREATININE-BSD FRML MDRD: >90 ML/MIN/1.73M2
GLUCOSE BLDC GLUCOMTR-MCNC: 142 MG/DL (ref 70–99)
GLUCOSE BLDC GLUCOMTR-MCNC: 187 MG/DL (ref 70–99)
GLUCOSE SERPL-MCNC: 166 MG/DL (ref 70–99)
HOLD SPECIMEN: NORMAL
OSMOLALITY SERPL: 290 MMOL/KG (ref 275–295)
OSMOLALITY UR: 43 MMOL/KG (ref 100–1200)
POTASSIUM SERPL-SCNC: 3.8 MMOL/L (ref 3.4–5.3)
SODIUM SERPL-SCNC: 121 MMOL/L (ref 136–145)
SODIUM UR-SCNC: <20 MMOL/L

## 2023-01-29 PROCEDURE — 250N000013 HC RX MED GY IP 250 OP 250 PS 637

## 2023-01-29 PROCEDURE — 120N000013 HC R&B IMCU

## 2023-01-29 PROCEDURE — 250N000013 HC RX MED GY IP 250 OP 250 PS 637: Performed by: HOSPITALIST

## 2023-01-29 PROCEDURE — 250N000011 HC RX IP 250 OP 636: Performed by: NURSE PRACTITIONER

## 2023-01-29 PROCEDURE — 83930 ASSAY OF BLOOD OSMOLALITY: CPT | Performed by: HOSPITALIST

## 2023-01-29 PROCEDURE — 250N000009 HC RX 250

## 2023-01-29 PROCEDURE — 84295 ASSAY OF SERUM SODIUM: CPT | Performed by: HOSPITALIST

## 2023-01-29 PROCEDURE — 250N000011 HC RX IP 250 OP 636: Performed by: HOSPITALIST

## 2023-01-29 PROCEDURE — 36415 COLL VENOUS BLD VENIPUNCTURE: CPT | Performed by: HOSPITALIST

## 2023-01-29 PROCEDURE — 99231 SBSQ HOSP IP/OBS SF/LOW 25: CPT | Performed by: HOSPITALIST

## 2023-01-29 PROCEDURE — 250N000013 HC RX MED GY IP 250 OP 250 PS 637: Performed by: STUDENT IN AN ORGANIZED HEALTH CARE EDUCATION/TRAINING PROGRAM

## 2023-01-29 PROCEDURE — 83935 ASSAY OF URINE OSMOLALITY: CPT | Performed by: HOSPITALIST

## 2023-01-29 PROCEDURE — 80048 BASIC METABOLIC PNL TOTAL CA: CPT | Performed by: HOSPITALIST

## 2023-01-29 PROCEDURE — 250N000011 HC RX IP 250 OP 636: Performed by: STUDENT IN AN ORGANIZED HEALTH CARE EDUCATION/TRAINING PROGRAM

## 2023-01-29 PROCEDURE — 250N000009 HC RX 250: Performed by: HOSPITALIST

## 2023-01-29 PROCEDURE — 84300 ASSAY OF URINE SODIUM: CPT | Performed by: HOSPITALIST

## 2023-01-29 RX ORDER — LORAZEPAM 2 MG/ML
2 INJECTION INTRAMUSCULAR ONCE
Status: DISCONTINUED | OUTPATIENT
Start: 2023-01-29 | End: 2023-01-29

## 2023-01-29 RX ORDER — OLANZAPINE 10 MG/2ML
10 INJECTION, POWDER, FOR SOLUTION INTRAMUSCULAR ONCE
Status: COMPLETED | OUTPATIENT
Start: 2023-01-29 | End: 2023-01-29

## 2023-01-29 RX ORDER — OLANZAPINE 10 MG/2ML
10 INJECTION, POWDER, FOR SOLUTION INTRAMUSCULAR DAILY PRN
Status: DISCONTINUED | OUTPATIENT
Start: 2023-01-29 | End: 2023-01-29

## 2023-01-29 RX ORDER — LIDOCAINE HYDROCHLORIDE 20 MG/ML
JELLY TOPICAL ONCE
Status: DISCONTINUED | OUTPATIENT
Start: 2023-01-29 | End: 2023-01-30

## 2023-01-29 RX ORDER — LORAZEPAM 2 MG/ML
2 INJECTION INTRAMUSCULAR ONCE
Status: DISCONTINUED | OUTPATIENT
Start: 2023-01-29 | End: 2023-01-30

## 2023-01-29 RX ORDER — WATER 10 ML/10ML
INJECTION INTRAMUSCULAR; INTRAVENOUS; SUBCUTANEOUS
Status: COMPLETED
Start: 2023-01-29 | End: 2023-01-29

## 2023-01-29 RX ORDER — LIDOCAINE HYDROCHLORIDE 20 MG/ML
JELLY TOPICAL
Status: DISCONTINUED
Start: 2023-01-29 | End: 2023-01-29 | Stop reason: WASHOUT

## 2023-01-29 RX ORDER — NITROGLYCERIN 0.4 MG/1
0.4 TABLET SUBLINGUAL EVERY 5 MIN PRN
Status: DISCONTINUED | OUTPATIENT
Start: 2023-01-29 | End: 2023-01-29

## 2023-01-29 RX ORDER — SODIUM CHLORIDE 1 G/1
2 TABLET ORAL 4 TIMES DAILY
Status: DISCONTINUED | OUTPATIENT
Start: 2023-01-29 | End: 2023-01-30

## 2023-01-29 RX ADMIN — SODIUM CHLORIDE TAB 1 GM 1 G: 1 TAB at 09:40

## 2023-01-29 RX ADMIN — OLANZAPINE 5 MG: 5 TABLET, ORALLY DISINTEGRATING ORAL at 10:56

## 2023-01-29 RX ADMIN — OLANZAPINE 10 MG: 10 INJECTION, POWDER, FOR SOLUTION INTRAMUSCULAR at 18:41

## 2023-01-29 RX ADMIN — SERTRALINE HYDROCHLORIDE 200 MG: 100 TABLET ORAL at 09:41

## 2023-01-29 RX ADMIN — MELATONIN TAB 3 MG 3 MG: 3 TAB at 03:07

## 2023-01-29 RX ADMIN — VASOPRESSIN: 20 INJECTION, SOLUTION INTRAVENOUS at 19:01

## 2023-01-29 RX ADMIN — RISPERIDONE 2 MG: 2 TABLET ORAL at 09:39

## 2023-01-29 RX ADMIN — SODIUM CHLORIDE TAB 1 GM 2 G: 1 TAB at 18:23

## 2023-01-29 RX ADMIN — LITHIUM CARBONATE 300 MG: 300 TABLET, FILM COATED, EXTENDED RELEASE ORAL at 21:12

## 2023-01-29 RX ADMIN — OLANZAPINE 10 MG: 10 INJECTION, POWDER, FOR SOLUTION INTRAMUSCULAR at 18:46

## 2023-01-29 RX ADMIN — WATER: 1 INJECTION INTRAMUSCULAR; INTRAVENOUS; SUBCUTANEOUS at 18:44

## 2023-01-29 RX ADMIN — SODIUM CHLORIDE TAB 1 GM 2 G: 1 TAB at 21:12

## 2023-01-29 RX ADMIN — RISPERIDONE 2 MG: 2 TABLET ORAL at 21:12

## 2023-01-29 RX ADMIN — DIVALPROEX SODIUM 500 MG: 500 TABLET, DELAYED RELEASE ORAL at 21:12

## 2023-01-29 RX ADMIN — DIVALPROEX SODIUM 500 MG: 500 TABLET, DELAYED RELEASE ORAL at 06:35

## 2023-01-29 RX ADMIN — SODIUM CHLORIDE TAB 1 GM 1 G: 1 TAB at 13:38

## 2023-01-29 RX ADMIN — SERTRALINE HYDROCHLORIDE 25 MG: 25 TABLET ORAL at 09:37

## 2023-01-29 RX ADMIN — LACOSAMIDE 100 MG: 100 TABLET, FILM COATED ORAL at 09:48

## 2023-01-29 RX ADMIN — LACOSAMIDE 100 MG: 100 TABLET, FILM COATED ORAL at 21:12

## 2023-01-29 RX ADMIN — TOPIRAMATE 200 MG: 200 TABLET, FILM COATED ORAL at 21:12

## 2023-01-29 RX ADMIN — LEVOTHYROXINE SODIUM 150 MCG: 150 TABLET ORAL at 06:35

## 2023-01-29 RX ADMIN — TOPIRAMATE 200 MG: 200 TABLET, FILM COATED ORAL at 09:40

## 2023-01-29 RX ADMIN — DIVALPROEX SODIUM 500 MG: 500 TABLET, DELAYED RELEASE ORAL at 13:38

## 2023-01-29 ASSESSMENT — ACTIVITIES OF DAILY LIVING (ADL)
ADLS_ACUITY_SCORE: 40

## 2023-01-29 NOTE — PLAN OF CARE
Unable to assess orientation properly, baseline Autism and non-verbal. Pt slept for a bit during shift but when awake is very active. Pt is walking around in their room naked and taking frequent showers. Pt has sitter in the room. Pt has an order for a urine sodium lab sample, could not obtain.

## 2023-01-29 NOTE — PROGRESS NOTES
Hospitalist brief update note:    Sodium further trended down to 121. Patient with polydipsia. He is going to shower every few minute drinking water.   Urine osm/sodium low. They were low on admission as well, with low low serum osm indicating dilution.     Discussed with RN/charge RN to shut off the water supply to the room, discussed earlier as well regarding this. RN will contact ANS/engineering depart for that, and if not feasible, will transfer to different room where they have capability to do this.     Meanwhile, increase salt tablet to 2 tabs qid  Infuse 150 ml of 2% saline and recheck sodium at 10 PM.    If sodium 125 or more on repeat, and able to restrict water, will hold further salt tablet to avoid rapid correction. If continues to worsen, will consult nephrology in AM    Discussed with RN.   Prasanth Villarreal MD  Hospitalist

## 2023-01-29 NOTE — PLAN OF CARE
"Goal Outcome Evaluation:         No change, frequently going into the bathroom for showers, gets easily agitated, he has 1-1 bedside attendant for safety. Does not follow instructions to reduce water intake . 2 falls today from going back and forth to the bathroom, does not dry himself, walks around dripping water on the floor, refusing to be clothed, walking in the room naked. Keeps asking for \"big coke,\" Staff have tried Gatorade, he opens it, pours it in the sink, and will refill the bottle with water.  Discussed patient with MD                "

## 2023-01-29 NOTE — PROGRESS NOTES
"BP (!) 129/91 (BP Location: Right arm)   Pulse 94   Temp 97.3  F (36.3  C) (Axillary)   Resp 18   Wt 116 kg (255 lb 11.7 oz)   SpO2 92%   BMI 33.74 kg/m   .    Assessment: Patient is alert, unable to assess mentation due to history of autism and non-verbal. Minimal communication with \"yes/no\". Patient very active and anxious all shift. Slept for 1hr. Patient unable to be reoriented. Walking around room naked and not allowing staff to assist with a dry towel from frequent showers. 1:1 sitter present. Sodium is low, salt tabs ordered. Patient drank 3 Gatorades and multiple diet cokes.     Pain management: denies pain    Heaven Salinas RN on 1/28/2023 at 7:11 PM         "

## 2023-01-29 NOTE — PROGRESS NOTES
[facility]    Medicine Progress Note - Hospitalist Service    Date of Admission:  1/24/2023  Date of Service: 01/29/2023    Assessment & Plan      Duane D Backes is a 42 year-old male with past medical history including autism with behavioral disturbance, epilepsy, epidermoid tumor who is admitted on 1/24/2023 as transfer from [facility] ED for seizures.     Epilepsy with breakthrough seizure  Epidermoid tumor with associated mass-effect  *Presents with witnessed seizure at group home, additional seizure with EMS and again in ED   *Per review of outside notes, has reportedly has missed home depakote doses recently  *Hyponatremia likely lowering seizure threshold as well   *CT head with grossly stable epidermoid tumor with mass effect   -was admitted to ICU given concern for status, with Ceribell device overnight and continuous EEG monitoring. No clear abnormalities/no epileptiform discharges were observed except for possible focal slowing over the right hemisphere.   - was placed on IV Depakote, transition to p.o.  PTA Vimpat and Topamax as well continued. Per chart review, patient's neurologist Dr Washington had recommended stopping Vimpat which he was taking PTA per group home.   - Seizure precautions, IV lorazepam ordered for breakthrough seizures   - General neurology consulted and discussed, no changes in PTA medications.    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  Given presentation with seizure, received 3% saline and was corrected, up to 136 after 48 hours.    - D5 was restarted and sodium down to 132-131 and then discontinued. Sodium then trended down to 125. Labs consistent with dilutional. Patient is drinking lots of water.   - Discussed with nursing staff to provide electrolyte solution so that he gets some sodium and less likely to cause dilutional hyponatremia. Also needs to turn the tap water off. Staff contacting engineering.  "  -salt tablets started. BMP pending today.   -Patient has been on sertraline for several years, less likely to be contributed by this although possible.  Since this is a stable regimen, discussed with psychiatry service and no plan to change medication at this time.    Autism with behavioral disturbance  Cognitive impairment  -  PTA regimen including Risperdal 2 Mg p.o. twice daily, Zoloft 200 Mg p.o. every morning and 25 Mg in the evening, lithium 300 Mg at bedtime resumed.  - has not needed Restraints >24 hours  - Sitter at bedside, continue   - Reportedly on lithium, but lithium level <0.1 at [facility]. PTA dose resumed.   - Patient with multiple code 21, psychiatry has been consulted and evaluated patient, no medication change at this point.  -Plan is to send him back to his family environment/group home but they will not be able to accept him over the weekend.       Diet: Snacks/Supplements Adult: Magic Cup; Between Meals  Regular Diet Adult    DVT Prophylaxis: Pneumatic Compression Devices  Colin Catheter: Not present  Lines: None     Cardiac Monitoring: None  Code Status: Full Code      Clinically Significant Risk Factors         # Hyponatremia: Lowest Na = 125 mmol/L in last 2 days, will monitor as appropriate                 # Obesity: Estimated body mass index is 33.74 kg/m  as calculated from the following:    Height as of 1/12/23: 1.854 m (6' 1\").    Weight as of this encounter: 116 kg (255 lb 11.7 oz).          Disposition Plan  as long as sodium is improving, tomorrow. Called both parents and sister (all legal guardians), unable to reach. Left VM to sister.      Expected Discharge Date: 01/30/2023,  7:00 AM                Prasanth Villarreal MD  Hospitalist Service  [facility]  Securely message with [employer] (more info)  Text page via [facility] Paging/Directory   ______________________________________________________________________    Interval History     Overnight event reviewed " and discussed with nursing staff. Patient is drinking a lot of water, very frequently from the tap water.       Physical Exam   Vital Signs: Temp: 98.9  F (37.2  C) Temp src: Axillary BP: (!) 142/95 Pulse: 100   Resp: 18 SpO2: 95 % O2 Device: None (Room air)    Weight: 255 lbs 11.74 oz     Constitutional:  NAD. Up in the room naked/only brief on.   HENT: Oropharynx clear, MMM  Respiratory: breathing comfortably   Skin: Warm, dry   Neurologic: alert, moving all extremities.     Medical Decision Making       30 MINUTES SPENT BY ME on the date of service doing chart review, history, exam, documentation & further activities per the note.      Data     I have personally reviewed the following data over the past 24 hrs:    N/A  \   N/A   / N/A     125 (L) 90 (L) 3.9 (L) /  142 (H)   3.6 24 0.63 (L) \       Imaging results reviewed over the past 24 hrs:   No results found for this or any previous visit (from the past 24 hour(s)).  Most Recent 3 CBC's:  Recent Labs   Lab Test 01/27/23  1012 01/25/23  0521 01/24/23  1756 12/19/22  1102   WBC  --  7.9 6.4 5.2   HGB  --  14.9 14.2 14.9   MCV  --  86 87 89    229 204 290     Most Recent 3 BMP's:  Recent Labs   Lab Test 01/29/23  1153 01/29/23  0743 01/28/23  1435 01/27/23  1012 01/26/23  0252 01/25/23  2257 01/25/23  0952 01/25/23  0521 01/24/23  2316 01/24/23  1756   NA  --   --  125*  --  132* 131*   < > 132*  132*   < > 121*   POTASSIUM  --   --  3.6 3.7  --   --   --  4.4  --  3.8   CHLORIDE  --   --  90*  --   --   --   --  96*  --  86*   CO2  --   --  24  --   --   --   --  27  --  27   BUN  --   --  3.9*  --   --   --   --  8.9  --  11.0   CR  --   --  0.63* 0.64*  --   --   --  0.79   < > 0.73   ANIONGAP  --   --  11  --   --   --   --  9  --  8   JESSICA  --   --  9.2  --   --   --   --  9.0  --  9.1   * 187* 149*  --   --   --    < > 116*   < > 107*    < > = values in this interval not displayed.     Most Recent 2 LFT's:  Recent Labs   Lab Test  01/24/23  1756 12/19/22  1102 08/23/22  2132   AST 20 19 26   ALT 8* 6* 12   ALKPHOS 56  --  56   BILITOTAL 0.4  --  0.3     Most Recent 3 INR's:No lab results found.  Most Recent 3 Troponin's:No lab results found.  Most Recent 3 BNP's:No lab results found.

## 2023-01-29 NOTE — PLAN OF CARE
Pt is here with seizures. A&Oxself. Pt follows some commands. Neuros unchanged, pt's speech is incomprehensible at times, can say yes, mom and dad or repeat words back. Pt is redirectable, agitated at times. VSS. Up with SBA. Regular diet. Pt did not sleep overnight, constantly drinking water int he shower or sink, Gatorade or conrado cola. Pt has been going between the chair to sitting in the shower. No sign or symptoms of pain. Pt scoring yellow on the aggression stoplight tool. Plan for discharge back home on Monday. Continue to monitor.

## 2023-01-30 LAB
CREAT SERPL-MCNC: 0.71 MG/DL (ref 0.67–1.17)
GFR SERPL CREATININE-BSD FRML MDRD: >90 ML/MIN/1.73M2
HOLD SPECIMEN: NORMAL
OSMOLALITY SERPL: 294 MMOL/KG (ref 275–295)
SODIUM SERPL-SCNC: 135 MMOL/L (ref 136–145)
SODIUM SERPL-SCNC: 139 MMOL/L (ref 136–145)
SODIUM SERPL-SCNC: 140 MMOL/L (ref 136–145)

## 2023-01-30 PROCEDURE — 250N000011 HC RX IP 250 OP 636: Performed by: HOSPITALIST

## 2023-01-30 PROCEDURE — 250N000013 HC RX MED GY IP 250 OP 250 PS 637: Performed by: STUDENT IN AN ORGANIZED HEALTH CARE EDUCATION/TRAINING PROGRAM

## 2023-01-30 PROCEDURE — 120N000013 HC R&B IMCU

## 2023-01-30 PROCEDURE — 99233 SBSQ HOSP IP/OBS HIGH 50: CPT | Performed by: HOSPITALIST

## 2023-01-30 PROCEDURE — 250N000013 HC RX MED GY IP 250 OP 250 PS 637

## 2023-01-30 PROCEDURE — 36415 COLL VENOUS BLD VENIPUNCTURE: CPT | Performed by: HOSPITALIST

## 2023-01-30 PROCEDURE — 83930 ASSAY OF BLOOD OSMOLALITY: CPT | Performed by: HOSPITALIST

## 2023-01-30 PROCEDURE — 250N000013 HC RX MED GY IP 250 OP 250 PS 637: Performed by: HOSPITALIST

## 2023-01-30 PROCEDURE — 84295 ASSAY OF SERUM SODIUM: CPT | Performed by: HOSPITALIST

## 2023-01-30 PROCEDURE — 99418 PROLNG IP/OBS E/M EA 15 MIN: CPT

## 2023-01-30 PROCEDURE — 82565 ASSAY OF CREATININE: CPT | Performed by: STUDENT IN AN ORGANIZED HEALTH CARE EDUCATION/TRAINING PROGRAM

## 2023-01-30 PROCEDURE — 120N000001 HC R&B MED SURG/OB

## 2023-01-30 PROCEDURE — 36415 COLL VENOUS BLD VENIPUNCTURE: CPT | Performed by: STUDENT IN AN ORGANIZED HEALTH CARE EDUCATION/TRAINING PROGRAM

## 2023-01-30 RX ORDER — HALOPERIDOL 5 MG/ML
2 INJECTION INTRAMUSCULAR EVERY 4 HOURS PRN
Status: DISCONTINUED | OUTPATIENT
Start: 2023-01-30 | End: 2023-02-02 | Stop reason: HOSPADM

## 2023-01-30 RX ORDER — HALOPERIDOL 5 MG/1
5 TABLET ORAL ONCE
Status: COMPLETED | OUTPATIENT
Start: 2023-01-30 | End: 2023-01-31

## 2023-01-30 RX ORDER — WATER 10 ML/10ML
INJECTION INTRAMUSCULAR; INTRAVENOUS; SUBCUTANEOUS
Status: DISCONTINUED
Start: 2023-01-30 | End: 2023-01-30 | Stop reason: HOSPADM

## 2023-01-30 RX ORDER — QUETIAPINE FUMARATE 25 MG/1
25 TABLET, FILM COATED ORAL AT BEDTIME
Status: DISCONTINUED | OUTPATIENT
Start: 2023-01-30 | End: 2023-02-02 | Stop reason: HOSPADM

## 2023-01-30 RX ORDER — HALOPERIDOL 2 MG/1
2 TABLET ORAL EVERY 4 HOURS PRN
Status: DISCONTINUED | OUTPATIENT
Start: 2023-01-30 | End: 2023-02-02 | Stop reason: HOSPADM

## 2023-01-30 RX ORDER — OLANZAPINE 10 MG/2ML
5 INJECTION, POWDER, FOR SOLUTION INTRAMUSCULAR DAILY PRN
Status: DISCONTINUED | OUTPATIENT
Start: 2023-01-30 | End: 2023-01-30

## 2023-01-30 RX ADMIN — DIVALPROEX SODIUM 500 MG: 500 TABLET, DELAYED RELEASE ORAL at 13:36

## 2023-01-30 RX ADMIN — SERTRALINE HYDROCHLORIDE 200 MG: 100 TABLET ORAL at 08:57

## 2023-01-30 RX ADMIN — MELATONIN TAB 3 MG 3 MG: 3 TAB at 20:05

## 2023-01-30 RX ADMIN — SERTRALINE HYDROCHLORIDE 25 MG: 25 TABLET ORAL at 16:33

## 2023-01-30 RX ADMIN — DIVALPROEX SODIUM 500 MG: 500 TABLET, DELAYED RELEASE ORAL at 06:31

## 2023-01-30 RX ADMIN — OLANZAPINE 5 MG: 10 INJECTION, POWDER, FOR SOLUTION INTRAMUSCULAR at 09:42

## 2023-01-30 RX ADMIN — LACOSAMIDE 100 MG: 100 TABLET, FILM COATED ORAL at 08:58

## 2023-01-30 RX ADMIN — ACETAMINOPHEN 650 MG: 325 TABLET, FILM COATED ORAL at 20:05

## 2023-01-30 RX ADMIN — TOPIRAMATE 200 MG: 200 TABLET, FILM COATED ORAL at 20:05

## 2023-01-30 RX ADMIN — TOPIRAMATE 200 MG: 200 TABLET, FILM COATED ORAL at 08:58

## 2023-01-30 RX ADMIN — HALOPERIDOL 2 MG: 2 TABLET ORAL at 16:33

## 2023-01-30 RX ADMIN — DIVALPROEX SODIUM 500 MG: 500 TABLET, DELAYED RELEASE ORAL at 22:00

## 2023-01-30 RX ADMIN — RISPERIDONE 2 MG: 2 TABLET ORAL at 09:03

## 2023-01-30 RX ADMIN — OLANZAPINE 5 MG: 5 TABLET, ORALLY DISINTEGRATING ORAL at 12:55

## 2023-01-30 RX ADMIN — LACOSAMIDE 100 MG: 100 TABLET, FILM COATED ORAL at 20:05

## 2023-01-30 RX ADMIN — RISPERIDONE 2 MG: 2 TABLET ORAL at 20:05

## 2023-01-30 RX ADMIN — DIPHENHYDRAMINE HYDROCHLORIDE 25 MG: 25 CAPSULE ORAL at 20:05

## 2023-01-30 RX ADMIN — RISPERIDONE 1 MG: 1 TABLET ORAL at 18:26

## 2023-01-30 RX ADMIN — RISPERIDONE 1 MG: 1 TABLET ORAL at 10:31

## 2023-01-30 RX ADMIN — OLANZAPINE 5 MG: 5 TABLET, ORALLY DISINTEGRATING ORAL at 08:59

## 2023-01-30 RX ADMIN — HALOPERIDOL 2 MG: 2 TABLET ORAL at 22:00

## 2023-01-30 RX ADMIN — LEVOTHYROXINE SODIUM 150 MCG: 150 TABLET ORAL at 06:31

## 2023-01-30 ASSESSMENT — ACTIVITIES OF DAILY LIVING (ADL)
ADLS_ACUITY_SCORE: 44
ADLS_ACUITY_SCORE: 48
ADLS_ACUITY_SCORE: 48
ADLS_ACUITY_SCORE: 44
ADLS_ACUITY_SCORE: 48
ADLS_ACUITY_SCORE: 44
ADLS_ACUITY_SCORE: 48

## 2023-01-30 NOTE — PLAN OF CARE
Pt is here with seizures. Somnolent. Neuros unchanged, pt's speech is incomprehensible at times, can say yes or repeat some words back. Pt started in 5 point hard restraint and was switched over to soft restraint at 2255. Pt pulls at lines when removed for range of motion. VSS. Tele NSR. Regular diet. Bedrest. Incontinent of bladder, external cath in place with adequate OP. No sign or symptoms of pain. Pt scoring yellow on the aggression stoplight tool. Discharge pending. Continue to monitor.

## 2023-01-30 NOTE — PROGRESS NOTES
House NP note    I responded to code 21 that was called on this patient as he was walking through the hallways naked trying to drink water from any available source.    Nursing staff redirected him back to his room.  I reviewed patient's chart briefly including PMH and HPI, noting he has severe, worsening hyponatremia currently 121 w/ concurrent polydipsia.  He is been prescribed salt tablets & 2% saline infusion.  He does not currently have IV access, nursing staff are concerned he will rip this out.  He is also reportedly demonstrated aggression towards staff and property and staff are concerned about safety of an IV pole.  If left untreated his sodium may potentially worsen and lower his seizure threshold.    I Discussed with security, psychiatric team RN, unit based nursing staff and oncoming [place] NP that it is my opinion that the safest way to treat his acute medical problem may be to sedate with antipsychotics and or benzodiazepines, restrain him with violent restraints given his size and strength, establish IV access, treat his hyponatremia and follow serial lab data.    I handed off management of this code 21/RRT situation to HECTOR Conde & defer further management to her.     Elicia Irizarry CNP  Hospitalist - [place] [facility] 7am-6pm  Text Page   Pager [phone]

## 2023-01-30 NOTE — PROGRESS NOTES
Cross Cover   1/30/2023  1226    Contacted with 10pm sodium level result of 135. Pt received total of 150cc Na 2% over 2 hours. Sodium at 1405 on 1/29 was 121. Per nursing, pt's current mentation is at baseline.   - Salt tabs on hold     Discussed with Dr. Sierra who will review the chart and further manage correction.       VERONICA Sosa [facility]  Hospitalist Service  House Officer  Pager: [phone] (7a - 6p)

## 2023-01-30 NOTE — PROGRESS NOTES
Moved patient to different rm - 728, disabled water faucet in the room from the two shut off valves under the sink.   Patient was moved to and less than 10 minutes later he walked out of the room naked into the hallway looking for any sources of water, redirected to room  And called CODE 21.   Please see house officer notes for rest of events after.

## 2023-01-30 NOTE — CODE/RAPID RESPONSE
House NP Note  1/29/2023 1900    I assumed care from my colleague, Elicia Irizarry CNP. Please see the initial details of the call from her note. In summary, the patient is severely hyponatremic, attempting to drink from any/all sources, using any force against staff to complete this - putting himself and others at harm.     After a discussion with the psychiatric team, security and bedside staff, the best option is to chemically and physically sedate Mr. Green with 10 mg IM zyprexa (given his strength/body habitus) to allow for IV placement and initiation of Na 2% infusion and follow serial Na overnight.     Interventions:  - Violent restraints temporarily  - Zyprexa 10 mg IM  - PIV placement   - Start Na 2% infusion  - Recheck Na due at 2200    Restraints for Medical Healing  Etiology of current behavior (including combativeness) is metabolic (hyponatremia) and underlying cognitive impairment in the setting of autism.   --5/5 pt restraints - 4 extremity nylon locking + chest.  -- all restraints applied appropriately w/o complication.   --Sitter bedside.    Exam:  Psych:  Agitated, combative  Extremities:   5 pt restraints: 4 extr + chest  all 4 extr warm, good color, < 1sec cap refill  all 4 extr straps correct tension - no excessive slack - with aaron facing outward    I have updated the patient's legal guardian (Soren), who verbalizes understanding and agreement of the plan above.     2200 Addendum:  Discussed patient progress with nursing. Attempted to remove restraints and pt immediately reached for his PIV.  - will continue restraints but transition to soft restraints as the patient is much calmer than initial call at 1815.  - RN plans to call lab to draw Na  - RN able to safely administer oral meds as scheduled despite restraints      VERONICA Sosa CNP  Hospitalist Service  House Officer  Pager: [phone] (4o - 6p)

## 2023-01-30 NOTE — PLAN OF CARE
"Pt is impulsive, redirectable at times, calm and attentive when asked simple questions for care. Frequently walks to BR naked to splashe water on body and face from toilet, sometimes drinks  unable to stop this interaction with out confrontation. Haldol 2mg given PO. Redirecting from acute injury.  Pt has excessive thirst, request \"coke\" every few minutes then sitsback down in chair .                      "

## 2023-01-30 NOTE — PLAN OF CARE
I was a sit with the patient from 700-1530PM. When I arrived this morning patient was in four non violent restraint. About 9am when patient woke up, started to get agitated, removed his male purwick, tele-box, and restraints on both hands. I tried to stop him but patient is strong where he held my hands and push me out of his way. I asked for staff assist, and since two feet still on restraints, risk for fall and hurt other colleague, code 21 was called.   Since then patient is out of non restraint. Most of the time patient pace and keep going back in the bathroom to wash his face out the toilet   Bowl even after he urinate. You can't stop him doing it since he would close the door.   He keep asking for coke and drunk a lot of them during my shift.   I put the trash can out of the room, once he took a cup in the trash, went to the bathroom, scoop water and drunk. He would open the closet looking for coke.   Patient is in high risk for fall, from going back and forth of the bathroom, & wet feet. Couple of times he almost slipt from wet floor. I would put towel on the floor to prevent fall, patient would removed it soak it on the toilet bowl and wipe them on his body and face.   He refuse to wear socks, and gown. Walking around naked in the room. He can clearly communicate when he wants drinks, yes/no and bye. Otherwise he garble when he talk.

## 2023-01-30 NOTE — PROGRESS NOTES
Care Management Follow Up    Length of Stay (days): 6    Expected Discharge Date: 01/30/2023     Concerns to be Addressed:       Patient plan of care discussed at interdisciplinary rounds: Yes    Anticipated Discharge Disposition: Group Home     Anticipated Discharge Services: None  Anticipated Discharge DME: None    Patient/family educated on Medicare website which has current facility and service quality ratings: no  Education Provided on the Discharge Plan:    Patient/Family in Agreement with the Plan: yes    Referrals Placed by CM/SW: Internal Clinic Care Coordination, Communication hand-offs to next level of Care Providers, Scheduled Follow-up appointments, Community Residential Settings (Group Homes)  Private pay costs discussed: Not applicable    Additional Information:  Spoke to Sandrine,  manager, about patient returning today.  She stated he would need transportation set up.  Any new meds need to be sent to [facility].  Left a voice mail message for guardian (parents) regarding anticipated discharge today and to see if they would transport him or if they would like transportation set up.     Addendum @ 1042:  Spoke to patient's father (guardian) about discharge today.  He stated he would be able to transport him.  Also asked about his water intake.  He stated the  aware he is to have his water intake monitored, this was not new.  He prefers to pick patient up at the doorway.      Per RN, she spoke with father and he will come to hospital and anticipate patient will discharge around noon.    Updated Deadea that patient's father will be transporting him home early afternoon.  Orders to be faxed to [phone].    Addendum @ 3268:  Per Hospitalist, patient will not discharge today.  Updated DeaDea at the .  Patient's father aware as he is at the hospital.    Rosario Tapia RN, BSN, PHN  Inpatient Care Coordination  [facility]  Phone: [phone]

## 2023-01-30 NOTE — PLAN OF CARE
Goal Outcome Evaluation:         Patient here after a seizure. Alert, oriented to self. VSS. Difficult to re-direct, patient refused to wear clothes, not following commands, requests ice cream and coke. PRN Zyprexa given, no effect, patient still restless. Sitter at bedside, unable to redirect patient. Code 21 called this morning, after he got himself out of restraints, restraints discontinued. Patient is constantly looking for something to drink and eat.   Patient's sodium level was low, now WNL, recheck at 14:00. Attempts to limit fluid intake are unsuccessful. Patient on a regular diet. Ambulating in room, continues to be restless. Plan to discharge back to group home once medically stable. Father was at bedside early this afternoon, stated patient I usually not behaving like this.

## 2023-01-30 NOTE — CODE/RAPID RESPONSE
[facility]    RRT Note:  CODE 21/Restraint check  1/30/2023   Time Called: 9:29 AM    RRT called for: Code 21  Assessment & Plan     Agitation and Impulsiveness  Code 21 called after patient removed 4 point restraints. Upon arrival patient is pacing back and fourth in his room. I have seen Duane previously during this hospitalization for agitation, and he responds well to being offered ice cream. Patient was redirectable and sat in the chair to eat his ice cream. Patient was not physically or verbally aggressive toward staff, however nursing expressed that he was placed in 4 point restraints overnight due to agitation and concern for staff safety due to his aggressive behaviors.     Patient was seen overnight after patient attempting to drink from any available water source, walking in the hallways naked. Patient was found to have a sodium of 121 suspected to be secondary to psychogenic polydipsia. Patient was sedated, restrained, and started on Na 2% infusion. Patient's sodium rapidly corrected from 121-135, and there was concern for development of cerebral pontine myelinolysis due to rapid correction, however thought to be less likely to to rapid onset of hyponatremia. Patient's agitation is associated with restrictions on how much he can drink.     -- 5 mg IM zyprexa  -- Sitter bedside.  -- PRN risperadone  -- restrict fluids as able    Discussed with and defer further cares to Dr. Villarreal.    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      Code Status: Full Code    Gal Herbert NP    Allergies   Allergies   Allergen Reactions     Penicillin [Penicillins] Other (See Comments)     Prozac [Fluoxetine Hcl]      Reglan [Metoclopramide Hcl]      Ritalin [Methylphenidate Derivatives] Other (See Comments)     Ritalin     Trazodone         Physical Exam   Vital Signs with Ranges:  Temp:  [96.6  F (35.9  C)-99.3  F (37.4  C)] 98.5  F (36.9  C)  Pulse:  [] 105  Resp:  [16-18] 18  BP: (106-157)/() 138/99  SpO2:  [92 %-95 %] 95 %  I/O last 3 completed shifts:  In: 1150 [P.O.:1150]  Out: 3425 [Urine:3425]    Constitutional: Non-toxic appearing, not in acute distress. Pacing in room naked.  Pulmonary: non-labored breathing pattern.  Skin/Integumen: No lesion or rashes.  Neuro: No new focal neuro deficits appreciated.  Psych:  Agitated, impulsive.  Extremities: Moves all 4 extremities without difficulty.      Data     IMAGING: (X-ray/CT/MRI)   No results found for this or any previous visit (from the past 24 hour(s)).    CBC with Diff:  Recent Labs   Lab Test 01/27/23  1012 01/25/23  0521   WBC  --  7.9   HGB  --  14.9   MCV  --  86    229      Absolute Retic (10e9/L)   Date Value   06/23/2014 122.4 (H)     % Retic (%)   Date Value   06/23/2014 3.0 (H)       Lactic Acid:    Lab Results   Component Value Date    LACT 1.0 08/26/2022    LACT 0.7 02/24/2021           Comprehensive Metabolic Panel:  Recent Labs   Lab 01/30/23  0824 01/29/23  2245 01/29/23  1405 01/24/23  2316 01/24/23  1756      < > 121*   < > 121*   POTASSIUM  --   --  3.8   < > 3.8   CHLORIDE  --   --  87*   < > 86*   CO2  --   --  24   < > 27   ANIONGAP  --   --  10   < > 8   GLC  --   --  166*   < > 107*   BUN  --   --  3.4*   < > 11.0   CR 0.71  --  0.56*   < > 0.73   GFRESTIMATED >90  --  >90   < > >90   JESSICA  --   --  9.1   < > 9.1   PROTTOTAL  --   --   --   --  7.1   ALBUMIN  --   --   --   --  4.5   BILITOTAL  --   --   --   --  0.4   ALKPHOS  --   --   --   --  56   AST  --   --   --   --  20   ALT  --   --   --   --  8*    < > = values in this interval not displayed.       UA:  No results for input(s): COLOR, APPEARANCE, URINEGLC, URINEBILI, URINEKETONE, SG, UBLD, URINEPH, PROTEIN, UROBILINOGEN, NITRITE, LEUKEST, RBCU, WBCU in the last 168  hours.      Time Spent on this Encounter   I spent 15 minutes on the unit/floor managing the care of Duane D Backtatyana. Over 50% of my time was spent counseling the patient and/or coordinating care regarding services listed in this note.

## 2023-01-30 NOTE — CONSULTS
"      Psychiatry Consultation; Follow up              Reason for Consult, requesting source:    Autism with behavioral agitation. Patient initially seen by Kris LOPEZ from psychiatry 1/27   Requesting source: Prasanth Villarreal  Labs and imaging reviewed, patient seen and evaluated by VERONICA Babcock CNP             Interim history:    Duane D Backes is a 42 year old male who was admitted to [facility] on 1/24/23 with seizures after missing two doses of his Depakote. PMH significant for autism spectrum disorder, behavioral disturbance, intellectual disability, epilepsy, epidermoid tumor. Transferred from [facility] ED. Psychiatry initially consulted for agitation and low lithium level on 1/27. After psychiatry received collateral, it appeared patient was at his baseline and medications were continued and Depakote was restarted by neurology.     Patient was planning on discharging back to the group home today with his father transporting him, however multiple code 21s have been called related to agitation with water restriction and severe hyponatremia. Patient was found to have a sodium of 121 suspected to be secondary to psychogenic polydipsia. Patient was sedated, restrained, and started on Na 2% infusion.    Today, patient was sitting in bedside chair completely naked after drinking water out of the toilet. The Hospitalist has instructed staff to shut off water supply to sink in efforts to limit water intake. Today he received a total of Zyprexa 15mg and Risperdal 1mg for PRNs for agitation related to fluid restriction. Sodium today is now up to 140 today. Patient was calm on assessment and when asked if he was happy or sad he said \"happy\" and also agreed he's very thirsty.         Current Medications:       divalproex sodium delayed-release  500 mg Oral Q8H LUIS MIGUEL     Lacosamide  100 mg Oral BID     levothyroxine  150 mcg Oral QAM AC     lithium ER  300 mg Oral At Bedtime     risperiDONE  2 mg Oral BID " "    sertraline  200 mg Oral QAM     sertraline  25 mg Oral Daily     sodium chloride (PF)  3 mL Intracatheter Q8H     sodium chloride (PF)  3 mL Intracatheter Q8H     sterile water (preservative free)         topiramate  200 mg Oral Q12H [facility] (08/20)              Family and Social History:   Parents are guardians. Lives at group home and they are willing to take him back.            MSE:   Appearance: awake, alert, naked   Attitude:  cooperative  Eye Contact:  fair  Mood:  \"happy\"  Affect:  : mood congruent  Speech:  limited vocabulary   Psychomotor Behavior:  stereotypies observed  Muscle strength and tone: baseline   Thought Process:  goal oriented  Associations:  no loose associations  Thought Content:  no evidence of suicidal ideation or homicidal ideation  Insight:  poor  Judgement:  poor  Oriented to:  person   Attention Span and Concentration:  poor  Recent and Remote Memory:  poor    Vital signs:  Temp: 98.5  F (36.9  C) Temp src: Axillary BP: (!) 138/99 Pulse: 105   Resp: 18 SpO2: 95 % O2 Device: None (Room air)     Weight: 116 kg (255 lb 11.7 oz)  Estimated body mass index is 33.74 kg/m  as calculated from the following:    Height as of 1/12/23: 1.854 m (6' 1\").    Weight as of this encounter: 116 kg (255 lb 11.7 oz).    Qtc: EKG pending          DSM-5 Diagnosis:   #1 Autism Spectrum Disorder  #2 Intellectual disability          Assessment:   Duane is a 42 year old male with a history of the above diagnoses who presents with seizures in the context of missed Depakote doses and hyponatremia. His mental status has been at baseline, however has experienced significant agitation related to fluid restriction and extreme thirst. In theory, all his psych meds could be contributing to SIADH however the Hospitalist believes labs are more consistent with psychogenic polydipsia. Discussed case with Dr. Pratt from psychiatry and agrees to discontinue Lithium as level has been subtherapeutic for months. Nurses reported " Zyprexa prn did not touch him, will try haldol and get EKG.           Summary of Recommendations:     --Discontinued Lithium 300mg at bedtime     --Ordered EKG, replaced Zyprexa PRN with Haldol 2mg PO/IM [id number]. Would hold Haldol if QTc comes back above 500msec.     --Likely will need behavioral plan surrounding fluid restriction (salt water, salt packets, pedialyte, etc.)       Elba Pierre, KARIME-BC  Consult/Liaison Psychiatry   [facility]

## 2023-01-30 NOTE — PROGRESS NOTES
[facility]    Medicine Progress Note - Hospitalist Service    Date of Admission:  1/24/2023  Date of Service: 01/30/2023    Assessment & Plan      Duane D Backes is a 42 year-old male with past medical history including autism with behavioral disturbance, epilepsy, epidermoid tumor who is admitted on 1/24/2023 as transfer from [facility] ED for seizures.     Epilepsy with breakthrough seizure  Epidermoid tumor with associated mass-effect  *Presents with witnessed seizure at group home, additional seizure with EMS and again in ED   *Per review of outside notes, has reportedly has missed home depakote doses recently  *Hyponatremia likely lowering seizure threshold as well   *CT head with grossly stable epidermoid tumor with mass effect   -was admitted to ICU given concern for status, with Ceribell device overnight and continuous EEG monitoring. No clear abnormalities/no epileptiform discharges were observed except for possible focal slowing over the right hemisphere.   - was placed on IV Depakote, transition to p.o.  PTA Vimpat and Topamax as well continued. Per chart review, patient's neurologist Dr Washington had recommended stopping Vimpat which he was taking PTA per group home.   - Seizure precautions, IV lorazepam ordered for breakthrough seizures   - General neurology consulted and discussed, no changes in PTA medications.    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  Given presentation with seizure, received 3% saline and was corrected, up to 136 after 48 hours.    - D5 was restarted and sodium down to 132-131 and then discontinued. Sodium then trended down to 121. Labs consistent with dilutional. Patient is drinking lots of water.   - water supply was cut off to his room, 2% saline 150 ml given, and rapidly corrected. Given acute drop, unlikely to have complication with rapid correction and so will not attempt lowering sodium.   - Salt tabs  "stopped.   -Patient has been on sertraline for several years, less likely to be contributed by this although possible.  Since this is a stable regimen, discussed with psychiatry service and no plan to change medication at this time.    Autism with behavioral disturbance  Cognitive impairment  -  PTA regimen including Risperdal 2 Mg p.o. twice daily, Zoloft 200 Mg p.o. every morning and 25 Mg in the evening, lithium 300 Mg at bedtime resumed.   -Given multiple RRTs and ongoing agitation, psychiatry consulted, plan is to start haldol. To stop lithium. EKG to monitor QTc  - Sitter at bedside, continue     - Likely needs transfer to psych inpatient.        Diet: Snacks/Supplements Adult: Magic Cup; Between Meals  Regular Diet Adult    DVT Prophylaxis: Pneumatic Compression Devices  Colin Catheter: Not present  Lines: None     Cardiac Monitoring: ACTIVE order. Indication: Electrolyte Imbalance (24 hours)- Magnesium <1.3 mg/ml; Potassium < =2.8 or > 5.5 mg/ml  Code Status: Full Code      Clinically Significant Risk Factors         # Hyponatremia: Lowest Na = 121 mmol/L in last 2 days, will monitor as appropriate                 # Obesity: Estimated body mass index is 33.74 kg/m  as calculated from the following:    Height as of 1/12/23: 1.854 m (6' 1\").    Weight as of this encounter: 116 kg (255 lb 11.7 oz).          Disposition Plan  to group home once agitation/restless improves. May need inpatient psychiatry admission.     Expected Discharge Date: 01/31/2023                  Prasanth Villarreal MD  Hospitalist Service  [facility]  Securely message with [employer] (more info)  Text page via [employer] Paging/Directory   ______________________________________________________________________    Interval History     Overnight event reviewed and discussed with nursing staff/charge RN. Patient needed zyperxa and restrains for IV line and IVF last evening. Moved to a different room and water supply was turned " off. Then Sodium rapidly corrected 135 and 140 this am. Given acute drop from 136 few days ago, no intervention dose for rapid correction.   - Patient still pacing in the room, wanting more water. Has been drinking from commode.   - His father came to visit as it was felt he will better in his usual home setting/group home discharge, but he felt patient is more restless, not like his usual, not directable even from him which is not usual.   - Had code 21 this am, needed zyprexa.       Physical Exam   Vital Signs: Temp: 98.5  F (36.9  C) Temp src: Axillary BP: (!) 138/99 Pulse: 105   Resp: 18 SpO2: 95 % O2 Device: None (Room air)    Weight: 255 lbs 11.74 oz     Constitutional:  NAD. Up in the room naked, had cloths on briefly when his father was present.   HENT: MMM  Respiratory: breathing comfortably   Skin: Warm   Neurologic: alert, moving all extremities. Reetless.     Medical Decision Making       70 MINUTES SPENT BY ME on the date of service doing chart review, history, exam, documentation & further activities per the note. discussed with RN, patient's father, SW, psychiatry NP.      Data     I have personally reviewed the following data over the past 24 hrs:    N/A  \   N/A   / N/A     140 N/A N/A /  N/A   N/A N/A 0.71 \       Imaging results reviewed over the past 24 hrs:   No results found for this or any previous visit (from the past 24 hour(s)).  Most Recent 3 CBC's:  Recent Labs   Lab Test 01/27/23  1012 01/25/23  0521 01/24/23  1756 12/19/22  1102   WBC  --  7.9 6.4 5.2   HGB  --  14.9 14.2 14.9   MCV  --  86 87 89    229 204 290     Most Recent 3 BMP's:  Recent Labs   Lab Test 01/30/23  0824 01/29/23  2245 01/29/23  1405 01/29/23  1153 01/29/23  0743 01/28/23  1435 01/27/23  1012 01/25/23  0952 01/25/23  0521    135* 121*  --   --  125*  --    < > 132*  132*   POTASSIUM  --   --  3.8  --   --  3.6 3.7  --  4.4   CHLORIDE  --   --  87*  --   --  90*  --   --  96*   CO2  --   --  24  --   --   24  --   --  27   BUN  --   --  3.4*  --   --  3.9*  --   --  8.9   CR 0.71  --  0.56*  --   --  0.63* 0.64*  --  0.79   ANIONGAP  --   --  10  --   --  11  --   --  9   JESSICA  --   --  9.1  --   --  9.2  --   --  9.0   GLC  --   --  166* 142* 187* 149*  --    < > 116*    < > = values in this interval not displayed.     Most Recent 2 LFT's:  Recent Labs   Lab Test 01/24/23  1756 12/19/22  1102 08/23/22  2132   AST 20 19 26   ALT 8* 6* 12   ALKPHOS 56  --  56   BILITOTAL 0.4  --  0.3     Most Recent 3 INR's:No lab results found.  Most Recent 3 Troponin's:No lab results found.  Most Recent 3 BNP's:No lab results found.

## 2023-01-31 LAB
ANION GAP SERPL CALCULATED.3IONS-SCNC: 13 MMOL/L (ref 7–15)
BUN SERPL-MCNC: 8 MG/DL (ref 6–20)
CALCIUM SERPL-MCNC: 9.6 MG/DL (ref 8.6–10)
CHLORIDE SERPL-SCNC: 105 MMOL/L (ref 98–107)
CREAT SERPL-MCNC: 0.58 MG/DL (ref 0.67–1.17)
DEPRECATED HCO3 PLAS-SCNC: 23 MMOL/L (ref 22–29)
GFR SERPL CREATININE-BSD FRML MDRD: >90 ML/MIN/1.73M2
GLUCOSE BLDC GLUCOMTR-MCNC: 110 MG/DL (ref 70–99)
GLUCOSE BLDC GLUCOMTR-MCNC: 194 MG/DL (ref 70–99)
GLUCOSE SERPL-MCNC: 114 MG/DL (ref 70–99)
POTASSIUM SERPL-SCNC: 3.9 MMOL/L (ref 3.4–5.3)
SODIUM SERPL-SCNC: 141 MMOL/L (ref 136–145)

## 2023-01-31 PROCEDURE — 250N000013 HC RX MED GY IP 250 OP 250 PS 637: Performed by: STUDENT IN AN ORGANIZED HEALTH CARE EDUCATION/TRAINING PROGRAM

## 2023-01-31 PROCEDURE — 120N000001 HC R&B MED SURG/OB

## 2023-01-31 PROCEDURE — 250N000013 HC RX MED GY IP 250 OP 250 PS 637

## 2023-01-31 PROCEDURE — 250N000013 HC RX MED GY IP 250 OP 250 PS 637: Performed by: HOSPITALIST

## 2023-01-31 PROCEDURE — 80048 BASIC METABOLIC PNL TOTAL CA: CPT | Performed by: HOSPITALIST

## 2023-01-31 PROCEDURE — 93010 ELECTROCARDIOGRAM REPORT: CPT | Performed by: INTERNAL MEDICINE

## 2023-01-31 PROCEDURE — 36415 COLL VENOUS BLD VENIPUNCTURE: CPT | Performed by: HOSPITALIST

## 2023-01-31 PROCEDURE — 93005 ELECTROCARDIOGRAM TRACING: CPT

## 2023-01-31 PROCEDURE — 99232 SBSQ HOSP IP/OBS MODERATE 35: CPT | Performed by: HOSPITALIST

## 2023-01-31 RX ORDER — MICONAZOLE NITRATE 20 MG/G
CREAM TOPICAL 2 TIMES DAILY
Status: DISCONTINUED | OUTPATIENT
Start: 2023-01-31 | End: 2023-02-02 | Stop reason: HOSPADM

## 2023-01-31 RX ORDER — LORAZEPAM 1 MG/1
1 TABLET ORAL ONCE
Status: COMPLETED | OUTPATIENT
Start: 2023-01-31 | End: 2023-01-31

## 2023-01-31 RX ORDER — DIVALPROEX SODIUM 500 MG/1
500 TABLET, EXTENDED RELEASE ORAL 3 TIMES DAILY
Status: DISCONTINUED | OUTPATIENT
Start: 2023-01-31 | End: 2023-02-02 | Stop reason: HOSPADM

## 2023-01-31 RX ADMIN — DIVALPROEX SODIUM 500 MG: 500 TABLET, DELAYED RELEASE ORAL at 13:50

## 2023-01-31 RX ADMIN — MICONAZOLE NITRATE: 20 CREAM TOPICAL at 08:06

## 2023-01-31 RX ADMIN — HALOPERIDOL 2 MG: 2 TABLET ORAL at 08:03

## 2023-01-31 RX ADMIN — HALOPERIDOL 2 MG: 2 TABLET ORAL at 04:14

## 2023-01-31 RX ADMIN — LACOSAMIDE 100 MG: 100 TABLET, FILM COATED ORAL at 20:09

## 2023-01-31 RX ADMIN — HALOPERIDOL 2 MG: 2 TABLET ORAL at 13:50

## 2023-01-31 RX ADMIN — RISPERIDONE 2 MG: 2 TABLET ORAL at 08:02

## 2023-01-31 RX ADMIN — ACETAMINOPHEN 650 MG: 325 TABLET, FILM COATED ORAL at 13:50

## 2023-01-31 RX ADMIN — HALOPERIDOL 5 MG: 5 TABLET ORAL at 00:08

## 2023-01-31 RX ADMIN — SERTRALINE HYDROCHLORIDE 25 MG: 25 TABLET ORAL at 16:27

## 2023-01-31 RX ADMIN — LACOSAMIDE 100 MG: 100 TABLET, FILM COATED ORAL at 08:03

## 2023-01-31 RX ADMIN — MICONAZOLE NITRATE: 20 CREAM TOPICAL at 01:19

## 2023-01-31 RX ADMIN — HALOPERIDOL 2 MG: 2 TABLET ORAL at 20:09

## 2023-01-31 RX ADMIN — RISPERIDONE 1 MG: 1 TABLET ORAL at 06:32

## 2023-01-31 RX ADMIN — ACETAMINOPHEN 650 MG: 325 TABLET, FILM COATED ORAL at 07:59

## 2023-01-31 RX ADMIN — RISPERIDONE 1 MG: 1 TABLET ORAL at 16:28

## 2023-01-31 RX ADMIN — MICONAZOLE NITRATE: 20 CREAM TOPICAL at 21:30

## 2023-01-31 RX ADMIN — DIVALPROEX SODIUM 500 MG: 500 TABLET, FILM COATED, EXTENDED RELEASE ORAL at 21:07

## 2023-01-31 RX ADMIN — QUETIAPINE FUMARATE 25 MG: 25 TABLET ORAL at 00:08

## 2023-01-31 RX ADMIN — SERTRALINE HYDROCHLORIDE 200 MG: 100 TABLET ORAL at 08:03

## 2023-01-31 RX ADMIN — LORAZEPAM 1 MG: 1 TABLET ORAL at 22:22

## 2023-01-31 RX ADMIN — DIVALPROEX SODIUM 500 MG: 500 TABLET, DELAYED RELEASE ORAL at 06:32

## 2023-01-31 RX ADMIN — TOPIRAMATE 200 MG: 200 TABLET, FILM COATED ORAL at 20:09

## 2023-01-31 RX ADMIN — LEVOTHYROXINE SODIUM 150 MCG: 150 TABLET ORAL at 06:32

## 2023-01-31 RX ADMIN — TOPIRAMATE 200 MG: 200 TABLET, FILM COATED ORAL at 08:00

## 2023-01-31 RX ADMIN — DIPHENHYDRAMINE HYDROCHLORIDE 25 MG: 25 CAPSULE ORAL at 04:14

## 2023-01-31 RX ADMIN — RISPERIDONE 2 MG: 2 TABLET ORAL at 20:10

## 2023-01-31 RX ADMIN — QUETIAPINE FUMARATE 25 MG: 25 TABLET ORAL at 21:07

## 2023-01-31 ASSESSMENT — ACTIVITIES OF DAILY LIVING (ADL)
ADLS_ACUITY_SCORE: 47
ADLS_ACUITY_SCORE: 40
ADLS_ACUITY_SCORE: 47
ADLS_ACUITY_SCORE: 44
ADLS_ACUITY_SCORE: 40
ADLS_ACUITY_SCORE: 47
ADLS_ACUITY_SCORE: 40
ADLS_ACUITY_SCORE: 47
ADLS_ACUITY_SCORE: 47
ADLS_ACUITY_SCORE: 40

## 2023-01-31 NOTE — PROGRESS NOTES
"Impossible to get full assessment done d/t pt cognition and impulsiveness. Pt walks back and forth in the room to the bathroom and back to chair drinking toilet water and requesting \"cokes\" - pt did take medications from this RN   "

## 2023-01-31 NOTE — PROGRESS NOTES
"This RN paged the provider and  on call around 1/30 2320 due to pt agitation and flooding the toilet with paper towel. Per previous notes this RN exhausted all PRN medications to help with the agitation to no relief. Pt will constantly ask for \"big coke\" and if he did not get it would start puling and throwing objects in the room. This RN paged due to staff and patient safety. The pt would hit himself if not given soda after soda. Pt has noticeable red marks on his thighs from repeated punching himself. This RN did not feel safe in the room as a 1:1 sit without the pt being restraints d/t hx of violent outbreaks on staff when things are not done to his liking. Expressed this to the provider who agreed on the use of restraints for staff and pt safety.     At bedside we first tried soft restraints but the pt broke free of them within a minute and hit at staff who was assisting. The call was made for pt and staff safety to put pt in hard restraints with a chest restraint too.     During the time with saff in the room pt was continuing to splash water from the toilet that was on the ground onto his face. Pt has had a rash on his face previously but has gotten worse since splashing the ground water onto his face. This RN asked the pt if it hurts or itches and pt said \"fine fine fine, no no no\" -- pt face was washed clean with wash cloths with only water on them.     This RN and assisting psych staff were able to get pt safely into these restraints where he is no longer a danger to himself or others. Pt sates he is \"comfortable\" when asked how he is doing.     The current provider who assisted adrianjanene is to put in the order for the restraints. That provider is going to check with colleagues to see if there is any other solution but restraints for this pt and staff to remain safe.   "

## 2023-01-31 NOTE — PROGRESS NOTES
Writer called and spoke to patient's farther, Soren, and notified patient was placed in 5 point hard restraints previous shift.  Writer confirmed contact information to [facility]  [phone]  Head of organization  [phone]  Home patient's resides at   [phone]

## 2023-01-31 NOTE — PROGRESS NOTES
Writer spoke to patient's father about going to group home and  some of patients clothing and bring them to hospital. Maybe patient will wear own clothing instead of nothing at all. Patient continues not to allow brief, towel, sheet, etc on him. Soren, patient's farther stated he would try and do this tomorrow, Wednesday.

## 2023-01-31 NOTE — PROVIDER NOTIFICATION
Writer paged Dr Villarreal. Patient was placed in 5 point hard restraints previous shift, patient's guardian needs to be contacted and informed.

## 2023-01-31 NOTE — PROGRESS NOTES
Camilor was able get a hold of a person at facility at [phone] to get more insight on care/options to assist in addressing patient's behavior disturbances. Person stated name was Pricilla, very difficult to understand and had little to no insight except to give patient medication. Writer did get the phone number to Manager, Sandrine. [phone]    Addendum:  Writer spoke to Sandrine, pronounced like Dallin. Informed writer patient has little to no attention span and does not have anything he does to preoccupy his time. Also patient's behaviors have increased at home as well and medications have not been working. Sandrine did mention she had discussed with Soren, patients father, that the home is probably not the environment to manage patient's care.

## 2023-01-31 NOTE — PLAN OF CARE
Goal Outcome Evaluation:       Shift Notes  Neuro: self   CV: SBPs low 140s, NSR, qtc was 498  Respiratory: RA  GI/: incont  Skin: rash on face, buttock, and left arm, various scratches and red areas from pt hitting/scratching himself   Activity: normally independent and was until 0000 - now in 5 point restraints for safety   Diet: reg high sodium - watch intake, pt was drink copious amounts of water from toilet until restrained. Here with low sodium issues --- takes pills whole no issues   Drips:   Other: 5 point restraints, pt still actively fighting them, will not keep clothes, blankets, pads, or male external catheters on, he will thrash back and forth in bed till he can pull it off     All given PRN medications have not worked, pt still exhibiting same behaviors and has not slept all shift     Plan:  hopefully discharge back to group home...

## 2023-01-31 NOTE — PROGRESS NOTES
Pt has taken all of his meds along with PRN medications without any meaningful change in behavior, still pacing back in forth in the room and continuing to go into the bathroom and drink/bathe in the toilet water. Unable to redirect pt from doing this as he is also urinating every time he goes into the bathroom

## 2023-01-31 NOTE — CODE/RAPID RESPONSE
[facility]    RRT Note:  CODE 21/Restraint check  1/30/2023   Time Called: 2320    RRT called for: Code 21/Restraint Check.  4 point restraints     Assessment & Plan      Danger to Self (self-injurious) or Others (violent/combative):   Per bedside RN the patient has escalated his behaviors and has been constantly drinking from the toilet to the point that the toilet flooded the room and now patient is ambulating through toilet water on the floor at risk of slipping/falling. Patient's face has broken out with a red rash that could be chemical-related as he has been splashing toilet water on himself and also splashing water from the floor on his face. Code 21 staff arrived and assisted in moving patient to a new room where he was given PO haldol and seroquel and 4-point soft restraints were placed. Patient was able to remove soft restraints almost immediately as they did not fit his     Tinnea Corporis  Because patient was ambulating naked in the hallways, a total skin assessment was easy to complete and it was apparent that he had what appeared to be a fungal infection to his right upper buttock as well as his left forearm and possibly abdomen.  -- Miconazole cream BID    Behavioral agitation  Psychogenic polydypsea  Patient constantly wanting to eat and drink, as soon as he finishes eating or drinking he is moving to the next thing to eat or drink. Patient was doing okay with staff until he drove his sodium down and needed to be placed on fluid restriction. Patient's behaviors have been escalating tonight and given his history of striking staff when stressed, they were feeling unsafe with patient and requested intervention so code 21 staff responded to assist.  -- Unable to de-escalate  -- no injury to pt or staff.   -- all restraints applied appropriately w/o complication.   -- restraints applied supine, 5-point with the goal to transition to soft restraints as soon as patient is more calm  "and the ultimate goal to remove restraints entirely as soon as possible as this is a behavior issue and patient is likely seeking attention with his behaviors and not intending to harm.  -- Sitter bedside  -- I asked patient if he was comfortable and patient responded \"Yes, comfortable\"  -- I recommend rinsing face with water, avoid soap or any further irritant    INTERVENTIONS:  - One-time dose of 5mg PO haldol  - 25mg PO seroquel QHS  - 5-point restraints for medical healing while patient remains unable to redirect regarding drinking toilet water and new rash resulting from exposure to non-potable water  - miconazole cream BID    Discussed with and defer further cares to bedside RN    Code Status: Full Code    VERONICA Steiner CNP    Allergies   Allergies   Allergen Reactions     Penicillin [Penicillins] Other (See Comments)     Prozac [Fluoxetine Hcl]      Reglan [Metoclopramide Hcl]      Ritalin [Methylphenidate Derivatives] Other (See Comments)     Ritalin     Trazodone        Physical Exam   Vital Signs with Ranges:  Temp:  [96  F (35.6  C)-99.3  F (37.4  C)] 96.4  F (35.8  C)  Pulse:  [] 110  Resp:  [16-18] 16  BP: (106-173)/() 173/110  SpO2:  [92 %-99 %] 99 %  I/O last 3 completed shifts:  In: 7760 [P.O.:7760]  Out: 2025 [Urine:2025]    Physical Exam  HENT:      Head: Normocephalic.      Right Ear: External ear normal.      Left Ear: External ear normal.      Nose: Nose normal.   Eyes:      Pupils: Pupils are equal, round, and reactive to light.   Pulmonary:      Effort: Pulmonary effort is normal.   Musculoskeletal:         General: Normal range of motion.   Skin:     General: Skin is warm and dry.      Capillary Refill: Capillary refill takes less than 2 seconds.      Findings: Lesion and rash present.   Neurological:      General: No focal deficit present.      Mental Status: He is alert.         5 point restraints: 4 extremities + chest    Data     IMAGING: (X-ray/CT/MRI)   No results " found for this or any previous visit (from the past 24 hour(s)).    CBC with Diff:  Recent Labs   Lab Test 01/27/23  1012 01/25/23  0521   WBC  --  7.9   HGB  --  14.9   MCV  --  86    229      Absolute Retic (10e9/L)   Date Value   06/23/2014 122.4 (H)     % Retic (%)   Date Value   06/23/2014 3.0 (H)       Lactic Acid:    Lab Results   Component Value Date    LACT 1.0 08/26/2022    LACT 0.7 02/24/2021           Comprehensive Metabolic Panel:  Recent Labs   Lab 01/30/23  1426 01/30/23  0824 01/29/23  2245 01/29/23  1405 01/24/23  2316 01/24/23  1756    140   < > 121*   < > 121*   POTASSIUM  --   --   --  3.8   < > 3.8   CHLORIDE  --   --   --  87*   < > 86*   CO2  --   --   --  24   < > 27   ANIONGAP  --   --   --  10   < > 8   GLC  --   --   --  166*   < > 107*   BUN  --   --   --  3.4*   < > 11.0   CR  --  0.71  --  0.56*   < > 0.73   GFRESTIMATED  --  >90  --  >90   < > >90   JESSICA  --   --   --  9.1   < > 9.1   PROTTOTAL  --   --   --   --   --  7.1   ALBUMIN  --   --   --   --   --  4.5   BILITOTAL  --   --   --   --   --  0.4   ALKPHOS  --   --   --   --   --  56   AST  --   --   --   --   --  20   ALT  --   --   --   --   --  8*    < > = values in this interval not displayed.       UA:  No results for input(s): COLOR, APPEARANCE, URINEGLC, URINEBILI, URINEKETONE, SG, UBLD, URINEPH, PROTEIN, UROBILINOGEN, NITRITE, LEUKEST, RBCU, WBCU in the last 168 hours.      Time Spent on this Encounter   I spent 35 minutes on the unit/floor managing the care of Duane D Backes. Over 50% of my time was spent counseling the patient and/or coordinating care regarding services listed in this note.

## 2023-01-31 NOTE — PROGRESS NOTES
Pt here with breakthrough seizure, hx of autism with behavioral disturbances, from group home. A&O to self only. Intermittent tachy HR, otherwise VSS, on RA. LS clear. 5 point hard restraints in place, need to be reordered q4hrs per protocol. Restless and pulling against restraints periodically throughout shift. Redirectable for the most part today, and reassurance given. Pt does not allow anything covering body. Able to give bedbath and hair washed today. Restlessness and agitation managed with scheduled and PRN oral Risperdal, haldol, Tylenol. Reg diet, thin liquid diet, total feed, takes pills whole with water. No urinary incont episodes, urinal offered frequently, voided 800cc and 500cc today. +BS, passing flatus, no BM. Na+ 141. Constantly requested for coke and ice cream, etc all regulated throughout shift. Writer called and spoke to patient's father and undated on restraints in place. Writer also spoke with , Sandrine, see previous note. Psych consulted again. Pt scoring yellow on Aggression Stop Light Tool, sitter at bedside. Discharge plan pending.

## 2023-01-31 NOTE — PROGRESS NOTES
[facility]    Medicine Progress Note - Hospitalist Service    Date of Admission:  1/24/2023  Date of Service: 01/31/2023    Assessment & Plan      Duane D Backes is a 42 year-old male with past medical history including autism with behavioral disturbance, epilepsy, epidermoid tumor who is admitted on 1/24/2023 as transfer from [facility] ED for seizures.     Epilepsy with breakthrough seizure  Epidermoid tumor with associated mass-effect  *Presents with witnessed seizure at group home, additional seizure with EMS and again in ED   *Per review of outside notes, has reportedly has missed home depakote doses recently  *Hyponatremia likely lowering seizure threshold as well   *CT head with grossly stable epidermoid tumor with mass effect   -was admitted to ICU given concern for status, with Ceribell device overnight and continuous EEG monitoring. No clear abnormalities/no epileptiform discharges were observed except for possible focal slowing over the right hemisphere.   - was placed on IV Depakote, transition to p.o.  PTA Vimpat and Topamax as well continued. Per chart review, patient's neurologist Dr Washington had recommended stopping Vimpat which he was taking PTA per group home.   - Seizure precautions, IV lorazepam ordered for breakthrough seizures   - General neurology consulted and discussed, no changes in PTA medications.    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  Given presentation with seizure, received 3% saline and was corrected, up to 136 after 48 hours.    - D5 was restarted and sodium down to 132-131 and then discontinued. Sodium then trended down to 121. Labs consistent with dilutional. Patient is drinking lots of water.   - water supply was cut off to his room, 2% saline 150 ml given. It got corrected rapidly. Given acute drop just before this, unlikely to have complication with rapid correction and so will not attempt lowering  "sodium. Remains normal with fluid restriction now.  Salt tabs stopped.   -Patient has been on sertraline for several years, less likely to be contributed by this although possible.  Since this is a stable regimen, discussed with psychiatry service and no plan to change medication at this time.    Autism with behavioral disturbance  Cognitive impairment  -  PTA regimen including Risperdal 2 Mg p.o. twice daily, Zoloft 200 Mg p.o. every morning and 25 Mg in the evening, lithium 300 Mg at bedtime resumed.   -Given multiple RRTs and ongoing agitation, psychiatry consulted, started as needed haldol as well.  PTA lithium discontinued. EKG to monitor QTc.  Weekly, was 492 1/30.  - Sitter at bedside, continue     - Likely needs transfer to psych inpatient.   - Psychiatry service has been consulted again and I have discussed with staff at consult line 1/30  , Pending eval.       Diet: Snacks/Supplements Adult: Magic Cup; Between Meals  Regular Diet Adult    DVT Prophylaxis: Pneumatic Compression Devices  Colin Catheter: Not present  Lines: None     Cardiac Monitoring: None  Code Status: Full Code      Clinically Significant Risk Factors                         # Obesity: Estimated body mass index is 33.74 kg/m  as calculated from the following:    Height as of 1/12/23: 1.854 m (6' 1\").    Weight as of this encounter: 116 kg (255 lb 11.7 oz).          Disposition Plan  to group home once agitation/restless improves. May need inpatient psychiatry admission. Unable to reach his father today.     Expected Discharge Date: 02/01/2023                  Prasanth Villarreal MD  Hospitalist Service  [facility]  Securely message with [employer] (more info)  Text page via [employer] [facility] [employer] Paging/Directory   ______________________________________________________________________    Interval History     Another code 21 for agitation again and other than sedatives, he had to be put on five-point restraints to prevent from harming " himself and staff.  Patient was sleeping in the morning when I saw him, revisited this afternoon, he is awake remains on restraints, mildly restless.    -Sodium remains normal.   - 5 points restraints renewed x2.       Physical Exam   Vital Signs: Temp: 98.2  F (36.8  C) Temp src: Oral BP: 130/84 Pulse: 112   Resp: 18 SpO2: 96 % O2 Device: None (Room air)    Weight: 255 lbs 11.74 oz     Constitutional:  NAD. Up in the room naked, had cloths on briefly when his father was present.   HENT: MMM  Respiratory: breathing comfortably   Skin: Warm   Neurologic: alert, moving all extremities. Reetless.     Medical Decision Making       35 MINUTES SPENT BY ME on the date of service doing chart review, history, exam, documentation & further activities per the note. discussed with RN, called patient's father (no answer), SW/charge RN, psychiatry consult line.      Data     I have personally reviewed the following data over the past 24 hrs:    N/A  \   N/A   / N/A     141 105 8.0 /  114 (H)   3.9 23 0.58 (L) \       Imaging results reviewed over the past 24 hrs:   No results found for this or any previous visit (from the past 24 hour(s)).  Most Recent 3 CBC's:  Recent Labs   Lab Test 01/27/23  1012 01/25/23  0521 01/24/23  1756 12/19/22  1102   WBC  --  7.9 6.4 5.2   HGB  --  14.9 14.2 14.9   MCV  --  86 87 89    229 204 290     Most Recent 3 BMP's:  Recent Labs   Lab Test 01/31/23  0823 01/31/23  0714 01/30/23  1426 01/30/23  0824 01/29/23  2245 01/29/23  1405 01/29/23  0743 01/28/23  1435     --  139 140   < > 121*  --  125*   POTASSIUM 3.9  --   --   --   --  3.8  --  3.6   CHLORIDE 105  --   --   --   --  87*  --  90*   CO2 23  --   --   --   --  24  --  24   BUN 8.0  --   --   --   --  3.4*  --  3.9*   CR 0.58*  --   --  0.71  --  0.56*  --  0.63*   ANIONGAP 13  --   --   --   --  10  --  11   JESSICA 9.6  --   --   --   --  9.1  --  9.2   * 110*  --   --   --  166*   < > 149*    < > = values in this  interval not displayed.     Most Recent 2 LFT's:  Recent Labs   Lab Test 01/24/23  1756 12/19/22  1102 08/23/22  2132   AST 20 19 26   ALT 8* 6* 12   ALKPHOS 56  --  56   BILITOTAL 0.4  --  0.3     Most Recent 3 INR's:No lab results found.  Most Recent 3 Troponin's:No lab results found.  Most Recent 3 BNP's:No lab results found.

## 2023-02-01 LAB
ATRIAL RATE - MUSE: 100 BPM
DIASTOLIC BLOOD PRESSURE - MUSE: NORMAL MMHG
INTERPRETATION ECG - MUSE: NORMAL
MAGNESIUM SERPL-MCNC: 1.7 MG/DL (ref 1.7–2.3)
P AXIS - MUSE: 59 DEGREES
POTASSIUM SERPL-SCNC: 4.4 MMOL/L (ref 3.4–5.3)
PR INTERVAL - MUSE: 174 MS
QRS DURATION - MUSE: 92 MS
QT - MUSE: 382 MS
QTC - MUSE: 492 MS
R AXIS - MUSE: 64 DEGREES
SODIUM SERPL-SCNC: 138 MMOL/L (ref 136–145)
SYSTOLIC BLOOD PRESSURE - MUSE: NORMAL MMHG
T AXIS - MUSE: 54 DEGREES
VENTRICULAR RATE- MUSE: 100 BPM

## 2023-02-01 PROCEDURE — 93010 ELECTROCARDIOGRAM REPORT: CPT | Performed by: INTERNAL MEDICINE

## 2023-02-01 PROCEDURE — 83735 ASSAY OF MAGNESIUM: CPT | Performed by: INTERNAL MEDICINE

## 2023-02-01 PROCEDURE — 250N000013 HC RX MED GY IP 250 OP 250 PS 637: Performed by: HOSPITALIST

## 2023-02-01 PROCEDURE — 250N000013 HC RX MED GY IP 250 OP 250 PS 637

## 2023-02-01 PROCEDURE — 250N000013 HC RX MED GY IP 250 OP 250 PS 637: Performed by: STUDENT IN AN ORGANIZED HEALTH CARE EDUCATION/TRAINING PROGRAM

## 2023-02-01 PROCEDURE — 84295 ASSAY OF SERUM SODIUM: CPT | Performed by: INTERNAL MEDICINE

## 2023-02-01 PROCEDURE — 120N000001 HC R&B MED SURG/OB

## 2023-02-01 PROCEDURE — 99233 SBSQ HOSP IP/OBS HIGH 50: CPT | Performed by: INTERNAL MEDICINE

## 2023-02-01 PROCEDURE — 93005 ELECTROCARDIOGRAM TRACING: CPT

## 2023-02-01 PROCEDURE — 36415 COLL VENOUS BLD VENIPUNCTURE: CPT | Performed by: INTERNAL MEDICINE

## 2023-02-01 PROCEDURE — 250N000013 HC RX MED GY IP 250 OP 250 PS 637: Performed by: INTERNAL MEDICINE

## 2023-02-01 PROCEDURE — 84132 ASSAY OF SERUM POTASSIUM: CPT | Performed by: HOSPITALIST

## 2023-02-01 RX ORDER — LANOLIN ALCOHOL/MO/W.PET/CERES
3 CREAM (GRAM) TOPICAL EVERY EVENING
Status: DISCONTINUED | OUTPATIENT
Start: 2023-02-01 | End: 2023-02-02 | Stop reason: HOSPADM

## 2023-02-01 RX ORDER — MICONAZOLE NITRATE 20 MG/G
CREAM TOPICAL 2 TIMES DAILY
Qty: 56.7 G | Refills: 0 | Status: SHIPPED | OUTPATIENT
Start: 2023-02-01 | End: 2023-02-05

## 2023-02-01 RX ADMIN — RISPERIDONE 1 MG: 1 TABLET ORAL at 08:48

## 2023-02-01 RX ADMIN — LACOSAMIDE 100 MG: 100 TABLET, FILM COATED ORAL at 23:30

## 2023-02-01 RX ADMIN — DIVALPROEX SODIUM 500 MG: 500 TABLET, FILM COATED, EXTENDED RELEASE ORAL at 23:30

## 2023-02-01 RX ADMIN — TOPIRAMATE 200 MG: 200 TABLET, FILM COATED ORAL at 08:02

## 2023-02-01 RX ADMIN — TOPIRAMATE 200 MG: 200 TABLET, FILM COATED ORAL at 23:30

## 2023-02-01 RX ADMIN — RISPERIDONE 2 MG: 2 TABLET ORAL at 23:29

## 2023-02-01 RX ADMIN — SERTRALINE HYDROCHLORIDE 25 MG: 25 TABLET ORAL at 16:01

## 2023-02-01 RX ADMIN — DIVALPROEX SODIUM 500 MG: 500 TABLET, FILM COATED, EXTENDED RELEASE ORAL at 08:02

## 2023-02-01 RX ADMIN — RISPERIDONE 2 MG: 2 TABLET ORAL at 08:03

## 2023-02-01 RX ADMIN — MICONAZOLE NITRATE: 20 CREAM TOPICAL at 23:31

## 2023-02-01 RX ADMIN — DIVALPROEX SODIUM 500 MG: 500 TABLET, FILM COATED, EXTENDED RELEASE ORAL at 15:56

## 2023-02-01 RX ADMIN — MELATONIN TAB 3 MG 3 MG: 3 TAB at 00:37

## 2023-02-01 RX ADMIN — HALOPERIDOL 2 MG: 2 TABLET ORAL at 05:08

## 2023-02-01 RX ADMIN — HALOPERIDOL 2 MG: 2 TABLET ORAL at 00:37

## 2023-02-01 RX ADMIN — DIPHENHYDRAMINE HYDROCHLORIDE 25 MG: 25 CAPSULE ORAL at 06:01

## 2023-02-01 RX ADMIN — SERTRALINE HYDROCHLORIDE 200 MG: 100 TABLET ORAL at 08:02

## 2023-02-01 RX ADMIN — RISPERIDONE 1 MG: 1 TABLET ORAL at 14:59

## 2023-02-01 RX ADMIN — LEVOTHYROXINE SODIUM 150 MCG: 150 TABLET ORAL at 06:30

## 2023-02-01 RX ADMIN — MICONAZOLE NITRATE: 20 CREAM TOPICAL at 08:04

## 2023-02-01 RX ADMIN — LACOSAMIDE 100 MG: 100 TABLET, FILM COATED ORAL at 08:02

## 2023-02-01 RX ADMIN — MELATONIN TAB 3 MG 3 MG: 3 TAB at 23:29

## 2023-02-01 RX ADMIN — QUETIAPINE FUMARATE 25 MG: 25 TABLET ORAL at 23:28

## 2023-02-01 ASSESSMENT — ACTIVITIES OF DAILY LIVING (ADL)
DOING_ERRANDS_INDEPENDENTLY_DIFFICULTY: YES
ADLS_ACUITY_SCORE: 43
TOILETING_ISSUES: NO
ADLS_ACUITY_SCORE: 43
ADLS_ACUITY_SCORE: 43
CONCENTRATING,_REMEMBERING_OR_MAKING_DECISIONS_DIFFICULTY: YES
ADLS_ACUITY_SCORE: 43
WEAR_GLASSES_OR_BLIND: NO
ADLS_ACUITY_SCORE: 47
CHANGE_IN_FUNCTIONAL_STATUS_SINCE_ONSET_OF_CURRENT_ILLNESS/INJURY: NO
ADLS_ACUITY_SCORE: 47
FALL_HISTORY_WITHIN_LAST_SIX_MONTHS: NO
DRESSING/BATHING_DIFFICULTY: YES
DIFFICULTY_EATING/SWALLOWING: NO
WALKING_OR_CLIMBING_STAIRS_DIFFICULTY: NO
ADLS_ACUITY_SCORE: 43
ADLS_ACUITY_SCORE: 47
ADLS_ACUITY_SCORE: 47
ADLS_ACUITY_SCORE: 43
DRESSING/BATHING: BATHING DIFFICULTY, ASSISTANCE 1 PERSON;DRESSING DIFFICULTY, ASSISTANCE 1 PERSON
ADLS_ACUITY_SCORE: 43
ADLS_ACUITY_SCORE: 47

## 2023-02-01 NOTE — PLAN OF CARE
"Pt here with Seizures. A&O to self. Neuros inconsistent with commands, moves all extremities. VSS. Regular, thin diet, thin liquids. Takes pills whole with water. Excessive thirst/hunger. Most recent sodium of 141. Limit intake as possible. Pt responds well to \" You can have a drink/snack at this time\". Up with SBA GB. Denies pain. Pt scoring yellow on the Aggression Stop Light Tool. Room moved closer to nursing station for staff to better assist. Hard restraints removed @ 0000 to trial overnight. Pt escaped room multiple times, takes multiple staff members to redirect. Prns given. NP order of soft restraints if needed. 0200 in bed appears to be resting. Slept till 0400. Sitter at bedside. Discharge possibly today pending transportation back to group home.            "

## 2023-02-01 NOTE — PROGRESS NOTES
Called Manager Rukhsana from [facility] () @ 9514 2/1/2023    Baseline behaviors:   -At , pt is very active, has no attention span, does not sit longer then a minute at time.   -Instances where he has gone outside barefoot    - working to see if behaviors are r/t tumor or if something has changed w/ health to increase behaviors (last 3-4 months)    Food/Fluids:   -No restrictions to food/fluids d/t 3 other residents at [facility]   -Pulled staff from [facility] in past d/t fluid/food fixation   -Goes to fridge, drinks endless amounts of water, milk, other fluids and if not available will go to condiments (will drink from shower water and sink in bathroom as well)    Clothing:   -Wears clothing without issue at , will change if does not like what he is wearing   -Will expose himself and run through house naked after shower at times    Labs:   -Staff at  unable to take labs, would need a homecare nurse to come in and draw them.          ///Rukhsana stated they have staff to monitor him if he is back to his baseline but if he is not (which father has told them he is not) they do not have the staff to monitor him. ///      Addendum 1245: Spoke with father who came to visit patient and asked what the changes were that are from pts baseline and father was unsure. He did not know what the differences from baseline were and just asked if he could go and shave the pt.

## 2023-02-01 NOTE — PROGRESS NOTES
[facility]    Hospitalist Progress Note    Assessment & Plan   Date of Admission:  1/24/2023  Date of Service: 01/31/2023        Assessment & Plan         Duane D Backes is a 42 year-old male with past medical history including autism with behavioral disturbance, epilepsy, epidermoid tumor who is admitted on 1/24/2023 as transfer from [facility] ED for seizures.      Epilepsy with breakthrough seizure  Epidermoid tumor with associated mass-effect  *Presents with witnessed seizure at group home, additional seizure with EMS and again in ED   *Per review of outside notes, has reportedly has missed home depakote doses recently  *Hyponatremia likely lowering seizure threshold as well   *CT head with grossly stable epidermoid tumor with mass effect   -was admitted to ICU given concern for status, with Ceribell device overnight and continuous EEG monitoring. No clear abnormalities/no epileptiform discharges were observed except for possible focal slowing over the right hemisphere.   - was placed on IV Depakote, transition to p.o.  PTA Vimpat and Topamax as well continued. Per chart review, patient's neurologist Dr Washington had recommended stopping Vimpat which he was taking PTA per group home.   - Seizure precautions, IV lorazepam ordered for breakthrough seizures   - General neurology consulted and discussed, no changes in PTA medications.     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  Given presentation with seizure, received 3% saline and was corrected, up to 136 after 48 hours.    -no urine osmol so unclear if original admission Na secondary to psychogenic polydipsia vs SIADH. Suspect psychogenic polydipsia given hx of this and occurrence later during hospital stay.   - D5 was restarted and sodium down to 132-131 and then discontinued. Sodium then trended down to 121. Labs consistent with dilutional. Patient is drinking lots of water.   - water supply  was cut off to his room, 2% saline 150 ml given. It got corrected rapidly. Given acute drop just before this, unlikely to have complication with rapid correction and so will not attempt lowering sodium. Remains normal with fluid restriction now.  Salt tabs stopped.   -Patient has been on sertraline for several years, less likely to be contributed by this although possible.  Since this is a stable regimen, discussed with psychiatry service and no plan to change medication at this time.  -urine osmol 40 range c/w psychogenic polydipsia  -Na stable since correction  -2L per day fluid restriction. Possible could loosen to 2.5 Liter  -am Na    Discussed care plan with care coordinator, rn, charge RN and psychiatry. See psychiatry note, pt does better with reduced behaviours at home and plan is for discharge back to group home with fluid restriction and Na monitoring. Stop Lithium. Close pcp follow up, home care RN to monitor fluid restriction compliance, lab draws. pcp follow up      Autism with behavioral disturbance  Cognitive impairment  Psychogenic polydipsia  -  PTA regimen including Risperdal 2 Mg p.o. twice daily, Zoloft 200 Mg p.o. every morning and 25 Mg in the evening, lithium 300 Mg at bedtime resumed.   -Given multiple RRTs and ongoing agitation, psychiatry consulted, started as needed haldol as well.  PTA lithium discontinued. EKG to monitor QTc.  Weekly, was 492 1/30.  ekg 2/1 with QTC interval stable, >500. Nl K and Mg. Nl Na  -no focal complaints  - moved to room closer to RN desk, water shut off to room given constant attempts to drink water and drink urine  -bedside commode with freq change of bag to prevent pt drinking urine.   - per RN no clear response to prn medications.   - Lithium stopped per psychiatry 1/30  - urine osmol 40 range c/w psychogenic polydipsia  - not sleeping well  -PVR zero.     Plan;   -stop prn benadryl given potential side effects and no clear itching  -spoke with  "psychiatry,they will review case and contact me later today. Regarding meds, behavioral plan, disposition   - Sitter at bedside, continue     - Likely needs transfer to psych inpatient.   - Psychiatry service has been consulted again for today, will need to discuss medical and behavior management and placement with them.   -will update guardians after talking with psychiatry   -2L per day fluid restriction. Will need to provide fluids 50-100cc per hour through day.    -care coodinator consult    Diet: Snacks/Supplements Adult: Magic Cup; Between Meals  Regular Diet Adult    DVT Prophylaxis: Pneumatic Compression Devices  Colin Catheter: Not present  Lines: None     Cardiac Monitoring: None  Code Status: Full Code          Clinically Significant Risk Factors                            # Obesity: Estimated body mass index is 33.74 kg/m  as calculated from the following:    Height as of 1/12/23: 1.854 m (6' 1\").    Weight as of this encounter: 116 kg (255 lb 11.7 oz).                 Disposition Plan    to group home once agitation/restless improves. May need inpatient psychiatry admission.   Will talk to psychiatry     Expected Discharge Date: TBD, may need inpatient psychiatry           Attempted to reach pt's father/guardian by phone today.     Ishan Astorga MD, MD  Text Page  (7am to 6pm)  Interval History   Ongoing agitation and resltessness, walking around room and sitting on commode frequently  Out of restraints  Needs constant redirection  In new room with water shut off  Was attempting to drink urine from toilet  No clear focal complaints.   No itching. Has appeared comfortable   1200cc already today po intake  Asking for fluids constanttly    No complaints for me on my exam    Has received multiple prn medications for agitation.     -Data reviewed today: I reviewed all new labs and imaging results over the last 24 hours. I personally reviewed ekg and today's labs, labs last 24hours.     Physical Exam "   Temp: 97.8  F (36.6  C) Temp src: Oral BP: (!) 156/84 Pulse: 97   Resp: 22 SpO2: 95 % O2 Device: None (Room air)    Vitals:    01/24/23 2300   Weight: 116 kg (255 lb 11.7 oz)     Vital Signs with Ranges  Temp:  [97.8  F (36.6  C)-98.3  F (36.8  C)] 97.8  F (36.6  C)  Pulse:  [] 97  Resp:  [16-22] 22  BP: (130-156)/(84) 156/84  SpO2:  [94 %-96 %] 95 %  I/O last 3 completed shifts:  In: 3805 [P.O.:3805]  Out: 3700 [Urine:3700]    Constitutional: Up walking in room aimlessly, sitting in bed calmly during my exam. Appears comfortable.   Respiratory: Breathing easily, CTAB  Cardiovascular: RRR no r/g/m  GI: soft. Nt, nd  Skin/Integumen: mild acne across back. No excoriations  Ortho; no focal jt swelling or redness  Neuro- limited verbal responses, follows simple commands correctly, nl tone. No abnl movements, nl gait, alert,   Psych; significant restlessness, calm, cooperative, frequently pacing in room.       Medications       divalproex sodium extended-release  500 mg Oral TID     Lacosamide  100 mg Oral BID     levothyroxine  150 mcg Oral QAM AC     melatonin  3 mg Oral QPM     miconazole   Topical BID     QUEtiapine  25 mg Oral At Bedtime     risperiDONE  2 mg Oral BID     sertraline  200 mg Oral QAM     sertraline  25 mg Oral Daily     sodium chloride (PF)  3 mL Intracatheter Q8H     topiramate  200 mg Oral Q12H [facility] (08/20)       Data   Recent Labs   Lab 02/01/23  0751 01/31/23  1611 01/31/23  0823 01/31/23  0714 01/30/23  1426 01/30/23  0824 01/29/23  2245 01/29/23  1405 01/29/23  0743 01/28/23  1435 01/27/23  1012 01/27/23  1012   PLT  --   --   --   --   --   --   --   --   --   --   --  244     --  141  --  139 140   < > 121*  --  125*  --   --    POTASSIUM 4.4  --  3.9  --   --   --   --  3.8  --  3.6  --  3.7   CHLORIDE  --   --  105  --   --   --   --  87*  --  90*  --   --    CO2  --   --  23  --   --   --   --  24  --  24  --   --    BUN  --   --  8.0  --   --   --   --  3.4*  --  3.9*  --    --    CR  --   --  0.58*  --   --  0.71  --  0.56*  --  0.63*  --  0.64*   ANIONGAP  --   --  13  --   --   --   --  10  --  11  --   --    JESSICA  --   --  9.6  --   --   --   --  9.1  --  9.2  --   --    GLC  --  194* 114* 110*  --   --   --  166*   < > 149*   < >  --     < > = values in this interval not displayed.       Imaging:   No results found for this or any previous visit (from the past 24 hour(s)).

## 2023-02-01 NOTE — PROGRESS NOTES
Brief House NP Note    Notified by RN that restraint order expires at midnight. I discussed a tentative plan with RN and charge RN to have bedside attendant in addition to bedside RN so we can safely trial patient off restraints. RN confirmed that patient will have both bedside attendant and bedside RN. In regards to PRN medications I was initially going to add a 1-time dose of PO zyprexa but patient's QTc was 492 on 1/31 so I did not add another QTc-prolonging medication. Trial a dose of lorazepam tonight in the effort to discontinue physical restraints but maintain patient and staff safety.    - 1mg PO ativan once    VERONICA Ortiz, CNP  Hospitalist - House MARY  Text me on the [place] mary for a textback  Text page with web based paging for a callback

## 2023-02-01 NOTE — PROVIDER NOTIFICATION
"MD Notification    Notified Person: MD    Notified Person Name: Dr. Ishan Astorga    Notification Date/Time: 2/1/2023 1011    Notification Interaction: Melvi    Purpose of Notification: \"FYI post void bladder scan is 0. Thanks\"    Orders Received: No new orders        "

## 2023-02-01 NOTE — PROGRESS NOTES
Care Management Follow Up    Length of Stay (days): 8    Expected Discharge Date: 02/02/2023     Concerns to be Addressed:       Patient plan of care discussed at interdisciplinary rounds: Yes    Anticipated Discharge Disposition: Group Home     Anticipated Discharge Services: None  Anticipated Discharge DME: None    Patient/family educated on Medicare website which has current facility and service quality ratings: no  Education Provided on the Discharge Plan:    Patient/Family in Agreement with the Plan: yes    Referrals Placed by CM/SW: Internal Clinic Care Coordination, Communication hand-offs to next level of Care Providers, Scheduled Follow-up appointments, Community Residential Settings (Group Homes)  Private pay costs discussed: Not applicable    Additional Information:  Per Hospitalist, patient will be ready for discharge tomorrow.  He will need HHC RN and a follow up appointment with PCP next week.    Spoke to Sandrine at group home to let her know he most likely will discharge tomorrow and will have HHC RN set up and PCP appt.  She stated patient's father wanted to transport him home.   staff usually take him to PCP appointments.    Referral sent to [facility] for HHC RN.    PCP appointment scheduled:  Feb 06, 2023 11:30 AM   (Arrive by 11:15 AM)   ED/Hospital Follow Up with Rajat Parkinson MD   [facility]    Rosario Tapia RN, BSN, PHN  Inpatient Care Coordination  [facility]  Phone: [phone]

## 2023-02-01 NOTE — PROGRESS NOTES
Patient chart reviewed in detail.  Discussed with Dr. Astorga, group home staff Gaurav, and Psychiatric NP who has consulted on Mr. Green in person.  Gaurav, the group home staff indicated that the patient has a very difficult time being outside of his accustomed home.  He indicated that he tends to have more behavioral issues in the hospital.  He also had increased behaviors when he first came to live at his current group home.  Gaurav also confirmed that the patient does drink large amounts of liquid out of the fridge daily, and that the staff uses distraction, with varying degrees of success, to try to manage the polydipsia.      I discussed my recommendation with Dr. Astorga, that the patient would do best back at his group home behaviorally.  This is very common for individuals with autism. This would help with the patient's comfort and minimize the need for prn medications for him.  Continuing with hospitalization, outside of his home environment, will continue to cause behavioral issues for him.    Will defer to Dr. Astroga regarding discharge timing, as there are considerations related to patient's polydipsia, and related hyponatremia, that will need to be monitored by outpt resources.      2/3/23:  Spoke with psychiatric provider outpt, and updated him on patient's hospitalization and medication change.  Patient did have recent increase in zoloft from 150 to 225 due to increased behaviors.

## 2023-02-01 NOTE — PLAN OF CARE
Goal Outcome Evaluation:    Reason for Admission: Breakthrough seizures    Cognitive/Mentation: A/Ox self. Disoriented x time, situation, and place.   Neuros/CMS: Intact ex does not answer questions at times or follow commands. Able to moves all extremities.   VS: HTN but w/in parameters of SBP<180.   GI: BS active, last BM 1/29/2023. Continent.  : Urgency, frequency- PVBS 0ml. Incontinent/Continent.  Pulmonary: LS clear.  Pain: None.     Drains/Lines: No IV access- MD aware  Skin: Scattered scratches. Rash on back/buttocks/arms- scheduled ointment.  Activity: SBA with 1:1 sitter.  Diet: Regular with thin liquids. Takes pills whole. 2000ml fluid restriction.     Therapies recs:    Discharge:  tomorrow 2/2    Aggression Stoplight Tool: YELLOW    End of shift summary:   Water in patients room/bathroom has been disabled and pt is using commode. Pych evaluation completed. Called  facility and retrieved baseline information- see note. Father dropped off clothing and shoes and shaved pt. Ambulated outside the room w/ 3 staff members 1x. Pt constantly asking for coke and food (cheese burger and fries). Pt walked out of the room 2x this shift and able to be redirected back to the room. PRN risperidone given 2x for increasing agitation. Pt took nap in afternoon. Pt on a 2,000ml fluid restriction (can loosen to 2.5L-see MD note)- Total intake from 4955-6213 2/1 is 2,090ml.

## 2023-02-01 NOTE — PROGRESS NOTES
5 point restraint discontinue trial start 2345. RN and sitter at bedside.    0000: Pt has escaped room several times. Takes several staff members to redirect pt into room. All prns used. NP notified. Soft wrist restraints ordered if needed.    0600 Update: soft restraints were not needed overnight. While agitated, pt becoming more redirectable.

## 2023-02-02 ENCOUNTER — TELEPHONE (OUTPATIENT)
Dept: FAMILY MEDICINE | Facility: CLINIC | Age: 43
End: 2023-02-02
Payer: MEDICARE

## 2023-02-02 VITALS
RESPIRATION RATE: 18 BRPM | DIASTOLIC BLOOD PRESSURE: 82 MMHG | HEART RATE: 76 BPM | BODY MASS INDEX: 33.74 KG/M2 | OXYGEN SATURATION: 97 % | SYSTOLIC BLOOD PRESSURE: 154 MMHG | TEMPERATURE: 97.7 F | WEIGHT: 255.73 LBS

## 2023-02-02 PROBLEM — F54 PSYCHOGENIC POLYDIPSIA: Status: ACTIVE | Noted: 2023-02-02

## 2023-02-02 PROBLEM — R63.1 PSYCHOGENIC POLYDIPSIA: Status: ACTIVE | Noted: 2023-02-02

## 2023-02-02 PROBLEM — E87.1 HYPONATREMIA: Status: ACTIVE | Noted: 2022-08-23

## 2023-02-02 LAB
POTASSIUM SERPL-SCNC: 4.2 MMOL/L (ref 3.4–5.3)
SODIUM SERPL-SCNC: 139 MMOL/L (ref 136–145)

## 2023-02-02 PROCEDURE — 99238 HOSP IP/OBS DSCHRG MGMT 30/<: CPT | Performed by: INTERNAL MEDICINE

## 2023-02-02 PROCEDURE — 250N000013 HC RX MED GY IP 250 OP 250 PS 637: Performed by: STUDENT IN AN ORGANIZED HEALTH CARE EDUCATION/TRAINING PROGRAM

## 2023-02-02 PROCEDURE — 84132 ASSAY OF SERUM POTASSIUM: CPT | Performed by: INTERNAL MEDICINE

## 2023-02-02 PROCEDURE — 250N000013 HC RX MED GY IP 250 OP 250 PS 637: Performed by: HOSPITALIST

## 2023-02-02 PROCEDURE — 36415 COLL VENOUS BLD VENIPUNCTURE: CPT | Performed by: INTERNAL MEDICINE

## 2023-02-02 PROCEDURE — 84295 ASSAY OF SERUM SODIUM: CPT | Performed by: INTERNAL MEDICINE

## 2023-02-02 PROCEDURE — 250N000013 HC RX MED GY IP 250 OP 250 PS 637

## 2023-02-02 RX ADMIN — HALOPERIDOL 2 MG: 2 TABLET ORAL at 02:29

## 2023-02-02 RX ADMIN — MICONAZOLE NITRATE: 20 CREAM TOPICAL at 09:01

## 2023-02-02 RX ADMIN — LACOSAMIDE 100 MG: 100 TABLET, FILM COATED ORAL at 08:58

## 2023-02-02 RX ADMIN — RISPERIDONE 2 MG: 2 TABLET ORAL at 08:57

## 2023-02-02 RX ADMIN — SERTRALINE HYDROCHLORIDE 200 MG: 100 TABLET ORAL at 08:57

## 2023-02-02 RX ADMIN — TOPIRAMATE 200 MG: 200 TABLET, FILM COATED ORAL at 08:58

## 2023-02-02 RX ADMIN — RISPERIDONE 1 MG: 1 TABLET ORAL at 10:50

## 2023-02-02 RX ADMIN — DIVALPROEX SODIUM 500 MG: 500 TABLET, FILM COATED, EXTENDED RELEASE ORAL at 08:58

## 2023-02-02 RX ADMIN — LEVOTHYROXINE SODIUM 150 MCG: 150 TABLET ORAL at 08:58

## 2023-02-02 ASSESSMENT — ACTIVITIES OF DAILY LIVING (ADL)
ADLS_ACUITY_SCORE: 43
ADLS_ACUITY_SCORE: 43
ADLS_ACUITY_SCORE: 40
ADLS_ACUITY_SCORE: 43

## 2023-02-02 NOTE — DISCHARGE INSTRUCTIONS
HOMECARE NOTE:   Your doctor has ordered home care to help you after your hospital stay.  The staff will contact you to schedule your first visit.  This service will be provided by [facility].  If you have any question, or have not received a call within 48 hours of discharge, please call them at [phone].      *please see homecare quality ratings for all homecares in your area at www.medicare.gov

## 2023-02-02 NOTE — PROGRESS NOTES
Care Management Follow Up    Length of Stay (days): 9    Expected Discharge Date: 02/02/2023     Concerns to be Addressed:       Patient plan of care discussed at interdisciplinary rounds: Yes    Anticipated Discharge Disposition: Group Home     Anticipated Discharge Services: None  Anticipated Discharge DME: None    Patient/family educated on Medicare website which has current facility and service quality ratings: no  Education Provided on the Discharge Plan:    Patient/Family in Agreement with the Plan: yes    Referrals Placed by CM/SW: Internal Clinic Care Coordination, Communication hand-offs to next level of Care Providers, Scheduled Follow-up appointments, Community Residential Settings (Group Homes)  Private pay costs discussed: Not applicable    Additional Information:  Confirmed with [facility] they have accepted patient for [facility].  They will get orders from [email or web address] once signed.  Updated Sandrine at  of discharge plan and will fax orders once signed.      Care Management Discharge Note    Discharge Date: 02/02/2023       Discharge Disposition: Group Home    Discharge Services: None    Discharge DME: None    Discharge Transportation:      Private pay costs discussed: Not applicable    PAS Confirmation Code:    Patient/family educated on Medicare website which has current facility and service quality ratings: no    Education Provided on the Discharge Plan:    Persons Notified of Discharge Plans:  manager  Patient/Family in Agreement with the Plan: yes    Handoff Referral Completed: Yes    Additional Information:  Received call from [facility].  They stated SOC for RN would not be until Monday 2/6.  Contacted Dr. Astorga and he stated that SOC would be ok for 1st lab draw needed.  Updated Orly at [facility] that SOC 2/6 was ok.    No other CM interventions anticipated.    Rosario Tapia RN, BSN, PHN  Inpatient Care Coordination  [facility]  Phone:  [phone]

## 2023-02-02 NOTE — PLAN OF CARE
Goal Outcome Evaluation:    Reason for Admission: Breakthrough seizures     Cognitive/Mentation: A/Ox self. Disoriented x time, situation, and place.   Neuros/CMS: Intact ex does not answer questions at times or follow commands. Able to moves all extremities.   VS: HTN but w/in parameters of SBP<180.   GI: BS active, last BM 1/29/2023. Continent.  : Urgency, frequency. Continent.  Pulmonary: LS clear.  Pain: None.      Drains/Lines: No IV access- MD aware  Skin: Scattered scratches. Rash on back/buttocks/arms- scheduled ointment.  Activity: SBA with 1:1 sitter.  Diet: Regular with thin liquids. Takes pills whole. 2000ml fluid restriction.      Therapies recs:    Discharge:  2/2 1100     Aggression Stoplight Tool: YELLOW     End of shift summary:  Intake this shift is 550ml. Pt discharging via private vehicle with father back to  at 1100. Educated  on discharge summary, follow up education, and medications. Answered all nurse questions.

## 2023-02-02 NOTE — PROVIDER NOTIFICATION
"MD Notification    Notified Person: MD    Notified Person Name: Dr. Ishan Astorga    Notification Date/Time: 2/2/2023 0977    Notification Interaction: [employer] Messaging/ Web based paging    Purpose of Notification:   \"Pts father asking if he can come and pick him up at 11am for discharge? Thanks\"     Orders Received:    Comments:    "

## 2023-02-02 NOTE — PLAN OF CARE
Pt here with Seizures. A&O VIDYA. Neuros inconsistent, moves all extremities. VSS. Reg diet, thin liquids. Takes pills whole. Up with SBA. Denies pain. Pt scoring GREEN on the Aggression Stop Light Tool. 2000ml fluid restriction [id number] 400ml/2000ml. Rested most of shift. RN and NA at bedside. Haldol given @ 0230 for agitation d/t lock-down overhead announcement. Discharge today back to group home pending family transportation.

## 2023-02-02 NOTE — TELEPHONE ENCOUNTER
The Home Care/Assisted Living/Nursing Facility is calling regarding an established patient.  Has the patient seen Home Care in the past or is currently residing in Assisted Living or Nursing Facility? No.     Orly calling from [facility] requesting the following orders that are NOT within the Home Care, Assisted Living or Nursing Home Eval and Treatment standing order and must be ordered by a Licensed Practitioner.    Preferred Call Back Number: [phone]    Delayed start of care   Care to begin 2/6/23. Orly states patient is being discharged from [facility] today, 2/2/23 and they are aware patient's home care will be delayed.    Routing to Licensed Practitioner (Provider) to review request and provide approval or recommendation.    Writer has verified [employer] will send fax to have orders signed.    ROSENDO Atkinson, RN  [facility]

## 2023-02-02 NOTE — DISCHARGE SUMMARY
[facility]    Discharge Summary  Hospitalist    Date of Admission:  1/24/2023  Date of Discharge:  2/2/2023  Discharging Provider: Ishan Astorga MD, MD    Discharge Diagnoses      Epilepsy with breakthrough seizure  Epidermoid tumor with associated mass-effect  Hypoosmolar hyponatremia   psychogenic polydipsia causing severe hyponatremia.   Autism with behavioral disturbance  Cognitive impairment    Psychogenic polydipsia      History of Present Illness   Duane D Backes is a 42 year old male who presents with the above chief complaint.     History is obtained from review of EMS records and review of [facility] notes. History from patient unable to be obtained due to his sedated status.  EMS was reportedly called to the patient's group home after his group home staff witnessed generalized seizure activity.  He has reportedly missed 2 doses of his Depakote recently, for unknown reasons.  EMS witnessed a tonic-clonic seizures and administered rescue diazepam. He reportedly had another seizure in the ED shortly after receiving IV Depakote.  Head CT and [facility] with grossly stable epidermoid cyst with mass-effect.  His case was discussed with neurology on-call, who recommended transfer to [facility] ICU for continuous EEG monitoring and further recommendations.  In addition to IV Depakote, he received 1 L normal saline followed by maintenance fluids for a sodium noted to be 121 on arrival.       Hospital Course   Duane D Backes was admitted on 1/24/2023.  The following problems were addressed during his hospitalization:  Duane D Backes is a 42 year old male who presents with the above chief complaint.     History is obtained from review of EMS records and review of [facility] notes. History from patient unable to be obtained due to his sedated status.  EMS was reportedly called to the patient's group home after his group home staff witnessed generalized seizure activity.  He has  reportedly missed 2 doses of his Depakote recently, for unknown reasons.  EMS witnessed a tonic-clonic seizures and administered rescue diazepam. He reportedly had another seizure in the ED shortly after receiving IV Depakote.  Head CT and [facility] with grossly stable epidermoid cyst with mass-effect.  His case was discussed with neurology on-call, who recommended transfer to [facility] ICU for continuous EEG monitoring and further recommendations.  In addition to IV Depakote, he received 1 L normal saline followed by maintenance fluids for a sodium noted to be 121 on arrival.        Principal Problem:    Seizures (H)  Active Problems:    Active autistic disorder    Hyponatremia    Psychogenic polydipsia    Date of Admission:  1/24/2023          Assessment & Plan         Duane D Backes is a 42 year-old male with past medical history including autism with behavioral disturbance, epilepsy, epidermoid tumor who is admitted on 1/24/2023 as transfer from [facility] ED for seizures.      Epilepsy with breakthrough seizure  Epidermoid tumor with associated mass-effect  *Presents with witnessed seizure at group home, additional seizure with EMS and again in ED   *Per review of outside notes, has reportedly has missed home depakote doses recently  *Hyponatremia likely lowering seizure threshold as well   *CT head with grossly stable epidermoid tumor with mass effect   -was admitted to ICU given concern for status, with Ceribell device overnight and continuous EEG monitoring. No clear abnormalities/no epileptiform discharges were observed except for possible focal slowing over the right hemisphere.   - was placed on IV Depakote, transition to p.o.  PTA Vimpat and Topamax as well continued. Per chart review, patient's neurologist Dr Washington had recommended stopping Vimpat which he was taking PTA per group home.   - Seizure precautions, IV lorazepam ordered for breakthrough seizures   - General neurology consulted and  discussed, no changes in PTA medications.  -follow up with primary neurologist next available  -discussed care plan with pt's father     Hypoosmolar hyponatremia  Suspect psychogenic polydipsia   *Sodium 121, serum osm 252.  Given presentation with seizure, received 3% saline and was corrected, up to 136 after 48 hours.    -no urine osmol so unclear if original admission Na secondary to psychogenic polydipsia vs SIADH. Suspect psychogenic polydipsia given hx of this and occurrence later during hospital stay.   - D5 was restarted and sodium down to 132-131 and then discontinued. Sodium then trended down to 121. Labs consistent with dilutional. Patient is drinking lots of water.   - water supply was cut off to his room, 2% saline 150 ml given. It got corrected rapidly. Given acute drop just before this, unlikely to have complication with rapid correction and so will not attempt lowering sodium. Remains normal with fluid restriction now.  Salt tabs stopped.   -Patient has been on sertraline for several years, less likely to be contributed by this although possible.  Since this is a stable regimen, discussed with psychiatry service and no plan to change medication at this time.  -urine osmol 40 range c/w psychogenic polydipsia  -Na stable since correction and on fluid restriction  -2L per day fluid restriction.  -Discussed care plan with care coordinator, rn, charge RN and psychiatry. See psychiatry note, pt does better with reduced behaviours at home and plan is for discharge back to group home with fluid restriction and Na monitoring. Stop Lithium. Close pcp follow up, home care RN to monitor fluid restriction compliance, lab draws. pcp follow up    -- Na remained wnl and stable day of discharge. Pt did well overnight, slept well. Less anxious and pacing. Calm, eating, cooperative.   - discharge back to group home with home care RN with Na level on Monday  - pcp and psychiatry follow up   - stop Lithium per psych,  reviewed with psychiatry day of discharge. They will update pt's primary psychiatrist  - care plan at  to restrict access to water,   - 2L fliud restriction  - discussed full care plan with pt's father    Autism with behavioral disturbance  Cognitive impairment  Psychogenic polydipsia  -  PTA regimen including Risperdal 2 Mg p.o. twice daily, Zoloft 200 Mg p.o. every morning and 25 Mg in the evening, lithium 300 Mg at bedtime resumed.   -Given multiple RRTs and ongoing agitation, psychiatry consulted, started as needed haldol as well.  PTA lithium discontinued. EKG to monitor QTc.  Weekly, was 492 1/30.  ekg 2/1 with QTC interval stable, >500. Nl K and Mg. Nl Na  -no focal complaints  - moved to room closer to RN desk, water shut off to room given constant attempts to drink water and drink urine, pacing in room and constantly asking water, walking naked in room.   -bedside commode with freq change of bag to prevent pt drinking urine.   - per RN no clear response to prn medications.   - Lithium stopped per psychiatry 1/30  - urine osmol 40 range c/w psychogenic polydipsia as cause of hyponatremia  -PVR zero.   -stopped prn benadryl given potential side effects and no clear itching  - Sitter at bedside    - Psychiatry service reconsulted 2/1, case discussed, cont to stop Lithium. See psych note. They reviewed pt's behaviour with staff at group home that know him well. He exhibits similar behaviors at  but gradually improved as he become accustomed to place. Does drink water excessively at times and staff attempt to limit his access to water. They state pt typically has deterioration in behaviors in hospital and agree that pt would do better at home.   -2L per day fluid restriction.    -2/2 pt did well overnight. Calmer, cooperative with cares, taking po. Slept well overnight. Not asking for fluids as frequently.   - discussed care plan with pt's father, RN, psychiatry and agree with discharge back to his .   -  "f/u primary psychiatrist  - recommend 2 L fluid restriction, record water intake carefully, supervision by staff when near access to water.      Diet: Snacks/Supplements Adult: Magic Cup; Between Meals  Regular Diet Adult    DVT Prophylaxis: Pneumatic Compression Devices  Colin Catheter: Not present  Lines: None     Cardiac Monitoring: None  Code Status: Full Code          Clinically Significant Risk Factors                             # Obesity: Estimated body mass index is 33.74 kg/m  as calculated from the following:    Height as of 1/12/23: 1.854 m (6' 1\").    Weight as of this encounter: 116 kg (255 lb 11.7 oz).                 Disposition Plan  Back   to group home    Ishan Astorga MD, MD    Significant Results and Procedures   See hospital course    Pending Results   none  Unresulted Labs Ordered in the Past 30 Days of this Admission     No orders found from 12/25/2022 to 1/25/2023.          Code Status   Full Code       Primary Care Physician   Rajat Parkinson    Physical Exam   Temp: 97.7  F (36.5  C) Temp src: Axillary BP: (!) 154/82 Pulse: 76   Resp: 18 SpO2: 97 % O2 Device: None (Room air)    Vitals:    01/24/23 2300   Weight: 116 kg (255 lb 11.7 oz)     Vital Signs with Ranges  Temp:  [97.7  F (36.5  C)-98.7  F (37.1  C)] 97.7  F (36.5  C)  Pulse:  [] 76  Resp:  [18-22] 18  BP: (133-154)/(82-88) 154/82  SpO2:  [95 %-97 %] 97 %  I/O last 3 completed shifts:  In: 1570 [P.O.:1570]  Out: 1750 [Urine:1750]    Constitutional:  In chair, calm, nad  Respiratory: CTAB  Cardiovascular: RRR no r/g/m  GI: soft, nt, nd  Skin: no rash, no edema  Musculoskeletal: no focal jt swellign  Neurologic: alert, grossly nonfocal, answers simple questions.   Neuropsychiatric: calm, cooperative.     Discharge Disposition   Discharged to group home  Condition at discharge: Good    Consultations This Hospital Stay   NEUROLOGY CRITICAL CARE ADULT IP CONSULT  CARE MANAGEMENT / SOCIAL WORK IP CONSULT  SPEECH LANGUAGE " PATH ADULT IP CONSULT  PSYCHIATRY IP CONSULT  NEUROLOGY IP CONSULT  PSYCHIATRY IP CONSULT  PSYCHIATRY IP CONSULT    Time Spent on this Encounter   I, Ishan Astorga MD, personally saw the patient today and spent less than or equal to 30 minutes discharging this patient.    Discharge Orders      Home Care Referral      Primary Care - Care Coordination Referral      Reason for your hospital stay    1. Epilepsy with breakthrough seizure  Epidermoid tumor with associated mass-effect  2. Psychogenic polydipsea with associated severe low sodium level/hyponatremia  3. Autism with behavioral disorder     Activity    Your activity upon discharge: activity as tolerated     Follow-up and recommended labs and tests     1. Follow up with primary neurologist regarding seizure disorder and medications as soon as possible- 1-3 weeks  2. Follow up with primary care doctor one week with BMP labs  3. Start home care RN to follow up autism, medications, psychogenic polydipsia, bmp lab draws (results to be sent to primary care doctor)  4. Follow up with primary psychiatrist next available/asap.  Updated them on lithium being stopped.     Discharge Instructions    1. Stop Lithium levels per inpatient psychiatry consult team  2. 2,000 ml per day (per 24 hour period) fluid restriction: measure amount of fluids patient is drinking and keep log of daily fluid intake. It is very important patient adheres to this fluid restriction. Pt needs observation when bathing/around access to water to ensure not drinking water beyond allowed 2 liters per day.  Needs fluid intake managed by staff.   3. Updated primary care doctor  4. Continue current strategies at distraction/redirection. Recommend spacing out his 2 liters of fluid allowed per day though out the day     Full Code     Diet    Follow this diet upon discharge: Orders Placed This Encounter      Snacks/Supplements Adult: Magic Cup; Between Meals      Fluid restriction 2000 ML FLUID per 24  hour period to prevent low blood sodium levels      Regular Diet Adult     Stroke Hospital Follow Up    [facility] will call you to coordinate care as prescribed by your provider. If you don t hear from a representative within 2 business days, please call [phone].       Discharge Medications   Discharge Medication List as of 2/2/2023 10:25 AM      START taking these medications    Details   miconazole (MICATIN) 2 % external cream Apply topically 2 times daily for 4 days Apply to rash left buttock and left forearm bid for 4 daysDisp-56.7 g, [id number]         CONTINUE these medications which have NOT CHANGED    Details   ammonium lactate (LAC-HYDRIN) 12 % external lotion Apply topically daily To feet and heelsHistorical      diazepam (VALIUM) 5 MG/ML (HIGH CONC) solution Diazepam: Give 1 ml (5mg): Give 1 ml for ANY seizure. May repeat once 1 ml (5 mg). Monitor breathing, if there any concerns call 911. Do not exceed 10 mg per day., Disp-30 mL, R-5, E-Prescribe      divalproex sodium extended-release (DEPAKOTE ER) 500 MG 24 hr tablet Take 1 tablet (500 mg) by mouth 3 times daily, Disp-90 tablet, R-11, E-Prescribe      docusate sodium (COLACE) 100 MG capsule TAKE 1 CAPSULE BY MOUTH TWICE DAILY., Disp-62 capsule, R-11, [id number]      ENULOSE 10 GM/15ML SOLUTION TAKE 30ML BY MOUTH EVERY EVENING AT 5PM;TAKE 30ML BY MOUTH TWICE DAILY AS NEEDED, Disp-2880 mL, R-11, E-Prescribe      fish oil-omega-3 fatty acids 1000 MG capsule TAKE 1 CAPSULE BY MOUTH TWICE DAILY  **NON-COVERED MED**  **PACK IN CARDS*, Disp-100 capsule, R-11, E-Prescribe      GAVILAX 17 GM/SCOOP powder MIX 17 GRAMS IN LIQUID AND DRINK BY MOUTH ONCE DAILY FOR CONSTIPATION., Disp-510 g, R-11, E-Prescribe      Lacosamide (VIMPAT) 100 MG TABS tablet Take 1 tablet (100 mg) by mouth 2 times daily, Disp-60 tablet, R-5, E-Prescribe      levothyroxine (SYNTHROID/LEVOTHROID) 150 MCG tablet TAKE 1 TABLET BY MOUTH ONCE DAILY FOR THYROID., Disp-31  tablet, R-11, E-PrescribeREFILL REQUEST PLEASE REVIEW & ADDRESS ASAP PLEASE NOTIFY US OF ANY CHANGES IN MEDICATIONS. THANK YOU  RX  AUDIT      melatonin 5 MG tablet Take 1 tablet (5 mg) by mouth At Bedtime, Disp-30 tablet, R-0, E-Prescribe      MILK OF MAGNESIA 400 MG/5ML suspension Take 30 mLs by mouth every evening, Disp-3780 mL, R-0, CARMEN, E-Prescribe      propranolol (INDERAL) 40 MG tablet Take 1 tablet by mouth 2 times daily as needed, Historical      !! risperiDONE (RISPERDAL) 1 MG tablet Take 1 mg by mouth 2 times daily as needed, Historical      !! risperiDONE (RISPERDAL) 2 MG tablet Take 2 mg by mouth 2 times daily, Historical      !! sertraline (ZOLOFT) 100 MG tablet Take 200 mg by mouth every morning, Historical      !! sertraline (ZOLOFT) 25 MG tablet Take 1 tablet by mouth daily, Historical      Starch, Thickening, POWD Combined with beverages, per instructions from Speech Therapy., Disp-850 g, R-99, E-Prescribe      topiramate (TOPAMAX) 200 MG tablet TAKE 1 TABLET BY MOUTH TWICE DAILY., Disp-60 tablet, R-11, E-Prescribe      VITAMIN D3 25 MCG (1000 UT) tablet TAKE 3 TABLETS (3000 UNITS) BY MOUTH ONCE DAILY, Disp-100 tablet, R-11, E-PrescribeRX -PLEASE ADVISE-THANK YOU       !! - Potential duplicate medications found. Please discuss with provider.      STOP taking these medications       lithium ER (LITHOBID) 300 MG CR tablet Comments:   Reason for Stopping:             Allergies   Allergies   Allergen Reactions     Penicillin [Penicillins] Other (See Comments)     Prozac [Fluoxetine Hcl]      Reglan [Metoclopramide Hcl]      Ritalin [Methylphenidate Derivatives] Other (See Comments)     Ritalin     Trazodone      Data   Most Recent 3 CBC's:Recent Labs   Lab Test 23  1012 23  0521 23  1756 22  1102   WBC  --  7.9 6.4 5.2   HGB  --  14.9 14.2 14.9   MCV  --  86 87 89    229 204 290      Most Recent 3 BMP's:  Recent Labs   Lab Test 23  0851 23  0751  01/31/23  1611 01/31/23  0823 01/31/23  0714 01/30/23  1426 01/30/23  0824 01/29/23  2245 01/29/23  1405 01/29/23  0743 01/28/23  1435    138  --  141  --    < > 140   < > 121*  --  125*   POTASSIUM 4.2 4.4  --  3.9  --   --   --   --  3.8  --  3.6   CHLORIDE  --   --   --  105  --   --   --   --  87*  --  90*   CO2  --   --   --  23  --   --   --   --  24  --  24   BUN  --   --   --  8.0  --   --   --   --  3.4*  --  3.9*   CR  --   --   --  0.58*  --   --  0.71  --  0.56*  --  0.63*   ANIONGAP  --   --   --  13  --   --   --   --  10  --  11   JESSICA  --   --   --  9.6  --   --   --   --  9.1  --  9.2   GLC  --   --  194* 114* 110*  --   --   --  166*   < > 149*    < > = values in this interval not displayed.     Most Recent 2 LFT's:  Recent Labs   Lab Test 01/24/23  1756 12/19/22  1102 08/23/22  2132   AST 20 19 26   ALT 8* 6* 12   ALKPHOS 56  --  56   BILITOTAL 0.4  --  0.3     Most Recent INR's and Anticoagulation Dosing History:  Anticoagulation Dose History    There is no flowsheet data to display.       Most Recent 3 Troponin's:No lab results found.  Most Recent Cholesterol Panel:  Recent Labs   Lab Test 11/04/21  1607   CHOL 165   LDL 69   HDL 67   TRIG 143     Most Recent 6 Bacteria Isolates From Any Culture (See EPIC Reports for Culture Details):  Recent Labs   Lab Test 02/25/21  1435 02/25/21  0028 02/25/21  0023   CULT Heavy growth  Normal stacey    Light growth  Enterobacter cloacae complex  *  Light growth  Klebsiella pneumoniae  * No growth No growth     Most Recent TSH, T4 and A1c Labs:  Recent Labs   Lab Test 08/23/22  2132 02/25/21  0749 09/23/20  0858   TSH 8.03*   < >  --    T4 1.13   < >  --    A1C  --   --  4.7    < > = values in this interval not displayed.     Results for orders placed or performed during the hospital encounter of 01/24/23   Head CT w/o contrast    Narrative    EXAM: CT HEAD W/O CONTRAST  LOCATION: [facility]  DATE/TIME: 1/24/2023 7:16  PM    INDICATION: Seizure, epidermoid cyst  COMPARISON: MRI brain 1/12/2023 and 10/10/2021  TECHNIQUE: Routine CT Head without IV contrast. Multiplanar reformats. Dose reduction techniques were used.    FINDINGS:  INTRACRANIAL CONTENTS: Grossly stable appearance of previously demonstrated multilobulated epidermoid cyst insinuating throughout the basilar cisterns, encasing the medulla, extending through the right foramen of Luschka inferiorly, and asymmetrically   extending superiorly into the right middle cranial fossa. Grossly stable mass effect upon the right temporal lobe, brainstem, and right cerebellar hemisphere, as well as upon the posterior third and fourth ventricles. Ventricular size and morphology also   appear stable dating back to MRI brain 10/10/2021. No hydrocephalus. No intracranial hemorrhage or CT evidence for acute infarction.    VISUALIZED ORBITS/SINUSES/MASTOIDS: Prior left cataract surgery. Visualized portions of the orbits are otherwise unremarkable. No paranasal sinus mucosal disease. No middle ear or mastoid effusion.    BONES/SOFT TISSUES: No acute abnormality. Diffuse calvarial thickening, which may be secondary to chronic antiepileptic medication use.        Impression    IMPRESSION:  1.  No acute intracranial abnormality.  2.  Grossly stable multilobulated epidermoid cyst and associated mass effect, as described above.

## 2023-02-03 ENCOUNTER — PATIENT OUTREACH (OUTPATIENT)
Dept: CARE COORDINATION | Facility: CLINIC | Age: 43
End: 2023-02-03
Payer: MEDICARE

## 2023-02-03 LAB
ATRIAL RATE - MUSE: 99 BPM
DIASTOLIC BLOOD PRESSURE - MUSE: NORMAL MMHG
INTERPRETATION ECG - MUSE: NORMAL
P AXIS - MUSE: 59 DEGREES
PR INTERVAL - MUSE: 162 MS
QRS DURATION - MUSE: 90 MS
QT - MUSE: 376 MS
QTC - MUSE: 482 MS
R AXIS - MUSE: 62 DEGREES
SYSTOLIC BLOOD PRESSURE - MUSE: NORMAL MMHG
T AXIS - MUSE: 55 DEGREES
VENTRICULAR RATE- MUSE: 99 BPM

## 2023-02-03 NOTE — PROGRESS NOTES
Clinic Care Coordination Contact    Situation: Patient chart reviewed by care coordinator.    Background: IP care coordination hand off received by primary care CC.     Assessment: patient resides in a group home, and patient was discharged with home health care.     Plan/Recommendations: Primary care coordination will not duplicate care management provided by group home.     Lidia Lopez RN, BSN, PHN Care Coordinator  [place], [place], and [facility]   Phone: [phone]

## 2023-02-06 NOTE — TELEPHONE ENCOUNTER
Orly with home care calling to request update on request if PCP will be following for home care. RN informed PCP provided approval for request to follow for care and orders. Orly verbalized understanding.    No further questions or concerns at this time.     Santa Terrazas RN    [facility]

## 2023-02-06 NOTE — TELEPHONE ENCOUNTER
Orly is calling to follow up with request below.     Orly needs confirmation from Dr. Parkinson that he will be following patient's care.     Routing to provider to review and advise. Please address Home Care request below as well.     Anu Dong RN, BSN  [facility]

## 2023-03-02 ENCOUNTER — TELEPHONE (OUTPATIENT)
Dept: FAMILY MEDICINE | Facility: CLINIC | Age: 43
End: 2023-03-02
Payer: MEDICARE

## 2023-03-02 DIAGNOSIS — Z53.9 DIAGNOSIS NOT YET DEFINED: Primary | ICD-10-CM

## 2023-03-02 PROCEDURE — G0180 MD CERTIFICATION HHA PATIENT: HCPCS | Performed by: FAMILY MEDICINE

## 2023-03-02 NOTE — TELEPHONE ENCOUNTER
[facility] calling to let provider know that they are discharging pt from home care sometime next week.        Shanique Jennings RN  [facility]

## 2023-03-08 ENCOUNTER — TELEPHONE (OUTPATIENT)
Dept: NEUROLOGY | Facility: CLINIC | Age: 43
End: 2023-03-08

## 2023-03-08 DIAGNOSIS — G40.309 GENERALIZED TONIC CLONIC EPILEPSY (H): Primary | ICD-10-CM

## 2023-03-08 NOTE — TELEPHONE ENCOUNTER
Received request for change of medication because Diazepam 5MG/ML Conc Oral Sol is on backorder.    See Below:

## 2023-03-13 ENCOUNTER — TELEPHONE (OUTPATIENT)
Dept: NEUROLOGY | Facility: CLINIC | Age: 43
End: 2023-03-13

## 2023-03-13 RX ORDER — DIAZEPAM 10 MG/100UL
10 SPRAY NASAL PRN
Qty: 2 EACH | Refills: 1 | Status: SHIPPED | OUTPATIENT
Start: 2023-03-13 | End: 2024-01-03

## 2023-03-13 NOTE — TELEPHONE ENCOUNTER
What is the concern that needs to be addressed by a nurse? [facility] pharmacy would like a call back to verify that Rx that was sent over today for the Valtoco is replacing the previous one on file? Please call back to verify.    May a detailed message be left on [a]list gamesil? No    Date of last office visit: 12/15/2022    Message routed to: [facility] RN POOL

## 2023-03-14 ENCOUNTER — TELEPHONE (OUTPATIENT)
Dept: FAMILY MEDICINE | Facility: CLINIC | Age: 43
End: 2023-03-14

## 2023-03-14 NOTE — TELEPHONE ENCOUNTER
Berta Meier, , calling in requesting that provider review patient's current medications for stool softeners, as patient is having diarrhea and Berta believes meds need to be adjusted. Berta states that she isn't sure how long patient has been having diarrhea (states that she is new there) but she is concerned that he is having this diarrhea while being on multiple stool softeners. Berta states they cannot hold medications for loose stools without provider order, but would prefer provider review meds and just write new orders.     Patient is currently taking the following for bowel regimen:    - Milk of Magnesia 30 mLs every evening  - Gavilax 17 gm daily  - Docusate sodium (Colace) 100 mg capsule BID  - Enulose 10 gm/15 mL solution, take 30 mL every evening at 5 pm, BID if needed      Once provider reviews meds, Berta asking office to fax orders to [phone] and update med list that is sent to [facility] (update current meds if any are to be discontinued)    Routing to provider to review and advise      JAI OlivaN, RN  [facility]

## 2023-04-10 ENCOUNTER — VIRTUAL VISIT (OUTPATIENT)
Dept: NEUROLOGY | Facility: CLINIC | Age: 43
End: 2023-04-10
Payer: MEDICARE

## 2023-04-10 DIAGNOSIS — R56.9 SEIZURES (H): ICD-10-CM

## 2023-04-10 DIAGNOSIS — G40.909 SEIZURE DISORDER (H): ICD-10-CM

## 2023-04-10 RX ORDER — LACOSAMIDE 100 MG/1
100 TABLET ORAL 2 TIMES DAILY
Qty: 60 TABLET | Refills: 5 | Status: SHIPPED | OUTPATIENT
Start: 2023-04-10 | End: 2023-09-05

## 2023-04-10 RX ORDER — TOPIRAMATE 200 MG/1
TABLET, FILM COATED ORAL
Qty: 60 TABLET | Refills: 11 | Status: SHIPPED | OUTPATIENT
Start: 2023-04-10 | End: 2024-03-06

## 2023-04-10 RX ORDER — DIVALPROEX SODIUM 500 MG/1
500 TABLET, EXTENDED RELEASE ORAL
Qty: 90 TABLET | Refills: 11 | Status: SHIPPED | OUTPATIENT
Start: 2023-04-10 | End: 2024-03-06

## 2023-04-10 NOTE — NURSING NOTE
Pt unable to complete PHQ-9.    Is the patient currently in the state of [place]? YES    Visit mode:VIDEO    If the visit is dropped, the patient can be reconnected by: VIDEO VISIT: Text to cell phone: [phone]    Will anyone else be joining the visit? Yes, Shakira the group home director will be joining.     How would you like to obtain your AVS? Mail a copy    Are changes needed to the allergy or medication list? NO    Reason for visit: Follow up appt.     Medication and allergies have been reviewed.     Ciro Rothman, VF

## 2023-04-10 NOTE — PROGRESS NOTES
Virtual Visit Details    Type of service:  Video Visit   Video Start Time: 9:32 AM  Video End Time:9:41 AM    Originating Location (pt. Location): Home    Distant Location (provider location):  On-site  Platform used for Video Visit: [facility] Epilepsy Care Progress Note    Patient:  Duane D Backes  :  1980   Age:  42 year old   Today's Office Visit:  4/10/2023    Epilepsy history copied forward:  Duane D Backes is right handed with history of cognitive disability and epilepsy referred to us by Dr. Carter for high depakote levels. Per medical records from  he had a generalized tonic-clonic convulsion at [place], EEG showed rare left temporal epileptiform discharges. He is has been treated for epilepsy with topiramate (low levels) and depakote with high concentration for many years.   Interval History: Spoke to Shakira (). 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 Prior to this last seizure in 2021 (intubated in ICU, status) and prior to this his last seizure was 2020. He is working with behavioral analyst. He is on water intake restriction and his is causing behavioral issues.   He continues to have  behaviors have worsened (slam door, throwing things). He has lived in new home 2021. He is very disruptive to other clients, home is thinking he has to find another placement. I encouraged them to work with psychiatrist and regular 2-3 month follow up to manage behaviors.  He has established follow up with psychiatrist.  They are not sure about side effects.   Current antiepileptic drug:   Divalproex  mg three times a day   Topiramate  200 mg twice a day  Lacosamide 100 mg twice a day   Medication Notes:   AED Medication Compliance:  compliant most of the time  Using a pill box:  Yes  Past AEDs notes:   Topiramate: We increase topiramate to 200 mg twice a day in winter 2020.     Depakote was decreased from 1500 mg twice a day to 1000 mg twice a day and 2020 due to blood concentrations near 170. Lowering depakote caused more behavioral issues.   vimpat started 2/2021 after status epilepticus  Psycho-Social History: he moved to current group home 11/2021. He grew up with parents, after high school he was institutionalized (may be due to behavioral) and then transferred to group home 2013. No drinking, no drugs, and no smoking.     Exam:    There were no vitals taken for this visit.     Wt Readings from Last 5 Encounters:   01/24/23 255 lb 11.7 oz (116 kg)   01/12/23 251 lb 5.2 oz (114 kg)   12/05/22 230 lb (104.3 kg)   08/26/22 234 lb 9.1 oz (106.4 kg)   01/13/22 211 lb (95.7 kg)     Cognitive delay, Alert, face symmetric, extra ocular movements in tact, able to life upper extremities. Very restless, playing with his belly.      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  Prior EEGs have shown rare left temporal sharp waves (opposite side of tumor).  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  Patient has been managed on Depakote and topiramate for several years with no seizures. He is still on lacosamide it seems and it was not stopped. We will continue same antiepileptic drug since he recently had several generalized tonic-clonic convulsions.     We reviewed MRI brain 1/2023 and tumor is stable. Talk to primary care provider about hyponatremia with polydipsia.    Continue same antiepileptic drug.       Plan :     Continue current seizure medications:   Depakote 500 mg three times a day   Topiramate  200 mg twice a  day  Lacosamide 100 mg twice a day     Follow up with psychiatrist for behavioral issues and optimize medications to help behavior.  I encouraged them to work with psychiatrist and regular 2-3 month follow up to manage behaviors.     Follow up  With Felix 4-6 months     Diazepam: Give 1 ml (5mg): Give 1 ml for ANY seizure. May repeat once 1 ml (5 mg). Monitor breathing, if there any concerns call 911. Do not exceed 10 mg per day.        I spent 15 minutes in total today to provide comprehensive  medical care.   I spent 3 minutes writing the note and placing orders.   I spent 1 minutes  reviewing the chart.     The rest of the time was spent with the patient in face to face interview. During this time key medical decisions were made with review of medical chart prior to visit, visit with patient, counseling/education, and post visit work, including documentation of note on the day of visit. I addressed all questions the patient/caregiver raised in regards to epilepsy or related medical questions.           Lauren Washington MD   Epilepsy Staff

## 2023-04-10 NOTE — LETTER
4/10/2023       RE: Duane D Backes  : 1980   MRN: [id number]        Dear Colleague,    Thank you for referring your patient, Duane D Backes, to the [facility] EPILEPSY CARE at [facility]. Please see a copy of my visit note below.    [facility]    Patient:  Duane D Backes  :  1980   Age:  42 year old   Today's Office Visit:  4/10/2023    Epilepsy history copied forward:  Duane D Backes is right handed with history of cognitive disability and epilepsy referred to us by Dr. Carter for high depakote levels. Per medical records from  he had a generalized tonic-clonic convulsion at [place], EEG showed rare left temporal epileptiform discharges. He is has been treated for epilepsy with topiramate (low levels) and depakote with high concentration for many years.   Interval History: Spoke to Shakira (). 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 Prior to this last seizure in 2021 (intubated in ICU, status) and prior to this his last seizure was 2020. He is working with behavioral analyst. He is on water intake restriction and his is causing behavioral issues.   He continues to have  behaviors have worsened (slam door, throwing things). He has lived in new home 2021. He is very disruptive to other clients, home is thinking he has to find another placement. I encouraged them to work with psychiatrist and regular 2-3 month follow up to manage behaviors.  He has established follow up with psychiatrist.  They are not sure about side effects.   Current antiepileptic drug:   Divalproex  mg three times a day   Topiramate  200 mg twice a day  Lacosamide 100 mg twice a day   Medication Notes:   AED Medication Compliance:  compliant most of the time  Using a pill box:  Yes  Past AEDs notes:   Topiramate: We increase topiramate to  200 mg twice a day in winter 2020.    Depakote was decreased from 1500 mg twice a day to 1000 mg twice a day and 2020 due to blood concentrations near 170. Lowering depakote caused more behavioral issues.   vimpat started 2/2021 after status epilepticus  Psycho-Social History: he moved to current group home 11/2021. He grew up with parents, after high school he was institutionalized (may be due to behavioral) and then transferred to group home 2013. No drinking, no drugs, and no smoking.     Exam:    There were no vitals taken for this visit.     Wt Readings from Last 5 Encounters:   01/24/23 255 lb 11.7 oz (116 kg)   01/12/23 251 lb 5.2 oz (114 kg)   12/05/22 230 lb (104.3 kg)   08/26/22 234 lb 9.1 oz (106.4 kg)   01/13/22 211 lb (95.7 kg)     Cognitive delay, Alert, face symmetric, extra ocular movements in tact, able to life upper extremities. Very restless, playing with his belly.      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  Prior EEGs have shown rare left temporal sharp waves (opposite side of tumor).  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  Patient has been managed on Depakote and topiramate for several years with no seizures. He is still on lacosamide it seems and it was not stopped. We will continue same antiepileptic drug since he recently had several generalized tonic-clonic convulsions.     We reviewed MRI brain 1/2023 and tumor is stable. Talk to primary care provider about hyponatremia with polydipsia.    Continue same antiepileptic drug.       Plan :     Continue current seizure medications:   Depakote 500 mg three times  a day   Topiramate  200 mg twice a day  Lacosamide 100 mg twice a day     Follow up with psychiatrist for behavioral issues and optimize medications to help behavior.  I encouraged them to work with psychiatrist and regular 2-3 month follow up to manage behaviors.     Follow up  With Felix 4-6 months     Diazepam: Give 1 ml (5mg): Give 1 ml for ANY seizure. May repeat once 1 ml (5 mg). Monitor breathing, if there any concerns call 911. Do not exceed 10 mg per day.        I spent 15 minutes in total today to provide comprehensive  medical care.   I spent 3 minutes writing the note and placing orders.   I spent 1 minutes  reviewing the chart.     The rest of the time was spent with the patient in face to face interview. During this time key medical decisions were made with review of medical chart prior to visit, visit with patient, counseling/education, and post visit work, including documentation of note on the day of visit. I addressed all questions the patient/caregiver raised in regards to epilepsy or related medical questions.                 Again, thank you for allowing me to participate in the care of your patient.      Sincerely,    Lauren Washington MD

## 2023-04-25 ENCOUNTER — TELEPHONE (OUTPATIENT)
Dept: NEUROLOGY | Facility: CLINIC | Age: 43
End: 2023-04-25

## 2023-04-25 NOTE — TELEPHONE ENCOUNTER
[facility] Center    Phone Message    May a detailed message be left on voicemail: yes     Reason for Call: Other: Shakira calling to request a call back to inform them of the exact location of Duane's tumor. Shakira requested this to be sent as priority due to needing this information today prior to 9:00 am.     Action Taken: Message routed to:  Other: [facility] EPILEPS[facility]    Travel Screening: Not Applicable

## 2023-06-11 ENCOUNTER — HOSPITAL ENCOUNTER (EMERGENCY)
Facility: CLINIC | Age: 43
Discharge: NURSING, REHAB OR LONG-TERM CARE | End: 2023-06-12
Attending: EMERGENCY MEDICINE | Admitting: EMERGENCY MEDICINE
Payer: MEDICARE

## 2023-06-11 DIAGNOSIS — Z87.898 HISTORY OF SEIZURES: ICD-10-CM

## 2023-06-11 DIAGNOSIS — R40.20 LOSS OF CONSCIOUSNESS (H): ICD-10-CM

## 2023-06-11 LAB
ANION GAP SERPL CALCULATED.3IONS-SCNC: 10 MMOL/L (ref 7–15)
BASOPHILS # BLD AUTO: 0 10E3/UL (ref 0–0.2)
BASOPHILS NFR BLD AUTO: 0 %
BUN SERPL-MCNC: 12.8 MG/DL (ref 6–20)
CALCIUM SERPL-MCNC: 9.3 MG/DL (ref 8.6–10)
CHLORIDE SERPL-SCNC: 96 MMOL/L (ref 98–107)
CREAT SERPL-MCNC: 0.72 MG/DL (ref 0.67–1.17)
DEPRECATED HCO3 PLAS-SCNC: 25 MMOL/L (ref 22–29)
EOSINOPHIL # BLD AUTO: 0.1 10E3/UL (ref 0–0.7)
EOSINOPHIL NFR BLD AUTO: 2 %
ERYTHROCYTE [DISTWIDTH] IN BLOOD BY AUTOMATED COUNT: 12.5 % (ref 10–15)
GFR SERPL CREATININE-BSD FRML MDRD: >90 ML/MIN/1.73M2
GLUCOSE SERPL-MCNC: 112 MG/DL (ref 70–99)
HCT VFR BLD AUTO: 42.7 % (ref 40–53)
HGB BLD-MCNC: 14.2 G/DL (ref 13.3–17.7)
IMM GRANULOCYTES # BLD: 0 10E3/UL
IMM GRANULOCYTES NFR BLD: 0 %
LYMPHOCYTES # BLD AUTO: 1.3 10E3/UL (ref 0.8–5.3)
LYMPHOCYTES NFR BLD AUTO: 17 %
MCH RBC QN AUTO: 29.3 PG (ref 26.5–33)
MCHC RBC AUTO-ENTMCNC: 33.3 G/DL (ref 31.5–36.5)
MCV RBC AUTO: 88 FL (ref 78–100)
MONOCYTES # BLD AUTO: 0.6 10E3/UL (ref 0–1.3)
MONOCYTES NFR BLD AUTO: 8 %
NEUTROPHILS # BLD AUTO: 5.8 10E3/UL (ref 1.6–8.3)
NEUTROPHILS NFR BLD AUTO: 73 %
NRBC # BLD AUTO: 0 10E3/UL
NRBC BLD AUTO-RTO: 0 /100
PLATELET # BLD AUTO: 199 10E3/UL (ref 150–450)
POTASSIUM SERPL-SCNC: 3.8 MMOL/L (ref 3.4–5.3)
RBC # BLD AUTO: 4.85 10E6/UL (ref 4.4–5.9)
SODIUM SERPL-SCNC: 131 MMOL/L (ref 136–145)
VALPROATE SERPL-MCNC: 69.5 UG/ML
WBC # BLD AUTO: 7.9 10E3/UL (ref 4–11)

## 2023-06-11 PROCEDURE — 96372 THER/PROPH/DIAG INJ SC/IM: CPT | Mod: 59 | Performed by: EMERGENCY MEDICINE

## 2023-06-11 PROCEDURE — 85004 AUTOMATED DIFF WBC COUNT: CPT | Performed by: EMERGENCY MEDICINE

## 2023-06-11 PROCEDURE — 80201 ASSAY OF TOPIRAMATE: CPT | Performed by: EMERGENCY MEDICINE

## 2023-06-11 PROCEDURE — 80164 ASSAY DIPROPYLACETIC ACD TOT: CPT | Performed by: EMERGENCY MEDICINE

## 2023-06-11 PROCEDURE — 93005 ELECTROCARDIOGRAM TRACING: CPT

## 2023-06-11 PROCEDURE — 80048 BASIC METABOLIC PNL TOTAL CA: CPT | Performed by: EMERGENCY MEDICINE

## 2023-06-11 PROCEDURE — 250N000011 HC RX IP 250 OP 636

## 2023-06-11 PROCEDURE — 99284 EMERGENCY DEPT VISIT MOD MDM: CPT

## 2023-06-11 PROCEDURE — 80235 DRUG ASSAY LACOSAMIDE: CPT | Performed by: EMERGENCY MEDICINE

## 2023-06-11 PROCEDURE — 250N000011 HC RX IP 250 OP 636: Performed by: EMERGENCY MEDICINE

## 2023-06-11 RX ORDER — LORAZEPAM 2 MG/ML
2 INJECTION INTRAMUSCULAR ONCE
Status: COMPLETED | OUTPATIENT
Start: 2023-06-11 | End: 2023-06-11

## 2023-06-11 RX ORDER — HALOPERIDOL 5 MG/ML
INJECTION INTRAMUSCULAR
Status: DISCONTINUED
Start: 2023-06-11 | End: 2023-06-11 | Stop reason: HOSPADM

## 2023-06-11 RX ORDER — LORAZEPAM 2 MG/ML
INJECTION INTRAMUSCULAR
Status: DISCONTINUED
Start: 2023-06-11 | End: 2023-06-11 | Stop reason: HOSPADM

## 2023-06-11 RX ORDER — OLANZAPINE 10 MG/2ML
INJECTION, POWDER, FOR SOLUTION INTRAMUSCULAR
Status: DISCONTINUED
Start: 2023-06-11 | End: 2023-06-11 | Stop reason: HOSPADM

## 2023-06-11 RX ORDER — HALOPERIDOL 5 MG/ML
2 INJECTION INTRAMUSCULAR ONCE
Status: COMPLETED | OUTPATIENT
Start: 2023-06-11 | End: 2023-06-11

## 2023-06-11 RX ORDER — OLANZAPINE 10 MG/2ML
10 INJECTION, POWDER, FOR SOLUTION INTRAMUSCULAR ONCE
Status: COMPLETED | OUTPATIENT
Start: 2023-06-11 | End: 2023-06-11

## 2023-06-11 RX ADMIN — LORAZEPAM 2 MG: 2 INJECTION INTRAMUSCULAR; INTRAVENOUS at 21:16

## 2023-06-11 RX ADMIN — LORAZEPAM 2 MG: 2 INJECTION INTRAMUSCULAR; INTRAVENOUS at 22:01

## 2023-06-11 RX ADMIN — HALOPERIDOL 2 MG: 5 INJECTION INTRAMUSCULAR at 21:16

## 2023-06-11 RX ADMIN — LORAZEPAM 2 MG: 2 INJECTION INTRAMUSCULAR at 21:16

## 2023-06-11 RX ADMIN — OLANZAPINE 10 MG: 10 INJECTION, POWDER, FOR SOLUTION INTRAMUSCULAR at 22:01

## 2023-06-11 RX ADMIN — HALOPERIDOL LACTATE 2 MG: 5 INJECTION, SOLUTION INTRAMUSCULAR at 21:16

## 2023-06-11 ASSESSMENT — ACTIVITIES OF DAILY LIVING (ADL)
ADLS_ACUITY_SCORE: 35
ADLS_ACUITY_SCORE: 35

## 2023-06-12 VITALS
RESPIRATION RATE: 18 BRPM | OXYGEN SATURATION: 95 % | SYSTOLIC BLOOD PRESSURE: 124 MMHG | DIASTOLIC BLOOD PRESSURE: 75 MMHG | TEMPERATURE: 96.7 F | HEART RATE: 95 BPM

## 2023-06-12 LAB
ALBUMIN UR-MCNC: NEGATIVE MG/DL
APPEARANCE UR: CLEAR
ATRIAL RATE - MUSE: 66 BPM
BILIRUB UR QL STRIP: NEGATIVE
COLOR UR AUTO: NORMAL
DIASTOLIC BLOOD PRESSURE - MUSE: NORMAL MMHG
GLUCOSE UR STRIP-MCNC: NEGATIVE MG/DL
HGB UR QL STRIP: NEGATIVE
INTERPRETATION ECG - MUSE: NORMAL
KETONES UR STRIP-MCNC: NEGATIVE MG/DL
LEUKOCYTE ESTERASE UR QL STRIP: NEGATIVE
NITRATE UR QL: NEGATIVE
P AXIS - MUSE: 54 DEGREES
PH UR STRIP: 7 [PH] (ref 5–7)
PR INTERVAL - MUSE: 178 MS
QRS DURATION - MUSE: 98 MS
QT - MUSE: 406 MS
QTC - MUSE: 425 MS
R AXIS - MUSE: 51 DEGREES
RBC URINE: 1 /HPF
SP GR UR STRIP: 1.01 (ref 1–1.03)
SYSTOLIC BLOOD PRESSURE - MUSE: NORMAL MMHG
T AXIS - MUSE: 48 DEGREES
UROBILINOGEN UR STRIP-MCNC: NORMAL MG/DL
VENTRICULAR RATE- MUSE: 66 BPM
WBC URINE: 0 /HPF

## 2023-06-12 PROCEDURE — 81003 URINALYSIS AUTO W/O SCOPE: CPT | Performed by: EMERGENCY MEDICINE

## 2023-06-12 PROCEDURE — 250N000013 HC RX MED GY IP 250 OP 250 PS 637: Performed by: EMERGENCY MEDICINE

## 2023-06-12 RX ORDER — TOPIRAMATE 100 MG/1
200 TABLET, FILM COATED ORAL 2 TIMES DAILY
Status: DISCONTINUED | OUTPATIENT
Start: 2023-06-12 | End: 2023-06-12 | Stop reason: HOSPADM

## 2023-06-12 RX ORDER — LACOSAMIDE 100 MG/1
100 TABLET ORAL 2 TIMES DAILY
Status: DISCONTINUED | OUTPATIENT
Start: 2023-06-12 | End: 2023-06-12 | Stop reason: HOSPADM

## 2023-06-12 RX ORDER — RISPERIDONE 1 MG/1
2 TABLET ORAL 2 TIMES DAILY
Status: DISCONTINUED | OUTPATIENT
Start: 2023-06-12 | End: 2023-06-12 | Stop reason: HOSPADM

## 2023-06-12 RX ORDER — RISPERIDONE 1 MG/1
1 TABLET ORAL 2 TIMES DAILY PRN
Status: DISCONTINUED | OUTPATIENT
Start: 2023-06-12 | End: 2023-06-12 | Stop reason: HOSPADM

## 2023-06-12 RX ADMIN — TOPIRAMATE 200 MG: 100 TABLET, FILM COATED ORAL at 08:36

## 2023-06-12 RX ADMIN — LACOSAMIDE 100 MG: 100 TABLET, FILM COATED ORAL at 08:36

## 2023-06-12 RX ADMIN — RISPERIDONE 2 MG: 1 TABLET ORAL at 08:35

## 2023-06-12 ASSESSMENT — ACTIVITIES OF DAILY LIVING (ADL)
ADLS_ACUITY_SCORE: 35

## 2023-06-12 NOTE — ED NOTES
Patient has sitter bedside. Patient is redirectable. Patient up to the bathroom stumbled and nearly fell. Patient back to bed, vitals taken and patient current;y sleeping with sitter bedside.

## 2023-06-12 NOTE — ED NOTES
Patient awake and out of bed. Patient urinated in bed. Sitter and RN cleaned up, changed bedding, and patients clothing.Patient calm and cooperative at this time.

## 2023-06-12 NOTE — ED NOTES
Pt darting out of room repeatedly. Pt is strong and unable to be physically stopped by one person. Security called for backup.   27 yo male no known health issues bib family stating that he has not been acting normal. states that he was living and working in [place] states that he has stated to family that he has been feeling depressed and cousin states that he told him that he wanted to kill himself. pt nonverbal in triage. using nods to answer questions. shakes head yes to if feeling suicidal. shakes YES if has a plan, yes to attempts in the past. yes to drinking tonight. no to drugs. pt actively tries to leave after answering questions. 25 yo male no known health issues bib family stating that he has not been acting normal and that he told his mother that he wanted to kill himself this evening.   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 wasn't talking much, states that he went to spend time with his father in [place] last week and came back home even more depressed as per family. as per mom last evening he stated to her that "I love you, I want to kill myself". mom called family members and brought him to ER for evaluation. [facility] and saw a psychiatrist at that time. family says that he will occasionaly drink unknown illicit drug use.   pt initially not verbally answering questions in triage. but shaking his head to yes or no questions. nods yes to feeling depressed, nods yes to suicidal ideals, yes to plan, yes to attempts in the past, no to thoughts about harming others, yes to drinking tonight. no to drugs. pt actively tries to leave after answering questions. detained by security due to SI

## 2023-06-12 NOTE — ED NOTES
2120- This RN gave report to Allyssa THOMAS RN for transfer to ED 6. This Rn returned to patient bedside, where 1:1 sitter, security, and attending MD were. This RN asked for the patient to be taken via stretcher to ED 06.     Pt was ambulated from ED 27 to ED 06 by Jcarlos Jurado (security officers). MD notified.

## 2023-06-12 NOTE — ED NOTES
Pt darted out of room 27, despite having 1:1 RN sitter due to pt's speed. Pt made it through double doors past ed 25 and was stopped by ED staff. Pt was able to be redirected back to room 27. Dr. Joy at bedside and aware

## 2023-06-12 NOTE — ED NOTES
Patient is awake and redirectable with sitter bedside. Patient frequently up to the bathroom. SBA.

## 2023-06-12 NOTE — ED NOTES
"This RN called and spoke to pt's Father and guardian, Soren. Soren stated that pt has seizures every couple of months, but is unsure what medications he is on. His father states that his seizures come from \"some sodium problem last time.\" Dr. Joy notified. Soren verbally consented for treatment of Duane.  "

## 2023-06-12 NOTE — ED NOTES
RN called both numbers listed for legal guardians Seema Doty, and Shannan.  No answer.  Message left regarding callback for transport home.

## 2023-06-12 NOTE — ED PROVIDER NOTES
History     Chief Complaint:  Seizures       HPI   Duane D Backes is a 42 year old male with a past medical history significant for autism, OCD, seizure disorder who presents to the ED via/accompanied by EMS with a chief complaint of concern for possible seizure at group home.  Per EMS report, the patient was noted to start leaning over at his group home mostly on the couch.  There were reportedly no convulsions noted.  Patient arrives in the emergency department at his current baseline.        Independent Historian: EMS provided the history..     Review of External Notes: See MDM    ROS:  Review of Systems  Full ROS completed and negative other than pertinent positives and negatives noted in HPI    Allergies:  Penicillin [Penicillins]  Prozac [Fluoxetine Hcl]  Reglan [Metoclopramide Hcl]  Ritalin [Methylphenidate Derivatives]  Trazodone     Medications:    ammonium lactate (LAC-HYDRIN) 12 % external lotion  diazepam (VALIUM) 5 MG/ML (HIGH CONC) solution  diazePAM (VALTOCO 10 MG DOSE) 10 MG/0.1ML LIQD  divalproex sodium extended-release (DEPAKOTE ER) 500 MG 24 hr tablet  docusate sodium (COLACE) 100 MG capsule  ENULOSE 10 GM/15ML SOLUTION  fish oil-omega-3 fatty acids 1000 MG capsule  GAVILAX 17 GM/SCOOP powder  Lacosamide (VIMPAT) 100 MG TABS tablet  levothyroxine (SYNTHROID/LEVOTHROID) 150 MCG tablet  melatonin 5 MG tablet  MILK OF MAGNESIA 400 MG/5ML suspension  propranolol (INDERAL) 40 MG tablet  risperiDONE (RISPERDAL) 1 MG tablet  risperiDONE (RISPERDAL) 2 MG tablet  sertraline (ZOLOFT) 100 MG tablet  sertraline (ZOLOFT) 25 MG tablet  Starch, Thickening, POWD  topiramate (TOPAMAX) 200 MG tablet  VITAMIN D3 25 MCG (1000 UT) tablet        Past Medical History:    Past Medical History:   Diagnosis Date     Autistic disorder, current or active state      Dry skin      Moderate intellectual disabilities      Obsessive-compulsive disorders      Overweight (BMI 25.0-29.9) 8/2/2013     Seizure disorder (H)       Unspecified constipation        Past Surgical History:    Past Surgical History:   Procedure Laterality Date     ANESTHESIA OUT OF OR MRI N/A 1/12/2023    Procedure: ANESTHESIA OUT OF OR  Magnetic resonance imaging Brain with and without @1400;  Surgeon: GENERIC ANESTHESIA PROVIDER;  Location: [facility] OR     CATARACT IOL, RT/LT Left 11/17/2018        Family History:    family history is not on file.    Social History:   reports that he has never smoked. He has never used smokeless tobacco. He reports that he does not drink alcohol and does not use drugs.  PCP: Rajat Parkinson     Physical Exam     Patient Vitals for the past 24 hrs:   BP Temp Temp src Pulse Resp SpO2   06/12/23 0007 -- (!) 96.7  F (35.9  C) Temporal -- -- --   06/11/23 2221 116/78 -- -- 84 20 98 %        Physical Exam    Constitutional: Well developed, nontox appearance, baseline cognitive delay  Head: Atraumatic.   Mouth/Throat: Oropharynx is clear and moist.   Neck:  no stridor  Eyes: no scleral icterus  Cardiovascular: RRR  Pulmonary/Chest: nml resp effort  Ext: Warm, well perfused  Neurological: alert, steady gait, moves ext x4  Skin: Skin is warm and dry.   Psychiatric: intermittently anxious and agitated   Nursing note and vitals reviewed.    Emergency Department Course   ECG:  ECG results from 06/11/23   EKG 12-lead, tracing only     Value    Systolic Blood Pressure     Diastolic Blood Pressure     Ventricular Rate 66    Atrial Rate 66    AR Interval 178    QRS Duration 98        QTc 425    P Axis 54    R AXIS 51    T Axis 48    Interpretation ECG      Sinus rhythm with frequent Premature ventricular complexes  Otherwise normal ECG  When compared with ECG of 01-FEB-2023 08:17,  Premature ventricular complexes are now Present  Vent. rate has decreased BY  33 BPM  QT has shortened         Imaging:  No orders to display        Report per radiology unless otherwise specified in report or noted in MDM    Laboratory:  Labs Ordered and Resulted  from Time of ED Arrival to Time of ED Departure   BASIC METABOLIC PANEL - Abnormal       Result Value    Sodium 131 (*)     Potassium 3.8      Chloride 96 (*)     Carbon Dioxide (CO2) 25      Anion Gap 10      Urea Nitrogen 12.8      Creatinine 0.72      Calcium 9.3      Glucose 112 (*)     GFR Estimate >90     VALPROIC ACID - Normal    Valproic acid 69.5     ROUTINE UA WITH MICROSCOPIC REFLEX TO CULTURE - Normal    Color Urine Straw      Appearance Urine Clear      Glucose Urine Negative      Bilirubin Urine Negative      Ketones Urine Negative      Specific Gravity Urine 1.006      Blood Urine Negative      pH Urine 7.0      Protein Albumin Urine Negative      Urobilinogen Urine Normal      Nitrite Urine Negative      Leukocyte Esterase Urine Negative      RBC Urine 1      WBC Urine 0     CBC WITH PLATELETS AND DIFFERENTIAL    WBC Count 7.9      RBC Count 4.85      Hemoglobin 14.2      Hematocrit 42.7      MCV 88      MCH 29.3      MCHC 33.3      RDW 12.5      Platelet Count 199      % Neutrophils 73      % Lymphocytes 17      % Monocytes 8      % Eosinophils 2      % Basophils 0      % Immature Granulocytes 0      NRBCs per 100 WBC 0      Absolute Neutrophils 5.8      Absolute Lymphocytes 1.3      Absolute Monocytes 0.6      Absolute Eosinophils 0.1      Absolute Basophils 0.0      Absolute Immature Granulocytes 0.0      Absolute NRBCs 0.0     LACOSAMIDE LEVEL   TOPIRAMATE LEVEL        Procedures       Emergency Department Course & Assessments:             Interventions:  Medications   Lacosamide (VIMPAT) tablet 100 mg (has no administration in time range)   risperiDONE (risperDAL) tablet 2 mg (has no administration in time range)   risperiDONE (risperDAL) tablet 1 mg (has no administration in time range)   topiramate (TOPAMAX) tablet 200 mg (has no administration in time range)   haloperidol lactate (HALDOL) injection 2 mg (2 mg Intramuscular $Given 6/11/23 2116)   LORazepam (ATIVAN) injection 2 mg (2 mg  Intramuscular $Given 23)   OLANZapine (zyPREXA) injection 10 mg (10 mg Intramuscular $Given 23)   LORazepam (ATIVAN) injection 2 mg (2 mg Intramuscular $Given 23)        Independent Interpretation (X-rays, CTs, rhythm strip):  See MDM    Consultations/Discussion of Management or Tests:  None    Social Determinants of Health affecting care:  See MDM    Disposition:  Care of the patient was transferred to my colleague Dr. Navarrete pending reevaluation in the morning.     Impression & Plan    CMS Diagnoses: none  Medical Decision Makin year old male presenting w/ concern for possible seizure    Social determinants affecting patient's health include:  Baseline autism increasing risk for presentation to the emergency department     I reviewed medical records from  most recent neurology office visit note on 4/10/2023    DDx includes seizure NOS, electrolyte abnormality, syncope, dehydration.  Doubt meningitis, encephalitis, acute intracranial abnormality such as hemorrhage or new mass apart from that already visualized.  EKG interpretation as noted above significant for sinus rhythm with occasional PVCs on my independent interpretation.    Upon review of the patient's previous neurology notes, it seems the patient generally has tonic-clonic seizures.  There is no mention of this in the EMS report which reports no convulsions were noted.  Labs were reassuring and while the patient had mild hyponatremia, it is not low enough that I would suspect it would precipitate a seizure.  History is somewhat vague from the group home and I can only assume the patient has been getting their medications daily as prescribed although it seems that he has missed doses in the past.  Antiepileptic levels pending with valproic acid returning in the therapeutic range.  Given the patient has not had any further seizures in the emergency department and return to baseline my initial interpretation, I do not  feel emergent neuro consultation is indicated this time.  Given the patient's agitation and cognitive delay, he was sedated in the emergency department to help facilitate work-up.  The patient signed out in stable condition awaiting reevaluation at which point should he continue to be at his baseline without further episodes of seizures, I feel he will be safe for discharge with plan to follow-up with his primary neurologist.  Patient was signed out in stable condition awaiting reevaluation in the morning.      Diagnosis:    ICD-10-CM    1. Loss of consciousness (H)  R40.20       2. History of seizures  Z87.898            Discharge Medications:  New Prescriptions    No medications on file        6/11/2023   Samson Joy MD Vaughn, Christopher E, MD  06/12/23 0144

## 2023-06-12 NOTE — DISCHARGE INSTRUCTIONS
Please call your primary neurology team thank you for intact today.    Please return to the emergency department for further seizures in the next 24 hours.    Discharge Instructions  Recurrent Seizure (Convulsion)    You were seen today for a seizure. The most common reason for a recurrent seizure is having missed a dose of your medication or taken it at a different time than normal. Other things that increase the risk of seizures include fever, sleep deprivation, alcohol, and stress. Although anti-seizure medications (anti-epileptic drugs) work for many people with seizure disorders, some people continue to have seizures even after trying several medications.    Generally, every Emergency Department visit should have a follow-up clinic visit with either a primary or a specialty clinic/provider. Please follow-up as instructed by your emergency provider today.    Return to the Emergency Department if:   You develop a fever over 100.4 F.  You feel much more ill, or develop new symptoms like severe headache.  You have trouble walking, seeing, or develop weakness or numbness in your arms or legs.     What can I do to help myself?  Take your medication exactly as directed, at the right times, and the right doses.   If you develop uncomfortable side effects, do not stop taking your anti-seizure medication without first speaking to your provider.   Do not let your prescription run out. Stopping anti-seizure medication abruptly can put you at risk of a seizure.  While taking an anti-seizure medication, do not start taking any other medications including over-the-counter medications and herbal supplements without first checking with your provider because mixing them can be dangerous.  Do not drive until you have been rechecked by your provider and have been told it is safe to drive.  If you have a seizure while driving you may cause a motor vehicle accident with injury or death to yourself or others.   Do not swim, climb  ladders, or do anything else that would be dangerous if you had another seizure or spell of loss of consciousness, until you are cleared by your provider.    Check your state driving requirements for patients with seizures on the Epilepsy Foundation Website at www.epilepsyfoundation.org/resources/drivingandtravel.cfm.  Do not drink alcohol.  Drinking alcohol increases the risk of seizures and can interfere with the effect of anti-seizure medications.  Start a seizure calendar to record any seizure triggers, such as days when you were sleep-deprived, stressed, drank alcohol, or (if you are a woman) had your period.  Remember, if one medication does not work for you, either because you cannot tolerate the side effects or because you continue to have seizures, your provider can suggest alternate medications or alternate methods of taking the medication.  If you were given a prescription for medicine here today, be sure to read all of the information (including the package insert) that comes with your prescription.  This will include important information about the medicine, its side effects, and any warnings that you need to know about.  The pharmacist who fills the prescription can provide more information and answer questions you may have about the medicine.  If you have questions or concerns that the pharmacist cannot address, please call or return to the Emergency Department.   Remember that you can always come back to the Emergency Department if you are not able to see your regular provider in the amount of time listed above, if you get any new symptoms, or if there is anything that worries you.

## 2023-06-12 NOTE — ED TRIAGE NOTES
"BIBA from [facility] ([id number] [id number])  For a seizure lasting 1 min on couch   staff observed seizure, no extremities convulsion report.  staff stated that pt was sitting on couch and began to tilt over on the side.     staff unable to tell EMS of pt's medications, the last meds given, or any new medication changes.    Pt's baseline: Autism, alert to self, able to commands. Nonverbal per EMS, but pt stated \"yes\" to this RN when asking a question.     Pt ripping off all vital machine monitors when attempting to grab triage vitals.    "

## 2023-06-12 NOTE — ED NOTES
Pt darting at [facility] requiring multiple staff members to physically redirect pt back to room.

## 2023-06-13 ENCOUNTER — TELEPHONE (OUTPATIENT)
Dept: EMERGENCY MEDICINE | Facility: CLINIC | Age: 43
End: 2023-06-13

## 2023-06-13 ENCOUNTER — TELEPHONE (OUTPATIENT)
Dept: NEUROLOGY | Facility: CLINIC | Age: 43
End: 2023-06-13
Payer: MEDICARE

## 2023-06-13 LAB — TOPIRAMATE SERPL-MCNC: 3 UG/ML

## 2023-06-13 NOTE — CONFIDENTIAL NOTE
Forwarded to the team at [facility]. The pt last saw Dr Washington on 4/10/23 at the [facility] location.

## 2023-06-13 NOTE — TELEPHONE ENCOUNTER
Health Call Center    Phone Message    May a detailed message be left on voicemail: yes     Reason for Call: Symptoms or Concerns     If patient has red-flag symptoms, warm transfer to triage line    Current symptom or concern: Patient was taken to ER on 6/11/23 for seizure and convulsions.  Tonya  would like to discuss ongoing care of patient.  Also, Tonya is wanting to know where patient's tumor is.    Symptoms have been present for:  3 day(s)    Has patient previously been seen for this? Yes    By Wilner Carter:     Date: ASAP    Are there any new or worsening symptoms? No  Patient was last seen 9/2020 by Dr. Carter  Action Taken: Message routed to:  [facility] ([facility]):  Neurology    Travel Screening: Not Applicable

## 2023-06-14 LAB — LACOSAMIDE SERPL-MCNC: 0.9 UG/ML

## 2023-06-15 ENCOUNTER — TELEPHONE (OUTPATIENT)
Dept: NEUROLOGY | Facility: CLINIC | Age: 43
End: 2023-06-15

## 2023-06-15 NOTE — TELEPHONE ENCOUNTER
What is the concern that needs to be addressed by a nurse? jael  called in to say that patient had seizure with some possible convulsion, jael would like to know where to go from here. Call back number [phone]    May a detailed message be left on voicemail -yes    Date of last office visit:     Message routed to: mincep

## 2023-07-12 ENCOUNTER — TELEPHONE (OUTPATIENT)
Dept: NEUROLOGY | Facility: CLINIC | Age: 43
End: 2023-07-12

## 2023-07-12 NOTE — LETTER
"7/17/2023       RE: Duane D Backes  [address]  [facility] [address]      SEIZURE PLAN AND PROTOCOL     Seizure description:     Full body stiffening and/or jerking, urinary incontinence, verbally unresponsive. \"Convulsions\"        Plan of Care:     Follow general seizure care and First Aid guidelines for seizures.  Anticonvulsant medications will be administered as prescribed.  All seizures will be documented on a Seizure Report including:  date, time, length of seizure, specific body movements and behaviors exhibited during the seizure, and post-seizure state.  Antiepileptic blood levels will be ordered by the physician based upon client's condition and medications.     Missed Doses:     IF YOU REALIZE WITHIN 24 HOURS:     ...You MISSED ONE DOSE of your anticonvulsant medication(s), take the missed dose immediately unless it is time for your next scheduled dose. If that is the case, take your next scheduled dose, wait two hours, and then take the missed dose.     ...You MISSED TWO OR MORE DOSES, take one of the missed doses immediately, even if it is time for your next scheduled dose. Then call the [facility] clinic to schedule an appointment.      IF YOU REALIZE NOW YOU MISSED A DOSE MORE THAN 24 HOURS AGO, francisco it on your calendar. DO NOT take an extra dose.        Medication Errors:     Your facility nurse should be notified of any medication errors. The nurse should observe the urgency of the error. It is not necessary to notify the MD on call unless the facility nurse or delegate believes it to be life threatening or could possibly result in a serious complication. Routine notices required by regulation should be telephoned to the clinic during office hours.     Extra Dose:     An extra dose of an antiepileptic drug is not serious. Ordinarily, at most, the client may experience increased side effects for several hours. Contact your facility nurse immediately if an extra dose was given.   " "  Seizure Protocol:     Valtoco 10 mg nasal spray (diazepam high concentration intranasal)  Spray 10 mg in nostril as needed (seizure greater than 3 minutes or more than 2 in 8 hours.). Maximum dose 20 mg in a 24 hour period  Monitor breathing. Activate emergency medical services (EMS)  if there are any concerns (\"call 9-1-1\")        When to call 911 for Seizures:     Call 911 if:  Duane does not start breathing within 1 minute after the seizure. If this happens call 911 immediately and start CPR.  Duane sustains an injury  Duane has one seizure right after another without regaining consciousness in between  A GTC seizure lasts over 5 minutes  Facility protocol requires EMS evaluation              Provider:                Lauren Washington M.D.      "

## 2023-07-12 NOTE — TELEPHONE ENCOUNTER
What is the concern that needs to be addressed by a nurse? Patient needs  New updated seizure protocol      May a detailed message be left on voicemail? yes    Date of last office visit:     Message routed to: mincep

## 2023-07-17 NOTE — TELEPHONE ENCOUNTER
Chart reviewed.  Plan in recent MD notes is different from the prescribed rescue medication  Prescription is for Valtoco Nasal, plan is for diazepam Intensol (High Conc Solution)  Will contact MD for clarification

## 2023-07-25 NOTE — TELEPHONE ENCOUNTER
"Per   \"Valtoco is good if he is able to get it through insurance. Maximum dose is two sprays 20 mg in 24 hour period. thanks          Updated protocol provided. Printed and placed for MD signature  "

## 2023-08-03 NOTE — TELEPHONE ENCOUNTER
I call and leave a message for Tonya asking for a fax number to send the seizure protocol to or how they would like to received it.

## 2023-09-05 ENCOUNTER — TELEPHONE (OUTPATIENT)
Dept: NEUROLOGY | Facility: CLINIC | Age: 43
End: 2023-09-05

## 2023-09-05 ENCOUNTER — VIRTUAL VISIT (OUTPATIENT)
Dept: NEUROLOGY | Facility: CLINIC | Age: 43
End: 2023-09-05
Payer: MEDICARE

## 2023-09-05 DIAGNOSIS — G40.909 SEIZURE DISORDER (H): ICD-10-CM

## 2023-09-05 RX ORDER — LACOSAMIDE 100 MG/1
100 TABLET ORAL 2 TIMES DAILY
Qty: 62 TABLET | Refills: 5 | Status: SHIPPED | OUTPATIENT
Start: 2023-09-05 | End: 2024-03-06

## 2023-09-05 RX ORDER — LACOSAMIDE 100 MG/1
100 TABLET ORAL 2 TIMES DAILY
Qty: 60 TABLET | Refills: 5 | Status: SHIPPED | OUTPATIENT
Start: 2023-09-05 | End: 2023-09-05

## 2023-09-05 NOTE — PROGRESS NOTES
Virtual Visit Details    Type of service:  Video Visit   Video Start Time: 9:04 AM  Video End Time: 9:16 am     [facility] Epilepsy Care Progress Note    Patient:  Duane D Backes  :  1980   Age:  43 year old   Today's Office Visit:  2023    Epilepsy history copied forward:  Duane D Backes is right handed with history of cognitive disability and epilepsy referred to us by Dr. Carter for high depakote levels. Per medical records from  he had a generalized tonic-clonic convulsion at [facility], EEG showed rare left temporal epileptiform discharges. He is has been treated for epilepsy with topiramate (low levels) and depakote with high concentration for many years.   Interval History: Spoke to Yarely (evans kelly) (). 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 Prior to this last seizure in 2021 (intubated in ICU, status) and prior to this his last seizure was 2020. He is working with behavioral analyst. He is on water intake restriction and his is causing behavioral issues. He went to ER for UTI. They are not monitoring water, so I asked them to monitor water intake to avoid drop in Na+.   His behaviors is better, when he is hyper he can throw things. He will try to hit his house mate. I encouraged them to work with psychiatrist and regular 2-3 month follow up to manage behaviors.  He has established follow up with psychiatrist.    Current antiepileptic drug:   Divalproex  mg three times a day   Topiramate  200 mg twice a day  Lacosamide 100 mg twice a day   Medication Notes:   AED Medication Compliance:  compliant most of the time  Using a pill box:  Yes  Past AEDs notes:   Topiramate: We increase topiramate to 200 mg twice a day in winter 2020.    Depakote was decreased from 1500 mg twice a day to 1000 mg twice a day and  due to blood concentrations near 170. Lowering depakote  caused more behavioral issues.   vimpat started 2/2021 after status epilepticus  Psycho-Social History: he moved to current group home 11/2021. He grew up with parents, after high school he was institutionalized (may be due to behavioral) and then transferred to group home 2013. No drinking, no drugs, and no smoking.     Exam:    There were no vitals taken for this visit.     Wt Readings from Last 5 Encounters:   01/24/23 255 lb 11.7 oz (116 kg)   01/12/23 251 lb 5.2 oz (114 kg)   12/05/22 230 lb (104.3 kg)   08/26/22 234 lb 9.1 oz (106.4 kg)   01/13/22 211 lb (95.7 kg)     Cognitive delay, Alert, face symmetric, extra ocular movements in tact, able to life upper extremities. Very calm.     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  Prior EEGs have shown rare left temporal sharp waves (opposite side of tumor).  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  Patient has been managed on Depakote and topiramate for several years with no seizures. We will continue same antiepileptic drug.     We reviewed MRI brain 1/2023 and tumor is stable. Talk to primary care provider about hyponatremia with polydipsia.    Continue same antiepileptic drug.       Plan :     Continue current seizure medications:   Depakote 500 mg three times a day   Topiramate  200 mg twice a day  Lacosamide 100 mg twice a day (renewed this)    Follow up with psychiatrist for behavioral issues and optimize medications to help behavior.  I encouraged them to work with psychiatrist and regular 2-3 month follow up to manage behaviors.     Follow up  With Felix 6 months     Diazepam: Give 1 ml (5mg): Give 1 ml for ANY  seizure. May repeat once 1 ml (5 mg). Monitor breathing, if there any concerns call 911. Do not exceed 10 mg per day.    Repeat MRI brain 2024    Monitor water to avoid excessive intake as this will cause low Na+ and results seizures. Limit water to 8-10 glasses per day. Talk to primary care provider about hyponatremia with polydipsia and monitor na+    I spent 15 minutes in total today to provide comprehensive  medical care.   I spent 3 minutes writing the note and placing orders.   I spent 1 minutes  reviewing the chart.     The rest of the time was spent with the patient in face to face interview. During this time key medical decisions were made with review of medical chart prior to visit, visit with patient, counseling/education, and post visit work, including documentation of note on the day of visit. I addressed all questions the patient/caregiver raised in regards to epilepsy or related medical questions.           Lauren Washington MD   Epilepsy Staff

## 2023-09-05 NOTE — NURSING NOTE
Unable to complete any rooming of allergy review, medication review, vitals, QRNS due to time restraint.       Is the patient currently in the state of [place]? YES    Visit mode:VIDEO    If the visit is dropped, the patient can be reconnected by: VIDEO VISIT: Text to cell phone:   No relevant phone numbers on file.       Will anyone else be joining the visit? YES: How would they like to receive their invitation? Text to cell phone:  [facility]-   (If patient encounters technical issues they should call [phone])    How would you like to obtain your AVS? MyChart    Are changes needed to the allergy or medication list? No    Reason for visit: MATI SAM

## 2023-09-05 NOTE — PATIENT INSTRUCTIONS
Continue current seizure medications:   Depakote 500 mg three times a day   Topiramate  200 mg twice a day  Lacosamide 100 mg twice a day (renewed this)    Follow up with psychiatrist for behavioral issues and optimize medications to help behavior.  I encouraged them to work with psychiatrist and regular 2-3 month follow up to manage behaviors.     Follow up  With Felix 6 months     Diazepam: Give 1 ml (5mg): Give 1 ml for ANY seizure. May repeat once 1 ml (5 mg). Monitor breathing, if there any concerns call 911. Do not exceed 10 mg per day.    Repeat MRI brain 2024    Monitor water to avoid excessive intake as this will cause low Na+ and results seizures. Limit water to 8-10 glasses per day.     Talk to primary care provider about hyponatremia with polydipsia and monitor na+    Lauren Washington MD

## 2023-09-05 NOTE — TELEPHONE ENCOUNTER
Patients Pharmacy is asking for a quantity change on the Lacosamide from 60 tablets to 62 tablets.

## 2023-09-05 NOTE — LETTER
2023       RE: Duane D Backes  : 1980   MRN: [id number]        Dear Colleague,    Thank you for referring your patient, Duane D Backes, to the [facility] EPILEPSY CARE at [facility]. Please see a copy of my visit note below.    [facility]    Patient:  Duane D Backes  :  1980   Age:  43 year old   Today's Office Visit:  2023    Epilepsy history copied forward:  Duane D Backes is right handed with history of cognitive disability and epilepsy referred to us by Dr. Carter for high depakote levels. Per medical records from  he had a generalized tonic-clonic convulsion at [facility], EEG showed rare left temporal epileptiform discharges. He is has been treated for epilepsy with topiramate (low levels) and depakote with high concentration for many years.   Interval History: Spoke to Yarely (evans kelly) (). 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 Prior to this last seizure in 2021 (intubated in ICU, status) and prior to this his last seizure was 2020. He is working with behavioral analyst. He is on water intake restriction and his is causing behavioral issues. He went to ER for UTI. They are not monitoring water, so I asked them to monitor water intake to avoid drop in Na+.   His behaviors is better, when he is hyper he can throw things. He will try to hit his house mate. I encouraged them to work with psychiatrist and regular 2-3 month follow up to manage behaviors.  He has established follow up with psychiatrist.    Current antiepileptic drug:   Divalproex  mg three times a day   Topiramate  200 mg twice a day  Lacosamide 100 mg twice a day   Medication Notes:   AED Medication Compliance:  compliant most of the time  Using a pill box:  Yes  Past AEDs notes:   Topiramate: We increase  topiramate to 200 mg twice a day in winter 2020.    Depakote was decreased from 1500 mg twice a day to 1000 mg twice a day and 2020 due to blood concentrations near 170. Lowering depakote caused more behavioral issues.   vimpat started 2/2021 after status epilepticus  Psycho-Social History: he moved to current group home 11/2021. He grew up with parents, after high school he was institutionalized (may be due to behavioral) and then transferred to group home 2013. No drinking, no drugs, and no smoking.     Exam:    There were no vitals taken for this visit.     Wt Readings from Last 5 Encounters:   01/24/23 255 lb 11.7 oz (116 kg)   01/12/23 251 lb 5.2 oz (114 kg)   12/05/22 230 lb (104.3 kg)   08/26/22 234 lb 9.1 oz (106.4 kg)   01/13/22 211 lb (95.7 kg)     Cognitive delay, Alert, face symmetric, extra ocular movements in tact, able to life upper extremities. Very calm.     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  Prior EEGs have shown rare left temporal sharp waves (opposite side of tumor).  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  Patient has been managed on Depakote and topiramate for several years with no seizures. We will continue same antiepileptic drug.     We reviewed MRI brain 1/2023 and tumor is stable. Talk to primary care provider about hyponatremia with polydipsia.    Continue same antiepileptic drug.       Plan :     Continue current seizure medications:   Depakote 500 mg three times a day   Topiramate  200 mg twice a day  Lacosamide 100 mg twice a day (renewed this)    Follow up with psychiatrist for behavioral issues and optimize medications to help  behavior.  I encouraged them to work with psychiatrist and regular 2-3 month follow up to manage behaviors.     Follow up  With Felix 6 months     Diazepam: Give 1 ml (5mg): Give 1 ml for ANY seizure. May repeat once 1 ml (5 mg). Monitor breathing, if there any concerns call 911. Do not exceed 10 mg per day.    Repeat MRI brain 2024    Monitor water to avoid excessive intake as this will cause low Na+ and results seizures. Limit water to 8-10 glasses per day. Talk to primary care provider about hyponatremia with polydipsia and monitor na+    I spent 15 minutes in total today to provide comprehensive  medical care.   I spent 3 minutes writing the note and placing orders.   I spent 1 minutes  reviewing the chart.     The rest of the time was spent with the patient in face to face interview. During this time key medical decisions were made with review of medical chart prior to visit, visit with patient, counseling/education, and post visit work, including documentation of note on the day of visit. I addressed all questions the patient/caregiver raised in regards to epilepsy or related medical questions.           Again, thank you for allowing me to participate in the care of your patient.      Sincerely,    Lauren Washington MD

## 2023-09-06 ENCOUNTER — TELEPHONE (OUTPATIENT)
Dept: NEUROLOGY | Facility: CLINIC | Age: 43
End: 2023-09-06

## 2023-10-16 ENCOUNTER — OFFICE VISIT (OUTPATIENT)
Dept: FAMILY MEDICINE | Facility: CLINIC | Age: 43
End: 2023-10-16
Payer: MEDICARE

## 2023-10-16 VITALS
SYSTOLIC BLOOD PRESSURE: 116 MMHG | BODY MASS INDEX: 29.29 KG/M2 | DIASTOLIC BLOOD PRESSURE: 64 MMHG | RESPIRATION RATE: 18 BRPM | OXYGEN SATURATION: 96 % | HEART RATE: 96 BPM | HEIGHT: 73 IN | TEMPERATURE: 97.7 F | WEIGHT: 221 LBS

## 2023-10-16 DIAGNOSIS — R21 RASH AND NONSPECIFIC SKIN ERUPTION: Primary | ICD-10-CM

## 2023-10-16 DIAGNOSIS — F84.0 ACTIVE AUTISTIC DISORDER: ICD-10-CM

## 2023-10-16 PROCEDURE — 99213 OFFICE O/P EST LOW 20 MIN: CPT | Performed by: PHYSICIAN ASSISTANT

## 2023-10-16 RX ORDER — CLOBETASOL PROPIONATE 0.5 MG/G
CREAM TOPICAL
Qty: 30 G | Refills: 0 | Status: SHIPPED | OUTPATIENT
Start: 2023-10-16 | End: 2024-01-03

## 2023-10-16 ASSESSMENT — PATIENT HEALTH QUESTIONNAIRE - PHQ9
SUM OF ALL RESPONSES TO PHQ QUESTIONS 1-9: 12
10. IF YOU CHECKED OFF ANY PROBLEMS, HOW DIFFICULT HAVE THESE PROBLEMS MADE IT FOR YOU TO DO YOUR WORK, TAKE CARE OF THINGS AT HOME, OR GET ALONG WITH OTHER PEOPLE: VERY DIFFICULT
SUM OF ALL RESPONSES TO PHQ QUESTIONS 1-9: 12

## 2023-10-16 ASSESSMENT — PAIN SCALES - GENERAL: PAINLEVEL: NO PAIN (0)

## 2023-10-16 NOTE — PATIENT INSTRUCTIONS
At [facility], we strive to deliver an exceptional experience to you, every time we see you. If you receive a survey, please complete it as we do value your feedback.  If you have MyChart, you can expect to receive results automatically within 24 hours of their completion.  Your provider will send a note interpreting your results as well.   If you do not have MyChart, you should receive your results in about a week by mail.    Your care team:                            Family Medicine Internal Medicine   MD Scooter Gao MD Shantel Branch-Fleming, MD Srinivasa Vaka, MD Katya Belousova, PABENOIT Delgado, MD Pediatrics   Jarrell Tinajero, PAMD Elmira Bonds MD Amelia Massimini APRN CNP   VERONICA Dent CNP, MD Charanya Pasupathi, MD Kathleen Widmer, NP coming October 2023 Same-Day (No follow up visit)    RAMA Dexter PA coming Oct 2023     Clinic hours: Monday - Thursday 7 am-6 pm; Fridays 7 am-5 pm.   Urgent care: Monday - Friday 10 am- 8 pm; Saturday and Sunday 9 am-5 pm.    Clinic: [phone]       [facility]: Monday - Thursday 8 am - 7 pm; Friday 8 am - 6 pm  [facility]: [phone]

## 2023-10-16 NOTE — PROGRESS NOTES
Assessment & Plan   Problem List Items Addressed This Visit          Behavioral    Active autistic disorder     Other Visit Diagnoses       Rash and nonspecific skin eruption    -  Primary    Relevant Medications    clobetasol (TEMOVATE) 0.05 % external cream              13 minutes spent by me on the date of the encounter doing chart review, history and exam, documentation and further activities per the note  {     Depression Screening Follow Up        10/16/2023     1:44 PM   PHQ   PHQ-9 Total Score 12   Q9: Thoughts of better off dead/self-harm past 2 weeks Several days   F/U: Thoughts of suicide or self-harm Yes   F/U: Self harm-plan Yes   F/U: Self-harm action No   F/U: Safety concerns Yes     Patient has psychologist and psychiatrist and live [facility]. Per staff this is his baseline the past several weeks.  Is actually doing well at his appt today.   Sees psych tomorrow.         RAYSHAWN [facility]    Subjective Duane is a 43 year old, presenting for the following health issues:  Derm Problem        10/16/2023     1:54 PM   Additional Questions   Roomed by torey quinteros   Accompanied by mansi         10/16/2023     1:54 PM   Patient Reported Additional Medications   Patient reports taking the following new medications none       History of Present Illness       Reason for visit:  Rash  Symptom onset:  1-2 weeks ago  Symptoms include:  Rash on buttock an thigh and now face  Symptom intensity:  Moderate  Symptom progression:  Worsening  Had these symptoms before:  No  What makes it worse:  Itching  What makes it better:  Itching    He eats 0-1 servings of fruits and vegetables daily.He consumes 0 sweetened beverage(s) daily.He exercises with enough effort to increase his heart rate 9 or less minutes per day.  He exercises with enough effort to increase his heart rate 3 or less days per week. He is missing 1 dose(s) of medications per week.  He is not  "taking prescribed medications regularly due to other.     9 days ago rash noted to buttocks and rt thigh. Deniespain but lots of itching.         Review of Systems   SKIN rash as above      Objective    /64   Pulse 96   Temp 97.7  F (36.5  C) (Oral)   Resp 18   Ht 1.854 m (6' 1\")   Wt 100.2 kg (221 lb)   SpO2 96%   BMI 29.16 kg/m    Body mass index is 29.16 kg/m .  Physical Exam   SKIN- both butocks with MIRZA small red macpap lesion,s Rt> left and more extending around upper rt thigh                    "

## 2023-10-19 DIAGNOSIS — Z79.899 ENCOUNTER FOR LONG-TERM (CURRENT) USE OF HIGH-RISK MEDICATION: Primary | ICD-10-CM

## 2023-11-22 ENCOUNTER — OFFICE VISIT (OUTPATIENT)
Dept: FAMILY MEDICINE | Facility: CLINIC | Age: 43
End: 2023-11-22
Payer: MEDICARE

## 2023-11-22 VITALS
OXYGEN SATURATION: 98 % | TEMPERATURE: 97.1 F | WEIGHT: 222.6 LBS | DIASTOLIC BLOOD PRESSURE: 76 MMHG | RESPIRATION RATE: 16 BRPM | HEIGHT: 72 IN | SYSTOLIC BLOOD PRESSURE: 110 MMHG | HEART RATE: 84 BPM | BODY MASS INDEX: 30.15 KG/M2

## 2023-11-22 DIAGNOSIS — Z23 NEED FOR VACCINATION: ICD-10-CM

## 2023-11-22 DIAGNOSIS — R35.0 URINARY FREQUENCY: ICD-10-CM

## 2023-11-22 DIAGNOSIS — Z23 NEED FOR TDAP VACCINATION: ICD-10-CM

## 2023-11-22 DIAGNOSIS — E55.9 VITAMIN D DEFICIENCY: ICD-10-CM

## 2023-11-22 DIAGNOSIS — Z12.5 SCREENING FOR PROSTATE CANCER: ICD-10-CM

## 2023-11-22 DIAGNOSIS — E03.4 HYPOTHYROIDISM DUE TO ACQUIRED ATROPHY OF THYROID: ICD-10-CM

## 2023-11-22 DIAGNOSIS — Z00.00 ENCOUNTER FOR MEDICARE ANNUAL WELLNESS EXAM: Primary | ICD-10-CM

## 2023-11-22 PROCEDURE — 90480 ADMN SARSCOV2 VAC 1/ONLY CMP: CPT | Performed by: FAMILY MEDICINE

## 2023-11-22 PROCEDURE — G0008 ADMIN INFLUENZA VIRUS VAC: HCPCS | Performed by: FAMILY MEDICINE

## 2023-11-22 PROCEDURE — 91320 SARSCV2 VAC 30MCG TRS-SUC IM: CPT | Performed by: FAMILY MEDICINE

## 2023-11-22 PROCEDURE — 90686 IIV4 VACC NO PRSV 0.5 ML IM: CPT | Performed by: FAMILY MEDICINE

## 2023-11-22 PROCEDURE — G0438 PPPS, INITIAL VISIT: HCPCS | Performed by: FAMILY MEDICINE

## 2023-11-22 PROCEDURE — 99213 OFFICE O/P EST LOW 20 MIN: CPT | Mod: 25 | Performed by: FAMILY MEDICINE

## 2023-11-22 RX ORDER — LITHIUM CARBONATE 300 MG/1
300 TABLET, FILM COATED, EXTENDED RELEASE ORAL 2 TIMES DAILY
COMMUNITY
Start: 2023-11-12

## 2023-11-22 RX ORDER — QUETIAPINE FUMARATE 25 MG/1
25 TABLET, FILM COATED ORAL DAILY
COMMUNITY
Start: 2023-11-09

## 2023-11-22 ASSESSMENT — ENCOUNTER SYMPTOMS
FREQUENCY: 1
MYALGIAS: 0
NAUSEA: 0
DIZZINESS: 0
DIARRHEA: 0
NERVOUS/ANXIOUS: 1
SORE THROAT: 0
COUGH: 0
CONSTIPATION: 0
WEAKNESS: 0
PARESTHESIAS: 0
ABDOMINAL PAIN: 0
HEMATOCHEZIA: 0
EYE PAIN: 0
HEADACHES: 0
PALPITATIONS: 0
FEVER: 0
DYSURIA: 0
CHILLS: 0
JOINT SWELLING: 0
HEMATURIA: 0
HEARTBURN: 0
ARTHRALGIAS: 0
SHORTNESS OF BREATH: 0

## 2023-11-22 ASSESSMENT — ACTIVITIES OF DAILY LIVING (ADL)
CURRENT_FUNCTION: TRANSPORTATION REQUIRES ASSISTANCE
CURRENT_FUNCTION: HOUSEWORK REQUIRES ASSISTANCE
CURRENT_FUNCTION: MEDICATION ADMINISTRATION REQUIRES ASSISTANCE
CURRENT_FUNCTION: MONEY MANAGEMENT REQUIRES ASSISTANCE
CURRENT_FUNCTION: SHOPPING REQUIRES ASSISTANCE
CURRENT_FUNCTION: TELEPHONE REQUIRES ASSISTANCE
CURRENT_FUNCTION: BATHING REQUIRES ASSISTANCE
CURRENT_FUNCTION: PREPARING MEALS REQUIRES ASSISTANCE
CURRENT_FUNCTION: LAUNDRY REQUIRES ASSISTANCE

## 2023-11-22 ASSESSMENT — PAIN SCALES - GENERAL: PAINLEVEL: NO PAIN (0)

## 2023-11-22 NOTE — PROGRESS NOTES
"SUBJECTIVE:   Duane is a 43 year old, presenting for the following:  Physical        11/22/2023     3:43 PM   Additional Questions   Roomed by Chelsea   Accompanied by Rebecca-       Are you in the first 12 months of your Medicare coverage?  No    Healthy Habits:     In general, how would you rate your overall health?  Good    Frequency of exercise:  None    Do you usually eat at least 4 servings of fruit and vegetables a day, include whole grains    & fiber and avoid regularly eating high fat or \"junk\" foods?  Yes    Taking medications regularly:  Yes    Medication side effects:  None    Ability to successfully perform activities of daily living:  Telephone requires assistance, transportation requires assistance, shopping requires assistance, preparing meals requires assistance, housework requires assistance, bathing requires assistance, laundry requires assistance, medication administration requires assistance and money management requires assistance    Home Safety:  No safety concerns identified    Hearing Impairment:  No hearing concerns    In the past 6 months, have you been bothered by leaking of urine?  No    In general, how would you rate your overall mental or emotional health?  Fair    Additional concerns today:  No      Today's PHQ-2 Score:       11/22/2023     3:56 PM   PHQ-2 ( 1999 [employer])   Q1: Little interest or pleasure in doing things 0   Q2: Feeling down, depressed or hopeless 0   PHQ-2 Score 0   Q1: Little interest or pleasure in doing things Not at all   Q2: Feeling down, depressed or hopeless Not at all   PHQ-2 Score 0       Have you ever done Advance Care Planning? (For example, a Health Directive, POLST, or a discussion with a medical provider or your loved ones about your wishes): No, advance care planning information given to patient to review.  Patient declined advance care planning discussion at this time.       Fall risk  Fallen 2 or more times in the past year?: No  Any fall " with injury in the past year?: No    Cognitive Screening Not appropriate due to mental autism    Do you have sleep apnea, excessive snoring or daytime drowsiness? : no                   Social History     Tobacco Use    Smoking status: Never    Smokeless tobacco: Never   Substance Use Topics    Alcohol use: No             11/22/2023     3:56 PM   Alcohol Use   Prescreen: >3 drinks/day or >7 drinks/week? Not Applicable     Do you have a current opioid prescription? No  Do you use any other controlled substances or medications that are not prescribed by a provider? None    Current providers sharing in care for this patient include:   Patient Care Team:  Rajat Parkinson MD as PCP - General (Family Medicine)  Lauren Washington MD as Assigned Neuroscience Provider  Samson Tinajero PA as Assigned PCP    The following health maintenance items are reviewed in Epic and correct as of today:  Health Maintenance   Topic Date Due    EYE EXAM  08/13/2016    MEDICARE ANNUAL WELLNESS VISIT  11/04/2022    TSH W/FREE T4 REFLEX  02/23/2023    DTAP/TDAP/TD IMMUNIZATION (3 - Td or Tdap) 08/02/2023    INFLUENZA VACCINE (1) 09/01/2023    COVID-19 Vaccine (4 - 2023-24 season) 09/01/2023    ANNUAL REVIEW OF HM ORDERS  10/16/2024    LIPID  11/04/2026    ADVANCE CARE PLANNING  01/27/2028    HEPATITIS C SCREENING  Completed    HIV SCREENING  Completed    PHQ-2 (once per calendar year)  Completed    HEPATITIS B IMMUNIZATION  Completed    Pneumococcal Vaccine: Pediatrics (0 to 5 Years) and At-Risk Patients (6 to 64 Years)  Aged Out    IPV IMMUNIZATION  Aged Out    HPV IMMUNIZATION  Aged Out    MENINGITIS IMMUNIZATION  Aged Out    RSV MONOCLONAL ANTIBODY  Aged Out           Review of Systems   Constitutional:  Negative for chills and fever.   HENT:  Negative for congestion, ear pain, hearing loss and sore throat.    Eyes:  Negative for pain and visual disturbance.   Respiratory:  Negative for cough and shortness of breath.   "  Cardiovascular:  Negative for chest pain, palpitations and peripheral edema.   Gastrointestinal:  Negative for abdominal pain, constipation, diarrhea, heartburn, hematochezia and nausea.   Genitourinary:  Positive for frequency and urgency. Negative for dysuria, genital sores, hematuria, impotence and penile discharge.   Musculoskeletal:  Negative for arthralgias, joint swelling and myalgias.   Skin:  Negative for rash.   Neurological:  Negative for dizziness, weakness, headaches and paresthesias.   Psychiatric/Behavioral:  Positive for mood changes. The patient is nervous/anxious.          OBJECTIVE:   /76 (BP Location: Left arm, Patient Position: Sitting, Cuff Size: Adult Regular)   Pulse 84   Temp 97.1  F (36.2  C) (Oral)   Resp 16   Ht 1.825 m (5' 11.85\")   Wt 101 kg (222 lb 9.6 oz)   SpO2 98%   BMI 30.32 kg/m   Estimated body mass index is 30.32 kg/m  as calculated from the following:    Height as of this encounter: 1.825 m (5' 11.85\").    Weight as of this encounter: 101 kg (222 lb 9.6 oz).  Physical Exam  GENERAL: healthy, alert and no distress  EYES: Eyes grossly normal to inspection, PERRL and conjunctivae and sclerae normal  HENT: ear canals and TM's normal, nose and mouth without ulcers or lesions  NECK: no adenopathy, no asymmetry, masses, or scars and thyroid normal to palpation  RESP: lungs clear to auscultation - no rales, rhonchi or wheezes  CV: regular rate and rhythm, normal S1 S2, no S3 or S4, no murmur, click or rub, no peripheral edema and peripheral pulses strong  ABDOMEN: soft, nontender, no hepatosplenomegaly, no masses and bowel sounds normal   (male): normal male genitalia without lesions or urethral discharge, no hernia  MS: no gross musculoskeletal defects noted, no edema  SKIN: no suspicious lesions or rashes  NEURO: Normal strength and tone, infrequent speech is otherwise normal  PSYCH: Autistic, follows commands, cooperative with the exam, repeats some phrases, but he " "is not conversant          ASSESSMENT / PLAN:   (Z00.00) Encounter for Medicare annual wellness exam  (primary encounter diagnosis)  Comment: Negative screening exam; up-to-date on preventive services.   Plan: INFLUENZA VACCINE IM > 6 MONTHS VALENT IIV4         (AFLURIA/FLUZONE), COVID-19 12+ (2023-24)         (PFIZER), Prostate Specific Antigen Screen        Return in about 1 year (around 11/22/2024) for Medicare annual wellness exam, recheck medications.      (E03.4) Hypothyroidism due to acquired atrophy of thyroid  Comment: historically euthyroid.   Plan: TSH pending in 2 days.     (E55.9) Vitamin D deficiency  Comment: His last 2 levels have been in the normal range.   Plan: Vitamin D Deficiency        Adjust supplement dose as needed.     (R35.0) Urinary frequency  (Z12.5) Screening for prostate cancer  Comment: The frequency is probably related to oral intake habits. I will use the PSA to help exclude the need to see a urologist.   Plan: Prostate Specific Antigen Screen            (Z23) Need for vaccination  Comment:   Plan: INFLUENZA VACCINE IM > 6 MONTHS VALENT IIV4         (AFLURIA/FLUZONE), COVID-19 12+ (2023-24)         (PFIZER)            (Z23) Need for Tdap vaccination  Comment: Pharmacy  Plan:         COUNSELING:  Reviewed preventive health counseling, as reflected in patient instructions  Special attention given to:       Regular exercise       Vision screening       Immunizations  Vaccinated for: Covid-19 and Influenza           Prostate cancer screening      BMI:   Estimated body mass index is 30.32 kg/m  as calculated from the following:    Height as of this encounter: 1.825 m (5' 11.85\").    Weight as of this encounter: 101 kg (222 lb 9.6 oz).   Weight management plan: exercise recommendations summarized in the AVS.      He reports that he has never smoked. He has never used smokeless tobacco.      Appropriate preventive services were discussed with this patient, including applicable screening as " appropriate for fall prevention, nutrition, physical activity, Tobacco-use cessation, weight loss and cognition.  Checklist reviewing preventive services available has been given to the patient.    Reviewed patients plan of care and provided an AVS. The Basic Care Plan (routine screening as documented in Health Maintenance) for Duane meets the Care Plan requirement. This Care Plan has been established and reviewed with the Patient and group home attendant .           Rajat Parkinson MD  [facility]    This document serves as a record of the services and decisions personally performed and made by Dr. Parkinson. It was created on his behalf by Unruly Iverson, a trained medical scribe. The creation of this document is based the provider's statements to the medical scribe.  Unruly Iverson,  5:07 PM

## 2023-11-22 NOTE — PATIENT INSTRUCTIONS
Patient Education   Personalized Prevention Plan  You are due for the preventive services outlined below.  Your care team is available to assist you in scheduling these services.  If you have already completed any of these items, please share that information with your care team to update in your medical record.  Health Maintenance Due   Topic Date Due    Eye Exam  08/13/2016    Annual Wellness Visit  11/04/2022    Thyroid Function Lab  02/23/2023    Diptheria Tetanus Pertussis (DTAP/TDAP/TD) Vaccine (3 - Td or Tdap) 08/02/2023    Flu Vaccine (1) 09/01/2023    COVID-19 Vaccine (4 - 2023-24 season) 09/01/2023              Preventive Health Recommendations  Male Ages 40 to 49    Yearly exam:             See your health care provider every year in order to  o   Review health changes.   o   Discuss preventive care.    o   Review your medicines if your doctor has prescribed any.  You should be tested each year for STDs (sexually transmitted diseases) if you re at risk.   Have a cholesterol test every 5 years.   Have a colonoscopy (test for colon cancer) beginning at age 45.  Ask your provider about other options like a yearly FIT test or Cologuard test every 3 years (stool tests)   After age 45, have a diabetes test (fasting glucose). If you are at risk for diabetes, you should have this test every 3 years.    Talk with your health care provider about whether or not a prostate cancer screening test (PSA) is right for you.    Shots: Get a flu shot each year. Get a tetanus shot every 10 years.     Nutrition:  Eat at least 5 servings of fruits and vegetables daily.   Eat whole-grain bread, whole-wheat pasta and brown rice instead of white grains and rice.   Get adequate Calcium and Vitamin D.     Lifestyle  Aerobic exercise for at least 150 minutes per week (40+ minutes per day, 4-6 days per week). This will help you control your weight and prevent disease.   Limit alcohol to one drink per day.   No smoking.   Wear  sunscreen to prevent skin cancer.   See your dentist every six months for an exam and cleaning.

## 2023-11-24 ENCOUNTER — LAB (OUTPATIENT)
Dept: LAB | Facility: CLINIC | Age: 43
End: 2023-11-24
Payer: MEDICARE

## 2023-11-24 DIAGNOSIS — Z79.899 ENCOUNTER FOR LONG-TERM (CURRENT) USE OF HIGH-RISK MEDICATION: ICD-10-CM

## 2023-11-24 LAB
ALBUMIN SERPL BCG-MCNC: 4.5 G/DL (ref 3.5–5.2)
ANION GAP SERPL CALCULATED.3IONS-SCNC: 8 MMOL/L (ref 7–15)
BUN SERPL-MCNC: 19.5 MG/DL (ref 6–20)
CALCIUM SERPL-MCNC: 10 MG/DL (ref 8.6–10)
CHLORIDE SERPL-SCNC: 103 MMOL/L (ref 98–107)
CHOLEST SERPL-MCNC: 158 MG/DL
CREAT SERPL-MCNC: 0.76 MG/DL (ref 0.67–1.17)
DEPRECATED HCO3 PLAS-SCNC: 29 MMOL/L (ref 22–29)
EGFRCR SERPLBLD CKD-EPI 2021: >90 ML/MIN/1.73M2
FASTING STATUS PATIENT QL REPORTED: YES
GLUCOSE SERPL-MCNC: 101 MG/DL (ref 70–99)
GLUCOSE SERPL-MCNC: 101 MG/DL (ref 70–99)
HDLC SERPL-MCNC: 54 MG/DL
LDLC SERPL CALC-MCNC: 87 MG/DL
NONHDLC SERPL-MCNC: 104 MG/DL
PHOSPHATE SERPL-MCNC: 2.9 MG/DL (ref 2.5–4.5)
POTASSIUM SERPL-SCNC: 4.4 MMOL/L (ref 3.4–5.3)
SODIUM SERPL-SCNC: 140 MMOL/L (ref 135–145)
TRIGL SERPL-MCNC: 85 MG/DL
TSH SERPL DL<=0.005 MIU/L-ACNC: 3.12 UIU/ML (ref 0.3–4.2)

## 2023-11-24 PROCEDURE — 80061 LIPID PANEL: CPT

## 2023-11-24 PROCEDURE — 80069 RENAL FUNCTION PANEL: CPT

## 2023-11-24 PROCEDURE — 84443 ASSAY THYROID STIM HORMONE: CPT

## 2023-11-24 PROCEDURE — 36415 COLL VENOUS BLD VENIPUNCTURE: CPT

## 2023-12-04 ENCOUNTER — TELEPHONE (OUTPATIENT)
Dept: FAMILY MEDICINE | Facility: CLINIC | Age: 43
End: 2023-12-04
Payer: MEDICARE

## 2023-12-04 NOTE — TELEPHONE ENCOUNTER
Patient Quality Outreach    Patient is due for the following:   Diabetes -  Eye Exam      Topic Date Due    Diptheria Tetanus Pertussis (DTAP/TDAP/TD) Vaccine (3 - Td or Tdap) 08/02/2023       Next Steps:   Schedule a office visit for Eye Exam    Type of outreach:    Sent letter.      Questions for provider review:    None           Jenny Esquivel

## 2023-12-04 NOTE — LETTER
December 4, 2023    Duane D Backes  [id number]  [place] [id number]    Dear Duane,    At [facility] we care about your health and are committed to providing quality patient care.     Here is a list of Health Maintenance topics that are due now or due soon:  Health Maintenance Due   Topic Date Due    EYE EXAM  08/13/2016    DTAP/TDAP/TD IMMUNIZATION (3 - Td or Tdap) 08/02/2023        We are recommending that you:     Diabetic Eye Exam is due:  If this was done within the last 12 months then please contact the clinic or respond to this message with the date and location so we can update your records.    To schedule an appointment or discuss this further, you may contact us by phone at the [facility] at [phone] or online through the patient [email or web address] @ [email or web address]    Thank you for trusting [facility] and we appreciate the opportunity to serve you.  We look forward to supporting your healthcare needs in the future.    Your partners in health,      Quality Committee at [facility]

## 2023-12-19 ENCOUNTER — TELEPHONE (OUTPATIENT)
Dept: FAMILY MEDICINE | Facility: CLINIC | Age: 43
End: 2023-12-19
Payer: MEDICARE

## 2023-12-19 NOTE — TELEPHONE ENCOUNTER
RN received a call from patient's group home , [facility].     She states she is going to hold patient's   MILK OF MAGNESIA 400 MG/5ML suspension    Sig - Route: Take 30 mLs by mouth every evening - Oral   and   docusate sodium (COLACE) 100 MG capsule   Sig: TAKE 1 CAPSULE BY MOUTH TWICE DAILY.     Due to patient having diarrhea today, 12/19/23.     Routing to provider to update and advise.     Sopiha Charles, RN, BSN, PHN  [facility]  Nurse Triage, Family Practice

## 2024-01-03 ENCOUNTER — APPOINTMENT (OUTPATIENT)
Dept: CT IMAGING | Facility: CLINIC | Age: 44
End: 2024-01-03
Attending: EMERGENCY MEDICINE
Payer: MEDICARE

## 2024-01-03 ENCOUNTER — HOSPITAL ENCOUNTER (EMERGENCY)
Facility: CLINIC | Age: 44
Discharge: HOME | End: 2024-01-04
Attending: EMERGENCY MEDICINE | Admitting: EMERGENCY MEDICINE
Payer: MEDICARE

## 2024-01-03 DIAGNOSIS — R45.1 AGITATION: ICD-10-CM

## 2024-01-03 LAB
ALBUMIN UR-MCNC: NEGATIVE MG/DL
ANION GAP SERPL CALCULATED.3IONS-SCNC: 8 MMOL/L (ref 7–15)
APPEARANCE UR: CLEAR
BASOPHILS # BLD AUTO: 0 10E3/UL (ref 0–0.2)
BASOPHILS NFR BLD AUTO: 1 %
BILIRUB UR QL STRIP: NEGATIVE
BUN SERPL-MCNC: 18.3 MG/DL (ref 6–20)
CALCIUM SERPL-MCNC: 9.2 MG/DL (ref 8.6–10)
CHLORIDE SERPL-SCNC: 98 MMOL/L (ref 98–107)
COLOR UR AUTO: NORMAL
CREAT SERPL-MCNC: 0.73 MG/DL (ref 0.67–1.17)
DEPRECATED HCO3 PLAS-SCNC: 26 MMOL/L (ref 22–29)
EGFRCR SERPLBLD CKD-EPI 2021: >90 ML/MIN/1.73M2
EOSINOPHIL # BLD AUTO: 0.3 10E3/UL (ref 0–0.7)
EOSINOPHIL NFR BLD AUTO: 4 %
ERYTHROCYTE [DISTWIDTH] IN BLOOD BY AUTOMATED COUNT: 12.7 % (ref 10–15)
GLUCOSE SERPL-MCNC: 96 MG/DL (ref 70–99)
GLUCOSE UR STRIP-MCNC: NEGATIVE MG/DL
HCT VFR BLD AUTO: 41.3 % (ref 40–53)
HGB BLD-MCNC: 14 G/DL (ref 13.3–17.7)
HGB UR QL STRIP: NEGATIVE
IMM GRANULOCYTES # BLD: 0 10E3/UL
IMM GRANULOCYTES NFR BLD: 0 %
KETONES UR STRIP-MCNC: NEGATIVE MG/DL
LEUKOCYTE ESTERASE UR QL STRIP: NEGATIVE
LITHIUM SERPL-SCNC: 0.36 MMOL/L (ref 0.6–1.2)
LYMPHOCYTES # BLD AUTO: 2.4 10E3/UL (ref 0.8–5.3)
LYMPHOCYTES NFR BLD AUTO: 30 %
MCH RBC QN AUTO: 30.5 PG (ref 26.5–33)
MCHC RBC AUTO-ENTMCNC: 33.9 G/DL (ref 31.5–36.5)
MCV RBC AUTO: 90 FL (ref 78–100)
MONOCYTES # BLD AUTO: 0.8 10E3/UL (ref 0–1.3)
MONOCYTES NFR BLD AUTO: 10 %
NEUTROPHILS # BLD AUTO: 4.3 10E3/UL (ref 1.6–8.3)
NEUTROPHILS NFR BLD AUTO: 55 %
NITRATE UR QL: NEGATIVE
NRBC # BLD AUTO: 0 10E3/UL
NRBC BLD AUTO-RTO: 0 /100
PH UR STRIP: 6.5 [PH] (ref 5–7)
PLATELET # BLD AUTO: 211 10E3/UL (ref 150–450)
POTASSIUM SERPL-SCNC: 4.4 MMOL/L (ref 3.4–5.3)
RBC # BLD AUTO: 4.59 10E6/UL (ref 4.4–5.9)
RBC URINE: <1 /HPF
SODIUM SERPL-SCNC: 132 MMOL/L (ref 135–145)
SP GR UR STRIP: 1 (ref 1–1.03)
UROBILINOGEN UR STRIP-MCNC: NORMAL MG/DL
VALPROATE SERPL-MCNC: 70 UG/ML
WBC # BLD AUTO: 7.8 10E3/UL (ref 4–11)
WBC URINE: 0 /HPF

## 2024-01-03 PROCEDURE — 85004 AUTOMATED DIFF WBC COUNT: CPT | Performed by: EMERGENCY MEDICINE

## 2024-01-03 PROCEDURE — 70450 CT HEAD/BRAIN W/O DYE: CPT | Mod: MG

## 2024-01-03 PROCEDURE — 250N000011 HC RX IP 250 OP 636: Performed by: EMERGENCY MEDICINE

## 2024-01-03 PROCEDURE — 80164 ASSAY DIPROPYLACETIC ACD TOT: CPT | Performed by: EMERGENCY MEDICINE

## 2024-01-03 PROCEDURE — 96372 THER/PROPH/DIAG INJ SC/IM: CPT | Performed by: EMERGENCY MEDICINE

## 2024-01-03 PROCEDURE — 80048 BASIC METABOLIC PNL TOTAL CA: CPT | Performed by: EMERGENCY MEDICINE

## 2024-01-03 PROCEDURE — 81003 URINALYSIS AUTO W/O SCOPE: CPT | Performed by: EMERGENCY MEDICINE

## 2024-01-03 PROCEDURE — 36415 COLL VENOUS BLD VENIPUNCTURE: CPT | Performed by: EMERGENCY MEDICINE

## 2024-01-03 PROCEDURE — 250N000013 HC RX MED GY IP 250 OP 250 PS 637: Performed by: EMERGENCY MEDICINE

## 2024-01-03 PROCEDURE — 80178 ASSAY OF LITHIUM: CPT | Performed by: EMERGENCY MEDICINE

## 2024-01-03 PROCEDURE — 99285 EMERGENCY DEPT VISIT HI MDM: CPT | Mod: 25

## 2024-01-03 RX ORDER — VITAMIN B COMPLEX
25 TABLET ORAL EVERY MORNING
Status: DISCONTINUED | OUTPATIENT
Start: 2024-01-04 | End: 2024-01-04 | Stop reason: HOSPADM

## 2024-01-03 RX ORDER — LEVOTHYROXINE SODIUM 75 UG/1
150 TABLET ORAL
Status: DISCONTINUED | OUTPATIENT
Start: 2024-01-04 | End: 2024-01-04 | Stop reason: HOSPADM

## 2024-01-03 RX ORDER — PROPRANOLOL HYDROCHLORIDE 20 MG/1
40 TABLET ORAL 2 TIMES DAILY PRN
Status: DISCONTINUED | OUTPATIENT
Start: 2024-01-03 | End: 2024-01-04 | Stop reason: HOSPADM

## 2024-01-03 RX ORDER — POLYETHYLENE GLYCOL 3350 17 G/17G
17 POWDER, FOR SOLUTION ORAL DAILY
Status: DISCONTINUED | OUTPATIENT
Start: 2024-01-04 | End: 2024-01-04 | Stop reason: HOSPADM

## 2024-01-03 RX ORDER — LACOSAMIDE 100 MG/1
100 TABLET ORAL 2 TIMES DAILY
Status: DISCONTINUED | OUTPATIENT
Start: 2024-01-03 | End: 2024-01-04 | Stop reason: HOSPADM

## 2024-01-03 RX ORDER — RISPERIDONE 1 MG/1
1 TABLET ORAL 2 TIMES DAILY PRN
Status: DISCONTINUED | OUTPATIENT
Start: 2024-01-03 | End: 2024-01-04 | Stop reason: HOSPADM

## 2024-01-03 RX ORDER — RISPERIDONE 1 MG/1
2 TABLET ORAL 2 TIMES DAILY
Status: DISCONTINUED | OUTPATIENT
Start: 2024-01-03 | End: 2024-01-04

## 2024-01-03 RX ORDER — LACTULOSE 10 G/15ML
30 SOLUTION ORAL
Status: DISCONTINUED | OUTPATIENT
Start: 2024-01-03 | End: 2024-01-04 | Stop reason: HOSPADM

## 2024-01-03 RX ORDER — DIAZEPAM 5 MG
5 TABLET ORAL
Status: COMPLETED | OUTPATIENT
Start: 2024-01-03 | End: 2024-01-03

## 2024-01-03 RX ORDER — OLANZAPINE 10 MG/2ML
10 INJECTION, POWDER, FOR SOLUTION INTRAMUSCULAR 2 TIMES DAILY PRN
Status: DISCONTINUED | OUTPATIENT
Start: 2024-01-03 | End: 2024-01-04 | Stop reason: HOSPADM

## 2024-01-03 RX ORDER — LITHIUM CARBONATE 300 MG/1
300 TABLET, FILM COATED, EXTENDED RELEASE ORAL 2 TIMES DAILY
Status: DISCONTINUED | OUTPATIENT
Start: 2024-01-03 | End: 2024-01-04

## 2024-01-03 RX ORDER — QUETIAPINE FUMARATE 25 MG/1
25 TABLET, FILM COATED ORAL EVERY MORNING
Status: DISCONTINUED | OUTPATIENT
Start: 2024-01-04 | End: 2024-01-04 | Stop reason: HOSPADM

## 2024-01-03 RX ORDER — QUETIAPINE FUMARATE 50 MG/1
50 TABLET, FILM COATED ORAL 2 TIMES DAILY
Status: DISCONTINUED | OUTPATIENT
Start: 2024-01-03 | End: 2024-01-04 | Stop reason: HOSPADM

## 2024-01-03 RX ORDER — DOCUSATE SODIUM 100 MG/1
100 CAPSULE, LIQUID FILLED ORAL 2 TIMES DAILY
Status: DISCONTINUED | OUTPATIENT
Start: 2024-01-03 | End: 2024-01-04 | Stop reason: HOSPADM

## 2024-01-03 RX ORDER — QUETIAPINE FUMARATE 50 MG/1
50 TABLET, FILM COATED ORAL 2 TIMES DAILY
COMMUNITY

## 2024-01-03 RX ORDER — MICONAZOLE NITRATE 20 MG/G
CREAM TOPICAL 2 TIMES DAILY
COMMUNITY

## 2024-01-03 RX ORDER — SERTRALINE HYDROCHLORIDE 100 MG/1
200 TABLET, FILM COATED ORAL EVERY MORNING
Status: DISCONTINUED | OUTPATIENT
Start: 2024-01-04 | End: 2024-01-04 | Stop reason: HOSPADM

## 2024-01-03 RX ORDER — TOPIRAMATE 100 MG/1
200 TABLET, FILM COATED ORAL 2 TIMES DAILY
Status: DISCONTINUED | OUTPATIENT
Start: 2024-01-03 | End: 2024-01-04 | Stop reason: HOSPADM

## 2024-01-03 RX ORDER — DIVALPROEX SODIUM 500 MG/1
500 TABLET, EXTENDED RELEASE ORAL
Status: DISCONTINUED | OUTPATIENT
Start: 2024-01-03 | End: 2024-01-04 | Stop reason: HOSPADM

## 2024-01-03 RX ADMIN — DIAZEPAM 5 MG: 5 TABLET ORAL at 19:33

## 2024-01-03 RX ADMIN — PROPRANOLOL HYDROCHLORIDE 40 MG: 20 TABLET ORAL at 16:54

## 2024-01-03 RX ADMIN — LITHIUM CARBONATE 300 MG: 300 TABLET, EXTENDED RELEASE ORAL at 20:01

## 2024-01-03 RX ADMIN — LACOSAMIDE 100 MG: 100 TABLET, FILM COATED ORAL at 21:11

## 2024-01-03 RX ADMIN — OLANZAPINE 10 MG: 10 INJECTION, POWDER, FOR SOLUTION INTRAMUSCULAR at 18:54

## 2024-01-03 RX ADMIN — DOCUSATE SODIUM 100 MG: 100 CAPSULE, LIQUID FILLED ORAL at 19:33

## 2024-01-03 RX ADMIN — RISPERIDONE 2 MG: 1 TABLET ORAL at 19:35

## 2024-01-03 RX ADMIN — TOPIRAMATE 200 MG: 100 TABLET, FILM COATED ORAL at 19:33

## 2024-01-03 RX ADMIN — QUETIAPINE FUMARATE 50 MG: 50 TABLET ORAL at 19:32

## 2024-01-03 RX ADMIN — DIVALPROEX SODIUM 500 MG: 500 TABLET, FILM COATED, EXTENDED RELEASE ORAL at 16:54

## 2024-01-03 RX ADMIN — LACTULOSE 30 ML: 20 SOLUTION ORAL at 16:54

## 2024-01-03 RX ADMIN — DIVALPROEX SODIUM 500 MG: 500 TABLET, FILM COATED, EXTENDED RELEASE ORAL at 19:31

## 2024-01-03 RX ADMIN — RISPERIDONE 1 MG: 1 TABLET ORAL at 16:55

## 2024-01-03 ASSESSMENT — ACTIVITIES OF DAILY LIVING (ADL)
ADLS_ACUITY_SCORE: 35

## 2024-01-03 NOTE — ED PROVIDER NOTES
History     Chief Complaint:  Aggressive Behavior     The history is limited by a developmental delay.      Duane D Backes is a 43 year old male with history of benign neoplasm of brain, autistic disorder, moderate intellectual disabilities, obsessive-compulsive disorders, and seizure disorder who presents to the ED via EMS from a group home for aggressive behavior. Staff at the patient's group home state that over the past few days the patient has been more aggressive. His Seroquel dosage was increased not too long ago and since then he has been more agitated, per EMS. Patient is partially nonverbal at baseline. He currently reports that he feels hungry and has some abdominal pain, though does not expand on this. He is on Lithium along with several other psychiatric medications. He did receive his morning medications today.     Independent Historian:   None - Patient Only    Review of External Notes:   Outside clinic notes reviewed/med lists reviewed. Previous brain imaging reviewed from early last year.     Medications:   Valium   Depakote  Colace   Chronulac   Gavilax   Vimpat  Synthroid  Lithobid   Milk of Magnesia   Inderal   Seroquel   Risperdal   Zoloft   Topamax     Past Medical History:    Autistic disorder   Moderate intellectual disabilities   Obsessive-compulsive disorders  Seizure disorder   Psychogenic polydipsia   Hypothyroidism due to acquired atrophy of thyroid   Hyponatremia   Hypomagnesemia   COVID-19 infection   Obesity, class 1   Benign neoplasm of brain   Chronic constipation     Past Surgical History:    Cataract extraction with IOL implant, left      Physical Exam   Patient Vitals for the past 24 hrs:   BP Pulse Resp SpO2   01/03/24 1510 139/84 88 18 98 %        Physical Exam  General: Adult male, sitting upright, rhythmic rocking on the bed  Eyes: PERRL, Conjunctive within normal limits.  No scleral icterus.  ENT: Moist mucous membranes  CV: Normal S1S2, no murmur, rub or gallop. Regular  rate and rhythm  Resp: Clear to auscultation bilaterally, no wheezes, rales or rhonchi. Normal respiratory effort.  GI: Abdomen is soft, nontender and nondistended.   MSK: Ambulatory in the room.  No visible deformity or limp.  Skin: Warm and dry. No rashes or lesions or ecchymoses on visible skin.  Neuro: Alert to person.  Answers yes to all questions.  Follows commands appropriately.  Psych: Poor eye contact.  Cooperative to voice commands    Emergency Department Course     Imaging:  CT Head w/o Contrast   Final Result   IMPRESSION:   1.  No acute findings.   2.  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             Laboratory:  Labs Ordered and Resulted from Time of ED Arrival to Time of ED Departure   BASIC METABOLIC PANEL - Abnormal       Result Value    Sodium 132 (*)     Potassium 4.4      Chloride 98      Carbon Dioxide (CO2) 26      Anion Gap 8      Urea Nitrogen 18.3      Creatinine 0.73      GFR Estimate >90      Calcium 9.2      Glucose 96     LITHIUM LEVEL - Abnormal    Lithium 0.36 (*)    VALPROIC ACID - Normal    Valproic acid 70.0     ROUTINE UA WITH MICROSCOPIC REFLEX TO CULTURE - Normal    Color Urine Straw      Appearance Urine Clear      Glucose Urine Negative      Bilirubin Urine Negative      Ketones Urine Negative      Specific Gravity Urine 1.004      Blood Urine Negative      pH Urine 6.5      Protein Albumin Urine Negative      Urobilinogen Urine Normal      Nitrite Urine Negative      Leukocyte Esterase Urine Negative      RBC Urine <1      WBC Urine 0     CBC WITH PLATELETS AND DIFFERENTIAL    WBC Count 7.8      RBC Count 4.59      Hemoglobin 14.0      Hematocrit 41.3      MCV 90      MCH 30.5      MCHC 33.9      RDW 12.7      Platelet Count 211      % Neutrophils 55      % Lymphocytes 30      % Monocytes 10      % Eosinophils 4      % Basophils 1      % Immature Granulocytes 0      NRBCs per 100 WBC 0      Absolute Neutrophils 4.3       Absolute Lymphocytes 2.4      Absolute Monocytes 0.8      Absolute Eosinophils 0.3      Absolute Basophils 0.0      Absolute Immature Granulocytes 0.0      Absolute NRBCs 0.0          Emergency Department Course & Assessments:       Interventions:  Medications   OLANZapine (zyPREXA) injection 10 mg (10 mg Intramuscular $Given 1/3/24 1854)   divalproex sodium extended-release (DEPAKOTE ER) 24 hr tablet 500 mg (500 mg Oral $Given 1/3/24 1931)   docusate sodium (COLACE) capsule 100 mg (100 mg Oral $Given 1/3/24 1933)   lactulose (CHRONULAC) solution 30 mL (30 mLs Oral $Given 1/3/24 1654)   polyethylene glycol (MIRALAX) Packet 17 g (has no administration in time range)   Lacosamide (VIMPAT) tablet 100 mg (100 mg Oral $Given 1/3/24 2111)   levothyroxine (SYNTHROID/LEVOTHROID) tablet 150 mcg (has no administration in time range)   lithium ER (LITHOBID) CR tablet 300 mg (300 mg Oral $Given 1/3/24 2001)   magnesium hydroxide (MILK OF MAGNESIA) suspension 30 mL (30 mLs Oral Not Given 1/3/24 1926)   QUEtiapine (SEROquel) tablet 25 mg (has no administration in time range)   QUEtiapine (SEROquel) tablet 50 mg (50 mg Oral $Given 1/3/24 1932)   risperiDONE (risperDAL) tablet 2 mg (2 mg Oral $Given 1/3/24 1935)   sertraline (ZOLOFT) tablet 200 mg (has no administration in time range)   topiramate (TOPAMAX) tablet 200 mg (200 mg Oral $Given 1/3/24 1933)   Vitamin D3 (CHOLECALCIFEROL) tablet 25 mcg (has no administration in time range)   diazepam (VALIUM) solution 5 mg (has no administration in time range)   propranolol (INDERAL) tablet 40 mg (40 mg Oral $Given 1/3/24 1654)   risperiDONE (risperDAL) tablet 1 mg (1 mg Oral $Given 1/3/24 1655)   diazepam (VALIUM) tablet 5 mg (5 mg Oral $Given 1/3/24 1933)        Assessments:  1615 Patient's nurse notifies me that he has been attempting to escape from the room and has been charging at people, thus he was put into seclusion.   1621 I obtained history and examined the patient as  noted above.  1826 Nurse notifies me that she got in contact with the patient's group home. Staff there is concerned about a UTI, as he has had urinary frequency recently. They also tell me that the patient has a known brain tumor. They agree to take the patient back after he has been stabilized in the ED.   1830 Discussed with DEC after they saw the patient.   1920  Patient appears calm. Seclusion orders not continued.     Independent Interpretation (X-rays, CTs, rhythm strip):  I reviewed the patient's head CT. Mass noted. No ICH.     Consultations/Discussion of Management or Tests:  None        Social Determinants of Health affecting care:   Stress/Adjustment Disorders    Disposition:  Care of the patient was transferred to my colleague Dr. Alejandre pending psychiatric medication management and stabilization of symptoms.     Impression & Plan    CMS Diagnoses: None    Medical Decision Making:  Duane Backes is a 44 y/o male with a history of benign neoplasm of brain, autistic disorder, moderate intellectual disabilities, obsessive-compulsive disorders, and seizure disorder who presents from group home with concerns for increasing agitation.  They also noted urinary frequency and stated that he has not had any recent imaging of his brain mass which has been stable in the past.  Today's evaluation did not demonstrate any focal neurologic deficits.  He is minimally verbal, mostly only stating yes to questions here.  He does not appear to be uncomfortable.  Urinalysis did not demonstrate UTI.  He has mild hyponatremia but otherwise no concerning findings on labs.  Medically, there do not seem to be any other findings to attribute his agitation to physically.  He is medically cleared.  DEC assessed the patient and thought he would benefit from medication management by psychiatry, this consult was ordered.  No indication at this time for admission.  Patient's home meds ordered.  No seizure activity noted.  Awaiting  psychiatric evaluation for further plans.      Diagnosis:    ICD-10-CM    1. Agitation  R45.1                 Scribe Disclosure:  I, Maeve Quesada, am serving as a scribe at 5:38 PM on 1/3/2024 to document services personally performed by Lesley Gregory MD based on my observations and the provider's statements to me.   1/3/2024   Lesley Gregory MD Jonkman, Tracy Dianne, MD  01/03/24 0590

## 2024-01-03 NOTE — ED NOTES
Patient repeatedly running out of room to the restroom. Upon exiting the restroom, he has charged at staff, other patients, and had to be escorted back to room by security.  Physician notified of patient behaviors and in to assess patient with security present.

## 2024-01-03 NOTE — ED NOTES
Bed: ED01  Expected date: 1/3/24  Expected time: 2:43 PM  Means of arrival:   Comments:  GENE Pate

## 2024-01-03 NOTE — ED TRIAGE NOTES
Pt arrives to the ED via EMS from group home for aggressive behavior. Per EMS, staff states that over the past couple days he has been more aggressive. EMS states that he had his psych meds adjusted not to long ago and since then he has been more agitated. Pt partially non verbal at baseline per EMS. H/o Autism.

## 2024-01-03 NOTE — PHARMACY-ADMISSION MEDICATION HISTORY
Pharmacist Admission Medication History    Admission medication history is complete. The information provided in this note is only as accurate as the sources available at the time of the update.    Information Source(s): Facility ([facility]/) medication list/MAR via N/A    Pertinent Information:     Changes made to PTA medication list:  Added: Miconazole  Deleted: clobetasol, Valtoco, melatonin  Changed: Seroquel from 25 mg dailt to 75 mg qam and 50 mg qpm    Medication Affordability:       Allergies reviewed with patient and updates made in EHR: yes    Medication History Completed By: Sharon Xavier RPH 1/3/2024 4:50 PM    PTA Med List   Medication Sig Last Dose    ammonium lactate (LAC-HYDRIN) 12 % external lotion Apply topically daily To feet and heels 1/2/2024    diazepam (VALIUM) 5 MG/ML (HIGH CONC) solution Diazepam: Give 1 ml (5mg): Give 1 ml for ANY seizure. May repeat once 1 ml (5 mg). Monitor breathing, if there any concerns call 911. Do not exceed 10 mg per day. Unknown    divalproex sodium extended-release (DEPAKOTE ER) 500 MG 24 hr tablet Take 1 tablet (500 mg) by mouth 3 times daily 1/3/2024 at 0700    docusate sodium (COLACE) 100 MG capsule TAKE 1 CAPSULE BY MOUTH TWICE DAILY. 1/3/2024 at 0700    ENULOSE 10 GM/15ML SOLUTION TAKE 30ML BY MOUTH EVERY EVENING AT 5PM;TAKE 30ML BY MOUTH TWICE DAILY AS NEEDED 1/2/2024    fish oil-omega-3 fatty acids 1000 MG capsule TAKE 1 CAPSULE BY MOUTH TWICE DAILY  **NON-COVERED MED**  **PACK IN CARDS* 1/3/2024 at 0700    GAVILAX 17 GM/SCOOP powder MIX 17 GRAMS IN LIQUID AND DRINK BY MOUTH ONCE DAILY FOR CONSTIPATION. 1/3/2024 at 0800    Lacosamide (VIMPAT) 100 MG TABS tablet Take 1 tablet (100 mg) by mouth 2 times daily 1/3/2024 at 0700    levothyroxine (SYNTHROID/LEVOTHROID) 150 MCG tablet TAKE 1 TABLET BY MOUTH ONCE DAILY FOR THYROID. 1/3/2024 at 0700    lithium ER (LITHOBID) 300 MG CR tablet Take 300 mg by mouth 2 times daily 1/3/2024 at 0800    miconazole (MICATIN) 2 %  external cream Apply topically 2 times daily 1/3/2024 at 0700    MILK OF MAGNESIA 400 MG/5ML suspension Take 30 mLs by mouth every evening 1/2/2024    propranolol (INDERAL) 40 MG tablet Take 1 tablet by mouth 2 times daily as needed Unknown    QUEtiapine (SEROQUEL) 25 MG tablet Take 25 mg by mouth daily 1/3/2024 at 0800    QUEtiapine (SEROQUEL) 50 MG tablet Take 50 mg by mouth 2 times daily Take with 25 mg in the am for a total am dose of 75 mg and then 50 mg in the pm. 1/3/2024 at 0800    risperiDONE (RISPERDAL) 1 MG tablet Take 1 mg by mouth 2 times daily as needed 1/3/2024 at 0815    risperiDONE (RISPERDAL) 2 MG tablet Take 2 mg by mouth 2 times daily 1/3/2024 at 0800    sertraline (ZOLOFT) 100 MG tablet Take 200 mg by mouth every morning 1/3/2024    Starch, Thickening, POWD Combined with beverages, per instructions from Speech Therapy. Unknown    topiramate (TOPAMAX) 200 MG tablet TAKE 1 TABLET BY MOUTH TWICE DAILY. 1/3/2024 at 0700    VITAMIN D3 25 MCG (1000 UT) tablet TAKE 3 TABLETS (3000 UNITS) BY MOUTH ONCE DAILY 1/3/2024 at 0700     '

## 2024-01-04 ENCOUNTER — TELEPHONE (OUTPATIENT)
Dept: BEHAVIORAL HEALTH | Facility: CLINIC | Age: 44
End: 2024-01-04
Payer: MEDICARE

## 2024-01-04 VITALS
HEART RATE: 82 BPM | SYSTOLIC BLOOD PRESSURE: 138 MMHG | DIASTOLIC BLOOD PRESSURE: 72 MMHG | RESPIRATION RATE: 18 BRPM | OXYGEN SATURATION: 95 %

## 2024-01-04 PROBLEM — G40.909 SEIZURE DISORDER (H): Status: ACTIVE | Noted: 2020-06-18

## 2024-01-04 LAB — VALPROATE SERPL-MCNC: 97.4 UG/ML

## 2024-01-04 PROCEDURE — 99284 EMERGENCY DEPT VISIT MOD MDM: CPT | Performed by: REGISTERED NURSE

## 2024-01-04 PROCEDURE — 80164 ASSAY DIPROPYLACETIC ACD TOT: CPT | Performed by: REGISTERED NURSE

## 2024-01-04 PROCEDURE — 250N000013 HC RX MED GY IP 250 OP 250 PS 637: Performed by: REGISTERED NURSE

## 2024-01-04 PROCEDURE — 250N000013 HC RX MED GY IP 250 OP 250 PS 637: Performed by: EMERGENCY MEDICINE

## 2024-01-04 PROCEDURE — 36415 COLL VENOUS BLD VENIPUNCTURE: CPT | Performed by: REGISTERED NURSE

## 2024-01-04 PROCEDURE — 93005 ELECTROCARDIOGRAM TRACING: CPT | Mod: RTG

## 2024-01-04 RX ORDER — LITHIUM CARBONATE 300 MG/1
300 TABLET, FILM COATED, EXTENDED RELEASE ORAL 2 TIMES DAILY
Status: DISCONTINUED | OUTPATIENT
Start: 2024-01-04 | End: 2024-01-04 | Stop reason: HOSPADM

## 2024-01-04 RX ORDER — RISPERIDONE 2 MG/1
2 TABLET ORAL 3 TIMES DAILY
Qty: 90 TABLET | Refills: 0 | Status: SHIPPED | OUTPATIENT
Start: 2024-01-04 | End: 2024-09-05

## 2024-01-04 RX ORDER — RISPERIDONE 1 MG/1
2 TABLET ORAL 3 TIMES DAILY
Status: DISCONTINUED | OUTPATIENT
Start: 2024-01-04 | End: 2024-01-04 | Stop reason: HOSPADM

## 2024-01-04 RX ORDER — LITHIUM CARBONATE 450 MG
450 TABLET, EXTENDED RELEASE ORAL 2 TIMES DAILY
Status: DISCONTINUED | OUTPATIENT
Start: 2024-01-04 | End: 2024-01-04

## 2024-01-04 RX ADMIN — DIVALPROEX SODIUM 500 MG: 500 TABLET, FILM COATED, EXTENDED RELEASE ORAL at 14:14

## 2024-01-04 RX ADMIN — LEVOTHYROXINE SODIUM 150 MCG: 0.07 TABLET ORAL at 07:31

## 2024-01-04 RX ADMIN — Medication 25 MCG: at 07:31

## 2024-01-04 RX ADMIN — LACTULOSE 30 ML: 20 SOLUTION ORAL at 18:36

## 2024-01-04 RX ADMIN — RISPERIDONE 1 MG: 1 TABLET ORAL at 02:01

## 2024-01-04 RX ADMIN — LITHIUM CARBONATE 300 MG: 300 TABLET, EXTENDED RELEASE ORAL at 07:30

## 2024-01-04 RX ADMIN — QUETIAPINE FUMARATE 50 MG: 50 TABLET ORAL at 07:31

## 2024-01-04 RX ADMIN — PROPRANOLOL HYDROCHLORIDE 40 MG: 20 TABLET ORAL at 14:13

## 2024-01-04 RX ADMIN — LACOSAMIDE 100 MG: 100 TABLET, FILM COATED ORAL at 19:19

## 2024-01-04 RX ADMIN — DIVALPROEX SODIUM 500 MG: 500 TABLET, FILM COATED, EXTENDED RELEASE ORAL at 07:30

## 2024-01-04 RX ADMIN — LITHIUM CARBONATE 300 MG: 300 TABLET, EXTENDED RELEASE ORAL at 19:18

## 2024-01-04 RX ADMIN — RISPERIDONE 2 MG: 1 TABLET ORAL at 07:31

## 2024-01-04 RX ADMIN — PROPRANOLOL HYDROCHLORIDE 40 MG: 20 TABLET ORAL at 02:01

## 2024-01-04 RX ADMIN — TOPIRAMATE 200 MG: 100 TABLET, FILM COATED ORAL at 07:32

## 2024-01-04 RX ADMIN — POLYETHYLENE GLYCOL 3350 17 G: 17 POWDER, FOR SOLUTION ORAL at 07:30

## 2024-01-04 RX ADMIN — TOPIRAMATE 200 MG: 100 TABLET, FILM COATED ORAL at 19:16

## 2024-01-04 RX ADMIN — MAGNESIUM HYDROXIDE 30 ML: 400 SUSPENSION ORAL at 18:36

## 2024-01-04 RX ADMIN — RISPERIDONE 2 MG: 1 TABLET ORAL at 19:15

## 2024-01-04 RX ADMIN — RISPERIDONE 1 MG: 1 TABLET ORAL at 14:13

## 2024-01-04 RX ADMIN — LACOSAMIDE 100 MG: 100 TABLET, FILM COATED ORAL at 07:33

## 2024-01-04 RX ADMIN — QUETIAPINE FUMARATE 50 MG: 50 TABLET ORAL at 19:17

## 2024-01-04 RX ADMIN — DOCUSATE SODIUM 100 MG: 100 CAPSULE, LIQUID FILLED ORAL at 07:32

## 2024-01-04 RX ADMIN — SERTRALINE HYDROCHLORIDE 200 MG: 100 TABLET ORAL at 07:32

## 2024-01-04 RX ADMIN — DIAZEPAM 5 MG: 5 SOLUTION ORAL at 09:59

## 2024-01-04 RX ADMIN — QUETIAPINE FUMARATE 25 MG: 25 TABLET ORAL at 07:33

## 2024-01-04 RX ADMIN — DIVALPROEX SODIUM 500 MG: 500 TABLET, FILM COATED, EXTENDED RELEASE ORAL at 19:17

## 2024-01-04 RX ADMIN — DOCUSATE SODIUM 100 MG: 100 CAPSULE, LIQUID FILLED ORAL at 19:15

## 2024-01-04 ASSESSMENT — ACTIVITIES OF DAILY LIVING (ADL)
ADLS_ACUITY_SCORE: 35

## 2024-01-04 NOTE — ED NOTES
"Pt repeatedly coming to door to go to bathroom, asking for more \"coke\"- pt given coloring papers and crayons- child life called.   "

## 2024-01-04 NOTE — ED NOTES
RN ED Mental Health Handoff Note    Voluntary    Does patient require 1:1? Yes    Hold and rights been given and documented for patient: Yes    Is the patient in BH scrubs? Yes    Has the patient been searched? Yes    Is the 15 minute observation tool up to date? Yes    Was patient issued a welcome folder? Yes    Room check completed this shift: Yes    PSS3 and [place] Assessment/Reassessment this shift:        Behavioral status of patient: Yellow.      Code 21 called this shift? No    Use of restraints/seclusion this shift? No    Most recent vital signs:      BP: 138/72 Pulse: 82   Resp: 18 SpO2: 95 % O2 Device: None (Room air)      Medications:  Scheduled medication compliance? Yes    PRN Meds administered this shift? Yes    Medications   OLANZapine (zyPREXA) injection 10 mg (10 mg Intramuscular $Given 1/3/24 1854)   divalproex sodium extended-release (DEPAKOTE ER) 24 hr tablet 500 mg (500 mg Oral $Given 1/4/24 1414)   docusate sodium (COLACE) capsule 100 mg (100 mg Oral $Given 1/4/24 0732)   lactulose (CHRONULAC) solution 30 mL (30 mLs Oral $Given 1/3/24 1654)   polyethylene glycol (MIRALAX) Packet 17 g (17 g Oral $Given 1/4/24 0730)   Lacosamide (VIMPAT) tablet 100 mg (100 mg Oral $Given 1/4/24 0733)   levothyroxine (SYNTHROID/LEVOTHROID) tablet 150 mcg (150 mcg Oral $Given 1/4/24 0731)   magnesium hydroxide (MILK OF MAGNESIA) suspension 30 mL (30 mLs Oral Not Given 1/3/24 1926)   QUEtiapine (SEROquel) tablet 25 mg (25 mg Oral $Given 1/4/24 0733)   QUEtiapine (SEROquel) tablet 50 mg (50 mg Oral $Given 1/4/24 0731)   sertraline (ZOLOFT) tablet 200 mg (200 mg Oral $Given 1/4/24 0732)   topiramate (TOPAMAX) tablet 200 mg (200 mg Oral $Given 1/4/24 0732)   Vitamin D3 (CHOLECALCIFEROL) tablet 25 mcg (25 mcg Oral $Given 1/4/24 0731)   diazepam (VALIUM) solution 5 mg (5 mg Oral $Given 1/4/24 7298)   propranolol (INDERAL) tablet 40 mg (40 mg Oral $Given 1/4/24 9124)   risperiDONE (risperDAL) tablet 1 mg (1 mg Oral  $Given 1/4/24 1413)   lithium ER (LITHOBID) CR tablet 300 mg (has no administration in time range)   risperiDONE (risperDAL) tablet 2 mg (has no administration in time range)   diazepam (VALIUM) tablet 5 mg (5 mg Oral $Given 1/3/24 1933)         ADLs    Meal Provided this shift? Yes    Hygiene items provided? Yes    ADLs completed? Yes    Date of last shower: unknown     Any significant events this shift? No    Any information that would be helpful in caring for this patient?  Pt able to be redirected- give prn meds     Family present/updated? Yes    Location of patient's belongings: dec office     Critical Care Minutes:  Does the patient need critical care minutes documented? No

## 2024-01-04 NOTE — CARE PLAN
"   01/03/24 1910   Care Path Handoff   Final Disposition / Recommended Care Path discharge   Clinical Substantiation  attempted to assess Patient. Patient would no engage with . Patient sat on the bed and ate food on the tray in front him. Patient looked at the telemed screen once in the 5 minutes  was at bedside. Patient did not speak one work or outwardly appear to hear or acknowledge .  attempted to engage patient with simple questions \"is your name Duane?\" \"What are you eating\". Ect.  attempted to call Patient listed Guardians  called Shannan Tian [phone] (Shannan) and Seema Green ( (Mother)[phone] which are listed as Co-guardians. Neither answered, messages were left for both.  spoke with MD who is going to follow-up on some physical complaints listed by the group home. Per MD RN was able to get in contact with [facility] who said they would allow patient to return if he was more stable and his medical complaints were looked at. MD will put in a request for psychiatry to look at patient's medications. Hopefully psychiatry will be able to adjust patient's medications and patient will be able to return to group [facility] tomorrow.   Identified Goals and  Safety Issues NA   Designated Contact #6 [phone] Shannan (Sister)   Designated Contact #2 Seema Green (CO GUARDIAN-MUST ACT JOINTLY) (Mother)  [phone]   Plan for Care reviewed with assigned Medical Provider yes   Plan for Care Team Review provider       "

## 2024-01-04 NOTE — ED NOTES
"Pt walked out of room, started walking towards RN's desk and grabbed energy drink off table, stating \"I want coke.\" Pt was able to redirected, gave RN drink back, and redirected back into room, and given soda and crackers.   "

## 2024-01-04 NOTE — ED NOTES
Patient requesting to go to the restroom every few minutes. He is not urinating. Just washing hands and then charging at staff requiring being taken back to his room. Given a urinal

## 2024-01-04 NOTE — DISCHARGE INSTRUCTIONS
"  Aftercare Plan    > Follow up with your established outpatient psychiatric medication management provider as scheduled.      > A coordinator will call tomorrow to schedule a Transition Clinic appointment for a psychiatry follow up within a few days.     SEE ADDITIONAL PAGE FOR YOUR PERSONALIZED SAFETY PLAN      If I am feeling unsafe or I am in a crisis, I will:   Contact my established care providers   Call the [employer] Lifeline 988   Go to the nearest emergency room   Call 911   Your [facility] has a mental health crisis team you can call 24/7: [facility] [phone]      Crisis Text Line  Text 408762  You will be connected with a trained live crisis counselor to provide support.     Por beth, texto  ISH a [id number] o texto a 442-AYUDAME en [facility] Mental [facility] Warm Line  Peer to peer support  Monday thru Saturday, 12 pm to 10 pm  [phone] or [phone]  Text \"Support\" to 51298     [employer] [place] [employer] (DANISH)  391.409.0159 or 1.888.DANISH.HELPS        Mental Health Apps  My3  https://[employer].org/     VirtualHopeBox  https://[employer].org/apps/virtual-hope-box/    Additional Information  Today you were seen by a licensed mental health professional through Triage and Transition services, [facility] (P)  for a crisis assessment in the Emergency Department at [facility].  It is recommended that you follow up with your established providers (psychiatrist, mental health therapist, and/or primary care doctor - as relevant) as soon as possible. Coordinators from [facility] will be calling you in the next 24-48 hours to ensure that you have the resources you need.  You can also contact [facility] coordinators directly at [phone]. You may have been scheduled for or offered an appointment with a mental health provider. [facility] maintains an extensive network of licensed behavioral health providers to connect patients with the services they need.  We do not " charge providers a fee to participate in our referral network.  We match patients with providers based on a patient's specific needs, insurance coverage, and location.  Our first effort will be to refer you to a provider within your care system, and will utilize providers outside your care system as needed.

## 2024-01-04 NOTE — ED PROVIDER NOTES
Patient signed out to me. Please see in the initial notes for further details. Plan at time of sign out is disposition pending DEC and psychiatry plan. Patient seen by DEC and psychiatry. Plan for increasing Seroquel and discharge home. No indication for admission per DEC and psychiatry. Please see there documentation for further details. Group home contacted, will accept patient back. He was in no distress at time of discharge.      Nehemias Orlando MD  01/09/24 1100

## 2024-01-04 NOTE — ED NOTES
RN ED Mental Health Handoff Note    PERI    Does patient require 1:1? Yes    Hold and rights been given and documented for patient: Yes    Is the patient in BH scrubs? Yes    Has the patient been searched? Yes    Is the 15 minute observation tool up to date? No. Not needed sitter at door    Was patient issued a welcome folder? Yes    Room check completed this shift: Yes    PSS3 and [place] Assessment/Reassessment this shift:        Behavioral status of patient: yellow    Code 21 called this shift? No    Use of restraints/seclusion this shift? Yes. Details: patient was placed in seclusion to avoid patient exiting room and charging at staff.    Most recent vital signs:      BP: 139/84 Pulse: 88   Resp: 18 SpO2: 98 % O2 Device: None (Room air)      Medications:  Scheduled medication compliance? Yes    PRN Meds administered this shift? Yes    Medications   OLANZapine (zyPREXA) injection 10 mg (10 mg Intramuscular $Given 1/3/24 1854)   divalproex sodium extended-release (DEPAKOTE ER) 24 hr tablet 500 mg (500 mg Oral $Given 1/3/24 1931)   docusate sodium (COLACE) capsule 100 mg (100 mg Oral $Given 1/3/24 1933)   lactulose (CHRONULAC) solution 30 mL (30 mLs Oral $Given 1/3/24 1654)   polyethylene glycol (MIRALAX) Packet 17 g (has no administration in time range)   Lacosamide (VIMPAT) tablet 100 mg (100 mg Oral $Given 1/3/24 2111)   levothyroxine (SYNTHROID/LEVOTHROID) tablet 150 mcg (has no administration in time range)   lithium ER (LITHOBID) CR tablet 300 mg (300 mg Oral $Given 1/3/24 2001)   magnesium hydroxide (MILK OF MAGNESIA) suspension 30 mL (30 mLs Oral Not Given 1/3/24 1926)   QUEtiapine (SEROquel) tablet 25 mg (has no administration in time range)   QUEtiapine (SEROquel) tablet 50 mg (50 mg Oral $Given 1/3/24 1932)   risperiDONE (risperDAL) tablet 2 mg (2 mg Oral $Given 1/3/24 1935)   sertraline (ZOLOFT) tablet 200 mg (has no administration in time range)   topiramate (TOPAMAX) tablet 200 mg (200 mg Oral $Given  1/3/24 1933)   Vitamin D3 (CHOLECALCIFEROL) tablet 25 mcg (has no administration in time range)   diazepam (VALIUM) solution 5 mg (has no administration in time range)   propranolol (INDERAL) tablet 40 mg (40 mg Oral $Given 1/4/24 0201)   risperiDONE (risperDAL) tablet 1 mg (1 mg Oral $Given 1/4/24 0201)   diazepam (VALIUM) tablet 5 mg (5 mg Oral $Given 1/3/24 1933)         ADLs    Meal Provided this shift? Yes    Hygiene items provided? Yes    ADLs completed? Yes    Date of last shower:     Any significant events this shift? Yes. Details: patient continually asking for food and drinks. Patient urinates in corner of room despite having a urinal and being allowed to use restroom numerous times.    Any information that would be helpful in caring for this patient?  Patient will charge at staff. Patient will continue to ask for food and drink items. Patient can be redirectable and is compliant with medications.    Family present/updated? No    Location of patient's belongings:     Critical Care Minutes:  Does the patient need critical care minutes documented? No

## 2024-01-04 NOTE — ED NOTES
Pt repeatedly asking to exit room, going to bathroom to wash his hands. Assisted by sitter at bedside. Pt calm and cooperative,, allowed RN to administer am meds and take am vital signs

## 2024-01-04 NOTE — CONSULTS
Diagnostic Evaluation Consultation  Crisis Assessment    Patient Name: Duane D Backes  Age:  43 year old  Legal Sex: male  Gender Identity: male  Pronouns:   Race: White  Ethnicity: Not  or   Language: English      Patient was assessed: Virtual: [facility] Crisis Assessment Start Time: 1805 Crisis Assessment Stop Time: 0610  Patient location: [facility] EMERGENCY DEPT                             ED07    Referral Data and Chief Complaint  Duane D Backes presents to the ED via EMS. Patient is presenting to the ED for the following concerns: Verbal agitation, Physical aggression.   Factors that make the mental health crisis life threatening or complex are:  Per group home patient has been more physically aggressive.  They wonder if patient is struggling with a medical issue such as a UTI as patient has been urinating frequently.  Per MD patient has an increase in Seroquel recently as well..      Informed Consent and Assessment Methods  Explained the crisis assessment process, including applicable information disclosures and limits to confidentiality, assessed understanding of the process, and obtained consent to proceed with the assessment.  Assessment methods included conducting a formal interview with patient, review of medical records, collaboration with medical staff, and obtaining relevant collateral information from family and community providers when available.  : unable to complete     Patient response to interventions: no evidence of understanding  Coping skills were attempted to reduce the crisis:        History of the Crisis   Unable to determine.  Per chart review patient has a history of autism and cognitive delay, he lives in a group home and has his parents and sister as legal guardians.    Brief Psychosocial History  Family:  Single, Children  (Unable to assess)  Support System:  Parent(s), Sibling(s)  Employment Status:  disabled  Source of Income:  disability  Financial  Environmental Concerns:  none  Current Hobbies:   (Unable to assess)  Barriers in Personal Life:       Significant Clinical History  Current Anxiety Symptoms:   (Unable to assess)  Current Depression/Trauma:   (Unable to assess)  Current Somatic Symptoms:   (Unable to assess)  Current Psychosis/Thought Disturbance:   (Unable to assess)  Current Eating Symptoms:   (Unable to assess)  Chemical Use History:  Alcohol: None  Benzodiazepines: None  Opiates: None  Cocaine: None  Marijuana: None  Other Use: None   Past diagnosis:  Autism  Family history:   (Unable to assess)  Past treatment:   (Unable to assess)  Details of most recent treatment:     Other relevant history:          Collateral Information  Is there collateral information: No (Called and left messages for both guardians, no answer)     Collateral information name, relationship, phone number:       What happened today:       What is different about patient's functioning:       Concern about alcohol/drug use:      What do you think the patient needs:      Has patient made comments about wanting to kill themselves/others:      If d/c is recommended, can they take part in safety/aftercare planning:       Additional collateral information:        Risk Assessment  [place] Suicide Severity Rating Scale Full Clinical Version:             [place] Suicide Severity Rating Scale Recent:              Environmental or Psychosocial Events:    Protective Factors: Protective Factors:  (Unable to assess)    Does the patient have thoughts of harming others? Feels Like Hurting Others: yes  Previous Attempt to Hurt Others: yes  Current presentation: Confused  Violence Threats in Past 6 Months: Patient is charging at staff in the ED.  Patient has been violent in group home  Current Violence Plan or Thoughts: Unable to assess  Is the patient engaging in sexually inappropriate behavior?: no  Duty to warn initiated: no    Is the patient engaging in sexually inappropriate behavior?   no        Mental Status Exam   Affect: Flat  Appearance: Appropriate  Attention Span/Concentration: Inattentive  Eye Contact: Avoidant    Fund of Knowledge: Delayed   Language /Speech Content: Non-Fluent  Language /Speech Volume: Mute  Language /Speech Rate/Productions:    Recent Memory:  (Unable to assess)  Remote Memory:  (Unable to assess)  Mood: Normal  Orientation to Person:  (Unable to assess)   Orientation to Place:  (Unable to assess)  Orientation to Time of Day:  (Unable to assess)  Orientation to Date:  (Unable to assess)     Situation (Do they understand why they are here?):  (Unable to assess)  Psychomotor Behavior: Agitated  Thought Content:  (Unable to assess)  Thought Form:  (Unable to assess)     Mini-Cog Assessment  Number of Words Recalled:    Clock-Drawing Test:     Three Item Recall:    Mini-Cog Total Score:       Medication  Psychotropic medications:   Medication Orders - Psychiatric (From admission, onward)      Start     Dose/Rate Route Frequency Ordered Stop    01/04/24 0800  QUEtiapine (SEROquel) tablet 25 mg        Note to Pharmacy: PTA Sig:Take 25 mg by mouth daily      25 mg Oral EVERY MORNING 01/03/24 1631      01/04/24 0800  sertraline (ZOLOFT) tablet 200 mg        Note to Pharmacy: PTA Sig:Take 200 mg by mouth every morning      200 mg Oral EVERY MORNING 01/03/24 1631 01/03/24 2000  lithium ER (LITHOBID) CR tablet 300 mg        Note to Pharmacy: PTA Sig:Take 300 mg by mouth 2 times daily      300 mg Oral 2 TIMES DAILY 01/03/24 1631      01/03/24 2000  QUEtiapine (SEROquel) tablet 50 mg        Note to Pharmacy: PTA Sig:Take 50 mg by mouth 2 times daily Take with 25 mg in the am for a total am dose of 75 mg and then 50 mg in the pm.      50 mg Oral 2 TIMES DAILY 01/03/24 1631      01/03/24 2000  risperiDONE (risperDAL) tablet 2 mg        Note to Pharmacy: PTA Sig:Take 2 mg by mouth 2 times daily      2 mg Oral 2 TIMES DAILY 01/03/24 1631 01/03/24 1832  diazepam (VALIUM) tablet  5 mg         5 mg Oral ONCE PRN 01/03/24 1832      01/03/24 1637  risperiDONE (risperDAL) tablet 1 mg        Note to Pharmacy: PTA Sig:Take 1 mg by mouth 2 times daily as needed      1 mg Oral 2 TIMES DAILY PRN 01/03/24 1637      01/03/24 1631  diazepam (VALIUM) solution 5 mg        Note to Pharmacy: PTA Sig:Diazepam: Give 1 ml (5mg): Give 1 ml for ANY seizure. May repeat once 1 ml (5 mg). Monitor breathing, if there any concerns call 911. Do not exceed 10 mg per day.      5 mg Oral DAILY PRN 01/03/24 1631      01/03/24 1616  OLANZapine (zyPREXA) injection 10 mg         10 mg Intramuscular 2 TIMES DAILY PRN 01/03/24 1616               Current Care Team  Patient Care Team:  Rajat Parkinson MD as PCP - General (Family Medicine)  Lauren Washington MD as Assigned Neuroscience Provider  Rajat Parkinson MD as Assigned PCP    Diagnosis  Patient Active Problem List   Diagnosis Code    Moderate mental retardation F71    Active autistic disorder F84.0    Obsessive-compulsive disorder F42.9    Chronic constipation K59.09    Hearing loss H91.90    Generalized tonic clonic epilepsy (H) G40.309    Dry skin L85.3    CARDIOVASCULAR SCREENING; LDL GOAL LESS THAN 160 Z13.6    Hypothyroidism due to acquired atrophy of thyroid E03.4    Benign neoplasm of brain (H) 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 Patient would no engage with . Patient sat on the bed and ate food on the tray in front him. Patient looked at the  "telemed screen once in the 5 minutes  was at bedside. Patient did not speak one work or outwardly appear to hear or acknowledge .  attempted to engage patient with simple questions \"is your name Duane?\" \"What are you eating\". Ect.  attempted to call Patient listed Guardians  called Shannan Tian [phone] (Shannan) and Seema Green ( (Mother)[phone] which are listed as Co-guardians. Neither answered, messages were left for both.  spoke with MD who is going to follow-up on some physical complaints listed by the group home. Per MD RN was able to get in contact with group home who said they would allow patient to return if he was more stable and his medical complaints were looked at. MD will put in a request for psychiatry to look at patient's medications. Hopefully psychiatry will be able to adjust patient's medications and patient will be able to return to group home tomorrow.                          Patient coping skills attempted to reduce the crisis:       Disposition  Recommended disposition: Group Home        Reviewed case and recommendations with attending provider. Attending Name: Dr. Gregory       Attending concurs with disposition: yes       Patient and/or validated legal guardian concurs with disposition:   no ( unable to get in contact with patient's guardians)       Final disposition:  discharge    Legal status on admission:      Assessment Details   Total duration spent with the patient: 5 min     CPT code(s) utilized: Non-Billable    GÓMEZ Padilla, Psychotherapist  [facility] - Triage & Transition Services  Callback: [phone]         "

## 2024-01-04 NOTE — ED NOTES
Patient is somewhat more calm. Continues to come to door asking for food, drinks, or restroom. No longer yelling or charging staff or other patients. 1:1 remains in place closely watching patient.

## 2024-01-04 NOTE — TELEPHONE ENCOUNTER
Writer made the first attempt call to schedule patient with transitioning clinic for medication management psychiatry.  Patient was not available.  Will make second attempt to call tomorrow .

## 2024-01-04 NOTE — ED NOTES
Sister Shannan is coming 01/04/24. She is the most involved and the main  for patient as parents are very elderly.   Patient lives at [facility]. Manager name is Swati. Her phone number is [phone]. She voiced concerns about a possible UTI due to increased urination and rubbing his groin against objects as well as a growth in his brain that has not been scanned for some time. Physician notified of Swati's concerns

## 2024-01-04 NOTE — CONSULTS
Psychiatry Consultation; Follow up              Reason for Consult, requesting source:    Psychiatry seeing patient today regarding agitation    Requesting source: Michael Jacobo    Labs and imaging reviewed. Provider contacted with recommendations.     Telemedicine Visit: The patient was seen for a visit utilizing the [employer] system. Permission from the patient to conduct the exam by telemedicine was obtained prior to proceeding.  Duane was also informed that insurance will be billed for this contact.   Patient Location):  [facility]   Provider Location: [employer]/remote  As the provider I attest to compliance with applicable laws and regulations related to telemedicine                 Interim history:    Psychiatry seeing patient today regarding agitation.    This note is being entered to supplement the psychiatry consultation note that was completed on 1/3/2024 by the licensed mental health professional Lauren Rivera. They have reviewed with me the pertinent clinical details related to their encounter. I am being consulted to offer additional guidance on psychiatric pharmacological interventions.     Duane D Backes is a 43 year old male with history of benign neoplasm of brain, autistic disorder, moderate intellectual disabilities, obsessive-compulsive disorders, and seizure disorder who presents to the ED via EMS from a group home for aggressive behavior. Staff at the patient's group home state that over the past few days the patient has been more aggressive. His Seroquel dosage was increased not too long ago and since then he has been more agitated, per EMS. Patient is partially nonverbal at baseline. He currently reports that he feels hungry and has some abdominal pain, though does not expand on this. He is on Lithium along with several other psychiatric medications. He did receive his morning medications today.     Note from previous psych consultation on 1/27/2023: No known hx  of psychiatric hospitalizations or suicide attempts. Patient with history of behavioral issues. Managed on sertraline 225 mg daily, lithium  mg at bedtime, risperidone 2 mg bid. Group home has a provider managing his medications. No hx of ECT or  commitment.      Today, I met with patient, but assessment was complicated by technology and some communication difficulty. I explained to patient that I was meeting with him to review his medications and check to see if he has any questions.  I shared with patient that his group home was concerned with some agitation in the group home. When asked if he is experiencing any feelings of anxiety or restlessness, patient stood up and briefly walked away from the video. Continued assessment was shortened, due to patient walking away again from the video.     Per group home staff, patient's Seroquel was increased by 25 mg daily on 11/22. This increase was in an attempt to treat increased patient agitation and aggressive behaviors in the group home. However, staff reports that patient negative behaviors have continued to appear aggressive.         Current Medications:      divalproex sodium extended-release  500 mg Oral TID    docusate sodium  100 mg Oral BID    Lacosamide  100 mg Oral BID    lactulose  30 mL Oral Daily with supper    levothyroxine  150 mcg Oral QAM AC    lithium ER  300 mg Oral BID    magnesium hydroxide  30 mL Oral Daily with supper    polyethylene glycol  17 g Oral Daily    QUEtiapine  25 mg Oral QAM    QUEtiapine  50 mg Oral BID    risperiDONE  2 mg Oral BID    sertraline  200 mg Oral QAM    topiramate  200 mg Oral BID    Vitamin D3  25 mcg Oral QAM              MSE:   Appearance: awake, alert  Attitude:  guarded  Eye Contact:   Intermittent  Mood:   VIDYA, but in no acute distress  Affect:  slightly restricted  Speech:   Patient was quite guarded, and did not verbally respond to questions.  Psychomotor Behavior:  no evidence of tardive dyskinesia,  "dystonia, or tics  Muscle strength and tone: Appears average  Thought Process:   VIDYA   Associations:   VIDYA  Thought Content:   VIDYA  Insight:   VIDYA  Judgement:   VIDYA, but appears impulsive, given report from group home  Oriented to:   VIDYA  Attention Span and Concentration:  limited  Recent and Remote Memory:   VIDYA    Vital signs:      BP: 120/72 Pulse: 71   Resp: 18 SpO2: 95 % O2 Device: None (Room air)        Estimated body mass index is 30.32 kg/m  as calculated from the following:    Height as of 11/22/23: 1.825 m (5' 11.85\").    Weight as of 11/22/23: 101 kg (222 lb 9.6 oz).    Qtc: No current EKG  Valproic acid: 97.4 on 01/04/24  Lithium level: 0.36 (L) on 1/3/24         DSM-5 Diagnosis:   Autism spectrum disorder  Moderate intellectual disability          Assessment:   Patient was calm, but guarded and restless, during our meeting. Per chart review, patient's baseline is a vocabulary that consists of fewer words and less engagement. Due to this, much of my recommendations are based on chart review and information from patient's group home and guardian. Per group home, patient has been displaying an increase in aggressive behaviors over the past several months, with an increase in Seroquel by 25 mg daily in an attempt to address these behaviors.Group home reports that patient has seemingly been taking medications without issue, including Lithium 300 mg BID. Current Lithium level is at 0.36 on 1/3/24, which is sub-therapeutic.     Per group home staff, patient cannot return to their facility until a medication adjustment has been made. It is not likely that patient behaviors will change overnight with the adjustment of medications. In addition, non-pharmacological interventions might be more beneficial to patient, as opposed to adding or adjusting medications. However, medications have been reviewed and recommendations will be made.      Patient is on a significant number of antipsychotic medications, which could " "prolong QTc. However, nursing in ED reports that patient would not be able to tolerate EKG.    I do not believe that patient would benefit from inpatient psychiatry and should discharge back to his group home when medically stable.             Summary of Recommendations:   Please obtain EKG, as Seroquel and Risperdal can prolong QTc    Patient's guardian supports patient's continued hospitalization, so a 72-hour hold is not indicated    Increased Risperdal 2 mg to TID for anxiety and agitation    Patient should follow-up with outpatient psychiatrist ASAP following discharge        Non-pharmacological approaches are preferable to reduce responsive behaviours, improve/maintain functional capacity and reduce emotional disorders (LUZ Saez'Katelyn 2018).       NON-PHARM INTERVENTIONS        Non-pharmacological interventions include but are not limited to:   Lavender   Warm Blankets and/or weighted blankets   Activities of interest currently or in the past   Calming music   5,4,3,2,1    Grounding exercise: 5 things you see, 4 things you feel, 3 things you hear, 2 things you smell, 1 thing you taste    Use a consistent positive physical approach  - gesture & greet by name   - offer your hand & make eye contact   - approach slowly within visual range   - shake hands & maintain hand-under-hand   - move to the side of the patient  - get to eye level & respect intimate space   - wait for acknowledgement     How you help     Sight or Visual cues    Verbal or Auditory cues    Touch or Tactile cues     USE VISUAL combined VERBAL (gesture/point)   -  It s about time for     -  Let s go this way     -  Here are your socks      DON T ask questions you DON T want to hear the answer to  ( Do you want to  ,  Are you ready to  , \"I need you to  )    Acknowledge the response/reaction to your info     USE THEIR WORDS (with a ? OR in agreement)    LIMIT your words - Keep it SIMPLE    WAIT!!!!      ID common interest    Say something nice " about the person or their place    Share something about yourself and encourage the person to share back    Follow their lead - listen actively    Use some of their words to keep the flow going    Remember its the FIRST TIME!    Do s    Go with the FLOW    Use SUPPORTIVE communication techniques   - Use objects and the environment   - Give examples   - Use gestures and pointing   - Acknowledge & accept emotions   - Use empathy & Validation   - Use familiar phrases or known interests   - Respect  values  and  beliefs  - avoid the negative     DON Ts    Try to CONTROL the FLOW   - Give up reality orientation and BIG lies   - Do not correct errors   - Offer info if asked, monitoring the emotional state    Try to STOP the FLOW   - Don t reject topics   - Don t try to distract UNTIL you are well connected   - Keep VISUAL cues positive       A Positive Physical Approach   1. Knock on door or table - to get attention if the person is not looking at you & get permission to enter or approach     2. Open palm near face and smile - look friendly and give the person a visual cue make eye contact     3. Call the person by name OR at least say  Hi!      4. Move your hand out from an open hand near face to a greeting handshake position     5. Approach the person from the front - notice their reaction to your outstretched hand - start approaching or let the person come to you, if s/he likes to be in control     6. Move slowly - one step/second, stand tall, don t crouch down or lean in as you move toward the person     7. Move toward the right side of the person and offer your hand - give the person time to look at your hand and reach for it, if s/he is doing something else - offer, don t force     8. Stand to the side of the person at arm s length - respect personal space & be supportive not confrontational     9. Shake hands with the person - make eye contact while shaking     10. Slide your hand from a  shake  position to  hand-under-hand position - for safety,     connection, and function     11. Give your name & greet -  I m (name). It s good to see you!      12. Get to the person s level to talk - sit, squat, or kneel if the person is seated and stand beside the person if s/he is standing     13. NOW, deliver your message        Approaching When The Person is distressed:    TWO CHANGES -   1. Look concerned not too happy, if the person is upset     2. Let the person move toward you, keeping your body turned  sideways(supportive - not confrontational)     3. After greeting  try one of two options    a.  Sounds like you are (give an emotion or feeling that seems to be true)???    b. Repeat the person s words to you  If s/he said,  Where s my mom?  you   would say  You re looking for your mom (pause)  tell me about your mom     If the person said  I want to go home! , you would say  You want to go home (pause)  Tell me about your home  .     BASIC CARD CUES   ? -Knock - Announce self   ? -Greet & Smile   ? -Move Slowly - Hand offered in  handshake  position   ? -Move from the front to the side   ? -Greet with a handshake & your name        -Slide into hand-under-hand hold         -Get to the person s level   ? -Be friendly -make a  nice  comment or smile         -Give your message  simple, short, friendly     First -     ALWAYS use the positive physical approach!     Then -     Pay attention to the THREE ways you communicate     1 .  How you speak   - Tone of voice (friendly not bossy or critical)   - Pitch of voice (deep is better)   - Speed of speech ( slow and easy not pressured or fast)     2 .  What you say   THREE basic reasons to talk to someone   1. To get the person to DO something (5   approaches to try)   1. give a short, direct message about what is happening   2. give simple choices about what the person can do   3. ask the person to help you do something   4. ask if the person will give it a try   5.  break down the task  "- give it one step at a time     ** only ask  Are you ready to   If you are willing to come back later **     2.  Just to have a friendly interaction - to talk to the person    go slow - Go with Flow   2.  acknowledge emotions - \"sounds like , seems like , I can see you are \"   ?  3. use familiar words or phrases (what the person uses)   4. know who the person has been as a person what s/he values   ?  5. use familiar objects, pictures, actions to help & direct   ?   6.be prepared to have the same conversation over & over   ?   7. look interested & friendly   8.be prepared for some emotional outbursts   9.DON'T argue  - BUT don't let the person get into dangerous situations     **REMEMBER - the person is doing the BEST that s/he can**    3. Deal with the person's distress or frustration/anger   1.Try to figure out what the person really NEEDS or WANTS (\"It sounds like \" \"It looks like \" \"It seems like \" \"You're feeling \")   2.Use empathy not forced reality or lying   3.Once the person is listening and responding to you THEN -   4.Redirect his attention and actions to something that is OK OR   5.Distract him with other things or activities you know he likes & values     **Always BE CAREFUL about personal space and touch with the person especially when s/he is distressed or being forceful **    4.  How you respond to the person   1.use positive, friendly approval or praise (short, specific and sincere)   2.offer your thanks and appreciation for his/her efforts   3.laugh with him/her & appreciate attempts at humor & friendliness   4.  shake hands to start and end an interaction    5.  use touch - hugging, hand holding, comforting only IF the person wants it     If what you are doing is NOT working -     ? STOP!     ? BACK OFF - give the person some space and time     ? Decide on what to do differently     ? Try Again!       Key Points About 'Who' the person Is .   - preferred name   - introvert or extrovert   - a " planner or a doer   - a follower or a leader   - a 'detail' or a 'big picture' person   - work history - favorite and most hated jobs or parts of jobs   - family relationships and history - feelings about   -various family members   - social history - memberships and relationships to friends and groups   - leisure background - favorite activities & beliefs about fun, games, & free time   - previous daily routines and schedules   - personal care habits and preferences   - [employer]al and spiritual needs and beliefs   - values and interests   - favorite topics, foods, places   - favorite music and songs - dislike of music or songs   - hot buttons & stressors   - behavior under stress   - what things help with stress?   - handedness   - level of cognitive impairment   - types of help that are useful       Communication - When Words Don t Work Anymore      Keys to Success:   -Watch movements & actions   -Watch facial expressions and eye movements   -Listen for changes in volume, frequency, and intensity of sounds or words   -Investigate & Check it out   -Meet the need     It s all about Meeting Needs    -Physical needs   -Emotional needs   -Probable Needs:   -Physical  ?  *Tired   *In pain or uncomfortable   *Thirsty or Hungry   *Need to pee or have a BM or already did & need help   *Too hot or too cold     -Emotional   *Afraid   *Lonely   *Bored   *Angry   *Excited     What Can You Do?   -Figure it out Go thru the list   -Meet the need  Offer help that matches need   -Use visual cues more than verbal cues   -Use touch only after  permission  is given   -Connect - Visually, Verbally, Tactilely   -Protect Yourself & the Person - use Hand Under Hand & Supportive Stance techniques   -Reflect - copy expression/tone, repeat some key words, move with the person   -Engage - LISTEN with your head, your heart, and your body  -Respond - try to meet the unmet needs, offer comfort and connection     ** IF IT DOESN T seem to be  "working - STOP, BACK OFF - and then TRY AGAIN - changing something in your efforts (visually, verbally, or through touch/physical contact)**     Types of Help - Using Your Senses   Visual   -Written Information - Schedules and Notes   -Key Word Signs - locators & identifiers   -Objects in View - familiar items to stimulate task performance   -Gestures - pointing and movements   -Demonstration - provide someone to imitate     2.  Auditory  -Talking and Telling - give information, ask questions, provide choices   -Breaking it Down - Step-by-Step Task Instructions   -Using Simple Words and Phrases - Verbal Cues   -Name Calling - Auditory Attention   -Positive Feedback - praise, \"yes\", encouragement     3.Tactile - Touch   -Greeting & Comforting - handsbela bazzi, 'hand-holding'   -Touch for Attention during tasks  - Tactile Guidance - lead through 'once' to get the feel   -Hand-Under-Hand Guidance - palm to palm contact   -Hand-Under-Hand Assistance - physical help   -Dependent Care - doing for & to the person         Page me or re-consult psychiatry as needed.       Zaina Mendoza, KARIME, APRN  Consult/Liaison Psychiatry  [facility]   Contact information available via [facility] Paging/Directory.  If I am not available, please call [facility] intake ([phone])             "

## 2024-01-04 NOTE — ED NOTES
" attempted to assess Patient. Patient would no engage with . Patient sat on the bed and ate food on the tray in front him. Patient looked at the telemed screen once in the 5 minutes  was at bedside. Patient did not speak one work or outwardly appear to hear or acknowledge .  attempted to engage patient with simple questions \"is your name Duane?\" \"What are you eating\". Ect.      attempted to call Patient listed Guardians     called JudithamybookerShannan [phone] (Shannan) and Seema Green ( (Mother)[phone] which are listed as Co-guardians. Neither answered, messages were left for both.      spoke with MD who is going to follow-up on some physical complaints listed by the group home.  Per MD RN was able to get in contact with group [facility] who said they would allow patient to return if he was more stable and his medical complaints were looked at.  MD will put in a request for psychiatry to look at patient's medications.    Lauren Rivera, LINK, LICSW, HP  DEC - Triage & Transition Services  Callback: [phone]    "

## 2024-01-04 NOTE — ED NOTES
Rn spoke with  @ [phone]. will take patient back, but is requiring a med/psychiatrist evaluation. RN spoke with extended care and made them aware of request.

## 2024-01-04 NOTE — ED NOTES
"Sister/guardian at bedside. Pt assisted going to the bathroom. Sister states that pt has history of seizure. \"Last seizure episode was 4 months ago. Taking seizure meds but can't remember the name\"  "

## 2024-01-05 ENCOUNTER — TELEPHONE (OUTPATIENT)
Dept: BEHAVIORAL HEALTH | Facility: CLINIC | Age: 44
End: 2024-01-05
Payer: MEDICARE

## 2024-01-05 ENCOUNTER — TELEPHONE (OUTPATIENT)
Dept: FAMILY MEDICINE | Facility: CLINIC | Age: 44
End: 2024-01-05
Payer: MEDICARE

## 2024-01-05 LAB
ATRIAL RATE - MUSE: 68 BPM
DIASTOLIC BLOOD PRESSURE - MUSE: NORMAL MMHG
INTERPRETATION ECG - MUSE: NORMAL
P AXIS - MUSE: 54 DEGREES
PR INTERVAL - MUSE: 186 MS
QRS DURATION - MUSE: 96 MS
QT - MUSE: 390 MS
QTC - MUSE: 414 MS
R AXIS - MUSE: 29 DEGREES
SYSTOLIC BLOOD PRESSURE - MUSE: NORMAL MMHG
T AXIS - MUSE: 36 DEGREES
VENTRICULAR RATE- MUSE: 68 BPM

## 2024-01-05 NOTE — PROGRESS NOTES
"Triage & Transition Services, Extended Care     Therapy Progress Note    Patient: Duane goes by \"Duane,\" uses he/him pronouns  Date of Service: January 4, 2024  Site of Service: [facility] EMERGENCY DEPT                             ED07  Patient was seen yes  Mode of Assessment: In person    Presentation Summary: Pt observed to be calm, cooperative, and redirectable by sister/legal guardian who was visiting and in ED room with pt during visit. Pt verbalized desire to return home. Discussed POC. Guardian consents to discharge and plans to drive pt back to group home herself.     Therapeutic Intervention(s) Provided: Explored strategies for self-soothing.    Current Symptoms: anxious difficulty concentrating, impaired decision making anxious impulsive, inattentive, distractability  (no eating symptoms noted)    Mental Status Exam   Affect: Blunted  Appearance: Appropriate  Attention Span/Concentration: Inattentive  Eye Contact: Avoidant    Fund of Knowledge: Delayed   Language /Speech Content: Non-Fluent  Language /Speech Volume: Other (please comment) (WDL)  Language /Speech Rate/Productions: Other (please comment) (limited speech)  Recent Memory: Variable  Remote Memory: Variable  Mood: Anxious, Other (please comment) (euthymic)  Orientation to Person: Yes   Orientation to Place: Yes  Orientation to Time of Day: No  Orientation to Date: No     Situation (Do they understand why they are here?): No  Psychomotor Behavior: Agitated (no agression observed, redirectable)  Thought Content: Clear (unable to fully assess)  Thought Form: Intact, Other (please comment) (unable to fully assess)    Treatment Objective(s) Addressed: safety planning    Patient Response to Interventions: acceptance expressed    Progress Towards Goals: Patient Progress Toward Goals: is making progress  Comment: Calm, coopertive, redirectable in ED today. Verbalizes desire to go home.  Collaboration Comment: Sister/co-guardian consents " to discharge.  approves patient to return home today.    Case Management: Case Management Included: collaborating with patient's support system  Details on Collaborating with Patient's Support System: Patient's group home: [facility] ([facility])    [facility]    [address], [place], [address]; Number Not in Service [phone]; No Answer [phone]; [phone]   Swati Saez [phone]  Summary of Interaction: Legal Guardians:  [address] (Mother) and Soren Green (Father) [phone]; 9339  Shannan Tian (Sister) [phone]    Plan: discharge  yes provider, LEEANNA Orlando MD, and Dread TIM RN  yes    Clinical Substantiation: DEC  recommended pt for discharge back to group home after initial consult yesterday evening. Pt denied SI/HI and A/V hallucinations. Pt lives in a supported living environment with lethal means restriction. Today, pt cooperative and redirectable in ED, and pt verbalized looking forward to returning home. Pt's sister and co-guardian consents to discharge and willing to transport pt back to group home tonight. At 1649,  Swati Saez [phone] approved pt's return to home tonight.    Legal Status: Legal Status at Admission: Guardian/ad litum    Session Status: Time session started: 1622  Time session ended: 1640  Session Duration (minutes): 18 minutes  Session Number: 1  Anticipated number of sessions or this episode of care: 1  Date of most recent diagnostic assessment:  (none found)    Time Spent: 18 minutes    CPT Code: CPT Codes: 99139 - Psychotherapy (with patient) - 30 (16-37*) min    Diagnosis:   Patient Active Problem List   Diagnosis Code    Moderate intellectual disabilities F71    Active autistic disorder F84.0    Obsessive-compulsive disorder F42.9    Chronic constipation K59.09    Hearing loss H91.90    Generalized tonic clonic  epilepsy (H) G40.309    Dry skin L85.3    CARDIOVASCULAR SCREENING; LDL GOAL LESS THAN 160 Z13.6    Hypothyroidism due to acquired atrophy of thyroid E03.4    Benign neoplasm of brain (H) D33.2    Obesity, Class I, BMI 30-34.9 E66.9    Seizure disorder (H) G40.909    Altered mental status, unspecified altered mental status type R41.82    History of COVID-19 Z86.16    Hypomagnesemia E83.42    Status epilepticus (H) G40.901    Hyponatremia E87.1    Hypothyroidism, unspecified type E03.9    Seizures (H) R56.9    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

## 2024-01-05 NOTE — TELEPHONE ENCOUNTER
Patient , Heide calling to set up a hospital follow up for patient.     Patient was recently in the ER for increased agitation. Patient not admitted.    Hospital follow up set up    Future Appointments 1/5/2024 - 7/3/2024        Date Visit Type Length Department Provider     1/10/2024  2:30 PM ED/HOSP FOLLOW UP 30 min BK SHELLI/IM/Rajat Yusuf MD    Location Instructions:     [facility] is located at [address] less than a mile north of the [facility] exit off [address]. Free parking is available; access the lot by turning east from [facility] onto [address].              3/6/2024 10:45 AM RETURN SEIZURE 30 min ME UMP MINCEP EPILEPSY Lauren Washington MD    Location Instructions:     Located at: [address] [facility] [address] [facility], [address] [facility] [address]                   Serenity Ochoa RN

## 2024-01-10 ENCOUNTER — TELEPHONE (OUTPATIENT)
Dept: FAMILY MEDICINE | Facility: CLINIC | Age: 44
End: 2024-01-10

## 2024-01-10 ENCOUNTER — VIRTUAL VISIT (OUTPATIENT)
Dept: FAMILY MEDICINE | Facility: CLINIC | Age: 44
End: 2024-01-10
Payer: MEDICARE

## 2024-01-10 DIAGNOSIS — R63.1 POLYDIPSIA: ICD-10-CM

## 2024-01-10 DIAGNOSIS — R13.13 PHARYNGEAL DYSPHAGIA: Primary | ICD-10-CM

## 2024-01-10 PROCEDURE — 99214 OFFICE O/P EST MOD 30 MIN: CPT | Mod: 95 | Performed by: FAMILY MEDICINE

## 2024-01-10 NOTE — PATIENT INSTRUCTIONS
It is common with patients with psychiatric diagnoses to have a problem with increased thirst and water consumption (psychogenic polydipsia), and sometimes this can worsen if they are taking lithium (polydipsia is a side effect). If he is drinking excessive amounts of fluids, he is expected to urinate frequently to keep his total body water constant. Therefore, his frequent urination does not indicate that he has urologic problem. I do not think he needs to see a urologist for urinary frequency unless his PSA level (pending) indicates that he has an enlarged prostate (unlikely). I do not think he needs to see an endocrinologist because his labs are negative for diabetes insipidus and diabetes mellitus. The labs done in the ED 1/3/24 show that he does not have diabetes.

## 2024-01-10 NOTE — TELEPHONE ENCOUNTER
----- Message from WILMER Albert sent at 1/5/2024  3:12 PM CST -----  Regarding: Speech Therapy OP Clinical Swallow Referral  Hi Dr. Tesha MD,     I have worked with Duane over the past several years to address his dysphagia concerns. His insurance company requires that he be seen annually to approve payment for the thickening powder his care providers use in his liquids. Swati, his caregiver reached out to me as it has been >1 year since he has been seen by speech therapy. If you are in agreement will you place a speech therapy referral for dysphagia/clinical swallow evaluation to be scheduled with me in the [facility].     Please let me know if you have further questions.     Thank you.     -Nani Alcocer MA CCC-SLP

## 2024-01-10 NOTE — PROGRESS NOTES
"Duane is a 43 year old who is being evaluated via a billable video visit.        Instructions Relayed to Patient by Virtual Roomer:     Patient is active on [employer]:   Relayed following to patient: \"It looks like you are active on [employer], are you able to join the visit this way? If not, do you need us to send you a link now or would you like your provider to send a link via text or email when they are ready to initiate the visit?\"    Reminded patient to ensure they were logged on to virtual visit by arrival time listed. Documented in appointment notes if patient had flexibility to initiate visit sooner than arrival time. If pediatric virtual visit, ensured pediatric patient along with parent/guardian will be present for video visit.     Patient offered the website [email or web address] and/or phone number to [employer] Help line: [phone]   How would you like to obtain your AVS? Mail a copy  If the video visit is dropped, the invitation should be resent by: Text to cell phone: [phone]  Will anyone else be joining your video visit? No          Assessment & Plan     (R13.13) Pharyngeal dysphagia  (primary encounter diagnosis)  Comment: His insurance requires a swallow study yearly to continue to cover the beverage thickener. Discussion reveals that he has not  been receiving this for a few months, probably.   Plan: Starch, Thickening, POWD, Speech Therapy         Referral            (R63.1) Polydipsia  Comment: I have expressed my opinion regarding this previously.   Plan: The group home  requested to put this in writing in the AVS.       35 minutes spent by me on the date of the encounter doing chart review, review of test results, patient visit, and documentation       Rajat Parkinson MD  [facility]    Subjective Duane is a 43 year old, presenting for the following health issues:  Hospital F/U and Referall Request (Speech Therapy)        1/10/2024 "     2:04 PM   Additional Questions   Roomed by Eulalio ACOSTA   Accompanied by Heide Saez ()       [facility]       Hospital Follow-up Visit:    Hospital/Nursing Home/IP Rehab Facility:  [facility] Emergency Department  Date of Admission: 1/3/2024  Date of Discharge: 1/4/2024  Reason(s) for Admission: Agitation     Was your hospitalization related to COVID-19? No   Problems taking medications regularly:  None  Medication changes since discharge: Risperdal 3x per day   Problems adhering to non-medication therapy:  None    Summary of hospitalization:  [facility] discharge summary reviewed  Diagnostic Tests/Treatments reviewed.  Follow up needed: none  Other Healthcare Providers Involved in Patient s Care:         None  Update since discharge: improved.         Plan of care communicated with patient and Heide ()               Review of Systems         Objective           Vitals:  No vitals were obtained today due to virtual visit.    Physical Exam   GENERAL: Healthy, alert and no distress  EYES: Eyes grossly normal to inspection.  No discharge or erythema, or obvious scleral/conjunctival abnormalities.  RESP: No audible wheeze, cough, or visible cyanosis.  No visible retractions or increased work of breathing.    SKIN: Visible skin clear. No significant rash, abnormal pigmentation or lesions.  NEURO: Cranial nerves grossly intact.  Mentation and speech appropriate for age.  PSYCH: Mentation appears normal, affect normal/bright, judgement and insight intact, normal speech and appearance well-groomed. He greets me in the background            Video-Visit Details    Type of service:  Video Visit     Originating Location (pt. ocation): Home  Total video time: 22 minutes  Distant Location (provider location):  On-site  Platform used for Video Visit: Lakisha    This document serves as a record of the services and decisions personally performed and made by Dr. Parkinson. It was  created on his behalf by Unruly Iverson, a trained medical scribe. The creation of this document is based the provider's statements to the medical scribe.  Unruly Iverson,  5:29 PM

## 2024-01-16 ENCOUNTER — TELEPHONE (OUTPATIENT)
Dept: BEHAVIORAL HEALTH | Facility: CLINIC | Age: 44
End: 2024-01-16
Payer: MEDICARE

## 2024-01-16 NOTE — TELEPHONE ENCOUNTER
Received call from Swati Saez [phone] [facility], home patient lives in. Stated calling back about message received asking for call back to schedule. Coordinator unable to locate RUTH, no info shared.     Encouraged to have patient/guardian call to schedule.    Anu Meier  Transition Clinic Coordinator  01/16/24 1:30 PM

## 2024-01-24 ENCOUNTER — THERAPY VISIT (OUTPATIENT)
Dept: SPEECH THERAPY | Facility: CLINIC | Age: 44
End: 2024-01-24
Payer: MEDICARE

## 2024-01-24 ENCOUNTER — TELEPHONE (OUTPATIENT)
Dept: BEHAVIORAL HEALTH | Facility: CLINIC | Age: 44
End: 2024-01-24

## 2024-01-24 DIAGNOSIS — Z91.89 AT RISK FOR ASPIRATION: Primary | ICD-10-CM

## 2024-01-24 DIAGNOSIS — F84.0 ACTIVE AUTISTIC DISORDER: ICD-10-CM

## 2024-01-24 PROCEDURE — 92610 EVALUATE SWALLOWING FUNCTION: CPT | Mod: GN | Performed by: SPEECH-LANGUAGE PATHOLOGIST

## 2024-01-24 NOTE — TELEPHONE ENCOUNTER
Writer spoke with [facility] and scheduled TC therapy appointment for 02/08/2024 @ 10:30 am.    Aleida Sheets  01/24/2024  [phone]

## 2024-01-24 NOTE — PROGRESS NOTES
SPEECH LANGUAGE PATHOLOGY EVALUATION    See electronic medical record for Abuse and Falls Screening details.    Subjective      Presenting condition or subjective complaint: Pt is a 43 y.o. male with autism and intellectual impairments, who was referred by his PCP per the request of his  as Pt has been on thickened liquids in the past. Pt completed OP VFSS 11/11/20 with results indicating moderately-severe oropharyngeal dysphagia with aspiration observed across thin, nectar, and honey thick consistencies as well as delayed swallow response resulting in premature pharyngeal entry of liquids. Per the OP VFSS honey thick liquids and NDD2 was recommended. Pt was hospitalized in January, 2023 for seizures, while IP SLP recommended regular textures with thin liquids with limited quantity of liquid or solid given at a time to manage impulsivity (1-2 oz of liquid in a cup at a time). Pt present today with caregiver, Gaurav, who reports Pt has been on thin liquids with cues to slow down and attempts to reduce amts, however, Pt is known to take other residents liquids and quickly drink them, Pt has psychogenetic polydipsia.   Date of onset: 01/10/24 (Date of MD orders.)    Relevant medical history:   Please see EMR  Dates & types of surgery:  Please see EMR.     Prior diagnostic imaging/testing results:     OP VFSS 11/11/20  Prior therapy history for the same diagnosis, illness or injury: Yes 2/4/22, 8/27/22. Clinial Swallow Evaluations.    Living Environment  Social support: With a caregiver/helper       Pain assessment:  Pt unable to respond.      Objective     SWALLOW EVALUTION  Dysphagia history: hx of aspiration, inconsistent use of thickened liquids, Pt highly impulsive with PO intake.   Current Diet/Method of Nutritional Intake: oral diet, thin liquids (level 0), regular diet        CLINICAL SWALLOW EVALUATION  Oral Motor Function: unable to assess due to poor participation/comprehension     Level of  assist required for feeding: frequent cues/help required   Textures Trialed:   Clinical Swallow Eval: Thin Liquids  Mode of presentation: cup, self-fed, volume limited by SLP    Volume presented: 1 oz  Preparatory Phase: WFL  Oral Phase: WFL  Pharyngeal phase of swallow: intact, impaired, coughing/choking   Strategies trialed during procedure: BRUNO SLP CLINICAL EVAL STRATEGIES: reduced amts    Diagnostic statement: Pt does not demonstrate overt s/sx of aspiration/penetration with 1 oz or less, with greater than 1 oz Pt exhibited a cough immediately following.        ESOPHAGEAL PHASE OF SWALLOW  no observed or reported concerns related to esophageal function     SWALLOW ASSESSMENT CLINICAL IMPRESSIONS AND RATIONALE  Diet Consistency Recommendations: oral diet, thin liquids (level 0), regular diet    Recommended Feeding/Eating Techniques: supervision needed, small bolus size, no straws, 1:1 supervision recommended for all PO intake with specific attention to limited amts provided, no more than 1 oz of liquid at a time, endure no over-loading of spoon/fork.     Medication Administration Recommendations: 1 pill at a time.   Instrumental Assessment Recommendations: instrumental evaluation not recommended at this time, would be beneficial to re-assess, however, suspect Pt unable to participate.       IF PT DOES PRESENT WITH UPPER RESPIRATORY ILLNESS OR PNEUMONIA WOULD RECOMMEND ATTEMPT AT REPEAT VFSS TO RULE IN/OUT ASPIRATION AND ID SAFEST LIQUID/TEXTURE RECOMMENDATIONS.     Assessment & Plan   CLINICAL IMPRESSIONS   Medical Diagnosis: Pharyngeal dysphagia (R13.13) (DysphagiaSpecialty Services:Clinical Swallow StudyAdditional Information:needs yearly swallow study to maintain coverage of his beverage thickener)    Treatment Diagnosis: Mild to moderate oropharyngeal dysphagia   Impression/Assessment: Pt is a 43 year old male with dysphagia complaints. The following significant findings have been identified: impaired  swallowing, characterized by coughing with larger amounts. Identified deficits interfere with their ability to consume an oral diet as compared to previous level of function.    PLAN OF CARE  Treatment Interventions: Swallowing dysfunction and/or oral function for feeding    Prognosis to achieve stated therapy goals is good   Rehab potential is impacted by: family/caregiver support, patient awareness of deficits    Long Term Goals   SLP Goal 1  Goal Identifier: LTG 1-Dysphagia  Goal Description: Caregiver will verbalize understanding of all results and recommendations with 100% in order to reduce aspiration risk on least restrictive diet.  Rationale: To maximize safety, ease and/or independence of oral intake  Goal Progress: Treatment provided following evaluation; education on overt s/sx of aspiration/penetration, education on safe swallow strategies including; 1:1 supervision for all PO intake, limiting amt offered to no more than 1 oz at a time and [place] support for food intake to ensure spoon/fork is not overloaded, monitor for coughing and cue for reduced rate, [place] support or limit amt availiable in each bite/sip if/when coughing noted. Medications ok with thin liquids with limited amt of liquid provided and direct supervision, 1 pill at a time. No straws.  Target Date: 01/24/24  Date Met: 01/24/24      Frequency of Treatment: 1 session  Duration of Treatment: 1 session     Recommended Referrals to Other Professionals:  none at this time.   Education Assessment:        Risks and benefits of evaluation/treatment have been explained.   Patient/Family/caregiver agrees with Plan of Care.     Evaluation Time:    SLP Eval: oral/pharyngeal swallow function, clinical minutes (56410): 10     Present: Not applicable     Signing Clinician: WILMER Albert    [facility]                                                                                   OUTPATIENT SPEECH LANGUAGE  PATHOLOGY      PLAN OF TREATMENT FOR OUTPATIENT REHABILITATION   Patient's Last Name, First Name, M.I. Backes,Duane D YOB: 1980   Provider's Name   [facility]   Medical Record No.  [id number]     Onset Date: 01/10/24 (Date of MD orders.) Start of Care Date: 01/24/24     Medical Diagnosis:  Pharyngeal dysphagia (R13.13) (DysphagiaSpecialty Services:Clinical Swallow StudyAdditional Information:needs yearly swallow study to maintain coverage of his beverage thickener)      SLP Treatment Diagnosis: Mild to moderate oropharyngeal dysphagia  Plan of Treatment  Frequency/Duration: 1 session  / 1 session     Certification date from 01/24/24   To 01/24/24          See note for plan of treatment details and functional goals     Nani Alcocer, SLP                         I CERTIFY THE NEED FOR THESE SERVICES FURNISHED UNDER        THIS PLAN OF TREATMENT AND WHILE UNDER MY CARE     (Physician attestation of this document indicates review and certification of the therapy plan).              Referring Provider:  Rajat Parkinson    Initial Assessment  See Epic Evaluation- 01/24/24

## 2024-02-15 ENCOUNTER — TELEPHONE (OUTPATIENT)
Dept: FAMILY MEDICINE | Facility: CLINIC | Age: 44
End: 2024-02-15
Payer: MEDICARE

## 2024-02-15 DIAGNOSIS — R30.0 DYSURIA: Primary | ICD-10-CM

## 2024-02-15 NOTE — TELEPHONE ENCOUNTER
Deidra reports that at Duanes's appointment with Dr. Parkinson on 1/10/2024 he offered to do a urine and they declined at that time. Deidra reports that now he may have a UTI. He has been having accidents during the day and this is not normal.  This has been going of for about 2 days. He is grabbing at his genital area. He is able to urinate. He does not use briefs. She reports that is hard to tell when is in discomfort. If the lab is ordered, someone from the group home is able to make an appointment and bring him in for the labs he needs.   Denies: fever, nausea , vomiting,

## 2024-02-15 NOTE — TELEPHONE ENCOUNTER
During the virtual visit, I was recommending a 24-hour urine collection to evaluate an ongoing issue.     These current symptoms are suggestive of a possible urinary infection, so they can certainly schedule him for a lab appointment to do a UA/UC.

## 2024-02-15 NOTE — TELEPHONE ENCOUNTER
RN spoke with Deidra, , to relay provider message.     Deidra would like to schedule lab appointment, would like to schedule at clinic closer to [place]. LEEANNA torres transferred to central scheduling.     Joya Simons RN

## 2024-02-16 ENCOUNTER — LAB (OUTPATIENT)
Dept: LAB | Facility: CLINIC | Age: 44
End: 2024-02-16
Payer: MEDICARE

## 2024-02-16 DIAGNOSIS — E55.9 VITAMIN D DEFICIENCY: ICD-10-CM

## 2024-02-16 DIAGNOSIS — R30.0 DYSURIA: ICD-10-CM

## 2024-02-16 LAB
ALBUMIN UR-MCNC: NEGATIVE MG/DL
APPEARANCE UR: CLEAR
BILIRUB UR QL STRIP: NEGATIVE
COLOR UR AUTO: YELLOW
GLUCOSE UR STRIP-MCNC: NEGATIVE MG/DL
HGB UR QL STRIP: ABNORMAL
KETONES UR STRIP-MCNC: NEGATIVE MG/DL
LEUKOCYTE ESTERASE UR QL STRIP: NEGATIVE
NITRATE UR QL: NEGATIVE
PH UR STRIP: 7 [PH] (ref 5–7)
RBC #/AREA URNS AUTO: NORMAL /HPF
SP GR UR STRIP: 1.01 (ref 1–1.03)
UROBILINOGEN UR STRIP-ACNC: 0.2 E.U./DL
VIT D+METAB SERPL-MCNC: 41 NG/ML (ref 20–50)
WBC #/AREA URNS AUTO: NORMAL /HPF

## 2024-02-16 PROCEDURE — 81001 URINALYSIS AUTO W/SCOPE: CPT

## 2024-02-16 PROCEDURE — 36415 COLL VENOUS BLD VENIPUNCTURE: CPT

## 2024-02-16 PROCEDURE — 82306 VITAMIN D 25 HYDROXY: CPT

## 2024-02-27 DIAGNOSIS — G40.909 SEIZURE DISORDER (H): ICD-10-CM

## 2024-03-06 ENCOUNTER — OFFICE VISIT (OUTPATIENT)
Dept: NEUROLOGY | Facility: CLINIC | Age: 44
End: 2024-03-06
Payer: MEDICARE

## 2024-03-06 ENCOUNTER — TELEPHONE (OUTPATIENT)
Dept: NEUROLOGY | Facility: CLINIC | Age: 44
End: 2024-03-06

## 2024-03-06 VITALS
SYSTOLIC BLOOD PRESSURE: 110 MMHG | TEMPERATURE: 96.8 F | HEART RATE: 84 BPM | WEIGHT: 238 LBS | HEIGHT: 71 IN | DIASTOLIC BLOOD PRESSURE: 74 MMHG | BODY MASS INDEX: 33.32 KG/M2

## 2024-03-06 DIAGNOSIS — G40.909 SEIZURE DISORDER (H): ICD-10-CM

## 2024-03-06 RX ORDER — TOPIRAMATE 200 MG/1
TABLET, FILM COATED ORAL
Qty: 180 TABLET | Refills: 3 | Status: SHIPPED | OUTPATIENT
Start: 2024-03-06 | End: 2024-10-03

## 2024-03-06 RX ORDER — DIVALPROEX SODIUM 500 MG/1
500 TABLET, EXTENDED RELEASE ORAL
Qty: 270 TABLET | Refills: 3 | Status: SHIPPED | OUTPATIENT
Start: 2024-03-06 | End: 2024-10-03

## 2024-03-06 RX ORDER — LACOSAMIDE 100 MG/1
100 TABLET ORAL 2 TIMES DAILY
Qty: 62 TABLET | Refills: 5 | Status: SHIPPED | OUTPATIENT
Start: 2024-03-06 | End: 2024-10-03

## 2024-03-06 RX ORDER — DIAZEPAM ORAL SOLUTION (CONCENTRATE) 5 MG/ML
SOLUTION ORAL
Qty: 30 ML | Refills: 5 | Status: SHIPPED | OUTPATIENT
Start: 2024-03-06

## 2024-03-06 NOTE — PATIENT INSTRUCTIONS
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  Prior EEGs have shown rare left temporal sharp waves (opposite side of tumor).  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  Patient has been managed on lacosamide, Depakote and topiramate for several years with no seizures. We will continue same antiepileptic drug.     We reviewed MRI brain 1/2023 and tumor is stable. I showed caregiver this cyst and the complexity of it.1/12/2023 - IMPRESSION:1. 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 Unchanged enhancing nodular components of about the periphery of the mass.   Caregiver asked if surgical intervention is possible to treat the cyst.  I explained we would not recommend surgical intervention because he is not symptomatic from the cyst at this point.  Additionally his seizures are controlled with current medications.  Certainly we are happy to give a neurosurgery consultation to address the question of surgical intervention further.    Swati raise concerns that other providers have commented his tumor may be the cause for behavioral outburst.  I reviewed with her that the tumor would not cause behavioral outburst.  It is possible that he may have had a seizure that was not clinically witnessed which may have exacerbated his behavior.  However caregivers have not reported a seizure since January 2023.  It is my opinion that his tumor would not cause behavioral deterioration or psychiatric issues.  Further  consideration should be given to as needed psychiatric medications to treat behavioral outburst.  This may include Seroquel, Haldol, or other medications.  In the past ativan   Has been helpful.  However, I did review with family that Ativan  is addictive and patients develop tolerance.  Ideally we would use nonbenzodiazepine rescue meds for behavioral outburst.    Management of hyponatremia (low sodium) with polydipsia  by psychiatry and primary care team.  His last sodium was 132.  He continues to drink large amounts of water, they add salt to the water in an effort to prevent hyponatremia.  Behaviorally it is very difficult to restrict water.  I did review with caregiver his repetitive convulsions in January 2023 were secondary to low sodium of 121.  They expressed understanding of this.        Plan :     Continue current seizure medications:   Depakote (500 mg ER tablet) 1 tablet three times a day   Topiramate (200 mg tablet) 1 tablet twice a day  Lacosamide (100 mg tablet) 1 tablet twice a day    Follow up with psychiatrist for behavioral issues and optimize medications to help behavior.  I encouraged them to work with psychiatrist and regular 2-3 month follow up to manage behaviors. Monitor water to avoid excessive intake as this will cause low Na+ and results seizures. Limit water to 8-10 glasses per day.    Follow up  With Felix 6 months     Diazepam: Give 1 ml (5mg): Give 1 ml for ANY seizure. May repeat once 1 ml (5 mg). Monitor breathing, if there any concerns call 911. Do not exceed 10 mg per day.    Repeat MRI brain 9499-8002 (after NSG consult)    MINCEP nurse to do seizure action protocol     Lauren Washington MD

## 2024-03-06 NOTE — LETTER
3/6/2024       RE: Duane D Backes  : 1980   MRN: [id number]      Dear Colleague,    Thank you for referring your patient, Duane D Backes, to the [facility] EPILEPSY CARE at [facility]. Please see a copy of my visit note below.    [facility]    Patient:  Duane D Backes  :  1980   Age:  43 year old   Today's Office Visit:  3/6/2024      Epilepsy history copied forward:  Duane D Backes is right handed with history of cognitive disability and epilepsy referred to us by Dr. Carter for high depakote levels. Per medical records from  he had a generalized tonic-clonic convulsion at [place], EEG showed rare left temporal epileptiform discharges. He is has been treated for epilepsy with topiramate (low levels) and depakote with high concentration for many years.   Interval History: Spoke to Breanna (program manger). 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 Prior to this last seizure in 2021 (intubated in ICU, status) and prior to this his last seizure was 2020. He is working with behavioral analyst. They are putting salt in his water to prevent hyponatremia. His primary care provider and psychiatrist. We reviewed cyst would not cause outburst. Breanna is worried about outburt and mood issues. He gets violent. In the past ativan   Has been helpful.  However, I did review with family that Ativan  is addictive and patients develop tolerance.  Ideally we would use nonbenzodiazepine rescue meds for behavioral outburst.  His behaviors is better, when he is hyper he can throw things. He will try to hit his house mate. I encouraged them to work with psychiatrist and regular 2-3 month follow up to manage behaviors.  He has established follow up with psychiatrist.    Current antiepileptic drug:   Divalproex  mg three times a day  "  Topiramate  200 mg twice a day  Lacosamide 100 mg twice a day   Medication Notes:   AED Medication Compliance:  compliant most of the time  Using a pill box:  Yes  Past AEDs notes:   Topiramate: We increase topiramate to 200 mg twice a day in winter 2020.    Depakote was decreased from 1500 mg twice a day to 1000 mg twice a day and 2020 due to blood concentrations near 170. Lowering depakote caused more behavioral issues.   vimpat started 2/2021 after status epilepticus  Psycho-Social History: he moved to current group home 11/2021. He grew up with parents, after high school he was institutionalized (may be due to behavioral) and then transferred to group home 2013. No drinking, no drugs, and no smoking.     Exam:    /74   Pulse 84   Temp 96.8  F (36  C) (Temporal)   Ht 5' 11\" (180.3 cm)   Wt 238 lb (108 kg)   BMI 33.19 kg/m       Wt Readings from Last 5 Encounters:   03/06/24 238 lb (108 kg)   11/22/23 222 lb 9.6 oz (101 kg)   10/16/23 221 lb (100.2 kg)   01/24/23 255 lb 11.7 oz (116 kg)   01/12/23 251 lb 5.2 oz (114 kg)     Cognitive delay, Alert, face symmetric, extra ocular movements in tact, able to life upper extremities. Very calm.     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  Prior EEGs have shown rare left temporal sharp waves (opposite side of tumor).  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  Patient has been managed on lacosamide, Depakote and topiramate for several years with no seizures. We will continue same antiepileptic drug.     We reviewed MRI brain 1/2023 and tumor is stable. I showed caregiver this cyst and the " complexity of it.1/12/2023 - IMPRESSION:1. 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 Unchanged enhancing nodular components of about the periphery of the mass.   Caregiver asked if surgical intervention is possible to treat the cyst.  I explained we would not recommend surgical intervention because he is not symptomatic from the cyst at this point.  Additionally his seizures are controlled with current medications.  Certainly we are happy to give a neurosurgery consultation to address the question of surgical intervention further.    Swati raise concerns that other providers have commented his tumor may be the cause for behavioral outburst.  I reviewed with her that the tumor would not cause behavioral outburst.  It is possible that he may have had a seizure that was not clinically witnessed which may have exacerbated his behavior.  However caregivers have not reported a seizure since January 2023.  It is my opinion that his tumor would not cause behavioral deterioration or psychiatric issues.  Further consideration should be given to as needed psychiatric medications to treat behavioral outburst.  This may include Seroquel, Haldol, or other medications.  In the past ativan   Has been helpful.  However, I did review with family that Ativan  is addictive and patients develop tolerance.  Ideally we would use nonbenzodiazepine rescue meds for behavioral outburst.    Management of hyponatremia (low sodium) with polydipsia  by psychiatry and primary care team.  His last sodium was 132.  He continues to drink large amounts of water, they add salt to the water in an effort to prevent hyponatremia.  Behaviorally it is very difficult to restrict water.  I did review with caregiver his repetitive convulsions in January 2023 were secondary to low sodium of 121.  They  expressed understanding of this.        Plan :     Continue current seizure medications:   Depakote (500 mg ER tablet) 1 tablet three times a day   Topiramate (200 mg tablet) 1 tablet twice a day  Lacosamide (100 mg tablet) 1 tablet twice a day    Follow up with psychiatrist for behavioral issues and optimize medications to help behavior.  I encouraged them to work with psychiatrist and regular 2-3 month follow up to manage behaviors. Monitor water to avoid excessive intake as this will cause low Na+ and results seizures. Limit water to 8-10 glasses per day.    Follow up  With Felix 6 months     Diazepam: Give 1 ml (5mg): Give 1 ml for ANY seizure. May repeat once 1 ml (5 mg). Monitor breathing, if there any concerns call 911. Do not exceed 10 mg per day.    Repeat MRI brain [id number] (after NSG consult)    [facility] nurse to do seizure action protocol     I spent 28 minutes in total today to provide comprehensive  medical care.   I spent 4 minutes writing the note and placing orders.   I spent 2 minutes  reviewing the chart.     The rest of the time was spent with the patient in face to face interview. During this time key medical decisions were made with review of medical chart prior to visit, visit with patient, counseling/education, and post visit work, including documentation of note on the day of visit. I addressed all questions the patient/caregiver raised in regards to epilepsy or related medical questions.             Again, thank you for allowing me to participate in the care of your patient.      Sincerely,    Lauren Washington MD

## 2024-03-06 NOTE — PROGRESS NOTES
[facility]    Patient:  Duane D Backes  :  1980   Age:  43 year old   Today's Office Visit:  3/6/2024      Epilepsy history copied forward:  Duane D Backes is right handed with history of cognitive disability and epilepsy referred to us by Dr. Carter for high depakote levels. Per medical records from  he had a generalized tonic-clonic convulsion at [place], EEG showed rare left temporal epileptiform discharges. He is has been treated for epilepsy with topiramate (low levels) and depakote with high concentration for many years.   Interval History: Spoke to Breanna (program manger). 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 Prior to this last seizure in 2021 (intubated in ICU, status) and prior to this his last seizure was 2020. He is working with behavioral analyst. They are putting salt in his water to prevent hyponatremia. His primary care provider and psychiatrist. We reviewed cyst would not cause outburst. Breanna is worried about outburt and mood issues. He gets violent. In the past ativan   Has been helpful.  However, I did review with family that Ativan  is addictive and patients develop tolerance.  Ideally we would use nonbenzodiazepine rescue meds for behavioral outburst.  His behaviors is better, when he is hyper he can throw things. He will try to hit his house mate. I encouraged them to work with psychiatrist and regular 2-3 month follow up to manage behaviors.  He has established follow up with psychiatrist.    Current antiepileptic drug:   Divalproex  mg three times a day   Topiramate  200 mg twice a day  Lacosamide 100 mg twice a day   Medication Notes:   AED Medication Compliance:  compliant most of the time  Using a pill box:  Yes  Past AEDs notes:   Topiramate: We increase topiramate to 200 mg twice a day in winter 2020.    Depakote was decreased from 1500 mg twice  "a day to 1000 mg twice a day and 2020 due to blood concentrations near 170. Lowering depakote caused more behavioral issues.   vimpat started 2/2021 after status epilepticus  Psycho-Social History: he moved to current group home 11/2021. He grew up with parents, after high school he was institutionalized (may be due to behavioral) and then transferred to group home 2013. No drinking, no drugs, and no smoking.     Exam:    /74   Pulse 84   Temp 96.8  F (36  C) (Temporal)   Ht 5' 11\" (180.3 cm)   Wt 238 lb (108 kg)   BMI 33.19 kg/m       Wt Readings from Last 5 Encounters:   03/06/24 238 lb (108 kg)   11/22/23 222 lb 9.6 oz (101 kg)   10/16/23 221 lb (100.2 kg)   01/24/23 255 lb 11.7 oz (116 kg)   01/12/23 251 lb 5.2 oz (114 kg)     Cognitive delay, Alert, face symmetric, extra ocular movements in tact, able to life upper extremities. Very calm.     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  Prior EEGs have shown rare left temporal sharp waves (opposite side of tumor).  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  Patient has been managed on lacosamide, Depakote and topiramate for several years with no seizures. We will continue same antiepileptic drug.     We reviewed MRI brain 1/2023 and tumor is stable. I showed caregiver this cyst and the complexity of it.1/12/2023 - IMPRESSION:1. 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 Unchanged enhancing nodular components of about the periphery of " the mass.   Caregiver asked if surgical intervention is possible to treat the cyst.  I explained we would not recommend surgical intervention because he is not symptomatic from the cyst at this point.  Additionally his seizures are controlled with current medications.  Certainly we are happy to give a neurosurgery consultation to address the question of surgical intervention further.    Swati raise concerns that other providers have commented his tumor may be the cause for behavioral outburst.  I reviewed with her that the tumor would not cause behavioral outburst.  It is possible that he may have had a seizure that was not clinically witnessed which may have exacerbated his behavior.  However caregivers have not reported a seizure since January 2023.  It is my opinion that his tumor would not cause behavioral deterioration or psychiatric issues.  Further consideration should be given to as needed psychiatric medications to treat behavioral outburst.  This may include Seroquel, Haldol, or other medications.  In the past ativan   Has been helpful.  However, I did review with family that Ativan  is addictive and patients develop tolerance.  Ideally we would use nonbenzodiazepine rescue meds for behavioral outburst.    Management of hyponatremia (low sodium) with polydipsia  by psychiatry and primary care team.  His last sodium was 132.  He continues to drink large amounts of water, they add salt to the water in an effort to prevent hyponatremia.  Behaviorally it is very difficult to restrict water.  I did review with caregiver his repetitive convulsions in January 2023 were secondary to low sodium of 121.  They expressed understanding of this.        Plan :     Continue current seizure medications:   Depakote (500 mg ER tablet) 1 tablet three times a day   Topiramate (200 mg tablet) 1 tablet twice a day  Lacosamide (100 mg tablet) 1 tablet twice a day    Follow up with psychiatrist for behavioral issues and optimize  medications to help behavior.  I encouraged them to work with psychiatrist and regular 2-3 month follow up to manage behaviors. Monitor water to avoid excessive intake as this will cause low Na+ and results seizures. Limit water to 8-10 glasses per day.    Follow up  With Felix 6 months     Diazepam: Give 1 ml (5mg): Give 1 ml for ANY seizure. May repeat once 1 ml (5 mg). Monitor breathing, if there any concerns call 911. Do not exceed 10 mg per day.    Repeat MRI brain [id number] (after NSG consult)    [facility] nurse to do seizure action protocol     I spent 28 minutes in total today to provide comprehensive  medical care.   I spent 4 minutes writing the note and placing orders.   I spent 2 minutes  reviewing the chart.     The rest of the time was spent with the patient in face to face interview. During this time key medical decisions were made with review of medical chart prior to visit, visit with patient, counseling/education, and post visit work, including documentation of note on the day of visit. I addressed all questions the patient/caregiver raised in regards to epilepsy or related medical questions.           Lauren Washington MD   Epilepsy Staff

## 2024-03-06 NOTE — LETTER
3/6/2024       RE: Duane D Backes  [id number]  [facility] [facility] [address]      SEIZURE PLAN AND PROTOCOL     Seizure description:     Generalized tonic-clonic convulsion: full body stiffening and/or jerking, urinary incontinence, verbally unresponsive.         Plan of Care:     Follow general seizure care and First Aid guidelines for seizures.  Anticonvulsant medications will be administered as prescribed.  All seizures will be documented on a Seizure Report including:  date, time, length of seizure, specific body movements and behaviors exhibited during the seizure, and post-seizure state.  Antiepileptic blood levels will be ordered by the physician based upon client's condition and medications.     Missed Doses:     IF YOU REALIZE WITHIN 24 HOURS:     ...You MISSED ONE DOSE of your anticonvulsant medication(s), take the missed dose immediately unless it is time for your next scheduled dose. If that is the case, take your next scheduled dose, wait two hours, and then take the missed dose.     ...You MISSED TWO OR MORE DOSES, take one of the missed doses immediately, even if it is time for your next scheduled dose. Then call the [facility] clinic to schedule an appointment.      IF YOU REALIZE NOW YOU MISSED A DOSE MORE THAN 24 HOURS AGO, francisco it on your calendar. DO NOT take an extra dose.        Medication Errors:     Your facility nurse should be notified of any medication errors. The nurse should observe the urgency of the error. It is not necessary to notify the MD on call unless the facility nurse or delegate believes it to be life threatening or could possibly result in a serious complication. Routine notices required by regulation should be telephoned to the clinic during office hours.     Extra Dose:     An extra dose of an antiepileptic drug is not serious. Ordinarily, at most, the client may experience increased side effects for several hours. Contact your facility nurse immediately if an extra  dose was given.     Seizure Protocol:     Diazepam (VALIUM) 5 MG/ML (HIGH CONC) solution    Give 1 ml (5mg): Give 1 ml for ANY seizure. May repeat once 1 ml (5 mg). Monitor breathing, if there any concerns call 911. Do not exceed 10 mg per day.      When to call 911 for Seizures:     Call 911 if:  Duane does not start breathing within 1 minute after the seizure. If this happens call 911 immediately and start CPR.  Duane sustains an injury  Duane has one seizure right after another without regaining consciousness in between  A GTC seizure lasts over 5 minutes  Facility protocol requires EMS evaluation              Provider:           Sincerely,      Lauren Washington M.D.

## 2024-03-07 ENCOUNTER — TELEPHONE (OUTPATIENT)
Dept: FAMILY MEDICINE | Facility: CLINIC | Age: 44
End: 2024-03-07

## 2024-03-07 NOTE — TELEPHONE ENCOUNTER
New Medication Request    Contacts         Type Contact Phone/Fax    03/07/2024 01:36 PM CST Phone (Incoming) [facility] (Emergency Contact) [phone]            What medication are you requesting?: Sodium tablets     Reason for medication request:  stated he has low sodium causing seizures.  Patient was transferred to RN    Have you taken this medication before?: No    Controlled Substance Agreement on file:   CSA -- Patient Level:    CSA: None found at the patient level.         Patient offered an appointment? No    Preferred Pharmacy:  [facility]. - [address] [facility] [address]  Phone: [phone] Fax: [phone]      Okay to leave a detailed message?: Yes at Other phone number:  [phone] ext. [phone]

## 2024-03-14 RX ORDER — LACOSAMIDE 100 MG/1
TABLET ORAL
Qty: 60 TABLET | Refills: 11 | OUTPATIENT
Start: 2024-03-14

## 2024-05-06 ENCOUNTER — TELEPHONE (OUTPATIENT)
Dept: FAMILY MEDICINE | Facility: CLINIC | Age: 44
End: 2024-05-06
Payer: MEDICARE

## 2024-05-06 NOTE — TELEPHONE ENCOUNTER
"Swati,  at [facility], calling to request prescription for sodium tablets for patient.     Swati is not with pt at the time, unable to fully triage. Per Swati, pt had 1 minute long \"uncontrollable laughing fit\" this morning. Pt was responsive throughout and afterward. Swati has not seen anything like this in pt before and is unsure if it is a seizure. (Swati tried calling neurology, but has not heard back yet). Body was not stiff, pt did not lose consciousness. Pt was not groggy/did not sleep after. Swati reports pt was back to normal after he stopped laughing. Pt has known history of seizures, no missed doses of medication.    Swati would like prescription for sodium tablets, states provider has said this was reason for pt's seizures and has been recommended by PCP in the past. RN stated that this might need to come from neurology, Swati said she has asked neurologist this in the past, but was told to ask PCP for prescription. Swati would also like a seizure protocol.'     RN recommended Swati follow up with neurologist, but would route message to PCP to advised on seizure protocol and sodium prescription.     Routing to provider.     Joya Simons, LEEANNA  "

## 2024-05-08 NOTE — TELEPHONE ENCOUNTER
Called the , Swati back and relayed the provider's message.  Swati verbalized understanding and stated she will wait to hear from Neurology regarding seizure protocol.      Kristina Kjellberg, MSN, RN

## 2024-05-08 NOTE — TELEPHONE ENCOUNTER
The patient has hyponatremia from polydipsia. Sodium tablets will not change his sodium level in this situation, and I recommend against it. The treatment is fluid restriction.    I will defer issues regarding seizure protocol to the patient's neurologist.

## 2024-07-02 ENCOUNTER — TELEPHONE (OUTPATIENT)
Dept: NEUROLOGY | Facility: CLINIC | Age: 44
End: 2024-07-02

## 2024-07-02 NOTE — TELEPHONE ENCOUNTER
Reached out to facility to advise of [facility] Departure and provider transfer. No answer, unable to leave . Letter sent.

## 2024-07-30 ENCOUNTER — LAB (OUTPATIENT)
Dept: LAB | Facility: CLINIC | Age: 44
End: 2024-07-30
Payer: MEDICARE

## 2024-07-30 DIAGNOSIS — E03.9 HYPOTHYROIDISM, UNSPECIFIED TYPE: Primary | ICD-10-CM

## 2024-07-30 DIAGNOSIS — Z51.81 ENCOUNTER FOR THERAPEUTIC DRUG MONITORING: ICD-10-CM

## 2024-07-30 LAB
ANION GAP SERPL CALCULATED.3IONS-SCNC: 9 MMOL/L (ref 7–15)
BUN SERPL-MCNC: 19.5 MG/DL (ref 6–20)
CHLORIDE SERPL-SCNC: 102 MMOL/L (ref 98–107)
CREAT SERPL-MCNC: 0.85 MG/DL (ref 0.67–1.17)
EGFRCR SERPLBLD CKD-EPI 2021: >90 ML/MIN/1.73M2
HCO3 SERPL-SCNC: 27 MMOL/L (ref 22–29)
POTASSIUM SERPL-SCNC: 4.4 MMOL/L (ref 3.4–5.3)
SODIUM SERPL-SCNC: 138 MMOL/L (ref 135–145)
T4 FREE SERPL-MCNC: 1.01 NG/DL (ref 0.9–1.7)
TSH SERPL DL<=0.005 MIU/L-ACNC: 6.07 UIU/ML (ref 0.3–4.2)

## 2024-07-30 PROCEDURE — 84439 ASSAY OF FREE THYROXINE: CPT

## 2024-07-30 PROCEDURE — 84443 ASSAY THYROID STIM HORMONE: CPT

## 2024-07-30 PROCEDURE — 80051 ELECTROLYTE PANEL: CPT

## 2024-07-30 PROCEDURE — 82565 ASSAY OF CREATININE: CPT

## 2024-07-30 PROCEDURE — 84520 ASSAY OF UREA NITROGEN: CPT

## 2024-07-30 PROCEDURE — 36415 COLL VENOUS BLD VENIPUNCTURE: CPT

## 2024-10-03 ENCOUNTER — OFFICE VISIT (OUTPATIENT)
Dept: NEUROLOGY | Facility: CLINIC | Age: 44
End: 2024-10-03
Payer: MEDICARE

## 2024-10-03 VITALS — TEMPERATURE: 97.5 F | SYSTOLIC BLOOD PRESSURE: 136 MMHG | DIASTOLIC BLOOD PRESSURE: 84 MMHG | HEART RATE: 90 BPM

## 2024-10-03 DIAGNOSIS — G40.909 SEIZURE DISORDER (H): ICD-10-CM

## 2024-10-03 DIAGNOSIS — G40.309 GENERALIZED TONIC CLONIC EPILEPSY (H): Primary | ICD-10-CM

## 2024-10-03 LAB
ERYTHROCYTE [DISTWIDTH] IN BLOOD BY AUTOMATED COUNT: 12.8 % (ref 10–15)
HCT VFR BLD AUTO: 45.5 % (ref 40–53)
HGB BLD-MCNC: 14.9 G/DL (ref 13.3–17.7)
MCH RBC QN AUTO: 29.4 PG (ref 26.5–33)
MCHC RBC AUTO-ENTMCNC: 32.7 G/DL (ref 31.5–36.5)
MCV RBC AUTO: 90 FL (ref 78–100)
PLATELET # BLD AUTO: 226 10E3/UL (ref 150–450)
RBC # BLD AUTO: 5.06 10E6/UL (ref 4.4–5.9)
WBC # BLD AUTO: 7.1 10E3/UL (ref 4–11)

## 2024-10-03 PROCEDURE — 80201 ASSAY OF TOPIRAMATE: CPT | Mod: ORL | Performed by: PSYCHIATRY & NEUROLOGY

## 2024-10-03 PROCEDURE — 84450 TRANSFERASE (AST) (SGOT): CPT | Mod: ORL | Performed by: PSYCHIATRY & NEUROLOGY

## 2024-10-03 PROCEDURE — 80235 DRUG ASSAY LACOSAMIDE: CPT | Mod: ORL | Performed by: PSYCHIATRY & NEUROLOGY

## 2024-10-03 PROCEDURE — 80164 ASSAY DIPROPYLACETIC ACD TOT: CPT | Mod: ORL | Performed by: PSYCHIATRY & NEUROLOGY

## 2024-10-03 PROCEDURE — 85014 HEMATOCRIT: CPT | Mod: ORL | Performed by: PSYCHIATRY & NEUROLOGY

## 2024-10-03 RX ORDER — LACOSAMIDE 100 MG/1
100 TABLET ORAL 2 TIMES DAILY
Qty: 62 TABLET | Refills: 5 | Status: SHIPPED | OUTPATIENT
Start: 2024-10-03

## 2024-10-03 RX ORDER — TOPIRAMATE 200 MG/1
TABLET, FILM COATED ORAL
Qty: 180 TABLET | Refills: 3 | Status: SHIPPED | OUTPATIENT
Start: 2024-10-03

## 2024-10-03 RX ORDER — DIVALPROEX SODIUM 500 MG/1
500 TABLET, FILM COATED, EXTENDED RELEASE ORAL
Qty: 270 TABLET | Refills: 3 | Status: SHIPPED | OUTPATIENT
Start: 2024-10-03

## 2024-10-03 ASSESSMENT — PAIN SCALES - GENERAL: PAINLEVEL: NO PAIN (0)

## 2024-10-03 NOTE — PATIENT INSTRUCTIONS
No seizures since last visit. Last known seizure January 2023.  Last sodium we are aware of July 2024 was normal at 138.    AEDs: DVP ER 1500 mg/d, topiramate 400 mg.d lacosamide 200 mg/d.    On exam alert. Single words. Echopraxia. Subtle right sided hyperreflexia.  Uses both hands well. No hemiparesis.    Continue current antiseizure medications. Refilled for one year.  Would consider repeat CT scan in next six months to one year.  Call if any seizures.

## 2024-10-03 NOTE — LETTER
10/3/2024       RE: Duane D Backes  : 1980   MRN: [id number]      Dear Colleague,    Thank you for referring your patient, Duane D Backes, to the [facility] EPILEPSY CARE at [facility]. Please see a copy of my visit note below.    [facility] Epilepsy Care Progress Note      Patient:  Duane D Backes  :  1980   Age:  44 year old   Today's Office Visit:  10/4/2024    Epilepsy Data:    Seizure Record  Current Visit Date: 10/03/24  Previous Visit Date: 24  Months since last visit: 6.93  Seizure Type 1: Tonic-clonic seizures  Description of Sz Type 1: convulsions  # of Type 1 Seizure since last visit: 0  Freq. Type 1 / Month: 0  Seizure Type 2: Status Epilepticus  Description of Sz Type 2: serial convulsions  # of Type 2 Seizure since last visit: 0  Freq. Type 2 / Month: 0    History of Present Illness:     Patient had been followed by Dr Washington, first time I have seen patient. Extensive review of chart indicates seizure onset  at which point version of head to left followed by BTC described. Was treated with DVP 1500 mg/d at the time for behavioral reasons. PHT was added but for unclear reasons discontinued subsequently. TPM added . Convulsive seizures documented 2020. Serial BTC requiring intubation occurred 2021, 2022, 2023, 1/3/2024. Last three runs of acute repetitive seizures required intubation and were associated with severe hyponatrenmia (<123) which was attributed to polydipsia and possibly lithium comedication. Attempts to increase DVP beyond 1500 mg/d resulted in toxicity. Lacosamide at current doses was added 2021. 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 Tho this is sometimes described in chart an epidermoid tumor, neurosurgical  consultation (Dr Lui 3/29/21) describes this as a epidermoid cyst and did not mention previous craniotomy or surgical treatment.     There have been no seizures since the last visit and no hospitalizations for status epilepticus.  Recent bouts of status epilepticus have been consistently associated with hyponatremia which has been attributed to polydipsia.  He continues compulsively seeking out water but staff is imposing appropriate limits.  His cognitive and behavioral status has not changed. He is generally alert and active.  Staff have not noted difficulties with weakness, clumsiness, decreased alertness, increased somnolence, dyscongjugate gaze or ataxia.      Current Outpatient Medications   Medication Sig Dispense Refill     ammonium lactate (LAC-HYDRIN) 12 % external lotion Apply topically daily To feet and heels       diazepam (VALIUM) 5 MG/ML (HIGH CONC) solution Diazepam: Give 1 ml (5mg): Give 1 ml for ANY seizure. May repeat once 1 ml (5 mg). Monitor breathing, if there any concerns call 911. Do not exceed 10 mg per day. 30 mL 5     divalproex sodium extended-release (DEPAKOTE ER) 500 MG 24 hr tablet Take 1 tablet (500 mg) by mouth 3 times daily. 270 tablet 3     docusate sodium (COLACE) 100 MG capsule TAKE 1 CAPSULE BY MOUTH TWICE DAILY. 62 capsule 11     ENULOSE 10 GM/15ML SOLUTION TAKE 30ML BY MOUTH EVERY EVENING AT 5PM;TAKE 30ML BY MOUTH TWICE DAILY AS NEEDED 2880 mL 11     fish oil-omega-3 fatty acids 1000 MG capsule TAKE 1 CAPSULE BY MOUTH TWICE DAILY  **NON-COVERED MED**  **PACK IN CARDS* 100 capsule 11     GAVILAX 17 GM/SCOOP powder MIX 17 GRAMS IN LIQUID AND DRINK BY MOUTH ONCE DAILY FOR CONSTIPATION. 510 g 11     Lacosamide (VIMPAT) 100 MG TABS tablet Take 1 tablet (100 mg) by mouth 2 times daily. 62 tablet 5     levothyroxine (SYNTHROID/LEVOTHROID) 150 MCG tablet TAKE 1 TABLET BY MOUTH ONCE DAILY FOR THYROID. 31 tablet 11     lithium ER (LITHOBID) 300 MG CR tablet Take 300 mg by mouth 2 times  daily       MILK OF MAGNESIA 400 MG/5ML suspension Take 30 mLs by mouth every evening 3780 mL 0     propranolol (INDERAL) 40 MG tablet Take 1 tablet by mouth 2 times daily as needed.       QUEtiapine (SEROQUEL) 25 MG tablet Take 25 mg by mouth daily       QUEtiapine (SEROQUEL) 50 MG tablet Take 50 mg by mouth 2 times daily Take with 25 mg in the am for a total am dose of 75 mg and then 50 mg in the pm.       risperiDONE (RISPERDAL) 1 MG tablet Take 1 mg by mouth 2 times daily as needed.       sertraline (ZOLOFT) 100 MG tablet Take 200 mg by mouth every morning       sertraline (ZOLOFT) 25 MG tablet Take 25 mg by mouth 2 times daily.       Starch, Thickening, POWD Combine with beverages, per instructions from Speech Therapy. 850 g 99     topiramate (TOPAMAX) 200 MG tablet TAKE 1 TABLET BY MOUTH TWICE DAILY. 180 tablet 3     VITAMIN D3 25 MCG (1000 UT) tablet TAKE 3 TABLETS (3000 UNITS) BY MOUTH ONCE DAILY 100 tablet 11     miconazole (MICATIN) 2 % external cream Apply topically 2 times daily (Patient not taking: Reported on 10/3/2024)       risperiDONE (RISPERDAL) 2 MG tablet Take 1 tablet (2 mg) by mouth 3 times daily for 30 days 90 tablet 0        Medication Notes:  AEDs reviewed and as above      AED Medication Compliance:  compliant all of the time  Using a pill box:   medications administered to patient    Review of Systems:  No vomiting, diarrhea, fevers, hematuria or kidney stones.  Staff deny trouble with swallowing, focal weakness.  Have you experienced a traumatic fall since your last visit: NO  Are these falls related to your seizures: Not Applicable    Other Issues:  Continues compulsively seeking out water which staff manages through redirection. Staff deny violence or SIB.  Is patient safe to drive:  No    Exam:    /84 (BP Location: Right arm, Patient Position: Sitting, Cuff Size: Adult Large)   Pulse 90   Temp 97.5  F (36.4  C) (Temporal)      Wt Readings from Last 5 Encounters:   03/06/24 238  lb (108 kg)   11/22/23 222 lb 9.6 oz (101 kg)   10/16/23 221 lb (100.2 kg)   01/24/23 255 lb 11.7 oz (116 kg)   01/12/23 251 lb 5.2 oz (114 kg)     Alert. Intrusive. Responds somewhat to staff distraction and redirection. Moderate echopraxia allows some examination. Does not reliably follow commands. Does not reliably repeat.  States name correctly. Not otherwise oriented. Single word answers. Will point to snack or water, state word correctly as indication of desire to consume.  Normal safe gait. VFF respond to threat. Will not follow finger but FROM eyes with Dolls eyes. Smile symmetrical.  Impersistence makes examination of strength difficult. DTR are mildly but consistently increased on left. Hoffmans present on left. Plantars withdraw. Right KJ with spread, doesn't occur with left KJ.  Squeezes hand equally bilaterally. Shifts items from left to right and right to left without much trou    Heart exam with murmur. RRR. ANGEL. Will not allow examination of disks.     Latest Reference Range & Units 01/03/24 16:52 01/04/24 10:23 10/03/24 15:56   Valproic acid ug/mL 70.0 97.4 93.7      Latest Reference Range & Units 01/03/24 16:52 07/30/24 14:52   Sodium 135 - 145 mmol/L 132 (L) 138      Latest Reference Range & Units 12/19/22 11:02 01/24/23 18:31 06/11/23 22:12   Topiramate Level 5.0 - 20.0 ug/mL 4.7 (L) 6.0 3.0 (L)      Latest Reference Range & Units 06/11/23 22:12   Lacosamide 1.0 - 10.0 ug/mL 0.9 (L)     EXAM: MR BRAIN PERFUSION W/O & W CONTRAST  1/12/2023 3:04 PM      HISTORY:  Seizure disorder (H); Epidermoid cyst of brain   .      COMPARISON:  MRI 10/10/2021, 3/25/2021     TECHNIQUE: Multiplanar T1-weighted, axial fat-saturated T2 FLAIR, and  axial susceptibility weighted images of the brain were obtained  without the administration of intravenous contrast. Additional  precontrast thin section coronal oblique T2-weighted and T2 FLAIR  images were obtained through the temporal lobes. After the  administration  of intravenous contrast, axial diffusion weighted,  axial fat saturated T2 FLAIR, and coronal T1-weighted images of the  brain were obtained     CONTRAST: 10mL Gadavist.     FINDINGS:     Mildly internally heterogeneous restricting extra-axial cystic lesion  in the right middle cranial fossa, not appreciably changed dating back  to 3/25/2021 given the amount of motion artifact on prior studies,  measuring approximately 4.8 x 6.3 cm. Similar mass effect on adjacent  temporal lobe, as well as displacement of the brainstem. Medial  extension and effacement of the right basilar and interpedicular  cisterns.     Posteriorly and inferiorly this extends to the inferior aspect of the  cisterna magna and partially into the right foramen of Luschka.  Unchanged areas of nodular enhancement at the pass for example  superiorly measuring 3 x 7 mm on coronal series 27 image 97. No new  abnormal intracranial enhancement. No abnormal elevated relative  cerebral blood volume.     No abnormal signal , atrophy, or congenital anomaly identified within  the medial left temporal lobe.     No new areas of restricted diffusion to suggest an acute infarct. No  acute hydrocephalus. No acute intracranial hemorrhage or abnormal  extra-axial fluid collections. Orbits are unremarkable. Paranasal  sinuses and mastoid air cells are clear.                                                                      IMPRESSION:  1. No appreciable change in the large epidermoid cyst centered in the  right middle cranial fossa with extension and mass effect on the basal  cisterns and posterior fossa, dating back to 3/25/2021.  2. Unchanged enhancing nodular components of about the periphery of  the mass.    EXAM: CT HEAD W/O CONTRAST  LOCATION: [facility]  DATE: 1/3/2024     INDICATION: Altered behavioral change, agitation, history of autism, history of brain tumor.  COMPARISON: CT head 01/24/2023.  TECHNIQUE: Routine CT Head without IV  contrast. Multiplanar reformats. Dose reduction techniques were used.     FINDINGS:  INTRACRANIAL CONTENTS: Redemonstrated multilobulated multi compartmental epidermoid cyst (with CSF density and multifocal dystrophic calcifications) located within the posterior fossa, suprasellar cistern, right middle cranial fossa with stable   significant mass effect on the cerebellum, brainstem and right temporal lobe. No intracranial hemorrhage or extraaxial collection.  No CT evidence of acute infarct. Normal parenchymal attenuation. Normal ventricles and sulci.      VISUALIZED ORBITS/SINUSES/MASTOIDS: No intraorbital abnormality. No paranasal sinus mucosal disease. No middle ear or mastoid effusion.     BONES/SOFT TISSUES: No acute abnormality.                                                                      IMPRESSION:  1.  No acute findings.  2.  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    Neuroimaging in EMR reviewed. Images available through 2006. Clear increase in size of cyst over that period of time; right hippocampus was present and right temporal cyst was minimal in 2006; anterior right hippocampus absent for last several years tho most recent imaging suggests right posterior hippocampus is larger and more dysmorphic than on the left. No contrast enhancement. Some indication of nodular gray matter in inferior and posterior cyst, not clear whether related to lining of cyst or cyst pressing on cortex.    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  Only epileptiform abnormalities reportedly on the left, which is discordant; however, this is a secondhand reference in chart and we do not have original reports.  He has had unprovoked seizures.  However, all seizures following addition of lacosamide have occurred in setting of significant hyponatremia.  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  Reasonably normal natremia recently.  Continues treatment with stop lithium and sertraline which may be contributory. Reported occasional behavioral outbursts; actively followed by psychiatry with aggressive psychotropic treatments (propranolol, risperidone, quetiapine, sertraline) which appear to have helped.  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 Indication of increased size between 2006 and 2021. 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 Difficult to excamine completely; subtle neurological findings on examination, not clear if these will be persistent. Neurosurgery opinion in chart describes this as epidermoid cyst. Pericystic lesions likely responsible for seizures in some form. Doubt epidermoid tumor but progression of lesion raises this thought.    DISCUSSION  Above discussed frankly and supportively. Everyone is happy that seizures remain under control. This likely is related to staff being able to manage his polydipsia. We discussed management of cyst; given pressure effects serial imaging would be reasonable tho not clear that this is necessary. Staff not clear how he would tolerate neuroimaging studies; also not clear that parents would favor these. Emphasized that there are significant pressure effects and decompensation is possible. Discussed red flag symptoms that would indicate need for immediate intervention including lethargy and unresponsiveness, focal weakness, worsening ataxia and dysconjugate gaze among others.    PLAN:  1) Continue current AEDs.  2) AED levels today to confirm compliance, rule out toxicity.  3) seizures should prompt evaluation for hyponatremia. Lacosamide could be maximized as needed  especially if seizure unprovoked.  4) staff will inquire re what interventions were needed for previous imaging and whether guardians would consent.  5) Will review neuroimages with neurosurgery. Consider formal consultation, consider neurooncology consultation; Dr Wahsington's notes indicate this has been considered before; may need to assess guardian's thoughts after review of images with neurosurgery.    Total time on day of visit 42 minutes. 22 minutes reviewing EMR prior to visit including review of multiple images, multiple AED levels, multiple EEG reports and multiple notes. Note started on day of visit, completed subsequently.    Ricardo Quiroga MD      Again, thank you for allowing me to participate in the care of your patient.      Sincerely,    Ricardo Quiroga MD

## 2024-10-04 LAB
AST SERPL W P-5'-P-CCNC: 15 U/L (ref 0–45)
VALPROATE SERPL-MCNC: 93.7 UG/ML

## 2024-10-04 NOTE — PROGRESS NOTES
[facility] Epilepsy Care Progress Note      Patient:  Duane D Backes  :  1980   Age:  44 year old   Today's Office Visit:  10/4/2024    Epilepsy Data:    Seizure Record  Current Visit Date: 10/03/24  Previous Visit Date: 24  Months since last visit: 6.93  Seizure Type 1: Tonic-clonic seizures  Description of Sz Type 1: convulsions  # of Type 1 Seizure since last visit: 0  Freq. Type 1 / Month: 0  Seizure Type 2: Status Epilepticus  Description of Sz Type 2: serial convulsions  # of Type 2 Seizure since last visit: 0  Freq. Type 2 / Month: 0    History of Present Illness:     Patient had been followed by Dr Washington, first time I have seen patient. Extensive review of chart indicates seizure onset  at which point version of head to left followed by BTC described. Was treated with DVP 1500 mg/d at the time for behavioral reasons. PHT was added but for unclear reasons discontinued subsequently. TPM added . Convulsive seizures documented 2020. Serial BTC requiring intubation occurred 2021, 2022, 2023, 1/3/2024. Last three runs of acute repetitive seizures required intubation and were associated with severe hyponatrenmia (<123) which was attributed to polydipsia and possibly lithium comedication. Attempts to increase DVP beyond 1500 mg/d resulted in toxicity. Lacosamide at current doses was added 2021. 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 Tho this is sometimes described in chart an epidermoid tumor, neurosurgical consultation (Dr Lui 3/29/21) describes this as a epidermoid cyst and did not mention previous craniotomy or surgical treatment.     There have been no seizures since the last visit and no hospitalizations for status epilepticus.  Recent bouts of status epilepticus have been consistently  associated with hyponatremia which has been attributed to polydipsia.  He continues compulsively seeking out water but staff is imposing appropriate limits.  His cognitive and behavioral status has not changed. He is generally alert and active.  Staff have not noted difficulties with weakness, clumsiness, decreased alertness, increased somnolence, dyscongjugate gaze or ataxia.      Current Outpatient Medications   Medication Sig Dispense Refill    ammonium lactate (LAC-HYDRIN) 12 % external lotion Apply topically daily To feet and heels      diazepam (VALIUM) 5 MG/ML (HIGH CONC) solution Diazepam: Give 1 ml (5mg): Give 1 ml for ANY seizure. May repeat once 1 ml (5 mg). Monitor breathing, if there any concerns call 911. Do not exceed 10 mg per day. 30 mL 5    divalproex sodium extended-release (DEPAKOTE ER) 500 MG 24 hr tablet Take 1 tablet (500 mg) by mouth 3 times daily. 270 tablet 3    docusate sodium (COLACE) 100 MG capsule TAKE 1 CAPSULE BY MOUTH TWICE DAILY. 62 capsule 11    ENULOSE 10 GM/15ML SOLUTION TAKE 30ML BY MOUTH EVERY EVENING AT 5PM;TAKE 30ML BY MOUTH TWICE DAILY AS NEEDED 2880 mL 11    fish oil-omega-3 fatty acids 1000 MG capsule TAKE 1 CAPSULE BY MOUTH TWICE DAILY  **NON-COVERED MED**  **PACK IN CARDS* 100 capsule 11    GAVILAX 17 GM/SCOOP powder MIX 17 GRAMS IN LIQUID AND DRINK BY MOUTH ONCE DAILY FOR CONSTIPATION. 510 g 11    Lacosamide (VIMPAT) 100 MG TABS tablet Take 1 tablet (100 mg) by mouth 2 times daily. 62 tablet 5    levothyroxine (SYNTHROID/LEVOTHROID) 150 MCG tablet TAKE 1 TABLET BY MOUTH ONCE DAILY FOR THYROID. 31 tablet 11    lithium ER (LITHOBID) 300 MG CR tablet Take 300 mg by mouth 2 times daily      MILK OF MAGNESIA 400 MG/5ML suspension Take 30 mLs by mouth every evening 3780 mL 0    propranolol (INDERAL) 40 MG tablet Take 1 tablet by mouth 2 times daily as needed.      QUEtiapine (SEROQUEL) 25 MG tablet Take 25 mg by mouth daily      QUEtiapine (SEROQUEL) 50 MG tablet Take 50 mg  by mouth 2 times daily Take with 25 mg in the am for a total am dose of 75 mg and then 50 mg in the pm.      risperiDONE (RISPERDAL) 1 MG tablet Take 1 mg by mouth 2 times daily as needed.      sertraline (ZOLOFT) 100 MG tablet Take 200 mg by mouth every morning      sertraline (ZOLOFT) 25 MG tablet Take 25 mg by mouth 2 times daily.      Starch, Thickening, POWD Combine with beverages, per instructions from Speech Therapy. 850 g 99    topiramate (TOPAMAX) 200 MG tablet TAKE 1 TABLET BY MOUTH TWICE DAILY. 180 tablet 3    VITAMIN D3 25 MCG (1000 UT) tablet TAKE 3 TABLETS (3000 UNITS) BY MOUTH ONCE DAILY 100 tablet 11    miconazole (MICATIN) 2 % external cream Apply topically 2 times daily (Patient not taking: Reported on 10/3/2024)      risperiDONE (RISPERDAL) 2 MG tablet Take 1 tablet (2 mg) by mouth 3 times daily for 30 days 90 tablet 0        Medication Notes:  AEDs reviewed and as above      AED Medication Compliance:  compliant all of the time  Using a pill box:   medications administered to patient    Review of Systems:  No vomiting, diarrhea, fevers, hematuria or kidney stones.  Staff deny trouble with swallowing, focal weakness.  Have you experienced a traumatic fall since your last visit: NO  Are these falls related to your seizures: Not Applicable    Other Issues:  Continues compulsively seeking out water which staff manages through redirection. Staff deny violence or SIB.  Is patient safe to drive:  No    Exam:    /84 (BP Location: Right arm, Patient Position: Sitting, Cuff Size: Adult Large)   Pulse 90   Temp 97.5  F (36.4  C) (Temporal)      Wt Readings from Last 5 Encounters:   03/06/24 238 lb (108 kg)   11/22/23 222 lb 9.6 oz (101 kg)   10/16/23 221 lb (100.2 kg)   01/24/23 255 lb 11.7 oz (116 kg)   01/12/23 251 lb 5.2 oz (114 kg)     Alert. Intrusive. Responds somewhat to staff distraction and redirection. Moderate echopraxia allows some examination. Does not reliably follow commands. Does not  reliably repeat.  States name correctly. Not otherwise oriented. Single word answers. Will point to snack or water, state word correctly as indication of desire to consume.  Normal safe gait. VFF respond to threat. Will not follow finger but FROM eyes with Dolls eyes. Smile symmetrical.  Impersistence makes examination of strength difficult. DTR are mildly but consistently increased on left. Hoffmans present on left. Plantars withdraw. Right KJ with spread, doesn't occur with left KJ.  Squeezes hand equally bilaterally. Shifts items from left to right and right to left without much trou    Heart exam with murmur. RRR. ANGEL. Will not allow examination of disks.     Latest Reference Range & Units 01/03/24 16:52 01/04/24 10:23 10/03/24 15:56   Valproic acid ug/mL 70.0 97.4 93.7      Latest Reference Range & Units 01/03/24 16:52 07/30/24 14:52   Sodium 135 - 145 mmol/L 132 (L) 138      Latest Reference Range & Units 12/19/22 11:02 01/24/23 18:31 06/11/23 22:12   Topiramate Level 5.0 - 20.0 ug/mL 4.7 (L) 6.0 3.0 (L)      Latest Reference Range & Units 06/11/23 22:12   Lacosamide 1.0 - 10.0 ug/mL 0.9 (L)     EXAM: MR BRAIN PERFUSION W/O & W CONTRAST  1/12/2023 3:04 PM      HISTORY:  Seizure disorder (H); Epidermoid cyst of brain   .      COMPARISON:  MRI 10/10/2021, 3/25/2021     TECHNIQUE: Multiplanar T1-weighted, axial fat-saturated T2 FLAIR, and  axial susceptibility weighted images of the brain were obtained  without the administration of intravenous contrast. Additional  precontrast thin section coronal oblique T2-weighted and T2 FLAIR  images were obtained through the temporal lobes. After the  administration of intravenous contrast, axial diffusion weighted,  axial fat saturated T2 FLAIR, and coronal T1-weighted images of the  brain were obtained     CONTRAST: 10mL Gadavist.     FINDINGS:     Mildly internally heterogeneous restricting extra-axial cystic lesion  in the right middle cranial fossa, not appreciably  changed dating back  to 3/25/2021 given the amount of motion artifact on prior studies,  measuring approximately 4.8 x 6.3 cm. Similar mass effect on adjacent  temporal lobe, as well as displacement of the brainstem. Medial  extension and effacement of the right basilar and interpedicular  cisterns.     Posteriorly and inferiorly this extends to the inferior aspect of the  cisterna magna and partially into the right foramen of Luschka.  Unchanged areas of nodular enhancement at the pass for example  superiorly measuring 3 x 7 mm on coronal series 27 image 97. No new  abnormal intracranial enhancement. No abnormal elevated relative  cerebral blood volume.     No abnormal signal , atrophy, or congenital anomaly identified within  the medial left temporal lobe.     No new areas of restricted diffusion to suggest an acute infarct. No  acute hydrocephalus. No acute intracranial hemorrhage or abnormal  extra-axial fluid collections. Orbits are unremarkable. Paranasal  sinuses and mastoid air cells are clear.                                                                      IMPRESSION:  1. No appreciable change in the large epidermoid cyst centered in the  right middle cranial fossa with extension and mass effect on the basal  cisterns and posterior fossa, dating back to 3/25/2021.  2. Unchanged enhancing nodular components of about the periphery of  the mass.    EXAM: CT HEAD W/O CONTRAST  LOCATION: [facility]  DATE: 1/3/2024     INDICATION: Altered behavioral change, agitation, history of autism, history of brain tumor.  COMPARISON: CT head 01/24/2023.  TECHNIQUE: Routine CT Head without IV contrast. Multiplanar reformats. Dose reduction techniques were used.     FINDINGS:  INTRACRANIAL CONTENTS: Redemonstrated multilobulated multi compartmental epidermoid cyst (with CSF density and multifocal dystrophic calcifications) located within the posterior fossa, suprasellar cistern, right middle  cranial fossa with stable   significant mass effect on the cerebellum, brainstem and right temporal lobe. No intracranial hemorrhage or extraaxial collection.  No CT evidence of acute infarct. Normal parenchymal attenuation. Normal ventricles and sulci.      VISUALIZED ORBITS/SINUSES/MASTOIDS: No intraorbital abnormality. No paranasal sinus mucosal disease. No middle ear or mastoid effusion.     BONES/SOFT TISSUES: No acute abnormality.                                                                      IMPRESSION:  1.  No acute findings.  2.  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    Neuroimaging in EMR reviewed. Images available through 2006. Clear increase in size of cyst over that period of time; right hippocampus was present and right temporal cyst was minimal in 2006; anterior right hippocampus absent for last several years tho most recent imaging suggests right posterior hippocampus is larger and more dysmorphic than on the left. No contrast enhancement. Some indication of nodular gray matter in inferior and posterior cyst, not clear whether related to lining of cyst or cyst pressing on cortex.    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  Only epileptiform abnormalities reportedly on the left, which is discordant; however, this is a secondhand reference in chart and we do not have original reports.  He has had unprovoked seizures.  However, all seizures following addition of lacosamide have occurred in setting of significant hyponatremia.  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  Reasonably normal natremia recently.  Continues treatment with stop lithium and sertraline which may be contributory. Reported occasional behavioral outbursts; actively followed by  psychiatry with aggressive psychotropic treatments (propranolol, risperidone, quetiapine, sertraline) which appear to have helped.  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 Indication of increased size between 2006 and 2021. 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 Difficult to excamine completely; subtle neurological findings on examination, not clear if these will be persistent. Neurosurgery opinion in chart describes this as epidermoid cyst. Pericystic lesions likely responsible for seizures in some form. Doubt epidermoid tumor but progression of lesion raises this thought.    DISCUSSION  Above discussed frankly and supportively. Everyone is happy that seizures remain under control. This likely is related to staff being able to manage his polydipsia. We discussed management of cyst; given pressure effects serial imaging would be reasonable tho not clear that this is necessary. Staff not clear how he would tolerate neuroimaging studies; also not clear that parents would favor these. Emphasized that there are significant pressure effects and decompensation is possible. Discussed red flag symptoms that would indicate need for immediate intervention including lethargy and unresponsiveness, focal weakness, worsening ataxia and dysconjugate gaze among others.    PLAN:  1) Continue current AEDs.  2) AED levels today to confirm compliance, rule out toxicity.  3) seizures should prompt evaluation for hyponatremia. Lacosamide could be maximized as needed especially if seizure unprovoked.  4) staff will inquire re what interventions were needed for previous imaging and whether guardians would consent.  5) Will review neuroimages with neurosurgery. Consider formal consultation, consider neurooncology consultation; Dr Washington's notes indicate this has been  considered before; may need to assess guardian's thoughts after review of images with neurosurgery.    Total time on day of visit 42 minutes. 22 minutes reviewing EMR prior to visit including review of multiple images, multiple AED levels, multiple EEG reports and multiple notes. Note started on day of visit, completed subsequently.    Ricardo Quiroga MD

## 2024-10-06 LAB — TOPIRAMATE SERPL-MCNC: 8 UG/ML

## 2024-10-07 LAB — LACOSAMIDE SERPL-MCNC: 3.2 UG/ML

## 2024-10-10 ENCOUNTER — MEDICAL CORRESPONDENCE (OUTPATIENT)
Dept: HEALTH INFORMATION MANAGEMENT | Facility: CLINIC | Age: 44
End: 2024-10-10
Payer: MEDICARE

## 2024-10-17 ENCOUNTER — TELEPHONE (OUTPATIENT)
Dept: NEUROLOGY | Facility: CLINIC | Age: 44
End: 2024-10-17

## 2024-10-17 DIAGNOSIS — G93.0 EPIDERMOID CYST OF BRAIN: Primary | ICD-10-CM

## 2024-10-17 NOTE — TELEPHONE ENCOUNTER
RECORDS RECEIVED FROM: Internal    REASON FOR VISIT: Brain Tumor/Mass   PROVIDER: Brian Gutierres MD   DATE OF APPT: 10/23/24 @ 10:00 am    NOTES (FOR ALL VISITS) STATUS DETAILS   OFFICE NOTE from referring provider Internal 10/17/24 (Phone Note), 10/3/24 [facility]     OFFICE NOTE from other specialist Internal 9/5/24 [facility]    3/6/24, 9/5/23, 4/10/23 Lauren Washington MD @[facility]    3/29/21 Ashok Lui MD @[facility]    See Additional Encounters   DISCHARGE SUMMARY from hospital Internal 1/24/23-2/2/23 Ishan Astorga MD @[facility]     DISCHARGE REPORT from the ER Internal 6/11/23 Davidson Gonzalez MD @[facility] ED    1/24/23 Juan Petty MD @[facility] ED     EEG Internal [facility]  1/24/23 EEG  12/7/22 EEG  2/28/21 EEG  2/27/21 EEG  2/26/21 EEG     MEDICATION LIST Internal    IMAGING  (FOR ALL VISITS)     MRI (HEAD, NECK, SPINE) Internal [facility]  1/12/23 MR Brain Perfusion  10/10/21 MR Raul  3/25/21 MR Brain  2/25/21 MR Brain     CT (HEAD, NECK, SPINE) Internal [facility]  1/3/24 CT Head  1/24/23 CT Head  2/24/21 CT Head  2/22/19 CT Head  4/12/16 CT Head

## 2024-10-17 NOTE — TELEPHONE ENCOUNTER
Discussed with neuurosurgery during case management conference yesterday.    We reviewed serial MRIs starting 2006 through 2023 including recent CT scan Jan 2024.    There was agreement that there had been increase in size of right temporal and posterior fossa multilobulated cyst between 2007 and 2017 bit that abnormality had been stable between 2017 and 2024. Shift in posterior fossa structures was noted; however findings were thought to e chronic and not requiring short term intervention.    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 They felt that further evaluation with skull base neurosurgery was appropriate. Suggested Dr Short.    Will generate order for neurosurgery evaluation and write letter to group home regarding need for this evaluation.    Ricardo Quiroga MD

## 2024-10-23 ENCOUNTER — PRE VISIT (OUTPATIENT)
Dept: NEUROSURGERY | Facility: CLINIC | Age: 44
End: 2024-10-23

## 2024-11-21 NOTE — PROGRESS NOTES
[facility]  Department of Neurosurgery    Name: Duane D Backes  MRN: [id number]  : 1980     Reason for visit:  multicompartmental right middle/posterior fossa epidermoid and seizures, new patient visit    Dear Dr. Quiroga,    It was a pleasure to see Mr. Green in the [facility] today.  I have reviewed the referral notes and imaging and have summarized our visit here. As you recall, Mr. Green is a pleasant 44 year-old right-handed male with history of developmental delay and epilepsy, followed by our epileptology team. Seizure onset was . Gradually antiepileptics have been added for breakthrough seizures. Has been on valproate, and lacosamide was added in 2021. He has had several bouts of status epilepticus, thought in part due to concomitant hyponatremia to ~123 due to psychogenic polydipsia per neurology notes. Prior EEGs have shown left temporal sharp     During bouts of seizures, he has had several EEGs.  EEGs from -2021 show no epileptiform discharges or seizures recorded. EEG from 2022 was similarly negative.  EEG 2023 showed possible focal slowing over the right hemisphere, and more fast activities over the left hemisphere. Dr. Washington's notes have indicated LEFT temporal sharp waves seen on EEG, noting that the epidermoid is on the right side.    Review of Systems:   Pertinent items are noted in HPI or as in patient entered ROS below, remainder of complete ROS is negative.     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   Head: No signs of trauma.    Eyes: Conjunctivae are normal.  Mouth/Throat: Oropharynx moist.  Neck: Normal range of motion.    Resp: No respiratory distress.   MSK: Moves all extremities.  No obvious deformity.  Neuro: The patient is fully oriented and quite pleasant. Speech normal. Extraocular movements are intact without nystagmus. Facial sensation is intact in V1, V2, V3 distributions. Facial nerve function is normal, rated as a House Brackmann 1, without synkinesis.  Palate is symmetric. Shoulders are full strength. Tongue is midline. There is no pronator drift. Full strength in all extremities. Sensation intact throughout. No dysmetria with finger-nose-finger testing.   Psych: Normal mood and affect. Behavior is normal.      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 There is mass effect and/or engulfment of the cranial nerves 2-12.     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   Epilepsy on triple AEDs  3.   Brain/brainstem compression  4.   Episodes of status epilepticus in setting of psychogenic polydispia and hyponatremia    Plan:  We reviewed the patient's history, imaging, natural history and expected outcomes of conservative management and intervention. Options include continued observation or surgical resection.  I discussed that the mainstay treatment option for epidermoids is surgery. Given the extension of the tumor in multiple locations, we would first focus on the epidermoid in the middle fossa to attempt to relieve the pressure on the temporal  lobe, the most likely component potentially contributing to seizures. The main indication however is the large size and brain/brainstem compression that will only get worse with time. I cannot be sure that the epidermoid is contributing to seizures, and I do not believe it is linked with any behavioral changes he has.  We also had a johnnie discussion about what these surgeries would entail. With his developmental delay, there would be challenges in the postoperative phase including wound healing, making sure the sutures/incision stays clean and sutures could be removed in clinic at about ~2 weeks from surgery, and following our nursing team instructions during the hospital stay. We would also have ideal instructions such as lifting restrictions, pain control, and other precautions that would need to maintained to optimize recovery from these surgeries. It would be a significant undertaking for him especially if two or three surgical approaches are needed to address the extent of this large multi-compartmental tumor.  If left untreated, I would expect continued slow growth of the epidermoid and potentially the accumulation of cranial nerve deficits and ultimately hydrocephalus which may become lethal unless it is treated with a  shunt. It is difficult to predict timing of when this might happen, but I would not expect this soon since the CSF pathways appear patent now.  We discussed the risks of surgery including bleeding, infection, CSF leak, cranial neuropathy (includes vision loss, diplopia/abnormal eye movements, facial weakness or paralysis, hearing loss, voice/swallowing dysfunction, shoulder weakness, tongue paralysis, neurologic injury (arm/leg weakness, numbness, difficulty with coordination), stroke, hydrocephalus requiring  shunt placement, need for additional procedures/operations.  We would begin with a right frontotemporal craniotomy and anterior petrosectomy with ventriculostomy placement for stage  1.  A 2nd stage involving a right retrosigmoid and far lateral approach would be considered to access most of the posterior fossa component.  His caretakers at the group home will relay this information to his guardians. I would be happy to review this information at any time with them.  If nonoperative management is selected, I would recommend a repeat MRI in 1 year and visit with my partner Giselle TABARES.  I encouraged Mr. Green to contact with any questions or concerns or if we may be of assistance in any way.      It was a pleasure to participate in the care of your patient. Please feel free to contact me at any point if I can be of any assistance for Mr. Green.    Sincerely,  Brian Gutierres MD       60 minutes spent on the date of the encounter doing chart review, review of outside records, review of test results, interpretation of tests, patient visit, documentation and discussion with other provider(s)

## 2024-11-27 ENCOUNTER — OFFICE VISIT (OUTPATIENT)
Dept: NEUROSURGERY | Facility: CLINIC | Age: 44
End: 2024-11-27
Attending: PSYCHIATRY & NEUROLOGY
Payer: MEDICARE

## 2024-11-27 VITALS — HEART RATE: 93 BPM | DIASTOLIC BLOOD PRESSURE: 75 MMHG | SYSTOLIC BLOOD PRESSURE: 116 MMHG | OXYGEN SATURATION: 92 %

## 2024-11-27 DIAGNOSIS — G93.0 EPIDERMOID CYST OF BRAIN: ICD-10-CM

## 2024-11-27 DIAGNOSIS — L72.0 EPIDERMOID CYST: Primary | ICD-10-CM

## 2024-11-27 NOTE — LETTER
[facility]  [facility] NEUROSURGERY CLINIC [address]  3RD FLOOR  [facility] [address]  Phone: [phone]  Fax: [phone]          2024    RE:   Duane D Backes  [address]  [facility] [address]      Dear Colleague,    Thank you for referring your patient, Duane D Backes, to the [facility]. Please see a copy of my visit note below.      [facility]  Department of Neurosurgery    Name: Duane D Backes  MRN: [id number]  : 1980     Reason for visit:  multicompartmental right middle/posterior fossa epidermoid and seizures, new patient visit    Dear Dr. Quiroga,    It was a pleasure to see Mr. Green in the [facility] today.  I have reviewed the referral notes and imaging and have summarized our visit here. As you recall, Mr. Green is a pleasant 44 year-old right-handed male with history of developmental delay and epilepsy, followed by our epileptology team. Seizure onset was . Gradually antiepileptics have been added for breakthrough seizures. Has been on valproate, and lacosamide was added in 2021. He has had several bouts of status epilepticus, thought in part due to concomitant hyponatremia to ~123 due to psychogenic polydipsia per neurology notes. Prior EEGs have shown left temporal sharp     During bouts of seizures, he has had several EEGs.  EEGs from -2021 show no epileptiform discharges or seizures recorded. EEG from 2022 was similarly negative.  EEG 2023 showed possible focal slowing over the right hemisphere, and more fast activities over the left hemisphere. Dr. Washington's notes have indicated LEFT temporal sharp waves seen on EEG, noting that the epidermoid is on the right side.    Review of Systems:   Pertinent items are noted in HPI or as in patient entered ROS  below, remainder of complete ROS is negative.     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   Head: No signs of trauma.    Eyes: Conjunctivae are normal.  Mouth/Throat: Oropharynx moist.  Neck: Normal range of motion.    Resp: No respiratory distress.   MSK: Moves all extremities.  No obvious deformity.  Neuro: The patient is fully oriented and quite pleasant. Speech normal. Extraocular movements are intact without nystagmus. Facial sensation is intact in V1, V2, V3 distributions. Facial nerve function is normal, rated as a House Brackmann 1, without synkinesis.  Palate is symmetric. Shoulders are full strength. Tongue is midline. There is no pronator drift. Full strength in all extremities. Sensation intact throughout. No dysmetria with finger-nose-finger testing.   Psych: Normal mood and affect. Behavior is normal.      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 There is mass effect and/or engulfment of the cranial nerves 2-12.     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   Epilepsy on triple AEDs  3.   Brain/brainstem compression  4.   Episodes of status epilepticus in setting of psychogenic  polydispia and hyponatremia    Plan:  We reviewed the patient's history, imaging, natural history and expected outcomes of conservative management and intervention. Options include continued observation or surgical resection.  I discussed that the mainstay treatment option for epidermoids is surgery. Given the extension of the tumor in multiple locations, we would first focus on the epidermoid in the middle fossa to attempt to relieve the pressure on the temporal lobe, the most likely component potentially contributing to seizures. The main indication however is the large size and brain/brainstem compression that will only get worse with time. I cannot be sure that the epidermoid is contributing to seizures, and I do not believe it is linked with any behavioral changes he has.  We also had a johnnie discussion about what these surgeries would entail. With his developmental delay, there would be challenges in the postoperative phase including wound healing, making sure the sutures/incision stays clean and sutures could be removed in clinic at about ~2 weeks from surgery, and following our nursing team instructions during the hospital stay. We would also have ideal instructions such as lifting restrictions, pain control, and other precautions that would need to maintained to optimize recovery from these surgeries. It would be a significant undertaking for him especially if two or three surgical approaches are needed to address the extent of this large multi-compartmental tumor.  If left untreated, I would expect continued slow growth of the epidermoid and potentially the accumulation of cranial nerve deficits and ultimately hydrocephalus which may become lethal unless it is treated with a  shunt. It is difficult to predict timing of when this might happen, but I would not expect this soon since the CSF pathways appear patent now.  We discussed the risks of surgery including bleeding, infection, CSF leak, cranial  neuropathy (includes vision loss, diplopia/abnormal eye movements, facial weakness or paralysis, hearing loss, voice/swallowing dysfunction, shoulder weakness, tongue paralysis, neurologic injury (arm/leg weakness, numbness, difficulty with coordination), stroke, hydrocephalus requiring  shunt placement, need for additional procedures/operations.  We would begin with a right frontotemporal craniotomy and anterior petrosectomy with ventriculostomy placement for stage 1.  A 2nd stage involving a right retrosigmoid and far lateral approach would be considered to access most of the posterior fossa component.  His caretakers at the group home will relay this information to his guardians. I would be happy to review this information at any time with them.  If nonoperative management is selected, I would recommend a repeat MRI in 1 year and visit with my partner Giselle TABARES.  I encouraged Mr. Green to contact with any questions or concerns or if we may be of assistance in any way.      It was a pleasure to participate in the care of your patient. Please feel free to contact me at any point if I can be of any assistance for Mr. Green.    Sincerely,  Brian Gutierres MD       60 minutes spent on the date of the encounter doing chart review, review of outside records, review of test results, interpretation of tests, patient visit, documentation and discussion with other provider(s)          Again, thank you for allowing me to participate in the care of your patient.      Sincerely,    Brian Gutierres MD

## 2024-11-27 NOTE — PATIENT INSTRUCTIONS
You were seen today with Dr. Brian Gutierres.    [email or web address]       How to Contact Us  Send a [employer]well[employer] message to your provider.   Call the clinic - your call will be routed appropriately.   Neurosurgery Clinic: [phone]  To speak directly to an RN Care Coordinator:  Jeni RN: [phone]  Rebecca RN: [phone]    Note: We do our best to check voicemail frequently throughout the day and make every effort to return calls within 1-2 business days. For urgent matters, please use the general clinic phone numbers listed above.

## 2024-11-27 NOTE — LETTER
2024       RE: Duane D Backes  [address]  [facility] [address]     Dear Colleague,    Thank you for referring your patient, Duane D Backes, to the [facility] NEUROSURGERY CLINIC [place] [facility]. Please see a copy of my visit note below.    [facility]  Department of Neurosurgery    Name: Duane D Backes  MRN: [id number]  : 1980     Reason for visit:  multicompartmental right middle/posterior fossa epidermoid and seizures, new patient visit    Dear Dr. Quiroga,    It was a pleasure to see Mr. Green in the [facility] today.  I have reviewed the referral notes and imaging and have summarized our visit here. As you recall, Mr. Green is a pleasant 44 year-old right-handed male with history of developmental delay and epilepsy, followed by our epileptology team. Seizure onset was . Gradually antiepileptics have been added for breakthrough seizures. Has been on valproate, and lacosamide was added in 2021. He has had several bouts of status epilepticus, thought in part due to concomitant hyponatremia to ~123 due to psychogenic polydipsia per neurology notes. Prior EEGs have shown left temporal sharp     During bouts of seizures, he has had several EEGs.  EEGs from -2021 show no epileptiform discharges or seizures recorded. EEG from 2022 was similarly negative.  EEG 2023 showed possible focal slowing over the right hemisphere, and more fast activities over the left hemisphere. Dr. Washington's notes have indicated LEFT temporal sharp waves seen on EEG, noting that the epidermoid is on the right side.    Review of Systems:   Pertinent items are noted in HPI or as in patient entered ROS below, remainder of complete ROS is negative.     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   Head: No signs of trauma.    Eyes: Conjunctivae are normal.  Mouth/Throat: Oropharynx moist.  Neck: Normal range of motion.    Resp: No respiratory distress.   MSK: Moves all extremities.  No obvious deformity.  Neuro: The patient is fully oriented and quite pleasant. Speech normal. Extraocular movements are intact without nystagmus. Facial sensation is intact in V1, V2, V3 distributions. Facial nerve function is normal, rated as a House Brackmann 1, without synkinesis.  Palate is symmetric. Shoulders are full strength. Tongue is midline. There is no pronator drift. Full strength in all extremities. Sensation intact throughout. No dysmetria with finger-nose-finger testing.   Psych: Normal mood and affect. Behavior is normal.      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 There is mass effect and/or engulfment of the cranial nerves 2-12.     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   Epilepsy on triple AEDs  3.   Brain/brainstem compression  4.   Episodes of status epilepticus in setting of psychogenic polydispia and hyponatremia    Plan:  We reviewed the patient's history, imaging, natural history and expected outcomes of conservative management  and intervention. Options include continued observation or surgical resection.  I discussed that the mainstay treatment option for epidermoids is surgery. Given the extension of the tumor in multiple locations, we would first focus on the epidermoid in the middle fossa to attempt to relieve the pressure on the temporal lobe, the most likely component potentially contributing to seizures. The main indication however is the large size and brain/brainstem compression that will only get worse with time. I cannot be sure that the epidermoid is contributing to seizures, and I do not believe it is linked with any behavioral changes he has.  We also had a johnnie discussion about what these surgeries would entail. With his developmental delay, there would be challenges in the postoperative phase including wound healing, making sure the sutures/incision stays clean and sutures could be removed in clinic at about ~2 weeks from surgery, and following our nursing team instructions during the hospital stay. We would also have ideal instructions such as lifting restrictions, pain control, and other precautions that would need to maintained to optimize recovery from these surgeries. It would be a significant undertaking for him especially if two or three surgical approaches are needed to address the extent of this large multi-compartmental tumor.  If left untreated, I would expect continued slow growth of the epidermoid and potentially the accumulation of cranial nerve deficits and ultimately hydrocephalus which may become lethal unless it is treated with a  shunt. It is difficult to predict timing of when this might happen, but I would not expect this soon since the CSF pathways appear patent now.  We discussed the risks of surgery including bleeding, infection, CSF leak, cranial neuropathy (includes vision loss, diplopia/abnormal eye movements, facial weakness or paralysis, hearing loss, voice/swallowing dysfunction, shoulder  weakness, tongue paralysis, neurologic injury (arm/leg weakness, numbness, difficulty with coordination), stroke, hydrocephalus requiring  shunt placement, need for additional procedures/operations.  We would begin with a right frontotemporal craniotomy and anterior petrosectomy with ventriculostomy placement for stage 1.  A 2nd stage involving a right retrosigmoid and far lateral approach would be considered to access most of the posterior fossa component.  His caretakers at the group home will relay this information to his guardians. I would be happy to review this information at any time with them.  If nonoperative management is selected, I would recommend a repeat MRI in 1 year and visit with my partner Giselle TABARES.  I encouraged Mr. Green to contact with any questions or concerns or if we may be of assistance in any way.      It was a pleasure to participate in the care of your patient. Please feel free to contact me at any point if I can be of any assistance for Mr. Green.    Sincerely,  Brian Gutierres MD       60 minutes spent on the date of the encounter doing chart review, review of outside records, review of test results, interpretation of tests, patient visit, documentation and discussion with other provider(s)        Again, thank you for allowing me to participate in the care of your patient.      Sincerely,    Brian Gutierres MD

## 2025-01-23 ENCOUNTER — OFFICE VISIT (OUTPATIENT)
Dept: FAMILY MEDICINE | Facility: CLINIC | Age: 45
End: 2025-01-23
Payer: MEDICARE

## 2025-01-23 VITALS
RESPIRATION RATE: 20 BRPM | TEMPERATURE: 96.7 F | SYSTOLIC BLOOD PRESSURE: 130 MMHG | BODY MASS INDEX: 36.68 KG/M2 | WEIGHT: 262 LBS | DIASTOLIC BLOOD PRESSURE: 81 MMHG | HEART RATE: 104 BPM | HEIGHT: 71 IN | OXYGEN SATURATION: 95 %

## 2025-01-23 DIAGNOSIS — G40.909 SEIZURE DISORDER (H): ICD-10-CM

## 2025-01-23 DIAGNOSIS — Z00.00 ENCOUNTER FOR MEDICARE ANNUAL WELLNESS EXAM: Primary | ICD-10-CM

## 2025-01-23 DIAGNOSIS — Z23 NEED FOR TDAP VACCINATION: ICD-10-CM

## 2025-01-23 DIAGNOSIS — Z13.220 SCREENING FOR LIPID DISORDERS: ICD-10-CM

## 2025-01-23 DIAGNOSIS — Z23 NEED FOR VACCINATION: ICD-10-CM

## 2025-01-23 DIAGNOSIS — E66.01 SEVERE OBESITY (BMI 35.0-39.9) WITH COMORBIDITY (H): ICD-10-CM

## 2025-01-23 DIAGNOSIS — R13.13 PHARYNGEAL DYSPHAGIA: ICD-10-CM

## 2025-01-23 DIAGNOSIS — E03.9 HYPOTHYROIDISM, UNSPECIFIED TYPE: ICD-10-CM

## 2025-01-23 DIAGNOSIS — Z13.1 SCREENING FOR DIABETES MELLITUS: ICD-10-CM

## 2025-01-23 PROBLEM — E83.42 HYPOMAGNESEMIA: Status: RESOLVED | Noted: 2022-08-23 | Resolved: 2025-01-23

## 2025-01-23 PROBLEM — G40.901 STATUS EPILEPTICUS (H): Status: RESOLVED | Noted: 2022-08-23 | Resolved: 2025-01-23

## 2025-01-23 PROBLEM — R56.9 SEIZURES (H): Status: RESOLVED | Noted: 2023-01-24 | Resolved: 2025-01-23

## 2025-01-23 NOTE — PATIENT INSTRUCTIONS
At [facility], we strive to deliver an exceptional experience to you, every time we see you. If you receive a survey, please let us know what we are doing well and/or what we could improve upon, as we do value your feedback.  If you have MyChart, you can expect to receive results automatically within 24 hours of their completion.  Your provider will send a note interpreting your results as well.   If you do not have MyChart, you should receive your results in about a week by mail.    Your care team:                            Family Medicine Internal Medicine   MD Scooter Gao, MD Denise Porter, MD Minesh Sanchez, MD Devi Griggs, PAEstrellaC    Lee Delgado, MD Pediatrics   Rubina Garner, MD Elmira Sarkar, MD Alejandra Stephens, APRN CNP Shirin Barrientos APRN CNP   Fransisco Dominique, MD Estelle Jacome, MD Rika Harley, CNP     Bucky Yadav, CNP Same-Day Provider (No follow-up visits)   VERONICA Chadwick, DNP Evelyn Banks, PA-C   VERONICA Robbins, FNP, BC CAROLYN PadronC     Clinic hours: Monday - Thursday 7 am-6 pm; Fridays 7 am-5 pm.   Urgent care: Monday - Friday 10 am- 8 pm; Saturday and Sunday 9 am-5 pm.    Clinic: [phone]       [facility]: Monday - Thursday 8 am - 7 pm; Friday 8 am - 6 pm  [facility]: [phone]     Patient Education   Preventive Care Advice   This is general advice given by our system to help you stay healthy. However, your care team may have specific advice just for you. Please talk to your care team about your preventive care needs.  Nutrition  Eat 5 or more servings of fruits and vegetables each day.  Try wheat bread, brown rice and whole grain pasta (instead of white bread, rice, and pasta).  Get enough calcium and vitamin D. Check the label on foods and aim for 100% of the RDA (recommended daily allowance).  Lifestyle  Exercise at least 150  minutes each week  (30 minutes a day, 5 days a week).  Do muscle strengthening activities 2 days a week. These help control your weight and prevent disease.  No smoking.  Wear sunscreen to prevent skin cancer.  Have a dental exam and cleaning every 6 months.  Yearly exams  See your health care team every year to talk about:  Any changes in your health.  Any medicines your care team has prescribed.  Preventive care, family planning, and ways to prevent chronic diseases.  Shots (vaccines)   HPV shots (up to age 26), if you've never had them before.  Hepatitis B shots (up to age 59), if you've never had them before.  COVID-19 shot: Get this shot when it's due.  Flu shot: Get a flu shot every year.  Tetanus shot: Get a tetanus shot every 10 years.  Pneumococcal, hepatitis A, and RSV shots: Ask your care team if you need these based on your risk.  Shingles shot (for age 50 and up)  General health tests  Diabetes screening:  Starting at age 35, Get screened for diabetes at least every 3 years.  If you are younger than age 35, ask your care team if you should be screened for diabetes.  Cholesterol test: At age 39, start having a cholesterol test every 5 years, or more often if advised.  Bone density scan (DEXA): At age 50, ask your care team if you should have this scan for osteoporosis (brittle bones).  Hepatitis C: Get tested at least once in your life.  STIs (sexually transmitted infections)  Before age 24: Ask your care team if you should be screened for STIs.  After age 24: Get screened for STIs if you're at risk. You are at risk for STIs (including HIV) if:  You are sexually active with more than one person.  You don't use condoms every time.  You or a partner was diagnosed with a sexually transmitted infection.  If you are at risk for HIV, ask about PrEP medicine to prevent HIV.  Get tested for HIV at least once in your life, whether you are at risk for HIV or not.  Cancer screening tests  Cervical cancer screening:  If you have a cervix, begin getting regular cervical cancer screening tests starting at age 21.  Breast cancer scan (mammogram): If you've ever had breasts, begin having regular mammograms starting at age 40. This is a scan to check for breast cancer.  Colon cancer screening: It is important to start screening for colon cancer at age 45.  Have a colonoscopy test every 10 years (or more often if you're at risk) Or, ask your provider about stool tests like a FIT test every year or Cologuard test every 3 years.  To learn more about your testing options, visit:   .  For help making a decision, visit:   https://bit.ly/fx11045.  Prostate cancer screening test: If you have a prostate, ask your care team if a prostate cancer screening test (PSA) at age 55 is right for you.  Lung cancer screening: If you are a current or former smoker ages 50 to 80, ask your care team if ongoing lung cancer screenings are right for you.  For informational purposes only. Not to replace the advice of your health care provider. Copyright   2023 [employer]. All rights reserved. Clinically reviewed by the [facility]. Flutter 362800 - REV 01/24.

## 2025-01-23 NOTE — PROGRESS NOTES
Preventive Care Visit  [facility]  Rajat Parkinson MD, Family Medicine  Jan 23, 2025      Assessment & Plan     (Z00.00) Encounter for Medicare annual wellness exam  (primary encounter diagnosis)  Comment: Negative screening exam except weight; up-to-date on preventive services except Tdap.   Plan: INFLUENZA VACCINE,SPLIT         VIRUS,TRIVALENT,PF(FLUZONE), COVID-19 12+         (PFIZER), Tdap, tetanus-diptheria-acell         pertussis, (BOOSTRIX) 5-2.5-18.5 LF-MCG/0.5         DELFIN injection, Lipid panel reflex to direct         LDL Non-fasting, Glucose        Return in about 1 year (around 1/23/2026) for Medicare annual wellness exam.      (E03.9) Hypothyroidism, unspecified type  Comment: his last TSH was mildly high.   Plan: TSH        Adjust levothyroxine if still high.     (E66.01) Severe obesity (BMI 35.0-39.9) with comorbidity (H)  Comment: His weight has increase significantly over the past few years. This could be purely a behavioral issue, or his psych medications could be increasing his appetite. Insurance does not cover a referral to the dietician. Group home staff will try to work on that issue.   Plan: BMI table and weight graph provided.     (G40.909) Seizure disorder (H)  Comment: staff request this update for his neurologist.   Plan: Valproic acid            (Z13.1) Screening for diabetes mellitus  Comment:   Plan: Glucose            (Z13.220) Screening for lipid disorders  Comment:   Plan: Lipid panel reflex to direct LDL Non-fasting            (Z23) Need for Tdap vaccination  Comment: pharmacy.   Plan: Tdap, tetanus-diptheria-acell pertussis,         (BOOSTRIX) 5-2.5-18.5 LF-MCG/0.5 DELFIN injection            (Z23) Need for vaccination  Comment:   Plan: INFLUENZA VACCINE,SPLIT         VIRUS,TRIVALENT,PF(FLUZONE), COVID-19 12+         (PFIZER)            BMI  Estimated body mass index is 36.54 kg/m  as calculated from the following:    Height as of this encounter:  "1.803 m (5' 11\").    Weight as of this encounter: 118.8 kg (262 lb).   Weight management plan: see discussion above.      Subjective Duane is a 44 year old, presenting for the following:  Physical Duane is accompanied by group home staff.      1/23/2025     1:09 PM   Additional Questions   Roomed by Jayy VIEYRA  Health Care Directive  Patient has a Health Care Directive on file  Advance care planning document is on file and is current.      1/23/2025   General Health   How would you rate your overall physical health? (!) FAIR   Feel stress (tense, anxious, or unable to sleep) Patient declined         1/23/2025   Nutrition   Diet: I don't know         1/23/2025   Exercise   Days per week of moderate/strenous exercise 1 day         1/23/2025   Social Factors   Frequency of gathering with friends or relatives Once a week         11/22/2023   Activities of Daily Living- Home Safety   Needs help with the following daily activites Telephone use    Transportation    Shopping    Preparing meals    Housework    Bathing    Laundry    Medication administration    Money management   Safety concerns in the home None of the above       Multiple values from one day are sorted in reverse-chronological order         1/23/2025   Dental   Dentist two times every year? (!) NO         11/22/2023   Hearing Screening   Hearing concerns? No concerns            No data to display                       Today's PHQ-2 Score:       9/5/2024    12:55 PM   PHQ-2 ( 1999 Pf[employer])   Q1: Little interest or pleasure in doing things 0   Q2: Feeling down, depressed or hopeless 0   PHQ-2 Score 0         1/23/2025   Substance Use   Alcohol more than 3/day or more than 7/wk Not Applicable     Social History     Tobacco Use    Smoking status: Never    Smokeless tobacco: Never   Substance Use Topics    Alcohol use: No    Drug use: No       ASCVD Risk   The 10-year ASCVD risk score (Michelle ROTH, et al., 2019) is: 0.8%    Values used to " "calculate the score:      Age: 44 years      Sex: Male      Is Non- : No      Diabetic: No      Tobacco smoker: No      Systolic Blood Pressure: 130 mmHg      Is BP treated: No      HDL Cholesterol: 54 mg/dL      Total Cholesterol: 158 mg/dL         No data to display                  Reviewed and updated as needed this visit by Provider   Tobacco   Meds   Med Hx  Surg Hx  Fam Hx          Current providers sharing in care for this patient include:  Patient Care Team:  Rajat Parkinson MD as PCP - General (Family Medicine)  Lauren Washington MD as Assigned Neuroscience Provider  Rajat Parkinson MD as Assigned PCP  Brian Gutierres MD as MD (Neurological Surgery)    The following health maintenance items are reviewed in Epic and correct as of today:  Health Maintenance   Topic Date Due    EYE EXAM  08/13/2016    DTAP/TDAP/TD IMMUNIZATION (3 - Td or Tdap) 08/02/2023    PHQ-2 (once per calendar year)  01/01/2025    TSH W/FREE T4 REFLEX  01/30/2025    MEDICARE ANNUAL WELLNESS VISIT  01/23/2026    ANNUAL REVIEW OF HM ORDERS  01/23/2026    GLUCOSE  01/03/2027    LIPID  11/24/2028    ADVANCE CARE PLANNING  01/23/2030    ZOSTER IMMUNIZATION (1 of 2) 07/22/2032    RSV VACCINE (1 - 1-dose 75+ series) 07/22/2057    HEPATITIS C SCREENING  Completed    HIV SCREENING  Completed    INFLUENZA VACCINE  Completed    HEPATITIS B IMMUNIZATION  Completed    COVID-19 Vaccine  Completed    Pneumococcal Vaccine: Pediatrics (0 to 5 Years) and At-Risk Patients (6 to 49 Years)  Aged Out    HPV IMMUNIZATION  Aged Out    MENINGITIS IMMUNIZATION  Aged Out    RSV MONOCLONAL ANTIBODY  Aged Out     Review of Systems       Objective    Exam  /81 (BP Location: Left arm, Patient Position: Chair, Cuff Size: Adult Large)   Pulse 104   Temp (!) 96.7  F (35.9  C) (Tympanic)   Resp 20   Ht 1.803 m (5' 11\")   Wt 118.8 kg (262 lb)   SpO2 95%   BMI 36.54 kg/m     Estimated body mass index is 36.54 kg/m  as " "calculated from the following:    Height as of this encounter: 1.803 m (5' 11\").    Weight as of this encounter: 118.8 kg (262 lb).    Physical Exam  GENERAL: alert and no distress  EYES: Eyes grossly normal to inspection, PERRL and conjunctivae and sclerae normal  HENT: ear canals and TM's normal, nose and mouth without ulcers or lesions  NECK: no adenopathy, no asymmetry, masses, or scars  RESP: lungs clear to auscultation - no rales, rhonchi or wheezes  CV: regular rate and rhythm, normal S1 S2, no S3 or S4, no murmur, click or rub, no peripheral edema  ABDOMEN: soft, nontender, no hepatosplenomegaly, no masses and bowel sounds normal  MS: no gross musculoskeletal defects noted, no edema  SKIN: no suspicious lesions or rashes  NEURO: Normal strength and tone, mentation intact and speech normal  PSYCH: Autistic, follows commands, cooperative with the exam, repeats some phrases, but he is not conversant and cannot provide history himself.          1/23/2025   Mini Cog   Mini-Cog Not Completed (choose reason) Mental handicap          This document serves as a record of the services and decisions personally performed and made by Dr. Parkinson. It was created on his behalf by Ruth Betancourt, a trained medical scribe. The creation of this document is based the provider's statements to the medical scribe.  Ruth Betancourt, 1:47 PM         Signed Electronically by: Rajat Parkinson MD    "

## 2025-02-12 ENCOUNTER — THERAPY VISIT (OUTPATIENT)
Dept: SPEECH THERAPY | Facility: CLINIC | Age: 45
End: 2025-02-12
Attending: FAMILY MEDICINE
Payer: MEDICARE

## 2025-02-12 DIAGNOSIS — R13.13 PHARYNGEAL DYSPHAGIA: ICD-10-CM

## 2025-02-12 PROCEDURE — 92610 EVALUATE SWALLOWING FUNCTION: CPT | Mod: GN | Performed by: SPEECH-LANGUAGE PATHOLOGIST

## 2025-02-12 NOTE — PROGRESS NOTES
"SPEECH LANGUAGE PATHOLOGY EVALUATION             Subjective        Presenting condition or subjective complaint: swallowig  Mr. Duane Backes is a 45 year old male with a history of dysphagia, completing several clinical swallow evaluations. He is here today for a clinical swallow evaluation due to reports of coughing after drinking liquids. Pt accompanied to this appointment by 2 group home staff who report he is very impulsive when eating and will \"chug\" large amounts of water or other liquids resulting in coughing. In the past it has been recommended that he thicken his liquids following videoswallow studies. There is no recent history of pneumonia or difficulty breathing.     Date of onset: 01/23/25 (MD order date)    Relevant medical history: Overweight; Seizures   Past Medical History:   Diagnosis Date    Autistic disorder, current or active state     Dry skin     Hypomagnesemia 08/23/2022    Moderate intellectual disabilities     Obesity, Class I, BMI 30-34.9 08/02/2013    Obsessive-compulsive disorders     Overweight (BMI 25.0-29.9) 08/02/2013    Seizure disorder (H)     Generalized tonic clonic    Status epilepticus (H) 08/23/2022    Unspecified constipation        Dates & types of surgery:    Past Surgical History:   Procedure Laterality Date    ANESTHESIA OUT OF OR MRI N/A 1/12/2023    Procedure: ANESTHESIA OUT OF OR  Magnetic resonance imaging Brain with and without @1400;  Surgeon: GENERIC ANESTHESIA PROVIDER;  Location: [facility] OR    CATARACT IOL, RT/LT Left 11/17/2018         Prior diagnostic imaging/testing results:     Yes, OP VFSS 11/11/20  Prior therapy history for the same diagnosis, illness or injury:    Yes 2/4/22, 8/27/22 and 1/24/24 Clinial Swallow Evaluations    Living Environment  Social support: With a caregiver/helper   Help at home: Self Cares (home health aide/personal care attendant, family, etc); Home management tasks (cooking, cleaning); Medication and/or finances; Assist for driving and " [email or web address] activities  Equipment owned:       Employment: No    Hobbies/Interests: [employer]    Patient goals for therapy:  To assess his swallow, and determine if he needs to thicken his liquids.    Pain assessment:  Pt unable to state    Because of the patient's autism and intellectual disability, he does not cooperate for some screening items, such as the fall risk assessment.      Objective     SWALLOW EVALUTION  Dysphagia history: hx of aspiration, inconsistent use of thickened liquids, Pt highly impulsive with PO intake. Most recent clinical swallow assessment on 1/24/24 recommended:  oral diet, thin liquids (level 0), regular diet and follow safe swallow strategies  Current Diet/Method of Nutritional Intake: thin liquids (level 0), regular diet        CLINICAL SWALLOW EVALUATION  Oral Motor Function: unable to assess due to poor participation/comprehension     Level of assist required for feeding: frequent cues/help required   Textures Trialed:   Clinical Swallow Eval: Thin Liquids  Mode of presentation: cup, self-fed   Volume presented: 4 oz via cup  Preparatory Phase: WFL  Oral Phase: WFL  Pharyngeal phase of swallow: impaired, coughing/choking,      Strategies trialed during procedure: BRUNO SLP CLINICAL EVAL STRATEGIES: Attempted to reduce amounts d/t impulsivity    Diagnostic statement: Clinical s/s of aspiration due to patient drinking all 4 oz of water at once-impulsive. Pt exhibited immediate coughing following thin liquids trials.     Clinical Swallow Eval: Purees  Mode of presentation: spoon, self-fed   Volume presented: 5 tbsp applesauce  Preparatory Phase:  impulsive, taking large spoonfuls if not supervised  Oral Phase: WFL  Pharyngeal phase of swallow: repeated swallows, wet vocal quality after swallow   Strategies trialed during procedure: BRUNO SLP CLINICAL EVAL STRATEGIES: reduced amounts, cues to clear throat as needed with limited success.    Diagnostic statement: Pt  impulsive with amounts of food/spoonfuls taken if not properly supervised. Repeated swallows and slight wet vocal quality after the swallow which clears with multiple swallows and attempts to throat clear.     Clinical Swallow Eval: Soft & Bite Sized  Mode of presentation: spoon, self-fed   Volume presented: 5 tbsp mixed fruit cocktail with juice  Preparatory Phase:  impulsive taking large spoonfuls if not supervised  Oral Phase: WFL  Pharyngeal phase of swallow: repeated swallows   Strategies trialed during procedure: BRUNO SLP CLINICAL EVAL STRATEGIES: reduced amounts    Diagnostic statement: Pt impulsive with amounts of food/spoonfuls taken if not properly supervised.     Clinical Swallow Eval: Solids  Mode of presentation: self-fed   Volume presented: 1 tom howard square  Preparatory Phase: WFL  Oral Phase: WFL  Pharyngeal phase of swallow: repeated swallows   Strategies trialed during procedure: BRUNO SLP CLINICAL EVAL STRATEGIES: limit amount of food to prevent eating too much at once.     Diagnostic statement: Pt impulsive with amounts of foods taken if not properly supervised. Repeated swallows withnattempts to throat clear.          ESOPHAGEAL PHASE OF SWALLOW  no observed or reported concerns related to esophageal function     SWALLOW ASSESSMENT CLINICAL IMPRESSIONS AND RATIONALE  Diet Consistency Recommendations: thin liquids (level 0), slightly thick liquids (level 1) if noting trouble with swallowing thin, easy to chew (level 7), regular diet    Recommended Feeding/Eating Techniques:    1:1 supervision for all PO intake  Regular textures cut up in small pieces  Thin liquids ok when presented in smaller amounts or with slow flow cup  Can use thickened liquids if noticing difficulty with swallowing thin (ex-coughing). Then thicken to  nectar thick consistency.    limiting amount of liquids offered to no more than 1 oz at a time  Try slow flow cup (look on amaz[employer] dysphagia)  Small portions of food at a  "time don't over fill your plate   limit amount available in each bite/sip if/when coughing noted.   Medications ok with thin liquids with limited amt of liquid provided and direct supervision  1 pill at a time  No straws  Medication Administration Recommendations: one at a time with thin and supervision  Instrumental Assessment Recommendations: VFSS (videofluoroscopic swallowing study) may be needed in the future to fully assess oropharyngeal swallow if s/s of aspiration persist despite diet modifications    Assessment & Plan   CLINICAL IMPRESSIONS   Medical Diagnosis: Pharyngeal dysphagia (R13.13)    Treatment Diagnosis: Mild oropharyngeal dysphagia   Impression/Assessment: Pt is a 44 year old male with swallowing complaints. The following significant findings have been identified: impaired swallowing and clinical s/s of aspiration with thin liquids. This is  characterized by impulsivity with swallowing, taking large amounts of foods especially liquids resulting in clinical s/s of aspiration.  Identified deficits interfere with their ability to safely consume an oral diet, particularly thin liquids. Recommend patient try \"slow flow\" cup for thin liquids to reduce the amount of liquids he swallows at a time due to his impulsivity. If he continues to exhibit s/s of aspiration we will trial thicker liquids in therapy and may need to complete a video swallow study to fully assess his oropharyngeal swallow.     PLAN OF CARE  Treatment Interventions: Swallowing dysfunction and/or oral function for feeding    Prognosis to achieve stated therapy goals is good   Rehab potential is impacted by: comorbidities, family/caregiver support, prior level of function    Long Term Goals:   SLP Goal 1  Goal Identifier: STG 1: PO trials  Goal Description: Pt will tolerate therapeutic po trials of advanced textures during therapy session with SLP and use of slow flow cup without s/s of aspiration and use of trained safe swallow " strategies.  Rationale: To maximize safety, ease and/or independence of oral intake  Target Date: 05/12/25    SLP Goal 2  Goal Identifier: STG 2: Swallow Education  Goal Description: Patient and/or group home staff will verbalize understanding of safe swallow strategies to reduce s/s of aspiration.  Rationale: To maximize safety, ease and/or independence of oral intake  Target Date: 05/12/25      Frequency of Treatment: 1x/month for up to 90 days  Duration of Treatment: 90 days     Recommended Referrals to Other Professionals: Speech Language Pathology- may need video swallow study in future  Education Assessment:   Learner/Method: Patient;Listening;No Barriers to Learning  Education Comments: Results of evaluation, diet recommendations, safe swallow strategies    Risks and benefits of evaluation/treatment have been explained.   Patient/Family/caregiver agrees with Plan of Care.     Evaluation Time:    SLP Eval: oral/pharyngeal swallow function, clinical minutes (17799): 35         Signing Clinician: WILMER Monet      [facility]                                                                                   OUTPATIENT SPEECH LANGUAGE PATHOLOGY      PLAN OF TREATMENT FOR OUTPATIENT REHABILITATION   Patient's Last Name, First Name, M.I. Backes,Duane D YOB: 1980   Provider's Name   [facility]   Medical Record No.  [id number]     Onset Date: 01/23/25 (MD order date) Start of Care Date: 02/12/25     Medical Diagnosis:  Pharyngeal dysphagia (R13.13)      SLP Treatment Diagnosis: Mild oropharyngeal dysphagia  Plan of Treatment  Frequency/Duration: 1x/month for up to 90 days  / 90 days     Certification date from 02/12/25   To 05/12/25          See note for plan of treatment details and functional goals     WILMER Monet                         I CERTIFY THE NEED FOR THESE SERVICES FURNISHED UNDER        THIS PLAN OF TREATMENT AND  WHILE UNDER MY CARE     (Physician attestation of this document indicates review and certification of the therapy plan).              Referring Provider:  Rajat Parkinson    Initial Assessment  See Epic Evaluation- 02/12/25

## 2025-02-19 DIAGNOSIS — G40.909 SEIZURE DISORDER (H): ICD-10-CM

## 2025-02-20 RX ORDER — LACOSAMIDE 100 MG/1
TABLET ORAL
Qty: 62 TABLET | Refills: 2 | Status: SHIPPED | OUTPATIENT
Start: 2025-02-20

## 2025-02-20 NOTE — TELEPHONE ENCOUNTER
Last Written Prescription:  Lacosamide (VIMPAT) 100 MG TABS tablet 62 tablet 5 10/3/2024 -- No   Sig - Route: Take 1 tablet (100 mg) by mouth 2 times daily. - Oral     ----------------------  Last Visit Date: 10-3-24  Future Visit Date: 5-29-25  ----------------------    [x]  Refill decision: Medication unable to be refilled by RN due to: Controlled medication        Request from pharmacy:  Requested Prescriptions   Pending Prescriptions Disp Refills    Lacosamide (VIMPAT) 100 MG TABS tablet [Pharmacy Med Name: Lacosamide 100 MG Tablet] 62 tablet 11     Sig: TAKE 1 TABLET BY MOUTH TWICE DAILY *6 TOTAL FILLS*       There is no refill protocol information for this order

## 2025-03-17 ENCOUNTER — LAB (OUTPATIENT)
Dept: LAB | Facility: CLINIC | Age: 45
End: 2025-03-17
Payer: MEDICARE

## 2025-03-17 DIAGNOSIS — R41.0 DISORIENTATION: Primary | ICD-10-CM

## 2025-03-17 DIAGNOSIS — Z79.899 MEDICATION MANAGEMENT: ICD-10-CM

## 2025-03-17 LAB
ALBUMIN UR-MCNC: 10 MG/DL
ANION GAP SERPL CALCULATED.3IONS-SCNC: 11 MMOL/L (ref 7–15)
APPEARANCE UR: CLEAR
BILIRUB UR QL STRIP: NEGATIVE
BUN SERPL-MCNC: 15.9 MG/DL (ref 6–20)
CHLORIDE SERPL-SCNC: 102 MMOL/L (ref 98–107)
COLOR UR AUTO: YELLOW
CREAT SERPL-MCNC: 0.91 MG/DL (ref 0.67–1.17)
EGFRCR SERPLBLD CKD-EPI 2021: >90 ML/MIN/1.73M2
GLUCOSE UR STRIP-MCNC: NEGATIVE MG/DL
HCO3 SERPL-SCNC: 25 MMOL/L (ref 22–29)
HGB UR QL STRIP: NEGATIVE
KETONES UR STRIP-MCNC: NEGATIVE MG/DL
LEUKOCYTE ESTERASE UR QL STRIP: NEGATIVE
LITHIUM SERPL-SCNC: 0.5 MMOL/L (ref 0.6–1.2)
MUCOUS THREADS #/AREA URNS LPF: PRESENT /LPF
NITRATE UR QL: NEGATIVE
PH UR STRIP: 6.5 [PH] (ref 5–7)
POTASSIUM SERPL-SCNC: 4.4 MMOL/L (ref 3.4–5.3)
RBC URINE: 2 /HPF
SODIUM SERPL-SCNC: 138 MMOL/L (ref 135–145)
SP GR UR STRIP: 1.03 (ref 1–1.03)
UROBILINOGEN UR STRIP-MCNC: NORMAL MG/DL
WBC URINE: 1 /HPF

## 2025-03-17 PROCEDURE — 82565 ASSAY OF CREATININE: CPT

## 2025-03-17 PROCEDURE — 84520 ASSAY OF UREA NITROGEN: CPT

## 2025-03-17 PROCEDURE — 81001 URINALYSIS AUTO W/SCOPE: CPT

## 2025-03-17 PROCEDURE — 80178 ASSAY OF LITHIUM: CPT

## 2025-03-17 PROCEDURE — 80051 ELECTROLYTE PANEL: CPT

## 2025-03-17 PROCEDURE — 36415 COLL VENOUS BLD VENIPUNCTURE: CPT

## 2025-03-17 PROCEDURE — 87086 URINE CULTURE/COLONY COUNT: CPT

## 2025-03-18 LAB — BACTERIA UR CULT: NO GROWTH

## 2025-04-17 ENCOUNTER — LAB (OUTPATIENT)
Dept: LAB | Facility: CLINIC | Age: 45
End: 2025-04-17
Payer: MEDICARE

## 2025-04-17 DIAGNOSIS — Z79.899 MEDICATION MANAGEMENT: ICD-10-CM

## 2025-04-17 LAB
ANION GAP SERPL CALCULATED.3IONS-SCNC: 6 MMOL/L (ref 7–15)
BUN SERPL-MCNC: 14.9 MG/DL (ref 6–20)
CHLORIDE SERPL-SCNC: 100 MMOL/L (ref 98–107)
CREAT SERPL-MCNC: 0.95 MG/DL (ref 0.67–1.17)
EGFRCR SERPLBLD CKD-EPI 2021: >90 ML/MIN/1.73M2
HCO3 SERPL-SCNC: 28 MMOL/L (ref 22–29)
LITHIUM SERPL-SCNC: 0.84 MMOL/L (ref 0.6–1.2)
POTASSIUM SERPL-SCNC: 4 MMOL/L (ref 3.4–5.3)
SODIUM SERPL-SCNC: 134 MMOL/L (ref 135–145)

## 2025-04-17 PROCEDURE — 82435 ASSAY OF BLOOD CHLORIDE: CPT

## 2025-04-17 PROCEDURE — 82565 ASSAY OF CREATININE: CPT

## 2025-04-17 PROCEDURE — 80178 ASSAY OF LITHIUM: CPT

## 2025-04-17 PROCEDURE — 36415 COLL VENOUS BLD VENIPUNCTURE: CPT

## 2025-04-17 PROCEDURE — 84520 ASSAY OF UREA NITROGEN: CPT

## 2025-04-17 PROCEDURE — 80051 ELECTROLYTE PANEL: CPT

## 2025-05-27 DIAGNOSIS — G40.909 SEIZURE DISORDER (H): ICD-10-CM

## 2025-05-29 ENCOUNTER — OFFICE VISIT (OUTPATIENT)
Dept: NEUROLOGY | Facility: CLINIC | Age: 45
End: 2025-05-29
Payer: MEDICARE

## 2025-05-29 VITALS
WEIGHT: 260.8 LBS | BODY MASS INDEX: 36.51 KG/M2 | TEMPERATURE: 98.6 F | HEART RATE: 97 BPM | OXYGEN SATURATION: 97 % | HEIGHT: 71 IN | SYSTOLIC BLOOD PRESSURE: 124 MMHG | DIASTOLIC BLOOD PRESSURE: 80 MMHG

## 2025-05-29 DIAGNOSIS — G40.909 SEIZURE DISORDER (H): Primary | ICD-10-CM

## 2025-05-29 PROCEDURE — 80235 DRUG ASSAY LACOSAMIDE: CPT | Mod: ORL | Performed by: PSYCHIATRY & NEUROLOGY

## 2025-05-29 PROCEDURE — 80164 ASSAY DIPROPYLACETIC ACD TOT: CPT | Mod: ORL | Performed by: PSYCHIATRY & NEUROLOGY

## 2025-05-29 PROCEDURE — 80201 ASSAY OF TOPIRAMATE: CPT | Mod: ORL | Performed by: PSYCHIATRY & NEUROLOGY

## 2025-05-29 RX ORDER — DIAZEPAM 10 MG/100UL
10 SPRAY NASAL PRN
Qty: 2 EACH | Refills: 0 | Status: SHIPPED | OUTPATIENT
Start: 2025-05-29

## 2025-05-29 RX ORDER — LACOSAMIDE 100 MG/1
100 TABLET ORAL 2 TIMES DAILY
Qty: 182 TABLET | Refills: 1 | Status: SHIPPED | OUTPATIENT
Start: 2025-05-29

## 2025-05-29 RX ORDER — LACOSAMIDE 100 MG/1
TABLET ORAL
Qty: 62 TABLET | Refills: 5 | OUTPATIENT
Start: 2025-05-29

## 2025-05-29 RX ORDER — LOPERAMIDE HYDROCHLORIDE 2 MG/1
CAPSULE ORAL
COMMUNITY
Start: 2025-05-13 | End: 2026-05-07

## 2025-05-29 NOTE — LETTER
2025       RE: Duane D Backes  : 1980   MRN: [id number]      Dear Colleague,    Thank you for referring your patient, Duane D Backes, to the [facility] EPILEPSY CARE at [facility]. Please see a copy of my visit note below.    [facility] Epilepsy Care Progress Note      Patient:  Duane D Backes  :  1980   Age:  44 year old   Today's Office Visit:  2025    Epilepsy Data:    Seizure Record  Current Visit Date: 25  Previous Visit Date: 10/03/24  Months since last visit: 7.82  Seizure Type 1: Tonic-clonic seizures  Description of Sz Type 1: convulsions  # of Type 1 Seizure since last visit: 0  Freq. Type 1 / Month: 0  Seizure Type 2: Status Epilepticus  Description of Sz Type 2: serial convulsions  # of Type 2 Seizure since last visit: 0  Freq. Type 2 / Month: 0    Background History:  seizure onset  at which point version of head to left followed by BTC described. Was treated with DVP 1500 mg/d at the time for behavioral reasons. PHT was added but for unclear reasons discontinued subsequently. TPM added . Convulsive seizures documented 2020. Serial BTC requiring intubation occurred 2021, 2022, 2023, 1/3/2024. Last three runs of acute repetitive seizures required intubation and were associated with severe hyponatrenmia (<123) which was attributed to polydipsia and possibly lithium comedication. Attempts to increase DVP beyond 1500 mg/d resulted in toxicity. Lacosamide at current doses was added 2021. 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 Significant ASD with obsessive features and  behavior disorder actively followed by psychiatry. Exam with subtle left hyperreflexia and spread, echopraxia, and single words.    History of Present Illness:     Staff reports no seizures since last seen. Last bout of seizures Jan 2024.  He continues seeking out water but responding to redirection.  Staff generally limits  him to total four glasses of water per day including meals.    Seen by Dr Conde skull base neurosurgery who confirmed diagnosis of epidermoid tumor, recommended neurosurgical treatment.  Dr Conde noted that behavioral diagnoses would increase risk of complications; however emphasized that tumor likely to continue to grow and that neurological symptoms could present suddenly and be potentially difficult to reverse. Staff reports that this was communicated to family and that they did not want to proceed with neurosurgical treatment. Neurosurgery felt that scan should be repeated in one year (seen Nov 2024). Staff reports he would require anesthesia for MRI (requires for dental work).    Current Outpatient Medications   Medication Sig Dispense Refill     ammonium lactate (LAC-HYDRIN) 12 % external lotion Apply topically daily To feet and heels       diazepam (VALIUM) 5 MG/ML (HIGH CONC) solution Diazepam: Give 1 ml (5mg): Give 1 ml for ANY seizure. May repeat once 1 ml (5 mg). Monitor breathing, if there any concerns call 911. Do not exceed 10 mg per day. 30 mL 5     divalproex sodium extended-release (DEPAKOTE ER) 500 MG 24 hr tablet Take 1 tablet (500 mg) by mouth 3 times daily. 270 tablet 3     docusate sodium (COLACE) 100 MG capsule TAKE 1 CAPSULE BY MOUTH TWICE DAILY. 62 capsule 11     ENULOSE 10 GM/15ML SOLUTION TAKE 30ML BY MOUTH EVERY EVENING AT 5PM;TAKE 30ML BY MOUTH TWICE DAILY AS NEEDED 2880 mL 11     fish oil-omega-3 fatty acids 1000 MG capsule TAKE 1 CAPSULE BY MOUTH TWICE DAILY  **NON-COVERED MED**  **PACK IN CARDS* 100 capsule 11     FLUoxetine (PROZAC) 20 MG capsule Pt and  prescribing physician aware of allergy, monitoring, no side effects noted as of writing       GAVILAX 17 GM/SCOOP powder MIX 17 GRAMS IN LIQUID AND DRINK BY MOUTH ONCE DAILY FOR CONSTIPATION. 510 g 11     Lacosamide (VIMPAT) 100 MG TABS tablet TAKE 1 TABLET BY MOUTH TWICE DAILY *6 TOTAL FILLS* 62 tablet 2     levothyroxine (SYNTHROID/LEVOTHROID) 150 MCG tablet TAKE 1 TABLET BY MOUTH ONCE DAILY FOR THYROID. 31 tablet 11     lithium ER (LITHOBID) 300 MG CR tablet Take 300 mg by mouth 2 times daily       loperamide (IMODIUM) 2 MG capsule loperamide 2 mg capsule       miconazole (MICATIN) 2 % external cream Apply topically 2 times daily.       MILK OF MAGNESIA 400 MG/5ML suspension Take 30 mLs by mouth every evening 3780 mL 0     QUEtiapine (SEROQUEL) 50 MG tablet Take 50 mg by mouth 2 times daily Take with 25 mg in the am for a total am dose of 75 mg and then 50 mg in the pm.       risperiDONE (RISPERDAL) 1 MG tablet Take 1 mg by mouth 2 times daily as needed.       risperiDONE (RISPERDAL) 2 MG tablet Take 1 tablet (2 mg) by mouth 3 times daily for 30 days (Patient taking differently: Take 2 mg by mouth as needed.) 90 tablet 0     sertraline (ZOLOFT) 100 MG tablet Take 200 mg by mouth every morning       sertraline (ZOLOFT) 25 MG tablet Take 25 mg by mouth 2 times daily.       Starch, Thickening, POWD Combine with beverages, per instructions from Speech Therapy. 850 g 99     topiramate (TOPAMAX) 200 MG tablet TAKE 1 TABLET BY MOUTH TWICE DAILY. 180 tablet 3     VITAMIN D3 25 MCG (1000 UT) tablet TAKE 3 TABLETS (3000 UNITS) BY MOUTH ONCE DAILY 100 tablet 11     propranolol (INDERAL) 40 MG tablet Take 1 tablet by mouth 2 times daily as needed. (Patient not taking: Reported on 5/29/2025)       QUEtiapine (SEROQUEL) 25 MG tablet Take 25 mg by mouth daily (Patient not taking: Reported on 5/29/2025)          Medication Notes:    Extensive behavioral health meds including risperidone, olanzapine, lithium; being transitioned  "from sertraline to fluoxetine.  AEDs compared to MAR brought by staff and as above. He does not have a rescue AED.  AED Medication Compliance:  compliant all of the time  Using a pill box:  medication administered to patient.    Review of Systems:  No vomiting, diarrhea, fevers, hematuria or kidney stones.  Have you experienced a traumatic fall since your last visit: NO  Are these falls related to your seizures: Not Applicable    Other Issues:  Behavior unchanged. Continues with active follow up with psychiatry.  Is patient safe to drive:  No    Exam:    /80   Pulse 97   Temp 98.6  F (37  C) (Temporal)   Ht 5' 11\" (180.3 cm)   Wt 260 lb 12.8 oz (118.3 kg)   SpO2 97%   BMI 36.37 kg/m       Wt Readings from Last 5 Encounters:   05/29/25 260 lb 12.8 oz (118.3 kg)   01/23/25 262 lb (118.8 kg)   03/06/24 238 lb (108 kg)   11/22/23 222 lb 9.6 oz (101 kg)   10/16/23 221 lb (100.2 kg)     Alert. Less intrusive than at last visit (received prn). Moderate echopraxia allows some examination. Today he follows most simple commands. He repeats simple phrases and names simple objects with paraphasia (e.g. calls a wristwatch a clock). This is somewhat better than at last visit.  Normal safe gait. VFF respond to threat. Follows finger today with full versions and some disruption of smooth pursuit. No nystagmus. ANGEL. Will not allow examination of disks. Smile symmetrical. Impersistence makes examination of strength difficult. DTR contiue mildly but consistently increased on left. Hoffmans present bilaterally. Right KJ with spread, doesn't occur with left KJ. Squeezes hand equally bilaterally. 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  Only " epileptiform abnormalities reportedly on the left, which is discordant; however, this is a secondhand reference in chart and we do not have original reports.  He has had unprovoked seizures.  However, all seizures following addition of lacosamide have occurred in setting of significant hyponatremia.  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  Reasonably normal sodiums recently.  Continues treatment with multiple psychotropic medications which may be contributory. Reported occasional behavioral outbursts; actively followed by psychiatry with aggressive psychotropic treatments (propranolol, risperidone, quetiapine, sertraline) which appear to have helped.  3) Right temporal cystic lesion; presented at case management conference and consulted with skull base surgery, both of whom feel that appearance and course indicate epidermoid tumor. This is likely responsible for seizures. It is associated with brainstem pressure which increases risk of decompensation. Increased size between 2006 and 2021. 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 Difficult to excamine completely; subtle neurological findings on examination, not clear if these will be persistent. Neurosurgery favors resection even considering behavioral challenges; family reportedly does not wish to proceed at this time.      DISCUSSION  Above discussed frankly and supportively. Everyone is happy that seizures remain under control. This likely is related to staff being able to manage his polydipsia. We discussed management of tumor. Discussed potential for decompensation. Lesion likely to grow; unclear when neurological changes will happen nor can we predict speed of decline; there is some possibility that decline can be sudden. If surgery not to be pursued will need repeat imaging sometime end  of this year early next year and this will have to be done under anesthesia. Will probably need neuroimaging every other year at least but would defer to neurosurgery. Again, emphasized that there are significant pressure effects and decompensation is possible. Discussed red flag symptoms that would indicate need for immediate intervention including lethargy and unresponsiveness, focal weakness, worsening ataxia and dysconjugate gaze among others.     PLAN:  1) Continue current AEDs.  2) AED levels today to confirm compliance, rule out toxicity.  3) seizures should prompt evaluation for hyponatremia. Lacosamide could be maximized as needed especially if seizure unprovoked.  4) RTC 6 months at which point consider setting up repeat MRI under anesthesia.  5) Follow up with neurosurgery after brain MRI for annual check.     Total time on day of visit 27 minutes. 11 minutes reviewing EMR prior to visit including review of multiple sodium levels. 8 min generating note following visit. Total 46 min on day of visit. Discussed with staff who provided unique information. The longitudinal plan of care for this patient's epilepsy (including epilepsy comorbidities if appropriate) was addressed during this visit. Due to the added complexity in care, I will continue to support this patient in the subsequent management of this condition(s) and with the ongoing continuity of care of this condition(s).       Ricardo Quiroga MD    Again, thank you for allowing me to participate in the care of your patient.      Sincerely,    Ricardo Quiroga MD

## 2025-05-29 NOTE — PROGRESS NOTES
[facility] Epilepsy Care Progress Note      Patient:  Duane D Backes  :  1980   Age:  44 year old   Today's Office Visit:  2025    Epilepsy Data:    Seizure Record  Current Visit Date: 25  Previous Visit Date: 10/03/24  Months since last visit: 7.82  Seizure Type 1: Tonic-clonic seizures  Description of Sz Type 1: convulsions  # of Type 1 Seizure since last visit: 0  Freq. Type 1 / Month: 0  Seizure Type 2: Status Epilepticus  Description of Sz Type 2: serial convulsions  # of Type 2 Seizure since last visit: 0  Freq. Type 2 / Month: 0    Background History:  seizure onset  at which point version of head to left followed by BTC described. Was treated with DVP 1500 mg/d at the time for behavioral reasons. PHT was added but for unclear reasons discontinued subsequently. TPM added . Convulsive seizures documented 2020. Serial BTC requiring intubation occurred 2021, 2022, 2023, 1/3/2024. Last three runs of acute repetitive seizures required intubation and were associated with severe hyponatrenmia (<123) which was attributed to polydipsia and possibly lithium comedication. Attempts to increase DVP beyond 1500 mg/d resulted in toxicity. Lacosamide at current doses was added 2021. 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 Significant ASD with obsessive features and behavior disorder actively followed by psychiatry. Exam with subtle left hyperreflexia and spread, echopraxia, and single words.    History of Present Illness:     Staff reports no seizures since last seen. Last bout of seizures 2024.  He continues seeking out water but responding to  redirection.  Staff generally limits  him to total four glasses of water per day including meals.    Seen by Dr Conde skull base neurosurgery who confirmed diagnosis of epidermoid tumor, recommended neurosurgical treatment.  Dr Conde noted that behavioral diagnoses would increase risk of complications; however emphasized that tumor likely to continue to grow and that neurological symptoms could present suddenly and be potentially difficult to reverse. Staff reports that this was communicated to family and that they did not want to proceed with neurosurgical treatment. Neurosurgery felt that scan should be repeated in one year (seen Nov 2024). Staff reports he would require anesthesia for MRI (requires for dental work).    Current Outpatient Medications   Medication Sig Dispense Refill    ammonium lactate (LAC-HYDRIN) 12 % external lotion Apply topically daily To feet and heels      diazepam (VALIUM) 5 MG/ML (HIGH CONC) solution Diazepam: Give 1 ml (5mg): Give 1 ml for ANY seizure. May repeat once 1 ml (5 mg). Monitor breathing, if there any concerns call 911. Do not exceed 10 mg per day. 30 mL 5    divalproex sodium extended-release (DEPAKOTE ER) 500 MG 24 hr tablet Take 1 tablet (500 mg) by mouth 3 times daily. 270 tablet 3    docusate sodium (COLACE) 100 MG capsule TAKE 1 CAPSULE BY MOUTH TWICE DAILY. 62 capsule 11    ENULOSE 10 GM/15ML SOLUTION TAKE 30ML BY MOUTH EVERY EVENING AT 5PM;TAKE 30ML BY MOUTH TWICE DAILY AS NEEDED 2880 mL 11    fish oil-omega-3 fatty acids 1000 MG capsule TAKE 1 CAPSULE BY MOUTH TWICE DAILY  **NON-COVERED MED**  **PACK IN CARDS* 100 capsule 11    FLUoxetine (PROZAC) 20 MG capsule Pt and prescribing physician aware of allergy, monitoring, no side effects noted as of writing      GAVILAX 17 GM/SCOOP powder MIX 17 GRAMS IN LIQUID AND DRINK BY MOUTH ONCE DAILY FOR CONSTIPATION. 510 g 11    Lacosamide (VIMPAT) 100 MG TABS tablet TAKE 1 TABLET BY MOUTH TWICE DAILY *6 TOTAL FILLS* 62  tablet 2    levothyroxine (SYNTHROID/LEVOTHROID) 150 MCG tablet TAKE 1 TABLET BY MOUTH ONCE DAILY FOR THYROID. 31 tablet 11    lithium ER (LITHOBID) 300 MG CR tablet Take 300 mg by mouth 2 times daily      loperamide (IMODIUM) 2 MG capsule loperamide 2 mg capsule      miconazole (MICATIN) 2 % external cream Apply topically 2 times daily.      MILK OF MAGNESIA 400 MG/5ML suspension Take 30 mLs by mouth every evening 3780 mL 0    QUEtiapine (SEROQUEL) 50 MG tablet Take 50 mg by mouth 2 times daily Take with 25 mg in the am for a total am dose of 75 mg and then 50 mg in the pm.      risperiDONE (RISPERDAL) 1 MG tablet Take 1 mg by mouth 2 times daily as needed.      risperiDONE (RISPERDAL) 2 MG tablet Take 1 tablet (2 mg) by mouth 3 times daily for 30 days (Patient taking differently: Take 2 mg by mouth as needed.) 90 tablet 0    sertraline (ZOLOFT) 100 MG tablet Take 200 mg by mouth every morning      sertraline (ZOLOFT) 25 MG tablet Take 25 mg by mouth 2 times daily.      Starch, Thickening, POWD Combine with beverages, per instructions from Speech Therapy. 850 g 99    topiramate (TOPAMAX) 200 MG tablet TAKE 1 TABLET BY MOUTH TWICE DAILY. 180 tablet 3    VITAMIN D3 25 MCG (1000 UT) tablet TAKE 3 TABLETS (3000 UNITS) BY MOUTH ONCE DAILY 100 tablet 11    propranolol (INDERAL) 40 MG tablet Take 1 tablet by mouth 2 times daily as needed. (Patient not taking: Reported on 5/29/2025)      QUEtiapine (SEROQUEL) 25 MG tablet Take 25 mg by mouth daily (Patient not taking: Reported on 5/29/2025)          Medication Notes:    Extensive behavioral health meds including risperidone, olanzapine, lithium; being transitioned from sertraline to fluoxetine.  AEDs compared to MAR brought by staff and as above. He does not have a rescue AED.  AED Medication Compliance:  compliant all of the time  Using a pill box:  medication administered to patient.    Review of Systems:  No vomiting, diarrhea, fevers, hematuria or kidney stones.  Have  "you experienced a traumatic fall since your last visit: NO  Are these falls related to your seizures: Not Applicable    Other Issues:  Behavior unchanged. Continues with active follow up with psychiatry.  Is patient safe to drive:  No    Exam:    /80   Pulse 97   Temp 98.6  F (37  C) (Temporal)   Ht 5' 11\" (180.3 cm)   Wt 260 lb 12.8 oz (118.3 kg)   SpO2 97%   BMI 36.37 kg/m       Wt Readings from Last 5 Encounters:   05/29/25 260 lb 12.8 oz (118.3 kg)   01/23/25 262 lb (118.8 kg)   03/06/24 238 lb (108 kg)   11/22/23 222 lb 9.6 oz (101 kg)   10/16/23 221 lb (100.2 kg)     Alert. Less intrusive than at last visit (received prn). Moderate echopraxia allows some examination. Today he follows most simple commands. He repeats simple phrases and names simple objects with paraphasia (e.g. calls a wristwatch a clock). This is somewhat better than at last visit.  Normal safe gait. VFF respond to threat. Follows finger today with full versions and some disruption of smooth pursuit. No nystagmus. ANGEL. Will not allow examination of disks. Smile symmetrical. Impersistence makes examination of strength difficult. DTR contiue mildly but consistently increased on left. Hoffmans present bilaterally. Right KJ with spread, doesn't occur with left KJ. Squeezes hand equally bilaterally. 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  Only epileptiform abnormalities reportedly on the left, which is discordant; however, this is a secondhand reference in chart and we do not have original reports.  He has had unprovoked seizures.  However, all seizures following addition of lacosamide have occurred in setting of significant hyponatremia.  2) " developmental delay, autistic spectrum disorder, obsessive components, unfortunately these include polydipsia which predisposes to hyponatremia.  Reasonably normal sodiums recently.  Continues treatment with multiple psychotropic medications which may be contributory. Reported occasional behavioral outbursts; actively followed by psychiatry with aggressive psychotropic treatments (propranolol, risperidone, quetiapine, sertraline) which appear to have helped.  3) Right temporal cystic lesion; presented at case management conference and consulted with skull base surgery, both of whom feel that appearance and course indicate epidermoid tumor. This is likely responsible for seizures. It is associated with brainstem pressure which increases risk of decompensation. Increased size between 2006 and 2021. 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 Difficult to excamine completely; subtle neurological findings on examination, not clear if these will be persistent. Neurosurgery favors resection even considering behavioral challenges; family reportedly does not wish to proceed at this time.      DISCUSSION  Above discussed frankly and supportively. Everyone is happy that seizures remain under control. This likely is related to staff being able to manage his polydipsia. We discussed management of tumor. Discussed potential for decompensation. Lesion likely to grow; unclear when neurological changes will happen nor can we predict speed of decline; there is some possibility that decline can be sudden. If surgery not to be pursued will need repeat imaging sometime end of this year early next year and this will have to be done under anesthesia. Will probably need neuroimaging every other year at least but would defer to neurosurgery. Again, emphasized that there are significant pressure effects and decompensation is possible. Discussed red flag symptoms that would  indicate need for immediate intervention including lethargy and unresponsiveness, focal weakness, worsening ataxia and dysconjugate gaze among others.     PLAN:  1) Continue current AEDs.  2) AED levels today to confirm compliance, rule out toxicity.  3) seizures should prompt evaluation for hyponatremia. Lacosamide could be maximized as needed especially if seizure unprovoked.  4) RTC 6 months at which point consider setting up repeat MRI under anesthesia.  5) Follow up with neurosurgery after brain MRI for annual check.  6) Provided seizure protocol including rescue medications for group home staff and included in AVS.     Total time on day of visit 27 minutes. 11 minutes reviewing EMR prior to visit including review of multiple sodium levels. 8 min generating note following visit. Total 46 min on day of visit. Discussed with staff who provided unique information. The longitudinal plan of care for this patient's epilepsy (including epilepsy comorbidities if appropriate) was addressed during this visit. Due to the added complexity in care, I will continue to support this patient in the subsequent management of this condition(s) and with the ongoing continuity of care of this condition(s).       Ricardo Quiroga MD   1

## 2025-05-29 NOTE — TELEPHONE ENCOUNTER
Lacosamide (VIMPAT) 100 MG TABS tablet   Start: 02/20/2025  Disp  62  R  2   TAKE 1 TABLET BY MOUTH TWICE DAILY *6 TOTAL FILLS*     Ricardo Quiroga MD  Neurology  Lv 10/3/24  Nv  5/29/25    Refill decision: Medication unable to be refilled by RN due to: Controlled medication     Request from pharmacy:  Requested Prescriptions   Pending Prescriptions Disp Refills    Lacosamide (VIMPAT) 100 MG TABS tablet [Pharmacy Med Name: Lacosamide 100 MG Tablet] 62 tablet 5     Sig: TAKE 1 TABLET BY MOUTH TWICE DAILY   *3 TOTAL FILLS*       Rx Protocol Controlled Substance Failed - 5/29/2025 12:44 PM        Failed - Visit with relevant provider in past 3 months or upcoming 3 months (provided they have been seen in the last 6 months)        Failed - Medication is active on med list and the sig matches        Failed - Urine drug screeen results on file in past 12 months     [unfilled]           Failed - Controlled Substance Agreement on file in last 12 months     Please review last Controlled Substance Pain agreement document.   CSA -- Encounter Level:    CSA: None found at the encounter level.       CSA -- Patient Level:    CSA: None found at the patient level.               Failed - Auto Fail - Please forward to Provider        Passed - Medication not refilled in past 28 days     Invalid Medication Grouper

## 2025-05-29 NOTE — PATIENT INSTRUCTIONS
Keep taking medications regularly as instructed.    Discuss appropriate fluid intake with primary care. In general four to six glasses of fluid per day is acceptable. Excessive fluid intake can worsen seizures especially given his multiple behavioral health medications.    For tonic clonic seizures administer benzodiazepine spray 10 mg at seizure onset. May repeat in 15 minutes if seizures are continuing.    Call 911 if seizures last longer than 15 minutes, if he has more than two seizures less than 8 hours, if he hits his head with the seizure, if he has a seizure in the water or if he does not wake up more than 30 minutes following a seizure. Call 911 if patient does not resume breathing after a seizure. If breathing is a problem after a seizure stimulate patient aggressively to initiate breathing or administer CPR.    During a seizure assist patient to ground. Loosen tight clothing. After seizure is over put patient on their side.

## 2025-05-31 LAB
LACOSAMIDE SERPL-MCNC: 4 UG/ML
TOPIRAMATE SERPL-MCNC: 8.8 UG/ML

## 2025-06-05 ENCOUNTER — TELEPHONE (OUTPATIENT)
Dept: NEUROLOGY | Facility: CLINIC | Age: 45
End: 2025-06-05

## 2025-06-05 ENCOUNTER — MEDICAL CORRESPONDENCE (OUTPATIENT)
Dept: HEALTH INFORMATION MANAGEMENT | Facility: CLINIC | Age: 45
End: 2025-06-05
Payer: MEDICARE

## 2025-06-05 NOTE — LETTER
6/6/2025       RE: Duane D Backes  [id number]  [facility] [address]      SEIZURE PLAN AND PROTOCOL     Seizure description:     Generalized tonic-clonic convulsion: full body stiffening and/or jerking, urinary incontinence, verbally unresponsive.         Plan of Care:     Follow general seizure care and First Aid guidelines for seizures.  Anticonvulsant medications will be administered as prescribed.  All seizures will be documented on a Seizure Report including:  date, time, length of seizure, specific body movements and behaviors exhibited during the seizure, and post-seizure state.  Antiepileptic blood levels will be ordered by the physician based upon client's condition and medications.     Missed Doses:     IF YOU REALIZE WITHIN 24 HOURS:     ...You MISSED ONE DOSE of your anticonvulsant medication(s), take the missed dose immediately unless it is time for your next scheduled dose. If that is the case, take your next scheduled dose, wait two hours, and then take the missed dose.     ...You MISSED TWO OR MORE DOSES, take one of the missed doses immediately, even if it is time for your next scheduled dose. Then call the [facility] clinic to schedule an appointment.      IF YOU REALIZE NOW YOU MISSED A DOSE MORE THAN 24 HOURS AGO, francisco it on your calendar. DO NOT take an extra dose.        Medication Errors:     Your facility nurse should be notified of any medication errors. The nurse should observe the urgency of the error. It is not necessary to notify the MD on call unless the facility nurse or delegate believes it to be life threatening or could possibly result in a serious complication. Routine notices required by regulation should be telephoned to the clinic during office hours.     Extra Dose:     An extra dose of an antiepileptic drug is not serious. Ordinarily, at most, the client may experience increased side effects for several hours. Contact your facility nurse immediately if an extra  dose was given.     Seizure Protocol:     diazePAM (VALTOCO 10 MG DOSE) 10 MG/0.1ML LIQD   Spray 10 mg in nostril as needed (administer for tonic clonic seizure. May repeat x 1 in 15 min if seizure recurs or does not stop.). - Nasal      When to call 911 for Seizures:     Call 911 if:  Duane does not start breathing within 1 minute after the seizure. If this happens call 911 immediately and start CPR.  Duane sustains an injury  Duane has one seizure right after another without regaining consciousness in between  A GTC seizure lasts over 5 minutes  Facility protocol requires EMS evaluation          Provider:         Sincerely,     Ricardo Quiroga MD

## 2025-06-09 ENCOUNTER — TELEPHONE (OUTPATIENT)
Dept: NEUROLOGY | Facility: CLINIC | Age: 45
End: 2025-06-09

## 2025-06-09 NOTE — TELEPHONE ENCOUNTER
What is the concern that needs to be addressed by a nurse? Patient went to physical therapy and is experiencing left sided weakness. Group home faxed notes from last PT visit to clinic. Physical therapist stated weakness is likely neurological, possibly stroke related or related to tumor. Caregiver would like to know if Dr. Quiroga would like to see Duane sooner to address this.    May a detailed message be left on voicemail? Yes    Date of last office visit: 5/29/25    Message routed to: [facility] RN Pool

## 2025-06-10 NOTE — TELEPHONE ENCOUNTER
This patient has a known right sided epidermoid tumor, with surgical resection previously recommended as it is anticipated to grow. If there has been an acute change (such as the left sided weakness), I would recommend he be seen in the ED for evaluation.    Serenity Simmons PA-C

## 2025-06-10 NOTE — TELEPHONE ENCOUNTER
RN placed call back to Mercedes, patient's care coordinator.   RN relayed Serenity Simmons PA-C's input to Mercedes. Mercedes reports she doesn't believe it is a sudden change, but patient clearly seems to have gotten worse in the month. RN advised to keep appointment with Serenity Simmons PA-C for in-person evaluation and recommendation. Mercedes thanked RN for their time and had no further questions.

## 2025-06-12 ENCOUNTER — HOSPITAL ENCOUNTER (OUTPATIENT)
Facility: CLINIC | Age: 45
End: 2025-06-12
Payer: MEDICARE

## 2025-06-12 ENCOUNTER — OFFICE VISIT (OUTPATIENT)
Dept: NEUROLOGY | Facility: CLINIC | Age: 45
End: 2025-06-12
Payer: MEDICARE

## 2025-06-12 VITALS
WEIGHT: 259 LBS | DIASTOLIC BLOOD PRESSURE: 72 MMHG | TEMPERATURE: 97.6 F | HEIGHT: 71 IN | HEART RATE: 92 BPM | SYSTOLIC BLOOD PRESSURE: 113 MMHG | BODY MASS INDEX: 36.26 KG/M2

## 2025-06-12 DIAGNOSIS — G93.0 EPIDERMOID CYST OF BRAIN: Primary | ICD-10-CM

## 2025-06-12 DIAGNOSIS — R53.1 LEFT-SIDED WEAKNESS: ICD-10-CM

## 2025-06-12 NOTE — LETTER
2025       RE: Duane D Backes  : 1980   MRN: [id number]      Dear Colleague,    Thank you for referring your patient, Duane D Backes, to the [facility] EPILEPSY [facility] at [facility]. Please see a copy of my visit note below.    [facility] Epilepsy [facility] Progress Note      Patient:  Duane D Backes  :  1980   Age:  44 year old   Today's Office Visit:  2025  Chief Complaint: Left sided weakness    Epilepsy Data:     Seizure Record  Current Visit Date: 25  Previous Visit Date: 25    Seizure Type 1: Tonic-clonic seizures  Description of Sz Type 1: convulsions  # of Type 1 Seizure since last visit: 0    Seizure Type 2: Status Epilepticus  Description of Sz Type 2: serial convulsions  # of Type 2 Seizure since last visit: 0       Background History:  Seizure onset  at which point version of head to left followed by BTC described. Was treated with DVP 1500 mg/d at the time for behavioral reasons. PHT was added but for unclear reasons discontinued subsequently. TPM added . Convulsive seizures documented 2020. Serial BTC requiring intubation occurred 2021, 2022, 2023, 1/3/2024. Last three runs of acute repetitive seizures required intubation and were associated with severe hyponatrenmia (<123) which was attributed to polydipsia and possibly lithium comedication. Attempts to increase DVP beyond 1500 mg/d resulted in toxicity. Lacosamide at current doses was added 2021. 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 Significant ASD with obsessive features and behavior disorder actively followed by  psychiatry. Exam with subtle left hyperreflexia and spread, echopraxia, and single words.    History of Present Illness:   Mr. Green was last seen by Dr. Quiroga on 5/29/2025. 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 The etiology was suspected to be a known right epidermoid tumor. All seizures following the addition of lacosamide occurred in the setting of significant hyponatremia. He had developmental delay, autistic spectrum disorder, obsessive components including polydipsia, and was to continue with psychiatry. 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 Family was not interested in surgical intervention when last seen by neurosurgery in November 2024. If not pursuing surgery, he was to have repeat imaging and follow up with neurosurgery in the fall of 2025. He was to continue his AED's unchanged, with labs ordered for monitoring. He was to follow up in 6 months for reevaluation, with an updated MRI under anesthesia to be considered.     In the interim, the clinic was contacted due to concern of left sided weakness. He was advised to present to the ED if acute changes with his known tumor. ED evaluation was declined and today's visit was scheduled.    Today, Mr. Green presented 17 minutes late for the visit, though I was still able to see him. He presents with two staff from his group home who provide the history. They state he has always had a foot drop on the left side, but his gait is worse in the last couple of months. He is stumbling more though thankfully has not fallen. He is having trouble with his left arm with lifting and using this. This has been gradually progressing over the last couple of months. He has had increased clumsiness gradually increasing. They deny any gaze changes.     They deny any changes in bladder or bowel function.  They deny any change in alertness. They deny any issues with swallowing. They deny any changes in breathing.     They deny any seizures. They continue to restrict fluids as best they can. They deny any clear side effects with his medications.    They state his sister who is one of his guardians is adamant against surgery still but consented to an updated MRI under anesthesia. We reviewed increased risk associated with anesthesia, which they expressed understanding and stated his sister indicated he would not be able to complete imaging without this. They indicate this was also reviewed with his sister. She is going to come and see him on Monday.    Current Outpatient Medications   Medication Sig Dispense Refill     ammonium lactate (LAC-HYDRIN) 12 % external lotion Apply topically daily To feet and heels       diazepam (VALIUM) 5 MG/ML (HIGH CONC) solution Diazepam: Give 1 ml (5mg): Give 1 ml for ANY seizure. May repeat once 1 ml (5 mg). Monitor breathing, if there any concerns call 911. Do not exceed 10 mg per day. 30 mL 5     diazePAM (VALTOCO 10 MG DOSE) 10 MG/0.1ML LIQD Spray 10 mg in nostril as needed (administer for tonic clonic seizure. May repeat x 1 in 15 min if seizure recurs or does not stop.). 2 each 0     divalproex sodium extended-release (DEPAKOTE ER) 500 MG 24 hr tablet Take 1 tablet (500 mg) by mouth 3 times daily. 270 tablet 3     docusate sodium (COLACE) 100 MG capsule TAKE 1 CAPSULE BY MOUTH TWICE DAILY. 62 capsule 11     ENULOSE 10 GM/15ML SOLUTION TAKE 30ML BY MOUTH EVERY EVENING AT 5PM;TAKE 30ML BY MOUTH TWICE DAILY AS NEEDED 2880 mL 11     fish oil-omega-3 fatty acids 1000 MG capsule TAKE 1 CAPSULE BY MOUTH TWICE DAILY  **NON-COVERED MED**  **PACK IN CARDS* 100 capsule 11     FLUoxetine (PROZAC) 20 MG capsule Pt and prescribing physician aware of allergy, monitoring, no side effects noted as of writing       GAVILAX 17 GM/SCOOP powder MIX 17 GRAMS IN LIQUID AND DRINK BY MOUTH ONCE DAILY FOR  CONSTIPATION. 510 g 11     Lacosamide (VIMPAT) 100 MG TABS tablet Take 1 tablet (100 mg) by mouth 2 times daily. 182 tablet 1     levothyroxine (SYNTHROID/LEVOTHROID) 150 MCG tablet TAKE 1 TABLET BY MOUTH ONCE DAILY FOR THYROID. 31 tablet 11     lithium ER (LITHOBID) 300 MG CR tablet Take 300 mg by mouth 2 times daily       loperamide (IMODIUM) 2 MG capsule loperamide 2 mg capsule       miconazole (MICATIN) 2 % external cream Apply topically 2 times daily.       MILK OF MAGNESIA 400 MG/5ML suspension Take 30 mLs by mouth every evening 3780 mL 0     propranolol (INDERAL) 40 MG tablet Take 1 tablet by mouth 2 times daily as needed. (Patient not taking: Reported on 5/29/2025)       QUEtiapine (SEROQUEL) 25 MG tablet Take 25 mg by mouth daily (Patient not taking: Reported on 5/29/2025)       QUEtiapine (SEROQUEL) 50 MG tablet Take 50 mg by mouth 2 times daily Take with 25 mg in the am for a total am dose of 75 mg and then 50 mg in the pm.       risperiDONE (RISPERDAL) 1 MG tablet Take 1 mg by mouth 2 times daily as needed.       risperiDONE (RISPERDAL) 2 MG tablet Take 1 tablet (2 mg) by mouth 3 times daily for 30 days (Patient taking differently: Take 2 mg by mouth as needed.) 90 tablet 0     sertraline (ZOLOFT) 100 MG tablet Take 200 mg by mouth every morning       sertraline (ZOLOFT) 25 MG tablet Take 25 mg by mouth 2 times daily.       Starch, Thickening, POWD Combine with beverages, per instructions from Speech Therapy. 850 g 99     topiramate (TOPAMAX) 200 MG tablet TAKE 1 TABLET BY MOUTH TWICE DAILY. 180 tablet 3     VITAMIN D3 25 MCG (1000 UT) tablet TAKE 3 TABLETS (3000 UNITS) BY MOUTH ONCE DAILY 100 tablet 11        Medication Notes:        AED Medication Compliance:  compliant most of the time, does not refuse meds  Using a pill box:  staff administer    Diagnostic studies reviewed:  CT head wo 1/3/2024:  FINDINGS:  INTRACRANIAL CONTENTS: Redemonstrated multilobulated multi compartmental epidermoid cyst (with  CSF density and multifocal dystrophic calcifications) located within the posterior fossa, suprasellar cistern, right middle cranial fossa with stable   significant mass effect on the cerebellum, brainstem and right temporal lobe. No intracranial hemorrhage or extraaxial collection.  No CT evidence of acute infarct. Normal parenchymal attenuation. Normal ventricles and sulci.   VISUALIZED ORBITS/SINUSES/MASTOIDS: No intraorbital abnormality. No paranasal sinus mucosal disease. No middle ear or mastoid effusion.  BONES/SOFT TISSUES: No acute abnormality.                                                                   IMPRESSION:  1.  No acute findings.  2.  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    MRI brain wwo 1/12/2023:  FINDINGS:  Mildly internally heterogeneous restricting extra-axial cystic lesion  in the right middle cranial fossa, not appreciably changed dating back  to 3/25/2021 given the amount of motion artifact on prior studies,  measuring approximately 4.8 x 6.3 cm. Similar mass effect on adjacent  temporal lobe, as well as displacement of the brainstem. Medial  extension and effacement of the right basilar and interpedicular  cisterns.  Posteriorly and inferiorly this extends to the inferior aspect of the  cisterna magna and partially into the right foramen of Luschka.  Unchanged areas of nodular enhancement at the pass for example  superiorly measuring 3 x 7 mm on coronal series 27 image 97. No new  abnormal intracranial enhancement. No abnormal elevated relative  cerebral blood volume.  No abnormal signal , atrophy, or congenital anomaly identified within  the medial left temporal lobe.  No new areas of restricted diffusion to suggest an acute infarct. No  acute hydrocephalus. No acute intracranial hemorrhage or abnormal  extra-axial fluid collections. Orbits are unremarkable. Paranasal  sinuses and mastoid air cells are clear.                                                                    IMPRESSION:  1. No appreciable change in the large epidermoid cyst centered in the  right middle cranial fossa with extension and mass effect on the basal  cisterns and posterior fossa, dating back to 3/25/2021.  2. Unchanged enhancing nodular components of about the periphery of  the mass.    EEG 1/25/2023:  Impression: No clear abnormalities were observed except for possible focal slowing over the right hemisphere.      Latest Reference Range & Units 05/29/25 13:54   Lacosamide 1.0 - 10.0 ug/mL 4.0   Topiramate Level 5.0 - 20.0 ug/mL 8.8   Valproic acid ug/mL 80.6      Latest Reference Range & Units 04/17/25 08:27   Sodium 135 - 145 mmol/L 134 (L)   Potassium 3.4 - 5.3 mmol/L 4.0   Chloride 98 - 107 mmol/L 100   Carbon Dioxide (CO2) 22 - 29 mmol/L 28   Urea Nitrogen 6.0 - 20.0 mg/dL 14.9   Creatinine 0.67 - 1.17 mg/dL 0.95   GFR Estimate >60 mL/min/1.73m2 >90   Anion Gap 7 - 15 mmol/L 6 (L)      Latest Reference Range & Units 10/03/24 15:56   AST 0 - 45 U/L 15      Latest Reference Range & Units 10/03/24 15:56   WBC 4.0 - 11.0 10e3/uL 7.1   Hemoglobin 13.3 - 17.7 g/dL 14.9   Hematocrit 40.0 - 53.0 % 45.5   Platelet Count 150 - 450 10e3/uL 226   RBC Count 4.40 - 5.90 10e6/uL 5.06   MCV 78 - 100 fL 90   MCH 26.5 - 33.0 pg 29.4   MCHC 31.5 - 36.5 g/dL 32.7   RDW 10.0 - 15.0 % 12.8     Review of Systems:  Lethargy / Tiredness:  No  Nausea / Vomiting:  No  Double Vision:  No  Sleepiness:  No  Depression:  stable  Poor Balance:  Yes  Appetite Changes:  No  Sleep Changes:  No  Behavioral Changes:  No  Respiratory: No shortness of breath  Have you experienced a traumatic fall since your last visit: NO  Are these falls related to your seizures: Not Applicable    Other Issues:    Is patient safe to drive:  No    Exam:    There were no vitals taken for this visit.     Wt Readings from Last 5 Encounters:   05/29/25 260 lb 12.8 oz (118.3 kg)   01/23/25 262 lb (118.8 kg)  "  03/06/24 238 lb (108 kg)   11/22/23 222 lb 9.6 oz (101 kg)   10/16/23 221 lb (100.2 kg)     Limited exam with patient's ability to follow commands.     General: General examination reveals a male in no acute distress  Cardiovascular: RRR, no rubs, gallops, or murmurs. No carotid bruits heard bilaterally  Pulmonary: Clear to auscultation bilaterally. No adventitious breath sounds.    Neurological Examination:    Mental Status: Alert and awake. Mood and affect were pleasant. Cognitively delayed, repeating \"Arby's coke\". Language with slight nasal tone, mild dysarthria.  Cranial Nerves:  II, III, IV, VI: Undilated fundoscopic exam not completed. Visual fields unable to assess. PERRL. EOMI as able to test without nystagmus.  V: Normal strength of muscles of mastication.  VII: Facial movements are symmetric and without weakness.  VIII: Hearing intact to conversation.  IX/X: Palate elevation symmetric.  XI: Sternocleidomastoid and trapezius are normal and without weakness against gravity.  XII: Tongue is midline.  Musculoskeletal: Neck supple.  Motor: Unable to formally test strength due to inability to follow commands for this. He is able to lift his arms and legs against gravity, though above shoulder height he used his right arm to raise his left arm higher, and when assessing toe movements and hip flexion on the left, he used his hand to help move his left lower extremity.   Reflexes:   Intact in upper and lower extremities, increased at right patella Positive esparza's on left, not noted on right.  Sensation: Sensation not tested.   Coordination: Bradykinesia with left fine finger movements compared to right. No tremor.   Station and Gait: Station was normal based. Gait was without circumduction or clear foot drop, though did catch his left foot with one step down the hallway. Able to rise from a seated position without assistance.    Assessment and Plan:   Mr. Green has history of focal epilepsy with focal to " bilateral tonic clonic seizures, probably related to a known right epidermoid tumor. All seizures following the addition of lacosamide have been in the setting of significant hyponatremia. He has developmental delay, autistic spectrum disorder, obsessive components and polydipsia which can lead to hyponatremia. He follows with psychiatry. He has been seen for surgical evaluation of the epidermoid tumor, with resection recommended, though family did not wish to pursue this. He has been having progressive left sided weakness and clumsiness over the last couple of months, concerning to be related to the epidermoid tumor.    At this time, Mr. Green will continue his current medications unchanged, with his seizure stable.    We will order an updated MRI of the brain with and without contrast under general anesthesia with the gradual changes in strength on the left side. This was recommended to be completed in the fall, so we are just moving this up. I will reach out to neurosurgery as well with the change in symptoms.  The patient will undergo an H&P with primary care given the need for general anesthesia. There are additional risks involved when anesthesia is required which the group home expressed understanding, and indicated this was also discussed with his sister and guardian who was open to an updated MRI, though is currently not interested in surgery. They were advised to present to the ED if any change in alertness, breathing changes, vision changes/eye movement changes, worsening weakness, etc. I reviewed this patient with Dr. Quiroga following the visit, and we would like the MRI within 3-4 weeks.    They were advised to follow up with neurosurgery after the imaging.    Mr. Green will follow up with Dr. Quiroga as scheduled. They were advised to call sooner with questions, concerns, or worsening symptoms.     Thank you for letting me participate in your patient's care.    As described above, I met with the  patient for 40 minutes and during this time counseling was greater than 50% of the visit time.    The longitudinal plan of care for the diagnosis(es)/condition(s) as documented were addressed during this visit. Due to the added complexity in care, I will continue to support Duane in the subsequent management and with ongoing continuity of care.                       Again, thank you for allowing me to participate in the care of your patient.      Sincerely,    Serenity Simmons PA-C

## 2025-06-12 NOTE — PATIENT INSTRUCTIONS
Continue current medications unchanged    We will order an updated MRI of the brain with and without contrast under anesthesia with the gradual changes in strength on the left side. This was recommended to be completed in the fall, so we are just moving this up. I will reach out to neurosurgery as well incase they are wanting anything completed sooner.  Please have the patient undergo an H&P with primary care. There are additional risks involved when anesthesia is required.    Follow up with neurosurgery after the imaging    Follow up with Dr. Quiroga as scheduled. Call sooner with questions, concerns, or worsening symptoms. Present to the ED if any change in alertness, breathing changes, vision changes/eye movement changes, worsening weakness, etc

## 2025-06-12 NOTE — PROGRESS NOTES
[facility]ile[facility] Care Progress Note      Patient:  Duane D Backes  :  1980   Age:  44 year old   Today's Office Visit:  2025  Chief Complaint: Left sided weakness    Epilepsy Data:     Seizure Record  Current Visit Date: 25  Previous Visit Date: 25    Seizure Type 1: Tonic-clonic seizures  Description of Sz Type 1: convulsions  # of Type 1 Seizure since last visit: 0    Seizure Type 2: Status Epilepticus  Description of Sz Type 2: serial convulsions  # of Type 2 Seizure since last visit: 0       Background History:  Seizure onset  at which point version of head to left followed by BTC described. Was treated with DVP 1500 mg/d at the time for behavioral reasons. PHT was added but for unclear reasons discontinued subsequently. TPM added . Convulsive seizures documented 2020. Serial BTC requiring intubation occurred 2021, 2022, 2023, 1/3/2024. Last three runs of acute repetitive seizures required intubation and were associated with severe hyponatrenmia (<123) which was attributed to polydipsia and possibly lithium comedication. Attempts to increase DVP beyond 1500 mg/d resulted in toxicity. Lacosamide at current doses was added 2021. 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 Significant ASD with obsessive features and behavior disorder actively followed by psychiatry. Exam with subtle left hyperreflexia and spread, echopraxia, and single words.    History of Present Illness:   Mr. Green was last seen by Dr. Quiroga on 2025. 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 All seizures following the addition of lacosamide occurred in the setting of significant hyponatremia. He had developmental delay, autistic spectrum disorder, obsessive components including polydipsia, and was to continue with psychiatry. 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 Family was not interested in surgical intervention when last seen by neurosurgery in November 2024. If not pursuing surgery, he was to have repeat imaging and follow up with neurosurgery in the fall of 2025. He was to continue his AED's unchanged, with labs ordered for monitoring. He was to follow up in 6 months for reevaluation, with an updated MRI under anesthesia to be considered.     In the interim, the clinic was contacted due to concern of left sided weakness. He was advised to present to the ED if acute changes with his known tumor. ED evaluation was declined and today's visit was scheduled.    Today, Mr. Green presented 17 minutes late for the visit, though I was still able to see him. He presents with two staff from his group home. They state he has always had a foot drop on the left side, but his gait is worse in the last couple of months. He is stumbling more though thankfully has not fallen. He is having trouble with his left arm with lifting and using this. This has been gradually progressing over the last couple of months. He has had increased clumsiness gradually increasing. They deny any gaze changes.     They deny any changes in bladder or bowel function. They deny any change in alertness. They deny any issues with swallowing. They deny any changes in breathing.     They deny any seizures. He continues to restrict fluids reasonably well. They deny any clear side effects with his medications.    They state his sister who is one of his guardians is adamant against  surgery but consented to an updated MRI under anesthesia. We reviewed increased risk associated with anesthesia, which they expressed understanding and stated he would not be able to complete imaging without this. They indicate this was also reviewed with his sister. She is going to come and see him on Monday.    Current Outpatient Medications   Medication Sig Dispense Refill    ammonium lactate (LAC-HYDRIN) 12 % external lotion Apply topically daily To feet and heels      diazepam (VALIUM) 5 MG/ML (HIGH CONC) solution Diazepam: Give 1 ml (5mg): Give 1 ml for ANY seizure. May repeat once 1 ml (5 mg). Monitor breathing, if there any concerns call 911. Do not exceed 10 mg per day. 30 mL 5    diazePAM (VALTOCO 10 MG DOSE) 10 MG/0.1ML LIQD Spray 10 mg in nostril as needed (administer for tonic clonic seizure. May repeat x 1 in 15 min if seizure recurs or does not stop.). 2 each 0    divalproex sodium extended-release (DEPAKOTE ER) 500 MG 24 hr tablet Take 1 tablet (500 mg) by mouth 3 times daily. 270 tablet 3    docusate sodium (COLACE) 100 MG capsule TAKE 1 CAPSULE BY MOUTH TWICE DAILY. 62 capsule 11    ENULOSE 10 GM/15ML SOLUTION TAKE 30ML BY MOUTH EVERY EVENING AT 5PM;TAKE 30ML BY MOUTH TWICE DAILY AS NEEDED 2880 mL 11    fish oil-omega-3 fatty acids 1000 MG capsule TAKE 1 CAPSULE BY MOUTH TWICE DAILY  **NON-COVERED MED**  **PACK IN CARDS* 100 capsule 11    FLUoxetine (PROZAC) 20 MG capsule Pt and prescribing physician aware of allergy, monitoring, no side effects noted as of writing      GAVILAX 17 GM/SCOOP powder MIX 17 GRAMS IN LIQUID AND DRINK BY MOUTH ONCE DAILY FOR CONSTIPATION. 510 g 11    Lacosamide (VIMPAT) 100 MG TABS tablet Take 1 tablet (100 mg) by mouth 2 times daily. 182 tablet 1    levothyroxine (SYNTHROID/LEVOTHROID) 150 MCG tablet TAKE 1 TABLET BY MOUTH ONCE DAILY FOR THYROID. 31 tablet 11    lithium ER (LITHOBID) 300 MG CR tablet Take 300 mg by mouth 2 times daily      loperamide (IMODIUM) 2 MG  capsule loperamide 2 mg capsule      miconazole (MICATIN) 2 % external cream Apply topically 2 times daily.      MILK OF MAGNESIA 400 MG/5ML suspension Take 30 mLs by mouth every evening 3780 mL 0    propranolol (INDERAL) 40 MG tablet Take 1 tablet by mouth 2 times daily as needed. (Patient not taking: Reported on 5/29/2025)      QUEtiapine (SEROQUEL) 25 MG tablet Take 25 mg by mouth daily (Patient not taking: Reported on 5/29/2025)      QUEtiapine (SEROQUEL) 50 MG tablet Take 50 mg by mouth 2 times daily Take with 25 mg in the am for a total am dose of 75 mg and then 50 mg in the pm.      risperiDONE (RISPERDAL) 1 MG tablet Take 1 mg by mouth 2 times daily as needed.      risperiDONE (RISPERDAL) 2 MG tablet Take 1 tablet (2 mg) by mouth 3 times daily for 30 days (Patient taking differently: Take 2 mg by mouth as needed.) 90 tablet 0    sertraline (ZOLOFT) 100 MG tablet Take 200 mg by mouth every morning      sertraline (ZOLOFT) 25 MG tablet Take 25 mg by mouth 2 times daily.      Starch, Thickening, POWD Combine with beverages, per instructions from Speech Therapy. 850 g 99    topiramate (TOPAMAX) 200 MG tablet TAKE 1 TABLET BY MOUTH TWICE DAILY. 180 tablet 3    VITAMIN D3 25 MCG (1000 UT) tablet TAKE 3 TABLETS (3000 UNITS) BY MOUTH ONCE DAILY 100 tablet 11        Medication Notes:        AED Medication Compliance:  compliant most of the time, does not refuse meds  Using a pill box:  staff administer    Diagnostic studies reviewed:  CT head wo 1/3/2024:  FINDINGS:  INTRACRANIAL CONTENTS: Redemonstrated multilobulated multi compartmental epidermoid cyst (with CSF density and multifocal dystrophic calcifications) located within the posterior fossa, suprasellar cistern, right middle cranial fossa with stable   significant mass effect on the cerebellum, brainstem and right temporal lobe. No intracranial hemorrhage or extraaxial collection.  No CT evidence of acute infarct. Normal parenchymal attenuation. Normal  ventricles and sulci.   VISUALIZED ORBITS/SINUSES/MASTOIDS: No intraorbital abnormality. No paranasal sinus mucosal disease. No middle ear or mastoid effusion.  BONES/SOFT TISSUES: No acute abnormality.                                                                   IMPRESSION:  1.  No acute findings.  2.  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    MRI brain wwo 1/12/2023:  FINDINGS:  Mildly internally heterogeneous restricting extra-axial cystic lesion  in the right middle cranial fossa, not appreciably changed dating back  to 3/25/2021 given the amount of motion artifact on prior studies,  measuring approximately 4.8 x 6.3 cm. Similar mass effect on adjacent  temporal lobe, as well as displacement of the brainstem. Medial  extension and effacement of the right basilar and interpedicular  cisterns.  Posteriorly and inferiorly this extends to the inferior aspect of the  cisterna magna and partially into the right foramen of Luschka.  Unchanged areas of nodular enhancement at the pass for example  superiorly measuring 3 x 7 mm on coronal series 27 image 97. No new  abnormal intracranial enhancement. No abnormal elevated relative  cerebral blood volume.  No abnormal signal , atrophy, or congenital anomaly identified within  the medial left temporal lobe.  No new areas of restricted diffusion to suggest an acute infarct. No  acute hydrocephalus. No acute intracranial hemorrhage or abnormal  extra-axial fluid collections. Orbits are unremarkable. Paranasal  sinuses and mastoid air cells are clear.                                                                   IMPRESSION:  1. No appreciable change in the large epidermoid cyst centered in the  right middle cranial fossa with extension and mass effect on the basal  cisterns and posterior fossa, dating back to 3/25/2021.  2. Unchanged enhancing nodular components of about the periphery of  the mass.    EEG  1/25/2023:  Impression: No clear abnormalities were observed except for possible focal slowing over the right hemisphere.      Latest Reference Range & Units 05/29/25 13:54   Lacosamide 1.0 - 10.0 ug/mL 4.0   Topiramate Level 5.0 - 20.0 ug/mL 8.8   Valproic acid ug/mL 80.6      Latest Reference Range & Units 04/17/25 08:27   Sodium 135 - 145 mmol/L 134 (L)   Potassium 3.4 - 5.3 mmol/L 4.0   Chloride 98 - 107 mmol/L 100   Carbon Dioxide (CO2) 22 - 29 mmol/L 28   Urea Nitrogen 6.0 - 20.0 mg/dL 14.9   Creatinine 0.67 - 1.17 mg/dL 0.95   GFR Estimate >60 mL/min/1.73m2 >90   Anion Gap 7 - 15 mmol/L 6 (L)      Latest Reference Range & Units 10/03/24 15:56   AST 0 - 45 U/L 15      Latest Reference Range & Units 10/03/24 15:56   WBC 4.0 - 11.0 10e3/uL 7.1   Hemoglobin 13.3 - 17.7 g/dL 14.9   Hematocrit 40.0 - 53.0 % 45.5   Platelet Count 150 - 450 10e3/uL 226   RBC Count 4.40 - 5.90 10e6/uL 5.06   MCV 78 - 100 fL 90   MCH 26.5 - 33.0 pg 29.4   MCHC 31.5 - 36.5 g/dL 32.7   RDW 10.0 - 15.0 % 12.8     Review of Systems:  Lethargy / Tiredness:  No  Nausea / Vomiting:  No  Double Vision:  No  Sleepiness:  No  Depression:  stable  Poor Balance:  Yes  Appetite Changes:  No  Sleep Changes:  No  Behavioral Changes:  No  Respiratory: No shortness of breath  Have you experienced a traumatic fall since your last visit: NO  Are these falls related to your seizures: Not Applicable    Other Issues:    Is patient safe to drive:  No    Exam:    There were no vitals taken for this visit.     Wt Readings from Last 5 Encounters:   05/29/25 260 lb 12.8 oz (118.3 kg)   01/23/25 262 lb (118.8 kg)   03/06/24 238 lb (108 kg)   11/22/23 222 lb 9.6 oz (101 kg)   10/16/23 221 lb (100.2 kg)     Limited exam with patient's ability to follow commands.     General: General examination reveals a male in no acute distress  Cardiovascular: RRR, no rubs, gallops, or murmurs. No carotid bruits heard bilaterally  Pulmonary: Clear to auscultation bilaterally. No  "adventitious breath sounds.    Neurological Examination:    Mental Status: Alert and awake. Mood and affect were pleasant. Cognitively delayed, repeating \"Arby's coke\". Language with slight nasal tone, mild dysarthria.  Cranial Nerves:  II, III, IV, VI: Undilated fundoscopic exam not completed. Visual fields unable to assess. PERRL. EOMI as able to test without nystagmus.  V: Normal strength of muscles of mastication.  VII: Facial movements are symmetric and without weakness.  VIII: Hearing intact to conversation.  IX/X: Palate elevation symmetric.  XI: Sternocleidomastoid and trapezius are normal and without weakness against gravity.  XII: Tongue is midline.  Musculoskeletal: Neck supple.  Motor: Unable to formally test strength due to inability to follow commands for this. He is able to lift his arms and legs against gravity, though above shoulder height he used his right arm to raise his left arm higher, and when assessing toe movements and hip flexion on the left, he used his hand to help move his left lower extremity.   Reflexes:   Intact in upper and lower extremities, increased at right patella Positive esparza's on left, not noted on right.  Sensation: Sensation not tested.   Coordination: Bradykinesia with left fine finger movements compared to right. No tremor.  Station and Gait: Station was normal based. Gait was without circumduction or clear foot drop, though did catch his left foot with one step down the hallway. Able to rise from a seated position without assistance.    Assessment and Plan:   ***    Within 3-4 weeks for updated imaging    As described above, I met with the patient for *** minutes and during this time counseling was {:[id number]} 50% of the visit time.      I spent *** minutes in total today to provide comprehensive medical care; Time in: 1112 Time out: ***.   I spent *** minutes writing the note and placing orders.   I spent *** minutes reviewing the chart.     The rest of the time was " spent with the patient in face to face interview. During this time key medical decisions were made with review of medical chart prior to visit, visit with patient, counseling/education, and post visit work, including documentation of note on the day of visit. I addressed all questions the patient/caregiver raised in regards to epilepsy or related medical questions.                       management and with ongoing continuity of care.

## 2025-06-18 ENCOUNTER — HOSPITAL ENCOUNTER (OUTPATIENT)
Facility: CLINIC | Age: 45
End: 2025-06-18
Payer: MEDICARE

## 2025-06-18 ENCOUNTER — TELEPHONE (OUTPATIENT)
Dept: NEUROLOGY | Facility: CLINIC | Age: 45
End: 2025-06-18

## 2025-06-23 ENCOUNTER — HOSPITAL ENCOUNTER (OUTPATIENT)
Facility: CLINIC | Age: 45
End: 2025-06-23
Payer: MEDICARE

## 2025-06-25 ENCOUNTER — TELEPHONE (OUTPATIENT)
Dept: NEUROLOGY | Facility: CLINIC | Age: 45
End: 2025-06-25

## 2025-06-25 ENCOUNTER — HOSPITAL ENCOUNTER (OUTPATIENT)
Facility: CLINIC | Age: 45
End: 2025-06-25
Payer: MEDICARE

## 2025-06-25 DIAGNOSIS — G93.0 EPIDERMOID CYST OF BRAIN: Primary | ICD-10-CM

## 2025-06-25 DIAGNOSIS — R53.1 LEFT-SIDED WEAKNESS: ICD-10-CM

## 2025-06-25 DIAGNOSIS — G40.909 SEIZURE DISORDER (H): ICD-10-CM

## 2025-06-25 NOTE — TELEPHONE ENCOUNTER
General Call    Contacts       Contact Date/Time Type Contact Phone/Fax    06/25/2025 02:18 PM CDT Phone (Incoming) [facility] (Emergency Contact) [phone] (H)          Reason for Call:  Group home called in regards to patients cancelled and reschedule MRI. There is a lot of confusion about this appt.   Could someone from the care team please call imagaing?     What are your questions or concerns:  MRI appt has been cancelled and r/s multiple times and the date and time the group home has is not what has ever been scheduled.     Date of last appointment with provider: 6/12    Okay to leave a detailed message?: N/A at Other phone number:  imagaing- [phone] say natalee, ask who ever answers for Kayla domingo

## 2025-07-01 ENCOUNTER — TELEPHONE (OUTPATIENT)
Dept: MEDSURG UNIT | Facility: CLINIC | Age: 45
End: 2025-07-01
Payer: MEDICARE

## 2025-07-01 NOTE — TELEPHONE ENCOUNTER
RADIOLOGY PROCEDURE INSTRUCTIONS     You are scheduled for an upcoming procedure in the   Radiology Department at [facility].       Date: 7/11/25      Procedure: MRI with anesthesia 0930     Address: [place] [address] [address]     [facility] [address] is located on [facility], across the street from hospital.  There is a skyway attached to this ramp that will direct you to the patient check in area.     Check into the [facility] Lounge at: 0800    Do not eat any food or liquids after: no solid food after 0000 (8 hours prior to arrival), OK to have clear liquids up until 0600 (2 hours prior to arrival)       Two visitors may accompany you to your procedure.    You will need to have someone available to drive you home.    We recommend that you have a responsible adult with you for at least 6 hours after you get home.    Please take all of your medications as prescribed with a sip of water in the am, unless you are contacted by a nurse from our department to hold them.  If you are on a GLP-1 (dulaglutide, exenatide, semaglutide, liraglutide, lixisenatide, semaglutide) weight loss drug you must hold this medication for 7 days prior to your procedure.     You need to have an up to date History & Physical exam (H&P)  by your primary physician in the 30 day period prior to your scheduled procedure.  Please contact your primary care team for this appointment. 6/12 h&p    If you develop a fever, cold symptoms or test positive for COVID-19 please call us to receive direction or reschedule.        If you have any questions, please call the [facility] nurses at [phone].     Thank you!      [facility] procedural RN

## 2025-07-02 ENCOUNTER — TELEPHONE (OUTPATIENT)
Dept: MEDSURG UNIT | Facility: CLINIC | Age: 45
End: 2025-07-02
Payer: MEDICAID

## 2025-07-11 ENCOUNTER — HOSPITAL ENCOUNTER (OUTPATIENT)
Dept: MRI IMAGING | Facility: CLINIC | Age: 45
Discharge: HOME | End: 2025-07-11
Attending: PHYSICIAN ASSISTANT | Admitting: PHYSICIAN ASSISTANT
Payer: MEDICARE

## 2025-07-11 ENCOUNTER — HOSPITAL ENCOUNTER (OUTPATIENT)
Facility: CLINIC | Age: 45
Discharge: HOME | End: 2025-07-11
Admitting: ANESTHESIOLOGY
Payer: MEDICARE

## 2025-07-11 VITALS
RESPIRATION RATE: 16 BRPM | OXYGEN SATURATION: 95 % | SYSTOLIC BLOOD PRESSURE: 136 MMHG | DIASTOLIC BLOOD PRESSURE: 82 MMHG | WEIGHT: 257 LBS | HEART RATE: 83 BPM | TEMPERATURE: 97 F | BODY MASS INDEX: 35.84 KG/M2

## 2025-07-11 DIAGNOSIS — G93.0 EPIDERMOID CYST OF BRAIN: ICD-10-CM

## 2025-07-11 DIAGNOSIS — G40.909 SEIZURE DISORDER (H): ICD-10-CM

## 2025-07-11 DIAGNOSIS — R53.1 LEFT-SIDED WEAKNESS: ICD-10-CM

## 2025-07-11 PROCEDURE — 255N000002 HC RX 255 OP 636: Performed by: PHYSICIAN ASSISTANT

## 2025-07-11 PROCEDURE — 250N000025 HC SEVOFLURANE, PER MIN

## 2025-07-11 PROCEDURE — 370N000017 HC ANESTHESIA TECHNICAL FEE, PER MIN

## 2025-07-11 PROCEDURE — 999N000154 HC STATISTIC RADIOLOGY XRAY, US, CT, MAR, NM

## 2025-07-11 PROCEDURE — 70553 MRI BRAIN STEM W/O & W/DYE: CPT

## 2025-07-11 PROCEDURE — A9585 GADOBUTROL INJECTION: HCPCS | Performed by: PHYSICIAN ASSISTANT

## 2025-07-11 PROCEDURE — 710N000009 HC RECOVERY PHASE 1, LEVEL 1, PER MIN

## 2025-07-11 RX ORDER — LIDOCAINE 40 MG/G
CREAM TOPICAL
Status: DISCONTINUED | OUTPATIENT
Start: 2025-07-11 | End: 2025-07-11 | Stop reason: HOSPADM

## 2025-07-11 RX ORDER — SODIUM CHLORIDE, SODIUM LACTATE, POTASSIUM CHLORIDE, CALCIUM CHLORIDE 600; 310; 30; 20 MG/100ML; MG/100ML; MG/100ML; MG/100ML
INJECTION, SOLUTION INTRAVENOUS CONTINUOUS
Status: DISCONTINUED | OUTPATIENT
Start: 2025-07-11 | End: 2025-07-11 | Stop reason: HOSPADM

## 2025-07-11 RX ORDER — GADOBUTROL 604.72 MG/ML
11 INJECTION INTRAVENOUS ONCE
Status: COMPLETED | OUTPATIENT
Start: 2025-07-11 | End: 2025-07-11

## 2025-07-11 RX ADMIN — GADOBUTROL 11 ML: 604.72 INJECTION INTRAVENOUS at 10:47

## 2025-07-11 ASSESSMENT — ACTIVITIES OF DAILY LIVING (ADL)
ADLS_ACUITY_SCORE: 54

## 2025-07-11 NOTE — PROGRESS NOTES
[facility] Admission Nursing Note    Patient Information  Name: Duane D Backes  Age: 44 year old  Reason for admission: MRI w/ Sedation  [facility] arrival time: 0800    Visitor Information  Name: Don, PCA's     Patient Admission/Assessment   Pre-procedure assessment complete: Yes  If abnormal assessment/labs, provider notified: N/A  NPO: Yes  Medications held per instructions/orders: N/A  Consent: deferred  If applicable, pregnancy test status: deferred  Patient oriented to room: Yes  Education/questions answered: Yes  Plan/other: proceed as scheduled    Discharge Planning  Discharge name/phone number: Don PCA's [phone]  Overnight post sedation caregiver: [facility]  Discharge location: home    Mercedes Patricio, RN      All admission questions answered by PCAs, Don

## 2025-07-11 NOTE — OR NURSING
Dr Flores bedside, Pt awake VSS, tolerating oral fluids. Pt calm. Dr Flores spoke with  care attendents. Ok yo transfer to care suites.

## 2025-07-11 NOTE — DISCHARGE INSTRUCTIONS
Discharge Instructions for Sedation and General Anesthesia    It's not unusual to feel dizzy, light-headed or faint for up to 24 hours after anesthesia. If you have these symptoms: sit for a few minutes before standing and have someone assist you when you get up to walk or use the bathroom.    You should rest and relax for the next 24 hours. You must make arrangements to have an adult stay with you for at least 24 hours after your discharge.  Avoid hazardous and strenuous activity.    DO NOT DRIVE any vehicle or operate mechanical equipment for 24 hours following the end of your procedure.  Even though you may feel normal, your    reactions may be affected by the medication you have received.    Do not drink alcoholic beverages for 24 hours following anesthesia.    Slowly progress to your regular diet as you feel able.      It's not unusual to feel nauseated and/or vomit after receiving anesthesia.  If you develop these symptoms, drink clear liquids (apple juice, ginger ale, broth, 7-up, etc) until you feel better.  If your nausea and vomiting persists for 24 hours, please notify your provider.    For any questions of a medical nature, call your ordering provider.    Do not make important decisions for 24 hours    If you had general anesthesia, you may have a sore throat for a couple of days related to the breathing tube used during surgery.  You may use Cepacol lozenges to help with this discomfort.  If it worsens or if you develop a fever, contact your ordering provider.

## 2025-07-11 NOTE — PROGRESS NOTES
[facility] Discharge Nursing Note    Patient Information  Name: Duane D Backes  Age: 44 year old    Discharge Education:  Discharge instructions reviewed: Yes  Additional education/resources provided: NA  Patient/patient representative verbalizes understanding: Yes  Patient discharging on new medications: No  Medication education completed: N/A    Discharge Plans:   Discharge location: home  Discharge ride contacted: Yes  Approximate discharge time: 1220    Discharge Criteria:  Discharge criteria met and vital signs stable: Yes    Patient Belongs:  Patient belongings returned to patient: Yes    Mercedes Patricio RN      All discharge instructions reviewed with [place].

## 2025-07-11 NOTE — PROGRESS NOTES
[facility] Post Procedure Note    Patient Information  Name: Duane D Backes  Age: 44 year old    Post Procedure  Time patient returned to [facility]: 1157  Concerns/abnormal assessment: none noted   If abnormal assessment, provider notified: N/A  Plan/Other: discharge    Shahana Souza RN

## 2025-07-23 NOTE — PROGRESS NOTES
[facility]  Department of Neurosurgery    Name: Duane D Backes  MRN: [id number]  : 1980     Reason for visit:  multicompartmental right middle/posterior fossa epidermoid and seizures, followup visit    Dear Dr. Quiroga,    It was a pleasure to see Mr. Green in the [facility] today.  As you recall, Mr. Green is a pleasant 44 year-old right-handed male with history of developmental delay and epilepsy, followed by our epileptology team. Seizure onset was . Gradually antiepileptics have been added for breakthrough seizures. Has been on valproate, and lacosamide was added in 2021. He has had several bouts of status epilepticus, thought in part due to concomitant hyponatremia to ~123 due to psychogenic polydipsia per neurology notes. Prior EEGs have shown left temporal sharp     During bouts of seizures, he has had several EEGs.  EEGs from -2021 show no epileptiform discharges or seizures recorded. EEG from 2022 was similarly negative.  EEG 2023 showed possible focal slowing over the right hemisphere, and more fast activities over the left hemisphere. Dr. Washington's notes have indicated LEFT temporal sharp waves seen on EEG, noting that the epidermoid is on the right side.    He was seen by neurology on 2025 and he is experiencing foot drop and worsening gait in the last couple months since that visit. He is also having trouble lifting the left arm. I spoke today with Duane and his , who says that his sister and family clearly do not want him to undergo any surgery procedure but want him to manage symptoms with the Neurology team.    Review of Systems:   Pertinent items are noted in HPI or as in patient entered ROS below, remainder of complete ROS is negative.     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   Head: No signs of trauma.    Resp: No respiratory distress.   MSK: Moves all extremities.    Neuro: The patient is conversant and interactive. Extraocular movements are intact without nystagmus. . Facial nerve function is normal, rated as a House Brackmann 1, without synkinesis. Moves all extremities.   Psych: Normal mood and affect. Behavior is normal.      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 There is mass effect and/or engulfment of the cranial nerves 2-12.     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   Epilepsy on triple AEDs  3.   Brain/brainstem compression  4.   Episodes of status epilepticus in setting of psychogenic polydispia and hyponatremia  5.   Newer left sided weakness in arm    Plan:  We again reviewed the patient's history, imaging, natural history and expected outcomes of conservative management and intervention. Options include continued observation or surgical resection.  I discussed that the mainstay treatment option for epidermoids is surgery. Given the extension of the tumor in multiple locations, we would first focus on the epidermoid in the middle fossa to attempt to relieve the pressure on the temporal lobe, the  most likely component potentially contributing to seizures. The main indication however is the large size and brain/brainstem compression that will only get worse with time. I cannot be sure that the epidermoid is contributing to seizures, and I do not believe it is linked with any behavioral changes he has. He may be causing the left arm weakness through compression.  We again also had a johnnie discussion about what these surgeries would entail. With his developmental delay, there would be challenges in the postoperative phase including wound healing, making sure the sutures/incision stays clean and sutures could be removed in clinic at about ~2 weeks from surgery, and following our nursing team instructions during the hospital stay. We would also have ideal instructions such as lifting restrictions, pain control, and other precautions that would need to maintained to optimize recovery from these surgeries. It would be a significant undertaking for him especially if two or three surgical approaches are needed to address the extent of this large multi-compartmental tumor.  If left untreated, I would expect continued slow growth of the epidermoid and potentially the accumulation of cranial nerve deficits and ultimately hydrocephalus which may become lethal unless it is treated with a  shunt. It is difficult to predict timing of when this might happen, but I would not expect this soon since the CSF pathways appear patent now.  We discussed the risks of surgery including bleeding, infection, CSF leak, cranial neuropathy (includes vision loss, diplopia/abnormal eye movements, facial weakness or paralysis, hearing loss, voice/swallowing dysfunction, shoulder weakness, tongue paralysis, neurologic injury (arm/leg weakness, numbness, difficulty with coordination), stroke, hydrocephalus requiring  shunt placement, need for additional procedures/operations.  We would begin with a right frontotemporal craniotomy and  anterior petrosectomy with ventriculostomy placement for stage 1.  A 2nd stage involving a right retrosigmoid and far lateral approach would be considered to access most of the posterior fossa component.  I spoke today with Duane and his , who says that his sister and family clearly do not want him to undergo any surgery procedure but want him to manage seizures with the Neurology team. Given this, we can see him as needed and if surgery is reconsidered by family, then I would be happy to see Duane again.  I encouraged Mr. Green to contact with any questions or concerns or if we may be of assistance in any way.      It was a pleasure to participate in the care of your patient. Please feel free to contact me at any point if I can be of any assistance for Mr. Green.    Sincerely,  Brian Gutierres MD       10 minutes spent on the date of the encounter doing chart review, review of outside records, review of test results, interpretation of tests, patient visit, documentation and discussion with other provider(s)      Video start: 11:45am  Video end: 11:50am

## 2025-07-25 ENCOUNTER — LAB REQUISITION (OUTPATIENT)
Dept: LAB | Facility: CLINIC | Age: 45
End: 2025-07-25
Payer: MEDICARE

## 2025-07-25 DIAGNOSIS — E55.9 VITAMIN D DEFICIENCY, UNSPECIFIED: ICD-10-CM

## 2025-07-25 DIAGNOSIS — E03.9 HYPOTHYROIDISM, UNSPECIFIED: ICD-10-CM

## 2025-07-25 LAB
TSH SERPL DL<=0.005 MIU/L-ACNC: 3.97 UIU/ML (ref 0.3–4.2)
VIT D+METAB SERPL-MCNC: 40 NG/ML (ref 20–50)

## 2025-07-25 PROCEDURE — 82306 VITAMIN D 25 HYDROXY: CPT | Mod: ORL

## 2025-07-25 PROCEDURE — 84443 ASSAY THYROID STIM HORMONE: CPT | Mod: ORL

## 2025-07-30 ENCOUNTER — VIRTUAL VISIT (OUTPATIENT)
Dept: NEUROSURGERY | Facility: CLINIC | Age: 45
End: 2025-07-30
Payer: MEDICARE

## 2025-07-30 DIAGNOSIS — L72.0 EPIDERMOID CYST: Primary | ICD-10-CM

## 2025-07-30 PROCEDURE — 1126F AMNT PAIN NOTED NONE PRSNT: CPT | Performed by: NEUROLOGICAL SURGERY

## 2025-07-30 PROCEDURE — 98005 SYNCH AUDIO-VIDEO EST LOW 20: CPT | Performed by: NEUROLOGICAL SURGERY

## 2025-07-30 NOTE — NURSING NOTE
Current patient location: [address] [place] [address]    Is the patient currently in the state of [place]? YES    Visit mode: VIDEO    If the visit is dropped, the patient can be reconnected by:TELEPHONE VISIT: Phone number:   No relevant phone numbers on file.       Will anyone else be joining the visit? NO  (If patient encounters technical issues they should call [phone])    Are changes needed to the allergy or medication list? Pt stated no med changes    Are refills needed on medications prescribed by this physician? NO    Rooming Documentation:  Questionnaire(s) completed    Reason for visit: MATI SAM

## 2025-07-30 NOTE — LETTER
[facility]  [facility]INIC [address]  [address]RD FLO[address]  [facility] [address]  Phone: [phone]  Fax: [phone]          2025    RE:   Duane D Backes  [address]  [facility] [address]      Dear Colleague,    Thank you for referring your patient, Duane D Backes, to the [facility]. Please see a copy of my visit note below.      [facility]  Department of Neurosurgery    Name: Duane D Backes  MRN: [id number]  : 1980     Reason for visit:  multicompartmental right middle/posterior fossa epidermoid and seizures, followup visit    Dear Dr. Quiroga,    It was a pleasure to see Mr. Green in the [facility] today.  As you recall, Mr. Green is a pleasant 44 year-old right-handed male with history of developmental delay and epilepsy, followed by our epileptology team. Seizure onset was . Gradually antiepileptics have been added for breakthrough seizures. Has been on valproate, and lacosamide was added in 2021. He has had several bouts of status epilepticus, thought in part due to concomitant hyponatremia to ~123 due to psychogenic polydipsia per neurology notes. Prior EEGs have shown left temporal sharp     During bouts of seizures, he has had several EEGs.  EEGs from -2021 show no epileptiform discharges or seizures recorded. EEG from 2022 was similarly negative.  EEG 2023 showed possible focal slowing over the right hemisphere, and more fast activities over the left hemisphere. Dr. Washington's notes have indicated LEFT temporal sharp waves seen on EEG, noting that the epidermoid is on the right side.    He was seen by neurology on 2025 and he is experiencing foot drop and worsening gait in the last couple months since that visit. He is also having trouble lifting the  left arm. I spoke today with Duane and his , who says that his sister and family clearly do not want him to undergo any surgery procedure but want him to manage symptoms with the Neurology team.    Review of Systems:   Pertinent items are noted in HPI or as in patient entered ROS below, remainder of complete ROS is negative.     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   Head: No signs of trauma.    Resp: No respiratory distress.   MSK: Moves all extremities.    Neuro: The patient is conversant and interactive. Extraocular movements are intact without nystagmus. . Facial nerve function is normal, rated as a House Brackmann 1, without synkinesis. Moves all extremities.   Psych: Normal mood and affect. Behavior is normal.      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 There is mass effect and/or engulfment of the cranial nerves 2-12.     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   Epilepsy on triple AEDs  3.   Brain/brainstem compression  4.   Episodes of status epilepticus in setting of psychogenic polydispia and hyponatremia  5.   Newer left sided weakness in  arm    Plan:  We again reviewed the patient's history, imaging, natural history and expected outcomes of conservative management and intervention. Options include continued observation or surgical resection.  I discussed that the mainstay treatment option for epidermoids is surgery. Given the extension of the tumor in multiple locations, we would first focus on the epidermoid in the middle fossa to attempt to relieve the pressure on the temporal lobe, the most likely component potentially contributing to seizures. The main indication however is the large size and brain/brainstem compression that will only get worse with time. I cannot be sure that the epidermoid is contributing to seizures, and I do not believe it is linked with any behavioral changes he has. He may be causing the left arm weakness through compression.  We again also had a johnnie discussion about what these surgeries would entail. With his developmental delay, there would be challenges in the postoperative phase including wound healing, making sure the sutures/incision stays clean and sutures could be removed in clinic at about ~2 weeks from surgery, and following our nursing team instructions during the hospital stay. We would also have ideal instructions such as lifting restrictions, pain control, and other precautions that would need to maintained to optimize recovery from these surgeries. It would be a significant undertaking for him especially if two or three surgical approaches are needed to address the extent of this large multi-compartmental tumor.  If left untreated, I would expect continued slow growth of the epidermoid and potentially the accumulation of cranial nerve deficits and ultimately hydrocephalus which may become lethal unless it is treated with a  shunt. It is difficult to predict timing of when this might happen, but I would not expect this soon since the CSF pathways appear patent now.  We discussed the risks of surgery  including bleeding, infection, CSF leak, cranial neuropathy (includes vision loss, diplopia/abnormal eye movements, facial weakness or paralysis, hearing loss, voice/swallowing dysfunction, shoulder weakness, tongue paralysis, neurologic injury (arm/leg weakness, numbness, difficulty with coordination), stroke, hydrocephalus requiring  shunt placement, need for additional procedures/operations.  We would begin with a right frontotemporal craniotomy and anterior petrosectomy with ventriculostomy placement for stage 1.  A 2nd stage involving a right retrosigmoid and far lateral approach would be considered to access most of the posterior fossa component.  I spoke today with Duane and his , who says that his sister and family clearly do not want him to undergo any surgery procedure but want him to manage seizures with the Neurology team. Given this, we can see him as needed and if surgery is reconsidered by family, then I would be happy to see Duane again.  I encouraged Mr. Green to contact with any questions or concerns or if we may be of assistance in any way.      It was a pleasure to participate in the care of your patient. Please feel free to contact me at any point if I can be of any assistance for Mr. Green.    Sincerely,  Brian Gutierres MD       10 minutes spent on the date of the encounter doing chart review, review of outside records, review of test results, interpretation of tests, patient visit, documentation and discussion with other provider(s)      Video start: 11:45am  Video end: 11:50am    Virtual Visit Details    Type of service:  Video Visit     Originating Location (pt. Location): Home    Distant Location (provider location):  On-site  Platform used for Video Visit: Lakisha        Again, thank you for allowing me to participate in the care of your patient.      Sincerely,    Brian Gutierres MD

## 2025-07-30 NOTE — Clinical Note
2025       RE: Duane D Backes  [address]  [facility] [address]     Dear Colleague,    Thank you for referring your patient, Duane D Backes, to the [facility] NEUROSURGERY CLINIC [place] [facility]. Please see a copy of my visit note below.    [facility] for [facility]  Department of Neurosurgery    Name: Duane D Backes  MRN: [id number]  : 1980     Reason for visit:  multicompartmental right middle/posterior fossa epidermoid and seizures, followup visit    Dear Dr. Quiroga,    It was a pleasure to see Mr. Green in the [facility] today.  As you recall, Mr. Green is a pleasant 44 year-old right-handed male with history of developmental delay and epilepsy, followed by our epileptology team. Seizure onset was . Gradually antiepileptics have been added for breakthrough seizures. Has been on valproate, and lacosamide was added in 2021. He has had several bouts of status epilepticus, thought in part due to concomitant hyponatremia to ~123 due to psychogenic polydipsia per neurology notes. Prior EEGs have shown left temporal sharp     During bouts of seizures, he has had several EEGs.  EEGs from -2021 show no epileptiform discharges or seizures recorded. EEG from 2022 was similarly negative.  EEG 2023 showed possible focal slowing over the right hemisphere, and more fast activities over the left hemisphere. Dr. Washington's notes have indicated LEFT temporal sharp waves seen on EEG, noting that the epidermoid is on the right side.    He was seen by neurology on 2025 and he is experiencing foot drop and worsening gait in the last couple months since that visit. He is also having trouble lifting the left arm. I spoke today with Duane and his , who says that his sister and family clearly do not want him to undergo any  surgery procedure but want him to manage symptoms with the Neurology team.    Review of Systems:   Pertinent items are noted in HPI or as in patient entered ROS below, remainder of complete ROS is negative.     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   Head: No signs of trauma.    Resp: No respiratory distress.   MSK: Moves all extremities.    Neuro: The patient is conversant and interactive. Extraocular movements are intact without nystagmus. . Facial nerve function is normal, rated as a House Brackmann 1, without synkinesis. Moves all extremities.   Psych: Normal mood and affect. Behavior is normal.      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 There is mass effect and/or engulfment of the cranial nerves 2-12.     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   Epilepsy on triple AEDs  3.   Brain/brainstem compression  4.   Episodes of status epilepticus in setting of psychogenic polydispia and hyponatremia  5.   Newer left sided weakness in arm    Plan:  We again reviewed the patient's history, imaging, natural history and expected outcomes of conservative management and  intervention. Options include continued observation or surgical resection.  I discussed that the mainstay treatment option for epidermoids is surgery. Given the extension of the tumor in multiple locations, we would first focus on the epidermoid in the middle fossa to attempt to relieve the pressure on the temporal lobe, the most likely component potentially contributing to seizures. The main indication however is the large size and brain/brainstem compression that will only get worse with time. I cannot be sure that the epidermoid is contributing to seizures, and I do not believe it is linked with any behavioral changes he has. He may be causing the left arm weakness through compression.  We again also had a johnnie discussion about what these surgeries would entail. With his developmental delay, there would be challenges in the postoperative phase including wound healing, making sure the sutures/incision stays clean and sutures could be removed in clinic at about ~2 weeks from surgery, and following our nursing team instructions during the hospital stay. We would also have ideal instructions such as lifting restrictions, pain control, and other precautions that would need to maintained to optimize recovery from these surgeries. It would be a significant undertaking for him especially if two or three surgical approaches are needed to address the extent of this large multi-compartmental tumor.  If left untreated, I would expect continued slow growth of the epidermoid and potentially the accumulation of cranial nerve deficits and ultimately hydrocephalus which may become lethal unless it is treated with a  shunt. It is difficult to predict timing of when this might happen, but I would not expect this soon since the CSF pathways appear patent now.  We discussed the risks of surgery including bleeding, infection, CSF leak, cranial neuropathy (includes vision loss, diplopia/abnormal eye movements, facial weakness or  "paralysis, hearing loss, voice/swallowing dysfunction, shoulder weakness, tongue paralysis, neurologic injury (arm/leg weakness, numbness, difficulty with coordination), stroke, hydrocephalus requiring  shunt placement, need for additional procedures/operations.  We would begin with a right frontotemporal craniotomy and anterior petrosectomy with ventriculostomy placement for stage 1.  A 2nd stage involving a right retrosigmoid and far lateral approach would be considered to access most of the posterior fossa component.  I spoke today with Duane and his , who says that his sister and family clearly do not want him to undergo any surgery procedure but want him to manage seizures with the Neurology team. Given this, we can see him as needed and if surgery is reconsidered by family, then I would be happy to see Duane again.  I encouraged Mr. Green to contact with any questions or concerns or if we may be of assistance in any way.      It was a pleasure to participate in the care of your patient. Please feel free to contact me at any point if I can be of any assistance for Mr. Green.    Sincerely,  Brian Gutierres MD       10 minutes spent on the date of the encounter doing chart review, review of outside records, review of test results, interpretation of tests, patient visit, documentation and discussion with other provider(s)      Video start: 11:45am  Video end: 11:50am    Virtual Visit Details    Type of service:  Video Visit   Video Start Time: {video visit start/end time for provider to select:730099}  Video End Time:{video visit start/end time for provider to select:256685}    Originating Location (pt. Location): {video visit patient location:243320::\"Home\"}  {PROVIDER LOCATION On-site should be selected for visits conducted from your clinic location or adjoining [facility] hospital, academic office, or other nearby [facility] building. Off-site should be selected for all other provider locations, " "including home:189146}  Distant Location (provider location):  {virtual location provider:055550}  Platform used for Video Visit: {Virtual Visit Platforms:241934::\"GadgetATM\"}        Again, thank you for allowing me to participate in the care of your patient.      Sincerely,    Brian Gutierres MD    "

## 2025-07-30 NOTE — PATIENT INSTRUCTIONS
You were seen today with Dr. Brian Gutierres.    Next steps:  Return to clinic as needed.       How to Contact Us  Send a [employer] message to your provider.   Call the clinic - your call will be routed appropriately.   Neurosurgery Clinic: [phone]  To speak directly to an RN Care Coordinator:  Jeni RN: [phone]  Rebecca RN: [phone]    Note: We do our best to check voicemail frequently throughout the day and make every effort to return calls within 1-2 business days. For urgent matters, please use the general clinic phone numbers listed above.

## 2025-07-30 NOTE — PROGRESS NOTES
Virtual Visit Details    Type of service:  Video Visit     Originating Location (pt. Location): Home    Distant Location (provider location):  On-site  Platform used for Video Visit: [employer]

## 2025-08-21 ENCOUNTER — HOSPITAL ENCOUNTER (INPATIENT)
Facility: CLINIC | Age: 45
End: 2025-08-21
Attending: EMERGENCY MEDICINE | Admitting: STUDENT IN AN ORGANIZED HEALTH CARE EDUCATION/TRAINING PROGRAM
Payer: MEDICARE

## 2025-08-21 ENCOUNTER — APPOINTMENT (OUTPATIENT)
Dept: CT IMAGING | Facility: CLINIC | Age: 45
End: 2025-08-21
Attending: EMERGENCY MEDICINE
Payer: MEDICARE

## 2025-08-21 ENCOUNTER — APPOINTMENT (OUTPATIENT)
Dept: GENERAL RADIOLOGY | Facility: CLINIC | Age: 45
End: 2025-08-21
Attending: EMERGENCY MEDICINE
Payer: MEDICARE

## 2025-08-21 VITALS
DIASTOLIC BLOOD PRESSURE: 75 MMHG | SYSTOLIC BLOOD PRESSURE: 131 MMHG | HEIGHT: 71 IN | TEMPERATURE: 98.4 F | BODY MASS INDEX: 35.5 KG/M2 | OXYGEN SATURATION: 96 % | WEIGHT: 253.6 LBS | HEART RATE: 86 BPM | RESPIRATION RATE: 16 BRPM

## 2025-08-21 DIAGNOSIS — Z71.89 OTHER SPECIFIED COUNSELING: Chronic | ICD-10-CM

## 2025-08-21 DIAGNOSIS — G93.40 ACUTE ENCEPHALOPATHY: ICD-10-CM

## 2025-08-21 DIAGNOSIS — J18.9 PNEUMONIA OF LEFT LOWER LOBE DUE TO INFECTIOUS ORGANISM: Primary | ICD-10-CM

## 2025-08-21 LAB
ALBUMIN SERPL BCG-MCNC: 4.2 G/DL (ref 3.5–5.2)
ALBUMIN UR-MCNC: NEGATIVE MG/DL
ALP SERPL-CCNC: 53 U/L (ref 40–150)
ALT SERPL W P-5'-P-CCNC: 7 U/L (ref 0–70)
ANION GAP SERPL CALCULATED.3IONS-SCNC: 12 MMOL/L (ref 7–15)
APPEARANCE UR: CLEAR
AST SERPL W P-5'-P-CCNC: 15 U/L (ref 0–45)
ATRIAL RATE - MUSE: 91 BPM
BASOPHILS # BLD AUTO: 0.04 10E3/UL (ref 0–0.2)
BASOPHILS NFR BLD AUTO: 0.4 %
BILIRUB SERPL-MCNC: 0.3 MG/DL
BILIRUB UR QL STRIP: NEGATIVE
BUN SERPL-MCNC: 16.3 MG/DL (ref 6–20)
CALCIUM SERPL-MCNC: 9.9 MG/DL (ref 8.8–10.4)
CHLORIDE SERPL-SCNC: 102 MMOL/L (ref 98–107)
COLOR UR AUTO: ABNORMAL
CREAT SERPL-MCNC: 0.78 MG/DL (ref 0.67–1.17)
DIASTOLIC BLOOD PRESSURE - MUSE: NORMAL MMHG
EGFRCR SERPLBLD CKD-EPI 2021: >90 ML/MIN/1.73M2
EOSINOPHIL # BLD AUTO: 0.2 10E3/UL (ref 0–0.7)
EOSINOPHIL NFR BLD AUTO: 1.9 %
ERYTHROCYTE [DISTWIDTH] IN BLOOD BY AUTOMATED COUNT: 12.7 % (ref 10–15)
FLUAV RNA SPEC QL NAA+PROBE: NEGATIVE
FLUBV RNA RESP QL NAA+PROBE: NEGATIVE
GLUCOSE SERPL-MCNC: 145 MG/DL (ref 70–99)
GLUCOSE UR STRIP-MCNC: NEGATIVE MG/DL
HCO3 BLDV-SCNC: 23 MMOL/L (ref 21–28)
HCO3 SERPL-SCNC: 22 MMOL/L (ref 22–29)
HCT VFR BLD AUTO: 40.8 % (ref 40–53)
HGB BLD-MCNC: 13.6 G/DL (ref 13.3–17.7)
HGB UR QL STRIP: ABNORMAL
HOLD SPECIMEN: NORMAL
IMM GRANULOCYTES # BLD: 0.04 10E3/UL
IMM GRANULOCYTES NFR BLD: 0.4 %
INTERPRETATION ECG - MUSE: NORMAL
KETONES UR STRIP-MCNC: NEGATIVE MG/DL
LACTATE BLD-SCNC: 0.9 MMOL/L (ref 0.7–2)
LEUKOCYTE ESTERASE UR QL STRIP: NEGATIVE
LITHIUM SERPL-SCNC: 0.52 MMOL/L (ref 0.6–1.2)
LYMPHOCYTES # BLD AUTO: 0.86 10E3/UL (ref 0.8–5.3)
LYMPHOCYTES NFR BLD AUTO: 8.2 %
MCH RBC QN AUTO: 30 PG (ref 26.5–33)
MCHC RBC AUTO-ENTMCNC: 33.3 G/DL (ref 31.5–36.5)
MCV RBC AUTO: 90.1 FL (ref 78–100)
MONOCYTES # BLD AUTO: 1.5 10E3/UL (ref 0–1.3)
MONOCYTES NFR BLD AUTO: 14.3 %
MUCOUS THREADS #/AREA URNS LPF: PRESENT /LPF
NEUTROPHILS # BLD AUTO: 7.82 10E3/UL (ref 1.6–8.3)
NEUTROPHILS NFR BLD AUTO: 74.8 %
NITRATE UR QL: NEGATIVE
NRBC # BLD AUTO: <0.03 10E3/UL
NRBC BLD AUTO-RTO: 0 /100
NT-PROBNP SERPL-MCNC: <36 PG/ML (ref 0–93)
P AXIS - MUSE: 51 DEGREES
PCO2 BLDV: 44 MM HG (ref 40–50)
PH BLDV: 7.33 [PH] (ref 7.32–7.43)
PH UR STRIP: 6.5 [PH] (ref 5–7)
PLATELET # BLD AUTO: 199 10E3/UL (ref 150–450)
PO2 BLDV: 45 MM HG (ref 25–47)
POTASSIUM SERPL-SCNC: 3.7 MMOL/L (ref 3.4–5.3)
PR INTERVAL - MUSE: 178 MS
PROCALCITONIN SERPL IA-MCNC: 0.06 NG/ML
PROT SERPL-MCNC: 7.7 G/DL (ref 6.4–8.3)
QRS DURATION - MUSE: 106 MS
QT - MUSE: 344 MS
QTC - MUSE: 423 MS
R AXIS - MUSE: 31 DEGREES
RBC # BLD AUTO: 4.53 10E6/UL (ref 4.4–5.9)
RBC URINE: 1 /HPF
RSV RNA SPEC NAA+PROBE: NEGATIVE
SAO2 % BLDV: 77 % (ref 70–75)
SARS-COV-2 RNA RESP QL NAA+PROBE: NEGATIVE
SODIUM SERPL-SCNC: 136 MMOL/L (ref 135–145)
SP GR UR STRIP: 1.01 (ref 1–1.03)
SQUAMOUS EPITHELIAL: <1 /HPF
SYSTOLIC BLOOD PRESSURE - MUSE: NORMAL MMHG
T AXIS - MUSE: 42 DEGREES
TROPONIN T SERPL HS-MCNC: 8 NG/L
TROPONIN T SERPL HS-MCNC: 9 NG/L
UROBILINOGEN UR STRIP-MCNC: NORMAL MG/DL
VALPROATE SERPL-MCNC: 96 UG/ML (ref 50–100)
VENTRICULAR RATE- MUSE: 91 BPM
WBC # BLD AUTO: 10.46 10E3/UL (ref 4–11)
WBC URINE: 0 /HPF

## 2025-08-21 PROCEDURE — 81001 URINALYSIS AUTO W/SCOPE: CPT | Performed by: EMERGENCY MEDICINE

## 2025-08-21 PROCEDURE — 84145 PROCALCITONIN (PCT): CPT | Performed by: STUDENT IN AN ORGANIZED HEALTH CARE EDUCATION/TRAINING PROGRAM

## 2025-08-21 PROCEDURE — 80235 DRUG ASSAY LACOSAMIDE: CPT | Performed by: EMERGENCY MEDICINE

## 2025-08-21 PROCEDURE — 80178 ASSAY OF LITHIUM: CPT | Performed by: EMERGENCY MEDICINE

## 2025-08-21 PROCEDURE — 36415 COLL VENOUS BLD VENIPUNCTURE: CPT | Performed by: EMERGENCY MEDICINE

## 2025-08-21 PROCEDURE — 99223 1ST HOSP IP/OBS HIGH 75: CPT | Mod: AI | Performed by: STUDENT IN AN ORGANIZED HEALTH CARE EDUCATION/TRAINING PROGRAM

## 2025-08-21 PROCEDURE — 250N000011 HC RX IP 250 OP 636: Performed by: EMERGENCY MEDICINE

## 2025-08-21 PROCEDURE — 83605 ASSAY OF LACTIC ACID: CPT

## 2025-08-21 PROCEDURE — 96361 HYDRATE IV INFUSION ADD-ON: CPT

## 2025-08-21 PROCEDURE — 85004 AUTOMATED DIFF WBC COUNT: CPT | Performed by: EMERGENCY MEDICINE

## 2025-08-21 PROCEDURE — 96365 THER/PROPH/DIAG IV INF INIT: CPT

## 2025-08-21 PROCEDURE — 120N000001 HC R&B MED SURG/OB

## 2025-08-21 PROCEDURE — 87637 SARSCOV2&INF A&B&RSV AMP PRB: CPT | Performed by: EMERGENCY MEDICINE

## 2025-08-21 PROCEDURE — 99285 EMERGENCY DEPT VISIT HI MDM: CPT | Mod: 25 | Performed by: EMERGENCY MEDICINE

## 2025-08-21 PROCEDURE — 258N000003 HC RX IP 258 OP 636: Performed by: STUDENT IN AN ORGANIZED HEALTH CARE EDUCATION/TRAINING PROGRAM

## 2025-08-21 PROCEDURE — 93005 ELECTROCARDIOGRAM TRACING: CPT

## 2025-08-21 PROCEDURE — 258N000003 HC RX IP 258 OP 636: Performed by: EMERGENCY MEDICINE

## 2025-08-21 PROCEDURE — 82310 ASSAY OF CALCIUM: CPT | Performed by: EMERGENCY MEDICINE

## 2025-08-21 PROCEDURE — 96375 TX/PRO/DX INJ NEW DRUG ADDON: CPT

## 2025-08-21 PROCEDURE — 80164 ASSAY DIPROPYLACETIC ACD TOT: CPT | Performed by: EMERGENCY MEDICINE

## 2025-08-21 PROCEDURE — 83880 ASSAY OF NATRIURETIC PEPTIDE: CPT | Performed by: EMERGENCY MEDICINE

## 2025-08-21 PROCEDURE — 84484 ASSAY OF TROPONIN QUANT: CPT | Performed by: EMERGENCY MEDICINE

## 2025-08-21 PROCEDURE — 250N000013 HC RX MED GY IP 250 OP 250 PS 637: Performed by: STUDENT IN AN ORGANIZED HEALTH CARE EDUCATION/TRAINING PROGRAM

## 2025-08-21 PROCEDURE — 87040 BLOOD CULTURE FOR BACTERIA: CPT | Performed by: EMERGENCY MEDICINE

## 2025-08-21 RX ORDER — ACETAMINOPHEN 325 MG/1
650 TABLET ORAL EVERY 4 HOURS PRN
Status: DISCONTINUED | OUTPATIENT
Start: 2025-08-21 | End: 2025-08-25 | Stop reason: HOSPADM

## 2025-08-21 RX ORDER — SODIUM CHLORIDE, SODIUM LACTATE, POTASSIUM CHLORIDE, CALCIUM CHLORIDE 600; 310; 30; 20 MG/100ML; MG/100ML; MG/100ML; MG/100ML
INJECTION, SOLUTION INTRAVENOUS CONTINUOUS
Status: ACTIVE | OUTPATIENT
Start: 2025-08-21 | End: 2025-08-22

## 2025-08-21 RX ORDER — CEFTRIAXONE 2 G/1
2 INJECTION, POWDER, FOR SOLUTION INTRAMUSCULAR; INTRAVENOUS EVERY 24 HOURS
Status: DISCONTINUED | OUTPATIENT
Start: 2025-08-22 | End: 2025-08-23

## 2025-08-21 RX ORDER — AMOXICILLIN 250 MG
2 CAPSULE ORAL 2 TIMES DAILY PRN
Status: DISCONTINUED | OUTPATIENT
Start: 2025-08-21 | End: 2025-08-25 | Stop reason: HOSPADM

## 2025-08-21 RX ORDER — LACTULOSE 10 G/15ML
30 SOLUTION ORAL DAILY
Status: DISCONTINUED | OUTPATIENT
Start: 2025-08-21 | End: 2025-08-25 | Stop reason: HOSPADM

## 2025-08-21 RX ORDER — LITHIUM CARBONATE 300 MG/1
600 TABLET, FILM COATED, EXTENDED RELEASE ORAL AT BEDTIME
COMMUNITY

## 2025-08-21 RX ORDER — CEFTRIAXONE 2 G/1
2 INJECTION, POWDER, FOR SOLUTION INTRAMUSCULAR; INTRAVENOUS ONCE
Status: COMPLETED | OUTPATIENT
Start: 2025-08-21 | End: 2025-08-21

## 2025-08-21 RX ORDER — LACOSAMIDE 100 MG/1
100 TABLET ORAL 2 TIMES DAILY
Status: DISCONTINUED | OUTPATIENT
Start: 2025-08-21 | End: 2025-08-25 | Stop reason: HOSPADM

## 2025-08-21 RX ORDER — DIAZEPAM 10 MG/2ML
5 INJECTION, SOLUTION INTRAMUSCULAR; INTRAVENOUS
Status: DISCONTINUED | OUTPATIENT
Start: 2025-08-21 | End: 2025-08-25 | Stop reason: HOSPADM

## 2025-08-21 RX ORDER — RISPERIDONE 2 MG/1
2 TABLET ORAL 3 TIMES DAILY
Status: DISCONTINUED | OUTPATIENT
Start: 2025-08-21 | End: 2025-08-25 | Stop reason: HOSPADM

## 2025-08-21 RX ORDER — POLYETHYLENE GLYCOL 3350 17 G/17G
17 POWDER, FOR SOLUTION ORAL DAILY PRN
Status: DISCONTINUED | OUTPATIENT
Start: 2025-08-21 | End: 2025-08-25 | Stop reason: HOSPADM

## 2025-08-21 RX ORDER — LIDOCAINE 40 MG/G
CREAM TOPICAL
Status: DISCONTINUED | OUTPATIENT
Start: 2025-08-21 | End: 2025-08-25 | Stop reason: HOSPADM

## 2025-08-21 RX ORDER — DIVALPROEX SODIUM 500 MG/1
500 TABLET, FILM COATED, EXTENDED RELEASE ORAL 3 TIMES DAILY
Status: DISCONTINUED | OUTPATIENT
Start: 2025-08-21 | End: 2025-08-25 | Stop reason: HOSPADM

## 2025-08-21 RX ORDER — POLYETHYLENE GLYCOL 3350 17 G/17G
1 POWDER, FOR SOLUTION ORAL DAILY PRN
COMMUNITY

## 2025-08-21 RX ORDER — PROCHLORPERAZINE MALEATE 5 MG/1
10 TABLET ORAL EVERY 6 HOURS PRN
Status: DISCONTINUED | OUTPATIENT
Start: 2025-08-21 | End: 2025-08-25 | Stop reason: HOSPADM

## 2025-08-21 RX ORDER — TOPIRAMATE 100 MG/1
200 TABLET, FILM COATED ORAL 2 TIMES DAILY
Status: DISCONTINUED | OUTPATIENT
Start: 2025-08-21 | End: 2025-08-25 | Stop reason: HOSPADM

## 2025-08-21 RX ORDER — QUETIAPINE FUMARATE 25 MG/1
100 TABLET, FILM COATED ORAL 2 TIMES DAILY
Status: DISCONTINUED | OUTPATIENT
Start: 2025-08-21 | End: 2025-08-25 | Stop reason: HOSPADM

## 2025-08-21 RX ORDER — ACETAMINOPHEN 650 MG/1
650 SUPPOSITORY RECTAL EVERY 4 HOURS PRN
Status: DISCONTINUED | OUTPATIENT
Start: 2025-08-21 | End: 2025-08-25 | Stop reason: HOSPADM

## 2025-08-21 RX ORDER — ONDANSETRON 2 MG/ML
4 INJECTION INTRAMUSCULAR; INTRAVENOUS EVERY 6 HOURS PRN
Status: DISCONTINUED | OUTPATIENT
Start: 2025-08-21 | End: 2025-08-25 | Stop reason: HOSPADM

## 2025-08-21 RX ORDER — LITHIUM CARBONATE 300 MG/1
300 TABLET, FILM COATED, EXTENDED RELEASE ORAL EVERY MORNING
Status: DISCONTINUED | OUTPATIENT
Start: 2025-08-22 | End: 2025-08-25 | Stop reason: HOSPADM

## 2025-08-21 RX ORDER — BENZONATATE 100 MG/1
100 CAPSULE ORAL 3 TIMES DAILY PRN
Status: DISCONTINUED | OUTPATIENT
Start: 2025-08-21 | End: 2025-08-25 | Stop reason: HOSPADM

## 2025-08-21 RX ORDER — LOPERAMIDE HYDROCHLORIDE 2 MG/1
2 CAPSULE ORAL 4 TIMES DAILY PRN
Status: DISCONTINUED | OUTPATIENT
Start: 2025-08-21 | End: 2025-08-25 | Stop reason: HOSPADM

## 2025-08-21 RX ORDER — ONDANSETRON 4 MG/1
4 TABLET, ORALLY DISINTEGRATING ORAL EVERY 6 HOURS PRN
Status: DISCONTINUED | OUTPATIENT
Start: 2025-08-21 | End: 2025-08-25 | Stop reason: HOSPADM

## 2025-08-21 RX ORDER — LITHIUM CARBONATE 300 MG/1
600 TABLET, FILM COATED, EXTENDED RELEASE ORAL AT BEDTIME
Status: DISCONTINUED | OUTPATIENT
Start: 2025-08-21 | End: 2025-08-25 | Stop reason: HOSPADM

## 2025-08-21 RX ORDER — QUETIAPINE FUMARATE 25 MG/1
50 TABLET, FILM COATED ORAL DAILY
Status: DISCONTINUED | OUTPATIENT
Start: 2025-08-21 | End: 2025-08-25 | Stop reason: HOSPADM

## 2025-08-21 RX ORDER — DOXYCYCLINE 100 MG/10ML
100 INJECTION, POWDER, LYOPHILIZED, FOR SOLUTION INTRAVENOUS EVERY 12 HOURS
Status: DISCONTINUED | OUTPATIENT
Start: 2025-08-22 | End: 2025-08-24

## 2025-08-21 RX ORDER — CALCIUM CARBONATE 500 MG/1
1000 TABLET, CHEWABLE ORAL 4 TIMES DAILY PRN
Status: DISCONTINUED | OUTPATIENT
Start: 2025-08-21 | End: 2025-08-25 | Stop reason: HOSPADM

## 2025-08-21 RX ORDER — LEVOTHYROXINE SODIUM 75 UG/1
150 TABLET ORAL DAILY
Status: DISCONTINUED | OUTPATIENT
Start: 2025-08-22 | End: 2025-08-25 | Stop reason: HOSPADM

## 2025-08-21 RX ORDER — DOCUSATE SODIUM 100 MG/1
100 CAPSULE, LIQUID FILLED ORAL 2 TIMES DAILY
Status: DISCONTINUED | OUTPATIENT
Start: 2025-08-21 | End: 2025-08-25 | Stop reason: HOSPADM

## 2025-08-21 RX ORDER — GUAIFENESIN/DEXTROMETHORPHAN 100-10MG/5
10 SYRUP ORAL EVERY 4 HOURS PRN
Status: DISCONTINUED | OUTPATIENT
Start: 2025-08-21 | End: 2025-08-25 | Stop reason: HOSPADM

## 2025-08-21 RX ORDER — AMOXICILLIN 250 MG
1 CAPSULE ORAL 2 TIMES DAILY PRN
Status: DISCONTINUED | OUTPATIENT
Start: 2025-08-21 | End: 2025-08-25 | Stop reason: HOSPADM

## 2025-08-21 RX ORDER — DOXYCYCLINE 100 MG/10ML
100 INJECTION, POWDER, LYOPHILIZED, FOR SOLUTION INTRAVENOUS ONCE
Status: COMPLETED | OUTPATIENT
Start: 2025-08-21 | End: 2025-08-21

## 2025-08-21 RX ADMIN — DOXYCYCLINE 100 MG: 100 INJECTION, POWDER, LYOPHILIZED, FOR SOLUTION INTRAVENOUS at 14:08

## 2025-08-21 RX ADMIN — LACTULOSE 30 ML: 20 SOLUTION ORAL at 21:07

## 2025-08-21 RX ADMIN — TOPIRAMATE 200 MG: 100 TABLET, FILM COATED ORAL at 21:11

## 2025-08-21 RX ADMIN — SODIUM CHLORIDE 1000 ML: 0.9 INJECTION, SOLUTION INTRAVENOUS at 09:49

## 2025-08-21 RX ADMIN — DIVALPROEX SODIUM 500 MG: 500 TABLET, FILM COATED, EXTENDED RELEASE ORAL at 18:10

## 2025-08-21 RX ADMIN — MAGNESIUM HYDROXIDE 30 ML: 400 SUSPENSION ORAL at 21:07

## 2025-08-21 RX ADMIN — DOCUSATE SODIUM 100 MG: 100 CAPSULE, LIQUID FILLED ORAL at 21:08

## 2025-08-21 RX ADMIN — DIVALPROEX SODIUM 500 MG: 500 TABLET, FILM COATED, EXTENDED RELEASE ORAL at 21:11

## 2025-08-21 RX ADMIN — LITHIUM CARBONATE 600 MG: 300 TABLET, EXTENDED RELEASE ORAL at 21:11

## 2025-08-21 RX ADMIN — RISPERIDONE 2 MG: 2 TABLET, FILM COATED ORAL at 18:10

## 2025-08-21 RX ADMIN — RISPERIDONE 2 MG: 2 TABLET, FILM COATED ORAL at 21:11

## 2025-08-21 RX ADMIN — QUETIAPINE FUMARATE 50 MG: 25 TABLET ORAL at 18:03

## 2025-08-21 RX ADMIN — CEFTRIAXONE 2 G: 2 INJECTION, POWDER, FOR SOLUTION INTRAMUSCULAR; INTRAVENOUS at 13:33

## 2025-08-21 RX ADMIN — SODIUM CHLORIDE, SODIUM LACTATE, POTASSIUM CHLORIDE, AND CALCIUM CHLORIDE: .6; .31; .03; .02 INJECTION, SOLUTION INTRAVENOUS at 18:05

## 2025-08-21 RX ADMIN — LACOSAMIDE 100 MG: 100 TABLET, FILM COATED ORAL at 21:07

## 2025-08-21 RX ADMIN — QUETIAPINE FUMARATE 100 MG: 25 TABLET ORAL at 21:08

## 2025-08-21 ASSESSMENT — ACTIVITIES OF DAILY LIVING (ADL)
ADLS_ACUITY_SCORE: 55
ADLS_ACUITY_SCORE: 54
ADLS_ACUITY_SCORE: 55
ADLS_ACUITY_SCORE: 54

## 2025-08-21 ASSESSMENT — COLUMBIA-SUICIDE SEVERITY RATING SCALE - C-SSRS: IS THE PATIENT NOT ABLE TO COMPLETE C-SSRS: UNABLE TO VERBALIZE

## 2025-08-22 LAB
ANION GAP SERPL CALCULATED.3IONS-SCNC: 12 MMOL/L (ref 7–15)
BUN SERPL-MCNC: 10 MG/DL (ref 6–20)
CALCIUM SERPL-MCNC: 9.8 MG/DL (ref 8.8–10.4)
CHLORIDE SERPL-SCNC: 102 MMOL/L (ref 98–107)
CREAT SERPL-MCNC: 0.63 MG/DL (ref 0.67–1.17)
EGFRCR SERPLBLD CKD-EPI 2021: >90 ML/MIN/1.73M2
ERYTHROCYTE [DISTWIDTH] IN BLOOD BY AUTOMATED COUNT: 12.9 % (ref 10–15)
GLUCOSE SERPL-MCNC: 127 MG/DL (ref 70–99)
HCO3 SERPL-SCNC: 21 MMOL/L (ref 22–29)
HCT VFR BLD AUTO: 41.2 % (ref 40–53)
HGB BLD-MCNC: 13.8 G/DL (ref 13.3–17.7)
MCH RBC QN AUTO: 30.1 PG (ref 26.5–33)
MCHC RBC AUTO-ENTMCNC: 33.5 G/DL (ref 31.5–36.5)
MCV RBC AUTO: 90 FL (ref 78–100)
PLATELET # BLD AUTO: 198 10E3/UL (ref 150–450)
POTASSIUM SERPL-SCNC: 4.1 MMOL/L (ref 3.4–5.3)
RBC # BLD AUTO: 4.58 10E6/UL (ref 4.4–5.9)
SODIUM SERPL-SCNC: 135 MMOL/L (ref 135–145)
WBC # BLD AUTO: 9.11 10E3/UL (ref 4–11)

## 2025-08-22 PROCEDURE — 99232 SBSQ HOSP IP/OBS MODERATE 35: CPT | Performed by: INTERNAL MEDICINE

## 2025-08-22 PROCEDURE — 36415 COLL VENOUS BLD VENIPUNCTURE: CPT | Performed by: STUDENT IN AN ORGANIZED HEALTH CARE EDUCATION/TRAINING PROGRAM

## 2025-08-22 PROCEDURE — 120N000001 HC R&B MED SURG/OB

## 2025-08-22 PROCEDURE — 250N000013 HC RX MED GY IP 250 OP 250 PS 637: Performed by: STUDENT IN AN ORGANIZED HEALTH CARE EDUCATION/TRAINING PROGRAM

## 2025-08-22 PROCEDURE — 85014 HEMATOCRIT: CPT | Performed by: STUDENT IN AN ORGANIZED HEALTH CARE EDUCATION/TRAINING PROGRAM

## 2025-08-22 PROCEDURE — 250N000013 HC RX MED GY IP 250 OP 250 PS 637: Performed by: INTERNAL MEDICINE

## 2025-08-22 PROCEDURE — 80048 BASIC METABOLIC PNL TOTAL CA: CPT | Performed by: STUDENT IN AN ORGANIZED HEALTH CARE EDUCATION/TRAINING PROGRAM

## 2025-08-22 PROCEDURE — 250N000011 HC RX IP 250 OP 636: Performed by: STUDENT IN AN ORGANIZED HEALTH CARE EDUCATION/TRAINING PROGRAM

## 2025-08-22 RX ORDER — OLANZAPINE 5 MG/1
5 TABLET, ORALLY DISINTEGRATING ORAL ONCE
Status: COMPLETED | OUTPATIENT
Start: 2025-08-22 | End: 2025-08-22

## 2025-08-22 RX ADMIN — DOXYCYCLINE 100 MG: 100 INJECTION, POWDER, LYOPHILIZED, FOR SOLUTION INTRAVENOUS at 03:23

## 2025-08-22 RX ADMIN — RISPERIDONE 2 MG: 2 TABLET, FILM COATED ORAL at 08:34

## 2025-08-22 RX ADMIN — DOCUSATE SODIUM 100 MG: 100 CAPSULE, LIQUID FILLED ORAL at 20:17

## 2025-08-22 RX ADMIN — LACTULOSE 30 ML: 20 SOLUTION ORAL at 20:18

## 2025-08-22 RX ADMIN — LACOSAMIDE 100 MG: 100 TABLET, FILM COATED ORAL at 20:16

## 2025-08-22 RX ADMIN — RISPERIDONE 2 MG: 2 TABLET, FILM COATED ORAL at 15:05

## 2025-08-22 RX ADMIN — TOPIRAMATE 200 MG: 100 TABLET, FILM COATED ORAL at 08:38

## 2025-08-22 RX ADMIN — FLUOXETINE HYDROCHLORIDE 20 MG: 20 CAPSULE ORAL at 08:37

## 2025-08-22 RX ADMIN — DOXYCYCLINE 100 MG: 100 INJECTION, POWDER, LYOPHILIZED, FOR SOLUTION INTRAVENOUS at 13:31

## 2025-08-22 RX ADMIN — LEVOTHYROXINE SODIUM 150 MCG: 0.07 TABLET ORAL at 08:36

## 2025-08-22 RX ADMIN — LITHIUM CARBONATE 600 MG: 300 TABLET, EXTENDED RELEASE ORAL at 21:18

## 2025-08-22 RX ADMIN — QUETIAPINE FUMARATE 50 MG: 25 TABLET ORAL at 15:01

## 2025-08-22 RX ADMIN — LACOSAMIDE 100 MG: 100 TABLET, FILM COATED ORAL at 08:36

## 2025-08-22 RX ADMIN — DIVALPROEX SODIUM 500 MG: 500 TABLET, FILM COATED, EXTENDED RELEASE ORAL at 15:05

## 2025-08-22 RX ADMIN — CEFTRIAXONE 2 G: 2 INJECTION, POWDER, FOR SOLUTION INTRAMUSCULAR; INTRAVENOUS at 12:12

## 2025-08-22 RX ADMIN — LITHIUM CARBONATE 300 MG: 300 TABLET, EXTENDED RELEASE ORAL at 08:39

## 2025-08-22 RX ADMIN — DIVALPROEX SODIUM 500 MG: 500 TABLET, FILM COATED, EXTENDED RELEASE ORAL at 08:33

## 2025-08-22 RX ADMIN — TOPIRAMATE 200 MG: 100 TABLET, FILM COATED ORAL at 20:17

## 2025-08-22 RX ADMIN — DOCUSATE SODIUM 100 MG: 100 CAPSULE, LIQUID FILLED ORAL at 08:34

## 2025-08-22 RX ADMIN — MAGNESIUM HYDROXIDE 30 ML: 400 SUSPENSION ORAL at 20:18

## 2025-08-22 RX ADMIN — GUAIFENESIN SYRUP AND DEXTROMETHORPHAN 10 ML: 100; 10 SYRUP ORAL at 03:40

## 2025-08-22 RX ADMIN — DIVALPROEX SODIUM 500 MG: 500 TABLET, FILM COATED, EXTENDED RELEASE ORAL at 21:19

## 2025-08-22 RX ADMIN — RISPERIDONE 2 MG: 2 TABLET, FILM COATED ORAL at 21:19

## 2025-08-22 RX ADMIN — OLANZAPINE 5 MG: 5 TABLET, ORALLY DISINTEGRATING ORAL at 08:39

## 2025-08-22 RX ADMIN — QUETIAPINE FUMARATE 100 MG: 25 TABLET ORAL at 20:15

## 2025-08-22 RX ADMIN — QUETIAPINE FUMARATE 100 MG: 25 TABLET ORAL at 06:57

## 2025-08-22 RX ADMIN — Medication 5 MG: at 21:26

## 2025-08-22 ASSESSMENT — ACTIVITIES OF DAILY LIVING (ADL)
ADLS_ACUITY_SCORE: 62
ADLS_ACUITY_SCORE: 65
ADLS_ACUITY_SCORE: 75
ADLS_ACUITY_SCORE: 68
ADLS_ACUITY_SCORE: 75
ADLS_ACUITY_SCORE: 65
ADLS_ACUITY_SCORE: 75
ADLS_ACUITY_SCORE: 62
ADLS_ACUITY_SCORE: 66
ADLS_ACUITY_SCORE: 75
ADLS_ACUITY_SCORE: 62
ADLS_ACUITY_SCORE: 68
ADLS_ACUITY_SCORE: 74
ADLS_ACUITY_SCORE: 75
ADLS_ACUITY_SCORE: 75
ADLS_ACUITY_SCORE: 65
ADLS_ACUITY_SCORE: 66
ADLS_ACUITY_SCORE: 62
ADLS_ACUITY_SCORE: 68
ADLS_ACUITY_SCORE: 66

## 2025-08-23 PROCEDURE — 120N000001 HC R&B MED SURG/OB

## 2025-08-23 PROCEDURE — 250N000011 HC RX IP 250 OP 636: Performed by: INTERNAL MEDICINE

## 2025-08-23 PROCEDURE — 99232 SBSQ HOSP IP/OBS MODERATE 35: CPT | Performed by: INTERNAL MEDICINE

## 2025-08-23 PROCEDURE — 250N000011 HC RX IP 250 OP 636: Performed by: STUDENT IN AN ORGANIZED HEALTH CARE EDUCATION/TRAINING PROGRAM

## 2025-08-23 PROCEDURE — 250N000013 HC RX MED GY IP 250 OP 250 PS 637: Performed by: INTERNAL MEDICINE

## 2025-08-23 PROCEDURE — 250N000013 HC RX MED GY IP 250 OP 250 PS 637: Performed by: STUDENT IN AN ORGANIZED HEALTH CARE EDUCATION/TRAINING PROGRAM

## 2025-08-23 RX ORDER — QUETIAPINE FUMARATE 25 MG/1
50 TABLET, FILM COATED ORAL 3 TIMES DAILY PRN
Status: DISCONTINUED | OUTPATIENT
Start: 2025-08-23 | End: 2025-08-25 | Stop reason: HOSPADM

## 2025-08-23 RX ORDER — ZIPRASIDONE MESYLATE 20 MG/ML
10 INJECTION, POWDER, LYOPHILIZED, FOR SOLUTION INTRAMUSCULAR EVERY 4 HOURS PRN
Status: DISCONTINUED | OUTPATIENT
Start: 2025-08-23 | End: 2025-08-25 | Stop reason: HOSPADM

## 2025-08-23 RX ADMIN — DOCUSATE SODIUM 100 MG: 100 CAPSULE, LIQUID FILLED ORAL at 21:11

## 2025-08-23 RX ADMIN — MAGNESIUM HYDROXIDE 30 ML: 400 SUSPENSION ORAL at 21:12

## 2025-08-23 RX ADMIN — CEFTRIAXONE 2 G: 2 INJECTION, POWDER, FOR SOLUTION INTRAMUSCULAR; INTRAVENOUS at 11:14

## 2025-08-23 RX ADMIN — DIVALPROEX SODIUM 500 MG: 500 TABLET, FILM COATED, EXTENDED RELEASE ORAL at 08:14

## 2025-08-23 RX ADMIN — DOCUSATE SODIUM 100 MG: 100 CAPSULE, LIQUID FILLED ORAL at 08:14

## 2025-08-23 RX ADMIN — RISPERIDONE 2 MG: 2 TABLET, FILM COATED ORAL at 08:16

## 2025-08-23 RX ADMIN — QUETIAPINE FUMARATE 50 MG: 25 TABLET ORAL at 13:53

## 2025-08-23 RX ADMIN — DIVALPROEX SODIUM 500 MG: 500 TABLET, FILM COATED, EXTENDED RELEASE ORAL at 15:33

## 2025-08-23 RX ADMIN — ZIPRASIDONE MESYLATE 10 MG: 20 INJECTION, POWDER, LYOPHILIZED, FOR SOLUTION INTRAMUSCULAR at 12:32

## 2025-08-23 RX ADMIN — QUETIAPINE FUMARATE 50 MG: 25 TABLET ORAL at 11:13

## 2025-08-23 RX ADMIN — QUETIAPINE FUMARATE 100 MG: 25 TABLET ORAL at 21:11

## 2025-08-23 RX ADMIN — QUETIAPINE FUMARATE 100 MG: 25 TABLET ORAL at 08:16

## 2025-08-23 RX ADMIN — DOXYCYCLINE 100 MG: 100 INJECTION, POWDER, LYOPHILIZED, FOR SOLUTION INTRAVENOUS at 01:57

## 2025-08-23 RX ADMIN — DIVALPROEX SODIUM 500 MG: 500 TABLET, FILM COATED, EXTENDED RELEASE ORAL at 21:11

## 2025-08-23 RX ADMIN — LITHIUM CARBONATE 300 MG: 300 TABLET, EXTENDED RELEASE ORAL at 08:15

## 2025-08-23 RX ADMIN — RISPERIDONE 2 MG: 2 TABLET, FILM COATED ORAL at 15:33

## 2025-08-23 RX ADMIN — TOPIRAMATE 200 MG: 100 TABLET, FILM COATED ORAL at 08:14

## 2025-08-23 RX ADMIN — TOPIRAMATE 200 MG: 100 TABLET, FILM COATED ORAL at 21:11

## 2025-08-23 RX ADMIN — RISPERIDONE 2 MG: 2 TABLET, FILM COATED ORAL at 21:11

## 2025-08-23 RX ADMIN — LEVOTHYROXINE SODIUM 150 MCG: 0.07 TABLET ORAL at 08:14

## 2025-08-23 RX ADMIN — LACOSAMIDE 100 MG: 100 TABLET, FILM COATED ORAL at 21:11

## 2025-08-23 RX ADMIN — FLUOXETINE HYDROCHLORIDE 20 MG: 20 CAPSULE ORAL at 08:16

## 2025-08-23 RX ADMIN — LACOSAMIDE 100 MG: 100 TABLET, FILM COATED ORAL at 08:15

## 2025-08-23 RX ADMIN — ZIPRASIDONE MESYLATE 10 MG: 20 INJECTION, POWDER, LYOPHILIZED, FOR SOLUTION INTRAMUSCULAR at 18:18

## 2025-08-23 RX ADMIN — DOXYCYCLINE 100 MG: 100 INJECTION, POWDER, LYOPHILIZED, FOR SOLUTION INTRAVENOUS at 12:32

## 2025-08-23 RX ADMIN — LITHIUM CARBONATE 600 MG: 300 TABLET, EXTENDED RELEASE ORAL at 21:12

## 2025-08-23 ASSESSMENT — ACTIVITIES OF DAILY LIVING (ADL)
ADLS_ACUITY_SCORE: 70
ADLS_ACUITY_SCORE: 70
ADLS_ACUITY_SCORE: 75

## 2025-08-24 LAB
ANION GAP SERPL CALCULATED.3IONS-SCNC: 10 MMOL/L (ref 7–15)
BUN SERPL-MCNC: 13.1 MG/DL (ref 6–20)
CALCIUM SERPL-MCNC: 10.1 MG/DL (ref 8.8–10.4)
CHLORIDE SERPL-SCNC: 103 MMOL/L (ref 98–107)
CREAT SERPL-MCNC: 0.74 MG/DL (ref 0.67–1.17)
EGFRCR SERPLBLD CKD-EPI 2021: >90 ML/MIN/1.73M2
GLUCOSE SERPL-MCNC: 114 MG/DL (ref 70–99)
HCO3 SERPL-SCNC: 25 MMOL/L (ref 22–29)
POTASSIUM SERPL-SCNC: 3.9 MMOL/L (ref 3.4–5.3)
SODIUM SERPL-SCNC: 138 MMOL/L (ref 135–145)

## 2025-08-24 PROCEDURE — 120N000001 HC R&B MED SURG/OB

## 2025-08-24 PROCEDURE — 250N000013 HC RX MED GY IP 250 OP 250 PS 637: Performed by: STUDENT IN AN ORGANIZED HEALTH CARE EDUCATION/TRAINING PROGRAM

## 2025-08-24 PROCEDURE — 250N000011 HC RX IP 250 OP 636: Performed by: INTERNAL MEDICINE

## 2025-08-24 PROCEDURE — 250N000013 HC RX MED GY IP 250 OP 250 PS 637: Performed by: INTERNAL MEDICINE

## 2025-08-24 PROCEDURE — 36415 COLL VENOUS BLD VENIPUNCTURE: CPT | Performed by: INTERNAL MEDICINE

## 2025-08-24 PROCEDURE — 80048 BASIC METABOLIC PNL TOTAL CA: CPT | Performed by: INTERNAL MEDICINE

## 2025-08-24 PROCEDURE — 99232 SBSQ HOSP IP/OBS MODERATE 35: CPT | Performed by: INTERNAL MEDICINE

## 2025-08-24 PROCEDURE — 250N000011 HC RX IP 250 OP 636: Performed by: STUDENT IN AN ORGANIZED HEALTH CARE EDUCATION/TRAINING PROGRAM

## 2025-08-24 RX ORDER — DOXYCYCLINE 100 MG/1
100 CAPSULE ORAL EVERY 12 HOURS SCHEDULED
Status: DISCONTINUED | OUTPATIENT
Start: 2025-08-24 | End: 2025-08-25 | Stop reason: HOSPADM

## 2025-08-24 RX ORDER — CEFPODOXIME PROXETIL 200 MG/1
200 TABLET, FILM COATED ORAL 2 TIMES DAILY
Status: DISCONTINUED | OUTPATIENT
Start: 2025-08-24 | End: 2025-08-25 | Stop reason: HOSPADM

## 2025-08-24 RX ADMIN — LACOSAMIDE 100 MG: 100 TABLET, FILM COATED ORAL at 08:04

## 2025-08-24 RX ADMIN — CEFPODOXIME PROXETIL 200 MG: 200 TABLET, FILM COATED ORAL at 21:03

## 2025-08-24 RX ADMIN — DIVALPROEX SODIUM 500 MG: 500 TABLET, FILM COATED, EXTENDED RELEASE ORAL at 15:47

## 2025-08-24 RX ADMIN — ZIPRASIDONE MESYLATE 10 MG: 20 INJECTION, POWDER, LYOPHILIZED, FOR SOLUTION INTRAMUSCULAR at 03:43

## 2025-08-24 RX ADMIN — QUETIAPINE FUMARATE 50 MG: 25 TABLET ORAL at 02:42

## 2025-08-24 RX ADMIN — LEVOTHYROXINE SODIUM 150 MCG: 0.07 TABLET ORAL at 07:54

## 2025-08-24 RX ADMIN — LACTULOSE 30 ML: 20 SOLUTION ORAL at 21:01

## 2025-08-24 RX ADMIN — RISPERIDONE 2 MG: 2 TABLET, FILM COATED ORAL at 08:03

## 2025-08-24 RX ADMIN — LACOSAMIDE 100 MG: 100 TABLET, FILM COATED ORAL at 21:02

## 2025-08-24 RX ADMIN — Medication 5 MG: at 22:19

## 2025-08-24 RX ADMIN — TOPIRAMATE 200 MG: 100 TABLET, FILM COATED ORAL at 21:02

## 2025-08-24 RX ADMIN — DIVALPROEX SODIUM 500 MG: 500 TABLET, FILM COATED, EXTENDED RELEASE ORAL at 21:02

## 2025-08-24 RX ADMIN — DIVALPROEX SODIUM 500 MG: 500 TABLET, FILM COATED, EXTENDED RELEASE ORAL at 08:02

## 2025-08-24 RX ADMIN — DOXYCYCLINE 100 MG: 100 INJECTION, POWDER, LYOPHILIZED, FOR SOLUTION INTRAVENOUS at 02:05

## 2025-08-24 RX ADMIN — DOCUSATE SODIUM 100 MG: 100 CAPSULE, LIQUID FILLED ORAL at 08:02

## 2025-08-24 RX ADMIN — TOPIRAMATE 200 MG: 100 TABLET, FILM COATED ORAL at 08:02

## 2025-08-24 RX ADMIN — QUETIAPINE FUMARATE 100 MG: 25 TABLET ORAL at 21:01

## 2025-08-24 RX ADMIN — QUETIAPINE FUMARATE 100 MG: 25 TABLET ORAL at 08:04

## 2025-08-24 RX ADMIN — LITHIUM CARBONATE 300 MG: 300 TABLET, EXTENDED RELEASE ORAL at 08:03

## 2025-08-24 RX ADMIN — FLUOXETINE HYDROCHLORIDE 20 MG: 20 CAPSULE ORAL at 08:03

## 2025-08-24 RX ADMIN — RISPERIDONE 2 MG: 2 TABLET, FILM COATED ORAL at 15:47

## 2025-08-24 RX ADMIN — LITHIUM CARBONATE 600 MG: 300 TABLET, EXTENDED RELEASE ORAL at 21:01

## 2025-08-24 RX ADMIN — RISPERIDONE 2 MG: 2 TABLET, FILM COATED ORAL at 21:02

## 2025-08-24 RX ADMIN — DOXYCYCLINE HYCLATE 100 MG: 100 CAPSULE ORAL at 21:02

## 2025-08-24 RX ADMIN — QUETIAPINE FUMARATE 50 MG: 25 TABLET ORAL at 11:56

## 2025-08-24 RX ADMIN — DOCUSATE SODIUM 100 MG: 100 CAPSULE, LIQUID FILLED ORAL at 21:02

## 2025-08-24 RX ADMIN — QUETIAPINE FUMARATE 50 MG: 25 TABLET ORAL at 14:01

## 2025-08-24 RX ADMIN — MAGNESIUM HYDROXIDE 30 ML: 400 SUSPENSION ORAL at 21:01

## 2025-08-24 ASSESSMENT — ACTIVITIES OF DAILY LIVING (ADL)
ADLS_ACUITY_SCORE: 81
ADLS_ACUITY_SCORE: 76
ADLS_ACUITY_SCORE: 77
ADLS_ACUITY_SCORE: 81
ADLS_ACUITY_SCORE: 81
ADLS_ACUITY_SCORE: 76
ADLS_ACUITY_SCORE: 81
ADLS_ACUITY_SCORE: 81
ADLS_ACUITY_SCORE: 77
ADLS_ACUITY_SCORE: 77
ADLS_ACUITY_SCORE: 81
ADLS_ACUITY_SCORE: 77
ADLS_ACUITY_SCORE: 81
ADLS_ACUITY_SCORE: 81
ADLS_ACUITY_SCORE: 77
ADLS_ACUITY_SCORE: 81

## 2025-08-25 VITALS
HEIGHT: 71 IN | RESPIRATION RATE: 18 BRPM | SYSTOLIC BLOOD PRESSURE: 145 MMHG | HEART RATE: 85 BPM | TEMPERATURE: 97.3 F | WEIGHT: 256.7 LBS | OXYGEN SATURATION: 94 % | DIASTOLIC BLOOD PRESSURE: 85 MMHG | BODY MASS INDEX: 35.94 KG/M2

## 2025-08-25 PROCEDURE — 250N000013 HC RX MED GY IP 250 OP 250 PS 637: Performed by: INTERNAL MEDICINE

## 2025-08-25 PROCEDURE — 99239 HOSP IP/OBS DSCHRG MGMT >30: CPT | Performed by: INTERNAL MEDICINE

## 2025-08-25 PROCEDURE — 250N000013 HC RX MED GY IP 250 OP 250 PS 637: Performed by: STUDENT IN AN ORGANIZED HEALTH CARE EDUCATION/TRAINING PROGRAM

## 2025-08-25 RX ADMIN — DIVALPROEX SODIUM 500 MG: 500 TABLET, FILM COATED, EXTENDED RELEASE ORAL at 08:38

## 2025-08-25 RX ADMIN — DOCUSATE SODIUM 100 MG: 100 CAPSULE, LIQUID FILLED ORAL at 08:37

## 2025-08-25 RX ADMIN — CEFPODOXIME PROXETIL 200 MG: 200 TABLET, FILM COATED ORAL at 08:53

## 2025-08-25 RX ADMIN — LEVOTHYROXINE SODIUM 150 MCG: 0.07 TABLET ORAL at 08:38

## 2025-08-25 RX ADMIN — DOXYCYCLINE HYCLATE 100 MG: 100 CAPSULE ORAL at 08:37

## 2025-08-25 RX ADMIN — RISPERIDONE 2 MG: 2 TABLET, FILM COATED ORAL at 08:37

## 2025-08-25 RX ADMIN — LACOSAMIDE 100 MG: 100 TABLET, FILM COATED ORAL at 08:37

## 2025-08-25 RX ADMIN — QUETIAPINE FUMARATE 100 MG: 25 TABLET ORAL at 08:37

## 2025-08-25 RX ADMIN — LITHIUM CARBONATE 300 MG: 300 TABLET, EXTENDED RELEASE ORAL at 08:38

## 2025-08-25 RX ADMIN — TOPIRAMATE 200 MG: 100 TABLET, FILM COATED ORAL at 08:38

## 2025-08-25 RX ADMIN — FLUOXETINE HYDROCHLORIDE 20 MG: 20 CAPSULE ORAL at 08:38

## 2025-08-25 ASSESSMENT — ACTIVITIES OF DAILY LIVING (ADL)
ADLS_ACUITY_SCORE: 77

## 2025-08-26 ENCOUNTER — PATIENT OUTREACH (OUTPATIENT)
Dept: CARE COORDINATION | Facility: CLINIC | Age: 45
End: 2025-08-26
Payer: MEDICARE

## 2025-08-26 LAB
BACTERIA SPEC CULT: NO GROWTH
BACTERIA SPEC CULT: NO GROWTH

## 2025-08-28 LAB — LACOSAMIDE SERPL-MCNC: 3.8 UG/ML

## (undated) RX ORDER — PROPOFOL 10 MG/ML
INJECTION, EMULSION INTRAVENOUS
Status: DISPENSED
Start: 2023-01-12

## (undated) RX ORDER — FENTANYL CITRATE 50 UG/ML
INJECTION, SOLUTION INTRAMUSCULAR; INTRAVENOUS
Status: DISPENSED
Start: 2025-07-11